# Patient Record
Sex: FEMALE | Race: WHITE | Employment: OTHER | URBAN - NONMETROPOLITAN AREA
[De-identification: names, ages, dates, MRNs, and addresses within clinical notes are randomized per-mention and may not be internally consistent; named-entity substitution may affect disease eponyms.]

---

## 2017-02-15 ENCOUNTER — TELEPHONE (OUTPATIENT)
Dept: NEUROLOGY | Age: 67
End: 2017-02-15

## 2017-02-27 DIAGNOSIS — G43.719 INTRACTABLE CHRONIC MIGRAINE WITHOUT AURA AND WITHOUT STATUS MIGRAINOSUS: Primary | ICD-10-CM

## 2017-03-23 ENCOUNTER — HOSPITAL ENCOUNTER (OUTPATIENT)
Dept: PAIN MANAGEMENT | Age: 67
Discharge: HOME | End: 2017-03-23
Payer: MEDICARE

## 2017-03-23 VITALS
WEIGHT: 164 LBS | TEMPERATURE: 97.1 F | RESPIRATION RATE: 16 BRPM | HEIGHT: 66 IN | SYSTOLIC BLOOD PRESSURE: 119 MMHG | DIASTOLIC BLOOD PRESSURE: 74 MMHG | HEART RATE: 74 BPM | OXYGEN SATURATION: 98 % | BODY MASS INDEX: 26.36 KG/M2

## 2017-03-23 DIAGNOSIS — M54.41 RIGHT-SIDED LOW BACK PAIN WITH RIGHT-SIDED SCIATICA, UNSPECIFIED CHRONICITY: ICD-10-CM

## 2017-03-23 DIAGNOSIS — M47.816 LUMBAR FACET ARTHROPATHY: ICD-10-CM

## 2017-03-23 DIAGNOSIS — G89.29 CHRONIC RIGHT-SIDED LOW BACK PAIN WITHOUT SCIATICA: ICD-10-CM

## 2017-03-23 DIAGNOSIS — M54.2 NECK AND SHOULDER PAIN: ICD-10-CM

## 2017-03-23 DIAGNOSIS — R51.9 FREQUENT HEADACHES: ICD-10-CM

## 2017-03-23 DIAGNOSIS — M25.519 NECK AND SHOULDER PAIN: ICD-10-CM

## 2017-03-23 DIAGNOSIS — G44.221 CHRONIC TENSION-TYPE HEADACHE, INTRACTABLE: ICD-10-CM

## 2017-03-23 DIAGNOSIS — M54.50 CHRONIC RIGHT-SIDED LOW BACK PAIN WITHOUT SCIATICA: ICD-10-CM

## 2017-03-23 PROCEDURE — 80307 DRUG TEST PRSMV CHEM ANLYZR: CPT

## 2017-03-23 PROCEDURE — 99214 OFFICE O/P EST MOD 30 MIN: CPT

## 2017-03-23 RX ORDER — OXYCODONE HYDROCHLORIDE 10 MG/1
10 TABLET ORAL 2 TIMES DAILY
Qty: 60 TABLET | Refills: 0 | Status: SHIPPED | OUTPATIENT
Start: 2017-03-23 | End: 2017-05-25 | Stop reason: SDUPTHER

## 2017-03-23 RX ORDER — METHOCARBAMOL 500 MG/1
500 TABLET, FILM COATED ORAL 2 TIMES DAILY PRN
Qty: 60 TABLET | Refills: 2 | Status: SHIPPED | OUTPATIENT
Start: 2017-03-23 | End: 2017-08-11 | Stop reason: SDUPTHER

## 2017-03-23 RX ORDER — ONDANSETRON 4 MG/1
4 TABLET, ORALLY DISINTEGRATING ORAL EVERY 8 HOURS PRN
Qty: 45 TABLET | Refills: 2 | Status: SHIPPED | OUTPATIENT
Start: 2017-03-23 | End: 2019-05-01 | Stop reason: ALTCHOICE

## 2017-03-23 RX ORDER — HYDROXYCHLOROQUINE SULFATE 200 MG/1
1 TABLET, FILM COATED ORAL 2 TIMES DAILY
COMMUNITY

## 2017-03-23 ASSESSMENT — ACTIVITIES OF DAILY LIVING (ADL): EFFECT OF PAIN ON DAILY ACTIVITIES: DAILY ACTIVITIES

## 2017-03-23 ASSESSMENT — PAIN DESCRIPTION - ONSET: ONSET: ON-GOING

## 2017-03-23 ASSESSMENT — PAIN DESCRIPTION - PROGRESSION: CLINICAL_PROGRESSION: NOT CHANGED

## 2017-03-23 ASSESSMENT — PAIN DESCRIPTION - DESCRIPTORS: DESCRIPTORS: ACHING;CONSTANT;THROBBING

## 2017-03-23 ASSESSMENT — PAIN DESCRIPTION - PAIN TYPE: TYPE: CHRONIC PAIN

## 2017-03-23 ASSESSMENT — PAIN DESCRIPTION - ORIENTATION: ORIENTATION: LOWER

## 2017-03-23 ASSESSMENT — PAIN SCALES - GENERAL: PAINLEVEL_OUTOF10: 7

## 2017-03-23 ASSESSMENT — PAIN DESCRIPTION - LOCATION: LOCATION: BACK;NECK

## 2017-03-23 ASSESSMENT — PAIN DESCRIPTION - FREQUENCY: FREQUENCY: CONTINUOUS

## 2017-03-24 ENCOUNTER — TRANSCRIBE ORDERS (OUTPATIENT)
Dept: PHYSICAL THERAPY | Facility: HOSPITAL | Age: 67
End: 2017-03-24

## 2017-03-24 DIAGNOSIS — M50.30 DDD (DEGENERATIVE DISC DISEASE), CERVICAL: Primary | ICD-10-CM

## 2017-03-24 DIAGNOSIS — M51.36 DDD (DEGENERATIVE DISC DISEASE), LUMBAR: ICD-10-CM

## 2017-03-27 ENCOUNTER — TELEPHONE (OUTPATIENT)
Dept: NEUROLOGY | Age: 67
End: 2017-03-27

## 2017-03-27 ENCOUNTER — TELEPHONE (OUTPATIENT)
Dept: PAIN MANAGEMENT | Age: 67
End: 2017-03-27

## 2017-03-29 LAB
ALPRAZOLAM, URINE CONFIRM: 528 NG/ML
ALPRAZOLAM, URINE: POSITIVE
AMPHETAMINES, URINE: NEGATIVE NG/ML
BARBITURATES, URINE: NEGATIVE NG/ML
BENZODIAZEPINES, URINE: ABNORMAL NG/ML
BENZODIAZEPINES, URINE: POSITIVE NG/ML
CANNABINOIDS, URINE: NEGATIVE NG/ML
CLONAZEPAM, URINE: NEGATIVE
COCAINE METABOLITE, URINE: NEGATIVE NG/ML
CREATININE, URINE: 70.9 MG/DL (ref 20–300)
ETHANOL U, QUAN: NEGATIVE %
FENTANYL URINE: NEGATIVE PG/ML
FLURAZEPAM, UR: NEGATIVE
LORAZEPAM, URINE: NEGATIVE
MEPERIDINE, UR: NEGATIVE NG/ML
METHADONE SCREEN, URINE: NEGATIVE NG/ML
MIDAZOLAM, URINE: NEGATIVE
NORDIAZEPAM, URINE: NEGATIVE
OPIATES, URINE: NEGATIVE NG/ML
OXAZEPAM, URINE: NEGATIVE
OXYCODONE/OXYMORPHONE, UR: NEGATIVE NG/ML
PH, URINE: 5.1 (ref 4.5–8.9)
PHENCYCLIDINE, URINE: NEGATIVE NG/ML
PROPOXYPHENE, URINE: NEGATIVE NG/ML
TEMAZEPAM, URINE: NEGATIVE
TRIAZOLAM, URINE: NEGATIVE

## 2017-04-05 ENCOUNTER — OFFICE VISIT (OUTPATIENT)
Dept: NEUROLOGY | Age: 67
End: 2017-04-05
Payer: MEDICARE

## 2017-04-05 VITALS
BODY MASS INDEX: 26.49 KG/M2 | HEIGHT: 65 IN | WEIGHT: 159 LBS | SYSTOLIC BLOOD PRESSURE: 118 MMHG | DIASTOLIC BLOOD PRESSURE: 72 MMHG

## 2017-04-05 DIAGNOSIS — G43.719 INTRACTABLE CHRONIC MIGRAINE WITHOUT AURA AND WITHOUT STATUS MIGRAINOSUS: Primary | ICD-10-CM

## 2017-04-05 PROCEDURE — 64615 CHEMODENERV MUSC MIGRAINE: CPT | Performed by: PSYCHIATRY & NEUROLOGY

## 2017-04-05 PROCEDURE — 1036F TOBACCO NON-USER: CPT | Performed by: PSYCHIATRY & NEUROLOGY

## 2017-05-08 ENCOUNTER — TRANSCRIBE ORDERS (OUTPATIENT)
Dept: PHYSICAL THERAPY | Facility: HOSPITAL | Age: 67
End: 2017-05-08

## 2017-05-08 DIAGNOSIS — M51.36 DDD (DEGENERATIVE DISC DISEASE), LUMBAR: Primary | ICD-10-CM

## 2017-05-09 ENCOUNTER — APPOINTMENT (OUTPATIENT)
Dept: PHYSICAL THERAPY | Facility: HOSPITAL | Age: 67
End: 2017-05-09

## 2017-05-17 ENCOUNTER — HOSPITAL ENCOUNTER (OUTPATIENT)
Dept: PHYSICAL THERAPY | Facility: HOSPITAL | Age: 67
Setting detail: THERAPIES SERIES
Discharge: HOME | End: 2017-05-17

## 2017-05-17 DIAGNOSIS — M54.2 NECK PAIN: ICD-10-CM

## 2017-05-17 DIAGNOSIS — M51.36 DDD (DEGENERATIVE DISC DISEASE), LUMBAR: Primary | ICD-10-CM

## 2017-05-17 PROCEDURE — G8979 MOBILITY GOAL STATUS: HCPCS | Performed by: PHYSICAL THERAPIST

## 2017-05-17 PROCEDURE — 97110 THERAPEUTIC EXERCISES: CPT | Performed by: PHYSICAL THERAPIST

## 2017-05-17 PROCEDURE — G8978 MOBILITY CURRENT STATUS: HCPCS | Performed by: PHYSICAL THERAPIST

## 2017-05-17 PROCEDURE — 97161 PT EVAL LOW COMPLEX 20 MIN: CPT | Performed by: PHYSICAL THERAPIST

## 2017-05-19 ENCOUNTER — APPOINTMENT (OUTPATIENT)
Dept: PHYSICAL THERAPY | Facility: HOSPITAL | Age: 67
End: 2017-05-19

## 2017-05-23 ENCOUNTER — HOSPITAL ENCOUNTER (OUTPATIENT)
Dept: PHYSICAL THERAPY | Facility: HOSPITAL | Age: 67
Setting detail: THERAPIES SERIES
Discharge: HOME | End: 2017-05-23

## 2017-05-23 DIAGNOSIS — M51.36 DDD (DEGENERATIVE DISC DISEASE), LUMBAR: Primary | ICD-10-CM

## 2017-05-23 DIAGNOSIS — M54.2 NECK PAIN: ICD-10-CM

## 2017-05-23 PROCEDURE — 97140 MANUAL THERAPY 1/> REGIONS: CPT

## 2017-05-23 PROCEDURE — 97110 THERAPEUTIC EXERCISES: CPT

## 2017-05-25 ENCOUNTER — HOSPITAL ENCOUNTER (OUTPATIENT)
Dept: PHYSICAL THERAPY | Facility: HOSPITAL | Age: 67
Setting detail: THERAPIES SERIES
Discharge: HOME | End: 2017-05-25

## 2017-05-25 ENCOUNTER — HOSPITAL ENCOUNTER (OUTPATIENT)
Dept: PAIN MANAGEMENT | Age: 67
Discharge: HOME | End: 2017-05-25
Payer: MEDICARE

## 2017-05-25 VITALS
WEIGHT: 165 LBS | DIASTOLIC BLOOD PRESSURE: 71 MMHG | HEIGHT: 66 IN | TEMPERATURE: 97.4 F | HEART RATE: 67 BPM | BODY MASS INDEX: 26.52 KG/M2 | SYSTOLIC BLOOD PRESSURE: 103 MMHG | OXYGEN SATURATION: 95 % | RESPIRATION RATE: 16 BRPM

## 2017-05-25 DIAGNOSIS — M25.519 NECK AND SHOULDER PAIN: ICD-10-CM

## 2017-05-25 DIAGNOSIS — R51.9 FREQUENT HEADACHES: ICD-10-CM

## 2017-05-25 DIAGNOSIS — M54.41 RIGHT-SIDED LOW BACK PAIN WITH RIGHT-SIDED SCIATICA, UNSPECIFIED CHRONICITY: ICD-10-CM

## 2017-05-25 DIAGNOSIS — M51.36 DDD (DEGENERATIVE DISC DISEASE), LUMBAR: Primary | ICD-10-CM

## 2017-05-25 DIAGNOSIS — G89.29 CHRONIC RIGHT-SIDED LOW BACK PAIN WITHOUT SCIATICA: ICD-10-CM

## 2017-05-25 DIAGNOSIS — M54.50 CHRONIC RIGHT-SIDED LOW BACK PAIN WITHOUT SCIATICA: ICD-10-CM

## 2017-05-25 DIAGNOSIS — G44.221 CHRONIC TENSION-TYPE HEADACHE, INTRACTABLE: ICD-10-CM

## 2017-05-25 DIAGNOSIS — M47.816 LUMBAR FACET ARTHROPATHY: ICD-10-CM

## 2017-05-25 DIAGNOSIS — M54.2 NECK AND SHOULDER PAIN: ICD-10-CM

## 2017-05-25 DIAGNOSIS — M54.2 NECK PAIN: ICD-10-CM

## 2017-05-25 PROCEDURE — 97140 MANUAL THERAPY 1/> REGIONS: CPT

## 2017-05-25 PROCEDURE — 99213 OFFICE O/P EST LOW 20 MIN: CPT

## 2017-05-25 PROCEDURE — 97110 THERAPEUTIC EXERCISES: CPT

## 2017-05-25 RX ORDER — OXYCODONE HYDROCHLORIDE 10 MG/1
10 TABLET ORAL 2 TIMES DAILY
Qty: 60 TABLET | Refills: 0 | Status: SHIPPED | OUTPATIENT
Start: 2017-05-25 | End: 2017-10-24 | Stop reason: SDUPTHER

## 2017-05-25 ASSESSMENT — PAIN DESCRIPTION - FREQUENCY: FREQUENCY: CONTINUOUS

## 2017-05-25 ASSESSMENT — PAIN DESCRIPTION - PAIN TYPE: TYPE: CHRONIC PAIN

## 2017-05-25 ASSESSMENT — PAIN DESCRIPTION - PROGRESSION: CLINICAL_PROGRESSION: NOT CHANGED

## 2017-05-25 ASSESSMENT — PAIN DESCRIPTION - ORIENTATION: ORIENTATION: LOWER

## 2017-05-25 ASSESSMENT — PAIN SCALES - GENERAL: PAINLEVEL_OUTOF10: 7

## 2017-05-25 ASSESSMENT — PAIN DESCRIPTION - ONSET: ONSET: ON-GOING

## 2017-05-25 ASSESSMENT — PAIN DESCRIPTION - LOCATION: LOCATION: BACK;NECK

## 2017-05-30 ENCOUNTER — HOSPITAL ENCOUNTER (OUTPATIENT)
Dept: PHYSICAL THERAPY | Facility: HOSPITAL | Age: 67
Setting detail: THERAPIES SERIES
Discharge: HOME | End: 2017-05-30

## 2017-05-30 ENCOUNTER — TELEPHONE (OUTPATIENT)
Dept: NEUROLOGY | Age: 67
End: 2017-05-30

## 2017-05-30 DIAGNOSIS — M51.36 DDD (DEGENERATIVE DISC DISEASE), LUMBAR: Primary | ICD-10-CM

## 2017-05-30 DIAGNOSIS — M54.2 NECK PAIN: ICD-10-CM

## 2017-05-30 PROCEDURE — 97140 MANUAL THERAPY 1/> REGIONS: CPT | Performed by: PHYSICAL THERAPIST

## 2017-05-30 PROCEDURE — 97110 THERAPEUTIC EXERCISES: CPT | Performed by: PHYSICAL THERAPIST

## 2017-06-01 ENCOUNTER — APPOINTMENT (OUTPATIENT)
Dept: PHYSICAL THERAPY | Facility: HOSPITAL | Age: 67
End: 2017-06-01

## 2017-06-06 ENCOUNTER — APPOINTMENT (OUTPATIENT)
Dept: PHYSICAL THERAPY | Facility: HOSPITAL | Age: 67
End: 2017-06-06

## 2017-06-08 ENCOUNTER — APPOINTMENT (OUTPATIENT)
Dept: PHYSICAL THERAPY | Facility: HOSPITAL | Age: 67
End: 2017-06-08

## 2017-06-13 ENCOUNTER — HOSPITAL ENCOUNTER (OUTPATIENT)
Dept: PHYSICAL THERAPY | Facility: HOSPITAL | Age: 67
Setting detail: THERAPIES SERIES
Discharge: HOME | End: 2017-06-13

## 2017-06-13 DIAGNOSIS — M51.36 DDD (DEGENERATIVE DISC DISEASE), LUMBAR: Primary | ICD-10-CM

## 2017-06-13 DIAGNOSIS — M54.2 NECK PAIN: ICD-10-CM

## 2017-06-13 PROCEDURE — 97110 THERAPEUTIC EXERCISES: CPT

## 2017-06-13 PROCEDURE — 97140 MANUAL THERAPY 1/> REGIONS: CPT

## 2017-06-13 NOTE — THERAPY TREATMENT NOTE
Outpatient Physical Therapy Ortho Treatment Note  [facility]     Patient Name: Natalee Noble  : 1950  MRN: [id number]  Today's Date: 2017      Visit Date: 2017    Visit Dx:    ICD-10-CM ICD-9-CM   1. DDD (degenerative disc disease), lumbar M51.36 722.52   2. Neck pain M54.2 723.1       There is no problem list on file for this patient.       Past Medical History:   Diagnosis Date   • Back pain    • Fractures     R tibia/fibula; L ankle   • Neck pain         Past Surgical History:   Procedure Laterality Date   • BACK SURGERY      L2-5 fusion    • NECK SURGERY      ACDF C4-7                             PT Assessment/Plan       17 1437       PT Assessment    Functional Limitations Impaired gait;Limitations in community activities;Limitations in functional capacity and performance;Performance in leisure activities;Performance in self-care ADL  -TC     Impairments Balance;Gait;Endurance;Impaired flexibility;Impaired muscle length;Impaired muscle endurance;Impaired muscle power;Impaired postural alignment;Joint mobility;Motor function;Muscle strength;Pain;Posture;Poor body mechanics;Range of motion  -TC     Assessment Comments Pt has not met any goals at this time but is making progress towards her goals. Her cervical, thoracic and hip ROM are improving along with her core/gluteal  activation. Although these are improving they are still limited thus exacerbating her poor pathomechanics and increasing the strain across the area above and below her cervical and lumbar fusion and place shearing forces across the SIJs. She did report a new onset of thoracic/right scapular pain but it appeared to be joint referred pain due to a stuck facet and subsequent mm guarding. This was relieved post mobilizaitions and exercises post session. She would continue to benefit from skilled care in order to improve function, decrease pain, and prevent further surgery. Thank you for this referral.     -TC     Please refer to paper survey for additional self-reported information Yes  -TC     Rehab Potential Good  -TC     Patient/caregiver participated in establishment of treatment plan and goals Yes  -TC     Patient would benefit from skilled therapy intervention Yes  -TC     PT Plan    PT Frequency 2x/week  -TC     Predicted Duration of Therapy Intervention (days/wks) 4-6 weeks   -TC     Planned CPT's? PT THER PROC EA 15 MIN: 30424;PT THER ACT EA 15 MIN: 28864;PT MANUAL THERAPY EA 15 MIN: 79278;PT NEUROMUSC RE-EDUCATION EA 15 MIN: 14578;PT ELECTRICAL STIM UNATTEND: ;PT ELECTRICAL STIM ATTD EA 15 MIN: 27272  -TC     Physical Therapy Interventions (Optional Details) balance training;gait training;gross motor skills;home exercise program;lumbar stabilization;joint mobilization;manual therapy techniques;modalities;neuromuscular re-education;patient/family education;postural re-education;ROM (Range of Motion);strengthening;taping  -TC     PT Plan Comments We will continue too work with her addressing spinal and hip mobility, guarding, and  neural tension and restore her ROM in order to promote improved posture. We will be progressing her scapular, lumbar and work to activate and strengthen her core and gluteals in order to improve force closure to decrease strain on SIJs and across lumbar and cervical spine.   -TC       User Key  (r) = Recorded By, (t) = Taken By, (c) = Cosigned By    Initials Name Provider Type    TC Lakeisha Rock, PT DPT Physical Therapist                    Exercises       06/13/17 1280          Subjective Comments    Subjective Comments Pt reported that for the past week and 1/2 she has had a terrible pain in her R shoulder blade. She has tried ice/heat and nothing seems to help. The place wont give, it is even painful with shoulder elevation.   -TC      Subjective Pain    Able to rate subjective pain? yes  -TC      Pre-Treatment Pain Level 7  -TC      Subjective Pain Comment right mid  thoracic pain   -TC      Exercise 1    Exercise Name 1 hooklying hip abd with femoral ER  -TC      Cueing 1 Verbal;Tactile;Demo  -TC      Sets 1 3  -TC      Reps 1 10  -TC      Exercise 2    Exercise Name 2 standing SA slides   -TC      Cueing 2 Verbal;Tactile;Demo  -TC      Sets 2 3  -TC      Reps 2 10  -TC      Exercise 3    Exercise Name 3 reviewed lisa UE phasic w/o resistance; cues not to overextert herself into scapular depression, retraction and thoracic extension  -TC      Cueing 3 Verbal;Tactile;Demo  -TC      Sets 3 3  -TC      Reps 3 10  -TC        User Key  (r) = Recorded By, (t) = Taken By, (c) = Cosigned By    Initials Name Provider Type    TC Lakeisha Rock, PT DPT Physical Therapist                        Manual Rx (last 36 hours)      Manual Treatments       06/13/17 1437          Manual Rx 1    Manual Rx 1 Location assessed her thoracic and scapular ROM  -TC      Manual Rx 1 Type decreased ROM with left thoracic, full thoracic right rotation with limited motion at T5-6 and associated ribs, sharp pain to right of scapula elicited. Palpation over the right rhomboids also elicited similar pain she reports.   -TC      Manual Rx 1 Duration 5  -TC      Manual Rx 2    Manual Rx 2 Location prone STM to thoracic carmel, rhomboids, R>L, UT    -TC      Manual Rx 2 Grade moderate, TrP  -TC      Manual Rx 2 Duration 13  -TC      Manual Rx 3    Manual Rx 3 Location prone thoracic rotational mobilization to T5-6  -TC      Manual Rx 3 Grade 3  -TC      Manual Rx 3 Duration 5---pain relief noted afterwards improved mobility   -TC      Manual Rx 4    Manual Rx 4 Location prone PA to R 4th and 6th rib  -TC      Manual Rx 4 Grade 2-3  -TC      Manual Rx 4 Duration 5 pain relief noted after, improved mobility   -TC        User Key  (r) = Recorded By, (t) = Taken By, (c) = Cosigned By    Initials Name Provider Type    TC Lakeisha Rock, PT DPT Physical Therapist                PT OP Goals       06/13/17 6627        PT Short Term Goals    STG Date to Achieve 07/04/17  -TC     STG 1 Pt will report no greater than 3-4/10 with sitting, standing and walking activities.   -TC     STG 1 Progress Ongoing  -TC     STG 1 Progress Comments 7/10 to scapular region/thoracic spine today; mostly 5/10 neck and back pain   -TC     STG 2 Pt will be independent with home program.   -TC     STG 2 Progress Partially Met  -TC     STG 2 Progress Comments reports daily compliance  -TC     Long Term Goals    LTG Date to Achieve 07/25/17  -TC     LTG 1 Pt will score 20 % or less on the Oswestry.   -TC     LTG 1 Progress Ongoing  -TC     LTG 1 Progress Comments unable to assess today d/t time contraints from assessing her thoracic/scapular pain  -TC     LTG 2 Pt will score 15 or less on the neck disability index.   -TC     LTG 2 Progress Ongoing  -TC     LTG 2 Progress Comments unable to assess today d/t time contraints from assessing her thoracic/scapular pain  -TC     LTG 3 Pt will perform single leg stance for 10-15 seconds without hip drop, pelvic drift/sway.   -TC     LTG 3 Progress Ongoing  -TC     LTG 3 Progress Comments unable to perform this today without demonstrating compensations   -TC     LTG 4 Pt will demonstrate 60 degrees or greater cervical rotation.   -TC     LTG 4 Progress Ongoing  -TC     LTG 4 Progress Comments R 57, L 54  -TC     Time Calculation    PT Goal Re-Cert Due Date 07/13/17  -TC       User Key  (r) = Recorded By, (t) = Taken By, (c) = Cosigned By    Initials Name Provider Type    TC Lakeisha Rock, PT DPT Physical Therapist                Therapy Education       06/13/17 1550          Therapy Education    Given HEP;Symptoms/condition management;Pain management;Posture/body mechanics  -TC      Program New   added neutral bridge with femoral ER resisted, reviewed lisa UE phasic mechanics, and added SA  slides   -TC      How Provided Verbal;Demonstration;Written  -TC      Provided to Patient  -TC      Level of  Understanding Teach back education performed;Verbalized;Demonstrated  -TC        User Key  (r) = Recorded By, (t) = Taken By, (c) = Cosigned By    Initials Name Provider Type    TC Lakeisha Rock, PT DPT Physical Therapist                Time Calculation:   Start Time: 1437  Stop Time: 1526  Time Calculation (min): 49 min  Total Timed Code Minutes- PT: 49 minute(s)    Therapy Charges for Today     Code Description Service Date Service Provider Modifiers Qty    82152877046  PT MANUAL THERAPY EA 15 MIN 6/13/2017 Lakeisha Rock, PT DPT GP 2    70829624096 HC PT THER PROC EA 15 MIN 6/13/2017 Lakeisha Rock, PT DPT GP 1                    Lakeisha Rock, PT DPT  6/13/2017

## 2017-06-14 ENCOUNTER — HOSPITAL ENCOUNTER (OUTPATIENT)
Dept: PAIN MANAGEMENT | Age: 67
Discharge: HOME | End: 2017-06-14
Payer: MEDICARE

## 2017-06-14 VITALS
SYSTOLIC BLOOD PRESSURE: 114 MMHG | OXYGEN SATURATION: 98 % | HEART RATE: 59 BPM | RESPIRATION RATE: 20 BRPM | DIASTOLIC BLOOD PRESSURE: 63 MMHG | TEMPERATURE: 97.5 F

## 2017-06-14 DIAGNOSIS — M50.90 CERVICAL DISC DISEASE: Chronic | ICD-10-CM

## 2017-06-14 DIAGNOSIS — M54.2 NECK PAIN: ICD-10-CM

## 2017-06-14 PROCEDURE — 2580000003 HC RX 258

## 2017-06-14 PROCEDURE — 62321 NJX INTERLAMINAR CRV/THRC: CPT

## 2017-06-14 PROCEDURE — 99152 MOD SED SAME PHYS/QHP 5/>YRS: CPT

## 2017-06-14 PROCEDURE — 20553 NJX 1/MLT TRIGGER POINTS 3/>: CPT

## 2017-06-14 PROCEDURE — 6360000002 HC RX W HCPCS

## 2017-06-14 PROCEDURE — G0260 INJ FOR SACROILIAC JT ANESTH: HCPCS

## 2017-06-14 PROCEDURE — 2500000003 HC RX 250 WO HCPCS

## 2017-06-14 PROCEDURE — 3209999900 FLUORO FOR SURGICAL PROCEDURES

## 2017-06-14 RX ORDER — MIDAZOLAM HYDROCHLORIDE 1 MG/ML
INJECTION INTRAMUSCULAR; INTRAVENOUS
Status: COMPLETED | OUTPATIENT
Start: 2017-06-14 | End: 2017-06-14

## 2017-06-14 RX ORDER — 0.9 % SODIUM CHLORIDE 0.9 %
VIAL (ML) INJECTION
Status: COMPLETED | OUTPATIENT
Start: 2017-06-14 | End: 2017-06-14

## 2017-06-14 RX ORDER — LIDOCAINE HYDROCHLORIDE 10 MG/ML
INJECTION, SOLUTION EPIDURAL; INFILTRATION; INTRACAUDAL; PERINEURAL
Status: COMPLETED | OUTPATIENT
Start: 2017-06-14 | End: 2017-06-14

## 2017-06-14 RX ORDER — METHYLPREDNISOLONE ACETATE 80 MG/ML
INJECTION, SUSPENSION INTRA-ARTICULAR; INTRALESIONAL; INTRAMUSCULAR; SOFT TISSUE
Status: COMPLETED | OUTPATIENT
Start: 2017-06-14 | End: 2017-06-14

## 2017-06-14 RX ORDER — TRIAMCINOLONE ACETONIDE 40 MG/ML
INJECTION, SUSPENSION INTRA-ARTICULAR; INTRAMUSCULAR
Status: COMPLETED | OUTPATIENT
Start: 2017-06-14 | End: 2017-06-14

## 2017-06-14 RX ORDER — BUPIVACAINE HYDROCHLORIDE 2.5 MG/ML
INJECTION, SOLUTION EPIDURAL; INFILTRATION; INTRACAUDAL
Status: COMPLETED | OUTPATIENT
Start: 2017-06-14 | End: 2017-06-14

## 2017-06-14 RX ORDER — BUPIVACAINE HYDROCHLORIDE 5 MG/ML
INJECTION, SOLUTION EPIDURAL; INTRACAUDAL
Status: COMPLETED | OUTPATIENT
Start: 2017-06-14 | End: 2017-06-14

## 2017-06-14 RX ADMIN — BUPIVACAINE HYDROCHLORIDE 4.5 ML: 5 INJECTION, SOLUTION EPIDURAL; INTRACAUDAL at 11:28

## 2017-06-14 RX ADMIN — TRIAMCINOLONE ACETONIDE 20 MG: 40 INJECTION, SUSPENSION INTRA-ARTICULAR; INTRAMUSCULAR at 11:29

## 2017-06-14 RX ADMIN — LIDOCAINE HYDROCHLORIDE 3 ML: 10 INJECTION, SOLUTION EPIDURAL; INFILTRATION; INTRACAUDAL; PERINEURAL at 11:23

## 2017-06-14 RX ADMIN — MIDAZOLAM HYDROCHLORIDE 2 MG: 1 INJECTION INTRAMUSCULAR; INTRAVENOUS at 11:19

## 2017-06-14 RX ADMIN — BUPIVACAINE HYDROCHLORIDE 2.5 ML: 2.5 INJECTION, SOLUTION EPIDURAL; INFILTRATION; INTRACAUDAL at 11:26

## 2017-06-14 RX ADMIN — METHYLPREDNISOLONE ACETATE 80 MG: 80 INJECTION, SUSPENSION INTRA-ARTICULAR; INTRALESIONAL; INTRAMUSCULAR; SOFT TISSUE at 11:27

## 2017-06-14 RX ADMIN — Medication 1.5 ML: at 11:26

## 2017-06-14 ASSESSMENT — PAIN DESCRIPTION - PAIN TYPE: TYPE: CHRONIC PAIN

## 2017-06-14 ASSESSMENT — PAIN DESCRIPTION - PROGRESSION: CLINICAL_PROGRESSION: GRADUALLY WORSENING

## 2017-06-14 ASSESSMENT — PAIN SCALES - GENERAL: PAINLEVEL_OUTOF10: 7

## 2017-06-14 ASSESSMENT — PAIN DESCRIPTION - ORIENTATION: ORIENTATION: LOWER

## 2017-06-14 ASSESSMENT — PAIN DESCRIPTION - FREQUENCY: FREQUENCY: CONTINUOUS

## 2017-06-14 ASSESSMENT — PAIN DESCRIPTION - LOCATION: LOCATION: BACK;NECK

## 2017-06-14 ASSESSMENT — PAIN DESCRIPTION - ONSET: ONSET: AWAKENED FROM SLEEP

## 2017-06-20 ENCOUNTER — TELEPHONE (OUTPATIENT)
Dept: NEUROLOGY | Age: 67
End: 2017-06-20

## 2017-06-20 ENCOUNTER — HOSPITAL ENCOUNTER (OUTPATIENT)
Dept: PHYSICAL THERAPY | Facility: HOSPITAL | Age: 67
Setting detail: THERAPIES SERIES
Discharge: HOME | End: 2017-06-20

## 2017-06-20 DIAGNOSIS — M54.2 NECK PAIN: ICD-10-CM

## 2017-06-20 DIAGNOSIS — M51.36 DDD (DEGENERATIVE DISC DISEASE), LUMBAR: Primary | ICD-10-CM

## 2017-06-20 PROCEDURE — 97140 MANUAL THERAPY 1/> REGIONS: CPT

## 2017-06-20 PROCEDURE — 97110 THERAPEUTIC EXERCISES: CPT

## 2017-06-20 NOTE — THERAPY TREATMENT NOTE
Outpatient Physical Therapy Ortho Treatment Note  [facility]     Patient Name: Natalee Noble  : 1950  MRN: [id number]  Today's Date: 2017      Visit Date: 2017    Visit Dx:    ICD-10-CM ICD-9-CM   1. DDD (degenerative disc disease), lumbar M51.36 722.52   2. Neck pain M54.2 723.1       There is no problem list on file for this patient.       Past Medical History:   Diagnosis Date   • Back pain    • Fractures     R tibia/fibula; L ankle   • Neck pain         Past Surgical History:   Procedure Laterality Date   • BACK SURGERY      L2-5 fusion    • NECK SURGERY      ACDF C4-7                             PT Assessment/Plan       17 0938       PT Assessment    Assessment Comments Pt was 8 mins late to therapy today, but I was able to see her for 41 mins. Pts R SIJ was a bit more sore today compared to the past few sessions. Her right thoracic pain is improving but still sore as well. She still appears to have joint hypomobility and secondary mm guarding in the region thus exacerbating the pain. She felt better after thoracic and SIJ mobilizations today but she still requires review and cues for proper mechanaics during her home exercises. We once more focused on this today in order to ensure she is not aggravating her symptoms with her HEP. I added/modified her HEP and she understands the importance of performing her exercises with proper technique and quality vs quantity. We will reassess and progress her as indicated next session and address more thoracic/cervical ROM and stability. .  -TC     PT Plan    PT Plan Comments We will continue to address her mobility restrictions, mm guarding and restore ROM. We will begin to work into her new ranges and work towards core, hip, scapular stability in order to begin to decrease the strain across her cervical and lumbar spine. Force closure will continue to be addressed to decreased strain  through her SIJs.   -TC       User Key  (r)  = Recorded By, (t) = Taken By, (c) = Cosigned By    Initials Name Provider Type    TC Lakeisha Rock, PT DPT Physical Therapist                    Exercises       06/20/17 0938          Subjective Comments    Subjective Comments Pt reported that her shoulder blade pain better but still there. Her epidural went well to cervical, lumbar and SIJs. She has stayed on ice and heat for last few days. R SIJ still bothering her most    -TC      Subjective Pain    Able to rate subjective pain? yes  -TC      Pre-Treatment Pain Level 7  -TC      Post-Treatment Pain Level 5  -TC      Subjective Pain Comment R SIJ and R lateral thoracic region   -TC      Exercise 1    Exercise Name 1 prone isometric glute with attempted lifts (knees bent and towel roll supporting each above the knee)   -TC      Cueing 1 Verbal;Tactile;Demo  -TC      Sets 1 3  -TC      Reps 1 10  -TC      Exercise 2    Exercise Name 2 reviewed hookllying hip abd with femoral ER   -TC      Cueing 2 Verbal;Tactile;Demo  -TC      Sets 2 3  -TC      Reps 2 10  -TC      Exercise 3    Exercise Name 3 lisa UE phasic reivew   -TC      Cueing 3 Verbal;Tactile;Demo  -TC      Sets 3 3  -TC      Reps 3 10  -TC      Exercise 4    Exercise Name 4 standing SA wall slides reviewed   -TC      Cueing 4 Verbal;Tactile;Demo  -TC      Sets 4 3  -TC      Reps 4 10  -TC        User Key  (r) = Recorded By, (t) = Taken By, (c) = Cosigned By    Initials Name Provider Type    TC Lakeisha Rock, PT DPT Physical Therapist                        Manual Rx (last 36 hours)      Manual Treatments       06/20/17 0938          Manual Rx 1    Manual Rx 1 Location assessed her thoracic and scapular ROM  -TC      Manual Rx 1 Type decreased ROM with left thoracic, full thoracic right rotation with limited motion at T5-6 and associated ribs, sharp pain to right of scapula elicited. Palpation over the right rhomboids also elicited similar pain she reports.   -TC      Manual Rx 1 Duration 5  -TC       Manual Rx 2    Manual Rx 2 Location prone STM to thoracic carmel, rhomboids, R>L, UT    -TC      Manual Rx 2 Grade moderate, TrP  -TC      Manual Rx 2 Duration 8  -TC      Manual Rx 3    Manual Rx 3 Location prone thoracic rotational mobilization to T5-6  -TC      Manual Rx 3 Grade 3  -TC      Manual Rx 3 Duration 5---pain relief noted afterwards improved mobility   -TC      Manual Rx 4    Manual Rx 4 Location prone PA to R 4th and 6th rib  -TC      Manual Rx 4 Grade 2-3  -TC      Manual Rx 4 Duration 5 pain relief noted after, improved mobility   -TC      Manual Rx 5    Manual Rx 5 Location R SIJ   -TC      Manual Rx 5 Type distraction mobilization grade 2 and sacral PA grade 3  -TC      Manual Rx 5 Grade 2-3  -TC      Manual Rx 5 Duration 5  -TC        User Key  (r) = Recorded By, (t) = Taken By, (c) = Cosigned By    Initials Name Provider Type    TC Lakeisha Rock, PT DPT Physical Therapist                PT OP Goals       06/20/17 0938       PT Short Term Goals    STG Date to Achieve 07/04/17  -TC     STG 1 Pt will report no greater than 3-4/10 with sitting, standing and walking activities.   -TC     STG 1 Progress Ongoing  -TC     STG 1 Progress Comments 7/10 thoracic and R SIJ today   -TC     STG 2 Pt will be independent with home program.   -TC     STG 2 Progress Partially Met  -TC     STG 2 Progress Comments reports compliance, reviewed today because she continues to perform with poor technique  -TC     Long Term Goals    LTG Date to Achieve 07/25/17  -TC     LTG 1 Pt will score 20 % or less on the Oswestry.   -TC     LTG 1 Progress Ongoing  -TC     LTG 2 Pt will score 15 or less on the neck disability index.   -TC     LTG 2 Progress Ongoing  -TC     LTG 3 Pt will perform single leg stance for 10-15 seconds without hip drop, pelvic drift/sway.   -TC     LTG 3 Progress Ongoing  -TC     LTG 4 Pt will demonstrate 60 degrees or greater cervical rotation.   -TC     LTG 4 Progress Ongoing  -TC     Time  Calculation    PT Goal Re-Cert Due Date 07/13/17  -TC       User Key  (r) = Recorded By, (t) = Taken By, (c) = Cosigned By    Initials Name Provider Type    TC Lakeisha Rock PT DPT Physical Therapist                Therapy Education       06/20/17 1124          Therapy Education    Given HEP;Symptoms/condition management;Posture/body mechanics  -TC      Program New   added prone active gluteal contraction; reviewed all other ex, bridge with femoral ER resisted, lisa UE phasic and SA wall slides  -TC      How Provided Verbal;Demonstration;Written  -TC      Provided to Patient  -TC      Level of Understanding Teach back education performed;Verbalized;Demonstrated  -TC        User Key  (r) = Recorded By, (t) = Taken By, (c) = Cosigned By    Initials Name Provider Type    TC Lakeisha Rock, PT DPT Physical Therapist                Time Calculation:   Start Time: 0938  Stop Time: 1019  Time Calculation (min): 41 min  Total Timed Code Minutes- PT: 41 minute(s)    Therapy Charges for Today     Code Description Service Date Service Provider Modifiers Qty    21546623114 HC PT MANUAL THERAPY EA 15 MIN 6/20/2017 Lakeisha Rock, PT DPT GP 2    19155109444 HC PT THER PROC EA 15 MIN 6/20/2017 Lakeisha Rock, PT DPT GP 1                    Lakeisha Rock PT DPT  6/20/2017

## 2017-06-22 ENCOUNTER — APPOINTMENT (OUTPATIENT)
Dept: PHYSICAL THERAPY | Facility: HOSPITAL | Age: 67
End: 2017-06-22

## 2017-06-27 ENCOUNTER — HOSPITAL ENCOUNTER (OUTPATIENT)
Dept: PHYSICAL THERAPY | Facility: HOSPITAL | Age: 67
Setting detail: THERAPIES SERIES
Discharge: HOME | End: 2017-06-27

## 2017-06-27 DIAGNOSIS — M51.36 DDD (DEGENERATIVE DISC DISEASE), LUMBAR: Primary | ICD-10-CM

## 2017-06-27 DIAGNOSIS — M54.2 NECK PAIN: ICD-10-CM

## 2017-06-27 PROCEDURE — 97140 MANUAL THERAPY 1/> REGIONS: CPT

## 2017-06-27 PROCEDURE — 97110 THERAPEUTIC EXERCISES: CPT

## 2017-06-28 ENCOUNTER — PROCEDURE VISIT (OUTPATIENT)
Dept: NEUROLOGY | Age: 67
End: 2017-06-28
Payer: MEDICARE

## 2017-06-28 VITALS
BODY MASS INDEX: 27.16 KG/M2 | DIASTOLIC BLOOD PRESSURE: 70 MMHG | SYSTOLIC BLOOD PRESSURE: 118 MMHG | WEIGHT: 163 LBS | HEIGHT: 65 IN

## 2017-06-28 DIAGNOSIS — G43.719 INTRACTABLE CHRONIC MIGRAINE WITHOUT AURA AND WITHOUT STATUS MIGRAINOSUS: Primary | ICD-10-CM

## 2017-06-28 PROCEDURE — 1036F TOBACCO NON-USER: CPT | Performed by: PSYCHIATRY & NEUROLOGY

## 2017-06-28 PROCEDURE — 64615 CHEMODENERV MUSC MIGRAINE: CPT | Performed by: PSYCHIATRY & NEUROLOGY

## 2017-06-28 RX ORDER — BUTALBITAL, ACETAMINOPHEN AND CAFFEINE 50; 325; 40 MG/1; MG/1; MG/1
1 TABLET ORAL EVERY 6 HOURS PRN
Qty: 30 TABLET | Refills: 5 | Status: SHIPPED | OUTPATIENT
Start: 2017-06-28 | End: 2018-04-28 | Stop reason: SDUPTHER

## 2017-06-28 NOTE — THERAPY TREATMENT NOTE
Outpatient Physical Therapy Ortho Treatment Note  [facility]     Patient Name: Natalee Noble  : 1950  MRN: [id number]  Today's Date: 2017      Visit Date: 2017    Visit Dx:    ICD-10-CM ICD-9-CM   1. DDD (degenerative disc disease), lumbar M51.36 722.52   2. Neck pain M54.2 723.1       There is no problem list on file for this patient.       Past Medical History:   Diagnosis Date   • Back pain    • Fractures     R tibia/fibula; L ankle   • Neck pain         Past Surgical History:   Procedure Laterality Date   • BACK SURGERY      L2-5 fusion    • NECK SURGERY      ACDF C4-7            17 1435   PT Assessment   Assessment Comments Pt reported that she was slightly more guarded today, she has done a lot of activity. I continue to progress her with lower level strengthening exercises and stability with focus on scapular, core, and hips. She tolerate all activity today and even reported improvement afterwards. She still demonstrate UT and LS overactivity with cervical or scapular movement and diminished hip extension and abduction. We reviewed and practiced gait mechanics at the end with cues to promote more hip extension and thoracic rotation.    PT Plan   PT Plan Comments We will continue to address her mobility, mm guarding and ROM. With progression of strengthening into gained ranges and core, hip, scapular stability to decreased strain on low back and lower cervical.           17 1435   Subjective Comments   Subjective Comments Pt stated that she has a headache   Subjective Pain   Able to rate subjective pain? yes   Pre-Treatment Pain Level 7   Post-Treatment Pain Level 5   Exercise 1   Exercise Name 1 prone isometric glute with attempted lifts (knees bent and towel roll supporting each above the knee)    Cueing 1 Verbal;Tactile;Demo   Sets 1 3   Reps 1 10   Exercise 2   Exercise Name 2 prone on elbow; cervicothoracic neutral with chin tucks    Sets 2 3   Reps 2 10    Time (Seconds) 2 2-3s holds    Exercise 3   Exercise Name 3 prone mid to lower trap level 1   Cueing 3 Verbal;Tactile;Demo   Sets 3 3   Reps 3 10   Time (Seconds) 3 2-3s holdsl    Exercise 4   Exercise Name 4 standing bolster against wall; scapular Ws    Cueing 4 Verbal;Tactile;Demo   Sets 4 2   Reps 4 15   Exercise 5   Exercise Name 5 Ambulated 150ft in hallway (see assessment)    Time (Minutes) 5 3                                    Manual Rx (last 36 hours)      Manual Treatments       06/27/17 1435          Manual Rx 1    Manual Rx 1 Location assessed her thoracic and scapular ROM  -TC      Manual Rx 1 Type decreased ROM with left thoracic, full thoracic right rotation with limited motion at T5-6 and associated ribs, sharp pain to right of scapula elicited. Palpation over the right rhomboids also elicited similar pain she reports.   -TC      Manual Rx 1 Duration 5  -TC      Manual Rx 2    Manual Rx 2 Location prone STM to thoracic carmel, rhomboids, R>L, UT    -TC      Manual Rx 2 Grade moderate, TrP  -TC      Manual Rx 2 Duration 8  -TC      Manual Rx 3    Manual Rx 3 Location prone thoracic rotational mobilization to T5-6  -TC      Manual Rx 3 Grade 3  -TC      Manual Rx 3 Duration 5---pain relief noted afterwards improved mobility   -TC      Manual Rx 4    Manual Rx 4 Location prone PA to R 4th and 6th rib  -TC      Manual Rx 4 Grade 2-3  -TC      Manual Rx 4 Duration 5 pain relief noted after, improved mobility   -TC      Manual Rx 5    Manual Rx 5 Location R SIJ   -TC      Manual Rx 5 Type distraction mobilization grade 2 and sacral PA grade 3  -TC      Manual Rx 5 Grade 2-3  -TC      Manual Rx 5 Duration 5  -TC        User Key  (r) = Recorded By, (t) = Taken By, (c) = Cosigned By    Initials Name Provider Type    TC Lakeisha Rock, PT DPT Physical Therapist                   06/27/17 1435   PT Short Term Goals   STG Date to Achieve 07/04/17   STG 1 Pt will report no greater than 3-4/10 with sitting,  standing and walking activities.    STG 1 Progress Ongoing   STG 1 Progress Comments 5-7/10 R SIJ    STG 2 Pt will be independent with home program.    STG 2 Progress Partially Met   Long Term Goals   LTG Date to Achieve 07/25/17   LTG 1 Pt will score 20 % or less on the Oswestry.    LTG 1 Progress Ongoing   LTG 2 Pt will score 15 or less on the neck disability index.    LTG 2 Progress Ongoing   LTG 3 Pt will perform single leg stance for 10-15 seconds without hip drop, pelvic drift/sway.    LTG 3 Progress Ongoing   LTG 4 Pt will demonstrate 60 degrees or greater cervical rotation.    LTG 4 Progress Ongoing   Time Calculation   PT Goal Re-Cert Due Date 07/13/17           Time Calculation:   Start Time: 1435  Stop Time: 1520  Time Calculation (min): 45 min  Total Timed Code Minutes- PT: 45 minute(s)    Therapy Charges for Today     Code Description Service Date Service Provider Modifiers Qty    51153405202 HC PT MANUAL THERAPY EA 15 MIN 6/27/2017 Lakeisha Rock, PT DPT GP 1    84126139890 HC PT THER PROC EA 15 MIN 6/27/2017 aLkeisha Rock, PT DPT GP 2                    Lakeisha Rock, PT DPT  6/28/2017

## 2017-06-29 ENCOUNTER — HOSPITAL ENCOUNTER (OUTPATIENT)
Dept: PHYSICAL THERAPY | Facility: HOSPITAL | Age: 67
Setting detail: THERAPIES SERIES
Discharge: HOME | End: 2017-06-29

## 2017-06-29 DIAGNOSIS — M51.36 DDD (DEGENERATIVE DISC DISEASE), LUMBAR: Primary | ICD-10-CM

## 2017-06-29 DIAGNOSIS — M54.2 NECK PAIN: ICD-10-CM

## 2017-06-29 PROCEDURE — 97110 THERAPEUTIC EXERCISES: CPT

## 2017-06-29 PROCEDURE — 97140 MANUAL THERAPY 1/> REGIONS: CPT

## 2017-06-29 NOTE — THERAPY TREATMENT NOTE
Outpatient Physical Therapy Ortho Treatment Note  [facility]     Patient Name: Natalee Noble  : 1950  MRN: [id number]  Today's Date: 2017      Visit Date: 2017    Visit Dx:    ICD-10-CM ICD-9-CM   1. DDD (degenerative disc disease), lumbar M51.36 722.52   2. Neck pain M54.2 723.1       There is no problem list on file for this patient.       Past Medical History:   Diagnosis Date   • Back pain    • Fractures     R tibia/fibula; L ankle   • Neck pain         Past Surgical History:   Procedure Laterality Date   • BACK SURGERY      L2-5 fusion    • NECK SURGERY      ACDF C4-7                             PT Assessment/Plan       17 1226       PT Assessment    Assessment Comments Pt presented today with increased scapulothoracic pain moreso than usual. This pain has been improving to the point of dissipation the past few sessions. She cannot think of anything specifically that flared the symptoms. Her rhomboids, SA and LS are taught and presented with spasms and taughtness today impairing normal scapulothoracic and rib cage rhythm and eliciting her symptoms. I was able to improve her symptoms with mobilizations and DTM with improved ROM afterwards. I encouraged normal scapular ROM exercises at home and we reviewed her other exercises for HEP, she will be off 1 week.    -TC     PT Plan    PT Plan Comments We will continue to address her impaired spinal and scapular mobility avioding a flare in symptoms. Restore scapulothoracic rhythm, perform unweighted spine activities and promote core, gluteal strengthening. G code and Recert due next scheduled session.   -TC       User Key  (r) = Recorded By, (t) = Taken By, (c) = Cosigned By    Initials Name Provider Type    TC Lakeisha Rock, PT DPT Physical Therapist                    Exercises       17 1121          Subjective Comments    Subjective Comments Pt reported that her R scapula is guarded again and flared, sharp  pain   -TC      Subjective Pain    Able to rate subjective pain? yes  -TC      Pre-Treatment Pain Level 7  -TC      Exercise 1    Exercise Name 1 muscle synergy technique; resisted scapular retraction/adduction    -TC      Cueing 1 Verbal;Tactile;Demo  -TC      Sets 1 2  -TC      Reps 1 10  -TC      Exercise 2    Exercise Name 2 thoracic segmental flexion/ext with end range low stretch both planes; ag bolster standing ag wall   -TC      Cueing 2 Verbal;Tactile;Demo  -TC      Sets 2 2  -TC      Reps 2 20  -TC      Exercise 4    Exercise Name 4 standing SA wall slides with a plus, arms out at a 45 deg angle   -TC      Cueing 4 Verbal;Tactile;Demo  -TC      Sets 4 2  -TC      Reps 4 20  -TC      Exercise 5    Exercise Name 5 Reviewed previous HEP  -TC      Time (Minutes) 5 3  -TC        User Key  (r) = Recorded By, (t) = Taken By, (c) = Cosigned By    Initials Name Provider Type    TC Lakeisha Rock, PT DPT Physical Therapist                        Manual Rx (last 36 hours)      Manual Treatments       06/29/17 1121          Manual Rx 1    Manual Rx 1 Location L sidelying; R scapular   -TC      Manual Rx 1 Type lift off mobilization (posterior) with DTM to medial border of scapula (and rhomboids, LS, SA attach)   -TC      Manual Rx 1 Grade 3  -TC      Manual Rx 1 Duration 15  -TC      Manual Rx 2    Manual Rx 2 Location L sidelying PROM of R scapula  -TC      Manual Rx 2 Type all planes  -TC      Manual Rx 2 Grade 2x10  -TC      Manual Rx 2 Duration 8  -TC      Manual Rx 3    Manual Rx 3 Location prone thoracic rotational mobilization to T5-6  -TC      Manual Rx 3 Grade 3  -TC      Manual Rx 3 Duration 5---pain relief noted afterwards improved mobility   -TC        User Key  (r) = Recorded By, (t) = Taken By, (c) = Cosigned By    Initials Name Provider Type    TC Lakeisha Rock, PT DPT Physical Therapist                PT OP Goals       06/29/17 1121       PT Short Term Goals    STG Date to Achieve 07/04/17   -TC     STG 1 Pt will report no greater than 3-4/10 with sitting, standing and walking activities.   -TC     STG 1 Progress Ongoing  -TC     STG 2 Pt will be independent with home program.   -TC     STG 2 Progress Partially Met  -TC     STG 2 Progress Comments reviewed today every component  -TC     Long Term Goals    LTG Date to Achieve 07/25/17  -TC     LTG 1 Pt will score 20 % or less on the Oswestry.   -TC     LTG 1 Progress Ongoing  -TC     LTG 2 Pt will score 15 or less on the neck disability index.   -TC     LTG 2 Progress Ongoing  -TC     LTG 3 Pt will perform single leg stance for 10-15 seconds without hip drop, pelvic drift/sway.   -TC     LTG 3 Progress Ongoing  -TC     LTG 3 Progress Comments working on correcting hip drop and increasing gluteal extension  -TC     LTG 4 Pt will demonstrate 60 degrees or greater cervical rotation.   -TC     LTG 4 Progress Ongoing  -TC     Time Calculation    PT Goal Re-Cert Due Date 07/13/17  -TC       User Key  (r) = Recorded By, (t) = Taken By, (c) = Cosigned By    Initials Name Provider Type    TC Lakeisha Rock, PT DPT Physical Therapist                Therapy Education       06/29/17 1225          Therapy Education    Given HEP;Symptoms/condition management;Posture/body mechanics  -TC      Program New  -TC      How Provided Verbal;Demonstration  -TC      Provided to Patient  -TC      Level of Understanding Teach back education performed;Verbalized;Demonstrated  -TC        User Key  (r) = Recorded By, (t) = Taken By, (c) = Cosigned By    Initials Name Provider Type    TC Lakeisha Rock PT DPT Physical Therapist                Time Calculation:   Start Time: 1121  Stop Time: 1208  Time Calculation (min): 47 min  Total Timed Code Minutes- PT: 47 minute(s)    Therapy Charges for Today     Code Description Service Date Service Provider Modifiers Qty    06428772584 HC PT MANUAL THERAPY EA 15 MIN 6/29/2017 Lakeisha Rock, PT DPT GP 2    56465060814 HC PT THER  PROC EA 15 MIN 6/29/2017 Lakeisha Rock, PT DPT GP 1                    Lakiesha Rock, PT DPT  6/29/2017

## 2017-06-30 ENCOUNTER — APPOINTMENT (OUTPATIENT)
Dept: PHYSICAL THERAPY | Facility: HOSPITAL | Age: 67
End: 2017-06-30

## 2017-07-11 ENCOUNTER — HOSPITAL ENCOUNTER (OUTPATIENT)
Dept: PHYSICAL THERAPY | Facility: HOSPITAL | Age: 67
Setting detail: THERAPIES SERIES
Discharge: HOME | End: 2017-07-11

## 2017-07-11 DIAGNOSIS — M54.2 NECK PAIN: ICD-10-CM

## 2017-07-11 DIAGNOSIS — M51.36 DDD (DEGENERATIVE DISC DISEASE), LUMBAR: Primary | ICD-10-CM

## 2017-07-11 PROCEDURE — 97110 THERAPEUTIC EXERCISES: CPT

## 2017-07-11 PROCEDURE — 97140 MANUAL THERAPY 1/> REGIONS: CPT

## 2017-07-11 NOTE — THERAPY TREATMENT NOTE
Outpatient Physical Therapy Ortho Treatment Note  [facility]     Patient Name: Natalee Noble  : 1950  MRN: [id number]  Today's Date: 2017      Visit Date: 2017    Visit Dx:    ICD-10-CM ICD-9-CM   1. DDD (degenerative disc disease), lumbar M51.36 722.52   2. Neck pain M54.2 723.1       There is no problem list on file for this patient.       Past Medical History:   Diagnosis Date   • Back pain    • Fractures     R tibia/fibula; L ankle   • Neck pain         Past Surgical History:   Procedure Laterality Date   • BACK SURGERY      L2-5 fusion    • NECK SURGERY      ACDF C4-7                             PT Assessment/Plan       17 1633       PT Assessment    Assessment Comments Her thoracic mobility and pain is improving along with her ROM. Her scapular strength, core and gluteal activation continue to be poor and her pain is exacerbated by an elevated R 1st rib and taught latissimus dorsi and teres major.   -TC     PT Plan    PT Plan Comments Continue to address thoracic, scapular ROM. Restore thoracic and scapular rhythm and strengthen into new ranges addressing core, gluteal, scapular and thoracic carmel.   -TC       User Key  (r) = Recorded By, (t) = Taken By, (c) = Cosigned By    Initials Name Provider Type    TC Lakeisha Rock, PT DPT Physical Therapist                    Exercises       17 1348          Subjective Comments    Subjective Comments Pt reports that the place in her thoracic spine is much  improved. She has been trying exercises in the pool and this has also helped. It catches every once in a while but has improved significantly. Neck and back pain have improved. She is taking more mm relaxer than she wasa taking. Her main goal is to manage her pain without medication  -TC      Subjective Pain    Able to rate subjective pain? yes  -TC      Pre-Treatment Pain Level 6  -TC      Post-Treatment Pain Level 4  -TC      Subjective Pain Comment 6/10 low  back and 5/10 neck  -TC      Exercise 1    Exercise Name 1 thoracic SB with rotation facing wall    (thoracic wall crawl) reported improved pain  -TC      Cueing 1 Verbal;Tactile;Demo  -TC      Sets 1 2  -TC      Reps 1 15  -TC      Time (Seconds) 1 5  -TC      Exercise 2    Exercise Name 2 Ambulated [id number] in hallway working on improving hip extension during terminal stance/preswing, increase thoracic rotation, and avoid slight lateral hip drift to the right during midstance.   -TC      Time (Minutes) 2 6  -TC        User Key  (r) = Recorded By, (t) = Taken By, (c) = Cosigned By    Initials Name Provider Type    TC Lakeisha Rock, PT DPT Physical Therapist                        Manual Rx (last 36 hours)      Manual Treatments       07/11/17 1348          Manual Rx 2    Manual Rx 2 Location prone STM to thoracic carmel, rhomboids, R>L, UT, lumbar carmel and R SIJ    -TC      Manual Rx 2 Grade moderate, TrP  -TC      Manual Rx 2 Duration 8  -TC      Manual Rx 3    Manual Rx 3 Location prone thoracic rotational mobilization to T5-6  -TC      Manual Rx 3 Grade 3  -TC      Manual Rx 3 Duration 5---pain relief noted afterwards improved mobility   -TC      Manual Rx 4    Manual Rx 4 Location hooklying R lat dorsi and teres major  -TC      Manual Rx 4 Type sustained deep pressure  -TC      Manual Rx 4 Duration 5 pain relief noted after  -TC      Manual Rx 5    Manual Rx 5 Location R SIJ   -TC      Manual Rx 5 Type distraction mobilization grade 2 and sacral PA grade 3  -TC      Manual Rx 5 Grade 2-3  -TC      Manual Rx 5 Duration 5  -TC      Manual Rx 6    Manual Rx 6 Location R 1st rib   -TC      Manual Rx 6 Type depression mobilization  -TC      Manual Rx 6 Grade 2-3  -TC      Manual Rx 6 Duration 3  -TC        User Key  (r) = Recorded By, (t) = Taken By, (c) = Cosigned By    Initials Name Provider Type    TC Lakeisha Rock, PT DPT Physical Therapist                PT OP Goals       07/11/17 1348       PT Mike  Term Goals    STG Date to Achieve 07/04/17  -TC     STG 1 Pt will report no greater than 3-4/10 with sitting, standing and walking activities.   -TC     STG 1 Progress Ongoing  -TC     STG 1 Progress Comments 6-7/10 today pre session   -TC     STG 2 Pt will be independent with home program.   -TC     STG 2 Progress Partially Met  -TC     Long Term Goals    LTG Date to Achieve 07/25/17  -TC     LTG 1 Pt will score 20 % or less on the Oswestry.   -TC     LTG 1 Progress Ongoing  -TC     LTG 2 Pt will score 15 or less on the neck disability index.   -TC     LTG 2 Progress Ongoing  -TC     LTG 3 Pt will perform single leg stance for 10-15 seconds without hip drop, pelvic drift/sway.   -TC     LTG 3 Progress Ongoing  -TC     LTG 3 Progress Comments improving during gait   -TC     LTG 4 Pt will demonstrate 60 degrees or greater cervical rotation.   -TC     LTG 4 Progress Ongoing  -TC     Time Calculation    PT Goal Re-Cert Due Date 07/13/17  -TC       User Key  (r) = Recorded By, (t) = Taken By, (c) = Cosigned By    Initials Name Provider Type    TC Lakeisha Rock, PT DPT Physical Therapist                Therapy Education       07/11/17 1633          Therapy Education    Given HEP;Symptoms/condition management  -TC      Program Reinforced  -TC      How Provided Verbal  -TC      Provided to Patient  -TC      Level of Understanding Verbalized  -TC        User Key  (r) = Recorded By, (t) = Taken By, (c) = Cosigned By    Initials Name Provider Type    TC Lakeisha Rock, PT DPT Physical Therapist                Time Calculation:   Start Time: 1348  Stop Time: 1435  Time Calculation (min): 47 min  Total Timed Code Minutes- PT: 47 minute(s)    Therapy Charges for Today     Code Description Service Date Service Provider Modifiers Qty    87028140699 HC PT MANUAL THERAPY EA 15 MIN 7/11/2017 Lakeisha Rock, PT DPT GP 2    28752726939 HC PT THER PROC EA 15 MIN 7/11/2017 Lakeisha Rock, PT DPT GP 1                     Lakeisha Rock, PT DPT  7/11/2017

## 2017-07-13 ENCOUNTER — APPOINTMENT (OUTPATIENT)
Dept: PHYSICAL THERAPY | Facility: HOSPITAL | Age: 67
End: 2017-07-13

## 2017-07-18 ENCOUNTER — HOSPITAL ENCOUNTER (OUTPATIENT)
Dept: PHYSICAL THERAPY | Facility: HOSPITAL | Age: 67
Setting detail: THERAPIES SERIES
Discharge: HOME | End: 2017-07-18

## 2017-07-18 DIAGNOSIS — M54.2 NECK PAIN: ICD-10-CM

## 2017-07-18 DIAGNOSIS — M51.36 DDD (DEGENERATIVE DISC DISEASE), LUMBAR: Primary | ICD-10-CM

## 2017-07-18 PROCEDURE — 97140 MANUAL THERAPY 1/> REGIONS: CPT

## 2017-07-18 PROCEDURE — G8979 MOBILITY GOAL STATUS: HCPCS

## 2017-07-18 PROCEDURE — 97110 THERAPEUTIC EXERCISES: CPT

## 2017-07-18 PROCEDURE — G8978 MOBILITY CURRENT STATUS: HCPCS

## 2017-07-18 NOTE — THERAPY PROGRESS REPORT/RE-CERT
Outpatient Physical Therapy Ortho Progress Note   [place]     Patient Name: Natalee Noble  : 1950  MRN: [id number]  Today's Date: 2017      Visit Date: 2017    Visit Dx:    ICD-10-CM ICD-9-CM   1. DDD (degenerative disc disease), lumbar M51.36 722.52   2. Neck pain M54.2 723.1       There is no problem list on file for this patient.       Past Medical History:   Diagnosis Date   • Back pain    • Fractures     R tibia/fibula; L ankle   • Neck pain         Past Surgical History:   Procedure Laterality Date   • BACK SURGERY      L2-5 fusion    • NECK SURGERY      ACDF C4-7                             PT Assessment/Plan       17 1400       PT Assessment    Functional Limitations Impaired gait;Limitations in functional capacity and performance;Performance in leisure activities;Performance in self-care ADL  -TC     Impairments Balance;Gait;Muscle strength;Range of motion;Posture;Poor body mechanics;Joint mobility;Pain  -TC     Assessment Comments Pt has progressed quite well towards her goals mostly in regards to her cervical, thoracic, and hip ROM allowing her to improve her functional abilities with things such as driving, picking things up from floor, carrying objects, and walking. Her core, hip, and scapular strength have also improved thus further decreasing the strain across her cervical and lumbar spine. She has functionally improved to the point we will place her on hold for a couple of weeks after a review of her HEP next session and during this time assess how she does during this time and avoiding any flares we will discharge.    -TC     Please refer to paper survey for additional self-reported information Yes  -TC     Rehab Potential Good  -TC     Patient/caregiver participated in establishment of treatment plan and goals Yes  -TC     Patient would benefit from skilled therapy intervention Yes  -TC     PT Plan    PT Frequency 1x/week  -TC     Predicted Duration of  Therapy Intervention (days/wks) 4 weeks   -TC     Planned CPT's? PT THER PROC EA 15 MIN: 21570;PT MANUAL THERAPY EA 15 MIN: 52147;PT NEUROMUSC RE-EDUCATION EA 15 MIN: 96709;PT ELECTRICAL STIM UNATTEND: ;PT ELECTRICAL STIM ATTD EA 15 MIN: 65589  -TC     Physical Therapy Interventions (Optional Details) balance training;joint mobilization;home exercise program;lumbar stabilization;modalities;motor coordination training;ROM (Range of Motion);stair training;strengthening;stretching  -TC     PT Plan Comments We discussed placing her on hold for a few weeks and reviewing her comprehensive HEP next session to work on during that time.   -TC       User Key  (r) = Recorded By, (t) = Taken By, (c) = Cosigned By    Initials Name Provider Type    TC Lakeisha Rock, PT DPT Physical Therapist                    Exercises       07/18/17 1300          Subjective Comments    Subjective Comments Pt reported that she has had horrible migraines. She has tried to do as many exercises as possible but admitted she has been unable to perform many due to her severe migraines.    -TC      Subjective Pain    Able to rate subjective pain? yes  -TC      Subjective Pain Comment 7-8/10 migraine; neck 5-6/10 and same with back  -TC      Exercise 1    Exercise Name 1 sidelying place and hold hip ext/abd  -TC      Cueing 1 Tactile;Demo  -TC      Sets 1 5   lisa   -TC      Time (Seconds) 1 30-45s holds   -TC      Exercise 2    Exercise Name 2 standing ag wall mini squat position with resisted hip abd   -TC      Cueing 2 Verbal;Tactile;Demo  -TC      Sets 2 3  -TC      Reps 2 10  -TC      Exercise 3    Exercise Name 3 // bars with mirror for visual feedback; practiced SLS w/ cues to correct hip shift and pelvic drift; hands on waist    added to HEP   -TC      Sets 3 5  -TC      Exercise 4    Exercise Name 4 Reassessment performed, all goals addressed   -TC        User Key  (r) = Recorded By, (t) = Taken By, (c) = Cosigned By    Initials Name  Provider Type    TC Lakeisha Rock, PT DPT Physical Therapist                        Manual Rx (last 36 hours)      Manual Treatments       07/18/17 1300          Manual Rx 3    Manual Rx 3 Location prone thoracic rotational mobilization to T5-6  -TC      Manual Rx 3 Grade 3  -TC      Manual Rx 3 Duration 5---pain relief noted afterwards improved mobility   -TC      Manual Rx 5    Manual Rx 5 Location R SIJ   -TC      Manual Rx 5 Type distraction mobilization grade 2 and sacral PA grade 3  -TC      Manual Rx 5 Grade 2-3  -TC      Manual Rx 5 Duration 5  -TC        User Key  (r) = Recorded By, (t) = Taken By, (c) = Cosigned By    Initials Name Provider Type    TC Lakeisha Rock, PT DPT Physical Therapist                PT OP Goals       07/18/17 1300       PT Short Term Goals    STG Date to Achieve 08/01/17  -TC     STG 1 Pt will report no greater than 3-4/10 with sitting, standing and walking activities.   -TC     STG 1 Progress Partially Met  -TC     STG 1 Progress Comments pt reports 5-6/10 pain neck and low back   -TC     STG 2 Pt will be independent with home program.   -TC     STG 2 Progress Partially Met  -TC     STG 2 Progress Comments full HEP will be provided next session; besides last week she was performing religiously   -TC     Long Term Goals    LTG Date to Achieve 08/15/17  -TC     LTG 1 Pt will score 20 % or less on the Oswestry.   -TC     LTG 1 Progress Partially Met  -TC     LTG 1 Progress Comments 23%  -TC     LTG 2 Pt will score 15 or less on the neck disability index.   -TC     LTG 2 Progress Progressing  -TC     LTG 2 Progress Comments 26  -TC     LTG 3 Pt will perform single leg stance for 10-15 seconds without hip drop, pelvic drift/sway.   -TC     LTG 3 Progress Partially Met  -TC     LTG 3 Progress Comments best 9s on left and 5s on right; painfree and demonstrating good mechanics  -TC     LTG 4 Pt will demonstrate 60 degrees or greater cervical rotation.   -TC     LTG 4 Progress Met   -TC     LTG 4 Progress Comments PROM 75 deg bilaterally just stiff but painfree; AROM >65  deg bilaterally with stiffness noted at end range   -TC     Time Calculation    PT Goal Re-Cert Due Date 08/18/17  -TC       User Key  (r) = Recorded By, (t) = Taken By, (c) = Cosigned By    Initials Name Provider Type    TC Lakeisha Rock, PT DPT Physical Therapist                Therapy Education       07/18/17 1359          Therapy Education    Given HEP;Symptoms/condition management  -TC      Program New   SLS   -      How Provided Verbal  -TC      Provided to Patient  -TC      Level of Understanding Teach back education performed;Verbalized  -TC        User Key  (r) = Recorded By, (t) = Taken By, (c) = Cosigned By    Initials Name Provider Type    TC Lakeisha Rock, PT DPT Physical Therapist                Outcome Measures       07/18/17 1400          Modified Oswestry    Modified Oswestry Score/Comments 23%  -TC      Neck Disability Index    Section 1 - Pain Intensity 2  -TC      Section 2 - Personal Care 1  -TC      Section 3 - Lifting 4  -TC      Section 4 - Work 3  -TC      Section 5 - Headaches 4  -TC      Section 6 - Concentration 1  -TC      Section 7 - Sleeping 2  -TC      Section 8 - Driving 2  -TC      Section 9 - Reading 3  -TC      Section 10 - Recreation 4  -TC      Neck Disability Index Score 26  -TC      Functional Assessment    Outcome Measure Options Modterrid Miguel Ángely;Neck Disability Index (NDI)  -TC        User Key  (r) = Recorded By, (t) = Taken By, (c) = Cosigned By    Initials Name Provider Type    TC Lakeisha Rock, PT DPT Physical Therapist            Time Calculation:   Start Time: 1300  Stop Time: 1345  Time Calculation (min): 45 min  Total Timed Code Minutes- PT: 45 minute(s)    Therapy Charges for Today     Code Description Service Date Service Provider Modifiers Qty    48468119158 HC PT MOBILITY CURRENT 7/18/2017 Lakeisha Rock, PT DPT GP, CJ 1    12440576298 HC PT MOBILITY  PROJECTED 7/18/2017 Lakeisha Rock, PT DPT GP, CJ 1    79151845773 HC PT MANUAL THERAPY EA 15 MIN 7/18/2017 Lakeisha Rock, PT DPT GP 1    40443849668 HC PT THER PROC EA 15 MIN 7/18/2017 Lakeisha Rock, PT DPT GP 2          PT G-Codes  Outcome Measure Options: Sofie Sosa, Neck Disability Index (NDI)  Functional Limitation: Mobility: Walking and moving around  Mobility: Walking and Moving Around Current Status (): At least 20 percent but less than 40 percent impaired, limited or restricted  Mobility: Walking and Moving Around Goal Status (): At least 20 percent but less than 40 percent impaired, limited or restricted         Lakeisha Rock, PT DPT  7/18/2017

## 2017-07-20 ENCOUNTER — HOSPITAL ENCOUNTER (OUTPATIENT)
Dept: PHYSICAL THERAPY | Facility: HOSPITAL | Age: 67
Setting detail: THERAPIES SERIES
Discharge: HOME | End: 2017-07-20

## 2017-07-20 DIAGNOSIS — M54.2 NECK PAIN: ICD-10-CM

## 2017-07-20 DIAGNOSIS — M51.36 DDD (DEGENERATIVE DISC DISEASE), LUMBAR: Primary | ICD-10-CM

## 2017-07-20 PROCEDURE — 97110 THERAPEUTIC EXERCISES: CPT

## 2017-07-20 NOTE — THERAPY TREATMENT NOTE
Outpatient Physical Therapy Ortho Treatment Note  [facility]     Patient Name: Natalee Noble  : 1950  MRN: [id number]  Today's Date: 2017      Visit Date: 2017    Visit Dx:    ICD-10-CM ICD-9-CM   1. DDD (degenerative disc disease), lumbar M51.36 722.52   2. Neck pain M54.2 723.1       There is no problem list on file for this patient.       Past Medical History:   Diagnosis Date   • Back pain    • Fractures     R tibia/fibula; L ankle   • Neck pain         Past Surgical History:   Procedure Laterality Date   • BACK SURGERY      L2-5 fusion    • NECK SURGERY      ACDF C4-7                             PT Assessment/Plan       17 1717       PT Assessment    Assessment Comments Her cervical, thoracic and hip ROM are still limited but much progressed along with her core, cervical, scapular and hip strength thus improving her overall movement and mobility. Due to her great progression, I compiled a comprehensive HEP  I would like to work on and we will schedule a follow up session in 3-4 weeks in order to assess her progress independently. Avoiding a flare or regression, we may discharge her at that time. She has made good functional gains.   -TC     PT Plan    PT Plan Comments see assessment.  -TC       User Key  (r) = Recorded By, (t) = Taken By, (c) = Cosigned By    Initials Name Provider Type    TC Lakeisha Rock, PT DPT Physical Therapist                    Exercises       17 9016          Subjective Comments    Subjective Comments Pt reported that she was sore after last session  -TC      Subjective Pain    Able to rate subjective pain? yes  -TC      Pre-Treatment Pain Level 6  -TC      Exercise 1    Exercise Name 1 pectoralis stretches on towel roll   -TC      Cueing 1 Verbal;Tactile;Demo  -TC      Exercise 2    Exercise Name 2 hooklying neutral  bridge with resisted femoral ER  -TC      Exercise 3    Exercise Name 3 DLPS  -TC      Exercise 4    Exercise Name  4 prone thoracic ext/flex ROM  -TC      Exercise 5    Exercise Name 5 prone pillow under abd; altLE and UE  -TC      Exercise 6    Exercise Name 6 thoracic wall crawl  -TC      Exercise 7    Exercise Name 7 unweighted cervical ROM  -TC      Exercise 8    Exercise Name 8 Sa slides  -TC      Exercise 9    Exercise Name 9 resisted lisa UE phasic   -TC      Exercise 10    Exercise Name 10 reviewed mini squats with slightly resisted hip abd  -TC      Exercise 11    Exercise Name 11 reviewed SLS   -TC        User Key  (r) = Recorded By, (t) = Taken By, (c) = Cosigned By    Initials Name Provider Type    TC Lakeisha Rock, PT DPT Physical Therapist                               PT OP Goals       07/20/17 1435       PT Short Term Goals    STG Date to Achieve 08/01/17  -TC     STG 1 Pt will report no greater than 3-4/10 with sitting, standing and walking activities.   -TC     STG 1 Progress Partially Met  -TC     STG 2 Pt will be independent with home program.   -TC     STG 2 Progress Partially Met  -TC     STG 2 Progress Comments full HEP provided today, reassess next session   -TC     Long Term Goals    LTG Date to Achieve 08/15/17  -TC     LTG 1 Pt will score 20 % or less on the Oswestry.   -TC     LTG 1 Progress Partially Met  -TC     LTG 2 Pt will score 15 or less on the neck disability index.   -TC     LTG 2 Progress Progressing  -TC     LTG 3 Pt will perform single leg stance for 10-15 seconds without hip drop, pelvic drift/sway.   -TC     LTG 3 Progress Partially Met  -TC     LTG 4 Pt will demonstrate 60 degrees or greater cervical rotation.   -TC     LTG 4 Progress Met  -TC     Time Calculation    PT Goal Re-Cert Due Date 08/18/17  -TC       User Key  (r) = Recorded By, (t) = Taken By, (c) = Cosigned By    Initials Name Provider Type    TC Lakeisha Rock, PT DPT Physical Therapist                Therapy Education       07/20/17 1716          Therapy Education    Given HEP;Symptoms/condition  management;Posture/body mechanics  -TC      Program New   gave her a printout of comprehensive HEP and reviewed/progressed today   -TC      How Provided Verbal;Demonstration;Written  -TC      Provided to Patient  -TC      Level of Understanding Teach back education performed;Verbalized;Demonstrated  -TC        User Key  (r) = Recorded By, (t) = Taken By, (c) = Cosigned By    Initials Name Provider Type    TC Lakeisha Rock, PT DPT Physical Therapist                Outcome Measures       07/18/17 1400          Modified Oswestry    Modified Oswestry Score/Comments 23%  -TC      Neck Disability Index    Section 1 - Pain Intensity 2  -TC      Section 2 - Personal Care 1  -TC      Section 3 - Lifting 4  -TC      Section 4 - Work 3  -TC      Section 5 - Headaches 4  -TC      Section 6 - Concentration 1  -TC      Section 7 - Sleeping 2  -TC      Section 8 - Driving 2  -TC      Section 9 - Reading 3  -TC      Section 10 - Recreation 4  -TC      Neck Disability Index Score 26  -TC      Functional Assessment    Outcome Measure Options Modifed Owestry;Neck Disability Index (NDI)  -TC        User Key  (r) = Recorded By, (t) = Taken By, (c) = Cosigned By    Initials Name Provider Type    TC Lakeisha Rock, PT DPT Physical Therapist            Time Calculation:   Start Time: 1435  Stop Time: 1518  Time Calculation (min): 43 min  Total Timed Code Minutes- PT: 43 minute(s)    Therapy Charges for Today     Code Description Service Date Service Provider Modifiers Qty    [id number]  PT THER PROC EA 15 MIN 7/20/2017 Lakeisha Rock, PT DPT GP 3                    Lakeisha Rock PT DPT  7/20/2017

## 2017-08-11 ENCOUNTER — HOSPITAL ENCOUNTER (OUTPATIENT)
Dept: PAIN MANAGEMENT | Age: 67
Discharge: HOME | End: 2017-08-11
Payer: MEDICARE

## 2017-08-11 VITALS
TEMPERATURE: 98 F | OXYGEN SATURATION: 96 % | HEIGHT: 66 IN | BODY MASS INDEX: 26.2 KG/M2 | SYSTOLIC BLOOD PRESSURE: 114 MMHG | DIASTOLIC BLOOD PRESSURE: 73 MMHG | RESPIRATION RATE: 18 BRPM | WEIGHT: 163 LBS | HEART RATE: 70 BPM

## 2017-08-11 DIAGNOSIS — M25.519 NECK AND SHOULDER PAIN: ICD-10-CM

## 2017-08-11 DIAGNOSIS — M50.90 CERVICAL DISC DISEASE: ICD-10-CM

## 2017-08-11 DIAGNOSIS — M54.2 NECK AND SHOULDER PAIN: ICD-10-CM

## 2017-08-11 DIAGNOSIS — R51.9 FREQUENT HEADACHES: ICD-10-CM

## 2017-08-11 DIAGNOSIS — M47.816 LUMBAR FACET ARTHROPATHY: ICD-10-CM

## 2017-08-11 DIAGNOSIS — G44.221 CHRONIC TENSION-TYPE HEADACHE, INTRACTABLE: ICD-10-CM

## 2017-08-11 PROCEDURE — 99214 OFFICE O/P EST MOD 30 MIN: CPT

## 2017-08-11 RX ORDER — METHOCARBAMOL 500 MG/1
500 TABLET, FILM COATED ORAL 2 TIMES DAILY PRN
Qty: 60 TABLET | Refills: 2 | Status: SHIPPED | OUTPATIENT
Start: 2017-08-11 | End: 2018-03-26 | Stop reason: CLARIF

## 2017-08-11 ASSESSMENT — PAIN DESCRIPTION - ORIENTATION: ORIENTATION: LOWER;MID

## 2017-08-11 ASSESSMENT — PAIN DESCRIPTION - DIRECTION: RADIATING_TOWARDS: INTO RIGHT LEG

## 2017-08-11 ASSESSMENT — PAIN DESCRIPTION - PROGRESSION: CLINICAL_PROGRESSION: NOT CHANGED

## 2017-08-11 ASSESSMENT — PAIN DESCRIPTION - PAIN TYPE: TYPE: CHRONIC PAIN

## 2017-08-11 ASSESSMENT — ACTIVITIES OF DAILY LIVING (ADL): EFFECT OF PAIN ON DAILY ACTIVITIES: LIMITS ACTIVITY

## 2017-08-11 ASSESSMENT — PAIN SCALES - GENERAL: PAINLEVEL_OUTOF10: 6

## 2017-08-11 ASSESSMENT — PAIN DESCRIPTION - LOCATION: LOCATION: BACK;NECK

## 2017-08-11 ASSESSMENT — PAIN DESCRIPTION - ONSET: ONSET: ON-GOING

## 2017-08-11 ASSESSMENT — PAIN DESCRIPTION - FREQUENCY: FREQUENCY: CONTINUOUS

## 2017-08-31 ENCOUNTER — TELEPHONE (OUTPATIENT)
Dept: NEUROLOGY | Age: 67
End: 2017-08-31

## 2017-09-01 DIAGNOSIS — G43.719 INTRACTABLE CHRONIC MIGRAINE WITHOUT AURA AND WITHOUT STATUS MIGRAINOSUS: ICD-10-CM

## 2017-09-05 ENCOUNTER — DOCUMENTATION (OUTPATIENT)
Dept: PHYSICAL THERAPY | Facility: HOSPITAL | Age: 67
End: 2017-09-05

## 2017-09-05 DIAGNOSIS — M54.2 NECK PAIN: ICD-10-CM

## 2017-09-05 DIAGNOSIS — M51.36 DDD (DEGENERATIVE DISC DISEASE), LUMBAR: Primary | ICD-10-CM

## 2017-09-05 NOTE — THERAPY DISCHARGE NOTE
Outpatient Physical Therapy Discharge Summary         Patient Name: Natalee Noble  : 1950  MRN: [id number]    Today's Date: 2017    Visit Dx:    ICD-10-CM ICD-9-CM   1. DDD (degenerative disc disease), lumbar M51.36 722.52   2. Neck pain M54.2 723.1             PT OP Goals       17 1353       PT Short Term Goals    STG Date to Achieve 17  -TC     STG 1 Pt will report no greater than 3-4/10 with sitting, standing and walking activities.   -TC     STG 1 Progress Partially Met  -TC     STG 2 Pt will be independent with home program.   -TC     STG 2 Progress Partially Met  -TC     Long Term Goals    LTG Date to Achieve 08/15/17  -TC     LTG 1 Pt will score 20 % or less on the Oswestry.   -TC     LTG 1 Progress Partially Met  -TC     LTG 2 Pt will score 15 or less on the neck disability index.   -TC     LTG 2 Progress Progressing  -TC     LTG 3 Pt will perform single leg stance for 10-15 seconds without hip drop, pelvic drift/sway.   -TC     LTG 3 Progress Partially Met  -TC     LTG 4 Pt will demonstrate 60 degrees or greater cervical rotation.   -TC     LTG 4 Progress Met  -TC       User Key  (r) = Recorded By, (t) = Taken By, (c) = Cosigned By    Initials Name Provider Type    LESLIE Rock, PT DPT Physical Therapist          OP PT Discharge Summary  Date of Discharge: 17  Reason for Discharge: Independent  Outcomes Achieved: Patient able to partially acheive established goals  Discharge Destination: Home with home program  Discharge Instructions: Pt had partially met or met all of her goals regarding her neck and back pain and mobility and therefore we agreed to place her on hold to assess how she maintained her independence of her condition. We spoke over the phone today and she reported she has been compliant with her HEP and that she is feeling well. She even reported she is thinking about getting back into yoga incrementally. Due to her continued progress and  maintenance independently we will discharge her at this time.       Time Calculation:                    Lakeisha Rock, PT DPT  9/5/2017

## 2017-09-19 ENCOUNTER — TELEPHONE (OUTPATIENT)
Dept: NEUROLOGY | Age: 67
End: 2017-09-19

## 2017-09-19 RX ORDER — CYPROHEPTADINE HYDROCHLORIDE 4 MG/1
TABLET ORAL
Qty: 90 TABLET | Refills: 5 | Status: SHIPPED | OUTPATIENT
Start: 2017-09-19

## 2017-10-24 ENCOUNTER — HOSPITAL ENCOUNTER (OUTPATIENT)
Dept: PAIN MANAGEMENT | Age: 67
Discharge: HOME | End: 2017-10-24
Payer: MEDICARE

## 2017-10-24 VITALS
BODY MASS INDEX: 26.68 KG/M2 | TEMPERATURE: 97.2 F | HEIGHT: 66 IN | WEIGHT: 166 LBS | OXYGEN SATURATION: 100 % | SYSTOLIC BLOOD PRESSURE: 110 MMHG | DIASTOLIC BLOOD PRESSURE: 67 MMHG | RESPIRATION RATE: 16 BRPM | HEART RATE: 60 BPM

## 2017-10-24 DIAGNOSIS — M51.36 LUMBAR DEGENERATIVE DISC DISEASE: ICD-10-CM

## 2017-10-24 DIAGNOSIS — M47.816 LUMBAR FACET ARTHROPATHY: ICD-10-CM

## 2017-10-24 DIAGNOSIS — R51.9 FREQUENT HEADACHES: ICD-10-CM

## 2017-10-24 DIAGNOSIS — M50.90 CERVICAL DISC DISEASE: ICD-10-CM

## 2017-10-24 DIAGNOSIS — M54.2 NECK AND SHOULDER PAIN: ICD-10-CM

## 2017-10-24 DIAGNOSIS — M25.519 NECK AND SHOULDER PAIN: ICD-10-CM

## 2017-10-24 DIAGNOSIS — G44.221 CHRONIC TENSION-TYPE HEADACHE, INTRACTABLE: ICD-10-CM

## 2017-10-24 DIAGNOSIS — M51.16 DISPLACEMENT OF LUMBAR DISC WITH RADICULOPATHY: Chronic | ICD-10-CM

## 2017-10-24 PROCEDURE — 20553 NJX 1/MLT TRIGGER POINTS 3/>: CPT

## 2017-10-24 PROCEDURE — 2500000003 HC RX 250 WO HCPCS

## 2017-10-24 PROCEDURE — 2580000003 HC RX 258

## 2017-10-24 PROCEDURE — 3209999900 FLUORO FOR SURGICAL PROCEDURES

## 2017-10-24 PROCEDURE — 6360000002 HC RX W HCPCS

## 2017-10-24 PROCEDURE — 99152 MOD SED SAME PHYS/QHP 5/>YRS: CPT

## 2017-10-24 PROCEDURE — 62323 NJX INTERLAMINAR LMBR/SAC: CPT

## 2017-10-24 RX ORDER — METHYLPREDNISOLONE ACETATE 80 MG/ML
INJECTION, SUSPENSION INTRA-ARTICULAR; INTRALESIONAL; INTRAMUSCULAR; SOFT TISSUE
Status: COMPLETED | OUTPATIENT
Start: 2017-10-24 | End: 2017-10-24

## 2017-10-24 RX ORDER — BUPIVACAINE HYDROCHLORIDE 5 MG/ML
INJECTION, SOLUTION EPIDURAL; INTRACAUDAL
Status: COMPLETED | OUTPATIENT
Start: 2017-10-24 | End: 2017-10-24

## 2017-10-24 RX ORDER — 0.9 % SODIUM CHLORIDE 0.9 %
VIAL (ML) INJECTION
Status: COMPLETED | OUTPATIENT
Start: 2017-10-24 | End: 2017-10-24

## 2017-10-24 RX ORDER — LIDOCAINE HYDROCHLORIDE 10 MG/ML
INJECTION, SOLUTION EPIDURAL; INFILTRATION; INTRACAUDAL; PERINEURAL
Status: COMPLETED | OUTPATIENT
Start: 2017-10-24 | End: 2017-10-24

## 2017-10-24 RX ORDER — OXYCODONE HYDROCHLORIDE 10 MG/1
10 TABLET ORAL 2 TIMES DAILY
Qty: 60 TABLET | Refills: 0 | Status: SHIPPED | OUTPATIENT
Start: 2017-10-24 | End: 2017-12-19 | Stop reason: SDUPTHER

## 2017-10-24 RX ORDER — MIDAZOLAM HYDROCHLORIDE 1 MG/ML
INJECTION INTRAMUSCULAR; INTRAVENOUS
Status: COMPLETED | OUTPATIENT
Start: 2017-10-24 | End: 2017-10-24

## 2017-10-24 RX ORDER — BUPIVACAINE HYDROCHLORIDE 2.5 MG/ML
INJECTION, SOLUTION EPIDURAL; INFILTRATION; INTRACAUDAL
Status: COMPLETED | OUTPATIENT
Start: 2017-10-24 | End: 2017-10-24

## 2017-10-24 RX ORDER — TRIAMCINOLONE ACETONIDE 40 MG/ML
INJECTION, SUSPENSION INTRA-ARTICULAR; INTRAMUSCULAR
Status: COMPLETED | OUTPATIENT
Start: 2017-10-24 | End: 2017-10-24

## 2017-10-24 RX ADMIN — TRIAMCINOLONE ACETONIDE 20 MG: 40 INJECTION, SUSPENSION INTRA-ARTICULAR; INTRAMUSCULAR at 10:05

## 2017-10-24 RX ADMIN — BUPIVACAINE HYDROCHLORIDE 2.5 ML: 2.5 INJECTION, SOLUTION EPIDURAL; INFILTRATION; INTRACAUDAL at 10:03

## 2017-10-24 RX ADMIN — METHYLPREDNISOLONE ACETATE 80 MG: 80 INJECTION, SUSPENSION INTRA-ARTICULAR; INTRALESIONAL; INTRAMUSCULAR; SOFT TISSUE at 10:04

## 2017-10-24 RX ADMIN — BUPIVACAINE HYDROCHLORIDE 9.5 ML: 5 INJECTION, SOLUTION EPIDURAL; INTRACAUDAL at 10:04

## 2017-10-24 RX ADMIN — Medication 1.5 ML: at 10:04

## 2017-10-24 RX ADMIN — MIDAZOLAM HYDROCHLORIDE 2 MG: 1 INJECTION INTRAMUSCULAR; INTRAVENOUS at 09:55

## 2017-10-24 RX ADMIN — LIDOCAINE HYDROCHLORIDE 3 ML: 10 INJECTION, SOLUTION EPIDURAL; INFILTRATION; INTRACAUDAL; PERINEURAL at 10:01

## 2017-10-24 ASSESSMENT — ACTIVITIES OF DAILY LIVING (ADL): EFFECT OF PAIN ON DAILY ACTIVITIES: LIMITS ACTIVITY

## 2017-10-24 ASSESSMENT — PAIN - FUNCTIONAL ASSESSMENT
PAIN_FUNCTIONAL_ASSESSMENT: 0-10

## 2017-10-24 NOTE — PROGRESS NOTES
Patient Name: Babak Cunningham  : 1950  MRN: [id number]    PRE-SEDATION ASSESSMENT    Procedure:  [unfilled]  I have examined the patient's status immediately prior to the procedure. BRIEF H&P    HPI/Changes/Indicators/Diagnosis  There are no active hospital problems to display for this patient. Medications:  Prior to Admission medications    Medication Sig Start Date End Date Taking? Authorizing Provider   oxyCODONE HCl (OXY-IR) 10 MG immediate release tablet Take 1 tablet by mouth 2 times daily .  Earliest Fill Date: 10/24/17 10/24/17   Danita George MD   cyproheptadine (PERIACTIN) 4 MG tablet Take 2-3 times a day as tolerated 17   Mat Guzman MD   onabotulinumtoxin A (BOTOX) 100 units injection Inject 200 Units into the muscle every 3 months 155 units injected in head and neck for migraines 17   Mat Guzman MD   methocarbamol (ROBAXIN) 500 MG tablet Take 1 tablet by mouth 2 times daily as needed (muscle spasms) 17   STEVE Dardenalbital-acetaminophen-caffeine (FIORICET) -55 MG per tablet Take 1 tablet by mouth every 6 hours as needed for Headaches 17   Mat Guzman MD   hydroxychloroquine (PLAQUENIL) 200 MG tablet Take 1 tablet by mouth 2 times daily    Historical Provider, MD   ondansetron (ZOFRAN-ODT) 4 MG disintegrating tablet Take 1 tablet by mouth every 8 hours as needed for Nausea 3/23/17   STEVE Darden   denosumab (PROLIA) 60 MG/ML SOLN SC injection Inject 60 mg into the skin every 6 months     Historical Provider, MD   escitalopram (LEXAPRO) 20 MG tablet Take 20 mg by mouth daily    Historical Provider, MD   butalbital-acetaminophen-caffeine (FIORICET, ESGIC) -40 MG per tablet Take 1 tablet by mouth every 4 hours as needed for Headaches    Historical Provider, MD   esomeprazole Magnesium (NEXIUM) 40 MG PACK Take 40 mg by mouth daily as needed     Historical Provider, MD   Coenzyme Q10 (CO Q-10) 200 MG CAPS Take 200 mg by mouth daily Historical Provider, MD   folic acid (FOLVITE) 654 MCG tablet Take 800 mcg by mouth daily    Historical Provider, MD   Lactobacillus (PROBIOTIC ACIDOPHILUS PO) Take by mouth daily    Historical Provider, MD   topiramate (TOPAMAX) 200 MG tablet Take 1 tablet by mouth daily. 12/18/12   STEVE Malik   conjugated estrogens (PREMARIN) vaginal cream 1 applicator q hs mwf 6/56/17   Cristobal Wright MD   atorvastatin (LIPITOR) 80 MG tablet Take 1 tablet by mouth daily. 8/23/12   Cristobal Wright MD   ALPRAZolam [place]) 1 MG tablet Take 1 tablet by mouth 2 times daily as needed for Anxiety. 8/23/12   Cristobal Wright MD       Allergies:  is allergic to benzoin; iodine; sulfa antibiotics; and vancomycin. Vital Signs:  Vitals:    10/24/17 0854   BP: 102/63   Pulse: 62   Resp: 18   Temp: 97.2 °F (36.2 °C)   SpO2: 98%       Physical Exam:  Cardiac:                                    [x]WNL                    []Comments:    Pulmonary:                               [x]WNL                    []Comments:    Neuro/Mental Status:               [x]WNL                    []Comments:      Informed Consent:  The risks and benefits of the procedure have been discussed with either the patient or if they cannot consent their representative. Assessment:  Patient examined and appropriate for the planned sedation and procedure. Plan:  Proceed with planned sedation and procedure as above. Mallampati Airway Assessment: Adequate      ASA STATUS:  []1. Normal Healty  [x]2. Mild Systemice Disease, doesn't limit activity eg HTN, mild DM  []3. Severe Systemic Disease, does limit activity eg stable angina, DM with vascular         disease          []4. Severe Systemic Disease constant threat eg CHF, renal failure  []5.  Moribund not expected to survive without procedure          Marlene Allen, RN

## 2017-10-24 NOTE — PROCEDURES
being on blood thinner at this time. Discussed the risk and benefits of procedure with patient. Will proceed today with Lumbar Epidural and Trigger Point Injections. PLAN:  [x] Will return to office in  3 month(s)for  [] Planned Procedure  [] Office Visit  [] Prescriptions were given today   [] No prescriptions needed today  [] Patient is to call with any questions or concerns which may arise prior to the next office visit. [x] LESI with sedation  [x] Trigger Point with Sedation  [] Cervical Epidural  [] Sacroiliac Joint Injection  [] RFl of Lumbar Facets        [] Over 50% of today's appointment was given to discussion, evaluation and counseling.

## 2017-10-24 NOTE — PROGRESS NOTES
Procedure:  Level of Consciousness: [x]Alert [x]Oriented []Disoriented []Lethargic  Anxiety Level: [x]Calm []Anxious []Depressed []Other  Skin: [x]Warm [x]Dry []Cool []Moist []Intact []Other  Cardiovascular: []Palpitations: [x]Never []Occasionally []Frequently  Chest Pain: [x]No []Yes  Respiratory:  [x]Unlabored []Labored []Cough ([] Productive []Unproductive)  HCG Required: [x]No []Yes   Results: []Negative []Positive  Knowledge Level:        [x]Patient/Other verbalized understanding of pre-procedure instructions. []Assessment of post-op care needs (transportation, responsible caregiver)        []Able to discuss health care problems and how to deal with it.   Factors that Affect Teaching:        Language Barrier: [x]No []Yes - why:        Hearing Loss:        [x]No []Yes            Corrective Device:  []Yes []No        Vision Loss:           []No [x]Yes            Corrective Device:  [x]Yes []No        Memory Loss:       [x]No []Yes            []Short Term []Long Term  Motivational Level:  [x]Asks Questions                  []Extremely Anxious       [x]Seems Interested               []Seems Uninterested                  [x]Denies need for Education  Risk for Injury:  [x]Patient oriented to person, place and time  []History of frequent falls/loss of balance  Nutritional:  []Change in appetite   []Weight Gain   []Weight Loss  Functional:  []Requires assistance with ADL'sNursing Admission Record    Current Issues / Kavon Brisk / ER Visits:  No    Percentage of Pain Relief after Last Procedure:  50 %    How long lasted:  2 months    Radiology exams received during the last 12 months: No       When na                                              Where na       Imaging on chart: No         Imaging records requested: No  MRI exams received in the past 2 years:  Yes  Physical therapy during the last 6 months: Yes       When: 2017                                             Where  na  Labs during the last 12 months: Yes    Education Provided:  [x] Review of Johnathon Bolanos  [x] Agreement Review  [x] Compliance Issues Discussed    [] Cognitive Behavior Needs [x] Exercise [] Review of Test [] Financial Issues  [] Tobacco/Alcohol Use [x] Teaching [] New Patient [] Picture Obtained    Physician Plan:  [] Outgoing Referral  [] Pharmacy Consult  [] Test Ordered   [] Obtained Test Results / Consult Notes  [] UDS due at next visit, verified per EPIC      [] Suspected Physical Abuse or Suicide Risk assessed - IF YES COMPLETE QUESTIONS BELOW    If any of the following questions are answered yes - contact attending physician for referral:    Has been considering harming self to escape stress, pain problems? [] YES  [x] NO  Has a suicide plan? [] YES  [x] NO  Has attempted suicide in the past?   [] YES  [x] NO  Has a close friend or family member who committed suicide? [] YES  [x] NO    Patient Referred To :      Additional Notes:    Assessment Completed by:  Electronically signed by Noe Borja RN on 10/24/2017 at 8:58 AM

## 2017-12-19 ENCOUNTER — HOSPITAL ENCOUNTER (OUTPATIENT)
Dept: PAIN MANAGEMENT | Age: 67
Discharge: HOME | End: 2017-12-19
Payer: MEDICARE

## 2017-12-19 VITALS
DIASTOLIC BLOOD PRESSURE: 76 MMHG | HEART RATE: 74 BPM | TEMPERATURE: 97.2 F | BODY MASS INDEX: 26.36 KG/M2 | WEIGHT: 164 LBS | HEIGHT: 66 IN | RESPIRATION RATE: 18 BRPM | SYSTOLIC BLOOD PRESSURE: 122 MMHG | OXYGEN SATURATION: 94 %

## 2017-12-19 DIAGNOSIS — M50.90 CERVICAL DISC DISEASE: ICD-10-CM

## 2017-12-19 DIAGNOSIS — M47.816 LUMBAR FACET ARTHROPATHY: ICD-10-CM

## 2017-12-19 DIAGNOSIS — M51.16 DISPLACEMENT OF LUMBAR DISC WITH RADICULOPATHY: ICD-10-CM

## 2017-12-19 DIAGNOSIS — M54.2 NECK AND SHOULDER PAIN: ICD-10-CM

## 2017-12-19 DIAGNOSIS — M25.519 NECK AND SHOULDER PAIN: ICD-10-CM

## 2017-12-19 DIAGNOSIS — R51.9 FREQUENT HEADACHES: ICD-10-CM

## 2017-12-19 PROCEDURE — 99213 OFFICE O/P EST LOW 20 MIN: CPT

## 2017-12-19 RX ORDER — OXYCODONE HYDROCHLORIDE 10 MG/1
10 TABLET ORAL 2 TIMES DAILY
Qty: 60 TABLET | Refills: 0 | Status: SHIPPED | OUTPATIENT
Start: 2017-12-19 | End: 2018-03-26 | Stop reason: SDUPTHER

## 2017-12-19 ASSESSMENT — PAIN DESCRIPTION - DIRECTION: RADIATING_TOWARDS: INTO RIGHT LEG

## 2017-12-19 ASSESSMENT — PAIN DESCRIPTION - ORIENTATION: ORIENTATION: LOWER;UPPER

## 2017-12-19 ASSESSMENT — PAIN DESCRIPTION - PAIN TYPE: TYPE: CHRONIC PAIN

## 2017-12-19 ASSESSMENT — PAIN SCALES - GENERAL: PAINLEVEL_OUTOF10: 7

## 2017-12-19 ASSESSMENT — PAIN DESCRIPTION - PROGRESSION: CLINICAL_PROGRESSION: NOT CHANGED

## 2017-12-19 ASSESSMENT — PAIN DESCRIPTION - FREQUENCY: FREQUENCY: CONTINUOUS

## 2017-12-19 ASSESSMENT — PAIN DESCRIPTION - ONSET: ONSET: ON-GOING

## 2017-12-19 ASSESSMENT — PAIN DESCRIPTION - LOCATION: LOCATION: BACK;NECK

## 2017-12-19 ASSESSMENT — ACTIVITIES OF DAILY LIVING (ADL): EFFECT OF PAIN ON DAILY ACTIVITIES: LIMITS ACTIVITIES

## 2017-12-19 NOTE — PROGRESS NOTES
03/23/2017    LABMETH Negative 03/23/2017    PPXUR Negative 03/23/2017    MEPERIDU Negative 03/23/2017    FENTU Negative 03/23/2017    ETHUQN Negative 03/23/2017          Vitals:  /76   Pulse 74   Temp 97.2 °F (36.2 °C) (Temporal)   Resp 18   Ht 5' 5.5\" (1.664 m)   Wt 164 lb (74.4 kg)   SpO2 94%   BMI 26.88 kg/m²       Physical Exam:  General appearance: no acute distress  Head: NCAT, EOMI  Skin: Warm, Dry   Musculoskeletal: ambulatory per self, steady gait  Neurologic: alert and oriented X 3, speech clear  Mood and affect: appropriate, no SI or HI    Assessment:    *     Cervical DDD  *     Cervical Radiculopathy  *     Myofascial Pain Syndrome  *     Sacroiliitis, right  *     Lumbar DDD  *     Lumbar Radiculopathy    Plan:   [x]  Patient is to call with any questions or concerns which may arise prior to the next office visit    [x]  Continue current medications per our office, see medication tab, EDER reviewed   []  Add   []  Imaging order given to patient   []  PT order given to patient   [x]  Procedure scheduled for next visit, see encounter details (JONG C6-7, Right SI joint injection and Cervical Trigger point injections with sedation)   []  UDS done today   []  UDS next visit   []  . .. Controlled Substance Monitoring: Discussed with patient possible medication side effects, risk of tolerance and/or dependence, and alternative treatments. Discussed the effects of long term narcotic use. Patient encouraged to set daily goals of exercising and decreasing daily narcotic intake.       Electronically signed by STEVE Arvizu

## 2018-03-22 ENCOUNTER — TELEPHONE (OUTPATIENT)
Dept: PAIN MANAGEMENT | Age: 68
End: 2018-03-22

## 2018-03-22 NOTE — TELEPHONE ENCOUNTER
Call received from patient stating that she is having a lot of increased pain and numbness to her legs and is only able to minmally walk right now. She has an appointment for a cervical epidural scheduled on 3/26/18. Have tried to change appointment to a lumbar epidural.  Message sent to [facility] with preauthorization and let patient know that we are trying to get approval at this time.

## 2018-03-26 ENCOUNTER — HOSPITAL ENCOUNTER (OUTPATIENT)
Dept: PAIN MANAGEMENT | Age: 68
Discharge: HOME | End: 2018-03-26
Payer: MEDICARE

## 2018-03-26 VITALS
HEART RATE: 57 BPM | BODY MASS INDEX: 26.36 KG/M2 | WEIGHT: 164 LBS | HEIGHT: 66 IN | SYSTOLIC BLOOD PRESSURE: 101 MMHG | RESPIRATION RATE: 20 BRPM | DIASTOLIC BLOOD PRESSURE: 63 MMHG | OXYGEN SATURATION: 98 % | TEMPERATURE: 97.8 F

## 2018-03-26 DIAGNOSIS — M51.16 LUMBAR DISC DISEASE WITH RADICULOPATHY: Chronic | ICD-10-CM

## 2018-03-26 DIAGNOSIS — M51.36 LUMBAR DEGENERATIVE DISC DISEASE: ICD-10-CM

## 2018-03-26 PROCEDURE — 2500000003 HC RX 250 WO HCPCS

## 2018-03-26 PROCEDURE — 99152 MOD SED SAME PHYS/QHP 5/>YRS: CPT

## 2018-03-26 PROCEDURE — 3209999900 FLUORO FOR SURGICAL PROCEDURES

## 2018-03-26 PROCEDURE — 62323 NJX INTERLAMINAR LMBR/SAC: CPT

## 2018-03-26 PROCEDURE — 6360000002 HC RX W HCPCS

## 2018-03-26 PROCEDURE — 2580000003 HC RX 258

## 2018-03-26 PROCEDURE — G0260 INJ FOR SACROILIAC JT ANESTH: HCPCS

## 2018-03-26 RX ORDER — BUPIVACAINE HYDROCHLORIDE 5 MG/ML
INJECTION, SOLUTION EPIDURAL; INTRACAUDAL
Status: COMPLETED | OUTPATIENT
Start: 2018-03-26 | End: 2018-03-26

## 2018-03-26 RX ORDER — TIZANIDINE 4 MG/1
4 TABLET ORAL 2 TIMES DAILY PRN
Qty: 30 TABLET | Refills: 2 | Status: SHIPPED | OUTPATIENT
Start: 2018-03-26 | End: 2019-05-01 | Stop reason: ALTCHOICE

## 2018-03-26 RX ORDER — TRIAMCINOLONE ACETONIDE 40 MG/ML
INJECTION, SUSPENSION INTRA-ARTICULAR; INTRAMUSCULAR
Status: COMPLETED | OUTPATIENT
Start: 2018-03-26 | End: 2018-03-26

## 2018-03-26 RX ORDER — 0.9 % SODIUM CHLORIDE 0.9 %
VIAL (ML) INJECTION
Status: COMPLETED | OUTPATIENT
Start: 2018-03-26 | End: 2018-03-26

## 2018-03-26 RX ORDER — METHYLPREDNISOLONE ACETATE 80 MG/ML
INJECTION, SUSPENSION INTRA-ARTICULAR; INTRALESIONAL; INTRAMUSCULAR; SOFT TISSUE
Status: COMPLETED | OUTPATIENT
Start: 2018-03-26 | End: 2018-03-26

## 2018-03-26 RX ORDER — OXYCODONE HYDROCHLORIDE 10 MG/1
10 TABLET ORAL 2 TIMES DAILY
Qty: 60 TABLET | Refills: 0 | Status: SHIPPED | OUTPATIENT
Start: 2018-03-26 | End: 2018-05-04 | Stop reason: SDUPTHER

## 2018-03-26 RX ORDER — MIDAZOLAM HYDROCHLORIDE 1 MG/ML
INJECTION INTRAMUSCULAR; INTRAVENOUS
Status: COMPLETED | OUTPATIENT
Start: 2018-03-26 | End: 2018-03-26

## 2018-03-26 RX ORDER — BUPIVACAINE HYDROCHLORIDE 2.5 MG/ML
INJECTION, SOLUTION EPIDURAL; INFILTRATION; INTRACAUDAL
Status: COMPLETED | OUTPATIENT
Start: 2018-03-26 | End: 2018-03-26

## 2018-03-26 RX ORDER — LIDOCAINE HYDROCHLORIDE 10 MG/ML
INJECTION, SOLUTION EPIDURAL; INFILTRATION; INTRACAUDAL; PERINEURAL
Status: COMPLETED | OUTPATIENT
Start: 2018-03-26 | End: 2018-03-26

## 2018-03-26 RX ADMIN — BUPIVACAINE HYDROCHLORIDE 2.5 ML: 2.5 INJECTION, SOLUTION EPIDURAL; INFILTRATION; INTRACAUDAL at 10:53

## 2018-03-26 RX ADMIN — TRIAMCINOLONE ACETONIDE 20 MG: 40 INJECTION, SUSPENSION INTRA-ARTICULAR; INTRAMUSCULAR at 10:56

## 2018-03-26 RX ADMIN — BUPIVACAINE HYDROCHLORIDE 4.5 ML: 5 INJECTION, SOLUTION EPIDURAL; INTRACAUDAL at 10:54

## 2018-03-26 RX ADMIN — Medication 1.5 ML: at 10:53

## 2018-03-26 RX ADMIN — METHYLPREDNISOLONE ACETATE 80 MG: 80 INJECTION, SUSPENSION INTRA-ARTICULAR; INTRALESIONAL; INTRAMUSCULAR; SOFT TISSUE at 10:54

## 2018-03-26 RX ADMIN — LIDOCAINE HYDROCHLORIDE 3 ML: 10 INJECTION, SOLUTION EPIDURAL; INFILTRATION; INTRACAUDAL; PERINEURAL at 10:51

## 2018-03-26 RX ADMIN — MIDAZOLAM HYDROCHLORIDE 2 MG: 1 INJECTION INTRAMUSCULAR; INTRAVENOUS at 10:45

## 2018-03-26 ASSESSMENT — PAIN DESCRIPTION - ONSET: ONSET: ON-GOING

## 2018-03-26 ASSESSMENT — PAIN SCALES - GENERAL: PAINLEVEL_OUTOF10: 8

## 2018-03-26 ASSESSMENT — PAIN DESCRIPTION - ORIENTATION: ORIENTATION: RIGHT;LOWER

## 2018-03-26 ASSESSMENT — PAIN DESCRIPTION - FREQUENCY: FREQUENCY: CONTINUOUS

## 2018-03-26 ASSESSMENT — PAIN DESCRIPTION - PROGRESSION: CLINICAL_PROGRESSION: NOT CHANGED

## 2018-03-26 ASSESSMENT — PAIN DESCRIPTION - PAIN TYPE: TYPE: CHRONIC PAIN

## 2018-04-25 ENCOUNTER — TELEPHONE (OUTPATIENT)
Dept: NEUROLOGY | Age: 68
End: 2018-04-25

## 2018-04-30 RX ORDER — BUTALBITAL, ACETAMINOPHEN AND CAFFEINE 50; 325; 40 MG/1; MG/1; MG/1
1 TABLET ORAL EVERY 6 HOURS PRN
Qty: 30 TABLET | Refills: 0 | Status: SHIPPED | OUTPATIENT
Start: 2018-04-30 | End: 2019-05-01

## 2018-05-02 ENCOUNTER — TELEPHONE (OUTPATIENT)
Dept: NEUROLOGY | Age: 68
End: 2018-05-02

## 2018-05-04 ENCOUNTER — HOSPITAL ENCOUNTER (OUTPATIENT)
Dept: PAIN MANAGEMENT | Age: 68
Discharge: HOME | End: 2018-05-04
Payer: MEDICARE

## 2018-05-04 VITALS
RESPIRATION RATE: 20 BRPM | OXYGEN SATURATION: 93 % | SYSTOLIC BLOOD PRESSURE: 98 MMHG | HEART RATE: 93 BPM | WEIGHT: 164 LBS | DIASTOLIC BLOOD PRESSURE: 60 MMHG | BODY MASS INDEX: 26.36 KG/M2 | HEIGHT: 66 IN | TEMPERATURE: 97.3 F

## 2018-05-04 DIAGNOSIS — R51.9 FREQUENT HEADACHES: ICD-10-CM

## 2018-05-04 DIAGNOSIS — M25.519 NECK AND SHOULDER PAIN: ICD-10-CM

## 2018-05-04 DIAGNOSIS — M54.2 NECK AND SHOULDER PAIN: ICD-10-CM

## 2018-05-04 DIAGNOSIS — M51.16 DISPLACEMENT OF LUMBAR DISC WITH RADICULOPATHY: ICD-10-CM

## 2018-05-04 DIAGNOSIS — M50.90 CERVICAL DISC DISEASE: ICD-10-CM

## 2018-05-04 DIAGNOSIS — M51.16 LUMBAR DISC DISEASE WITH RADICULOPATHY: ICD-10-CM

## 2018-05-04 DIAGNOSIS — M47.816 LUMBAR FACET ARTHROPATHY: ICD-10-CM

## 2018-05-04 DIAGNOSIS — M54.2 NECK PAIN: ICD-10-CM

## 2018-05-04 PROCEDURE — 3209999900 FLUORO FOR SURGICAL PROCEDURES

## 2018-05-04 PROCEDURE — 20553 NJX 1/MLT TRIGGER POINTS 3/>: CPT

## 2018-05-04 PROCEDURE — 6360000002 HC RX W HCPCS

## 2018-05-04 PROCEDURE — 2500000003 HC RX 250 WO HCPCS

## 2018-05-04 PROCEDURE — 80307 DRUG TEST PRSMV CHEM ANLYZR: CPT

## 2018-05-04 PROCEDURE — 62321 NJX INTERLAMINAR CRV/THRC: CPT

## 2018-05-04 PROCEDURE — 99152 MOD SED SAME PHYS/QHP 5/>YRS: CPT

## 2018-05-04 RX ORDER — MIDAZOLAM HYDROCHLORIDE 1 MG/ML
INJECTION INTRAMUSCULAR; INTRAVENOUS
Status: COMPLETED | OUTPATIENT
Start: 2018-05-04 | End: 2018-05-04

## 2018-05-04 RX ORDER — LIDOCAINE HYDROCHLORIDE 10 MG/ML
INJECTION, SOLUTION EPIDURAL; INFILTRATION; INTRACAUDAL; PERINEURAL
Status: COMPLETED | OUTPATIENT
Start: 2018-05-04 | End: 2018-05-04

## 2018-05-04 RX ORDER — OXYCODONE HYDROCHLORIDE 10 MG/1
10 TABLET ORAL 2 TIMES DAILY
Qty: 60 TABLET | Refills: 0 | Status: SHIPPED | OUTPATIENT
Start: 2018-05-04 | End: 2019-05-01

## 2018-05-04 RX ORDER — BUPIVACAINE HYDROCHLORIDE 5 MG/ML
INJECTION, SOLUTION EPIDURAL; INTRACAUDAL
Status: COMPLETED | OUTPATIENT
Start: 2018-05-04 | End: 2018-05-04

## 2018-05-04 RX ORDER — BUPIVACAINE HYDROCHLORIDE 2.5 MG/ML
INJECTION, SOLUTION EPIDURAL; INFILTRATION; INTRACAUDAL
Status: COMPLETED | OUTPATIENT
Start: 2018-05-04 | End: 2018-05-04

## 2018-05-04 RX ORDER — 0.9 % SODIUM CHLORIDE 0.9 %
VIAL (ML) INJECTION CONTINUOUS PRN
Status: COMPLETED | OUTPATIENT
Start: 2018-05-04 | End: 2018-05-04

## 2018-05-04 RX ORDER — METHYLPREDNISOLONE ACETATE 80 MG/ML
INJECTION, SUSPENSION INTRA-ARTICULAR; INTRALESIONAL; INTRAMUSCULAR; SOFT TISSUE
Status: COMPLETED | OUTPATIENT
Start: 2018-05-04 | End: 2018-05-04

## 2018-05-04 RX ORDER — TRIAMCINOLONE ACETONIDE 40 MG/ML
INJECTION, SUSPENSION INTRA-ARTICULAR; INTRAMUSCULAR
Status: COMPLETED | OUTPATIENT
Start: 2018-05-04 | End: 2018-05-04

## 2018-05-04 RX ADMIN — BUPIVACAINE HYDROCHLORIDE 9.5 ML: 5 INJECTION, SOLUTION EPIDURAL; INTRACAUDAL at 11:28

## 2018-05-04 RX ADMIN — METHYLPREDNISOLONE ACETATE 80 MG: 80 INJECTION, SUSPENSION INTRA-ARTICULAR; INTRALESIONAL; INTRAMUSCULAR; SOFT TISSUE at 11:27

## 2018-05-04 RX ADMIN — MIDAZOLAM HYDROCHLORIDE 2 MG: 1 INJECTION INTRAMUSCULAR; INTRAVENOUS at 11:17

## 2018-05-04 RX ADMIN — LIDOCAINE HYDROCHLORIDE 5 ML: 10 INJECTION, SOLUTION EPIDURAL; INFILTRATION; INTRACAUDAL; PERINEURAL at 11:23

## 2018-05-04 RX ADMIN — BUPIVACAINE HYDROCHLORIDE 2.5 ML: 2.5 INJECTION, SOLUTION EPIDURAL; INFILTRATION; INTRACAUDAL at 11:26

## 2018-05-04 RX ADMIN — TRIAMCINOLONE ACETONIDE 20 MG: 40 INJECTION, SUSPENSION INTRA-ARTICULAR; INTRAMUSCULAR at 11:31

## 2018-05-04 RX ADMIN — Medication 1.5 ML: at 11:27

## 2018-05-04 ASSESSMENT — ACTIVITIES OF DAILY LIVING (ADL): EFFECT OF PAIN ON DAILY ACTIVITIES: DAILY CHORES AND ACTIVITIES

## 2018-05-04 ASSESSMENT — PAIN - FUNCTIONAL ASSESSMENT: PAIN_FUNCTIONAL_ASSESSMENT: 0-10

## 2018-05-07 ENCOUNTER — TELEPHONE (OUTPATIENT)
Dept: NEUROLOGY | Age: 68
End: 2018-05-07

## 2018-05-09 LAB
ALPRAZOLAM, URINE CONFIRM: 408 NG/ML
ALPRAZOLAM, URINE: POSITIVE
AMPHETAMINES, URINE: NEGATIVE NG/ML
BARBITURATES, URINE: NEGATIVE NG/ML
BENZODIAZEPINES, URINE: ABNORMAL NG/ML
BENZODIAZEPINES, URINE: POSITIVE NG/ML
CANNABINOIDS, URINE: NEGATIVE NG/ML
CLONAZEPAM, URINE: NEGATIVE
COCAINE METABOLITE, URINE: NEGATIVE NG/ML
CREATININE, URINE: 62.2 MG/DL (ref 20–300)
ETHANOL U, QUAN: NEGATIVE %
FENTANYL URINE: NEGATIVE PG/ML
FLURAZEPAM, UR: NEGATIVE
LORAZEPAM, URINE: NEGATIVE
MEPERIDINE, UR: NEGATIVE NG/ML
METHADONE SCREEN, URINE: NEGATIVE NG/ML
MIDAZOLAM, URINE: NEGATIVE
NORDIAZEPAM, URINE: NEGATIVE
OPIATES, URINE: NEGATIVE NG/ML
OXAZEPAM, URINE: NEGATIVE
OXYCODONE/OXYMORPHONE, UR: NEGATIVE NG/ML
PH, URINE: 5 (ref 4.5–8.9)
PHENCYCLIDINE, URINE: NEGATIVE NG/ML
PROPOXYPHENE, URINE: NEGATIVE NG/ML
TEMAZEPAM, URINE: NEGATIVE
TRIAZOLAM, URINE: NEGATIVE

## 2018-05-11 ENCOUNTER — OFFICE VISIT (OUTPATIENT)
Dept: NEUROLOGY | Age: 68
End: 2018-05-11
Payer: MEDICARE

## 2018-05-11 ENCOUNTER — TELEPHONE (OUTPATIENT)
Dept: NEUROLOGY | Age: 68
End: 2018-05-11

## 2018-05-11 VITALS
BODY MASS INDEX: 25.55 KG/M2 | SYSTOLIC BLOOD PRESSURE: 121 MMHG | DIASTOLIC BLOOD PRESSURE: 79 MMHG | HEIGHT: 66 IN | WEIGHT: 159 LBS

## 2018-05-11 DIAGNOSIS — G43.719 INTRACTABLE CHRONIC MIGRAINE WITHOUT AURA AND WITHOUT STATUS MIGRAINOSUS: Primary | ICD-10-CM

## 2018-05-11 PROCEDURE — 64615 CHEMODENERV MUSC MIGRAINE: CPT | Performed by: PSYCHIATRY & NEUROLOGY

## 2018-05-11 RX ORDER — ONABOTULINUMTOXINA 200 [USP'U]/1
200 INJECTION, POWDER, LYOPHILIZED, FOR SOLUTION INTRADERMAL; INTRAMUSCULAR ONCE
Status: COMPLETED | OUTPATIENT
Start: 2018-05-11 | End: 2018-05-11

## 2018-05-11 RX ORDER — ONABOTULINUMTOXINA 200 [USP'U]/1
200 INJECTION, POWDER, LYOPHILIZED, FOR SOLUTION INTRADERMAL; INTRAMUSCULAR ONCE
Qty: 1 EACH | Refills: 0 | Status: SHIPPED | OUTPATIENT
Start: 2018-05-11 | End: 2018-05-11 | Stop reason: SDUPTHER

## 2018-05-11 RX ADMIN — ONABOTULINUMTOXINA 200 UNITS: 200 INJECTION, POWDER, LYOPHILIZED, FOR SOLUTION INTRADERMAL; INTRAMUSCULAR at 09:48

## 2018-08-03 ENCOUNTER — PROCEDURE VISIT (OUTPATIENT)
Dept: NEUROLOGY | Age: 68
End: 2018-08-03
Payer: MEDICARE

## 2018-08-03 ENCOUNTER — TELEPHONE (OUTPATIENT)
Dept: NEUROSURGERY | Age: 68
End: 2018-08-03

## 2018-08-03 VITALS
SYSTOLIC BLOOD PRESSURE: 112 MMHG | BODY MASS INDEX: 27.16 KG/M2 | DIASTOLIC BLOOD PRESSURE: 74 MMHG | HEIGHT: 65 IN | HEART RATE: 67 BPM | WEIGHT: 163 LBS

## 2018-08-03 DIAGNOSIS — G43.719 INTRACTABLE CHRONIC MIGRAINE WITHOUT AURA AND WITHOUT STATUS MIGRAINOSUS: Primary | ICD-10-CM

## 2018-08-03 PROCEDURE — 64615 CHEMODENERV MUSC MIGRAINE: CPT | Performed by: PSYCHIATRY & NEUROLOGY

## 2018-08-03 RX ORDER — ONABOTULINUMTOXINA 200 [USP'U]/1
200 INJECTION, POWDER, LYOPHILIZED, FOR SOLUTION INTRADERMAL; INTRAMUSCULAR ONCE
Status: COMPLETED | OUTPATIENT
Start: 2018-08-03 | End: 2018-08-03

## 2018-08-03 RX ORDER — CALCIUM CARBONATE 200(500)MG
1 TABLET,CHEWABLE ORAL 2 TIMES DAILY
COMMUNITY
End: 2019-05-01

## 2018-08-03 RX ADMIN — ONABOTULINUMTOXINA 200 UNITS: 200 INJECTION, POWDER, LYOPHILIZED, FOR SOLUTION INTRADERMAL; INTRAMUSCULAR at 11:09

## 2018-08-03 NOTE — TELEPHONE ENCOUNTER
Called patients specialty pharmacy to see why no one had sent us Botox, stated that they needed a prescription, gave them a verbal order they are filling and contacting patient. Patient was scheduled for this morning for Botox the Botox was not here, patient is already on her way and spoke with [employer] with her approval going to use a Botox and replace it was not patients fault that this was overlooked. Patients Botox will be here by Wednesday on next week.  August 8 per Hayden Tran the pharmacist.

## 2018-08-17 ENCOUNTER — TELEPHONE (OUTPATIENT)
Dept: NEUROSURGERY | Age: 68
End: 2018-08-17

## 2018-08-17 DIAGNOSIS — G43.719 INTRACTABLE CHRONIC MIGRAINE WITHOUT AURA AND WITHOUT STATUS MIGRAINOSUS: ICD-10-CM

## 2018-08-17 NOTE — TELEPHONE ENCOUNTER
Called patient to set up delivery with her specialty pharmacy [facility] would be calling . After  Confirmed shipment please call the office to let us know .

## 2018-09-01 ENCOUNTER — APPOINTMENT (OUTPATIENT)
Dept: CARDIOLOGY | Facility: HOSPITAL | Age: 68
End: 2018-09-01
Attending: FAMILY MEDICINE

## 2018-09-01 ENCOUNTER — APPOINTMENT (OUTPATIENT)
Dept: MRI IMAGING | Facility: HOSPITAL | Age: 68
End: 2018-09-01

## 2018-09-01 ENCOUNTER — HOSPITAL ENCOUNTER (INPATIENT)
Facility: HOSPITAL | Age: 68
LOS: 2 days | Discharge: HOME | End: 2018-09-03
Attending: EMERGENCY MEDICINE | Admitting: INTERNAL MEDICINE

## 2018-09-01 ENCOUNTER — APPOINTMENT (OUTPATIENT)
Dept: GENERAL RADIOLOGY | Facility: HOSPITAL | Age: 68
End: 2018-09-01

## 2018-09-01 ENCOUNTER — APPOINTMENT (OUTPATIENT)
Dept: CT IMAGING | Facility: HOSPITAL | Age: 68
End: 2018-09-01

## 2018-09-01 DIAGNOSIS — R41.89 COGNITIVE CHANGES: ICD-10-CM

## 2018-09-01 DIAGNOSIS — R26.9 ABNORMALITY OF GAIT FOLLOWING CEREBROVASCULAR ACCIDENT (CVA): ICD-10-CM

## 2018-09-01 DIAGNOSIS — R13.10 DYSPHAGIA, UNSPECIFIED TYPE: ICD-10-CM

## 2018-09-01 DIAGNOSIS — Z78.9 DECREASED ACTIVITIES OF DAILY LIVING (ADL): ICD-10-CM

## 2018-09-01 DIAGNOSIS — I63.9 CEREBROVASCULAR ACCIDENT (CVA), UNSPECIFIED MECHANISM (HCC): Primary | ICD-10-CM

## 2018-09-01 DIAGNOSIS — I69.398 ABNORMALITY OF GAIT FOLLOWING CEREBROVASCULAR ACCIDENT (CVA): ICD-10-CM

## 2018-09-01 LAB
ALBUMIN SERPL-MCNC: 3.8 G/DL (ref 3.5–5)
ALBUMIN/GLOB SERPL: 1.4 G/DL (ref 1.1–2.5)
ALP SERPL-CCNC: 64 U/L (ref 24–120)
ALT SERPL W P-5'-P-CCNC: 22 U/L (ref 0–54)
ANION GAP SERPL CALCULATED.3IONS-SCNC: 10 MMOL/L (ref 4–13)
APTT PPP: 21.1 SECONDS (ref 24.1–34.8)
AST SERPL-CCNC: 22 U/L (ref 7–45)
BASOPHILS # BLD AUTO: 0.02 10*3/MM3 (ref 0–0.2)
BASOPHILS NFR BLD AUTO: 0.3 % (ref 0–2)
BH CV ECHO MEAS - AO MAX PG (FULL): 1.6 MMHG
BH CV ECHO MEAS - AO MAX PG: 5 MMHG
BH CV ECHO MEAS - AO MEAN PG (FULL): 1 MMHG
BH CV ECHO MEAS - AO MEAN PG: 3 MMHG
BH CV ECHO MEAS - AO ROOT AREA (BSA CORRECTED): 1.7
BH CV ECHO MEAS - AO ROOT AREA: 7.5 CM^2
BH CV ECHO MEAS - AO ROOT DIAM: 3.1 CM
BH CV ECHO MEAS - AO V2 MAX: 112 CM/SEC
BH CV ECHO MEAS - AO V2 MEAN: 88.8 CM/SEC
BH CV ECHO MEAS - AO V2 VTI: 30 CM
BH CV ECHO MEAS - AVA(I,A): 2.3 CM^2
BH CV ECHO MEAS - AVA(I,D): 2.3 CM^2
BH CV ECHO MEAS - AVA(V,A): 2.6 CM^2
BH CV ECHO MEAS - AVA(V,D): 2.6 CM^2
BH CV ECHO MEAS - BSA(HAYCOCK): 1.9 M^2
BH CV ECHO MEAS - BSA: 1.8 M^2
BH CV ECHO MEAS - BZI_BMI: 26.6 KILOGRAMS/M^2
BH CV ECHO MEAS - BZI_METRIC_HEIGHT: 167.6 CM
BH CV ECHO MEAS - BZI_METRIC_WEIGHT: 74.8 KG
BH CV ECHO MEAS - EDV(CUBED): 95.4 ML
BH CV ECHO MEAS - EDV(MOD-SP4): 76.7 ML
BH CV ECHO MEAS - EDV(TEICH): 95.9 ML
BH CV ECHO MEAS - EF(CUBED): 64 %
BH CV ECHO MEAS - EF(MOD-SP4): 50.2 %
BH CV ECHO MEAS - EF(TEICH): 55.6 %
BH CV ECHO MEAS - ESV(CUBED): 34.3 ML
BH CV ECHO MEAS - ESV(MOD-SP4): 38.2 ML
BH CV ECHO MEAS - ESV(TEICH): 42.5 ML
BH CV ECHO MEAS - FS: 28.9 %
BH CV ECHO MEAS - IVS/LVPW: 1
BH CV ECHO MEAS - IVSD: 1 CM
BH CV ECHO MEAS - LA DIMENSION: 3.5 CM
BH CV ECHO MEAS - LA/AO: 1.1
BH CV ECHO MEAS - LAT PEAK E' VEL: 9.4 CM/SEC
BH CV ECHO MEAS - LV DIASTOLIC VOL/BSA (35-75): 41.6 ML/M^2
BH CV ECHO MEAS - LV MASS(C)D: 160 GRAMS
BH CV ECHO MEAS - LV MASS(C)DI: 86.8 GRAMS/M^2
BH CV ECHO MEAS - LV MAX PG: 3.4 MMHG
BH CV ECHO MEAS - LV MEAN PG: 2 MMHG
BH CV ECHO MEAS - LV SYSTOLIC VOL/BSA (12-30): 20.7 ML/M^2
BH CV ECHO MEAS - LV V1 MAX: 92.4 CM/SEC
BH CV ECHO MEAS - LV V1 MEAN: 66 CM/SEC
BH CV ECHO MEAS - LV V1 VTI: 22 CM
BH CV ECHO MEAS - LVIDD: 4.6 CM
BH CV ECHO MEAS - LVIDS: 3.3 CM
BH CV ECHO MEAS - LVLD AP4: 7.5 CM
BH CV ECHO MEAS - LVLS AP4: 6.5 CM
BH CV ECHO MEAS - LVOT AREA (M): 3.1 CM^2
BH CV ECHO MEAS - LVOT AREA: 3.1 CM^2
BH CV ECHO MEAS - LVOT DIAM: 2 CM
BH CV ECHO MEAS - LVPWD: 1 CM
BH CV ECHO MEAS - MED PEAK E' VEL: 4.79 CM/SEC
BH CV ECHO MEAS - MV A MAX VEL: 111 CM/SEC
BH CV ECHO MEAS - MV DEC TIME: 0.23 SEC
BH CV ECHO MEAS - MV E MAX VEL: 79.1 CM/SEC
BH CV ECHO MEAS - MV E/A: 0.71
BH CV ECHO MEAS - PA MAX PG: 1.1 MMHG
BH CV ECHO MEAS - PA V2 MAX: 52.9 CM/SEC
BH CV ECHO MEAS - RAP SYSTOLE: 5 MMHG
BH CV ECHO MEAS - RVSP: 35.9 MMHG
BH CV ECHO MEAS - SI(AO): 122.9 ML/M^2
BH CV ECHO MEAS - SI(CUBED): 33.2 ML/M^2
BH CV ECHO MEAS - SI(LVOT): 37.5 ML/M^2
BH CV ECHO MEAS - SI(MOD-SP4): 20.9 ML/M^2
BH CV ECHO MEAS - SI(TEICH): 28.9 ML/M^2
BH CV ECHO MEAS - SV(AO): 226.4 ML
BH CV ECHO MEAS - SV(CUBED): 61.1 ML
BH CV ECHO MEAS - SV(LVOT): 69.1 ML
BH CV ECHO MEAS - SV(MOD-SP4): 38.5 ML
BH CV ECHO MEAS - SV(TEICH): 53.3 ML
BH CV ECHO MEAS - TR MAX VEL: 278 CM/SEC
BH CV ECHO MEASUREMENTS AVERAGE E/E' RATIO: 11.15
BILIRUB SERPL-MCNC: 0.3 MG/DL (ref 0.1–1)
BUN BLD-MCNC: 17 MG/DL (ref 5–21)
BUN/CREAT SERPL: 13.9 (ref 7–25)
CALCIUM SPEC-SCNC: 8.7 MG/DL (ref 8.4–10.4)
CHLORIDE SERPL-SCNC: 109 MMOL/L (ref 98–110)
CO2 SERPL-SCNC: 23 MMOL/L (ref 24–31)
CREAT BLD-MCNC: 1.22 MG/DL (ref 0.5–1.4)
DEPRECATED RDW RBC AUTO: 46.7 FL (ref 40–54)
EOSINOPHIL # BLD AUTO: 0.22 10*3/MM3 (ref 0–0.7)
EOSINOPHIL NFR BLD AUTO: 3.3 % (ref 0–4)
ERYTHROCYTE [DISTWIDTH] IN BLOOD BY AUTOMATED COUNT: 13.6 % (ref 12–15)
GFR SERPL CREATININE-BSD FRML MDRD: 44 ML/MIN/1.73
GLOBULIN UR ELPH-MCNC: 2.8 GM/DL
GLUCOSE BLD-MCNC: 85 MG/DL (ref 70–100)
GLUCOSE BLDC GLUCOMTR-MCNC: 85 MG/DL (ref 70–130)
HCT VFR BLD AUTO: 39.8 % (ref 37–47)
HGB BLD-MCNC: 13.2 G/DL (ref 12–16)
IMM GRANULOCYTES # BLD: 0.05 10*3/MM3 (ref 0–0.03)
IMM GRANULOCYTES NFR BLD: 0.7 % (ref 0–5)
INR PPP: 0.87 (ref 0.91–1.09)
LEFT ATRIUM VOLUME INDEX: 24.3 ML/M2
LEFT ATRIUM VOLUME: 44.7 CM3
LV EF 2D ECHO EST: 50 %
LYMPHOCYTES # BLD AUTO: 1.47 10*3/MM3 (ref 0.72–4.86)
LYMPHOCYTES NFR BLD AUTO: 21.9 % (ref 15–45)
MAXIMAL PREDICTED HEART RATE: 152 BPM
MCH RBC QN AUTO: 31.3 PG (ref 28–32)
MCHC RBC AUTO-ENTMCNC: 33.2 G/DL (ref 33–36)
MCV RBC AUTO: 94.3 FL (ref 82–98)
MONOCYTES # BLD AUTO: 0.57 10*3/MM3 (ref 0.19–1.3)
MONOCYTES NFR BLD AUTO: 8.5 % (ref 4–12)
NEUTROPHILS # BLD AUTO: 4.38 10*3/MM3 (ref 1.87–8.4)
NEUTROPHILS NFR BLD AUTO: 65.3 % (ref 39–78)
NRBC BLD MANUAL-RTO: 0 /100 WBC (ref 0–0)
PLATELET # BLD AUTO: 205 10*3/MM3 (ref 130–400)
PMV BLD AUTO: 11.3 FL (ref 6–12)
POTASSIUM BLD-SCNC: 4.1 MMOL/L (ref 3.5–5.3)
PROT SERPL-MCNC: 6.6 G/DL (ref 6.3–8.7)
PROTHROMBIN TIME: 12.1 SECONDS (ref 11.9–14.6)
RBC # BLD AUTO: 4.22 10*6/MM3 (ref 4.2–5.4)
SODIUM BLD-SCNC: 142 MMOL/L (ref 135–145)
STRESS TARGET HR: 129 BPM
WBC NRBC COR # BLD: 6.71 10*3/MM3 (ref 4.8–10.8)

## 2018-09-01 PROCEDURE — 93306 TTE W/DOPPLER COMPLETE: CPT | Performed by: INTERNAL MEDICINE

## 2018-09-01 PROCEDURE — 85730 THROMBOPLASTIN TIME PARTIAL: CPT | Performed by: EMERGENCY MEDICINE

## 2018-09-01 PROCEDURE — 85025 COMPLETE CBC W/AUTO DIFF WBC: CPT | Performed by: EMERGENCY MEDICINE

## 2018-09-01 PROCEDURE — G8998 SWALLOW D/C STATUS: HCPCS

## 2018-09-01 PROCEDURE — G8996 SWALLOW CURRENT STATUS: HCPCS

## 2018-09-01 PROCEDURE — 80053 COMPREHEN METABOLIC PANEL: CPT | Performed by: EMERGENCY MEDICINE

## 2018-09-01 PROCEDURE — 93306 TTE W/DOPPLER COMPLETE: CPT

## 2018-09-01 PROCEDURE — 25010000002 MORPHINE SULFATE (PF) 2 MG/ML SOLUTION: Performed by: PSYCHIATRY & NEUROLOGY

## 2018-09-01 PROCEDURE — 85610 PROTHROMBIN TIME: CPT | Performed by: EMERGENCY MEDICINE

## 2018-09-01 PROCEDURE — 70450 CT HEAD/BRAIN W/O DYE: CPT

## 2018-09-01 PROCEDURE — 99223 1ST HOSP IP/OBS HIGH 75: CPT | Performed by: PSYCHIATRY & NEUROLOGY

## 2018-09-01 PROCEDURE — 25010000002 PERFLUTREN 6.52 MG/ML SUSPENSION: Performed by: FAMILY MEDICINE

## 2018-09-01 PROCEDURE — 94760 N-INVAS EAR/PLS OXIMETRY 1: CPT

## 2018-09-01 PROCEDURE — 70551 MRI BRAIN STEM W/O DYE: CPT

## 2018-09-01 PROCEDURE — G8997 SWALLOW GOAL STATUS: HCPCS

## 2018-09-01 PROCEDURE — 71045 X-RAY EXAM CHEST 1 VIEW: CPT

## 2018-09-01 PROCEDURE — 93010 ELECTROCARDIOGRAM REPORT: CPT | Performed by: INTERNAL MEDICINE

## 2018-09-01 PROCEDURE — 99285 EMERGENCY DEPT VISIT HI MDM: CPT

## 2018-09-01 PROCEDURE — 82962 GLUCOSE BLOOD TEST: CPT

## 2018-09-01 PROCEDURE — 93005 ELECTROCARDIOGRAM TRACING: CPT | Performed by: EMERGENCY MEDICINE

## 2018-09-01 PROCEDURE — 92610 EVALUATE SWALLOWING FUNCTION: CPT

## 2018-09-01 PROCEDURE — 94799 UNLISTED PULMONARY SVC/PX: CPT

## 2018-09-01 RX ORDER — MORPHINE SULFATE 2 MG/ML
1 INJECTION, SOLUTION INTRAMUSCULAR; INTRAVENOUS EVERY 4 HOURS PRN
Status: DISCONTINUED | OUTPATIENT
Start: 2018-09-01 | End: 2018-09-01

## 2018-09-01 RX ORDER — SODIUM CHLORIDE 0.9 % (FLUSH) 0.9 %
1-10 SYRINGE (ML) INJECTION AS NEEDED
Status: DISCONTINUED | OUTPATIENT
Start: 2018-09-01 | End: 2018-09-03 | Stop reason: HOSPADM

## 2018-09-01 RX ORDER — TIZANIDINE 4 MG/1
4 TABLET ORAL 2 TIMES DAILY PRN
COMMUNITY
End: 2019-08-22

## 2018-09-01 RX ORDER — ESCITALOPRAM OXALATE 20 MG/1
20 TABLET ORAL DAILY
COMMUNITY

## 2018-09-01 RX ORDER — ESOMEPRAZOLE MAGNESIUM 40 MG/1
40 CAPSULE, DELAYED RELEASE ORAL DAILY PRN
Status: ON HOLD | COMMUNITY
End: 2019-11-01

## 2018-09-01 RX ORDER — ASPIRIN 300 MG/1
300 SUPPOSITORY RECTAL DAILY
Status: DISCONTINUED | OUTPATIENT
Start: 2018-09-01 | End: 2018-09-02

## 2018-09-01 RX ORDER — ONDANSETRON 2 MG/ML
4 INJECTION INTRAMUSCULAR; INTRAVENOUS EVERY 6 HOURS PRN
Status: DISCONTINUED | OUTPATIENT
Start: 2018-09-01 | End: 2018-09-03 | Stop reason: HOSPADM

## 2018-09-01 RX ORDER — OXYCODONE AND ACETAMINOPHEN 10; 325 MG/1; MG/1
1 TABLET ORAL 2 TIMES DAILY
Status: ON HOLD | COMMUNITY
End: 2018-09-01

## 2018-09-01 RX ORDER — MORPHINE SULFATE 2 MG/ML
2 INJECTION, SOLUTION INTRAMUSCULAR; INTRAVENOUS EVERY 4 HOURS PRN
Status: DISCONTINUED | OUTPATIENT
Start: 2018-09-01 | End: 2018-09-02

## 2018-09-01 RX ORDER — SODIUM CHLORIDE 0.9 % (FLUSH) 0.9 %
10 SYRINGE (ML) INJECTION AS NEEDED
Status: DISCONTINUED | OUTPATIENT
Start: 2018-09-01 | End: 2018-09-03 | Stop reason: HOSPADM

## 2018-09-01 RX ORDER — ASPIRIN 325 MG
325 TABLET ORAL DAILY
Status: DISCONTINUED | OUTPATIENT
Start: 2018-09-01 | End: 2018-09-02

## 2018-09-01 RX ORDER — OXYCODONE HCL 10 MG/1
10 TABLET, FILM COATED, EXTENDED RELEASE ORAL EVERY 12 HOURS PRN
Status: ON HOLD | COMMUNITY
End: 2019-11-01

## 2018-09-01 RX ORDER — ATORVASTATIN CALCIUM 40 MG/1
80 TABLET, FILM COATED ORAL NIGHTLY
Status: DISCONTINUED | OUTPATIENT
Start: 2018-09-01 | End: 2018-09-03 | Stop reason: HOSPADM

## 2018-09-01 RX ORDER — ATORVASTATIN CALCIUM 80 MG/1
80 TABLET, FILM COATED ORAL DAILY
COMMUNITY
End: 2020-08-05 | Stop reason: ALTCHOICE

## 2018-09-01 RX ORDER — HYDROCODONE BITARTRATE AND ACETAMINOPHEN 7.5; 325 MG/1; MG/1
1 TABLET ORAL EVERY 4 HOURS PRN
Status: DISCONTINUED | OUTPATIENT
Start: 2018-09-01 | End: 2018-09-03 | Stop reason: HOSPADM

## 2018-09-01 RX ORDER — TOPIRAMATE 100 MG/1
100 TABLET, FILM COATED ORAL EVERY 12 HOURS SCHEDULED
Status: DISCONTINUED | OUTPATIENT
Start: 2018-09-01 | End: 2018-09-03 | Stop reason: HOSPADM

## 2018-09-01 RX ORDER — TOPIRAMATE 100 MG/1
200 TABLET, FILM COATED ORAL DAILY
COMMUNITY
End: 2019-08-22 | Stop reason: DRUGHIGH

## 2018-09-01 RX ORDER — BUTALBITAL, ACETAMINOPHEN AND CAFFEINE 50; 325; 40 MG/1; MG/1; MG/1
1 TABLET ORAL EVERY 6 HOURS PRN
COMMUNITY

## 2018-09-01 RX ORDER — METHOCARBAMOL 500 MG/1
500 TABLET, FILM COATED ORAL 2 TIMES DAILY PRN
Status: ON HOLD | COMMUNITY
End: 2018-09-03

## 2018-09-01 RX ORDER — ENALAPRILAT 2.5 MG/2ML
0.62 INJECTION INTRAVENOUS EVERY 6 HOURS PRN
Status: DISCONTINUED | OUTPATIENT
Start: 2018-09-01 | End: 2018-09-03 | Stop reason: HOSPADM

## 2018-09-01 RX ORDER — ALPRAZOLAM 1 MG/1
1 TABLET ORAL 3 TIMES DAILY PRN
COMMUNITY

## 2018-09-01 RX ADMIN — ASPIRIN 325 MG: 325 TABLET, COATED ORAL at 15:59

## 2018-09-01 RX ADMIN — MORPHINE SULFATE 2 MG: 2 INJECTION, SOLUTION INTRAMUSCULAR; INTRAVENOUS at 16:07

## 2018-09-01 RX ADMIN — HYDROCODONE BITARTRATE AND ACETAMINOPHEN 1 TABLET: 7.5; 325 TABLET ORAL at 18:26

## 2018-09-01 RX ADMIN — PERFLUTREN 9.78 MG: 6.52 INJECTION, SUSPENSION INTRAVENOUS at 16:52

## 2018-09-01 RX ADMIN — TOPIRAMATE 100 MG: 100 TABLET, FILM COATED ORAL at 21:08

## 2018-09-01 RX ADMIN — ATORVASTATIN CALCIUM 80 MG: 40 TABLET, FILM COATED ORAL at 21:08

## 2018-09-01 RX ADMIN — VALPROATE SODIUM 1000 MG: 100 INJECTION, SOLUTION INTRAVENOUS at 15:58

## 2018-09-01 NOTE — PLAN OF CARE
Problem: Patient Care Overview  Goal: Plan of Care Review  Outcome: Ongoing (interventions implemented as appropriate)   09/01/18 8509   Coping/Psychosocial   Plan of Care Reviewed With patient   Plan of Care Review   Progress no change  (Initial eval)   OTHER   Outcome Summary Clinical bedside swallow evaluation complete. Pt presented with a full range of consistencies except for mechanical soft solids. Adequate oral prepration and laryngeal elevation noted. Pt also displayed adequate bolus clearance and a timely swallow. The pt did have a cough prior to PO trials as well as 1x quite delayed cough following thin liquids, however no other overt s/s of aspiration noted and vocal quality was unchanged. SLP recommends a regular diet with thin liquids. Ok for meds to be administered whole with thin liquids. SLP is signing off of swallowing, however will follow to complete speech-language eval.

## 2018-09-01 NOTE — PROGRESS NOTES
Discharge Planning Assessment  [facility]     Patient Name: Natalee Noble  MRN: [id number]  Today's Date: 9/1/2018    Admit Date: 9/1/2018          Discharge Needs Assessment     Row Name 09/01/18 1723       Living Environment    Lives With alone    Current Living Arrangements home/apartment/condo    Primary Care Provided by self    Provides Primary Care For no one       Transition Planning    Transportation Anticipated family or friend will provide       Discharge Needs Assessment    Readmission Within the Last 30 Days no previous admission in last 30 days    Concerns to be Addressed no discharge needs identified    Equipment Currently Used at Home none    Anticipated Changes Related to Illness none    Discharge Coordination/Progress Pt has PCP and RX coverage.  Pt admitted for stroke.  SW will follow to see what therapy recommends.            Discharge Plan    No documentation.       Destination     No service coordination in this encounter.      Durable Medical Equipment     No service coordination in this encounter.      Dialysis/Infusion     No service coordination in this encounter.      Home Medical Care     No service coordination in this encounter.      Social Care     No service coordination in this encounter.                Demographic Summary    No documentation.           Functional Status    No documentation.           Psychosocial    No documentation.           Abuse/Neglect    No documentation.           Legal    No documentation.           Substance Abuse    No documentation.           Patient Forms    No documentation.         ALESSIA FreedmanW

## 2018-09-01 NOTE — THERAPY EVALUATION
Acute Care - Speech Language Pathology   Swallow Initial Evaluation [facility]     Patient Name: Natalee Noble  : 1950  MRN: [id number]  Today's Date: 2018               Admit Date: 2018  Clinical bedside swallow evaluation complete. Pt presented with a full range of consistencies except for mechanical soft solids. Adequate oral prepration and laryngeal elevation noted. Pt also displayed adequate bolus clearance and a timely swallow. The pt did have a cough prior to PO trials as well as 1x quite delayed cough following thin liquids, however no other overt s/s of aspiration noted and vocal quality was unchanged. SLP recommends a regular diet with thin liquids. Ok for meds to be administered whole with thin liquids. SLP is signing off of swallowing, however will follow to complete speech-language eval.  Balta Edwards, MS CCC-SLP 2018 3:47 PM    Visit Dx:     ICD-10-CM ICD-9-CM   1. Cerebrovascular accident (CVA), unspecified mechanism (CMS/HCC) I63.9 434.91   2. Dysphagia, unspecified type R13.10 787.20     Patient Active Problem List   Diagnosis   • Cerebrovascular accident (CVA) (CMS/HCC)     Past Medical History:   Diagnosis Date   • Arthritis    • Back pain    • Fractures     R tibia/fibula; L ankle   • Kidney disorder    • Migraine    • Neck pain      Past Surgical History:   Procedure Laterality Date   • BACK SURGERY      L2-5 fusion    • HYSTERECTOMY     • NECK SURGERY      ACDF C4-7          SWALLOW EVALUATION (last 72 hours)      SLP Adult Swallow Evaluation     Row Name 18 1509                   Rehab Evaluation    Document Type evaluation  -CS        Subjective Information complains of;pain  -CS        Patient Observations alert;cooperative  -CS        Patient/Family Observations Sister-in-law present  -CS        Patient Effort good  -CS           General Information    Patient Profile Reviewed yes  -CS        Pertinent History Of Current Problem MRI 18- Acute  infarct at left posterior temporal lobe.  -CS        Current Method of Nutrition NPO  -CS        Precautions/Limitations, Vision WFL;for purposes of eval  -CS        Precautions/Limitations, Hearing WFL;for purposes of eval  -CS        Prior Level of Function-Communication WFL  -CS        Prior Level of Function-Swallowing no diet consistency restrictions  -CS        Plans/Goals Discussed with patient;family  -CS        Barriers to Rehab none identified  -CS        Patient's Goals for Discharge patient did not state  -CS           Pain Assessment    Additional Documentation Pain Scale: FACES Pre/Post-Treatment (Group)  -CS           Pain Scale: FACES Pre/Post-Treatment    Pain: FACES Scale, Pretreatment 6-->hurts even more  -CS        Pain: FACES Scale, Post-Treatment 6-->hurts even more  -CS           Oral Motor and Function    Dentition Assessment natural, present and adequate  -CS        Secretion Management WNL/WFL  -CS        Mucosal Quality moist, healthy  -CS        Volitional Swallow WFL  -CS        Volitional Cough WFL  -CS           Oral Musculature and Cranial Nerve Assessment    Oral Motor General Assessment WFL  -CS           General Eating/Swallowing Observations    Respiratory Support Currently in Use room air  -CS        Eating/Swallowing Skills fed by SLP;appropriate self-feeding skills observed  -CS        Positioning During Eating upright in bed  -CS        Utensils Used spoon;straw  -CS        Consistencies Trialed regular textures;honey-thick liquids;nectar/syrup-thick liquids;thin liquids;pureed  -CS           Clinical Swallow Eval    Oral Prep Phase WFL  -CS        Oral Transit WFL  -CS        Oral Residue WFL  -CS        Pharyngeal Phase WFL  -CS        Clinical Swallow Evaluation Summary Clinical bedside swallow evaluation complete. Pt presented with a full range of consistencies except for mechanical soft solids. Adequate oral prepration and laryngeal elevation noted. Pt also displayed  adequate bolus clearance and a timely swallow. The pt did have a cough prior to PO trials as well as 1x quite delayed cough following thin liquids, however no other overt s/s of aspiration noted and vocal quality was unchanged. SLP recommends a regular diet with thin liquids. Ok for meds to be administered whole with thin liquids. SLP is signing off of swallowing, however will follow to complete speech-language eval.  -CS           Clinical Impression    SLP Swallowing Diagnosis functional oral phase;functional pharyngeal phase  -CS        Functional Impact no impact on function  -CS        Swallow Criteria for Skilled Therapeutic Interventions Met no problems identified which require skilled intervention  -CS           Recommendations    Therapy Frequency (Swallow) PRN  -CS        Predicted Duration Therapy Intervention (Days) until discharge  -CS        SLP Diet Recommendation regular textures;thin liquids  -CS        Recommended Diagnostics other (see comments)   Speech-language eval  -CS        Recommended Precautions and Strategies upright posture during/after eating  -CS        SLP Rec. for Method of Medication Administration meds whole;with thin liquids  -CS        Monitor for Signs of Aspiration yes;notify SLP if any concerns  -CS        Anticipated Dischage Disposition home with assist  -CS          User Key  (r) = Recorded By, (t) = Taken By, (c) = Cosigned By    Initials Name Effective Dates    CS Balta Edwards, MS CCC-SLP 04/03/18 -         EDUCATION  The patient has been educated in the following areas:   Dysphagia (Swallowing Impairment).    SLP Recommendation and Plan  SLP Swallowing Diagnosis: functional oral phase, functional pharyngeal phase  SLP Diet Recommendation: regular textures, thin liquids  Recommended Precautions and Strategies: upright posture during/after eating     Monitor for Signs of Aspiration: yes, notify SLP if any concerns  Recommended Diagnostics: other (see comments)  (Speech-language eval)  Swallow Criteria for Skilled Therapeutic Interventions Met: no problems identified which require skilled intervention  Anticipated Dischage Disposition: home with assist     Therapy Frequency (Swallow): PRN  Predicted Duration Therapy Intervention (Days): until discharge       Plan of Care Reviewed With: patient  Plan of Care Review  Plan of Care Reviewed With: patient  Progress: no change (Initial eval)  Outcome Summary: Clinical bedside swallow evaluation complete. Pt presented with a full range of consistencies except for mechanical soft solids. Adequate oral prepration and laryngeal elevation noted. Pt also displayed adequate bolus clearance and a timely swallow. The pt did have a cough prior to PO trials as well as 1x quite delayed cough following thin liquids, however no other overt s/s of aspiration noted and vocal quality was unchanged. SLP recommends a regular diet with thin liquids. Ok for meds to be administered whole with thin liquids. SLP is signing off of swallowing, however will follow to complete speech-language eval.           SLP Outcome Measures (last 72 hours)      SLP Outcome Measures     Row Name 09/01/18 1509             SLP Outcome Measures    Outcome Measure Used? Adult NOMS  -CS         Adult FCM Scores    FCM Chosen Swallowing  -      Swallowing FCM Score 7  -        User Key  (r) = Recorded By, (t) = Taken By, (c) = Cosigned By    Initials Name Effective Dates    Balta Rivera MS CCC-SLP 04/03/18 -            Time Calculation:         Time Calculation- SLP     Row Name 09/01/18 1547             Time Calculation- SLP    SLP Start Time 1509  -      SLP Stop Time 1547  -      SLP Time Calculation (min) 38 min  -      SLP Received On 09/01/18  -      SLP Goal Re-Cert Due Date 09/11/18  -        User Key  (r) = Recorded By, (t) = Taken By, (c) = Cosigned By    Initials Name Provider Type    Balta Rivera MS CCC-SLP Speech and Language Pathologist           Therapy Charges for Today     Code Description Service Date Service Provider Modifiers Qty    32866862255 HC ST SWALLOWING CURRENT STATUS 9/1/2018 Balta Edwards, MS CCC-SLP GN, CH 1    42192668010 HC ST SWALLOWING PROJECTED 9/1/2018 Balta Edwards, MS CCC-SLP GN, CH 1    52439380023 HC ST SWALLOWING DISCHARGE 9/1/2018 Balta Edwards, MS MA-SLP GN, CH 1    50192496530 HC ST EVAL ORAL PHARYNG SWALLOW 3 9/1/2018 Balta Edwards MS CCC-SLP GN 1          SLP G-Codes  SLP NOMS Used?: Yes  Functional Limitations: Swallowing  Swallow Current Status (): 0 percent impaired, limited or restricted  Swallow Goal Status (): 0 percent impaired, limited or restricted  Swallow Discharge Status (): 0 percent impaired, limited or restricted    Balta Edwards MS CCC-SLP  9/1/2018

## 2018-09-01 NOTE — PLAN OF CARE
Problem: Patient Care Overview  Goal: Plan of Care Review  Outcome: Ongoing (interventions implemented as appropriate)   09/01/18 4433   Coping/Psychosocial   Plan of Care Reviewed With patient   Plan of Care Review   Progress no change   OTHER   Outcome Summary Pt to room 345 from ER. pt A&Ox4. VSS. Pt speech fragmented and repetitive. pt unable to tell me pts age anlthough keeps repeating pt's birthdate. Pt repeating todays date as well. no slurred speech noted.pt c/o severe headache pt holding left side of head.        Problem: Fall Risk (Adult)  Goal: Identify Related Risk Factors and Signs and Symptoms  Outcome: Outcome(s) achieved Date Met: 09/01/18    Goal: Absence of Fall  Outcome: Ongoing (interventions implemented as appropriate)      Problem: Stroke (Ischemic) (Adult)  Goal: Signs and Symptoms of Listed Potential Problems Will be Absent, Minimized or Managed (Stroke)  Outcome: Ongoing (interventions implemented as appropriate)

## 2018-09-01 NOTE — CONSULTS
Neurology Consult Note    Referring Provider: Dr. Clayton Iniguez  Reason for Consultation: stroke      History of present illness:      68 year old female who presents with a last seen normal time of 5PM the night before presentation.  She had a sudden onset of left sided head pain and inability to speak.  Reportedly the patient does live alone.  She went on to bed.  She woke up this morning.  It is unclear whether she alerted EMS or whether her family members were trying to contact her via the telephone from [place] and could not and therefore they called EMS.  Regardless [facility]  She continued to complain of severe left-sided head pain.  She had an obvious expressive and receptive aphasia.  She had no facial droop.  She denied diplopia.  She denies unilateral weakness or numbness.  Physical exam was unremarkable with exception of the aphasia described below.  She has no previous history of stroke.      Past Medical History:   Diagnosis Date   • Arthritis    • Back pain    • Fractures     R tibia/fibula; L ankle   • Kidney disorder    • Migraine    • Neck pain        Allergies   Allergen Reactions   • Iodine Other (See Comments)     PT UNABLE TO TELL RN ABOUT REACTION AT THIS TIME, BUT FAMILY STATES PT HAD A REACTION TO BEING CLEANSED WITH IODINE IN SURGERY   • Sulfa Antibiotics      No current facility-administered medications on file prior to encounter.      No current outpatient prescriptions on file prior to encounter.       Social History     Social History   • Marital status:      Spouse name: N/A   • Number of children: N/A   • Years of education: N/A     Occupational History   • Not on file.     Social History Main Topics   • Smoking status: Never Smoker   • Smokeless tobacco: Not on file   • Alcohol use No   • Drug use: Unknown   • Sexual activity: Defer     Other Topics Concern   • Not on file     Social History Narrative   • No narrative on file     History  reviewed. No pertinent family history.    Review of Systems  A 14 point review of systems was reviewed and was negative except for speech changes    Vital Signs   Temp:  [97.6 °F (36.4 °C)-98.1 °F (36.7 °C)] 97.6 °F (36.4 °C)  Heart Rate:  [61-73] 69  Resp:  [16-17] 16  BP: (107-128)/(61-77) 117/77    General Exam:  Head:  Normal cephalic, atraumatic  HEENT:  Neck supple  Fundoscopic Exam:  No signs of disc edema  CVS:  Regular rate and rhythm.  No murmurs  Carotid Examination:  No bruits  Lungs:  Clear to auscultation  Abdomen:  Non-tender, Non-distended  Extremities:  No signs of peripheral edema  Skin:  No rashes    Neurologic Exam:    Mental Status:    -Awake, Alert, Oriented X 3  -No word finding difficulties  -She really has more of a global aphasia.  There are receptive and expressive components.  In addition to this she has no repetition.    CN II:  Visual fields full.  Pupils equally reactive to light  CN III, IV, VI:  Extraocular Muscles full with no signs of nystagmus  CN V:  Facial sensory is symmetric with no asymmetries.  CN VII:  Facial motor symmetric  CN VIII:  Gross hearing intact bilaterally  CN IX:  Palate elevates symmetrically  CN X:  Palate elevates symmetrically  CN XI:  Shoulder shrug symmetric  CN XII:  Tongue is midline on protrusion    Motor: (strength out of 5:  1= minimal movement, 2 = movement in plane of gravity, 3 = movement against gravity, 4 = movement against some resistance, 5 = full strength)    -Right Upper Ext: Proximal: 5 Distal: 5  -Left Upper Ext: Proximal: 5 Distal: 5    -Right Lower Ext: Proximal: 5 Distal: 5  -Left Lower Ext: Proximal: 5 Distal: 5    DTR:  -Right   Bicep: 2+ Tricep: 2+ Brachoradialis: 2+   Patella: 2+ Ankle: 2+ Neg Babinski  -Left   Bicep: 2+ Tricep: 2+ Brachoradialis: 2+   Patella: 2+ Ankle: 2+ Neg Babinski    Sensory:  -Intact to light touch, pinprick, temperature, pain, and proprioception    Coordination:  -Finger to nose intact  -Heel to shin  intact  -No ataxia    Gait  -No signs of ataxia  -ambulates unassisted      Results Review:  Lab Results (last 24 hours)     Procedure Component Value Units Date/Time    Comprehensive Metabolic Panel [[id number]]  (Abnormal) Collected:  09/01/18 0910    Specimen:  Blood from Arm, Right Updated:  09/01/18 0932     Glucose 85 mg/dL      BUN 17 mg/dL      Creatinine 1.22 mg/dL      Sodium 142 mmol/L      Potassium 4.1 mmol/L      Chloride 109 mmol/L      CO2 23.0 (L) mmol/L      Calcium 8.7 mg/dL      Total Protein 6.6 g/dL      Albumin 3.80 g/dL      ALT (SGPT) 22 U/L      AST (SGOT) 22 U/L      Alkaline Phosphatase 64 U/L      Total Bilirubin 0.3 mg/dL      eGFR Non African Amer 44 (L) mL/min/1.73      Globulin 2.8 gm/dL      A/G Ratio 1.4 g/dL      BUN/Creatinine Ratio 13.9     Anion Gap 10.0 mmol/L     aPTT [[id number]]  (Abnormal) Collected:  09/01/18 0910    Specimen:  Blood from Arm, Right Updated:  09/01/18 0930     PTT 21.1 (L) seconds     Protime-INR [[id number]]  (Abnormal) Collected:  09/01/18 0910    Specimen:  Blood from Arm, Right Updated:  09/01/18 0930     Protime 12.1 Seconds      INR 0.87 (L)    POC Glucose Once [[id number]]  (Normal) Collected:  09/01/18 0913    Specimen:  Blood Updated:  09/01/18 0926     Glucose 85 mg/dL      Comment: : [id number] Yeyo Jean BaptisteOtis) TracyMeter ID: [id number]       CBC & Differential [[id number]] Collected:  09/01/18 0910    Specimen:  Blood Updated:  09/01/18 0921    Narrative:       The following orders were created for panel order CBC & Differential.  Procedure                               Abnormality         Status                     ---------                               -----------         ------                     CBC Auto Differential[[id number]]         Abnormal            Final result                 Please view results for these tests on the individual orders.    CBC Auto Differential [[id number]]  (Abnormal) Collected:  09/01/18 0910    Specimen:  Blood from  Arm, Right Updated:  09/01/18 0921     WBC 6.71 10*3/mm3      RBC 4.22 10*6/mm3      Hemoglobin 13.2 g/dL      Hematocrit 39.8 %      MCV 94.3 fL      MCH 31.3 pg      MCHC 33.2 g/dL      RDW 13.6 %      RDW-SD 46.7 fl      MPV 11.3 fL      Platelets 205 10*3/mm3      Neutrophil % 65.3 %      Lymphocyte % 21.9 %      Monocyte % 8.5 %      Eosinophil % 3.3 %      Basophil % 0.3 %      Immature Grans % 0.7 %      Neutrophils, Absolute 4.38 10*3/mm3      Lymphocytes, Absolute 1.47 10*3/mm3      Monocytes, Absolute 0.57 10*3/mm3      Eosinophils, Absolute 0.22 10*3/mm3      Basophils, Absolute 0.02 10*3/mm3      Immature Grans, Absolute 0.05 (H) 10*3/mm3      nRBC 0.0 /100 WBC           .  Imaging Results (last 24 hours)     Procedure Component Value Units Date/Time    MRI Brain Without Contrast [[id number]] Collected:  09/01/18 1124     Updated:  09/01/18 1131    Narrative:       EXAM: MRI BRAIN WO CONTRAST- - 9/1/2018 10:52 AM CDT     HISTORY: Stroke       COMPARISON: 9/1/2018, 8/24/2012.      TECHNIQUE:   Routine pulse sequences of the brain were obtained without IV contrast.      FINDINGS:   Acute infarct at the left posterior temporal lobe measuring about 5.0 x  2.9 cm (AP by transverse).      Mild periventricular and subcortical white matter changes of chronic  microvascular disease. Ventricles, cisterns and sulci are normal in size  and configuration. Sella and midline structures within normal limits.  Brainstem and posterior fossa are within normal limits.      Visualized flow voids within normal limits. Visualized portions of the  orbits, paranasal sinuses and mastoid air cells are within normal  limits. There is T2 hyperintensity in the subcutaneous tissues overlying  the bilateral maxillary sinuses, could represent contusion.          Impression:       1. Acute infarct at the left posterior temporal lobe measuring about 5.0  x 2.9 cm.  2. Changes in the subcutaneous tissues overlying the bilateral  maxillary  sinuses, could represent contusion.  3. Mild chronic microvascular changes.     This report was finalized on 09/01/2018 11:27 by Dr Ewelina Arias MD.    XR Chest 1 View [[id number]] Collected:  09/01/18 1049     Updated:  09/01/18 1053    Narrative:       EXAM: XR CHEST 1 VW- - 9/1/2018 9:47 AM CDT     HISTORY: weakness       COMPARISON: 7/3/2014.      TECHNIQUE:  1 images.  Frontal view of the chest.     FINDINGS:    No pneumothorax, pleural effusion or focal consolidation. Cardiac  mediastinal silhouette within normal limits. No acute findings in the  bones, soft tissues or upper abdomen. Changes of lower cervical anterior  discectomy and fusion. Cholecystectomy clips.       Impression:       1. No acute cardiopulmonary findings.  This report was finalized on 09/01/2018 10:50 by Dr Ewelina Arias MD.    CT Head Without Contrast [[id number]] Collected:  09/01/18 0938     Updated:  09/01/18 0945    Narrative:       EXAM: CT HEAD WO CONTRAST- - 9/1/2018 9:16 AM CDT     HISTORY: Stroke       COMPARISON: 8/24/2012.      DOSE LENGTH PRODUCT: 571 mGy cm. Automated exposure control was also  utilized to decrease patient radiation dose.     TECHNIQUE: Unenhanced axial CT images obtained from vertex to skull  base.     FINDINGS:  No acute intracranial hemorrhage, midline shift, or mass effect.  Hypodensity at the posterior left temporal lobe cortex such as axial  image 13 and coronal image 26. Ventricles, sulci, basilar cisterns are  normal in size and configuration for the patient's age. Gray-white  matter differentiation maintained.     Globes, retrobulbar soft tissues, paranasal sinuses, mastoid air cells  and external auditory canals are within normal limits. Calvarium appears  intact. Subcutaneous tissues within normal limits.       Impression:       1. Hypodensity at the left posterior temporal lobe cortex, could  represent infarct. MRI could be helpful for definitive evaluation.  2. No evidence of acute  hemorrhage.  This report was finalized on 09/01/2018 09:42 by Dr Ewelina Arias MD.        MR brain reviewed by me.  There is evidence of an acute infarction in the anterior pole of the left temporal lobe.    Cardiac echo pending        Impression    1.  Acute ischemic infarction in the inferior division of the left middle cerebral artery  --Not a TPA candidate as presented outside of the three-hour window  2.  She currently has a severe headache.  She does have a history of migraines.  The headache is currently located over her left temple.  3.  Arthritis  4.  Chronic history of migraines - refractory to medications      Plan    · I will increase morphine to 2 mg IV every 4 hours when necessary severe headache  · I will also add a 1000 mg load of IV Depacon for the headache  · Aspirin recommended  · Lipitor 80 mg daily  · The patient is adamant that she takes 200 mg of extended release Topamax at night.  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  I will start Topamax immediate release twice a day currently.  · Carotid ultrasound ordered  · Cardiac echo pending  · Fasting lipid profile and hemoglobin A1c pending  · Therapy consultations    I discussed the patients findings and my recommendations with patient and family    Toby Hammer MD  09/01/18  2:24 PM

## 2018-09-01 NOTE — ED NOTES
"PT LIVES HOME ALONE. PT IS ALERT & ORIENTED. EMS STATES PT WAS DISORGANIZED AND UNABLE TO SPEAK CLEARLY ON THEIR ARRIVAL. PT REPORTS HEADACHE LAST NIGHT, AND STATED \"I CAN'T MAKE IT TALK\" SINCE LAST NIGHT. PT CALLED FAMILY IN [place] THIS MORNING, WHO ALSO NOTICED HER TROUBLE WITH SPEECH. PT HAS NO WEAKNESS AT THIS TIME. PT HAS HX OF MIGRAINE HEADACHES AND C/O FRONTAL HEADACHE AND PHOTOPHOBIA SINCE LAST NIGHT. PT ALSO REPORTS RECENT SINUS INFECTION, AND STATES SHE HAD EPIDURAL INJECTION IN NECK ON Monday. PT SPEAKING IN BROKEN SENTENCES, BUT IS LOGICAL. PT WAS ABLE TO SIT UP ON SIDE OF BED.      Tatiana Ng RN  09/01/18 0992    "

## 2018-09-01 NOTE — ED PROVIDER NOTES
Subjective   Patient brought by EMS with report from patient that she started having headache about 5 PM yesterday with difficulty getting words out and loss of coordination.  Family noted abnormal speech on phone this AM and called EMS.        History provided by:  Patient and EMS personnel   used: No    Altered Mental Status   Presenting symptoms: confusion    Severity:  Moderate  Most recent episode:  Yesterday  Episode history:  Single  Duration:  14 hours  Timing:  Constant  Progression:  Unchanged  Chronicity:  New  Context: not alcohol use, not dementia, not drug use, not head injury, not homeless, taking medications as prescribed, not nursing home resident, not recent change in medication, not recent illness and not recent infection    Associated symptoms: headaches and weakness    Associated symptoms: no abdominal pain, normal movement, no agitation, no bladder incontinence, no decreased appetite, no depression, no difficulty breathing, no eye deviation, no fever, no hallucinations, no light-headedness, no nausea, no palpitations, no seizures, no slurred speech, no suicidal behavior, no visual change and no vomiting        Review of Systems   Constitutional: Negative for decreased appetite and fever.   Respiratory: Negative.    Cardiovascular: Negative.  Negative for palpitations.   Gastrointestinal: Negative.  Negative for abdominal pain, nausea and vomiting.   Genitourinary: Negative.  Negative for bladder incontinence.   Musculoskeletal: Negative.    Skin: Negative.    Neurological: Positive for weakness and headaches. Negative for seizures and light-headedness.   Psychiatric/Behavioral: Positive for confusion. Negative for agitation and hallucinations.   All other systems reviewed and are negative.      Past Medical History:   Diagnosis Date   • Arthritis    • Back pain    • Fractures     R tibia/fibula; L ankle   • Kidney disorder    • Migraine    • Neck pain        Allergies    Allergen Reactions   • Iodine Other (See Comments)     PT UNABLE TO TELL RN ABOUT REACTION AT THIS TIME, BUT FAMILY STATES PT HAD A REACTION TO BEING CLEANSED WITH IODINE IN SURGERY   • Sulfa Antibiotics        Past Surgical History:   Procedure Laterality Date   • BACK SURGERY      L2-5 fusion 2012   • HYSTERECTOMY     • NECK SURGERY  2012    ACDF C4-7       History reviewed. No pertinent family history.    Social History     Social History   • Marital status:      Social History Main Topics   • Smoking status: Never Smoker   • Alcohol use No   • Drug use: Unknown     Other Topics Concern   • Not on file       Prior to Admission medications    Not on File       Medications   sodium chloride 0.9 % flush 10 mL (not administered)       Vitals:    09/01/18 1251   BP:    Pulse: 64   Resp:    Temp:    SpO2: 98%         Objective   Physical Exam   Constitutional: She appears well-developed and well-nourished.   HENT:   Head: Normocephalic and atraumatic.   Eyes: Pupils are equal, round, and reactive to light. EOM are normal.   Neck: Normal range of motion. Neck supple.   Cardiovascular: Normal rate and regular rhythm.    Pulmonary/Chest: Effort normal and breath sounds normal.   Musculoskeletal: Normal range of motion.   Neurological: She is alert. Coordination abnormal.   Answers correctly but slowly and has moderate expressive aphasia   Skin: Skin is warm and dry.   Psychiatric: She has a normal mood and affect. Her behavior is normal.   Nursing note and vitals reviewed.      Procedures         Lab Results (last 24 hours)     Procedure Component Value Units Date/Time    CBC & Differential [[id number]] Collected:  09/01/18 0910    Specimen:  Blood Updated:  09/01/18 0921    Narrative:       The following orders were created for panel order CBC & Differential.  Procedure                               Abnormality         Status                     ---------                               -----------         ------                      CBC Auto Differential[[id number]]         Abnormal            Final result                 Please view results for these tests on the individual orders.    Comprehensive Metabolic Panel [[id number]]  (Abnormal) Collected:  09/01/18 0910    Specimen:  Blood from Arm, Right Updated:  09/01/18 0932     Glucose 85 mg/dL      BUN 17 mg/dL      Creatinine 1.22 mg/dL      Sodium 142 mmol/L      Potassium 4.1 mmol/L      Chloride 109 mmol/L      CO2 23.0 (L) mmol/L      Calcium 8.7 mg/dL      Total Protein 6.6 g/dL      Albumin 3.80 g/dL      ALT (SGPT) 22 U/L      AST (SGOT) 22 U/L      Alkaline Phosphatase 64 U/L      Total Bilirubin 0.3 mg/dL      eGFR Non African Amer 44 (L) mL/min/1.73      Globulin 2.8 gm/dL      A/G Ratio 1.4 g/dL      BUN/Creatinine Ratio 13.9     Anion Gap 10.0 mmol/L     aPTT [[id number]]  (Abnormal) Collected:  09/01/18 0910    Specimen:  Blood from Arm, Right Updated:  09/01/18 0930     PTT 21.1 (L) seconds     Protime-INR [[id number]]  (Abnormal) Collected:  09/01/18 0910    Specimen:  Blood from Arm, Right Updated:  09/01/18 0930     Protime 12.1 Seconds      INR 0.87 (L)    CBC Auto Differential [[id number]]  (Abnormal) Collected:  09/01/18 0910    Specimen:  Blood from Arm, Right Updated:  09/01/18 0921     WBC 6.71 10*3/mm3      RBC 4.22 10*6/mm3      Hemoglobin 13.2 g/dL      Hematocrit 39.8 %      MCV 94.3 fL      MCH 31.3 pg      MCHC 33.2 g/dL      RDW 13.6 %      RDW-SD 46.7 fl      MPV 11.3 fL      Platelets 205 10*3/mm3      Neutrophil % 65.3 %      Lymphocyte % 21.9 %      Monocyte % 8.5 %      Eosinophil % 3.3 %      Basophil % 0.3 %      Immature Grans % 0.7 %      Neutrophils, Absolute 4.38 10*3/mm3      Lymphocytes, Absolute 1.47 10*3/mm3      Monocytes, Absolute 0.57 10*3/mm3      Eosinophils, Absolute 0.22 10*3/mm3      Basophils, Absolute 0.02 10*3/mm3      Immature Grans, Absolute 0.05 (H) 10*3/mm3      nRBC 0.0 /100 WBC     POC Glucose Once [[id number]]  (Normal)  Collected:  09/01/18 0913    Specimen:  Blood Updated:  09/01/18 0926     Glucose 85 mg/dL      Comment: : [id number] [place] ([place] TracyMeter ID: [id number]             MRI Brain Without Contrast   Final Result   1. Acute infarct at the left posterior temporal lobe measuring about 5.0   x 2.9 cm.   2. Changes in the subcutaneous tissues overlying the bilateral maxillary   sinuses, could represent contusion.   3. Mild chronic microvascular changes.       This report was finalized on 09/01/2018 11:27 by Dr Ewelina Arias MD.      XR Chest 1 View   Final Result   1. No acute cardiopulmonary findings.   This report was finalized on 09/01/2018 10:50 by Dr Ewelina Arias MD.      CT Head Without Contrast   Final Result   1. Hypodensity at the left posterior temporal lobe cortex, could   represent infarct. MRI could be helpful for definitive evaluation.   2. No evidence of acute hemorrhage.   This report was finalized on 09/01/2018 09:42 by Dr Ewelina Arias MD.          ED Course  ED Course as of Sep 01 1305   Sat Sep 01, 2018   1020 Told patient and sister-in-law of probable CVA.  Awaiting MRI.  [TR]   1251 I spoke with Dr. Chandra and with Dr. Hammer.  The patient will be admitted for further care.  [TR]      ED Course User Index  [TR] Harry Floyd Jr., MD          MDM  Number of Diagnoses or Management Options  Cerebrovascular accident (CVA), unspecified mechanism (CMS/HCC): new and requires workup     Amount and/or Complexity of Data Reviewed  Clinical lab tests: ordered and reviewed  Tests in the radiology section of CPT®: ordered and reviewed  Tests in the medicine section of CPT®: ordered and reviewed  Discuss the patient with other providers: yes    Risk of Complications, Morbidity, and/or Mortality  Presenting problems: moderate  Diagnostic procedures: moderate  Management options: moderate    Patient Progress  Patient progress: stable      Final diagnoses:   Cerebrovascular accident (CVA),  unspecified mechanism (CMS/HCC)          Harry Floyd Jr., MD  09/01/18 [id number]

## 2018-09-01 NOTE — H&P
[facility] Medicine Services  HISTORY AND PHYSICAL    Date of Admission: 9/1/2018  Primary Care Physician: Eliezer Valentin MD    Subjective     Chief Complaint: Headache onset 1700 yesterday.    History of Present Illness  Information from ER doctor and some from the patient.  Patient has marked difficulty in answering questions.     Apparently had headache start at 1700 last night.  She did not seek help.  This morning one of her children talked to her on the phone, found her speech to be garbled/off, called the ambulance.  Patient then presented to [facility] ER  She was outside the time parameters for TPA.      Review of Systems     Unable to complete a ROS as patient can not answer most questions.    Past Medical History:   Past Medical History:   Diagnosis Date   • Arthritis    • Back pain    • Fractures     R tibia/fibula; L ankle   • Kidney disorder    • Migraine    • Neck pain      Past Surgical History:  Past Surgical History:   Procedure Laterality Date   • BACK SURGERY      L2-5 fusion 2012   • HYSTERECTOMY     • NECK SURGERY  2012    ACDF C4-7     Social History:   Lives at home.  Indicates she is   No family present.  Records indicate no use of alcohol, drugs or tobacco.  Unable to ascertain DPOA or living will at this time.  PCP is listed as SANJU Valentin  Neurologist listed as Ez Haddad  See pain management also.  Code status will be full unless family presents with information otherwise.      Family History:   Unknown at this time    Allergies:  Suprisingly is able to give list of allergies  Benzoin  Iodine  Levaquin  Sulfa   Vancomycin.    Medications:  From Dr Haddad records.  conjugated estrogens (PREMARIN) vaginal cream    Indications: Menopause 1 applicator q hs mwf 90 g   5 08/23/2012   Active   atorvastatin (LIPITOR) 80 MG tablet    Indications: Hypercholesteremia Take 1 tablet by mouth daily. 90 tablet   3 08/23/2012   Active   ALPRAZolam (XANAX) 1 MG  tablet    Indications: Anxiety Take 1 tablet by mouth 2 times daily as needed for Anxiety. 60 tablet   5 08/23/2012   Active   topiramate (TOPAMAX) 200 MG tablet    Indications: Migraine Take 1 tablet by mouth daily. 90 tablet   3 12/18/2012   Active   esomeprazole Magnesium (NEXIUM) 40 MG PACK   Take 40 mg by mouth daily as needed          Active   Coenzyme Q10 (CO Q-10) 200 MG CAPS   Take 200 mg by mouth daily         Active   folic acid (FOLVITE) 800 MCG tablet   Take 800 mcg by mouth daily         Active   Lactobacillus (PROBIOTIC ACIDOPHILUS PO)   Take by mouth daily         Active   escitalopram (LEXAPRO) 20 MG tablet   Take 20 mg by mouth daily         Active   denosumab (PROLIA) 60 MG/ML SOLN SC injection   Inject 60 mg into the skin every 6 months          Active   hydroxychloroquine (PLAQUENIL) 200 MG tablet   Take 1 tablet by mouth 2 times daily         Active   ondansetron (ZOFRAN-ODT) 4 MG disintegrating tablet   Take 1 tablet by mouth every 8 hours as needed for Nausea 45 tablet   2 03/23/2017   Active   onabotulinumtoxin A (BOTOX) 100 units injection    Indications: Intractable chronic migraine without aura and without status migrainosus Inject 200 Units into the muscle every 3 months 155 units injected in head and neck for migraines 200 Units   3 09/01/2017   Active   cyproheptadine (PERIACTIN) 4 MG tablet   Take 2-3 times a day as tolerated 90 tablet   5 09/19/2017   Active   tiZANidine (ZANAFLEX) 4 MG tablet   Take 1 tablet by mouth 2 times daily as needed (as needed for muscle spasms) 30 tablet   2 03/26/2018   Active   butalbital-acetaminophen-caffeine (FIORICET, ESGIC) -40 MG per tablet   TAKE 1 TABLET BY MOUTH EVERY 6 HOURS AS NEEDED FOR HEADACHES 30 tablet   0 04/30/2018   Active   oxyCODONE HCl (OXY-IR) 10 MG immediate release tablet   Take 1 tablet by mouth 2 times daily for 30 days.. Earliest Fill Date: 5/4/18 60 tablet   0 05/04/2018   Active   calcium carbonate (TUMS) 500 MG  "chewable tablet   Take 1 tablet by mouth 2 times daily         Active     Hospital, Clinic, or Other Facility Administered Medication Ordered Dose Route Frequency Start Date End Date Status   BOTOX SOLR 200 Units    Indications: Intractable chronic migraine without aura and without status migrainosus 200 Units IJ ONCE 08/03/2018 08/03/2018 Ended       Objective     Vital Signs: /69   Pulse 64   Temp 98.1 °F (36.7 °C)   Resp 17   Ht 167.6 cm (66\")   Wt 74.9 kg (165 lb 3.2 oz)   SpO2 98%   BMI 26.66 kg/m²   Physical Exam   Constitutional: She appears well-developed and well-nourished.   HENT:   Head: Normocephalic and atraumatic.   Right Ear: External ear normal.   Left Ear: External ear normal.   Nose: Nose normal.   Mouth/Throat: Oropharynx is clear and moist. No oropharyngeal exudate.   Eyes: Pupils are equal, round, and reactive to light. Conjunctivae and EOM are normal. Right eye exhibits no discharge. Left eye exhibits no discharge.   Neck: Normal range of motion. Neck supple. No JVD present. No tracheal deviation present. No thyromegaly present.   Cardiovascular: Normal rate, regular rhythm, normal heart sounds and intact distal pulses.  Exam reveals no gallop and no friction rub.    No murmur heard.  Pulmonary/Chest: Effort normal and breath sounds normal. No respiratory distress.   Abdominal: Soft. Bowel sounds are normal. There is no hepatosplenomegaly. There is no tenderness.   Musculoskeletal: Normal range of motion. She exhibits no edema or deformity.   Lymphadenopathy:     She has no cervical adenopathy.   Neurological: She is alert. No cranial nerve deficit. Coordination normal.   Patient with significant dysphasia.    Difficulty getting patient to follow command with leg movements.  Was able to do arm movements after demonstration of what movement was requested.       Skin: Skin is warm and dry.   Psychiatric:   Very anxious and tearful   Nursing note and vitals reviewed.          Results " Reviewed:  Lab Results (last 24 hours)     Procedure Component Value Units Date/Time    Comprehensive Metabolic Panel [[id number]]  (Abnormal) Collected:  09/01/18 0910    Specimen:  Blood from Arm, Right Updated:  09/01/18 0932     Glucose 85 mg/dL      BUN 17 mg/dL      Creatinine 1.22 mg/dL      Sodium 142 mmol/L      Potassium 4.1 mmol/L      Chloride 109 mmol/L      CO2 23.0 (L) mmol/L      Calcium 8.7 mg/dL      Total Protein 6.6 g/dL      Albumin 3.80 g/dL      ALT (SGPT) 22 U/L      AST (SGOT) 22 U/L      Alkaline Phosphatase 64 U/L      Total Bilirubin 0.3 mg/dL      eGFR Non African Amer 44 (L) mL/min/1.73      Globulin 2.8 gm/dL      A/G Ratio 1.4 g/dL      BUN/Creatinine Ratio 13.9     Anion Gap 10.0 mmol/L     aPTT [[id number]]  (Abnormal) Collected:  09/01/18 0910    Specimen:  Blood from Arm, Right Updated:  09/01/18 0930     PTT 21.1 (L) seconds     Protime-INR [[id number]]  (Abnormal) Collected:  09/01/18 0910    Specimen:  Blood from Arm, Right Updated:  09/01/18 0930     Protime 12.1 Seconds      INR 0.87 (L)    POC Glucose Once [[id number]]  (Normal) Collected:  09/01/18 0913    Specimen:  Blood Updated:  09/01/18 0926     Glucose 85 mg/dL      Comment: : [id number] [place] ([place]) TracyMeter ID: [id number]       CBC & Differential [[id number]] Collected:  09/01/18 0910    Specimen:  Blood Updated:  09/01/18 0921    Narrative:       The following orders were created for panel order CBC & Differential.  Procedure                               Abnormality         Status                     ---------                               -----------         ------                     CBC Auto Differential[[id number]]         Abnormal            Final result                 Please view results for these tests on the individual orders.    CBC Auto Differential [[id number]]  (Abnormal) Collected:  09/01/18 0910    Specimen:  Blood from Arm, Right Updated:  09/01/18 0921     WBC 6.71 10*3/mm3      RBC 4.22 10*6/mm3       Hemoglobin 13.2 g/dL      Hematocrit 39.8 %      MCV 94.3 fL      MCH 31.3 pg      MCHC 33.2 g/dL      RDW 13.6 %      RDW-SD 46.7 fl      MPV 11.3 fL      Platelets 205 10*3/mm3      Neutrophil % 65.3 %      Lymphocyte % 21.9 %      Monocyte % 8.5 %      Eosinophil % 3.3 %      Basophil % 0.3 %      Immature Grans % 0.7 %      Neutrophils, Absolute 4.38 10*3/mm3      Lymphocytes, Absolute 1.47 10*3/mm3      Monocytes, Absolute 0.57 10*3/mm3      Eosinophils, Absolute 0.22 10*3/mm3      Basophils, Absolute 0.02 10*3/mm3      Immature Grans, Absolute 0.05 (H) 10*3/mm3      nRBC 0.0 /100 WBC         Imaging Results (last 24 hours)     Procedure Component Value Units Date/Time    MRI Brain Without Contrast [[id number]] Collected:  09/01/18 1124     Updated:  09/01/18 1131    Narrative:       EXAM: MRI BRAIN WO CONTRAST- - 9/1/2018 10:52 AM CDT     HISTORY: Stroke       COMPARISON: 9/1/2018, 8/24/2012.      TECHNIQUE:   Routine pulse sequences of the brain were obtained without IV contrast.      FINDINGS:   Acute infarct at the left posterior temporal lobe measuring about 5.0 x  2.9 cm (AP by transverse).      Mild periventricular and subcortical white matter changes of chronic  microvascular disease. Ventricles, cisterns and sulci are normal in size  and configuration. Sella and midline structures within normal limits.  Brainstem and posterior fossa are within normal limits.      Visualized flow voids within normal limits. Visualized portions of the  orbits, paranasal sinuses and mastoid air cells are within normal  limits. There is T2 hyperintensity in the subcutaneous tissues overlying  the bilateral maxillary sinuses, could represent contusion.          Impression:       1. Acute infarct at the left posterior temporal lobe measuring about 5.0  x 2.9 cm.  2. Changes in the subcutaneous tissues overlying the bilateral maxillary  sinuses, could represent contusion.  3. Mild chronic microvascular changes.     This report  was finalized on 09/01/2018 11:27 by Dr Ewelina Arias MD.    XR Chest 1 View [[id number]] Collected:  09/01/18 1049     Updated:  09/01/18 1053    Narrative:       EXAM: XR CHEST 1 VW- - 9/1/2018 9:47 AM CDT     HISTORY: weakness       COMPARISON: 7/3/2014.      TECHNIQUE:  1 images.  Frontal view of the chest.     FINDINGS:    No pneumothorax, pleural effusion or focal consolidation. Cardiac  mediastinal silhouette within normal limits. No acute findings in the  bones, soft tissues or upper abdomen. Changes of lower cervical anterior  discectomy and fusion. Cholecystectomy clips.       Impression:       1. No acute cardiopulmonary findings.  This report was finalized on 09/01/2018 10:50 by Dr Ewelina Arias MD.    CT Head Without Contrast [[id number]] Collected:  09/01/18 0938     Updated:  09/01/18 0945    Narrative:       EXAM: CT HEAD WO CONTRAST- - 9/1/2018 9:16 AM CDT     HISTORY: Stroke       COMPARISON: 8/24/2012.      DOSE LENGTH PRODUCT: 571 mGy cm. Automated exposure control was also  utilized to decrease patient radiation dose.     TECHNIQUE: Unenhanced axial CT images obtained from vertex to skull  base.     FINDINGS:  No acute intracranial hemorrhage, midline shift, or mass effect.  Hypodensity at the posterior left temporal lobe cortex such as axial  image 13 and coronal image 26. Ventricles, sulci, basilar cisterns are  normal in size and configuration for the patient's age. Gray-white  matter differentiation maintained.     Globes, retrobulbar soft tissues, paranasal sinuses, mastoid air cells  and external auditory canals are within normal limits. Calvarium appears  intact. Subcutaneous tissues within normal limits.       Impression:       1. Hypodensity at the left posterior temporal lobe cortex, could  represent infarct. MRI could be helpful for definitive evaluation.  2. No evidence of acute hemorrhage.  This report was finalized on 09/01/2018 09:42 by Dr Ewelina Arias MD.        I have  personally reviewed and interpreted the radiology studies and ECG obtained at time of admission.     Assessment / Plan     Assessment:   Left temporal lobe CVA  Dysphasia  Chronic pain history  Headache      Plan:      Admit   VS with neuro checks   Consult neurology  No TPA, outside of time restrictions   CBC CMP in AM  PT OT ST consults  When family present review past histories  Telemetry  Defer the carotid studies to neurology  Echocardiogram.    Code Status: full     I discussed the patient's findings and my recommendations with the patient.  No family present.    Estimated length of stay 3-5 days.    Fatimah Iniguez DO   09/01/18   1:11 PM

## 2018-09-02 ENCOUNTER — APPOINTMENT (OUTPATIENT)
Dept: CT IMAGING | Facility: HOSPITAL | Age: 68
End: 2018-09-02

## 2018-09-02 ENCOUNTER — APPOINTMENT (OUTPATIENT)
Dept: ULTRASOUND IMAGING | Facility: HOSPITAL | Age: 68
End: 2018-09-02

## 2018-09-02 LAB
ALBUMIN SERPL-MCNC: 3.3 G/DL (ref 3.5–5)
ALBUMIN/GLOB SERPL: 1.2 G/DL (ref 1.1–2.5)
ALP SERPL-CCNC: 65 U/L (ref 24–120)
ALT SERPL W P-5'-P-CCNC: 20 U/L (ref 0–54)
ANION GAP SERPL CALCULATED.3IONS-SCNC: 9 MMOL/L (ref 4–13)
ARTICHOKE IGE QN: 65 MG/DL (ref 0–99)
AST SERPL-CCNC: 19 U/L (ref 7–45)
BASOPHILS # BLD AUTO: 0.01 10*3/MM3 (ref 0–0.2)
BASOPHILS NFR BLD AUTO: 0.2 % (ref 0–2)
BILIRUB SERPL-MCNC: 0.4 MG/DL (ref 0.1–1)
BUN BLD-MCNC: 16 MG/DL (ref 5–21)
BUN/CREAT SERPL: 13 (ref 7–25)
CALCIUM SPEC-SCNC: 8.8 MG/DL (ref 8.4–10.4)
CHLORIDE SERPL-SCNC: 108 MMOL/L (ref 98–110)
CHOLEST SERPL-MCNC: 117 MG/DL (ref 130–200)
CO2 SERPL-SCNC: 24 MMOL/L (ref 24–31)
CREAT BLD-MCNC: 1.23 MG/DL (ref 0.5–1.4)
DEPRECATED RDW RBC AUTO: 45.7 FL (ref 40–54)
EOSINOPHIL # BLD AUTO: 0.18 10*3/MM3 (ref 0–0.7)
EOSINOPHIL NFR BLD AUTO: 4.3 % (ref 0–4)
ERYTHROCYTE [DISTWIDTH] IN BLOOD BY AUTOMATED COUNT: 13.4 % (ref 12–15)
GFR SERPL CREATININE-BSD FRML MDRD: 43 ML/MIN/1.73
GLOBULIN UR ELPH-MCNC: 2.7 GM/DL
GLUCOSE BLD-MCNC: 87 MG/DL (ref 70–100)
GLUCOSE BLDC GLUCOMTR-MCNC: 84 MG/DL (ref 70–130)
GLUCOSE BLDC GLUCOMTR-MCNC: 99 MG/DL (ref 70–130)
HBA1C MFR BLD: 5.8 %
HCT VFR BLD AUTO: 35.3 % (ref 37–47)
HDLC SERPL-MCNC: 27 MG/DL
HGB BLD-MCNC: 11.9 G/DL (ref 12–16)
IMM GRANULOCYTES # BLD: 0.05 10*3/MM3 (ref 0–0.03)
IMM GRANULOCYTES NFR BLD: 1.2 % (ref 0–5)
LDLC/HDLC SERPL: 2.27 {RATIO}
LYMPHOCYTES # BLD AUTO: 1.21 10*3/MM3 (ref 0.72–4.86)
LYMPHOCYTES NFR BLD AUTO: 29 % (ref 15–45)
MCH RBC QN AUTO: 31.6 PG (ref 28–32)
MCHC RBC AUTO-ENTMCNC: 33.7 G/DL (ref 33–36)
MCV RBC AUTO: 93.6 FL (ref 82–98)
MONOCYTES # BLD AUTO: 0.41 10*3/MM3 (ref 0.19–1.3)
MONOCYTES NFR BLD AUTO: 9.8 % (ref 4–12)
NEUTROPHILS # BLD AUTO: 2.31 10*3/MM3 (ref 1.87–8.4)
NEUTROPHILS NFR BLD AUTO: 55.5 % (ref 39–78)
NRBC BLD MANUAL-RTO: 0 /100 WBC (ref 0–0)
PLATELET # BLD AUTO: 184 10*3/MM3 (ref 130–400)
PMV BLD AUTO: 10.7 FL (ref 6–12)
POTASSIUM BLD-SCNC: 4.3 MMOL/L (ref 3.5–5.3)
PROT SERPL-MCNC: 6 G/DL (ref 6.3–8.7)
RBC # BLD AUTO: 3.77 10*6/MM3 (ref 4.2–5.4)
SODIUM BLD-SCNC: 141 MMOL/L (ref 135–145)
TRIGL SERPL-MCNC: 143 MG/DL (ref 0–149)
WBC NRBC COR # BLD: 4.17 10*3/MM3 (ref 4.8–10.8)

## 2018-09-02 PROCEDURE — 70450 CT HEAD/BRAIN W/O DYE: CPT

## 2018-09-02 PROCEDURE — G8988 SELF CARE GOAL STATUS: HCPCS | Performed by: OCCUPATIONAL THERAPIST

## 2018-09-02 PROCEDURE — G8987 SELF CARE CURRENT STATUS: HCPCS | Performed by: OCCUPATIONAL THERAPIST

## 2018-09-02 PROCEDURE — 99233 SBSQ HOSP IP/OBS HIGH 50: CPT | Performed by: PSYCHIATRY & NEUROLOGY

## 2018-09-02 PROCEDURE — 97166 OT EVAL MOD COMPLEX 45 MIN: CPT | Performed by: OCCUPATIONAL THERAPIST

## 2018-09-02 PROCEDURE — 85025 COMPLETE CBC W/AUTO DIFF WBC: CPT | Performed by: FAMILY MEDICINE

## 2018-09-02 PROCEDURE — G8978 MOBILITY CURRENT STATUS: HCPCS

## 2018-09-02 PROCEDURE — 94760 N-INVAS EAR/PLS OXIMETRY 1: CPT

## 2018-09-02 PROCEDURE — 93880 EXTRACRANIAL BILAT STUDY: CPT | Performed by: SURGERY

## 2018-09-02 PROCEDURE — G8979 MOBILITY GOAL STATUS: HCPCS

## 2018-09-02 PROCEDURE — G8980 MOBILITY D/C STATUS: HCPCS

## 2018-09-02 PROCEDURE — 82962 GLUCOSE BLOOD TEST: CPT

## 2018-09-02 PROCEDURE — 94799 UNLISTED PULMONARY SVC/PX: CPT

## 2018-09-02 PROCEDURE — 25010000002 MORPHINE SULFATE (PF) 2 MG/ML SOLUTION: Performed by: PSYCHIATRY & NEUROLOGY

## 2018-09-02 PROCEDURE — 97161 PT EVAL LOW COMPLEX 20 MIN: CPT

## 2018-09-02 PROCEDURE — 83036 HEMOGLOBIN GLYCOSYLATED A1C: CPT | Performed by: FAMILY MEDICINE

## 2018-09-02 PROCEDURE — 80053 COMPREHEN METABOLIC PANEL: CPT | Performed by: FAMILY MEDICINE

## 2018-09-02 PROCEDURE — 93880 EXTRACRANIAL BILAT STUDY: CPT

## 2018-09-02 PROCEDURE — 25010000002 DEXAMETHASONE PER 1 MG: Performed by: PSYCHIATRY & NEUROLOGY

## 2018-09-02 PROCEDURE — 80061 LIPID PANEL: CPT | Performed by: FAMILY MEDICINE

## 2018-09-02 RX ORDER — DABIGATRAN ETEXILATE 150 MG/1
150 CAPSULE ORAL EVERY 12 HOURS SCHEDULED
Status: DISCONTINUED | OUTPATIENT
Start: 2018-09-02 | End: 2018-09-03 | Stop reason: HOSPADM

## 2018-09-02 RX ORDER — DEXAMETHASONE SODIUM PHOSPHATE 10 MG/ML
10 INJECTION INTRAMUSCULAR; INTRAVENOUS ONCE
Status: COMPLETED | OUTPATIENT
Start: 2018-09-02 | End: 2018-09-02

## 2018-09-02 RX ADMIN — TOPIRAMATE 100 MG: 100 TABLET, FILM COATED ORAL at 20:24

## 2018-09-02 RX ADMIN — DABIGATRAN ETEXILATE MESYLATE 150 MG: 150 CAPSULE ORAL at 17:03

## 2018-09-02 RX ADMIN — ASPIRIN 325 MG: 325 TABLET, COATED ORAL at 11:06

## 2018-09-02 RX ADMIN — MORPHINE SULFATE 2 MG: 2 INJECTION, SOLUTION INTRAMUSCULAR; INTRAVENOUS at 04:14

## 2018-09-02 RX ADMIN — ATORVASTATIN CALCIUM 80 MG: 40 TABLET, FILM COATED ORAL at 20:24

## 2018-09-02 RX ADMIN — DEXAMETHASONE SODIUM PHOSPHATE 10 MG: 10 INJECTION INTRAMUSCULAR; INTRAVENOUS at 14:06

## 2018-09-02 RX ADMIN — MORPHINE SULFATE 2 MG: 2 INJECTION, SOLUTION INTRAMUSCULAR; INTRAVENOUS at 11:12

## 2018-09-02 RX ADMIN — DABIGATRAN ETEXILATE MESYLATE 150 MG: 150 CAPSULE ORAL at 20:24

## 2018-09-02 RX ADMIN — METOPROLOL TARTRATE 12.5 MG: 25 TABLET, FILM COATED ORAL at 20:24

## 2018-09-02 RX ADMIN — HYDROCODONE BITARTRATE AND ACETAMINOPHEN 1 TABLET: 7.5; 325 TABLET ORAL at 00:46

## 2018-09-02 RX ADMIN — TOPIRAMATE 100 MG: 100 TABLET, FILM COATED ORAL at 11:06

## 2018-09-02 NOTE — THERAPY DISCHARGE NOTE
Acute Care - Physical Therapy Initial Eval/Discharge  [facility]     Patient Name: Natalee Noble  : 1950  MRN: [id number]  Today's Date: 2018   Onset of Illness/Injury or Date of Surgery: 18  Date of Referral to PT: 18  Referring Physician: Dr. Iniguez      Admit Date: 2018    Visit Dx:    ICD-10-CM ICD-9-CM   1. Cerebrovascular accident (CVA), unspecified mechanism (CMS/HCC) I63.9 434.91   2. Dysphagia, unspecified type R13.10 787.20   3. Abnormality of gait following cerebrovascular accident (CVA) I69.398 438.89    R26.9 781.2     Patient Active Problem List   Diagnosis   • Cerebrovascular accident (CVA) (CMS/HCC)     Past Medical History:   Diagnosis Date   • Arthritis    • Back pain    • Fractures     R tibia/fibula; L ankle   • Kidney disorder    • Migraine    • Neck pain      Past Surgical History:   Procedure Laterality Date   • BACK SURGERY      L2-5 fusion    • HYSTERECTOMY     • NECK SURGERY      ACDF C4-7          PT ASSESSMENT (last 12 hours)      Physical Therapy Evaluation     Row Name 18 1438          PT Evaluation Time/Intention    Document Type evaluation  -RS     Mode of Treatment physical therapy  -RS     Patient Effort good  -RS     Symptoms Noted During/After Treatment none  -RS     Row Name 18 1438          General Information    Patient Profile Reviewed? yes  -RS     Onset of Illness/Injury or Date of Surgery 18  -RS     Referring Physician Dr. 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   -RS     Existing Precautions/Restrictions fall  -RS     Limitations/Impairments safety/cognitive  -RS     Risks Reviewed patient and family:;LOB;dizziness;increased discomfort  -RS     Benefits Reviewed patient and family:;improve function;increase independence;increase strength;increase balance;decrease risk of DVT  -RS     Barriers to Rehab none identified  -RS     Row Name 09/02/18 1438          Relationship/Environment    Primary Source of Support/Comfort child(siobhan)  -RS     Lives With alone  -RS     Row Name 09/02/18 1438          Resource/Environmental Concerns    Current Living Arrangements home/apartment/condo  -RS     Resource/Environmental Concerns none  -RS     Transportation Concerns car, none  -RS     Row Name 09/02/18 1438          Home Main Entrance    Number of Stairs, Main Entrance four  -RS     Stair Railings, Main Entrance railing on right side (ascending)  -RS     Stairs Comment, Main Entrance this is the makeup of her daughters home, who she will be discharging with.  -RS     Row Name 09/02/18 1438          Cognitive Assessment/Interventions    Additional Documentation Cognitive Assessment/Intervention (Group)  -RS     Row Name 09/02/18 1438          Cognitive Assessment/Intervention- PT/OT    Affect/Mental Status (Cognitive) confused  -RS     Orientation Status (Cognition) oriented to;person;verbal cues/prompts needed for orientation  -RS     Follows Commands (Cognition) follows one step commands;follows two step commands;increased processing time needed;repetition of directions required;verbal cues/prompting required;visual queue  -RS     Personal Safety Interventions gait belt;nonskid shoes/slippers when out of bed;supervised activity;fall prevention program maintained  -RS     Cognitive Assessment/Intervention Comment Patient displayed difficulty with auditory processing and has expressive aphasia  -RS     Row Name 09/02/18 1438          Safety Issues, Functional Mobility    Impairments  Affecting Function (Mobility) balance;postural/trunk control  -RS     Comment, Safety Issues/Impairments (Mobility) processing of auditory cues does impact safety  -[facility] Name 09/02/18 1438          Bed Mobility Assessment/Treatment    Bed Mobility Assessment/Treatment supine-sit;sit-supine  -RS     Supine-Sit [place] (Bed Mobility) conditional independence  -RS     Sit-Supine [place] (Bed Mobility) conditional independence  -RS     Bed Mobility, Safety Issues cognitive deficits limit understanding  -RS     Row Name 09/02/18 1438          Transfer Assessment/Treatment    Transfer Assessment/Treatment sit-stand transfer;stand-sit transfer  -RS     Sit-Stand [place] (Transfers) supervision;stand by assist  -RS     Stand-Sit [place] (Transfers) supervision;stand by assist  -RS     Row Name 09/02/18 1438          Gait/Stairs Assessment/Training    [place] Level (Gait) verbal cues;contact guard;stand by assist  -RS     Distance in Feet (Gait) 125  -RS     Pattern (Gait) swing-through  -RS     [place] Level (Stairs) contact guard;stand by assist  -RS     Handrail Location (Stairs) right side (ascending)  -RS     Number of Steps (Stairs) 8  -RS     Ascending Technique (Stairs) step-over-step  -RS     Descending Technique (Stairs) step-over-step  -RS     Comment (Gait/Stairs) No significant deficits were noted that impact safety other than processing of cueing  -RS     Row Name 09/02/18 1438          General ROM    GENERAL ROM COMMENTS No ROM identified in AROM; WFL for age  -[facility] Name 09/02/18 1438          MMT (Manual Muscle Testing)    General MMT Comments Sonu LE:  functionally 4+/5  -RS     Row Name 09/02/18 1438          Motor Assessment/Intervention    Additional Documentation Balance (Group)  -RS     Row Name 09/02/18 1438          Balance    Balance static sitting balance;static standing balance;dynamic sitting balance  -RS     Row Name 09/02/18 1438          Static Sitting  Balance    Level of [place] (Supported Sitting, Static Balance) independent  -RS     Sitting Position (Supported Sitting, Static Balance) sitting on edge of bed  -RS     Time Able to Maintain Position (Supported Sitting, Static Balance) 4 to 5 minutes  -RS     Row Name 09/02/18 1438          Dynamic Sitting Balance    Level of [place], Reaches Outside Midline (Sitting, Dynamic Balance) standby assist  -RS     Sitting Position, Reaches Outside Midline (Sitting, Dynamic Balance) sitting on edge of bed  -RS     Comment, Reaches Outside Midline (Sitting, Dynamic Balance) no perceptual deficit noted  -RS     Row Name 09/02/18 1438          Static Standing Balance    Level of [place] (Supported Standing, Static Balance) contact guard assist  -RS     Time Able to Maintain Position (Supported Standing, Static Balance) 15 to 30 seconds  -RS     Assistive Device Utilized (Supported Standing, Static Balance) --   therapist hand held assist required on right SLS  -RS     Comment (Supported Standing, Static Balance) No posterior LOB noted with SLS  -RS     [facility] Name 09/02/18 1438          Sensory Assessment/Intervention    Sensory General Assessment no sensation deficits identified  -[facility] Name 09/02/18 1438          Vision Assessment/Intervention    Visual Impairment/Limitations WFL  -[facility] Name 09/02/18 1438          Pain Scale: FACES Pre/Post-Treatment    Pain: FACES Scale, Pretreatment 2-->hurts little bit  -RS     Pain: FACES Scale, Post-Treatment 2-->hurts little bit  -RS     [facility] Name 09/02/18 1438          Plan of Care Review    Plan of Care Reviewed With patient;daughter  -[facility] Name 09/02/18 1438          Physical Therapy Clinical Impression    Date of Referral to PT 09/02/18  -RS     Functional Level at Time of Evaluation (PT Clinical Impression) Patient is near baseline from a physical standpoint.  Patient able to safely negotiate stairs with HR assist, ambulate without LOB with no AD, and  perform SLS with EO.  Patient does display slight posterior LOB with eyes closed standing assessment feet together.  -RS     Patient/Family Goals Statement (PT Clinical Impression) to get to outpatient therapy in [place]  -RS     Criteria for Skilled Interventions Met (PT Clinical Impression) no problems identified which require skilled intervention;current level of function same as previous level of function  -RS     Pathology/Pathophysiology Noted (Describe Specifically for Each System) neuromuscular  -RS     Impairments Found (describe specific impairments) arousal, attention, and cognition;gait, locomotion, and balance  -RS     Care Plan Review (PT) evaluation/treatment results reviewed;care plan/treatment goals reviewed;patient/other agree to care plan;risks/benefits reviewed  -RS     Care Plan Review, Other Participant (PT Clinical Impression) daughter  -RS     Patient/Family Concerns, Anticipated Discharge Disposition (PT) Daughter is a PT who will oversee transition to OP post discharge  -RS     Row Name 09/02/18 1438          Positioning and Restraints    Pre-Treatment Position in bed  -RS     Post Treatment Position bed  -RS     In Bed fowlers;call light within reach;encouraged to call for assist;with family/caregiver;side rails up x2  -RS     Row Name 09/02/18 1438          Living Environment    Home Accessibility stairs to enter home  -RS       User Key  (r) = Recorded By, (t) = Taken By, (c) = Cosigned By    Initials Name Provider Type    RS Tulio Madrid, PT, DPT, OCS Physical Therapist          Physical Therapy Education     Title: PT OT SLP Therapies (Active)     Topic: Physical Therapy (Active)     Point: Mobility training (Done)    Learning Progress Summary     Learner Status Readiness Method Response Comment Documented by    Patient Done Acceptance E,TB VU,NR Patient and daughter educated about balance deficits and COM orientation with standing activity in dim areas and with eyes  closed (posterior lean) RS 09/02/18 1506    Family Done Acceptance E,TB VU,NR Patient and daughter educated about balance deficits and COM orientation with standing activity in dim areas and with eyes closed (posterior lean) RS 09/02/18 1506          Point: Precautions (Done)    Learning Progress Summary     Learner Status Readiness Method Response Comment Documented by    Patient Done Acceptance E,TB VU,NR Patient and daughter educated about balance deficits and COM orientation with standing activity in dim areas and with eyes closed (posterior lean) RS 09/02/18 1506    Family Done Acceptance E,TB VU,NR Patient and daughter educated about balance deficits and COM orientation with standing activity in dim areas and with eyes closed (posterior lean) RS 09/02/18 1506                      User Key     Initials Effective Dates Name Provider Type Discipline    RS 06/19/18 -  Tulio Madrid, PT, DPT, OCS Physical Therapist PT                PT Recommendation and Plan  Anticipated Discharge Disposition (PT): home with assist, home with OP services  Therapy Frequency (PT Clinical Impression): evaluation only  Outcome Summary/Treatment Plan (PT)  Anticipated Discharge Disposition (PT): home with assist, home with OP services  Patient/Family Concerns, Anticipated Discharge Disposition (PT): Daughter is a PT who will oversee transition to OP post discharge  Plan of Care Reviewed With: patient  Progress: no change  Outcome Summary: PT evaluation completed.  Patient has no needs identified from PT standpoint at this time for mobility.  Daughter was present during the evaluation who is also a PT and plans to take the patient home tomorrow with her to start in outpatient next week.  Patient does demonstrate posterior LOB with eyes closed and is able to assume a SLS with UE support without sway.  Stair negotiation is near baseline at this time.  Patient did display difficulty with auditory processing as well as verbal  expression.  No further needs identified.          Outcome Measures     Row Name 09/02/18 1438             How much help from another person do you currently need...    Turning from your back to your side while in flat bed without using bedrails? 4  -RS      Moving from lying on back to sitting on the side of a flat bed without bedrails? 4  -RS      Moving to and from a bed to a chair (including a wheelchair)? 4  -RS      Standing up from a chair using your arms (e.g., wheelchair, bedside chair)? 4  -RS      Climbing 3-5 steps with a railing? 3  -RS      To walk in hospital room? 4  -RS      AM-PAC 6 Clicks Score 23  -RS         Functional Assessment    Outcome Measure Options AM-PAC 6 Clicks Basic Mobility (PT)  -RS        User Key  (r) = Recorded By, (t) = Taken By, (c) = Cosigned By    Initials Name Provider Type    RS Tulio Madrid, PT, DPT, OCS Physical Therapist           Time Calculation:         PT Charges     Row Name 09/02/18 1438             Time Calculation    Start Time 1438  -RS      Stop Time 1518  -RS      Time Calculation (min) 40 min  -RS      PT Received On 09/02/18  -RS      PT Goal Re-Cert Due Date 09/12/18  -RS        User Key  (r) = Recorded By, (t) = Taken By, (c) = Cosigned By    Initials Name Provider Type    Tulio Jackson, PT, DPT, OCS Physical Therapist        Therapy Suggested Charges     Code   Minutes Charges    None           Therapy Charges for Today     Code Description Service Date Service Provider Modifiers Qty    64531453647 HC PT MOBILITY CURRENT 9/2/2018 Tulio Madrid PT, DPT, OCS GP, CI 1    16869508074 HC PT MOBILITY PROJECTED 9/2/2018 Tulio Madrid PT, DPT, OCS GP, CI 1    63608287037 HC PT MOBILITY DISCHARGE 9/2/2018 Tulio Madrid PT, DPT, OCS GP, CI 1    76607801558 HC PT EVAL LOW COMPLEXITY 3 9/2/2018 Tulio Madrid, PT, DPT, OCS GP 1          PT G-Codes  Outcome Measure Options: AM-PAC 6 Clicks Basic Mobility  (PT)  Score: 23  Functional Limitation: Mobility: Walking and moving around  Mobility: Walking and Moving Around Current Status (): At least 1 percent but less than 20 percent impaired, limited or restricted  Mobility: Walking and Moving Around Goal Status (): At least 1 percent but less than 20 percent impaired, limited or restricted  Mobility: Walking and Moving Around Discharge Status (): At least 1 percent but less than 20 percent impaired, limited or restricted    PT Discharge Summary  Anticipated Discharge Disposition (PT): home with assist, home with OP services  Reason for Discharge: At baseline function  Outcomes Achieved: Refer to plan of care for updates on goals achieved  Discharge Destination: Home with outpatient services (planned D/C destination; patient still admitted at this time)    BHARATHI Madrid, PT, DPT, OCS  9/2/2018

## 2018-09-02 NOTE — PLAN OF CARE
Problem: Patient Care Overview  Goal: Plan of Care Review  Outcome: Ongoing (interventions implemented as appropriate)   09/02/18 3242   Coping/Psychosocial   Plan of Care Reviewed With patient   Plan of Care Review   Progress no change   OTHER   Outcome Summary PT evaluation completed. Patient has no needs identified from PT standpoint at this time for mobility. Daughter was present during the evaluation who is also a PT and plans to take the patient home tomorrow with her to start in outpatient next week. Patient does demonstrate posterior LOB with eyes closed and is able to assume a SLS with UE support without sway. Stair negotiation is near baseline at this time. Patient did display difficulty with auditory processing as well as verbal expression. No further needs identified.

## 2018-09-02 NOTE — THERAPY EVALUATION
Acute Care - Occupational Therapy Initial Evaluation  [facility]     Patient Name: Natalee Noble  : 1950  MRN: [id number]  Today's Date: 2018  Onset of Illness/Injury or Date of Surgery: 18  Date of Referral to OT: 18  Referring Physician: Dr. Iniguez    Admit Date: 2018       ICD-10-CM ICD-9-CM   1. Cerebrovascular accident (CVA), unspecified mechanism (CMS/HCC) I63.9 434.91   2. Dysphagia, unspecified type R13.10 787.20   3. Abnormality of gait following cerebrovascular accident (CVA) I69.398 438.89    R26.9 781.2   4. Cognitive changes R41.89 799.59     Patient Active Problem List   Diagnosis   • Cerebrovascular accident (CVA) (CMS/HCC)     Past Medical History:   Diagnosis Date   • Arthritis    • Back pain    • Fractures     R tibia/fibula; L ankle   • Kidney disorder    • Migraine    • Neck pain      Past Surgical History:   Procedure Laterality Date   • BACK SURGERY      L2-5 fusion    • HYSTERECTOMY     • NECK SURGERY      ACDF C4-7          OT ASSESSMENT FLOWSHEET (last 72 hours)      Occupational Therapy Evaluation     Row Name 18 1437                   OT Evaluation Time/Intention    Document Type evaluation  -        Mode of Treatment occupational therapy  -           General Information    Patient Profile Reviewed? yes  -        Onset of Illness/Injury or Date of Surgery 18  -        Referring Physician Dr. 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 going to DTR's home  -           Home Main Entrance    Number of Stairs, Main Entrance four  -           Cognitive Assessment/Interventions    Additional Documentation Cognitive Assessment/Intervention (Group)  -           Cognitive Assessment/Intervention- PT/OT    Affect/Mental Status (Cognitive) confused  -        Orientation Status (Cognition) oriented to;person   not oriented to birthdate  -        Follows Commands (Cognition) follows one step commands;visual queue;verbal cues/prompting required;repetition of directions required;50-74% accuracy;delayed response/completion;increased processing time needed  -        Cognitive Function (Cognitive) safety deficit;executive function deficit;attention deficit;memory deficit  -        Attention Deficit (Cognitive) distractible in noisy environment;perseverates on recent conversation;perseverates on recent task  -        Executive Function Deficit (Cognition) moderate deficit;insight/awareness of deficits;organization/sequencing  -        Memory Deficit (Cognitive) moderate deficit;recall, recent events;recall, biographical information  -        Safety Deficit (Cognitive) moderate deficit;awareness of need for assistance;insight into deficits/self awareness;judgment;safety precautions awareness;safety precautions follow-through/compliance  -        Personal Safety Interventions fall prevention program maintained;gait belt;muscle strengthening facilitated;nonskid shoes/slippers when out  of bed;supervised activity  -        Cognitive Assessment/Intervention Comment Pt. requires visual/verbal/demo cues to follow 1 step commands.  Ms. Rossi does not appear to have difficulty with performing familiar, desired ADL tasks, & displays no deficits in initiation in chosen tasks; however, she does display inhibiton & delayed response with auditory processing.  DTR reports that she plans to take pt. to her home in [place].  I rec the pt. have 24/7 S 2' to her confusion & decreased cognition.  She is oriented x 1 & does not appear to fully understand her deficits.  SHe appears to be moderately globally aphasic.    -           Bed Mobility Assessment/Treatment    Bed Mobility Assessment/Treatment supine-sit-supine  -        Supine-Sit-Supine Norris (Bed Mobility) conditional independence  -           Functional Mobility    Functional Mobility- Ind. Level supervision required  -        Functional Mobility- Comment S for safety 2' cog deficits  -           Transfer Assessment/Treatment    Transfer Assessment/Treatment sit-stand transfer;stand-sit transfer  -           Sit-Stand Transfer    Sit-Stand Norris (Transfers) supervision;verbal cues  -           Stand-Sit Transfer    Stand-Sit Norris (Transfers) supervision  -           ADL Assessment/Intervention    BADL Assessment/Intervention lower body dressing  -           Lower Body Dressing Assessment/Training    Lower Body Dressing Norris Level supervision;don;socks  -        Lower Body Dressing Position edge of bed sitting  -        Comment (Lower Body Dressing) S for safety  -           BADL Safety/Performance    Impairments, BADL Safety/Performance cognition  -        Skilled BADL Treatment/Intervention cognitive/safety deficit modifications;environmental modifications  -           General ROM    GENERAL ROM COMMENTS AROM WFL at all extremities  -           MMT (Manual Muscle Testing)    General MMT  Comments grossly 4+/5  -           Motor Assessment/Interventions    Additional Documentation Balance (Group);Fine Motor Testing & Training (Group)  -           Static Sitting Balance    Level of McLeod (Supported Sitting, Static Balance) independent  -           Dynamic Sitting Balance    Level of McLeod, Reaches Outside Midline (Sitting, Dynamic Balance) supervision  -           Static Standing Balance    Level of McLeod (Supported Standing, Static Balance) supervision;contact guard assist  -           Fine Motor Testing & Training    Comment, Fine Motor Coordination WFL  -           Vision Assessment/Intervention    Visual Impairment/Limitations corrective lenses full time;L  -           Pain Scale: FACES Pre/Post-Treatment    Pain: FACES Scale, Pretreatment 2-->hurts little bit  -        Pain: FACES Scale, Post-Treatment 2-->hurts little bit  -           Clinical Impression (OT)    Date of Referral to OT 09/01/18  -        OT Diagnosis decline in ADLs  -        Patient/Family Goals Statement (OT Eval) return to DTR's home  -        Criteria for Skilled Therapeutic Interventions Met (OT Eval) yes;treatment indicated  -        Rehab Potential (OT Eval) good, to achieve stated therapy goals  -        Therapy Frequency (OT Eval) 5 times/wk  -        Care Plan Review (OT) evaluation/treatment results reviewed;care plan/treatment goals reviewed;risks/benefits reviewed;current/potential barriers reviewed;patient/other agree to care plan  -        Care Plan Review, Other Participant (OT Eval) daughter  -        Anticipated Discharge Disposition (OT) home with OP services  -        Patient/Family Concerns, Anticipated Discharge Disposition (OT) Pt. to DC to DTR's home in [place].  Rec pt. have 24/7 S 2' to confusion, decreased safety  -           Planned OT Interventions    Planned Therapy Interventions (OT Eval) cognitive/visual perception retraining;BADL  retraining;IADL retraining;occupation/activity based interventions;patient/caregiver education/training  -           OT Cognitive Goals    Orientation Goal Selection (OT) orientation, OT goal 1  -        Directions Goal Selection (OT) direction following, OT goal 1  -CH        Executive Function Goal Selection (OT) executive function, OT goal 1  -        Additional Documentation Directions Goal Selection (OT) (Row);Executive Function Goal Selection (OT) (Row)  -           Orientation Goal 1 (OT)    Activity (Orientation Goal 1, OT) oriented to person;oriented to place;oriented to situation  -        Pratt/Cues/Accuracy (Orientation Goal 1, OT) independent use of environmental cues/strategies  -        Time Frame (Orientation Goal 1, OT) long term goal (LTG);by discharge  -        Barriers (Orientation Goal 1, OT) .  -        Progress/Outcome (Orientation Goal 1, OT) goal ongoing  -           Direction Following Goal 1 (OT)    Activity (Direction Following Goal 1, OT) follow two step directions  -        Pratt/Cues/Accuracy (Direction Following Goal 1, OT) with minimum;with additional processing time;verbal cues/redirection;with 90% accuracy  -        Time Frame (Direction Following Goal 1, OT) long term goal (LTG);by discharge  -        Barriers (Direction Following Goal 1, OT) .  -        Progress/Outcome (Direction Following Goal 1, OT) goal ongoing  -           Living Environment    Home Accessibility stairs to enter home  -          User Key  (r) = Recorded By, (t) = Taken By, (c) = Cosigned By    Initials Name Effective Dates     Elena Strickland, OTR/L 08/02/16 -            Occupational Therapy Education     Title: PT OT SLP Therapies (Active)     Topic: Occupational Therapy (Active)     Point: ADL training (Done)     Description: Instruct learner(s) on proper safety adaptation and remediation techniques during self care or transfers.   Instruct in proper use of  assistive devices.   Learning Progress Summary     Learner Status Readiness Method Response Comment Documented by    Patient Done Acceptance E VU,NR cog deficits, safety, level of S required  09/02/18 1533    Family Done Acceptance E VU,NR cog deficits, safety, level of S required  09/02/18 1533                      User Key     Initials Effective Dates Name Provider Type Discipline     08/02/16 -  Elena Strickland, OTR/L Occupational Therapist OT                  OT Recommendation and Plan  Outcome Summary/Treatment Plan (OT)  Anticipated Discharge Disposition (OT): (S) home with OP services  Patient/Family Concerns, Anticipated Discharge Disposition (OT): Pt. to DC to DTR's home in [place].  Rec pt. have 24/7 S 2' to confusion, decreased safety  Planned Therapy Interventions (OT Eval): cognitive/visual perception retraining, BADL retraining, IADL retraining, occupation/activity based interventions, patient/caregiver education/training  Therapy Frequency (OT Eval): 5 times/wk  Plan of Care Review  Plan of Care Reviewed With: patient  Plan of Care Reviewed With: patient  Outcome Summary: OT eval completed.  Pt. requires visual/verbal/demo cues to follow 1 step commands. Ms. Rossi does not appear to have difficulty with performing familiar, desired ADL tasks, & displays no deficits in initiation in chosen tasks; however, she does display inhibiton & delayed response with auditory processing. DTR reports that she plans to take pt. to her home in [place]. I rec the pt. have 24/7 S 2' to her confusion & decreased cognition. She is oriented x 1 & does not appear to fully understand her deficits. She appears to be moderately globally aphasic.             Outcome Measures     Row Name 09/02/18 1438 09/02/18 1437          How much help from another person do you currently need...    Turning from your back to your side while in flat bed without using bedrails? 4  -RS  --     Moving from lying on back to sitting  on the side of a flat bed without bedrails? 4  -RS  --     Moving to and from a bed to a chair (including a wheelchair)? 4  -RS  --     Standing up from a chair using your arms (e.g., wheelchair, bedside chair)? 4  -RS  --     Climbing 3-5 steps with a railing? 3  -RS  --     To walk in hospital room? 4  -RS  --     AM-PAC 6 Clicks Score 23  -RS  --        How much help from another is currently needed...    Putting on and taking off regular lower body clothing?  -- 4  -CH     Bathing (including washing, rinsing, and drying)  -- 3  -CH     Toileting (which includes using toilet bed pan or urinal)  -- 3  -CH     Putting on and taking off regular upper body clothing  -- 4  -CH     Taking care of personal grooming (such as brushing teeth)  -- 4  -CH     Eating meals  -- 4  -CH     Score  -- 22  -CH        Functional Assessment    Outcome Measure Options AM-PAC 6 Clicks Basic Mobility (PT)  -RS AM-PAC 6 Clicks Daily Activity (OT)  -       User Key  (r) = Recorded By, (t) = Taken By, (c) = Cosigned By    Initials Name Provider Type     Elnea Strickland OTR/L Occupational Therapist    RS Tulio Madrid, PT, DPT, OCS Physical Therapist          Time Calculation:         Time Calculation- OT     Row Name 09/02/18 1437             Time Calculation- OT    OT Start Time 1437  -      OT Stop Time 1510  -      OT Time Calculation (min) 33 min  -      OT Received On 09/02/18  -      OT Goal Re-Cert Due Date 09/12/18  -        User Key  (r) = Recorded By, (t) = Taken By, (c) = Cosigned By    Initials Name Provider Type     Elena Strickland OTR/L Occupational Therapist        Therapy Suggested Charges     Code   Minutes Charges    None           Therapy Charges for Today     Code Description Service Date Service Provider Modifiers Qty    73022779851  OT SELFCARE CURRENT 9/2/2018 Elena Strickland OTR/L GO, CJ 1    77902658411 HC OT SELFCARE PROJECTED 9/2/2018 Elena Strickland OTR/L GO, CI 1    68358884853 HC OT  EVAL MOD COMPLEXITY 2 9/2/2018 Elena Strickland OTR/L GO, KX 1          OT G-codes  OT Professional Judgement Used?: Yes  OT Functional Scales Options: AM-PAC 6 Clicks Daily Activity (OT)  Score: 22  Functional Limitation: Self care  Self Care Current Status (): At least 20 percent but less than 40 percent impaired, limited or restricted  Self Care Goal Status (): At least 1 percent but less than 20 percent impaired, limited or restricted    NAYELI Bennett/RENATO  9/2/2018

## 2018-09-02 NOTE — PLAN OF CARE
Problem: Patient Care Overview  Goal: Plan of Care Review  Outcome: Ongoing (interventions implemented as appropriate)   09/02/18 8717   Coping/Psychosocial   Plan of Care Reviewed With patient;daughter   OTHER   Outcome Summary Pt complains of headache, medication ordered and MD aware. Speech is only deficit, having moderate aphasia, but understandable.     Goal: Individualization and Mutuality  Outcome: Ongoing (interventions implemented as appropriate)    Goal: Discharge Needs Assessment  Outcome: Ongoing (interventions implemented as appropriate)    Goal: Interprofessional Rounds/Family Conf  Outcome: Ongoing (interventions implemented as appropriate)      Problem: Fall Risk (Adult)  Goal: Absence of Fall  Outcome: Ongoing (interventions implemented as appropriate)      Problem: Stroke (Ischemic) (Adult)  Goal: Signs and Symptoms of Listed Potential Problems Will be Absent, Minimized or Managed (Stroke)  Outcome: Ongoing (interventions implemented as appropriate)

## 2018-09-02 NOTE — PROGRESS NOTES
[facility] Medicine Services  INPATIENT PROGRESS NOTE    Patient Name: Natalee Noble  Date of Admission: 9/1/2018  Today's Date: 09/02/18  Length of Stay: 1  Primary Care Physician: Eliezer Valentin MD    Subjective   Chief Complaint: follow-up stroke  HPI   Patient and daughter do indicate that she has complained of symptoms of palpitations in the past.  In fact, they indicate that she is been seen by a cardiologist but workup was unremarkable.  She did have a brief episode of what appeared to be paroxysmal atrial fibrillation overnight.  She reports ongoing symptoms of headache, and reports that she has been on Topamax for many years.  Has a known history of migraine headaches.  No new neuro symptoms.        Review of Systems     All pertinent negatives and positives are as above. All other systems have been reviewed and are negative unless otherwise stated.     Objective    Temp:  [97.5 °F (36.4 °C)-98.2 °F (36.8 °C)] 97.5 °F (36.4 °C)  Heart Rate:  [] 76  Resp:  [16-18] 16  BP: ()/(55-78) 103/57  Physical Exam   Constitutional: No distress.   HENT:   Head: Normocephalic.   Mouth/Throat: No oropharyngeal exudate.   Wears eyeglasses   Eyes: Pupils are equal, round, and reactive to light. No scleral icterus.   Neck: No tracheal deviation present.   Cardiovascular: Normal rate and regular rhythm.    Pulmonary/Chest: Effort normal. No respiratory distress.   Abdominal: Soft.   Musculoskeletal: She exhibits no edema.   Neurological: She is alert.   Continues to have expressive/receptive aphasia   Skin: Skin is warm and dry. She is not diaphoretic.   Vitals reviewed.      Results Review:  I have reviewed the labs, radiology results, and diagnostic studies.    Laboratory Data:     Results from last 7 days  Lab Units 09/02/18  0503 09/01/18  0910   WBC 10*3/mm3 4.17* 6.71   HEMOGLOBIN g/dL 11.9* 13.2   HEMATOCRIT % 35.3* 39.8   PLATELETS 10*3/mm3 184 205           Results from last 7 days  Lab Units 09/02/18  0654 09/01/18  0910   SODIUM mmol/L 141 142   POTASSIUM mmol/L 4.3 4.1   CHLORIDE mmol/L 108 109   CO2 mmol/L 24.0 23.0*   BUN mg/dL 16 17   CREATININE mg/dL 1.23 1.22   CALCIUM mg/dL 8.8 8.7   BILIRUBIN mg/dL 0.4 0.3   ALK PHOS U/L 65 64   ALT (SGPT) U/L 20 22   AST (SGOT) U/L 19 22   GLUCOSE mg/dL 87 85       Culture Data:        Radiology Data:   Imaging Results (last 24 hours)     Procedure Component Value Units Date/Time    US Carotid Bilateral [[id number]] Updated:  09/02/18 1325          I have reviewed the patient's current medications.     Assessment/Plan     Hospital Problem List     Cerebrovascular accident (CVA) (CMS/HCC)        1.  Acute ischemic infarction in the inferior division of the left middle cerebral artery complicated by aphasia (both expressive and receptive)  2.  Suspect paroxysmal atrial fibrillation  3.  Migraine Headache - chronic migraines  4.  Arthritis    Plan:  1.  Contacted telemetry and it appears patient has episode of PAFIB last PM; patient and daughter do indicate that she has a history of palpitations, and in fact, state that she has seen a Cardiologist in the past but the workup was negative  2.  Case discussed with Dr. Hammer  3.  Start anticoagulation with Pradaxa  4.  Low dose PO Metoprolol; monitor HR closely  5.  Echo results reviewed with Dr. Reilly; bubble negative  6.  Topamax to be restarted  7.  NICS pending  8.  Statin  9.  PT/OT/ST  10.  Possibly d/c home tomorrow with daughter pending the above; does not need to be alone given communication barrier - Dr. Hammer has this discussion with family.      Mayur Sweet MD   09/02/18   1:36 PM

## 2018-09-02 NOTE — PLAN OF CARE
Problem: Patient Care Overview  Goal: Plan of Care Review  Outcome: Ongoing (interventions implemented as appropriate)   09/02/18 0704   Coping/Psychosocial   Plan of Care Reviewed With patient   Plan of Care Review   Progress no change   OTHER   Outcome Summary Pt. continues to have aphasia and gets very frustrated when trying to communicate, up with 1 assist to BRP, VSS, NIH 3, c/o HA and PRN meds given with minimal relief, daughter at bedside, safety maintained, will continue to monitor.        Problem: Fall Risk (Adult)  Goal: Absence of Fall  Outcome: Ongoing (interventions implemented as appropriate)      Problem: Stroke (Ischemic) (Adult)  Goal: Signs and Symptoms of Listed Potential Problems Will be Absent, Minimized or Managed (Stroke)  Outcome: Ongoing (interventions implemented as appropriate)

## 2018-09-02 NOTE — PROGRESS NOTES
Neurology Progress Note      Chief Complaint:  stroke    Subjective     Subjective:    No new issues.  Daughter at bedside.    Medications:  Current Facility-Administered Medications   Medication Dose Route Frequency Provider Last Rate Last Dose   • aspirin tablet 325 mg  325 mg Oral Daily Fatimah Iniguez DO   325 mg at 09/01/18 1559    Or   • aspirin suppository 300 mg  300 mg Rectal Daily Fatimah Iniguez DO       • atorvastatin (LIPITOR) tablet 80 mg  80 mg Oral Nightly Fatimah Iniguez DO   80 mg at 09/01/18 2108   • enalaprilat (VASOTEC) injection 0.625 mg  0.625 mg Intravenous Q6H PRN Fatimah Iniguez DO       • HYDROcodone-acetaminophen (NORCO) 7.5-325 MG per tablet 1 tablet  1 tablet Oral Q4H PRN Fatimah Iniguez DO   1 tablet at 09/02/18 0046   • Morphine sulfate (PF) injection 2 mg  2 mg Intravenous Q4H PRN Toby Hammer MD   2 mg at 09/02/18 0414   • ondansetron (ZOFRAN) injection 4 mg  4 mg Intravenous Q6H PRN Fatimah Iniguez DO       • sodium chloride 0.9 % flush 1-10 mL  1-10 mL Intravenous PRN Fatimah Iniguez DO       • sodium chloride 0.9 % flush 10 mL  10 mL Intravenous PRN Harry Floyd Jr., MD       • topiramate (TOPAMAX) tablet 100 mg  100 mg Oral Q12H Toby Hammer MD   100 mg at 09/01/18 2108       Review of Systems:   -A 14 point review of systems is completed and is negative except for speech changes      Objective      Vital Signs  Temp:  [97.6 °F (36.4 °C)-98.2 °F (36.8 °C)] 97.6 °F (36.4 °C)  Heart Rate:  [] 58  Resp:  [16-18] 17  BP: ()/(55-78) 100/55    Physical Exam:    General Exam:  Head:  Normal cephalic, atraumatic  HEENT:  Neck supple  Fundoscopic Exam:  No signs of disc edema  CVS:  Regular rate and rhythm.  No murmurs  Carotid Examination:  No bruits  Lungs:  Clear to auscultation  Abdomen:  Non-tender, Non-distended  Extremities:  No signs of peripheral edema  Skin:  No rashes     Neurologic Exam:     Mental Status:     -Awake, Alert, Oriented X 3  -No word finding difficulties  -She really has more of a global aphasia.  There are receptive and expressive components.  In addition to this she has no repetition.     CN II:  Visual fields full.  Pupils equally reactive to light  CN III, IV, VI:  Extraocular Muscles full with no signs of nystagmus  CN V:  Facial sensory is symmetric with no asymmetries.  CN VII:  Facial motor symmetric  CN VIII:  Gross hearing intact bilaterally  CN IX:  Palate elevates symmetrically  CN X:  Palate elevates symmetrically  CN XI:  Shoulder shrug symmetric  CN XII:  Tongue is midline on protrusion     Motor: (strength out of 5:  1= minimal movement, 2 = movement in plane of gravity, 3 = movement against gravity, 4 = movement against some resistance, 5 = full strength)     -Right Upper Ext: Proximal: 5 Distal: 5  -Left Upper Ext: Proximal: 5   Distal: 5     -Right Lower Ext: Proximal: 5 Distal: 5  -Left Lower Ext: Proximal: 5   Distal: 5     DTR:  -Right              Bicep: 2+         Tricep: 2+        Brachoradialis: 2+              Patella: 2+       Ankle: 2+         Neg Babinski  -Left              Bicep: 2+         Tricep: 2+        Brachoradialis: 2+              Patella: 2+       Ankle: 2+         Neg Babinski     Sensory:  -Intact to light touch, pinprick, temperature, pain, and proprioception     Coordination:  -Finger to nose intact  -Heel to shin intact  -No ataxia     Gait  -No signs of ataxia  -ambulates unassisted     Results Review:    I reviewed the patient's new clinical results.      Results from last 7 days  Lab Units 09/02/18  0503 09/01/18  0910   WBC 10*3/mm3 4.17* 6.71   HEMOGLOBIN g/dL 11.9* 13.2   HEMATOCRIT % 35.3* 39.8   PLATELETS 10*3/mm3 184 205          Results from last 7 days  Lab Units 09/02/18  0654 09/01/18  0910   SODIUM mmol/L 141 142   POTASSIUM mmol/L 4.3 4.1   CHLORIDE mmol/L 108 109   CO2 mmol/L 24.0 23.0*   BUN mg/dL 16 17   CREATININE mg/dL 1.23 1.22   CALCIUM  mg/dL 8.8 8.7   BILIRUBIN mg/dL 0.4 0.3   ALK PHOS U/L 65 64   ALT (SGPT) U/L 20 22   AST (SGOT) U/L 19 22   GLUCOSE mg/dL 87 85        Lab Results   Component Value Date    PROTIME 12.1 09/01/2018    INR 0.87 (L) 09/01/2018     No components found for: POCGLUC  No components found for: A1C  Lab Results   Component Value Date    HDL 27 (L) 09/02/2018    LDL 65 09/02/2018     No components found for: B12  No results found for: TSH    MR brain reviewed by me.  There is evidence of an acute infarction in the anterior pole of the left temporal lobe.    CUS:  pending     · Left ventricular systolic function is normal. Estimated EF = 50%.  · Left ventricular diastolic dysfunction (grade I a) consistent with impaired relaxation.  · Left atrial cavity size is mildly dilated.  · Mild mitral valve regurgitation is present  · Mild to moderate tricuspid valve regurgitation is present.  · There is abnormal thickening noted on the RV that may represent a vegetation  Assessment/Plan     Hospital Problem List    Active Problems:    Cerebrovascular accident (CVA) (CMS/[facility])    Impression:  1.  Acute ischemic infarction in the inferior division of the left middle cerebral artery  2.  Migraine ( history of) likely worsened by stroke.  It is somewhat better today but she still has a mild one today  3.  Arthritis    Plan:  · Decadron 10mg IV SHERRY for migraine abortive  · Aspirin recommended  · Lipitor 80 mg daily  · The patient is adamant that she takes 200 mg of extended release Topamax at night.  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  I will start Topamax immediate release twice a day currently.  · PT/OT evals pending  · D/C planning:  Daughter plans to take her home and provide supportive care.  The patient will likely not be safe to live alone currently secondary to communication barrier.  PT and OT have yet to make their D/C recs but I suspect she will not have any  needs.  I am going to recommend outpatient telemetry to rule out parox afib as currently her stroke is cryptogenic.          Toby Hammer MD  09/02/18  10:42 AM

## 2018-09-02 NOTE — PLAN OF CARE
Problem: Patient Care Overview  Goal: Plan of Care Review  Outcome: Ongoing (interventions implemented as appropriate)   09/02/18 7582   Coping/Psychosocial   Plan of Care Reviewed With patient   Plan of Care Review   Progress no change   OTHER   Outcome Summary OT eval completed. Pt. requires visual/verbal/demo cues to follow 1 step commands. Ms. Rossi does not appear to have difficulty with performing familiar, desired ADL tasks, & displays no deficits in initiation in chosen tasks; however, she does display inhibiton & delayed response with auditory processing. DTR reports that she plans to take pt. to her home in [place]. I rec the pt. have 24/7 S 2' to her confusion & decreased cognition. She is oriented x 1 & does not appear to fully understand her deficits. She appears to be moderately globally aphasic. I rec outpatient OT to improve her indep & safety.

## 2018-09-03 VITALS
BODY MASS INDEX: 26.55 KG/M2 | WEIGHT: 165.2 LBS | HEIGHT: 66 IN | TEMPERATURE: 97.5 F | SYSTOLIC BLOOD PRESSURE: 110 MMHG | OXYGEN SATURATION: 94 % | DIASTOLIC BLOOD PRESSURE: 63 MMHG | RESPIRATION RATE: 16 BRPM | HEART RATE: 52 BPM

## 2018-09-03 PROBLEM — I48.0 PAROXYSMAL A-FIB (HCC): Status: ACTIVE | Noted: 2018-09-03

## 2018-09-03 PROCEDURE — 99233 SBSQ HOSP IP/OBS HIGH 50: CPT | Performed by: PSYCHIATRY & NEUROLOGY

## 2018-09-03 PROCEDURE — 97535 SELF CARE MNGMENT TRAINING: CPT

## 2018-09-03 RX ORDER — DABIGATRAN ETEXILATE 150 MG/1
150 CAPSULE ORAL EVERY 12 HOURS SCHEDULED
Qty: 60 CAPSULE | Refills: 2 | OUTPATIENT
Start: 2018-09-03 | End: 2019-10-16

## 2018-09-03 RX ORDER — BISACODYL 10 MG
10 SUPPOSITORY, RECTAL RECTAL DAILY PRN
Status: DISCONTINUED | OUTPATIENT
Start: 2018-09-03 | End: 2018-09-03 | Stop reason: HOSPADM

## 2018-09-03 RX ORDER — SENNA AND DOCUSATE SODIUM 50; 8.6 MG/1; MG/1
1 TABLET, FILM COATED ORAL 2 TIMES DAILY
Status: DISCONTINUED | OUTPATIENT
Start: 2018-09-03 | End: 2018-09-03 | Stop reason: HOSPADM

## 2018-09-03 RX ORDER — DABIGATRAN ETEXILATE 150 MG/1
150 CAPSULE ORAL EVERY 12 HOURS SCHEDULED
Qty: 60 CAPSULE | Refills: 2 | Status: SHIPPED | OUTPATIENT
Start: 2018-09-03 | End: 2018-09-03

## 2018-09-03 RX ADMIN — DABIGATRAN ETEXILATE MESYLATE 150 MG: 150 CAPSULE ORAL at 09:01

## 2018-09-03 RX ADMIN — HYDROCODONE BITARTRATE AND ACETAMINOPHEN 1 TABLET: 7.5; 325 TABLET ORAL at 00:11

## 2018-09-03 RX ADMIN — POLYETHYLENE GLYCOL (3350) 17 G: 17 POWDER, FOR SOLUTION ORAL at 11:02

## 2018-09-03 RX ADMIN — METOPROLOL TARTRATE 12.5 MG: 25 TABLET, FILM COATED ORAL at 09:01

## 2018-09-03 RX ADMIN — TOPIRAMATE 100 MG: 100 TABLET, FILM COATED ORAL at 09:00

## 2018-09-03 RX ADMIN — DOCUSATE SODIUM -SENNOSIDES 1 TABLET: 50; 8.6 TABLET, COATED ORAL at 11:02

## 2018-09-03 NOTE — PROGRESS NOTES
Neurology Progress Note      Chief Complaint:  stroke    Subjective     Subjective:    She is doing very well this morning.  Last night I did receive a call saying that she had some increased numbness on that right arm.  She had artery received 1 dose of Pradaxa.  I did wonder rule out hemorrhagic conversion of the known stroke.  I requested a repeat head CT without contrast.  After reviewing this and showed no signs of hemorrhagic conversion.  She had no signs of edema.  She has some mild extension of a hypoattenuation in the parietal lobe but otherwise was unremarkable.  Medications:  Current Facility-Administered Medications   Medication Dose Route Frequency Provider Last Rate Last Dose   • atorvastatin (LIPITOR) tablet 80 mg  80 mg Oral Nightly Fatimah Iniguez DO   80 mg at 09/02/18 2024   • bisacodyl (DULCOLAX) suppository 10 mg  10 mg Rectal Daily PRN Mayur Sweet MD       • dabigatran etexilate (PRADAXA) capsule 150 mg  150 mg Oral Q12H Mayur Sweet MD   150 mg at 09/03/18 0901   • enalaprilat (VASOTEC) injection 0.625 mg  0.625 mg Intravenous Q6H PRN Fatimah Iniguez DO       • HYDROcodone-acetaminophen (NORCO) 7.5-325 MG per tablet 1 tablet  1 tablet Oral Q4H PRN Fatimah Iniguez DO   1 tablet at 09/03/18 0011   • metoprolol tartrate (LOPRESSOR) tablet 12.5 mg  12.5 mg Oral Q12H Mayur Sweet MD   12.5 mg at 09/03/18 0901   • ondansetron (ZOFRAN) injection 4 mg  4 mg Intravenous Q6H PRN Fatimah Iniguez DO       • polyethylene glycol 3350 powder (packet)  17 g Oral Daily Mayur Sweet MD   17 g at 09/03/18 1102   • sennosides-docusate sodium (SENOKOT-S) 8.6-50 MG tablet 1 tablet  1 tablet Oral BID Mayur Sweet MD   1 tablet at 09/03/18 1102   • sodium chloride 0.9 % flush 1-10 mL  1-10 mL Intravenous PRN Fatimah Iniguez DO       • sodium chloride 0.9 % flush 10 mL  10 mL Intravenous PRN Harry Floyd Jr., MD       • topiramate (TOPAMAX)  tablet 100 mg  100 mg Oral Q12H Toby Hammer MD   100 mg at 09/03/18 0900       Review of Systems:   -A 14 point review of systems is completed and is negative except for speech changes      Objective      Vital Signs  Temp:  [97.4 °F (36.3 °C)-98.3 °F (36.8 °C)] 97.5 °F (36.4 °C)  Heart Rate:  [] 52  Resp:  [16-18] 16  BP: (103-138)/(57-82) 110/63    Physical Exam:    General Exam:  Head:  Normal cephalic, atraumatic  HEENT:  Neck supple  Fundoscopic Exam:  No signs of disc edema  CVS:  Regular rate and rhythm.  No murmurs  Carotid Examination:  No bruits  Lungs:  Clear to auscultation  Abdomen:  Non-tender, Non-distended  Extremities:  No signs of peripheral edema  Skin:  No rashes     Neurologic Exam:     Mental Status:    -Awake, Alert, Oriented X 3  -No word finding difficulties  -She really has more of a global aphasia.  There are receptive and expressive components.  In addition to this she has no repetition.     CN II:  Visual fields full.  Pupils equally reactive to light  CN III, IV, VI:  Extraocular Muscles full with no signs of nystagmus  CN V:  Facial sensory is symmetric with no asymmetries.  CN VII:  Facial motor symmetric  CN VIII:  Gross hearing intact bilaterally  CN IX:  Palate elevates symmetrically  CN X:  Palate elevates symmetrically  CN XI:  Shoulder shrug symmetric  CN XII:  Tongue is midline on protrusion     Motor: (strength out of 5:  1= minimal movement, 2 = movement in plane of gravity, 3 = movement against gravity, 4 = movement against some resistance, 5 = full strength)     -Right Upper Ext: Proximal: 5 Distal: 5  -Left Upper Ext: Proximal: 5   Distal: 5     -Right Lower Ext: Proximal: 5 Distal: 5  -Left Lower Ext: Proximal: 5   Distal: 5     DTR:  -Right              Bicep: 2+         Tricep: 2+        Brachoradialis: 2+              Patella: 2+       Ankle: 2+         Neg Babinski  -Left              Bicep: 2+         Tricep: 2+        Brachoradialis: 2+               Patella: 2+       Ankle: 2+         Neg Babinski     Sensory:  -Intact to light touch, pinprick, temperature, pain, and proprioception     Coordination:  -Finger to nose intact  -Heel to shin intact  -No ataxia     Gait  -No signs of ataxia  -ambulates unassisted     Results Review:    I reviewed the patient's new clinical results.      Results from last 7 days  Lab Units 09/02/18  0503 09/01/18  0910   WBC 10*3/mm3 4.17* 6.71   HEMOGLOBIN g/dL 11.9* 13.2   HEMATOCRIT % 35.3* 39.8   PLATELETS 10*3/mm3 184 205          Results from last 7 days  Lab Units 09/02/18  0654 09/01/18  0910   SODIUM mmol/L 141 142   POTASSIUM mmol/L 4.3 4.1   CHLORIDE mmol/L 108 109   CO2 mmol/L 24.0 23.0*   BUN mg/dL 16 17   CREATININE mg/dL 1.23 1.22   CALCIUM mg/dL 8.8 8.7   BILIRUBIN mg/dL 0.4 0.3   ALK PHOS U/L 65 64   ALT (SGPT) U/L 20 22   AST (SGOT) U/L 19 22   GLUCOSE mg/dL 87 85        Lab Results   Component Value Date    PROTIME 12.1 09/01/2018    INR 0.87 (L) 09/01/2018     No components found for: POCGLUC  No components found for: A1C  Lab Results   Component Value Date    HDL 27 (L) 09/02/2018    LDL 65 09/02/2018     No components found for: B12  No results found for: TSH    MR brain reviewed by me.  There is evidence of an acute infarction in the anterior pole of the left temporal lobe.    CUS:  pending     · Left ventricular systolic function is normal. Estimated EF = 50%.  · Left ventricular diastolic dysfunction (grade I a) consistent with impaired relaxation.  · Left atrial cavity size is mildly dilated.  · Mild mitral valve regurgitation is present  · Mild to moderate tricuspid valve regurgitation is present.  · There is abnormal thickening noted on the RV that may represent a vegetation      CT Head from 09/02 reviewed by me:  No significant changes.  Slight increase in size to involve parietal lobe.  Assessment/Plan     Hospital Problem List    Active Problems:    Cerebrovascular accident (CVA)  (CMS/[facility])    Impression:  1.  Acute ischemic infarction in the inferior division of the left middle cerebral artery  2.  Migraine ( history of) likely worsened by stroke.  It is somewhat better today but she still has a mild one today  3.  Arthritis  4.  New onset atrial fibrillation found on cardiac telemetry    Plan:  · Aspirin recommended  · Lipitor 80 mg daily  · Back to 150 mg twice per day  · The patient is cleared for discharge today.  She will live with her daughter-in-law hold for the time being.  I requested that they follow up with a neurologist in 3 weeks.  She would like to be transferred to my neurology clinic.  I requested a follow-up in 3 weeks.  From a long-term standpoint I would like for her to transition off of Topamax.  With her global aphasia my fear is is that the side effect of word finding difficulties with Topamax may slow her improvement.  I have spoken with her about this.  This can be done as an outpatient.  She may be a candidate for the new migraine preventative Aimovig instead of Topomax.        Toby Hammer MD  09/03/18  12:11 PM

## 2018-09-03 NOTE — PLAN OF CARE
"Problem: Patient Care Overview  Goal: Plan of Care Review  Outcome: Ongoing (interventions implemented as appropriate)   09/03/18 0358   Coping/Psychosocial   Plan of Care Reviewed With patient   Plan of Care Review   Progress no change   OTHER   Outcome Summary pt continued to complain of headache; migraine med and prn pain med given with relief reported; at the start of this shift pt said \"numb\"; the day shift nurse and I concluded after trying to decifer her answers that she was having some numbeness on her right side; Dr. Hammer notified and CT of head was ordered; nothing new showed on the CT; within an hour the pt stated it was gone; pt continues to struggle with aphasia; no other deficits noted; pt does have bouts of anxiety and worry; she fears she is not going to get better; tried to reassure pt that it will take time and to take things a day at a time; pt did have trouble sleeping; pt and pt's daughter requested that something be ordered to help pt have BM; last BM was over a week ago however pt has been ill and not eating much all week; pt and daughter were informed that it would be addressed in the morning with the MD; pt's VSS and safety maintained     Goal: Individualization and Mutuality  Outcome: Ongoing (interventions implemented as appropriate)   09/03/18 0358   Individualization   Patient Specific Preferences pt likes cranberry juice   Patient Specific Interventions give extra time for pt to answer question or state what she is feeling or wanting; encourage pt to contiue verbal communication    Mutuality/Individual Preferences   What Anxieties, Fears, Concerns, or Questions Do You Have About Your Care? pt fears that her communication skills will never get better     Goal: Discharge Needs Assessment  Outcome: Ongoing (interventions implemented as appropriate)    Goal: Interprofessional Rounds/Family Conf  Outcome: Ongoing (interventions implemented as appropriate)      Problem: Fall Risk " (Adult)  Goal: Absence of Fall  Outcome: Ongoing (interventions implemented as appropriate)   09/03/18 0358   Fall Risk (Adult)   Absence of Fall making progress toward outcome       Problem: Stroke (Ischemic) (Adult)  Goal: Signs and Symptoms of Listed Potential Problems Will be Absent, Minimized or Managed (Stroke)  Outcome: Ongoing (interventions implemented as appropriate)   09/03/18 0358   Goal/Outcome Evaluation   Problems Assessed (Stroke (Ischemic)) all   Problems Assessed (Stroke (Ischemic)) communication impairment;situational response

## 2018-09-03 NOTE — DISCHARGE SUMMARY
[facility] Medicine Services  DISCHARGE SUMMARY       Date of Admission: 9/1/2018  Date of Discharge:  9/3/2018  Primary Care Physician: Eliezer Valentin MD    Presenting Problem/History of Present Illness:  Cerebrovascular accident (CVA), unspecified mechanism (CMS/[facility]) [I63.9]     Final Discharge Diagnoses:  Hospital Problem List     Cerebrovascular accident (CVA) (CMS/[facility])    Paroxysmal a-fib (CMS/[facility])         Discharge Diagnosis:  1.  Acute ischemic infarction in the inferior division of the left middle cerebral artery - complicated by aphasia (both expressive and receptive)  2.  Paroxysmal atrial fibrillation   3.  Migraine headaches  4.  Arthritis  5.  Chronic pain syndrome - followed in pain mgmt clinic (cervical/neck pain)    Consults: Dr. Hammer with Neurology    Procedures Performed:   Imaging Results (last 7 days)     Procedure Component Value Units Date/Time    US Carotid Bilateral [[id number]] Collected:  09/03/18 1005     Updated:  09/03/18 1009    Narrative:       History: Stroke       Impression:       Impression:  1. There is less than 50% stenosis of the right internal carotid artery.  2. There is less than 50% stenosis of the left internal carotid artery.  3. Antegrade flow is demonstrated in bilateral vertebral arteries.     Comments: Bilateral carotid vertebral arterial duplex scan was  performed.     Grayscale imaging shows intimal thickening and calcified elements at the  carotid bifurcation. The right internal carotid artery peak systolic  velocity is 77.4 cm/sec. The end-diastolic velocity is 30.5 cm/sec. The  right ICA/CCA ratio is approximately 0.81 . These findings correlate  with less than 50% stenosis of the right internal carotid artery.     Grayscale imaging shows intimal thickening and calcified elements at the  carotid bifurcation. The left internal carotid artery peak systolic  velocity is 77.4 cm/sec. The end-diastolic velocity is 32.8 cm/sec.  The  left ICA/CCA ratio is approximately 0.84 . These findings correlate with  less than 50% stenosis of the left internal carotid artery.     Antegrade flow is demonstrated in bilateral vertebral arteries.       This report was finalized on 09/03/2018 10:05 by Dr. Kenny Basilio MD.    CT Head Without Contrast [[id number]] Collected:  09/02/18 2006     Updated:  09/02/18 2015    Narrative:       History:  68-year-old with sensation loss.     Reference:  MRI brain and CT head September 1, 2018.     Technique:   Unenhanced CT imaging of the head/brain performed.     For this CT exam, one or more of the following dose reduction techniques  was employed:  -automated exposure control  -mA and/or kVp adjustment for patient size  -iterative reconstruction      mGy-cm     Findings:   Increased hypodensity of the known left temporal acute/subacute infarct,  expected evolutionary change. Infarct may have extended to involve the  left parieto-occipital junction (series 3 image 18-20). No acute  hemorrhage. Chronic microvascular ischemic changes and atrophy again  noted. No hydrocephalus. No acute osseous abnormalities.          Impression:       No hemorrhagic transformation of the known left temporal acute/subacute  infarct. Slight extension of infarct to involve the left  parieto-occipital region.  This report was finalized on 09/02/2018 20:12 by Dr Pavel Pace, .    MRI Brain Without Contrast [[id number]] Collected:  09/01/18 1124     Updated:  09/01/18 1131    Narrative:       EXAM: MRI BRAIN WO CONTRAST- - 9/1/2018 10:52 AM CDT     HISTORY: Stroke       COMPARISON: 9/1/2018, 8/24/2012.      TECHNIQUE:   Routine pulse sequences of the brain were obtained without IV contrast.      FINDINGS:   Acute infarct at the left posterior temporal lobe measuring about 5.0 x  2.9 cm (AP by transverse).      Mild periventricular and subcortical white matter changes of chronic  microvascular disease. Ventricles, cisterns and sulci  are normal in size  and configuration. Sella and midline structures within normal limits.  Brainstem and posterior fossa are within normal limits.      Visualized flow voids within normal limits. Visualized portions of the  orbits, paranasal sinuses and mastoid air cells are within normal  limits. There is T2 hyperintensity in the subcutaneous tissues overlying  the bilateral maxillary sinuses, could represent contusion.          Impression:       1. Acute infarct at the left posterior temporal lobe measuring about 5.0  x 2.9 cm.  2. Changes in the subcutaneous tissues overlying the bilateral maxillary  sinuses, could represent contusion.  3. Mild chronic microvascular changes.     This report was finalized on 09/01/2018 11:27 by Dr Ewelina Arias MD.    XR Chest 1 View [[id number]] Collected:  09/01/18 1049     Updated:  09/01/18 1053    Narrative:       EXAM: XR CHEST 1 VW- - 9/1/2018 9:47 AM CDT     HISTORY: weakness       COMPARISON: 7/3/2014.      TECHNIQUE:  1 images.  Frontal view of the chest.     FINDINGS:    No pneumothorax, pleural effusion or focal consolidation. Cardiac  mediastinal silhouette within normal limits. No acute findings in the  bones, soft tissues or upper abdomen. Changes of lower cervical anterior  discectomy and fusion. Cholecystectomy clips.       Impression:       1. No acute cardiopulmonary findings.  This report was finalized on 09/01/2018 10:50 by Dr Ewelina Arias MD.    CT Head Without Contrast [[id number]] Collected:  09/01/18 0938     Updated:  09/01/18 0945    Narrative:       EXAM: CT HEAD WO CONTRAST- - 9/1/2018 9:16 AM CDT     HISTORY: Stroke       COMPARISON: 8/24/2012.      DOSE LENGTH PRODUCT: 571 mGy cm. Automated exposure control was also  utilized to decrease patient radiation dose.     TECHNIQUE: Unenhanced axial CT images obtained from vertex to skull  base.     FINDINGS:  No acute intracranial hemorrhage, midline shift, or mass effect.  Hypodensity at the posterior  left temporal lobe cortex such as axial  image 13 and coronal image 26. Ventricles, sulci, basilar cisterns are  normal in size and configuration for the patient's age. Gray-white  matter differentiation maintained.     Globes, retrobulbar soft tissues, paranasal sinuses, mastoid air cells  and external auditory canals are within normal limits. Calvarium appears  intact. Subcutaneous tissues within normal limits.       Impression:       1. Hypodensity at the left posterior temporal lobe cortex, could  represent infarct. MRI could be helpful for definitive evaluation.  2. No evidence of acute hemorrhage.  This report was finalized on 09/01/2018 09:42 by Dr Ewelina Arias MD.      Interpretation Summary of Echocardiogram    · Left ventricular systolic function is normal. Estimated EF = 50%.  · Left ventricular diastolic dysfunction (grade I a) consistent with impaired relaxation.  · Left atrial cavity size is mildly dilated.  · Mild mitral valve regurgitation is present  · Mild to moderate tricuspid valve regurgitation is present.  · There is abnormal thickening noted on the RV that may represent a vegetation - this was discussed with Dr. Hammer and Dr. Reilly.  No patent foramen ovale.  No additional workup needed at this time     Pertinent Test Results:   Lab Results (last 48 hours)     Procedure Component Value Units Date/Time    Comprehensive Metabolic Panel [[id number]]  (Abnormal) Collected:  09/02/18 0654    Specimen:  Blood Updated:  09/02/18 0756     Glucose 87 mg/dL      BUN 16 mg/dL      Creatinine 1.23 mg/dL      Sodium 141 mmol/L      Potassium 4.3 mmol/L      Chloride 108 mmol/L      CO2 24.0 mmol/L      Calcium 8.8 mg/dL      Total Protein 6.0 (L) g/dL      Albumin 3.30 (L) g/dL      ALT (SGPT) 20 U/L      AST (SGOT) 19 U/L      Alkaline Phosphatase 65 U/L      Total Bilirubin 0.4 mg/dL      eGFR Non African Amer 43 (L) mL/min/1.73      Globulin 2.7 gm/dL      A/G Ratio 1.2 g/dL      BUN/Creatinine Ratio  13.0     Anion Gap 9.0 mmol/L     Hemoglobin A1c [[id number]] Collected:  09/02/18 0503    Specimen:  Blood Updated:  09/02/18 0750     Hemoglobin A1C 5.8 %     Narrative:       Less than 6.0           Non-Diabetic Range  6.0-7.0                 ADA Therapeutic Target  Greater than 7.0        Action Suggested    POC Glucose Once [[id number]]  (Normal) Collected:  09/02/18 0650    Specimen:  Blood Updated:  09/02/18 0701     Glucose 84 mg/dL      Comment: : [id number] ShnergleMeter ID: [id number]       Lipid Panel [[id number]]  (Abnormal) Collected:  09/02/18 0503    Specimen:  Blood Updated:  09/02/18 0537     Total Cholesterol 117 (L) mg/dL      Triglycerides 143 mg/dL      HDL Cholesterol 27 (L) mg/dL      LDL Cholesterol  65 mg/dL      LDL/HDL Ratio 2.27    CBC Auto Differential [[id number]]  (Abnormal) Collected:  09/02/18 0503    Specimen:  Blood Updated:  09/02/18 0514     WBC 4.17 (L) 10*3/mm3      RBC 3.77 (L) 10*6/mm3      Hemoglobin 11.9 (L) g/dL      Hematocrit 35.3 (L) %      MCV 93.6 fL      MCH 31.6 pg      MCHC 33.7 g/dL      RDW 13.4 %      RDW-SD 45.7 fl      MPV 10.7 fL      Platelets 184 10*3/mm3      Neutrophil % 55.5 %      Lymphocyte % 29.0 %      Monocyte % 9.8 %      Eosinophil % 4.3 (H) %      Basophil % 0.2 %      Immature Grans % 1.2 %      Neutrophils, Absolute 2.31 10*3/mm3      Lymphocytes, Absolute 1.21 10*3/mm3      Monocytes, Absolute 0.41 10*3/mm3      Eosinophils, Absolute 0.18 10*3/mm3      Basophils, Absolute 0.01 10*3/mm3      Immature Grans, Absolute 0.05 (H) 10*3/mm3      nRBC 0.0 /100 WBC     POC Glucose Once [[id number]]  (Normal) Collected:  09/02/18 0048    Specimen:  Blood Updated:  09/02/18 0117     Glucose 99 mg/dL      Comment: : [id number] ShnergleMeter ID: [id number]             Chief Complaint on Day of Discharge: No new symptoms; aphasia symptoms very similar to mildly improved    Hospital Course:  The patient is a 68 y.o. female who  "presented to [facility] with a chief complaint of headache and speech difficulty.  Patient reports that her symptoms started around 5 o'clock on the night prior to her admission.  She felt like she was having trouble forming words at that time, was experiencing a headache, and decided to go to bed.  When she woke up the following morning she spoke with one of her daughters who recognized that she was having trouble with her speech, subsequently called the ambulance, and patient presented to our facility for further workup and management.  Patient was outside the time window as far as consideration of thrombolytic therapy is concerned.    An MRI of the brain was performed which confirmed the presence of an acute ischemic infarction in the inferior division of the left middle cerebral artery.  Dr. Hammer of neurology was consulted and followed along with us during this hospitalization.  Noninvasive carotid studies were performed with results as noted above.  An echocardiogram was also performed.  There was no evidence of patent foramen ovale, ejection fractions were preserved, there was some evidence of diastolic dysfunction in addition to mild to moderate tricuspid regurgitation.  There was also mention of a \"thickening\" noted near the RV, with documentation that could not rule out vegetation.  This was discussed with Dr. Reilly, cardiologist that read the echocardiogram.  He reported that it did not appear like a typical valvular vegetation.  He did inquire if this echocardiogram was ordered for evaluation of valvular heart disease or endocarditis, and reported to him the patient was admitted for workup and treatment of an acute ischemic stroke.  Given that this thickening was located on the right side, he did confirm there was no evidence of a patent foramen ovale.  After discussion with consultants, no acute indication for further workup at this time including MELISSA.    Patient was maintained on " continuous telemetry and did have a brief episode of paroxysmal atrial fibrillation.  After this was discussed with patient and family, they did corroborate a history of her experiencing intermittent palpitations on outpatient basis.  In fact, they report that they were seen by cardiologist in the past for these symptoms, but it sounds like at that time no abnormal findings of arrhythmia were seen on the cardiac workup that had been performed.    In light of these findings, anticoagulation discussion occurred with Dr. Hammer in addition to patient/family.  We have started patient on anticoagulation therapy with Pradaxa  Per neurology, aspirin therapy is also recommended.  She will continue taking statin.    I started her on a low-dose of beta blocker, she will need close monitoring of her heart rate moving forward.  Review of her vital signs over the course of the past 24 hours reveals that the majority of her heart rates are in the 50s to 60s, with 2 episodes of her heart rate running at 107 and 122 respectively.  We will try a low-dose metoprolol 12.5 mg by mouth twice a day.  Patient will need close outpatient follow-up with primary care provider, in addition to follow-up in the [facility] neurology clinic.    Patient has a long-standing history of migraine headaches, and has been on medication therapy with Topamax.  This is not an ideal medication, per Dr. Hammer, in the setting of a patient who is had a recent stroke complicated by both expressive and receptive aphasia.  Nonetheless, she has had good results with better migraine control on this medication and will continue this therapy for the time being.  Again, patient will have close outpatient follow-up in the neurology clinic.    Our disposition plan will include discharge with the patient's daughter who is very supportive.  She lives in [place], [place] and is going to move her mother to live with her over the course of the next 3 weeks as she  "continues to recover.  It sounds like the daughter is even arranging for outpatient therapy services to include speech and language therapy.      Condition on Discharge:  Medically stable    Physical Exam on Discharge:  /63 (BP Location: Right leg, Patient Position: Lying)   Pulse 52   Temp 97.5 °F (36.4 °C) (Oral)   Resp 16   Ht 167.6 cm (66\")   Wt 74.9 kg (165 lb 3.2 oz)   SpO2 94%   BMI 26.66 kg/m²   Physical Exam  No acute distress, very pleasant and cooperative, continues to have ongoing symptoms of receptive and expressive aphasia which frustrates her, no new focal motor or sensory deficits.    Discharge Disposition:  Home or Self Care    Discharge Medications:     Discharge Medications      New Medications      Instructions Start Date   aspirin 81 MG tablet   81 mg, Oral, Daily      dabigatran etexilate 150 MG capsu  Commonly known as:  PRADAXA   150 mg, Oral, Every 12 Hours Scheduled      metoprolol tartrate 25 MG tablet  Commonly known as:  LOPRESSOR   12.5 mg, Oral, Every 12 Hours Scheduled         Continue These Medications      Instructions Start Date   ALPRAZolam 1 MG tablet  Commonly known as:  XANAX   1 mg, Oral, 3 Times Daily PRN      atorvastatin 80 MG tablet  Commonly known as:  LIPITOR   80 mg, Oral, Daily      butalbital-acetaminophen-caffeine -40 MG per tablet  Commonly known as:  FIORICET, ESGIC   1 tablet, Oral, Every 6 Hours PRN      escitalopram 20 MG tablet  Commonly known as:  LEXAPRO   20 mg, Oral, Daily      esomeprazole 40 MG capsule  Commonly known as:  nexIUM   40 mg, Oral, Daily PRN      oxyCODONE 10 MG 12 hr tablet  Commonly known as:  oxyCONTIN   10 mg, Oral, Every 12 Hours PRN      tiZANidine 4 MG tablet  Commonly known as:  ZANAFLEX   4 mg, Oral, 2 Times Daily PRN      topiramate 100 MG tablet  Commonly known as:  TOPAMAX   200 mg, Oral, Daily             Discharge Diet: cardiac    Activity at Discharge: continue outpatient therapy services    Discharge Care " Plan/Instructions: Patient plans to live with daughter in [place], [place] for the next 3 weeks while she continues to recover.    Follow-up Appointments:   Outpatient follow-up at the [facility] neurology clinic per Dr. Hammer's recommendations at the time of discharge.  Would recommend close outpatient follow-up with primary care provider, either here locally in [place] or with the provider near the [facility] [place] [facility] area where patient will be residing for the next 3 weeks with her daughter.    Test Results Pending at Discharge: none    Mayur Sweet MD  09/03/18  12:47 PM    Time: 25 min    Addendum: Contacted by nursing staff this afternoon after patient had been discharged that she was at the pharmacy attempting to fill the prescription for Pradaxa, and was notified that her insurance did not cover this NOAC, however they would cover Xarelto.  Based upon this information, patient will be started on Xarelto 20 mg by mouth daily.    Mayur Sweet MD  09/03/18  4:16 PM

## 2018-09-03 NOTE — THERAPY DISCHARGE NOTE
Acute Care - Speech Language Pathology Discharge Summary  [facility]       Patient Name: Natalee Noble  : 1950  MRN: [id number]    Today's Date: 9/3/2018  Onset of Illness/Injury or Date of Surgery: 18       Referring Physician: Dr. Iniguez        Admit Date: 2018      SLP Recommendation and Plan    Visit Dx:    ICD-10-CM ICD-9-CM   1. Cerebrovascular accident (CVA), unspecified mechanism (CMS/HCC) I63.9 434.91   2. Dysphagia, unspecified type R13.10 787.20   3. Abnormality of gait following cerebrovascular accident (CVA) I69.398 438.89    R26.9 781.2   4. Cognitive changes R41.89 799.59   5. Decreased activities of daily living (ADL) Z78.9 V49.89                           SLP Discharge Summary  Anticipated Dischage Disposition: home  Reason for Discharge: discharge from this facility  Progress Toward Achieving Short/long Term Goals: all goals met within established timelines  Discharge Destination: home      Balta Edwards MS CCC-SLP  9/3/2018

## 2018-09-03 NOTE — THERAPY TREATMENT NOTE
Acute Care - Occupational Therapy Treatment Note  [facility]     Patient Name: Nataele Noble  : 1950  MRN: [id number]  Today's Date: 9/3/2018  Onset of Illness/Injury or Date of Surgery: 18  Date of Referral to OT: 18  Referring Physician: Dr. Iniguez    Admit Date: 2018       ICD-10-CM ICD-9-CM   1. Cerebrovascular accident (CVA), unspecified mechanism (CMS/HCC) I63.9 434.91   2. Dysphagia, unspecified type R13.10 787.20   3. Abnormality of gait following cerebrovascular accident (CVA) I69.398 438.89    R26.9 781.2   4. Cognitive changes R41.89 799.59   5. Decreased activities of daily living (ADL) Z78.9 V49.89     Patient Active Problem List   Diagnosis   • Cerebrovascular accident (CVA) (CMS/HCC)     Past Medical History:   Diagnosis Date   • Arthritis    • Back pain    • Fractures     R tibia/fibula; L ankle   • Kidney disorder    • Migraine    • Neck pain      Past Surgical History:   Procedure Laterality Date   • BACK SURGERY      L2-5 fusion    • HYSTERECTOMY     • NECK SURGERY      ACDF C4-7       Therapy Treatment          Rehabilitation Treatment Summary     Row Name 18 0958             Treatment Time/Intention    Discipline occupational therapy assistant  -TS      Document Type therapy note (daily note)  -TS      Subjective Information no complaints  -TS2      Patient/Family Observations daughter present   -TS2      Existing Precautions/Restrictions fall  -TS2      Treatment Considerations/Comments expressive/receptive aphasia  -TS2      Recorded by [TS] Opal Dent COTA/L 18 0958  [TS2] Opal Dent COTA/L 18 1204      Row Name 18 0958             Cognitive Assessment/Intervention- PT/OT    Cognitive Assessment/Intervention Comment Pt able to carry out self care tasks with set up ie brushing teeth and combing hair, applying chapstick. Pt initiated tasks after prompted with task item such as comb, tooth brush or chapstick. Pt  able to follow 1 step command with visual cues x3 occurances out of 8 trials. Pt daughter and GILLETTE discussed safety techniques for home including shower seat, ID bracelet and door chimes.   -TS      Recorded by [TS] Opal Dent COTA/L 09/03/18 1204      Row Name 09/03/18 0958             Bed Mobility Assessment/Treatment    Bed Mobility Assessment/Treatment supine-sit  -TS      Supine-Sit [place] (Bed Mobility) independent  -TS      Recorded by [TS] Opal Dent COTA/L 09/03/18 1204      Row Name 09/03/18 0958             Functional Mobility    Functional Mobility- Ind. Level supervision required;independent  -TS      Recorded by [TS] Oapl Dent COTA/L 09/03/18 1204      Row Name 09/03/18 0958             Transfer Assessment/Treatment    Transfer Assessment/Treatment sit-stand transfer;stand-sit transfer  -TS      Recorded by [TS] Opal Dent COTA/L 09/03/18 1204      Row Name 09/03/18 0958             Sit-Stand Transfer    Sit-Stand [place] (Transfers) independent  -TS      Recorded by [TS] Opal Dent COTA/L 09/03/18 1204      Row Name 09/03/18 0958             Stand-Sit Transfer    Stand-Sit [place] (Transfers) independent  -TS      Recorded by [TS] Opal Dent GILLETTE/L 09/03/18 1204      Row Name 09/03/18 0958             ADL Assessment/Intervention    05502 - OT Self Care/Mgmt Minutes 30  -TS      BADL Assessment/Intervention grooming  -TS      Recorded by [TS] Opal Dent COTA/L 09/03/18 1204      Row Name 09/03/18 0958             Grooming Assessment/Training    [place] Level (Grooming) hair care, combing/brushing;oral care regimen;set up  -TS      Grooming Position unsupported standing;sink side  -TS      Recorded by [TS] Opal Dent COTA/L 09/03/18 1204      Row Name 09/03/18 0958             Positioning and Restraints    Pre-Treatment Position in bed  -TS      Post Treatment Position chair  -TS      In Chair  sitting;call light within reach;encouraged to call for assist;with family/caregiver  -TS      Recorded by [TS] Jadiel DentNAIN Crouch/L 09/03/18 1204      Row Name 09/03/18 0958             Pain Scale: FACES Pre/Post-Treatment    Pain: FACES Scale, Pretreatment 0-->no hurt  -TS      Recorded by [TS] Filipe NAIN Velazquez/L 09/03/18 1204      Row Name 09/03/18 0958             Outcome Summary/Treatment Plan (OT)    Daily Summary of Progress (OT) progress towards functional goals is fair  -TS      Recorded by [TS] Filipe, NAIN Velazquez/L 09/03/18 1204        User Key  (r) = Recorded By, (t) = Taken By, (c) = Cosigned By    Initials Name Effective Dates Discipline     Scott DentNAIN Roberto/L 08/02/16 -  OT               Occupational Therapy Education     Title: PT OT SLP Therapies (Active)     Topic: Occupational Therapy (Active)     Point: ADL training (Done)     Description: Instruct learner(s) on proper safety adaptation and remediation techniques during self care or transfers.   Instruct in proper use of assistive devices.   Learning Progress Summary     Learner Status Readiness Method Response Comment Documented by    Patient Done Acceptance E VU,NR cog deficits, safety, level of S required  09/02/18 1533    Family Done Acceptance E VU,NR cog deficits, safety, level of S required  09/02/18 1533                      User Key     Initials Effective Dates Name Provider Type Discipline     08/02/16 -  Elena Strickland, OTR/L Occupational Therapist OT                OT Recommendation and Plan  Outcome Summary/Treatment Plan (OT)  Daily Summary of Progress (OT): progress towards functional goals is fair  Daily Summary of Progress (OT): progress towards functional goals is fair        Outcome Measures     Row Name 09/03/18 1200 09/02/18 1438 09/02/18 1437       How much help from another person do you currently need...    Turning from your back to your side while in flat bed without using  bedrails?  -- 4  -RS  --    Moving from lying on back to sitting on the side of a flat bed without bedrails?  -- 4  -RS  --    Moving to and from a bed to a chair (including a wheelchair)?  -- 4  -RS  --    Standing up from a chair using your arms (e.g., wheelchair, bedside chair)?  -- 4  -RS  --    Climbing 3-5 steps with a railing?  -- 3  -RS  --    To walk in hospital room?  -- 4  -RS  --    AM-PAC 6 Clicks Score  -- 23  -RS  --       How much help from another is currently needed...    Putting on and taking off regular lower body clothing? 4  -TS  -- 4  -CH    Bathing (including washing, rinsing, and drying) 3  -TS  -- 3  -CH    Toileting (which includes using toilet bed pan or urinal) 4  -TS  -- 3  -CH    Putting on and taking off regular upper body clothing 4  -TS  -- 4  -CH    Taking care of personal grooming (such as brushing teeth) 4  -TS  -- 4  -CH    Eating meals 4  -TS  -- 4  -CH    Score 23  -TS  -- 22  -CH       Functional Assessment    Outcome Measure Options AM-PAC 6 Clicks Daily Activity (OT)  -TS AM-PAC 6 Clicks Basic Mobility (PT)  -RS AM-PAC 6 Clicks Daily Activity (OT)  -CH      User Key  (r) = Recorded By, (t) = Taken By, (c) = Cosigned By    Initials Name Provider Type     Elena Strickland, OTR/L Occupational Therapist    Opal Plummer, GILLETTE/L Occupational Therapy Assistant    RS Tulio Madrid, PT, DPT, OCS Physical Therapist           Time Calculation:         Time Calculation- OT     Row Name 09/03/18 1204 09/03/18 0958          Time Calculation- OT    OT Start Time 0958  -TS  --     OT Stop Time 1028  -TS  --     OT Time Calculation (min) 30 min  -TS  --     Total Timed Code Minutes- OT 30 minute(s)  -TS  --     OT Received On 09/03/18  -TS  --        Timed Charges    01259 - OT Self Care/Mgmt Minutes  -- 30  -TS       User Key  (r) = Recorded By, (t) = Taken By, (c) = Cosigned By    Initials Name Provider Type     Opal Dent, NAIN/L Occupational Therapy  Assistant           Therapy Suggested Charges     Code   Minutes Charges    42159 (CPT®) Hc Ot Neuromusc Re Education Ea 15 Min      27171 (CPT®) Hc Ot Ther Proc Ea 15 Min      42013 (CPT®) Hc Ot Therapeutic Act Ea 15 Min      75505 (CPT®) Hc Ot Manual Therapy Ea 15 Min      06776 (CPT®) Hc Ot Iontophoresis Ea 15 Min      84573 (CPT®) Hc Ot Elec Stim Ea-Per 15 Min      19107 (CPT®) Hc Ot Ultrasound Ea 15 Min      78349 (CPT®) Hc Ot Self Care/Mgmt/Train Ea 15 Min 30 2    Total  30 2        Therapy Charges for Today     Code Description Service Date Service Provider Modifiers Qty    70931317102 HC OT SELF CARE/MGMT/TRAIN EA 15 MIN 9/3/2018 Opal Dent COTA/L GO, KX 2          OT G-codes  OT Professional Judgement Used?: Yes  OT Functional Scales Options: AM-PAC 6 Clicks Daily Activity (OT)  Score: 22  Functional Limitation: Self care  Self Care Current Status (): At least 20 percent but less than 40 percent impaired, limited or restricted  Self Care Goal Status (): At least 1 percent but less than 20 percent impaired, limited or restricted    ADILIA Ornelas  9/3/2018

## 2018-09-03 NOTE — PLAN OF CARE
Problem: Patient Care Overview  Goal: Plan of Care Review  Outcome: Outcome(s) achieved Date Met: 09/03/18 09/03/18 0225   Coping/Psychosocial   Plan of Care Reviewed With patient;daughter   Plan of Care Review   Progress improving   OTHER   Outcome Summary No c/o numbness or migraine. Pt is aphasic at times but able to make sense and appropriate- at times will repeat question or answer mutliple times, but seems to be understanding....mirlax and senokot given as ordered for BM, tolerating food and drink without difficutlies, new medications and dosages reviewed. Up without being unsteady, will go home to stay with daughter for a few weeks for closer observation and care. VSS, in NSR currently no afib

## 2018-09-04 NOTE — THERAPY DISCHARGE NOTE
Acute Care - Occupational Therapy Discharge Summary  [facility]     Patient Name: Natalee Noble  : 1950  MRN: [id number]    Today's Date: 2018  Onset of Illness/Injury or Date of Surgery: 18    Date of Referral to OT: 18  Referring Physician: Dr. Iniguez      Admit Date: 2018        OT Recommendation and Plan    Visit Dx:    ICD-10-CM ICD-9-CM   1. Cerebrovascular accident (CVA), unspecified mechanism (CMS/HCC) I63.9 434.91   2. Dysphagia, unspecified type R13.10 787.20   3. Abnormality of gait following cerebrovascular accident (CVA) I69.398 438.89    R26.9 781.2   4. Cognitive changes R41.89 799.59   5. Decreased activities of daily living (ADL) Z78.9 V49.89                     OT Rehab Goals     Row Name 18 0719             Orientation Goal 1 (OT)    Activity (Orientation Goal 1, OT) oriented to person;oriented to place;oriented to situation  -TS      Luce/Cues/Accuracy (Orientation Goal 1, OT) independent use of environmental cues/strategies  -TS      Time Frame (Orientation Goal 1, OT) long term goal (LTG);by discharge  -TS      Progress/Outcome (Orientation Goal 1, OT) goal not met  -TS         Direction Following Goal 1 (OT)    Activity (Direction Following Goal 1, OT) follow two step directions  -TS      Luce/Cues/Accuracy (Direction Following Goal 1, OT) with minimum;with additional processing time;verbal cues/redirection;with 90% accuracy  -TS      Time Frame (Direction Following Goal 1, OT) long term goal (LTG);by discharge  -TS      Progress/Outcome (Direction Following Goal 1, OT) goal not met  -TS        User Key  (r) = Recorded By, (t) = Taken By, (c) = Cosigned By    Initials Name Provider Type Discipline    TS Opal Dent COTA/L Occupational Therapy Assistant OT                Outcome Measures     Row Name 18 1200 18 1438 18 1437       How much help from another person do you currently need...    Turning from your  back to your side while in flat bed without using bedrails?  -- 4  -RS  --    Moving from lying on back to sitting on the side of a flat bed without bedrails?  -- 4  -RS  --    Moving to and from a bed to a chair (including a wheelchair)?  -- 4  -RS  --    Standing up from a chair using your arms (e.g., wheelchair, bedside chair)?  -- 4  -RS  --    Climbing 3-5 steps with a railing?  -- 3  -RS  --    To walk in hospital room?  -- 4  -RS  --    AM-PAC 6 Clicks Score  -- 23  -RS  --       How much help from another is currently needed...    Putting on and taking off regular lower body clothing? 4  -TS  -- 4  -CH    Bathing (including washing, rinsing, and drying) 3  -TS  -- 3  -CH    Toileting (which includes using toilet bed pan or urinal) 4  -TS  -- 3  -CH    Putting on and taking off regular upper body clothing 4  -TS  -- 4  -CH    Taking care of personal grooming (such as brushing teeth) 4  -TS  -- 4  -CH    Eating meals 4  -TS  -- 4  -CH    Score 23  -TS  -- 22  -CH       Functional Assessment    Outcome Measure Options AM-PAC 6 Clicks Daily Activity (OT)  -TS AM-PAC 6 Clicks Basic Mobility (PT)  -RS AM-PAC 6 Clicks Daily Activity (OT)  -CH      User Key  (r) = Recorded By, (t) = Taken By, (c) = Cosigned By    Initials Name Provider Type    CH Elena Strickland, OTR/L Occupational Therapist    TS Opal Dent, GILLETTE/L Occupational Therapy Assistant    RS Tulio Madrid, PT, DPT, OCS Physical Therapist          Therapy Suggested Charges     Code   Minutes Charges    66433 (CPT®) Hc Ot Neuromusc Re Education Ea 15 Min      13453 (CPT®) Hc Ot Ther Proc Ea 15 Min      30845 (CPT®) Hc Ot Therapeutic Act Ea 15 Min      79674 (CPT®) Hc Ot Manual Therapy Ea 15 Min      48042 (CPT®) Hc Ot Iontophoresis Ea 15 Min      23646 (CPT®) Hc Ot Elec Stim Ea-Per 15 Min      75789 (CPT®) Hc Ot Ultrasound Ea 15 Min      66387 (CPT®) Hc Ot Self Care/Mgmt/Train Ea 15 Min 30 2    Total  30 2          Therapy Charges for  Today     Code Description Service Date Service Provider Modifiers Qty    64265742031 HC OT SELF CARE/MGMT/TRAIN EA 15 MIN 9/3/2018 Opal Dent COTA/L GO, KX 2          OT Discharge Summary  Reason for Discharge: Discharge from facility  Outcomes Achieved: Refer to plan of care for updates on goals achieved  Discharge Destination: Home with assist      ADILIA Ornelas  9/4/2018

## 2018-09-25 ENCOUNTER — TELEPHONE (OUTPATIENT)
Dept: NEUROLOGY | Age: 68
End: 2018-09-25

## 2018-09-28 ENCOUNTER — OFFICE VISIT (OUTPATIENT)
Dept: NEUROLOGY | Age: 68
End: 2018-09-28
Payer: MEDICARE

## 2018-09-28 VITALS
SYSTOLIC BLOOD PRESSURE: 104 MMHG | OXYGEN SATURATION: 98 % | HEART RATE: 60 BPM | WEIGHT: 147 LBS | DIASTOLIC BLOOD PRESSURE: 71 MMHG | BODY MASS INDEX: 24.46 KG/M2

## 2018-09-28 DIAGNOSIS — I69.320 APHASIA DUE TO RECENT STROKE: Primary | ICD-10-CM

## 2018-09-28 DIAGNOSIS — G43.719 INTRACTABLE CHRONIC MIGRAINE WITHOUT AURA AND WITHOUT STATUS MIGRAINOSUS: ICD-10-CM

## 2018-09-28 DIAGNOSIS — Z86.79 HISTORY OF ATRIAL FIBRILLATION: ICD-10-CM

## 2018-09-28 PROCEDURE — 3017F COLORECTAL CA SCREEN DOC REV: CPT | Performed by: PSYCHIATRY & NEUROLOGY

## 2018-09-28 PROCEDURE — 1090F PRES/ABSN URINE INCON ASSESS: CPT | Performed by: PSYCHIATRY & NEUROLOGY

## 2018-09-28 PROCEDURE — G8420 CALC BMI NORM PARAMETERS: HCPCS | Performed by: PSYCHIATRY & NEUROLOGY

## 2018-09-28 PROCEDURE — 99215 OFFICE O/P EST HI 40 MIN: CPT | Performed by: PSYCHIATRY & NEUROLOGY

## 2018-09-28 PROCEDURE — 1101F PT FALLS ASSESS-DOCD LE1/YR: CPT | Performed by: PSYCHIATRY & NEUROLOGY

## 2018-09-28 PROCEDURE — 1036F TOBACCO NON-USER: CPT | Performed by: PSYCHIATRY & NEUROLOGY

## 2018-09-28 PROCEDURE — G8400 PT W/DXA NO RESULTS DOC: HCPCS | Performed by: PSYCHIATRY & NEUROLOGY

## 2018-09-28 PROCEDURE — G8427 DOCREV CUR MEDS BY ELIG CLIN: HCPCS | Performed by: PSYCHIATRY & NEUROLOGY

## 2018-09-28 PROCEDURE — 1123F ACP DISCUSS/DSCN MKR DOCD: CPT | Performed by: PSYCHIATRY & NEUROLOGY

## 2018-09-28 PROCEDURE — 4040F PNEUMOC VAC/ADMIN/RCVD: CPT | Performed by: PSYCHIATRY & NEUROLOGY

## 2018-09-28 NOTE — PROGRESS NOTES
REVIEW OF SYSTEMS    Constitutional: []Fever []Sweats []Chills [] Recent Injury   [x] Denies all unless marked  HENT:[x]Headache  [] Head Injury  [] Sore Throat  [] Ear Pain  [] Dizziness [] Hearing Loss   [x] Denies all unless marked  Spine:  [x] Neck pain  [x] Back pain  [x] Sciaticia  [x] Denies all unless marked  Cardiovascular:[]Chest Pain [x]Palpitations [] Heart Disease  [x] Denies all unless marked  Pulmonary: []Shortness of Breath []Cough   [x] Denies all unless marked  Gastrointestinal:  []Abdominal Pain  []Blood in Stool  []Diarrhea []Constipation []Nausea  []Vomiting  [x] Denies all unless marked  Genitourinary:  [] Dysuria [] Frequency  [] Incontinence [] Urgency   [x] Denies all unless marked  Musculoskeletal: [] Arthralgia  [] Myalgias [] Muscle cramps  [] Muscle twitches   [x] Denies all unless marked   Extremities:   [] Pain   [] Swelling   [x] Denies all unless marked  Skin:[] Rash  [] Color Change  [x] Denies all unless marked  Neurological:[] Visual Disturbance [] Double Vision [] Slurred Speech [] Trouble swallowing  [] Vertigo [x] Tingling [x] Numbness [] Weakness [] Loss of Balance   [] Loss of Consciousness [] Memory Loss [] Seizures  [x] Denies all unless marked  Psychiatric/Behavioral:[] Depression [] Anxiety  [x] Denies all unless marked  Sleep: []  Insomnia [] Sleep Disturbance [] Snoring [] Restless Legs [] Daytime Sleepiness [] Sleep Apnea  [x] Denies all unless marked

## 2018-09-30 NOTE — PROGRESS NOTES
Chief Complaint   Patient presents with    Follow-Up from [address] is a 76y.o. year old female who is seen for evaluation of Her recent stroke. I've seen her in the past primarily for intractable migraines for which she gets Botox injections. Earlier this month she had an acute left MCA stroke and was diagnosed with atrial fibrillation. She had the latter once before during a surgical procedure but had been without it as best we know until her recent stroke. Those MRI films are reviewed. There is a left posterior MCA distribution stroke. Workup was otherwise unremarkable. Those records from [facility] health reviewed. She is now on Xarelto. Her aphasia has been improving daily. She has no significant motor or visual deficits. She is here today with her daughter from [place]. Old records are reviewed. We had a long talk regarding all of the above.     Active Ambulatory Problems     Diagnosis Date Noted    Encounter for long-term (current) use of other medications 01/27/2011    Goiter 01/27/2011    Left flank pain 01/27/2011    Dysuria 01/27/2011    Menopause 10/07/2011    Arthritis 12/14/2011    Right-sided low back pain with right-sided sciatica 12/15/2015    Chronic headaches 12/15/2015    Right-sided low back pain without sciatica 01/26/2016    Neck and shoulder pain 01/26/2016    Frequent headaches 01/26/2016    Lumbar facet arthropathy ([facility] [address] 09/02/2016    Cervical disc disease 06/14/2017    Displacement of lumbar disc with radiculopathy 10/24/2017    Lumbar disc disease with radiculopathy 03/26/2018    Cervical disc disease 05/04/2018     Resolved Ambulatory Problems     Diagnosis Date Noted    Lower urinary tract infectious disease 12/14/2011     Past Medical History:   Diagnosis Date    Anxiety     Bilateral low back pain without sciatica 1/26/2016    Chronic headaches 12/15/2015    Displacement of lumbar disc with radiculopathy 10/24/2017    [] Incontinence [] Urgency   [x] Denies all unless marked  Musculoskeletal: [] Arthralgia  [] Myalgias [] Muscle cramps  [] Muscle twitches   [x] Denies all unless marked   Extremities:   [] Pain   [] Swelling   [x] Denies all unless marked  Skin:[] Rash  [] Color Change  [x] Denies all unless marked  Neurological:[] Visual Disturbance [] Double Vision [] Slurred Speech [] Trouble swallowing  [] Vertigo [x] Tingling [x] Numbness [] Weakness [] Loss of Balance   [] Loss of Consciousness [] Memory Loss [] Seizures  [x] Denies all unless marked  Psychiatric/Behavioral:[] Depression [] Anxiety  [x] Denies all unless marked  Sleep: []  Insomnia [] Sleep Disturbance [] Snoring [] Restless Legs [] Daytime Sleepiness [] Sleep Apnea  [x] Denies all unless marked         Current Outpatient Prescriptions   Medication Sig Dispense Refill    calcium carbonate (TUMS) 500 MG chewable tablet Take 1 tablet by mouth 2 times daily      butalbital-acetaminophen-caffeine (FIORICET, ESGIC) -40 MG per tablet TAKE 1 TABLET BY MOUTH EVERY 6 HOURS AS NEEDED FOR HEADACHES 30 tablet 0    tiZANidine (ZANAFLEX) 4 MG tablet Take 1 tablet by mouth 2 times daily as needed (as needed for muscle spasms) 30 tablet 2    cyproheptadine (PERIACTIN) 4 MG tablet Take 2-3 times a day as tolerated 90 tablet 5    onabotulinumtoxin A (BOTOX) 100 units injection Inject 200 Units into the muscle every 3 months 155 units injected in head and neck for migraines 200 Units 3    hydroxychloroquine (PLAQUENIL) 200 MG tablet Take 1 tablet by mouth 2 times daily      ondansetron (ZOFRAN-ODT) 4 MG disintegrating tablet Take 1 tablet by mouth every 8 hours as needed for Nausea 45 tablet 2    denosumab (PROLIA) 60 MG/ML SOLN SC injection Inject 60 mg into the skin every 6 months       escitalopram (LEXAPRO) 20 MG tablet Take 20 mg by mouth daily      esomeprazole Magnesium (NEXIUM) 40 MG PACK Take 40 mg by mouth daily as needed       Coenzyme Q10 (CO Q-10) 200 MG CAPS Take 200 mg by mouth daily      folic acid (FOLVITE) 415 MCG tablet Take 800 mcg by mouth daily      Lactobacillus (PROBIOTIC ACIDOPHILUS PO) Take by mouth daily      topiramate (TOPAMAX) 200 MG tablet Take 1 tablet by mouth daily. 90 tablet 3    conjugated estrogens (PREMARIN) vaginal cream 1 applicator q hs mwf 90 g 5    atorvastatin (LIPITOR) 80 MG tablet Take 1 tablet by mouth daily. 90 tablet 3    ALPRAZolam (XANAX) 1 MG tablet Take 1 tablet by mouth 2 times daily as needed for Anxiety. 60 tablet 5    oxyCODONE HCl (OXY-IR) 10 MG immediate release tablet Take 1 tablet by mouth 2 times daily for 30 days. Mary Owens Date: 5/4/18 60 tablet 0     No current facility-administered medications for this visit.         Outpatient Prescriptions Marked as Taking for the 9/28/18 encounter (Office Visit) with Ubaldo Logan MD   Medication Sig Dispense Refill    calcium carbonate (TUMS) 500 MG chewable tablet Take 1 tablet by mouth 2 times daily      butalbital-acetaminophen-caffeine (FIORICET, ESGIC) -40 MG per tablet TAKE 1 TABLET BY MOUTH EVERY 6 HOURS AS NEEDED FOR HEADACHES 30 tablet 0    tiZANidine (ZANAFLEX) 4 MG tablet Take 1 tablet by mouth 2 times daily as needed (as needed for muscle spasms) 30 tablet 2    cyproheptadine (PERIACTIN) 4 MG tablet Take 2-3 times a day as tolerated 90 tablet 5    onabotulinumtoxin A (BOTOX) 100 units injection Inject 200 Units into the muscle every 3 months 155 units injected in head and neck for migraines 200 Units 3    hydroxychloroquine (PLAQUENIL) 200 MG tablet Take 1 tablet by mouth 2 times daily      ondansetron (ZOFRAN-ODT) 4 MG disintegrating tablet Take 1 tablet by mouth every 8 hours as needed for Nausea 45 tablet 2    denosumab (PROLIA) 60 MG/ML SOLN SC injection Inject 60 mg into the skin every 6 months       escitalopram (LEXAPRO) 20 MG tablet Take 20 mg by mouth daily      esomeprazole Magnesium (NEXIUM) 40 MG PACK Take 40 mg midline  CN XI-good shoulder shrug  CN XII-Tongue midline with no fasciculations or fibrillations    Motor Exam  V/V throughout upper and lower extremities bilaterally, no cogwheeling, normal tone      Reflexes   2+ biceps bilaterally  2+ brachioradialis  2+ triceps  2+patella  2+ ankle jerks    Tremors- no tremors in hands or head noted    Gait  Normal base and speed   No ataxia. No Romberg sign    Coordination  Finger to nose and IRENE-unremarkable    Lab Results   Component Value Date    CYMBCSKS85 391 01/27/2011     Lab Results   Component Value Date    WBC 6.30 12/14/2011    HGB 13.1 12/14/2011    HCT 39.5 12/14/2011    .8 (H) 12/14/2011     12/14/2011     Lab Results   Component Value Date     03/28/2012    K 4.7 03/28/2012     03/28/2012    CO2 26 03/28/2012    BUN 16 03/28/2012    CREATININE 1.3 03/28/2012    GLUCOSE 82 03/28/2012    CALCIUM 9.4 03/28/2012    PROT 6.8 03/28/2012    LABALBU 4.2 03/28/2012    ALKPHOS 93 03/28/2012    AST 19 03/28/2012    ALT 13 03/28/2012    LABGLOM 44 (L) 03/28/2012           Assessment    ICD-10-CM ICD-9-CM    1. Aphasia due to recent stroke I69.320 438.11    2. Intractable chronic migraine without aura and without status migrainosus G43.719 346.71    3. History of atrial fibrillation Z86.79 V12.59      Migraines well-controlled with Botox. Recent stroke likely cardioembolic from atrial fibrillation. Agree with anticoagulation. She is not yet ready to return to driving. She will continue with outpatient speech therapy. Namenda could be considered later for language if necessary. We had a long talk regarding poststroke headache and atrial fibrillation. Plan    Return in about 3 months (around 12/28/2018).     (Please note that portions of this note were completed with a voice recognition program. Efforts were made to edit the dictations but occasionally words are mis-transcribed.)

## 2018-10-31 ENCOUNTER — PROCEDURE VISIT (OUTPATIENT)
Dept: NEUROLOGY | Age: 68
End: 2018-10-31
Payer: MEDICARE

## 2018-10-31 VITALS
SYSTOLIC BLOOD PRESSURE: 111 MMHG | HEIGHT: 66 IN | DIASTOLIC BLOOD PRESSURE: 73 MMHG | BODY MASS INDEX: 26.03 KG/M2 | WEIGHT: 162 LBS

## 2018-10-31 DIAGNOSIS — G43.719 INTRACTABLE CHRONIC MIGRAINE WITHOUT AURA AND WITHOUT STATUS MIGRAINOSUS: Primary | ICD-10-CM

## 2018-10-31 PROCEDURE — 64615 CHEMODENERV MUSC MIGRAINE: CPT | Performed by: PSYCHIATRY & NEUROLOGY

## 2018-10-31 RX ORDER — MEMANTINE HYDROCHLORIDE 5 MG/1
TABLET ORAL
Qty: 120 TABLET | Refills: 5 | Status: SHIPPED | OUTPATIENT
Start: 2018-10-31 | End: 2019-05-01 | Stop reason: DRUGHIGH

## 2018-12-06 ENCOUNTER — TELEPHONE (OUTPATIENT)
Dept: NEUROLOGY | Age: 68
End: 2018-12-06

## 2019-01-16 ENCOUNTER — TELEPHONE (OUTPATIENT)
Dept: NEUROLOGY | Age: 69
End: 2019-01-16

## 2019-04-03 ENCOUNTER — HOSPITAL ENCOUNTER (OUTPATIENT)
Dept: PAIN MANAGEMENT | Age: 69
Discharge: HOME | End: 2019-04-03
Payer: MEDICARE

## 2019-04-03 VITALS
HEART RATE: 92 BPM | TEMPERATURE: 98.6 F | OXYGEN SATURATION: 96 % | SYSTOLIC BLOOD PRESSURE: 104 MMHG | DIASTOLIC BLOOD PRESSURE: 68 MMHG | RESPIRATION RATE: 18 BRPM

## 2019-04-03 DIAGNOSIS — R51.9 FREQUENT HEADACHES: ICD-10-CM

## 2019-04-03 DIAGNOSIS — M54.50 CHRONIC RIGHT-SIDED LOW BACK PAIN WITHOUT SCIATICA: ICD-10-CM

## 2019-04-03 DIAGNOSIS — M54.2 NECK AND SHOULDER PAIN: ICD-10-CM

## 2019-04-03 DIAGNOSIS — M51.16 DISPLACEMENT OF LUMBAR DISC WITH RADICULOPATHY: ICD-10-CM

## 2019-04-03 DIAGNOSIS — M25.519 NECK AND SHOULDER PAIN: ICD-10-CM

## 2019-04-03 DIAGNOSIS — M47.816 LUMBAR FACET ARTHROPATHY: ICD-10-CM

## 2019-04-03 DIAGNOSIS — G89.29 CHRONIC RIGHT-SIDED LOW BACK PAIN WITHOUT SCIATICA: ICD-10-CM

## 2019-04-03 DIAGNOSIS — M50.90 CERVICAL DISC DISEASE: ICD-10-CM

## 2019-04-03 DIAGNOSIS — M51.16 LUMBAR DISC DISEASE WITH RADICULOPATHY: ICD-10-CM

## 2019-04-03 PROCEDURE — 64615 CHEMODENERV MUSC MIGRAINE: CPT | Performed by: PSYCHIATRY & NEUROLOGY

## 2019-04-03 PROCEDURE — 64616 CHEMODENERV MUSC NECK DYSTON: CPT

## 2019-04-03 PROCEDURE — 64612 DESTROY NERVE FACE MUSCLE: CPT

## 2019-04-03 PROCEDURE — 2580000003 HC RX 258

## 2019-04-03 PROCEDURE — 6360000002 HC RX W HCPCS

## 2019-04-03 PROCEDURE — 64402 HC FACIAL NERVE INJECTION: CPT

## 2019-04-03 NOTE — PROCEDURES
[address]    Patient Name: Cherelle Patrick    : 1950                    Age: 76 y.o. Sex: female    Date: April 3, 2019    Pre-op Diagnosis: Chronic Migraines    Post-op Diagnosis: Chronic Migraines    Procedure: Botox injections x 155 units (45 units wasted)    Performing Procedure:  Anupal Julian      Patient Vitals for the past 24 hrs:   BP Temp Temp src Pulse Resp SpO2   19 0931 104/68 98.6 °F (37 °C) Temporal 92 18 96 %       Phoenix Salazar is a 76y.o. year old female who is seen for evaluation of Her intractable migraines for which she gets Botox injections. Last year she had an acute left MCA stroke and was diagnosed with atrial fibrillation. She had the latter once before during a surgical procedure but had been without it as best we know until her recent stroke. MRI films showed a left posterior MCA distribution stroke. . She is now on Xarelto. Her aphasia has been improving daily. She is on namenda, and it seems to be working. She has no significant motor or visual deficits. Indications:    Cherelle Patrick has been treated for problems with chronic migraine headaches. The patient was taken through a conservative course of treatment without complete resolution of symptoms. Botox injection therapy was offered and after the risks and benefits of the procedure were explained to the patient, we had agreed to proceed. The patient was taken to the procedure room and placed in the seated position. The following muscles were identified using palpation and standard landmarks. These muscles were marked and prepped with alcohol.  A total of 155 units were injected after careful aspiration and the following distribution bilaterally:  10 units in 2 sites, procerus 5 units in one site, frontalis 20 units in 4 sites, temporalis 40 unit in 8 sites, occipitals 30 units in 6 sites, cervical paraspinals 20 units in 4 sites, and trapezius 30 units in 6 sites. Discharge: The patient tolerated the procedure well. There were no complications during the procedure and the patient was discharged home with discharge instructions. The patient has been instructed to contact the office should there be any complications or questions to arise between today and their next appointment.     Plan:  [x] Will return to the office in   [] 1 month  [] 4 - 6 weeks  [] 8 weeks   [x] 12 weeks:  [x] Procedure Follow-up   [] Office Visit      Tamika Moyer MD, 4/3/2019 at 9:54 AM

## 2019-05-01 ENCOUNTER — OFFICE VISIT (OUTPATIENT)
Dept: CARDIOLOGY | Age: 69
End: 2019-05-01
Payer: MEDICARE

## 2019-05-01 VITALS
HEART RATE: 64 BPM | BODY MASS INDEX: 28.16 KG/M2 | WEIGHT: 169 LBS | HEIGHT: 65 IN | SYSTOLIC BLOOD PRESSURE: 100 MMHG | DIASTOLIC BLOOD PRESSURE: 70 MMHG

## 2019-05-01 DIAGNOSIS — R07.9 CHEST PAIN, UNSPECIFIED TYPE: Primary | ICD-10-CM

## 2019-05-01 DIAGNOSIS — I47.1 SVT (SUPRAVENTRICULAR TACHYCARDIA) (HCC): ICD-10-CM

## 2019-05-01 DIAGNOSIS — I48.0 PAROXYSMAL ATRIAL FIBRILLATION (HCC): ICD-10-CM

## 2019-05-01 PROBLEM — I47.10 SVT (SUPRAVENTRICULAR TACHYCARDIA): Status: ACTIVE | Noted: 2019-05-01

## 2019-05-01 PROCEDURE — 1036F TOBACCO NON-USER: CPT | Performed by: INTERNAL MEDICINE

## 2019-05-01 PROCEDURE — 1090F PRES/ABSN URINE INCON ASSESS: CPT | Performed by: INTERNAL MEDICINE

## 2019-05-01 PROCEDURE — 1123F ACP DISCUSS/DSCN MKR DOCD: CPT | Performed by: INTERNAL MEDICINE

## 2019-05-01 PROCEDURE — G8419 CALC BMI OUT NRM PARAM NOF/U: HCPCS | Performed by: INTERNAL MEDICINE

## 2019-05-01 PROCEDURE — 99212 OFFICE O/P EST SF 10 MIN: CPT | Performed by: INTERNAL MEDICINE

## 2019-05-01 PROCEDURE — G8400 PT W/DXA NO RESULTS DOC: HCPCS | Performed by: INTERNAL MEDICINE

## 2019-05-01 PROCEDURE — G8427 DOCREV CUR MEDS BY ELIG CLIN: HCPCS | Performed by: INTERNAL MEDICINE

## 2019-05-01 PROCEDURE — 3017F COLORECTAL CA SCREEN DOC REV: CPT | Performed by: INTERNAL MEDICINE

## 2019-05-01 PROCEDURE — 4040F PNEUMOC VAC/ADMIN/RCVD: CPT | Performed by: INTERNAL MEDICINE

## 2019-05-01 PROCEDURE — 93000 ELECTROCARDIOGRAM COMPLETE: CPT | Performed by: INTERNAL MEDICINE

## 2019-05-01 NOTE — PROGRESS NOTES
78-year-old lady with a history with a history of recurrent chest discomfort, dyslipidemia, SVT, and paroxysmal atrial fibrillation. Underwent angiographic assessment in 1998 demonstrating normal ventricular function and normal coronaries. History of some postoperative atrial fibrillation in the past with an evaluation in 2014 demonstrating no structural heart disease and a Holter monitor revealing only some episodic SVT. On return today gives history of having sustained an embolic CVA resulting in expressive aphasia which has almost fully cleared. Reevaluation demonstrated some recurrent atrial fibrillation prompting institution of Xarelto therapy. The density of her PAF was not felt sufficient to justify targeted antiarrhythmic therapy and she was continued on beta blockade. Recurrent SVT was also demonstrated on this most recent monitor. Review of the May 2018 EKG revealed normal sinus rhythm. Because of some recurrent chest discomfort in the past 6 months she underwent repeat angiographic assessment which was normal.    On exam carries 169 pounds in a 5 foot 5 inch frame. Pressure is 100/78 with a regular pulse of 64. EOMs full sclera conjunctiva normal.  No elevation Central venous pressure 45°. Carotid upstrokes normal without delay or bruit. Chest clear to auscultation. On S2 normal without murmurs gallops or clicks. No abdominal masses or bruits. No lower extremity edema. Pedal pulses intact. Alert and oriented ×3 with cognition. Normal based on conversation. No speech difficulties detected. EKG reveals a sinus rhythm without abnormality. Assessment/plan:  1. Paroxysmal atrial fibrillation - continue Xarelto therapy.   Should symptoms suggesting increase in the density of her dysrhythmias we will repeat a 2 week monitor to distinguish how much is SVT and how much is A. fib to hopefully exclude the necessity to proceed to ablation or change from a beta blocker to either sotalol or flecainide.  Her [id number] 2 score at the time of her embolic event was 2.  2.  SVT - no additional therapy at this time

## 2019-08-06 ENCOUNTER — TELEPHONE (OUTPATIENT)
Dept: CARDIOLOGY | Age: 69
End: 2019-08-06

## 2019-08-14 ENCOUNTER — HOSPITAL ENCOUNTER (OUTPATIENT)
Dept: PAIN MANAGEMENT | Age: 69
Discharge: HOME | End: 2019-08-14
Payer: MEDICARE

## 2019-08-14 VITALS
DIASTOLIC BLOOD PRESSURE: 76 MMHG | TEMPERATURE: 97 F | OXYGEN SATURATION: 97 % | SYSTOLIC BLOOD PRESSURE: 110 MMHG | RESPIRATION RATE: 18 BRPM | HEART RATE: 72 BPM

## 2019-08-14 DIAGNOSIS — M47.816 LUMBAR FACET ARTHROPATHY: ICD-10-CM

## 2019-08-14 DIAGNOSIS — M54.50 CHRONIC RIGHT-SIDED LOW BACK PAIN WITHOUT SCIATICA: ICD-10-CM

## 2019-08-14 DIAGNOSIS — M51.16 LUMBAR DISC DISEASE WITH RADICULOPATHY: ICD-10-CM

## 2019-08-14 DIAGNOSIS — M25.519 NECK AND SHOULDER PAIN: ICD-10-CM

## 2019-08-14 DIAGNOSIS — M54.2 NECK AND SHOULDER PAIN: ICD-10-CM

## 2019-08-14 DIAGNOSIS — R51.9 FREQUENT HEADACHES: ICD-10-CM

## 2019-08-14 DIAGNOSIS — M51.16 DISPLACEMENT OF LUMBAR DISC WITH RADICULOPATHY: ICD-10-CM

## 2019-08-14 DIAGNOSIS — G89.29 CHRONIC RIGHT-SIDED LOW BACK PAIN WITHOUT SCIATICA: ICD-10-CM

## 2019-08-14 DIAGNOSIS — M50.90 CERVICAL DISC DISEASE: ICD-10-CM

## 2019-08-14 PROCEDURE — 64615 CHEMODENERV MUSC MIGRAINE: CPT | Performed by: PSYCHIATRY & NEUROLOGY

## 2019-08-14 PROCEDURE — 64615 CHEMODENERV MUSC MIGRAINE: CPT

## 2019-08-14 PROCEDURE — 6360000002 HC RX W HCPCS

## 2019-08-14 NOTE — PROCEDURES
[address]    Patient Name: Thien Weinberg    : 1950                    Age: 71 y.o. Sex: female    Date: 2019    Pre-op Diagnosis: Chronic Migraines    Post-op Diagnosis: Chronic Migraines    Procedure: Botox injections x 155 units (45 units wasted)    Performing Procedure:  Pat Carmichael      No data found. Previous Injections:     Phoenix Henry is a 76 y. o. year old female who is seen for evaluation of Her intractable migraines for which she gets Botox injections. Last year she had an acute left MCA stroke and was diagnosed with atrial fibrillation. She had the latter once before during a surgical procedure but had been without it as best we know until her recent stroke. MRI films showed a left posterior MCA distribution stroke. . She is now on Xarelto. Her aphasia has been improving daily. She is on namenda, and it seems to be working. She has no significant motor or visual deficits.       Indications:    Thien Weinberg has been treated for problems with chronic migraine headaches. The patient was taken through a conservative course of treatment without complete resolution of symptoms. Botox injection therapy was offered and after the risks and benefits of the procedure were explained to the patient, we had agreed to proceed. The patient was taken to the procedure room and placed in the seated position. The following muscles were identified using palpation and standard landmarks. These muscles were marked and prepped with alcohol. A total of 155 units were injected after careful aspiration and the following distribution bilaterally:  10 units in 2 sites, procerus 5 units in one site, frontalis 20 units in 4 sites, temporalis 40 unit in 8 sites, occipitals 30 units in 6 sites, cervical paraspinals 20 units in 4 sites, and trapezius 30 units in 6 sites. Discharge: The patient tolerated the procedure well.  There were no complications during the procedure and the patient was discharged home with discharge instructions. The patient has been instructed to contact the office should there be any complications or questions to arise between today and their next appointment.     Plan:  [x] Will return to the office in   [] 1 month  [] 4 - 6 weeks  [] 8 weeks   [x] 12 weeks:  [x] Procedure Follow-up   [] Office Visit      Sarah Beth Slade MD, 8/14/2019 at 10:02 AM

## 2019-08-22 ENCOUNTER — OFFICE VISIT (OUTPATIENT)
Dept: CARDIOLOGY | Facility: CLINIC | Age: 69
End: 2019-08-22

## 2019-08-22 VITALS
WEIGHT: 171.9 LBS | BODY MASS INDEX: 27.63 KG/M2 | OXYGEN SATURATION: 96 % | SYSTOLIC BLOOD PRESSURE: 90 MMHG | HEART RATE: 70 BPM | HEIGHT: 66 IN | DIASTOLIC BLOOD PRESSURE: 62 MMHG

## 2019-08-22 DIAGNOSIS — I48.0 PAROXYSMAL A-FIB (HCC): Primary | ICD-10-CM

## 2019-08-22 DIAGNOSIS — Z88.8 ALLERGY TO IODINE: ICD-10-CM

## 2019-08-22 DIAGNOSIS — E78.5 HYPERLIPIDEMIA, UNSPECIFIED HYPERLIPIDEMIA TYPE: ICD-10-CM

## 2019-08-22 DIAGNOSIS — I63.9 CEREBROVASCULAR ACCIDENT (CVA), UNSPECIFIED MECHANISM (HCC): ICD-10-CM

## 2019-08-22 DIAGNOSIS — N18.30 CKD (CHRONIC KIDNEY DISEASE) STAGE 3, GFR 30-59 ML/MIN (HCC): ICD-10-CM

## 2019-08-22 PROCEDURE — 99213 OFFICE O/P EST LOW 20 MIN: CPT | Performed by: INTERNAL MEDICINE

## 2019-08-22 RX ORDER — CHOLECALCIFEROL (VITAMIN D3) 125 MCG
1 TABLET ORAL DAILY
COMMUNITY

## 2019-08-22 RX ORDER — CYCLOSPORINE 0.5 MG/ML
1 EMULSION OPHTHALMIC EVERY 12 HOURS
COMMUNITY
Start: 2019-05-01

## 2019-08-22 RX ORDER — UBIDECARENONE 75 MG
1 CAPSULE ORAL DAILY
COMMUNITY

## 2019-08-22 RX ORDER — HYDROXYCHLOROQUINE SULFATE 200 MG/1
1 TABLET, FILM COATED ORAL 2 TIMES DAILY
COMMUNITY

## 2019-08-22 RX ORDER — FAMOTIDINE 20 MG/1
20 TABLET, FILM COATED ORAL AS NEEDED
COMMUNITY
End: 2019-11-01 | Stop reason: HOSPADM

## 2019-08-22 NOTE — PROGRESS NOTES
[facility]        Subjective:     Encounter Date:08/22/2019      Patient ID: Natalee Noble is a 69 y.o. female.      HPI: I saw Natalee Noble today for cardiovascular care.  She is a very pleasant 69-year-old female with history of atrial fibrillation and on long-term anticoagulation with Xarelto.  Unfortunately she previously sustained a cerebrovascular accident which most likely was thromboembolic secondary to atrial fibrillation prior to instituting anticoagulation.  She underwent coronary angiography 1/28/2019 which demonstrated normal epicardial vessels and preserved left ventricular function.  On a 2-week ambulatory EKG monitor she demonstrated sinus rhythm but also 23 episodes of paroxysmal supraventricular tachycardia.  I placed her on metoprolol succinate 25 mg once daily there is been resolution of her palpitations.  Previous amatory EKG October 2018 demonstrated paroxysmal atrial fibrillation therefore the indication for anticoagulation.  Echocardiogram 9/20/2018 revealed left ventricular ejection fraction 50%, impaired left ventricular relaxation, mild to moderate tricuspid insufficiency    Ms. Noble reports decreased energy level but otherwise she is free of chest pain pressure tightness.  She does not report orthopnea or PND and no lower extremity edema.  She is free of syncope or any significant palpitations.  She does have dyspnea on exertion but this is without change.  She has rare orthostatic dizziness despite her blood pressure today 90/62.  She reports drinking 60 ounces of water daily.      The following portions of the patient's history were reviewed and updated as appropriate: allergies, current medications, past family history, past medical history, past social history, past surgical history and problem list.    Problem List:  Patient Active Problem List   Diagnosis   • Cerebrovascular accident (CVA) (CMS/[facility])   • Paroxysmal A-fib (CMS/[facility])   • Hyperlipidemia   •  Migraines   • History of echocardiogram   • History of cardiac cath   • History of cardiac monitoring   • CKD (chronic kidney disease) stage 3, GFR 30-59 ml/min (CMS/[facility])   • Allergy to iodine       Past Medical History:  Past Medical History:   Diagnosis Date   • Arthritis    • Back pain    • Fractures     R tibia/fibula; L ankle   • History of cardiac cath     1/28/19 left main no significant disease. LAD with no significant disease. LCX no significant disease. RCA no significant disease.   • History of cardiac monitoring     2/01/19 - ZIO PATCH - Underlying rhythm is sinus at 48-94 bpm. PSVT x23 with fastest 167 bpm, longest 24.4 seconds.   10/05/2018- ZIO PATCH - NSR most of time but did have episode of A fib ranging from .   • History of echocardiogram     9/2018: Left ventricular systolic function is normal. EF 50%. Left ventricular diastolic dysfunction consistent with ipaired relaxation. Left atrial cavity size is mildly dilated. Mild MR. Mild to moderate TR. There is abnormal thickening noted on the RV that may represent a vegetation-this was discussed with Dr. Hammer and Dr. Reilly. No patent foramen ovale. No further work up needed at this time   • Hyperlipidemia    • Kidney disorder    • Migraine    • Migraines    • Neck pain        Past Surgical History:  Past Surgical History:   Procedure Laterality Date   • BACK SURGERY      L2-5 fusion 2012   • CHOLECYSTECTOMY     • HYSTERECTOMY     • NECK SURGERY  2012    ACDF C4-7       Social History:  Social History     Socioeconomic History   • Marital status:      Spouse name: Not on file   • Number of children: Not on file   • Years of education: Not on file   • Highest education level: Not on file   Tobacco Use   • Smoking status: Never Smoker   Substance and Sexual Activity   • Alcohol use: No   • Sexual activity: Defer       Allergies:  Allergies   Allergen Reactions   • Benzoin Anaphylaxis and Swelling     States when used on her neck it shut  off her airway  States when used on her neck it shut off her airway  States when used on her neck it shut off her airway  Huge abscesses with surgery     • Iodine Other (See Comments)     PT UNABLE TO TELL RN ABOUT REACTION AT THIS TIME, BUT FAMILY STATES PT HAD A REACTION TO BEING CLEANSED WITH IODINE IN SURGERY  PT UNABLE TO TELL RN ABOUT REACTION AT THIS TIME, BUT FAMILY STATES PT HAD A REACTION TO BEING CLEANSED WITH IODINE IN SURGERY  IOBAN - used on neck, swelled to the point of shutting off airway   • Sulfa Antibiotics    • Vancomycin Unknown (See Comments) and Other (See Comments)     .  Kidney failure  Kidney failure  Kidney failure  Vancomycin induced acute kidney injury     • Acetaminophen Nausea Only         ROS:  Review of Systems   Constitution: Positive for malaise/fatigue. Negative for weakness.   HENT: Negative for hearing loss and nosebleeds.    Eyes: Negative for double vision and visual disturbance.   Cardiovascular: Negative for chest pain, claudication, dyspnea on exertion, near-syncope, orthopnea, palpitations, paroxysmal nocturnal dyspnea and syncope.   Respiratory: Negative for cough, shortness of breath, sleep disturbances due to breathing, snoring, sputum production and wheezing.    Endocrine: Negative for cold intolerance, heat intolerance, polydipsia and polyuria.   Hematologic/Lymphatic: Negative for adenopathy and bleeding problem. Does not bruise/bleed easily.   Skin: Negative for flushing and itching.   Musculoskeletal: Negative for back pain, falls, joint pain, joint swelling, muscle weakness and neck pain.   Gastrointestinal: Negative for abdominal pain, dysphagia, heartburn, nausea and vomiting.   Genitourinary: Negative for dysuria and frequency.   Neurological: Negative for disturbances in coordination, dizziness, light-headedness, loss of balance and numbness.   Psychiatric/Behavioral: Negative for altered mental status and memory loss. The patient is not nervous/anxious.   "  Allergic/Immunologic: Negative for hives and persistent infections.          Objective:         BP 90/62 (BP Location: Left arm, Patient Position: Sitting, Cuff Size: Large Adult)   Pulse 70   Ht 167.6 cm (66\")   Wt 78 kg (171 lb 14.4 oz)   SpO2 96%   BMI 27.75 kg/m²     Physical Exam   Constitutional: She is oriented to person, place, and time. She appears well-developed and well-nourished.   HENT:   Head: Normocephalic and atraumatic.   Eyes: Conjunctivae and EOM are normal. Pupils are equal, round, and reactive to light.   Neck: Neck supple. No thyromegaly present.   Cardiovascular: Normal rate, regular rhythm, S1 normal, S2 normal and intact distal pulses. PMI is not displaced. Exam reveals gallop and S4. Exam reveals no S3, no distant heart sounds, no friction rub, no midsystolic click and no opening snap.   No murmur heard.  Pulses:       Carotid pulses are 2+ on the right side, and 2+ on the left side.       Radial pulses are 2+ on the right side, and 2+ on the left side.        Femoral pulses are 2+ on the right side, and 2+ on the left side.       Dorsalis pedis pulses are 2+ on the right side, and 2+ on the left side.        Posterior tibial pulses are 2+ on the right side, and 2+ on the left side.   Pulmonary/Chest: Effort normal and breath sounds normal. No respiratory distress. She has no wheezes. She has no rales.   Abdominal: Soft. Bowel sounds are normal. She exhibits no distension and no mass. There is no tenderness.   Musculoskeletal: Normal range of motion. She exhibits no edema.   Neurological: She is alert and oriented to person, place, and time.   Skin: Skin is warm and dry. No erythema.   Psychiatric: She has a normal mood and affect.       In-Office Procedure(s):  Procedures    ASCVD RIsk Score::  The ASCVD Risk score (Mercedes DONNA Jr., et al., 2013) failed to calculate for the following reasons:    The patient has a prior MI or stroke diagnosis        Assessment/Plan:         1. Paroxysmal " A-fib (CMS/HCC)  On long-term anticoagulation    2. Cerebrovascular accident (CVA), unspecified mechanism (CMS/HCC)  Probably thromboembolic due to atrial fibrillation now on anticoagulation    3. Hyperlipidemia, unspecified hyperlipidemia type  Continue diet controlled and atorvastatin 80 mg daily    4. CKD (chronic kidney disease) stage 3, GFR 30-59 ml/min (CMS/HCC)  Stable    5. Allergy to iodine      I feel Ms. Noble is stable from the cardiovascular standpoint.  She is free of angina euvolemic and free of any significant symptoms.  She tolerates her medications well.  Most importantly she tolerates her long-term anticoagulation without significant bleeding or bruising.  I will see her in follow-up in 6 months sooner if needed.           Jose Scott MD  08/22/19  .

## 2019-10-16 ENCOUNTER — OFFICE VISIT (OUTPATIENT)
Dept: GASTROENTEROLOGY | Facility: CLINIC | Age: 69
End: 2019-10-16

## 2019-10-16 VITALS
DIASTOLIC BLOOD PRESSURE: 66 MMHG | HEART RATE: 73 BPM | WEIGHT: 176 LBS | HEIGHT: 66 IN | BODY MASS INDEX: 28.28 KG/M2 | TEMPERATURE: 98.2 F | SYSTOLIC BLOOD PRESSURE: 110 MMHG | OXYGEN SATURATION: 98 %

## 2019-10-16 DIAGNOSIS — R14.2 BELCHING: ICD-10-CM

## 2019-10-16 DIAGNOSIS — R14.0 BLOATING: ICD-10-CM

## 2019-10-16 DIAGNOSIS — Z86.010 HX OF COLONIC POLYPS: Primary | ICD-10-CM

## 2019-10-16 DIAGNOSIS — Z79.01 ANTICOAGULANT LONG-TERM USE: ICD-10-CM

## 2019-10-16 DIAGNOSIS — R10.13 EPIGASTRIC PAIN: ICD-10-CM

## 2019-10-16 DIAGNOSIS — R11.0 NAUSEA: ICD-10-CM

## 2019-10-16 PROBLEM — Z86.0100 HX OF COLONIC POLYPS: Status: ACTIVE | Noted: 2019-10-16

## 2019-10-16 PROCEDURE — 99214 OFFICE O/P EST MOD 30 MIN: CPT | Performed by: NURSE PRACTITIONER

## 2019-10-16 RX ORDER — SODIUM, POTASSIUM,MAG SULFATES 17.5-3.13G
1 SOLUTION, RECONSTITUTED, ORAL ORAL EVERY 12 HOURS
Qty: 2 BOTTLE | Refills: 0 | Status: SHIPPED | OUTPATIENT
Start: 2019-10-16 | End: 2020-05-01

## 2019-10-16 NOTE — PROGRESS NOTES
Chief Complaint   Patient presents with   • Colon Cancer Screening     Pt presents today for colon recall-last colon was 11/24/2014; Personal history of colon polyps; Family history of colon cancer   • Abdominal Pain     Pt c/o abdominal pain near epigastric area last 3-4 weeks   • Nausea     Pt also c/o being nauseated often-will sometimes last all day but other times will just last a few hours      Subjective   HPI  Natalee Noble is a 69 y.o. female who presents as a referral for preventative maintenance. She has no complaints  vomiting. No change in bowels. No wt loss. No BRBPR. No melena. There is no family hx for colon cancer. There was no Cologuard screening this year.  History of colon polyps.  Patient's last colonoscopy was performed on 11/24/2014 found to be normal.      The patient states that she has been having epigastric pain along with nausea bloating and belching for the past 3 to 4 weeks.  She states that she has quite a bit of nausea that will last all day at times.  She denies any chronic NSAID use.  She cannot associate her symptoms with any particular thing.    Has stage III renal disease followed by  and has only been able to take Nexium as needed or Pepcid. The patient states she is not on Pradaxa but is on Xarelto.      Past Medical History:   Diagnosis Date   • Anxiety and depression    • Arthritis    • Back pain    • CRF (chronic renal failure), stage 3 (moderate) (CMS/HCC)    • Diverticulosis    • Essential hypertension    • Family history of colon cancer    • Family history of colonic polyps    • Fractures     R tibia/fibula; L ankle   • History of cardiac cath     1/28/19 left main no significant disease. LAD with no significant disease. LCX no significant disease. RCA no significant disease.   • History of cardiac monitoring     2/01/19 - ZIO PATCH - Underlying rhythm is sinus at 48-94 bpm. PSVT x23 with fastest 167 bpm, longest 24.4 seconds.   10/05/2018- ZIO PATCH - NSR most  of time but did have episode of A fib ranging from .   • History of colon polyps    • History of echocardiogram     9/2018: Left ventricular systolic function is normal. EF 50%. Left ventricular diastolic dysfunction consistent with ipaired relaxation. Left atrial cavity size is mildly dilated. Mild MR. Mild to moderate TR. There is abnormal thickening noted on the RV that may represent a vegetation-this was discussed with Dr. Hammer and Dr. Reilly. No patent foramen ovale. No further work up needed at this time   • Hyperlipidemia    • Kidney disorder    • Migraine    • Migraines    • Neck pain      Past Surgical History:   Procedure Laterality Date   • ABDOMINOPLASTY     • BACK SURGERY      L2-5 fusion 2012   • CHOLECYSTECTOMY     • COLONOSCOPY  11/24/2014    Diverticulosis; Repeat 5 years   • COLONOSCOPY  11/09/2010    Diverticulosis; Repeat 4 years   • COLONOSCOPY  09/18/2007    Dr. Monzon-Normal; Repeat 3 years   • COLONOSCOPY  06/02/2005    Dr. Monzon-Diminuitive polypectomy above the cecum; Otherwise normal colonoscopy; Repeat 2 years   • CYSTOCELE REPAIR      Cystocele and rectocele repair   • ENDOSCOPY  09/28/2012    LA grade C esophagitis; HH   • FIBULA FRACTURE SURGERY     • HYSTERECTOMY     • NECK SURGERY  2012    ACDF C4-7   • ORIF TIBIA FRACTURE Right        Current Outpatient Medications:   •  ALPRAZolam (XANAX) 1 MG tablet, Take 1 mg by mouth 3 (Three) Times a Day As Needed for Anxiety., Disp: , Rfl:   •  aspirin 81 MG tablet, Take 1 tablet by mouth Daily., Disp: 30 tablet, Rfl: 1  •  atorvastatin (LIPITOR) 80 MG tablet, Take 80 mg by mouth Daily., Disp: , Rfl:   •  butalbital-acetaminophen-caffeine (FIORICET, ESGIC) -40 MG per tablet, Take 1 tablet by mouth Every 6 (Six) Hours As Needed for Headache., Disp: , Rfl:   •  Coenzyme Q10 (CO Q-10) 200 MG capsule, Take 1 tablet by mouth Daily., Disp: , Rfl:   •  conjugated estrogens (PREMARIN) 0.625 MG/GM vaginal cream, As Needed., Disp: , Rfl:   •   cycloSPORINE (RESTASIS) 0.05 % ophthalmic emulsion, INT 1 GTT INTO BOTH EYES BID, Disp: , Rfl:   •  Ergocalciferol (VITAMIN D2) 2000 units tablet, Take  by mouth., Disp: , Rfl:   •  escitalopram (LEXAPRO) 20 MG tablet, Take 20 mg by mouth Daily., Disp: , Rfl:   •  esomeprazole (nexIUM) 40 MG capsule, Take 40 mg by mouth Daily As Needed (Heartburn & indigestion)., Disp: , Rfl:   •  famotidine (PEPCID) 20 MG tablet, Take 20 mg by mouth As Needed., Disp: , Rfl:   •  FOLIC ACID PO, Take  by mouth Daily., Disp: , Rfl:   •  hydroxychloroquine (PLAQUENIL) 200 MG tablet, Take 1 tablet by mouth 2 (Two) Times a Day., Disp: , Rfl:   •  memantine (NAMENDA) 10 MG tablet, Take 10 mg by mouth 2 (Two) Times a Day., Disp: , Rfl:   •  metoprolol tartrate (LOPRESSOR) 25 MG tablet, Take 0.5 tablets by mouth Every 12 (Twelve) Hours. (Patient taking differently: Take 25 mg by mouth Every 12 (Twelve) Hours.), Disp: 60 tablet, Rfl: 1  •  oxyCODONE (oxyCONTIN) 10 MG 12 hr tablet, Take 10 mg by mouth Every 12 (Twelve) Hours As Needed for Severe Pain ., Disp: , Rfl:   •  rivaroxaban (XARELTO) 20 MG tablet, Take 20 mg by mouth Daily., Disp: , Rfl:   •  topiramate (TOPAMAX) 200 MG tablet, Take 1 tablet by mouth Daily., Disp: , Rfl:   •  sodium-potassium-magnesium sulfates (SUPREP BOWEL PREP KIT) 17.5-3.13-1.6 GM/177ML solution oral solution, Take 1 bottle by mouth Every 12 (Twelve) Hours. Split dose prep as directed by office instructions provided.  2 bottles = one kit., Disp: 2 bottle, Rfl: 0  Allergies   Allergen Reactions   • Benzoin Anaphylaxis and Swelling     States when used on her neck it shut off her airway  Huge abscesses with surgery     • Iodine Other (See Comments)     PT UNABLE TO TELL RN ABOUT REACTION AT THIS TIME, BUT FAMILY STATES PT HAD A REACTION TO BEING CLEANSED WITH IODINE IN SURGERY  IOBAN - used on neck, swelled to the point of shutting off airway   • Levaquin [Levofloxacin] Unknown (See Comments)     Unknown   • Sulfa  "Antibiotics    • Vancomycin Other (See Comments) and Unknown (See Comments)     Kidney failure  Vancomycin induced acute kidney injury     • Acetaminophen Nausea Only     Social History     Socioeconomic History   • Marital status:      Spouse name: Not on file   • Number of children: Not on file   • Years of education: Not on file   • Highest education level: Not on file   Tobacco Use   • Smoking status: Never Smoker   • Smokeless tobacco: Never Used   Substance and Sexual Activity   • Alcohol use: No   • Sexual activity: Defer     Family History   Problem Relation Age of Onset   • Colon cancer Father 65   • Colon polyps Daughter 38   • Esophageal cancer Neg Hx    • Liver cancer Neg Hx    • Liver disease Neg Hx    • Rectal cancer Neg Hx    • Stomach cancer Neg Hx        REVIEW OF SYSTEMS  General: well appearing, no fever chills or sweats, no unexplained wt loss  HEENT: no acute visual or hearing disturbances  Cardiovascular: No chest pain or palpitations  Pulmonary: No shortness of breath, coughing, wheezing or hemoptysis  : No burning, urgency, hematuria, or dysuria  Musculoskeletal: No joint pain or stiffness  Peripheral: no edema  Skin: No lesions or rashes  Neuro: No dizziness, headaches, stroke, syncope  Endocrine: No hot or cold intolerances  Hematological: No blood dyscrasias    Objective   Vitals:    10/16/19 1426   BP: 110/66   BP Location: Left arm   Patient Position: Sitting   Cuff Size: Adult   Pulse: 73   Temp: 98.2 °F (36.8 °C)   TempSrc: Tympanic   SpO2: 98%   Weight: 79.8 kg (176 lb)   Height: 166.4 cm (65.5\")     Body mass index is 28.84 kg/m².    PHYSICAL EXAM  General: age appropriate well nourished well appearing, no acute distress  Head: normocephalic and atraumatic  Global assessment-supple  Neck-No JVD noted, no lymphadenopathy  Pulmonary-clear to auscultation bilaterally, normal respiratory effort  Cardiovascular-normal rate and rhythm, normal heart sounds, S1 and S2 " noted  Abdomen-soft, non tender, non distended, normal bowel sounds all 4 quadrants, no hepatosplenomegaly noted  Extremities-No clubbing cyanosis or edema  Neuro-Non focal, converses appropriately, awake, alert, oriented    Imaging Results (most recent)     None        Assessment/Plan   Natalee was seen today for colon cancer screening, abdominal pain and nausea.    Diagnoses and all orders for this visit:    Hx of colonic polyps  Comments:  Schedule colonoscopy  Orders:  -     Case Request; Standing  -     Case Request    Anticoagulant long-term use  Comments:  History of A. fib on Xarelto will need permission to hold 24 hours    Epigastric pain  Comments:  Schedule EGD  Orders:  -     Case Request; Standing  -     Case Request    Nausea  -     Case Request; Standing  -     Case Request    Bloating  -     Case Request; Standing  -     Case Request    Belching  -     Case Request; Standing  -     Case Request    Other orders  -     Follow Anesthesia Guidelines / Standing Orders; Future  -     Obtain Informed Consent; Future  -     Implement Anesthesia Orders Day of Procedure; Standing  -     Obtain Informed Consent; Standing  -     Verify bowel prep was successful; Standing  -     sodium-potassium-magnesium sulfates (SUPREP BOWEL PREP KIT) 17.5-3.13-1.6 GM/177ML solution oral solution; Take 1 bottle by mouth Every 12 (Twelve) Hours. Split dose prep as directed by office instructions provided.  2 bottles = one kit.      ESOPHAGOGASTRODUODENOSCOPY WITH ANESTHESIA (N/A), COLONOSCOPY WITH ANESTHESIA (N/A)       Body mass index is 28.84 kg/m². Patient's Body mass index is 28.84 kg/m². BMI is above normal parameters. Recommendations include: nutrition counseling.      All risks, benefits, alternatives, and indications of colonoscopy procedure have been discussed with the patient. Risks to include perforation of the colon requiring possible surgery or colostomy, risk of bleeding from biopsies or removal of colon tissue,  possibility of missing a colon polyp or cancer, or adverse drug reaction.  Benefits to include the diagnosis and management of disease of the colon and rectum. Alternatives to include barium enema, radiographic evaluation, lab testing or no intervention. Pt verbalizes understanding and agrees.

## 2019-10-18 ENCOUNTER — TELEPHONE (OUTPATIENT)
Dept: GASTROENTEROLOGY | Facility: CLINIC | Age: 69
End: 2019-10-18

## 2019-10-18 NOTE — TELEPHONE ENCOUNTER
----- Message from Susan Lord MD sent at 10/18/2019  2:50 PM CDT -----  Regarding: xarelto clearance  Xarelto clearance obtained as noted below/    Susan Lord MD         Date: 10/18/2019         Mr/Mrs. Natalee Noble,  1950 has been evaluated, I have given the following Cardiac Assessment, as dictated in my office note, and below. This patient is low risk for non-cardiac surgery.     Disclaimer: It is preffered there be no cessation of anti-platelet or anti coagulation medication. Any cessation of anti-platelet or anti-coagulation medication can lead to coronary, vascular and thrombolytic events in patients with cardiovascular disease. If anti-platelet or anti- coagulation medication must be discontinued, please follow surgical protocol and return patient to their anti- platelet or anti-coagulation as soon as safely possible.      Anti-Platelet/ Anti-Coagulation for this patient: Hold Xarelto X 1 day and then resume        Should you have any questions, please contact my office at [phone].        Jose Scott M.D.     ----- Message -----  From: Marina Cruz MA  Sent: 10/18/2019   1:07 PM  To: Susan Lord MD

## 2019-10-18 NOTE — TELEPHONE ENCOUNTER
Received clearance for pt to hold Xarelto for 1 day prior to procedure. PT is scheduled for 11/1/19 and last dose of xarelto is 10/29/19. Called pt and LVM for pt.

## 2019-11-01 ENCOUNTER — HOSPITAL ENCOUNTER (OUTPATIENT)
Facility: HOSPITAL | Age: 69
Setting detail: HOSPITAL OUTPATIENT SURGERY
Discharge: HOME | End: 2019-11-01
Attending: INTERNAL MEDICINE | Admitting: INTERNAL MEDICINE

## 2019-11-01 ENCOUNTER — ANESTHESIA (OUTPATIENT)
Dept: GASTROENTEROLOGY | Facility: HOSPITAL | Age: 69
End: 2019-11-01

## 2019-11-01 ENCOUNTER — ANESTHESIA EVENT (OUTPATIENT)
Dept: GASTROENTEROLOGY | Facility: HOSPITAL | Age: 69
End: 2019-11-01

## 2019-11-01 VITALS
WEIGHT: 174 LBS | RESPIRATION RATE: 16 BRPM | SYSTOLIC BLOOD PRESSURE: 100 MMHG | OXYGEN SATURATION: 98 % | HEART RATE: 55 BPM | DIASTOLIC BLOOD PRESSURE: 57 MMHG | HEIGHT: 65 IN | TEMPERATURE: 97.4 F | BODY MASS INDEX: 28.99 KG/M2

## 2019-11-01 DIAGNOSIS — R11.0 NAUSEA: ICD-10-CM

## 2019-11-01 DIAGNOSIS — R14.0 BLOATING: ICD-10-CM

## 2019-11-01 DIAGNOSIS — Z86.010 HX OF COLONIC POLYPS: ICD-10-CM

## 2019-11-01 DIAGNOSIS — R14.2 BELCHING: ICD-10-CM

## 2019-11-01 DIAGNOSIS — R10.13 EPIGASTRIC PAIN: ICD-10-CM

## 2019-11-01 PROCEDURE — 25010000002 PROPOFOL 10 MG/ML EMULSION: Performed by: NURSE ANESTHETIST, CERTIFIED REGISTERED

## 2019-11-01 PROCEDURE — 87081 CULTURE SCREEN ONLY: CPT | Performed by: INTERNAL MEDICINE

## 2019-11-01 PROCEDURE — 88305 TISSUE EXAM BY PATHOLOGIST: CPT | Performed by: INTERNAL MEDICINE

## 2019-11-01 PROCEDURE — 45385 COLONOSCOPY W/LESION REMOVAL: CPT | Performed by: INTERNAL MEDICINE

## 2019-11-01 PROCEDURE — 25010000002 PHENYLEPHRINE PER 1 ML: Performed by: NURSE ANESTHETIST, CERTIFIED REGISTERED

## 2019-11-01 PROCEDURE — 43239 EGD BIOPSY SINGLE/MULTIPLE: CPT | Performed by: INTERNAL MEDICINE

## 2019-11-01 RX ORDER — SODIUM CHLORIDE 9 MG/ML
500 INJECTION, SOLUTION INTRAVENOUS CONTINUOUS PRN
Status: DISCONTINUED | OUTPATIENT
Start: 2019-11-01 | End: 2019-11-01 | Stop reason: HOSPADM

## 2019-11-01 RX ORDER — PROPOFOL 10 MG/ML
VIAL (ML) INTRAVENOUS AS NEEDED
Status: DISCONTINUED | OUTPATIENT
Start: 2019-11-01 | End: 2019-11-01 | Stop reason: SURG

## 2019-11-01 RX ORDER — PANTOPRAZOLE SODIUM 40 MG/1
40 TABLET, DELAYED RELEASE ORAL DAILY
Qty: 30 TABLET | Refills: 11 | Status: ON HOLD | OUTPATIENT
Start: 2019-11-01 | End: 2022-08-08

## 2019-11-01 RX ORDER — SODIUM CHLORIDE 0.9 % (FLUSH) 0.9 %
10 SYRINGE (ML) INJECTION AS NEEDED
Status: DISCONTINUED | OUTPATIENT
Start: 2019-11-01 | End: 2019-11-01 | Stop reason: HOSPADM

## 2019-11-01 RX ADMIN — PROPOFOL 420 MG: 10 INJECTION, EMULSION INTRAVENOUS at 09:11

## 2019-11-01 RX ADMIN — SODIUM CHLORIDE 500 ML: 9 INJECTION, SOLUTION INTRAVENOUS at 08:56

## 2019-11-01 RX ADMIN — LIDOCAINE HYDROCHLORIDE 100 MG: 20 INJECTION, SOLUTION INTRAVENOUS at 09:11

## 2019-11-01 RX ADMIN — PHENYLEPHRINE HYDROCHLORIDE 80 MCG: 10 INJECTION INTRAVENOUS at 09:36

## 2019-11-01 NOTE — ANESTHESIA PREPROCEDURE EVALUATION
Anesthesia Evaluation     Patient summary reviewed   no history of anesthetic complications:  NPO Solid Status: > 8 hours  NPO Liquid Status: > 2 hours           Airway   Mallampati: I  TM distance: >3 FB  Neck ROM: full  Dental      Comment: anterior crowns    Pulmonary - negative pulmonary ROS   Cardiovascular     PT is on anticoagulation therapy  Patient on routine beta blocker    (+) hypertension, dysrhythmias Atrial Fib, hyperlipidemia,   (-) past MI, CAD, cardiac stents      Neuro/Psych  (+) CVA (speech residual),     (-) seizures, TIA  GI/Hepatic/Renal/Endo    (+)  GERD,  renal disease CRI,   (-) liver disease, diabetes    Musculoskeletal     Abdominal    Substance History      OB/GYN          Other                        Anesthesia Plan    ASA 3     MAC     intravenous induction   Anesthetic plan, all risks, benefits, and alternatives have been provided, discussed and informed consent has been obtained with: patient.

## 2019-11-01 NOTE — ANESTHESIA POSTPROCEDURE EVALUATION
"Patient: Natalee Noble    Procedure Summary     Date:  11/01/19 Room / Location:  [facility] ENDOSCOPY 6 /  [facility]    Anesthesia Start:  0909 Anesthesia Stop:  0956    Procedures:       ESOPHAGOGASTRODUODENOSCOPY WITH ANESTHESIA (N/A )      COLONOSCOPY WITH ANESTHESIA (N/A ) Diagnosis:       Hx of colonic polyps      Epigastric pain      Nausea      Bloating      Belching      (Hx of colonic polyps [Z86.010])      (Epigastric pain [R10.13])      (Nausea [R11.0])      (Bloating [R14.0])      (Belching [R14.2])    Surgeon:  Susan Lord MD Provider:  Breanne Campbell CRNA    Anesthesia Type:  MAC ASA Status:  3          Anesthesia Type: MAC  Last vitals  BP   (!) 86/38 (11/01/19 1000)   Temp   97.4 °F (36.3 °C) (11/01/19 0836)   Pulse   55 (11/01/19 1000)   Resp   16 (11/01/19 1005)     SpO2   96 % (11/01/19 1000)     Post Anesthesia Care and Evaluation    Patient location during evaluation: PACU  Patient participation: complete - patient participated  Level of consciousness: awake and alert  Pain management: adequate  Airway patency: patent  Anesthetic complications: No anesthetic complications    Cardiovascular status: acceptable  Respiratory status: acceptable  Hydration status: acceptable    Comments: Blood pressure (!) 86/38, pulse 55, temperature 97.4 °F (36.3 °C), temperature source Temporal, resp. rate 16, height 165.1 cm (65\"), weight 78.9 kg (174 lb), SpO2 96 %, not currently breastfeeding.    Pt discharged from PACU based on michael score >8      "

## 2019-11-02 LAB — UREASE TISS QL: NEGATIVE

## 2019-11-04 LAB
CYTO UR: NORMAL
LAB AP CASE REPORT: NORMAL
PATH REPORT.FINAL DX SPEC: NORMAL
PATH REPORT.GROSS SPEC: NORMAL

## 2019-11-13 ENCOUNTER — HOSPITAL ENCOUNTER (OUTPATIENT)
Dept: PAIN MANAGEMENT | Age: 69
Discharge: HOME | End: 2019-11-13
Payer: MEDICARE

## 2019-11-13 VITALS
TEMPERATURE: 98 F | RESPIRATION RATE: 18 BRPM | OXYGEN SATURATION: 99 % | SYSTOLIC BLOOD PRESSURE: 115 MMHG | HEART RATE: 67 BPM | DIASTOLIC BLOOD PRESSURE: 53 MMHG

## 2019-11-13 DIAGNOSIS — M51.16 LUMBAR DISC DISEASE WITH RADICULOPATHY: ICD-10-CM

## 2019-11-13 DIAGNOSIS — M51.16 DISPLACEMENT OF LUMBAR DISC WITH RADICULOPATHY: ICD-10-CM

## 2019-11-13 DIAGNOSIS — M25.519 NECK AND SHOULDER PAIN: ICD-10-CM

## 2019-11-13 DIAGNOSIS — R51.9 FREQUENT HEADACHES: ICD-10-CM

## 2019-11-13 DIAGNOSIS — M54.2 NECK AND SHOULDER PAIN: ICD-10-CM

## 2019-11-13 DIAGNOSIS — M47.816 LUMBAR FACET ARTHROPATHY: ICD-10-CM

## 2019-11-13 DIAGNOSIS — M50.90 CERVICAL DISC DISEASE: ICD-10-CM

## 2019-11-13 PROCEDURE — 64615 CHEMODENERV MUSC MIGRAINE: CPT | Performed by: PSYCHIATRY & NEUROLOGY

## 2019-11-13 PROCEDURE — 64615 CHEMODENERV MUSC MIGRAINE: CPT

## 2019-11-13 PROCEDURE — 6360000002 HC RX W HCPCS

## 2019-12-26 RX ORDER — MEMANTINE HYDROCHLORIDE 10 MG/1
TABLET ORAL
Qty: 60 TABLET | Refills: 11 | Status: SHIPPED | OUTPATIENT
Start: 2019-12-26 | End: 2020-03-05

## 2020-02-26 ENCOUNTER — HOSPITAL ENCOUNTER (OUTPATIENT)
Dept: PAIN MANAGEMENT | Age: 70
Discharge: HOME | End: 2020-02-26
Payer: MEDICARE

## 2020-02-26 VITALS
HEART RATE: 64 BPM | SYSTOLIC BLOOD PRESSURE: 103 MMHG | RESPIRATION RATE: 18 BRPM | DIASTOLIC BLOOD PRESSURE: 61 MMHG | OXYGEN SATURATION: 98 % | TEMPERATURE: 97.5 F

## 2020-02-26 PROCEDURE — 6360000002 HC RX W HCPCS

## 2020-02-26 PROCEDURE — 64615 CHEMODENERV MUSC MIGRAINE: CPT | Performed by: PSYCHIATRY & NEUROLOGY

## 2020-02-26 PROCEDURE — 64615 CHEMODENERV MUSC MIGRAINE: CPT

## 2020-02-26 RX ORDER — BUTALBITAL, ACETAMINOPHEN AND CAFFEINE 50; 325; 40 MG/1; MG/1; MG/1
1 TABLET ORAL EVERY 4 HOURS PRN
Qty: 30 TABLET | Refills: 3 | Status: SHIPPED | OUTPATIENT
Start: 2020-02-26

## 2020-02-26 NOTE — PROCEDURES
in 6 sites, cervical paraspinals 20 units in 4 sites, and trapezius 40 units in 8 sites.       Discharge: The patient tolerated the procedure well. There were no complications during the procedure and the patient was discharged home with discharge instructions. The patient has been instructed to contact the office should there be any complications or questions to arise between today and their next appointment.     Plan:  [x] Will return to the office in   [] 1 month  [] 4 - 6 weeks  [] 8 weeks   [x] 12 weeks:  [x] Procedure Follow-up   [] Office Visit      Lucinda Short MD, 2/26/2020 at 10:19 AM

## 2020-03-25 PROBLEM — M50.90 CERVICAL DISC DISEASE: Status: RESOLVED | Noted: 2017-06-14 | Resolved: 2020-03-24

## 2020-05-01 ENCOUNTER — TELEMEDICINE (OUTPATIENT)
Dept: CARDIOLOGY | Facility: CLINIC | Age: 70
End: 2020-05-01

## 2020-05-01 VITALS — SYSTOLIC BLOOD PRESSURE: 124 MMHG | DIASTOLIC BLOOD PRESSURE: 79 MMHG | HEART RATE: 63 BPM

## 2020-05-01 DIAGNOSIS — E78.5 HYPERLIPIDEMIA, UNSPECIFIED HYPERLIPIDEMIA TYPE: ICD-10-CM

## 2020-05-01 DIAGNOSIS — I48.0 PAROXYSMAL A-FIB (HCC): Primary | ICD-10-CM

## 2020-05-01 DIAGNOSIS — Z79.01 LONG TERM CURRENT USE OF ANTICOAGULANT THERAPY: ICD-10-CM

## 2020-05-01 DIAGNOSIS — N18.30 CKD (CHRONIC KIDNEY DISEASE) STAGE 3, GFR 30-59 ML/MIN (HCC): ICD-10-CM

## 2020-05-01 DIAGNOSIS — I63.9 CEREBROVASCULAR ACCIDENT (CVA), UNSPECIFIED MECHANISM (HCC): ICD-10-CM

## 2020-05-01 PROCEDURE — 99213 OFFICE O/P EST LOW 20 MIN: CPT | Performed by: INTERNAL MEDICINE

## 2020-05-01 RX ORDER — METOPROLOL SUCCINATE 25 MG/1
25 TABLET, EXTENDED RELEASE ORAL DAILY
COMMUNITY
Start: 2020-03-24 | End: 2022-03-01

## 2020-05-01 NOTE — PROGRESS NOTES
[facility]    You have chosen to receive care through a telehealth visit.  Do you consent to use a video/audio connection for your medical care today? Yes    Subjective:     Encounter Date:05/01/2020      Patient ID: Natalee Noble is a 69 y.o. female.      HPI: Natalee Noble agreed to a video visit today secondary to the coronavirus pandemic.  She is a pleasant 69-year-old female with a history of paroxysmal atrial fibrillation.  She sustained a cerebrovascular accident September 2018 prior to long-term anticoagulation.  She currently is maintained on Xarelto 20 mg daily.  She tolerates her anticoagulation well without any significant bruising or bleeding.  She is remained free of any tachyarrhythmias for the most part.  She does report in the last 2 weeks she had 3 episodes however.  She is free of chest pain pressure tightness.  She does not experience progressive dyspnea on exertion nor does she report orthopnea PND or lower extremity edema.  She denies syncope or near syncope.  She has known dyslipidemia and remains on atorvastatin 80 mg daily.  Echocardiogram from September 2018 revealed preserved left ventricular function, evidence of diastolic dysfunction mild mitral and mild to moderate tricuspid insufficiency.      The following portions of the patient's history were reviewed and updated as appropriate: allergies, current medications, past family history, past medical history, past social history, past surgical history and problem list.                                                                                                                                                                                                                                                                         Problem List:  Patient Active Problem List   Diagnosis   • Cerebrovascular accident (CVA) (CMS/HCC)   • Paroxysmal A-fib (CMS/HCC)   • Hyperlipidemia   • Migraines   • History of  echocardiogram   • History of cardiac cath   • History of cardiac monitoring   • CKD (chronic kidney disease) stage 3, GFR 30-59 ml/min (CMS/HCC)   • Allergy to iodine   • Hx of colonic polyps   • Epigastric pain   • Nausea   • Bloating   • Belching   • Long term current use of anticoagulant therapy       Past Medical History:  Past Medical History:   Diagnosis Date   • Anxiety and depression    • Arthritis    • Back pain    • CRF (chronic renal failure), stage 3 (moderate) (CMS/HCC)    • Diverticulosis    • Essential hypertension    • Family history of colon cancer    • Family history of colonic polyps    • Fractures     R tibia/fibula; L ankle   • History of cardiac cath     1/28/19 left main no significant disease. LAD with no significant disease. LCX no significant disease. RCA no significant disease.   • History of cardiac monitoring     2/01/19 - ZIO PATCH - Underlying rhythm is sinus at 48-94 bpm. PSVT x23 with fastest 167 bpm, longest 24.4 seconds.   10/05/2018- ZIO PATCH - NSR most of time but did have episode of A fib ranging from .   • History of colon polyps    • History of echocardiogram     9/2018: Left ventricular systolic function is normal. EF 50%. Left ventricular diastolic dysfunction consistent with ipaired relaxation. Left atrial cavity size is mildly dilated. Mild MR. Mild to moderate TR. There is abnormal thickening noted on the RV that may represent a vegetation-this was discussed with Dr. Hammer and Dr. Reilly. No patent foramen ovale. No further work up needed at this time   • Hyperlipidemia    • Kidney disorder    • Migraine    • Migraines    • Neck pain        Past Surgical History:  Past Surgical History:   Procedure Laterality Date   • ABDOMINOPLASTY     • BACK SURGERY      L2-5 fusion 2012   • CHOLECYSTECTOMY     • COLONOSCOPY  11/24/2014    Diverticulosis; Repeat 5 years   • COLONOSCOPY  11/09/2010    Diverticulosis; Repeat 4 years   • COLONOSCOPY  09/18/2007    Dr. Monzon-Normal;  Repeat 3 years   • COLONOSCOPY  06/02/2005    Dr. Monzon-Diminuitive polypectomy above the cecum; Otherwise normal colonoscopy; Repeat 2 years   • COLONOSCOPY N/A 11/1/2019    Procedure: COLONOSCOPY WITH ANESTHESIA;  Surgeon: Susan Lord MD;  Location: [facility] ENDOSCOPY;  Service: Gastroenterology   • CYSTOCELE REPAIR      Cystocele and rectocele repair   • ENDOSCOPY  09/28/2012    LA grade C esophagitis; HH   • ENDOSCOPY N/A 11/1/2019    Procedure: ESOPHAGOGASTRODUODENOSCOPY WITH ANESTHESIA;  Surgeon: Susan Lord MD;  Location: [facility] ENDOSCOPY;  Service: Gastroenterology   • FIBULA FRACTURE SURGERY     • HYSTERECTOMY     • NECK SURGERY  2012    ACDF C4-7   • ORIF TIBIA FRACTURE Right        Social History:  Social History     Socioeconomic History   • Marital status:      Spouse name: Not on file   • Number of children: Not on file   • Years of education: Not on file   • Highest education level: Not on file   Tobacco Use   • Smoking status: Never Smoker   • Smokeless tobacco: Never Used   Substance and Sexual Activity   • Alcohol use: No   • Sexual activity: Defer       Allergies:  Allergies   Allergen Reactions   • Benzoin Anaphylaxis and Swelling     States when used on her neck it shut off her airway  Huge abscesses with surgery     • Iodine Other (See Comments)     PT UNABLE TO TELL RN ABOUT REACTION AT THIS TIME, BUT FAMILY STATES PT HAD A REACTION TO BEING CLEANSED WITH IODINE IN SURGERY  IOBAN - used on neck, swelled to the point of shutting off airway   • Levaquin [Levofloxacin] Unknown (See Comments)     Unknown   • Sulfa Antibiotics    • Vancomycin Other (See Comments) and Unknown (See Comments)     Kidney failure  Vancomycin induced acute kidney injury     • Acetaminophen Nausea Only         ROS:  Review of Systems   Constitution: Negative for malaise/fatigue.   HENT: Negative for hearing loss and nosebleeds.    Eyes: Negative for double vision and visual disturbance.   Cardiovascular:  Positive for palpitations. Negative for chest pain, claudication, dyspnea on exertion, near-syncope, orthopnea, paroxysmal nocturnal dyspnea and syncope.   Respiratory: Negative for cough, shortness of breath, sleep disturbances due to breathing, snoring, sputum production and wheezing.    Endocrine: Negative for cold intolerance, heat intolerance, polydipsia and polyuria.   Hematologic/Lymphatic: Negative for adenopathy and bleeding problem. Does not bruise/bleed easily.   Skin: Negative for flushing and itching.   Musculoskeletal: Negative for back pain, falls, joint pain, joint swelling, muscle weakness and neck pain.   Gastrointestinal: Negative for abdominal pain, dysphagia, heartburn, nausea and vomiting.   Genitourinary: Negative for dysuria and frequency.   Neurological: Negative for disturbances in coordination, dizziness, light-headedness, loss of balance, numbness and weakness.   Psychiatric/Behavioral: Negative for altered mental status and memory loss. The patient is not nervous/anxious.    Allergic/Immunologic: Negative for hives and persistent infections.          Objective:         /79   Pulse 63     Physical Exam not performed    In-Office Procedure(s):  Procedures    ASCVD RIsk Score::  The ASCVD Risk score (Shadi DONNA Jr., et al., 2013) failed to calculate for the following reasons:    The patient has a prior MI or stroke diagnosis        Assessment/Plan:         1. Paroxysmal A-fib (CMS/HCC)  Mildly symptomatic but controlled    2. Long term current use of anticoagulant therapy  Well-tolerated    3. Cerebrovascular accident (CVA), unspecified mechanism (CMS/HCC)  No recurrence on long-term anticoagulation    4. Hyperlipidemia, unspecified hyperlipidemia type  Continue diet and statin therapy    5. CKD (chronic kidney disease) stage 3, GFR 30-59 ml/min (CMS/HCC)  Stable    Ms. Noble overall is stable.  She is free of any chest discomfort or respiratory insufficiency.  She has mildly  symptomatic tach arrhythmia with underlying paroxysmal atrial fibrillation.  She tolerates her anticoagulation well.  I have encouraged her to continue her Xarelto as prescribed.  We will assess fasting lipids and liver function tests.  We will plan to see her in 3 to 4 months and obtain an echocardiogram at that time.    I spent 12 minutes on video visit and 13 minutes upon completion of electronic medical record documentation.  Lorraine Ms. Noble needs an order for fasting lipids and liver function tests I guess sent to her you may want to talk to her about it I think she wants to get it I believe [facility] much.  Thank you           Jose Scott MD  05/01/20  .

## 2020-05-06 ENCOUNTER — RESULTS ENCOUNTER (OUTPATIENT)
Dept: CARDIOLOGY | Facility: CLINIC | Age: 70
End: 2020-05-06

## 2020-05-06 DIAGNOSIS — Z79.01 LONG TERM CURRENT USE OF ANTICOAGULANT THERAPY: ICD-10-CM

## 2020-05-06 DIAGNOSIS — N18.30 CKD (CHRONIC KIDNEY DISEASE) STAGE 3, GFR 30-59 ML/MIN (HCC): ICD-10-CM

## 2020-05-06 DIAGNOSIS — I63.9 CEREBROVASCULAR ACCIDENT (CVA), UNSPECIFIED MECHANISM (HCC): ICD-10-CM

## 2020-05-06 DIAGNOSIS — E78.5 HYPERLIPIDEMIA, UNSPECIFIED HYPERLIPIDEMIA TYPE: ICD-10-CM

## 2020-05-06 DIAGNOSIS — I48.0 PAROXYSMAL A-FIB (HCC): ICD-10-CM

## 2020-05-28 ENCOUNTER — TELEPHONE (OUTPATIENT)
Dept: CARDIOLOGY | Facility: CLINIC | Age: 70
End: 2020-05-28

## 2020-05-28 DIAGNOSIS — E78.5 HYPERLIPIDEMIA, UNSPECIFIED HYPERLIPIDEMIA TYPE: Primary | ICD-10-CM

## 2020-06-03 LAB
ALBUMIN SERPL-MCNC: 4.1 G/DL (ref 3.8–4.8)
ALP SERPL-CCNC: 74 IU/L (ref 39–117)
ALT SERPL-CCNC: 19 IU/L (ref 0–32)
AMBIG ABBREV HFP7 DEFAULT: NORMAL
AMBIG ABBREV LP DEFAULT: NORMAL
AST SERPL-CCNC: 21 IU/L (ref 0–40)
BILIRUB DIRECT SERPL-MCNC: 0.07 MG/DL (ref 0–0.4)
BILIRUB SERPL-MCNC: 0.2 MG/DL (ref 0–1.2)
CHOLEST SERPL-MCNC: 230 MG/DL (ref 100–199)
HDLC SERPL-MCNC: 40 MG/DL
LDLC SERPL CALC-MCNC: 157 MG/DL (ref 0–99)
PROT SERPL-MCNC: 6.3 G/DL (ref 6–8.5)
TRIGL SERPL-MCNC: 163 MG/DL (ref 0–149)
VLDLC SERPL CALC-MCNC: 33 MG/DL (ref 5–40)

## 2020-08-05 ENCOUNTER — OFFICE VISIT (OUTPATIENT)
Dept: CARDIOLOGY | Facility: CLINIC | Age: 70
End: 2020-08-05

## 2020-08-05 VITALS
HEIGHT: 65 IN | SYSTOLIC BLOOD PRESSURE: 100 MMHG | HEART RATE: 64 BPM | DIASTOLIC BLOOD PRESSURE: 62 MMHG | RESPIRATION RATE: 16 BRPM | WEIGHT: 177 LBS | BODY MASS INDEX: 29.49 KG/M2 | TEMPERATURE: 98.4 F

## 2020-08-05 DIAGNOSIS — Z79.01 LONG TERM CURRENT USE OF ANTICOAGULANT THERAPY: ICD-10-CM

## 2020-08-05 DIAGNOSIS — N18.30 CKD (CHRONIC KIDNEY DISEASE) STAGE 3, GFR 30-59 ML/MIN (HCC): ICD-10-CM

## 2020-08-05 DIAGNOSIS — E78.5 HYPERLIPIDEMIA, UNSPECIFIED HYPERLIPIDEMIA TYPE: Primary | ICD-10-CM

## 2020-08-05 DIAGNOSIS — I63.9 CEREBROVASCULAR ACCIDENT (CVA), UNSPECIFIED MECHANISM (HCC): ICD-10-CM

## 2020-08-05 DIAGNOSIS — I48.0 PAROXYSMAL A-FIB (HCC): ICD-10-CM

## 2020-08-05 PROCEDURE — 99214 OFFICE O/P EST MOD 30 MIN: CPT | Performed by: INTERNAL MEDICINE

## 2020-08-05 RX ORDER — ROSUVASTATIN CALCIUM 40 MG/1
40 TABLET, COATED ORAL DAILY
Qty: 30 TABLET | Refills: 5 | Status: SHIPPED | OUTPATIENT
Start: 2020-08-05 | End: 2021-05-26 | Stop reason: ALTCHOICE

## 2020-08-05 RX ORDER — EZETIMIBE 10 MG/1
10 TABLET ORAL DAILY
Qty: 30 TABLET | Refills: 5 | Status: SHIPPED | OUTPATIENT
Start: 2020-08-05

## 2020-08-05 NOTE — PROGRESS NOTES
[facility]        Subjective:     Encounter Date:08/05/2020      Patient ID: Natalee Noble is a 70 y.o. female.      HPI: I saw Natalee Noble today for cardiovascular care.  She is a pleasant 70-year-old female who is observing the CDC guidelines for social distancing.  She remains free of fever cough or increased shortness of breath.  She does not report any change in her sense of smell or taste.  Ms. Noble is free of chest pain pressure or tightness.  She does not report any progressive dyspnea on exertion nor does she experience orthopnea or PND or lower extremity edema.  She denies syncope near syncope or palpitation.  She does have some chronic back and neck discomfort.  She has a history of paroxysmal atrial fibrillation and remains on long-term anticoagulation with Xarelto.  She is free of any significant bruising or bleeding.  She has a history of hyperlipidemia and currently is observing a low-cholesterol low saturated fat diet and it is on atorvastatin 80 mg daily.  Fasting lipids obtained 6/2/2020 revealed triglycerides 163 HDL 40 .    She underwent coronary angiography 1/28/2019 with no evidence of significant epicardial coronary artery disease.      The following portions of the patient's history were reviewed and updated as appropriate: allergies, current medications, past family history, past medical history, past social history, past surgical history and problem list.    Problem List:  Patient Active Problem List   Diagnosis   • Cerebrovascular accident (CVA) (CMS/[facility])   • Paroxysmal A-fib (CMS/[facility])   • Hyperlipidemia   • Migraines   • History of echocardiogram   • History of cardiac cath   • History of cardiac monitoring   • CKD (chronic kidney disease) stage 3, GFR 30-59 ml/min (CMS/[facility])   • Allergy to iodine   • Hx of colonic polyps   • Epigastric pain   • Nausea   • Bloating   • Belching   • Long term current use of anticoagulant therapy       Past Medical  History:  Past Medical History:   Diagnosis Date   • Anxiety and depression    • Arthritis    • Back pain    • CRF (chronic renal failure), stage 3 (moderate) (CMS/HCC)    • Diverticulosis    • Essential hypertension    • Family history of colon cancer    • Family history of colonic polyps    • Fractures     R tibia/fibula; L ankle   • History of cardiac cath     1/28/19 left main no significant disease. LAD with no significant disease. LCX no significant disease. RCA no significant disease.   • History of cardiac monitoring     2/01/19 - ZIO PATCH - Underlying rhythm is sinus at 48-94 bpm. PSVT x23 with fastest 167 bpm, longest 24.4 seconds.   10/05/2018- ZIO PATCH - NSR most of time but did have episode of A fib ranging from .   • History of colon polyps    • History of echocardiogram     9/2018: Left ventricular systolic function is normal. EF 50%. Left ventricular diastolic dysfunction consistent with ipaired relaxation. Left atrial cavity size is mildly dilated. Mild MR. Mild to moderate TR. There is abnormal thickening noted on the RV that may represent a vegetation-this was discussed with Dr. Hammer and Dr. Reilly. No patent foramen ovale. No further work up needed at this time   • Hyperlipidemia    • Kidney disorder    • Migraine    • Migraines    • Neck pain        Past Surgical History:  Past Surgical History:   Procedure Laterality Date   • ABDOMINOPLASTY     • BACK SURGERY      L2-5 fusion 2012   • CHOLECYSTECTOMY     • COLONOSCOPY  11/24/2014    Diverticulosis; Repeat 5 years   • COLONOSCOPY  11/09/2010    Diverticulosis; Repeat 4 years   • COLONOSCOPY  09/18/2007    Dr. Monzon-Normal; Repeat 3 years   • COLONOSCOPY  06/02/2005    Dr. Monzon-Diminuitive polypectomy above the cecum; Otherwise normal colonoscopy; Repeat 2 years   • COLONOSCOPY N/A 11/1/2019    Procedure: COLONOSCOPY WITH ANESTHESIA;  Surgeon: Susan Lord MD;  Location: [facility] ENDOSCOPY;  Service: Gastroenterology   • CYSTOCELE  REPAIR      Cystocele and rectocele repair   • ENDOSCOPY  09/28/2012    LA grade C esophagitis; HH   • ENDOSCOPY N/A 11/1/2019    Procedure: ESOPHAGOGASTRODUODENOSCOPY WITH ANESTHESIA;  Surgeon: Susan Lord MD;  Location: [facility] ENDOSCOPY;  Service: Gastroenterology   • FIBULA FRACTURE SURGERY     • HYSTERECTOMY     • NECK SURGERY  2012    ACDF C4-7   • ORIF TIBIA FRACTURE Right        Social History:  Social History     Socioeconomic History   • Marital status:      Spouse name: Not on file   • Number of children: Not on file   • Years of education: Not on file   • Highest education level: Not on file   Tobacco Use   • Smoking status: Never Smoker   • Smokeless tobacco: Never Used   Substance and Sexual Activity   • Alcohol use: No   • Sexual activity: Defer       Allergies:  Allergies   Allergen Reactions   • Benzoin Anaphylaxis and Swelling     States when used on her neck it shut off her airway  Huge abscesses with surgery     • Iodine Other (See Comments)     PT UNABLE TO TELL RN ABOUT REACTION AT THIS TIME, BUT FAMILY STATES PT HAD A REACTION TO BEING CLEANSED WITH IODINE IN SURGERY  IOBAN - used on neck, swelled to the point of shutting off airway   • Levaquin [Levofloxacin] Unknown (See Comments)     Unknown   • Sulfa Antibiotics    • Vancomycin Other (See Comments) and Unknown (See Comments)     Kidney failure  Vancomycin induced acute kidney injury     • Acetaminophen Nausea Only         ROS:  Review of Systems   Constitution: Negative for malaise/fatigue.   HENT: Negative for hearing loss and nosebleeds.    Eyes: Negative for double vision and visual disturbance.   Cardiovascular: Negative for chest pain, claudication, dyspnea on exertion, near-syncope, orthopnea, palpitations, paroxysmal nocturnal dyspnea and syncope.   Respiratory: Negative for cough, shortness of breath, sleep disturbances due to breathing, snoring, sputum production and wheezing.    Endocrine: Negative for cold  "intolerance, heat intolerance, polydipsia and polyuria.   Hematologic/Lymphatic: Negative for adenopathy and bleeding problem. Does not bruise/bleed easily.   Skin: Negative for flushing and itching.   Musculoskeletal: Negative for back pain, falls, joint pain, joint swelling, muscle weakness and neck pain.   Gastrointestinal: Negative for abdominal pain, dysphagia, heartburn, nausea and vomiting.   Genitourinary: Negative for dysuria and frequency.   Neurological: Negative for disturbances in coordination, dizziness, light-headedness, loss of balance, numbness and weakness.   Psychiatric/Behavioral: Negative for altered mental status and memory loss. The patient is not nervous/anxious.    Allergic/Immunologic: Negative for hives and persistent infections.          Objective:         /62   Pulse 64   Temp 98.4 °F (36.9 °C)   Resp 16   Ht 165.1 cm (65\")   Wt 80.3 kg (177 lb)   BMI 29.45 kg/m²     Physical Exam   Constitutional: She is oriented to person, place, and time. She appears well-developed and well-nourished.   HENT:   Head: Normocephalic and atraumatic.   Eyes: Pupils are equal, round, and reactive to light. Conjunctivae and EOM are normal.   Neck: Neck supple. No thyromegaly present.   Cardiovascular: Normal rate, regular rhythm, S1 normal, S2 normal, normal heart sounds and intact distal pulses. PMI is not displaced. Exam reveals no gallop, no S3, no S4, no distant heart sounds, no friction rub, no midsystolic click and no opening snap.   No murmur heard.  Pulses:       Carotid pulses are 2+ on the right side, and 2+ on the left side.       Radial pulses are 2+ on the right side, and 2+ on the left side.        Femoral pulses are 2+ on the right side, and 2+ on the left side.       Dorsalis pedis pulses are 2+ on the right side, and 2+ on the left side.        Posterior tibial pulses are 2+ on the right side, and 2+ on the left side.   Pulmonary/Chest: Effort normal and breath sounds normal. No " respiratory distress. She has no wheezes. She has no rales.   Abdominal: Soft. Bowel sounds are normal. She exhibits no distension and no mass. There is no tenderness.   Musculoskeletal: Normal range of motion. She exhibits no edema.   Neurological: She is alert and oriented to person, place, and time.   Skin: Skin is warm and dry. No erythema.   Psychiatric: She has a normal mood and affect.       In-Office Procedure(s):  Procedures    ASCVD RIsk Score::  The ASCVD Risk score ([place] DONNA Jr., et al., 2013) failed to calculate for the following reasons:    The patient has a prior MI or stroke diagnosis        Assessment/Plan:         1. Hyperlipidemia, unspecified hyperlipidemia type  LDL target less than 100 and triglyceride less than 150  - Lipid Panel; Future  - Hepatic Function Panel; Future    2. Paroxysmal A-fib (CMS/HCC)  Stable    3. Long term current use of anticoagulant therapy  Well-tolerated    4. Cerebrovascular accident (CVA), unspecified mechanism (CMS/HCC)  No recurrence    5. CKD (chronic kidney disease) stage 3, GFR 30-59 ml/min (CMS/HCC)  Stable    Ms. Noble is stable free of angina and euvolemic.  She is maintained a good activity level while observing the CDC guidelines for social distance.  I recommended she follow a low-cholesterol low saturated fat diet.  We discussed LDL target of 100 or less and triglycerides of less than 150.  I will replace atorvastatin with rosuvastatin and add Zetia.  She will review the cost of this medication change and decide on whether to proceed.  Should she proceed we will reassess fasting lipids and liver function tests and 8 weeks.       Jose Scott MD  08/05/20  .

## 2020-09-22 ENCOUNTER — TELEPHONE (OUTPATIENT)
Dept: NEUROLOGY | Age: 70
End: 2020-09-22

## 2020-09-30 ENCOUNTER — RESULTS ENCOUNTER (OUTPATIENT)
Dept: CARDIOLOGY | Facility: CLINIC | Age: 70
End: 2020-09-30

## 2020-09-30 DIAGNOSIS — E78.5 HYPERLIPIDEMIA, UNSPECIFIED HYPERLIPIDEMIA TYPE: ICD-10-CM

## 2020-10-06 LAB
ALBUMIN SERPL-MCNC: 4.1 G/DL (ref 3.8–4.8)
ALP SERPL-CCNC: 98 IU/L (ref 39–117)
ALT SERPL-CCNC: 25 IU/L (ref 0–32)
AMBIG ABBREV HFP7 DEFAULT: NORMAL
AST SERPL-CCNC: 30 IU/L (ref 0–40)
BILIRUB DIRECT SERPL-MCNC: 0.1 MG/DL (ref 0–0.4)
BILIRUB SERPL-MCNC: 0.3 MG/DL (ref 0–1.2)
CHOLEST SERPL-MCNC: 122 MG/DL (ref 100–199)
HDLC SERPL-MCNC: 40 MG/DL
LDLC SERPL CALC-MCNC: 61 MG/DL (ref 0–99)
PROT SERPL-MCNC: 6.2 G/DL (ref 6–8.5)
TRIGL SERPL-MCNC: 113 MG/DL (ref 0–149)
VLDLC SERPL CALC-MCNC: 21 MG/DL (ref 5–40)

## 2020-10-21 ENCOUNTER — HOSPITAL ENCOUNTER (OUTPATIENT)
Dept: PAIN MANAGEMENT | Age: 70
Discharge: HOME | End: 2020-10-21
Payer: MEDICARE

## 2020-10-21 VITALS
SYSTOLIC BLOOD PRESSURE: 135 MMHG | OXYGEN SATURATION: 97 % | TEMPERATURE: 96.4 F | HEART RATE: 62 BPM | DIASTOLIC BLOOD PRESSURE: 62 MMHG | RESPIRATION RATE: 18 BRPM

## 2020-10-21 PROCEDURE — 6360000002 HC RX W HCPCS

## 2020-10-21 PROCEDURE — 64615 CHEMODENERV MUSC MIGRAINE: CPT | Performed by: PSYCHIATRY & NEUROLOGY

## 2020-10-21 PROCEDURE — 64615 CHEMODENERV MUSC MIGRAINE: CPT

## 2020-10-21 RX ORDER — BUTALBITAL, ACETAMINOPHEN AND CAFFEINE 50; 325; 40 MG/1; MG/1; MG/1
1 TABLET ORAL EVERY 4 HOURS PRN
Qty: 30 TABLET | Refills: 0 | Status: SHIPPED | OUTPATIENT
Start: 2020-10-21

## 2020-10-21 NOTE — PROCEDURES
[address]    Patient Name: Kade Olivia    : 1950                    Age: 79 y.o. Sex: female    Date: 2020    Pre-op Diagnosis: Chronic Migraines    Post-op Diagnosis: Chronic Migraines    Procedure: Botox injections x 140 units (60 units wasted)    Performing Procedure:  Jennifer Mota      Patient Vitals for the past 24 hrs:   BP Temp Temp src Pulse Resp SpO2   10/21/20 0915 135/62 96.4 °F (35.8 °C) Temporal 62 18 97 %       Previous Injections:  Patient had 7 headaches/migraines over the past month. Last botox was . Indications:    Kade Olivia has been treated for problems with chronic migraine headaches. The patient was taken through a conservative course of treatment without complete resolution of symptoms. Botox injection therapy was offered and after the risks and benefits of the procedure were explained to the patient, we had agreed to proceed. The patient was taken to the procedure room and placed in the seated position. The following muscles were identified using palpation and standard landmarks. These muscles were marked and prepped with alcohol. A total of 155 units were injected after careful aspiration and the following distribution bilaterally:  0 units in 2 sites, procerus 0 units in one site, frontalis 20 units in 4 sites, temporalis 40 unit in 8 sites, occipitals 30 units in 6 sites, cervical paraspinals 20 units in 4 sites, and trapezius 30 units in 6 sites. Discharge: The patient tolerated the procedure well. There were no complications during the procedure and the patient was discharged home with discharge instructions. The patient has been instructed to contact the office should there be any complications or questions to arise between today and their next appointment.     Plan:  [x] Will return to the office in   [] 1 month  [] 4 - 6 weeks  [] 8 weeks   [x] 12 weeks:  [x] Procedure Follow-up [] Office Visit      Remi Doshi MD, 10/21/2020 at 10:26 AM

## 2021-04-09 ENCOUNTER — TELEPHONE (OUTPATIENT)
Dept: OBGYN CLINIC | Age: 71
End: 2021-04-09

## 2021-04-09 NOTE — TELEPHONE ENCOUNTER
[facility] faxed referral to our office. This pt has had rectocele surgery 7 years ago. Is having problems now. Called their office & let them know we would refer her to Dr Jaz Crisostomo in [facility]. Office voiced understanding.

## 2021-04-14 ENCOUNTER — HOSPITAL ENCOUNTER (OUTPATIENT)
Dept: PAIN MANAGEMENT | Age: 71
Discharge: HOME | End: 2021-04-14
Payer: MEDICARE

## 2021-04-14 VITALS
HEART RATE: 57 BPM | OXYGEN SATURATION: 98 % | SYSTOLIC BLOOD PRESSURE: 97 MMHG | RESPIRATION RATE: 18 BRPM | DIASTOLIC BLOOD PRESSURE: 65 MMHG | TEMPERATURE: 96.4 F

## 2021-04-14 PROCEDURE — 6360000002 HC RX W HCPCS

## 2021-04-14 PROCEDURE — 64615 CHEMODENERV MUSC MIGRAINE: CPT

## 2021-04-14 PROCEDURE — 64615 CHEMODENERV MUSC MIGRAINE: CPT | Performed by: PSYCHIATRY & NEUROLOGY

## 2021-04-14 NOTE — PROCEDURES
[address]    Patient Name: Jacob Del Cid    : 1950                    Age: 79 y.o. Sex: female    Date: 2021    Pre-op Diagnosis: Chronic Migraines    Post-op Diagnosis: Chronic Migraines    Procedure: Botox injections x 140 units (60 units wasted)    Performing Procedure:  Adrian Nicole      Patient Vitals for the past 24 hrs:   BP Temp Temp src Pulse Resp SpO2   21 1018 97/65 96.4 °F (35.8 °C) Temporal 57 18 98 %       Previous Injections:  Patient had 4/2 headaches/migraines over the past month. Last botox 10/20. Still on topamax  Indications:    P[place] De La Cruz Beth has been treated for problems with chronic migraine headaches. The patient was taken through a conservative course of treatment without complete resolution of symptoms. Botox injection therapy was offered and after the risks and benefits of the procedure were explained to the patient, we had agreed to proceed. The patient was taken to the procedure room and placed in the seated position. The following muscles were identified using palpation and standard landmarks. These muscles were marked and prepped with alcohol. A total of 155 units were injected after careful aspiration and the following distribution bilaterally:  0 units in 2 sites, procerus 0 units in one site, frontalis 20 units in 4 sites, temporalis 40 unit in 8 sites, occipitals 30 units in 6 sites, cervical paraspinals 20 units in 4 sites, and trapezius 30 units in 6 sites. Discharge: The patient tolerated the procedure well. There were no complications during the procedure and the patient was discharged home with discharge instructions. The patient has been instructed to contact the office should there be any complications or questions to arise between today and their next appointment.     Plan:  [x] Will return to the office in   [] 1 month  [] 4 - 6 weeks  [] 8 weeks   [x] 12 weeks:  [x]

## 2021-04-14 NOTE — PROGRESS NOTES
Patient states that he/she has __4____ headaches out of 30 days each month.   Patient states that he/she has __2___ migraines out of 30 days each month.monthly

## 2021-05-26 ENCOUNTER — OFFICE VISIT (OUTPATIENT)
Dept: CARDIOLOGY | Facility: CLINIC | Age: 71
End: 2021-05-26

## 2021-05-26 VITALS
SYSTOLIC BLOOD PRESSURE: 112 MMHG | BODY MASS INDEX: 26.59 KG/M2 | RESPIRATION RATE: 18 BRPM | DIASTOLIC BLOOD PRESSURE: 72 MMHG | HEIGHT: 65 IN | HEART RATE: 56 BPM | WEIGHT: 159.6 LBS | OXYGEN SATURATION: 99 %

## 2021-05-26 DIAGNOSIS — N18.32 STAGE 3B CHRONIC KIDNEY DISEASE (HCC): Chronic | ICD-10-CM

## 2021-05-26 DIAGNOSIS — I63.9 CEREBROVASCULAR ACCIDENT (CVA), UNSPECIFIED MECHANISM (HCC): Primary | Chronic | ICD-10-CM

## 2021-05-26 DIAGNOSIS — Z79.01 LONG TERM CURRENT USE OF ANTICOAGULANT THERAPY: Chronic | ICD-10-CM

## 2021-05-26 DIAGNOSIS — Z88.8 ALLERGY TO IODINE: Chronic | ICD-10-CM

## 2021-05-26 DIAGNOSIS — E78.5 HYPERLIPIDEMIA, UNSPECIFIED HYPERLIPIDEMIA TYPE: Chronic | ICD-10-CM

## 2021-05-26 DIAGNOSIS — I48.0 PAROXYSMAL A-FIB (HCC): Chronic | ICD-10-CM

## 2021-05-26 PROCEDURE — 99214 OFFICE O/P EST MOD 30 MIN: CPT | Performed by: INTERNAL MEDICINE

## 2021-05-26 RX ORDER — ATORVASTATIN CALCIUM 80 MG/1
1 TABLET, FILM COATED ORAL DAILY
COMMUNITY
Start: 2021-04-23

## 2021-05-26 NOTE — PROGRESS NOTES
"Chief Complaint  Hyperlipidemia and Atrial Fibrillation    Subjective    History of Present Illness      I saw Natalee Noble today for continued cardiovascular care.  She is a pleasant 70-year-old female who observes the CDC guidelines and has received her COVID-19 vaccine.  Ms. Noble remains active.  She has had a 20 pound intentional weight reduction.  She is recently noted some left upper chest discomfort.  This can last 5 to 10 minutes at a time but occurs primarily at rest without shortness of breath diaphoresis or nausea.  There is no exertional component.  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  She denies orthopnea, PND or any significant edema.  She is free of syncope or palpitations she does report occasional orthostatic dizziness.  She is known to have paroxysmal atrial fibrillation and remains on long-term anticoagulation with Xarelto 20 mg daily.  She tolerates her anticoagulation well no significant bruising or bleeding.  She has known hyperlipidemia previously on rosuvastatin which she did not tolerate and is currently on atorvastatin 80 mg daily, and Zetia 10 mg daily.    Objective   Vital Signs:   /72 (BP Location: Left arm, Patient Position: Sitting, Cuff Size: Adult)   Pulse 56   Resp 18   Ht 165.1 cm (65\")   Wt 72.4 kg (159 lb 9.6 oz)   SpO2 99%   BMI 26.56 kg/m²     Constitutional:       Appearance: Well-developed.   Eyes:      Conjunctiva/sclera: Conjunctivae normal.      Pupils: Pupils are equal, round, and reactive to light.   HENT:      Head: Normocephalic and atraumatic.   Neck:      Thyroid: No thyromegaly.   Pulmonary:      Effort: Pulmonary effort is normal. No respiratory distress.      Breath sounds: Normal breath sounds. No wheezing. No rales.   Chest:          Comments: Reproduction of chest discomfort with palpation in this region  Cardiovascular:      Normal rate. Regular rhythm.      No gallop. No S3 and S4 " gallop. Midsystolic click click.   Edema:     Peripheral edema absent.   Abdominal:      General: Bowel sounds are normal. There is no distension.      Palpations: Abdomen is soft. There is no abdominal mass.      Tenderness: There is no abdominal tenderness.   Musculoskeletal: Normal range of motion.      Cervical back: Neck supple. Skin:     General: Skin is warm and dry.      Findings: No erythema.   Neurological:      Mental Status: Alert and oriented to person, place, and time.         Result Review :     Common labs    Common Labsle 6/2/20 6/2/20 10/5/20 10/5/20    1025 1025 1226 1226   Total Protein  6.3  6.2   Albumin  4.1  4.1   Total Bilirubin  0.2  0.3   Alkaline Phosphatase  74  98   AST (SGOT)  21  30   ALT (SGPT)  19  25   Total Cholesterol 230 (A)  122    Triglycerides 163 (A)  113    HDL Cholesterol 40  40    LDL Cholesterol  157 (A)  61    (A) Abnormal value            Data reviewed: Cardiology studies Review of echocardiogram from 9/1/2018 reveals preserved left ventricular function, grade 1A diastolic dysfunction, mild mitral insufficiency mild to moderate tricuspid insufficiency          Assessment and Plan    1. Cerebrovascular accident (CVA), unspecified mechanism (CMS/HCC)  No recurrence    2. Paroxysmal A-fib (CMS/HCC)  Stable    3. Long term current use of anticoagulant therapy  Well-tolerated    4. Hyperlipidemia, unspecified hyperlipidemia type  Controlled  - Lipid Panel; Future    5. Stage 3b chronic kidney disease (CMS/HCC)  Stable    6. Allergy to iodine    7.  Musculoskeletal skeletal left chest discomfort    Ms. Noble is stable from the cardiovascular standpoint.  She does not report symptoms consistent with angina.  She is euvolemic and has stable respiratory status.  I have encouraged her to remain active, observe a low-cholesterol low saturated fat diet and we will reassess her fasting lipid profile liver function.  I will plan to see her in follow-up in 6 months at which time we  will repeat an echocardiogram.        Follow Up   No follow-ups on file.  Patient was given instructions and counseling regarding her condition or for health maintenance advice. Please see specific information pulled into the AVS if appropriate.

## 2021-06-02 LAB
CHOLEST SERPL-MCNC: 92 MG/DL (ref 100–199)
HDLC SERPL-MCNC: 34 MG/DL
LDLC SERPL CALC-MCNC: 41 MG/DL (ref 0–99)
TRIGL SERPL-MCNC: 86 MG/DL (ref 0–149)
VLDLC SERPL CALC-MCNC: 17 MG/DL (ref 5–40)

## 2021-09-29 ENCOUNTER — HOSPITAL ENCOUNTER (OUTPATIENT)
Dept: PAIN MANAGEMENT | Age: 71
Discharge: HOME | End: 2021-09-29
Payer: MEDICARE

## 2021-09-29 VITALS
OXYGEN SATURATION: 99 % | SYSTOLIC BLOOD PRESSURE: 126 MMHG | DIASTOLIC BLOOD PRESSURE: 69 MMHG | RESPIRATION RATE: 18 BRPM | TEMPERATURE: 96.3 F | HEART RATE: 56 BPM

## 2021-09-29 DIAGNOSIS — M25.519 NECK AND SHOULDER PAIN: ICD-10-CM

## 2021-09-29 DIAGNOSIS — R51.9 FREQUENT HEADACHES: ICD-10-CM

## 2021-09-29 DIAGNOSIS — M50.90 CERVICAL DISC DISEASE: ICD-10-CM

## 2021-09-29 DIAGNOSIS — M54.2 NECK AND SHOULDER PAIN: ICD-10-CM

## 2021-09-29 DIAGNOSIS — G44.221 CHRONIC TENSION-TYPE HEADACHE, INTRACTABLE: Primary | ICD-10-CM

## 2021-09-29 DIAGNOSIS — M51.16 DISPLACEMENT OF LUMBAR DISC WITH RADICULOPATHY: ICD-10-CM

## 2021-09-29 DIAGNOSIS — M47.816 LUMBAR FACET ARTHROPATHY: ICD-10-CM

## 2021-09-29 DIAGNOSIS — M51.16 LUMBAR DISC DISEASE WITH RADICULOPATHY: ICD-10-CM

## 2021-09-29 PROCEDURE — 6360000002 HC RX W HCPCS

## 2021-09-29 PROCEDURE — 64615 CHEMODENERV MUSC MIGRAINE: CPT | Performed by: PSYCHIATRY & NEUROLOGY

## 2021-09-29 PROCEDURE — 64615 CHEMODENERV MUSC MIGRAINE: CPT

## 2021-09-29 RX ORDER — BUTALBITAL, ACETAMINOPHEN AND CAFFEINE 50; 325; 40 MG/1; MG/1; MG/1
1 TABLET ORAL EVERY 4 HOURS PRN
Qty: 30 TABLET | Refills: 3 | Status: SHIPPED | OUTPATIENT
Start: 2021-09-29

## 2021-09-29 NOTE — PROCEDURES
[address]    Patient Name: Vikas Soares    : 1950                    Age: 70 y.o. Sex: female    Date: 2021    Pre-op Diagnosis: Chronic Migraines    Post-op Diagnosis: Chronic Migraines    Procedure: Botox injections x 155 units (45 units wasted)    Performing Procedure:  Baudilio Appiah MD      Patient Vitals for the past 24 hrs:   BP Temp Temp src Pulse Resp SpO2   21 1031 126/69 96.3 °F (35.7 °C) Temporal 56 18 99 %       Previous Injections:  Patient had  7/6 headaches/migraines over the past month. Last botox . Headaches worse lately. Needs RF of fioricet. Indications:    Vikas Soares has been treated for problems with chronic migraine headaches. The patient was taken through a conservative course of treatment without complete resolution of symptoms. Botox injection therapy was offered and after the risks and benefits of the procedure were explained to the patient, we had agreed to proceed. The patient was taken to the procedure room and placed in the seated position. The following muscles were identified using palpation and standard landmarks. These muscles were marked and prepped with alcohol. A total of 155 units were injected after careful aspiration and the following distribution bilaterally:  10 units in 2 sites, procerus 5 units in one site, frontalis 20 units in 4 sites, temporalis 40 unit in 8 sites, occipitals 30 units in 6 sites, cervical paraspinals 20 units in 4 sites, and trapezius 30 units in 6 sites. Discharge: The patient tolerated the procedure well. There were no complications during the procedure and the patient was discharged home with discharge instructions. The patient has been instructed to contact the office should there be any complications or questions to arise between today and their next appointment.     Plan:  [x] Will return to the office in   [] 1 month  [] 4 - 6 weeks [] 8 weeks   [x] 12 weeks:  [x] Procedure Follow-up   [] Office Visit      Karin Fuentes MD, 9/29/2021 at 10:40 AM
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
## 2021-11-23 ENCOUNTER — OFFICE VISIT (OUTPATIENT)
Dept: CARDIOLOGY | Facility: CLINIC | Age: 71
End: 2021-11-23

## 2021-11-23 VITALS
SYSTOLIC BLOOD PRESSURE: 90 MMHG | HEART RATE: 63 BPM | BODY MASS INDEX: 26.49 KG/M2 | OXYGEN SATURATION: 99 % | DIASTOLIC BLOOD PRESSURE: 62 MMHG | HEIGHT: 65 IN | WEIGHT: 159 LBS

## 2021-11-23 DIAGNOSIS — E78.5 HYPERLIPIDEMIA, UNSPECIFIED HYPERLIPIDEMIA TYPE: Primary | ICD-10-CM

## 2021-11-23 DIAGNOSIS — I63.9 CEREBROVASCULAR ACCIDENT (CVA), UNSPECIFIED MECHANISM (HCC): ICD-10-CM

## 2021-11-23 DIAGNOSIS — I48.0 PAROXYSMAL A-FIB (HCC): ICD-10-CM

## 2021-11-23 DIAGNOSIS — N18.32 STAGE 3B CHRONIC KIDNEY DISEASE (HCC): ICD-10-CM

## 2021-11-23 DIAGNOSIS — Z79.01 LONG TERM CURRENT USE OF ANTICOAGULANT THERAPY: ICD-10-CM

## 2021-11-23 PROCEDURE — 99214 OFFICE O/P EST MOD 30 MIN: CPT | Performed by: INTERNAL MEDICINE

## 2021-12-13 DIAGNOSIS — G43.719 INTRACTABLE CHRONIC MIGRAINE WITHOUT AURA AND WITHOUT STATUS MIGRAINOSUS: ICD-10-CM

## 2022-02-09 ENCOUNTER — HOSPITAL ENCOUNTER (OUTPATIENT)
Dept: PAIN MANAGEMENT | Age: 72
Discharge: HOME | End: 2022-02-09
Payer: MEDICARE

## 2022-02-09 VITALS
RESPIRATION RATE: 18 BRPM | TEMPERATURE: 97.4 F | SYSTOLIC BLOOD PRESSURE: 108 MMHG | DIASTOLIC BLOOD PRESSURE: 63 MMHG | OXYGEN SATURATION: 99 % | HEART RATE: 59 BPM

## 2022-02-09 PROCEDURE — 6360000002 HC RX W HCPCS

## 2022-02-09 PROCEDURE — 64615 CHEMODENERV MUSC MIGRAINE: CPT

## 2022-02-09 PROCEDURE — 64615 CHEMODENERV MUSC MIGRAINE: CPT | Performed by: PSYCHIATRY & NEUROLOGY

## 2022-02-09 NOTE — PROGRESS NOTES
Patient states that he/she has __8____ headaches out of 30 days each month.   Patient states that he/she has __5____ migraines out of 30 days each month.monthly

## 2022-02-09 NOTE — PROCEDURES
[address]    Patient Name: Dontae Phillips    : 1950                    Age: 70 y.o. Sex: female    Date: 2022    Pre-op Diagnosis: Chronic Migraines    Post-op Diagnosis: Chronic Migraines    Procedure: Botox injections x 155 units (45 units wasted)    Performing Procedure:  Luisana Carrasco MD      Patient Vitals for the past 24 hrs:   BP Temp Temp src Pulse Resp SpO2   22 1012 108/63 97.4 °F (36.3 °C) Temporal 59 18 99 %       Previous Injections:  Patient had 8/5 headaches/migraines over the past month. Last botox . Indications:    Dontae Phillips has been treated for problems with chronic migraine headaches. The patient was taken through a conservative course of treatment without complete resolution of symptoms. Botox injection therapy was offered and after the risks and benefits of the procedure were explained to the patient, we had agreed to proceed. The patient was taken to the procedure room and placed in the seated position. The following muscles were identified using palpation and standard landmarks. These muscles were marked and prepped with alcohol. A total of 155 units were injected after careful aspiration and the following distribution bilaterally:  10 units in 2 sites, procerus 5 units in one site, frontalis 20 units in 4 sites, temporalis 40 unit in 8 sites, occipitals 30 units in 6 sites, cervical paraspinals 20 units in 4 sites, and trapezius 30 units in 6 sites. Discharge: The patient tolerated the procedure well. There were no complications during the procedure and the patient was discharged home with discharge instructions. The patient has been instructed to contact the office should there be any complications or questions to arise between today and their next appointment.     Plan:  [x] Will return to the office in   [] 1 month  [] 4 - 6 weeks  [] 8 weeks   [x] 12 weeks:  [x] Procedure Follow-up   [] Office Visit            Anabella Mazariegos MD, 2/9/2022 at 10:28 AM

## 2022-02-15 ENCOUNTER — TELEPHONE (OUTPATIENT)
Dept: PAIN MANAGEMENT | Age: 72
End: 2022-02-15

## 2022-02-15 DIAGNOSIS — M47.816 LUMBAR FACET ARTHROPATHY: ICD-10-CM

## 2022-02-15 DIAGNOSIS — M50.90 CERVICAL DISC DISEASE: ICD-10-CM

## 2022-02-15 DIAGNOSIS — M51.16 LUMBAR DISC DISEASE WITH RADICULOPATHY: ICD-10-CM

## 2022-02-15 DIAGNOSIS — M51.16 DISPLACEMENT OF LUMBAR DISC WITH RADICULOPATHY: ICD-10-CM

## 2022-02-15 DIAGNOSIS — M54.2 NECK AND SHOULDER PAIN: ICD-10-CM

## 2022-02-15 DIAGNOSIS — R51.9 FREQUENT HEADACHES: ICD-10-CM

## 2022-02-15 DIAGNOSIS — M25.519 NECK AND SHOULDER PAIN: ICD-10-CM

## 2022-03-01 RX ORDER — METOPROLOL SUCCINATE 25 MG/1
TABLET, EXTENDED RELEASE ORAL
Qty: 90 TABLET | Refills: 0 | Status: SHIPPED | OUTPATIENT
Start: 2022-03-01 | End: 2022-06-14

## 2022-03-25 RX ORDER — MEMANTINE HYDROCHLORIDE 10 MG/1
TABLET ORAL
Qty: 180 TABLET | Refills: 1 | Status: SHIPPED | OUTPATIENT
Start: 2022-03-25

## 2022-03-25 NOTE — TELEPHONE ENCOUNTER
Requested Prescriptions     Pending Prescriptions Disp Refills    memantine (NAMENDA) 10 MG tablet [Pharmacy Med Name: MEMANTINE 10MG TABLETS] 180 tablet 1     Sig: TAKE 1 TABLET BY MOUTH TWICE DAILY       Last Office Visit: 9/28/18  Next Office Visit: pain mgt 5/4/22  Last Medication Refill: 3/5/20 with 1 refill

## 2022-04-14 NOTE — PROGRESS NOTES
Procedure:  Level of Consciousness: [x]Alert [x]Oriented []Disoriented []Lethargic  Anxiety Level: [x]Calm []Anxious []Depressed []Other  Skin: [x]Warm [x]Dry []Cool []Moist []Intact []Other  Cardiovascular: [x]Palpitations: [x]Never [x]Occasionally []Frequently  Chest Pain: [x]No []Yes  Respiratory:  [x]Unlabored []Labored []Cough ([] Productive []Unproductive)  HCG Required: [x]No []Yes   Results: []Negative []Positive  Knowledge Level:        [x]Patient/Other verbalized understanding of pre-procedure instructions. [x]Assessment of post-op care needs (transportation, responsible caregiver)        [x]Able to discuss health care problems and how to deal with it.   Factors that Affect Teaching:        Language Barrier: [x]No []Yes - why:        Hearing Loss:        [x]No []Yes            Corrective Device:  []Yes []No        Vision Loss:           [x]No []Yes            Corrective Device:  []Yes []No        Memory Loss:       [x]No []Yes            []Short Term []Long Term  Motivational Level:  [x]Asks Questions                  []Extremely Anxious       [x]Seems Interested               []Seems Uninterested                  [x]Denies need for Education  Risk for Injury:  [x]Patient oriented to person, place and time  []History of frequent falls/loss of balance  Nutritional:  []Change in appetite   []Weight Gain   []Weight Loss  Functional:  []Requires assistance with ADL's Clindamycin Counseling: I counseled the patient regarding use of clindamycin as an antibiotic for prophylactic and/or therapeutic purposes. Clindamycin is active against numerous classes of bacteria, including skin bacteria. Side effects may include nausea, diarrhea, gastrointestinal upset, rash, hives, yeast infections, and in rare cases, colitis.

## 2022-05-09 LAB
ALBUMIN SERPL-MCNC: 4.5 G/DL (ref 3.5–5.2)
ALP BLD-CCNC: 72 U/L (ref 35–104)
ALT SERPL-CCNC: 18 U/L (ref 5–33)
ANION GAP SERPL CALCULATED.3IONS-SCNC: 13 MMOL/L (ref 7–19)
AST SERPL-CCNC: 22 U/L (ref 5–32)
BACTERIA: ABNORMAL /HPF
BASOPHILS ABSOLUTE: 0 K/UL (ref 0–0.2)
BASOPHILS RELATIVE PERCENT: 0.2 % (ref 0–1)
BILIRUB SERPL-MCNC: 0.4 MG/DL (ref 0.2–1.2)
BILIRUBIN URINE: NEGATIVE
BLOOD, URINE: NEGATIVE
BUN BLDV-MCNC: 36 MG/DL (ref 8–23)
CALCIUM SERPL-MCNC: 9.2 MG/DL (ref 8.8–10.2)
CHLORIDE BLD-SCNC: 104 MMOL/L (ref 98–111)
CLARITY: CLEAR
CO2: 24 MMOL/L (ref 22–29)
COLOR: YELLOW
CREAT SERPL-MCNC: 1.1 MG/DL (ref 0.5–0.9)
CREATININE URINE: 61.9 MG/DL (ref 4.2–622)
CRYSTALS, UA: ABNORMAL /HPF
EOSINOPHILS ABSOLUTE: 0.1 K/UL (ref 0–0.6)
EOSINOPHILS RELATIVE PERCENT: 1 % (ref 0–5)
EPITHELIAL CELLS, UA: 1 /HPF (ref 0–5)
GFR AFRICAN AMERICAN: >59
GFR NON-AFRICAN AMERICAN: 49
GLUCOSE BLD-MCNC: 90 MG/DL (ref 74–109)
GLUCOSE URINE: NEGATIVE MG/DL
HCT VFR BLD CALC: 43.2 % (ref 37–47)
HEMOGLOBIN: 13.2 G/DL (ref 12–16)
HYALINE CASTS: 2 /HPF (ref 0–8)
IMMATURE GRANULOCYTES #: 0 K/UL
KETONES, URINE: NEGATIVE MG/DL
LEUKOCYTE ESTERASE, URINE: ABNORMAL
LYMPHOCYTES ABSOLUTE: 0.8 K/UL (ref 1.1–4.5)
LYMPHOCYTES RELATIVE PERCENT: 15.2 % (ref 20–40)
MCH RBC QN AUTO: 32.2 PG (ref 27–31)
MCHC RBC AUTO-ENTMCNC: 30.6 G/DL (ref 33–37)
MCV RBC AUTO: 105.4 FL (ref 81–99)
MONOCYTES ABSOLUTE: 0.5 K/UL (ref 0–0.9)
MONOCYTES RELATIVE PERCENT: 9.4 % (ref 0–10)
NEUTROPHILS ABSOLUTE: 3.8 K/UL (ref 1.5–7.5)
NEUTROPHILS RELATIVE PERCENT: 73.6 % (ref 50–65)
NITRITE, URINE: NEGATIVE
PDW BLD-RTO: 13.1 % (ref 11.5–14.5)
PH UA: 7.5 (ref 5–8)
PLATELET # BLD: 188 K/UL (ref 130–400)
PMV BLD AUTO: 11.5 FL (ref 9.4–12.3)
POTASSIUM SERPL-SCNC: 3.9 MMOL/L (ref 3.5–5)
PROTEIN PROTEIN: 8 MG/DL (ref 15–45)
PROTEIN UA: NEGATIVE MG/DL
RBC # BLD: 4.1 M/UL (ref 4.2–5.4)
RBC UA: 1 /HPF (ref 0–4)
SODIUM BLD-SCNC: 141 MMOL/L (ref 136–145)
SPECIFIC GRAVITY UA: 1.02 (ref 1–1.03)
TOTAL PROTEIN: 7.1 G/DL (ref 6.6–8.7)
UROBILINOGEN, URINE: 0.2 E.U./DL
VITAMIN D 25-HYDROXY: 72.8 NG/ML
WBC # BLD: 5.2 K/UL (ref 4.8–10.8)
WBC UA: 1 /HPF (ref 0–5)

## 2022-06-01 ENCOUNTER — HOSPITAL ENCOUNTER (OUTPATIENT)
Dept: PAIN MANAGEMENT | Age: 72
Discharge: HOME | End: 2022-06-01
Payer: MEDICARE

## 2022-06-01 VITALS
SYSTOLIC BLOOD PRESSURE: 92 MMHG | RESPIRATION RATE: 18 BRPM | OXYGEN SATURATION: 100 % | DIASTOLIC BLOOD PRESSURE: 61 MMHG | HEART RATE: 52 BPM | TEMPERATURE: 97.4 F

## 2022-06-01 DIAGNOSIS — R51.9 FREQUENT HEADACHES: ICD-10-CM

## 2022-06-01 DIAGNOSIS — M51.16 DISPLACEMENT OF LUMBAR DISC WITH RADICULOPATHY: ICD-10-CM

## 2022-06-01 DIAGNOSIS — M51.16 LUMBAR DISC DISEASE WITH RADICULOPATHY: ICD-10-CM

## 2022-06-01 DIAGNOSIS — M47.816 LUMBAR FACET ARTHROPATHY: ICD-10-CM

## 2022-06-01 DIAGNOSIS — M50.90 CERVICAL DISC DISEASE: ICD-10-CM

## 2022-06-01 DIAGNOSIS — M54.2 NECK AND SHOULDER PAIN: ICD-10-CM

## 2022-06-01 DIAGNOSIS — M25.519 NECK AND SHOULDER PAIN: ICD-10-CM

## 2022-06-01 PROCEDURE — 64615 CHEMODENERV MUSC MIGRAINE: CPT

## 2022-06-01 PROCEDURE — 6360000002 HC RX W HCPCS

## 2022-06-01 PROCEDURE — A4216 STERILE WATER/SALINE, 10 ML: HCPCS

## 2022-06-01 PROCEDURE — 64615 CHEMODENERV MUSC MIGRAINE: CPT | Performed by: PSYCHIATRY & NEUROLOGY

## 2022-06-01 PROCEDURE — 2580000003 HC RX 258

## 2022-06-01 RX ORDER — SODIUM CHLORIDE 0.9 % (FLUSH) 0.9 %
5 SYRINGE (ML) INJECTION ONCE
Status: DISCONTINUED | OUTPATIENT
Start: 2022-06-01 | End: 2022-06-03 | Stop reason: HOSPADM

## 2022-06-01 NOTE — PROCEDURES
[address]    Patient Name: Hafsa Carlin    : 1950                    Age: 70 y.o. Sex: female    Date: 2022    Pre-op Diagnosis: Chronic Migraines    Post-op Diagnosis: Chronic Migraines    Procedure: Botox injections x 155 units (45 units wasted)    Performing Procedure:  Ann-Marie Edmonds MD      Patient Vitals for the past 24 hrs:   BP Temp Temp src Pulse Resp SpO2   22 1014 92/61 97.4 °F (36.3 °C) Temporal 52 18 100 %       Previous Injections:  Patient had 3/3 headaches/migraines over the past month. Last botox 22  Indications:    Hafsa Carlin has been treated for problems with chronic migraine headaches. The patient was taken through a conservative course of treatment without complete resolution of symptoms. Botox injection therapy was offered and after the risks and benefits of the procedure were explained to the patient, we had agreed to proceed. The patient was taken to the procedure room and placed in the seated position. The following muscles were identified using palpation and standard landmarks. These muscles were marked and prepped with alcohol. A total of 155 units were injected after careful aspiration and the following distribution bilaterally:  10 units in 2 sites, procerus 5 units in one site, frontalis 20 units in 4 sites, temporalis 40 unit in 8 sites, occipitals 30 units in 6 sites, cervical paraspinals 20 units in 4 sites, and trapezius 30 units in 6 sites. Discharge: The patient tolerated the procedure well. There were no complications during the procedure and the patient was discharged home with discharge instructions. The patient has been instructed to contact the office should there be any complications or questions to arise between today and their next appointment.     Plan:  [x] Will return to the office in   [] 1 month  [] 4 - 6 weeks  [] 8 weeks   [x] 12 weeks:  [x] Procedure Follow-up [] Office Visit      Cheri Bob MD, 6/1/2022 at 10:38 AM

## 2022-06-01 NOTE — PROGRESS NOTES
Procedure:  Level of Consciousness: [x]Alert [x]Oriented []Disoriented []Lethargic  Anxiety Level: [x]Calm []Anxious []Depressed []Other  Skin: []Warm [x]Dry []Cool []Moist []Intact []Other  Cardiovascular: [x]Palpitations: [x]Never []Occasionally []Frequently  Chest Pain: [x]No []Yes  Respiratory:  [x]Unlabored []Labored []Cough ([] Productive []Unproductive)  HCG Required: [x]No []Yes   Results: []Negative []Positive  Knowledge Level:        [x]Patient/Other verbalized understanding of pre-procedure instructions. [x]Assessment of post-op care needs (transportation, responsible caregiver)        [x]Able to discuss health care problems and how to deal with it. Factors that Affect Teaching:        Language Barrier: [x]No []Yes - why:        Hearing Loss:        [x]No []Yes            Corrective Device:  []Yes []No        Vision Loss:           [x]No []Yes            Corrective Device:  []Yes []No        Memory Loss:       [x]No []Yes            []Short Term []Long Term  Motivational Level:  [x]Asks Questions                  []Extremely Anxious       [x]Seems Interested               []Seems Uninterested                  [x]Denies need for Education  Risk for Injury:  [x]Patient oriented to person, place and time  []History of frequent falls/loss of balance  Nutritional:  []Change in appetite   []Weight Gain   []Weight Loss  Functional:  []Requires assistance with ADL's      Botox Assessment  [] Patient  has had a reduction of at least 100 hours (5 Days) in Migraines since receiving Botox  []  []  []  Factors that Affect Teaching:  Assessment of post-op care needs (transportation, responsible caregiver)        []Able to discuss health care problems and how to deal with it. Factors that Affect Teaching:Patient states that he/she has __3____ headaches out of 30 days each month.   Patient states that he/she has ___3___ migraines out of 30 days each month.monthly

## 2022-06-14 RX ORDER — METOPROLOL SUCCINATE 25 MG/1
TABLET, EXTENDED RELEASE ORAL
Qty: 90 TABLET | Refills: 1 | Status: ON HOLD | OUTPATIENT
Start: 2022-06-14 | End: 2022-08-08

## 2022-08-06 ENCOUNTER — HOSPITAL ENCOUNTER (INPATIENT)
Facility: HOSPITAL | Age: 72
LOS: 2 days | Discharge: HOME | End: 2022-08-09
Attending: STUDENT IN AN ORGANIZED HEALTH CARE EDUCATION/TRAINING PROGRAM | Admitting: INTERNAL MEDICINE

## 2022-08-06 ENCOUNTER — APPOINTMENT (OUTPATIENT)
Dept: GENERAL RADIOLOGY | Facility: HOSPITAL | Age: 72
End: 2022-08-06

## 2022-08-06 ENCOUNTER — APPOINTMENT (OUTPATIENT)
Dept: CT IMAGING | Facility: HOSPITAL | Age: 72
End: 2022-08-06

## 2022-08-06 DIAGNOSIS — Z79.01 ON CONTINUOUS ORAL ANTICOAGULATION: ICD-10-CM

## 2022-08-06 DIAGNOSIS — M84.475A METATARSAL FRACTURE, PATHOLOGIC, LEFT, INITIAL ENCOUNTER: ICD-10-CM

## 2022-08-06 DIAGNOSIS — S92.342A CLOSED DISPLACED FRACTURE OF FOURTH METATARSAL BONE OF LEFT FOOT, INITIAL ENCOUNTER: ICD-10-CM

## 2022-08-06 DIAGNOSIS — U07.1 COVID-19 VIRUS INFECTION: ICD-10-CM

## 2022-08-06 DIAGNOSIS — I48.91 ATRIAL FIBRILLATION WITH RVR: Primary | ICD-10-CM

## 2022-08-06 DIAGNOSIS — Z74.09 IMPAIRED MOBILITY: ICD-10-CM

## 2022-08-06 DIAGNOSIS — R55 SYNCOPE AND COLLAPSE: ICD-10-CM

## 2022-08-06 LAB
ALBUMIN SERPL-MCNC: 4.1 G/DL (ref 3.5–5.2)
ALBUMIN/GLOB SERPL: 1.6 G/DL
ALP SERPL-CCNC: 85 U/L (ref 39–117)
ALT SERPL W P-5'-P-CCNC: 19 U/L (ref 1–33)
ANION GAP SERPL CALCULATED.3IONS-SCNC: 11 MMOL/L (ref 5–15)
APTT PPP: 34.7 SECONDS (ref 24.1–35)
AST SERPL-CCNC: 21 U/L (ref 1–32)
BASOPHILS # BLD AUTO: 0.01 10*3/MM3 (ref 0–0.2)
BASOPHILS NFR BLD AUTO: 0.1 % (ref 0–1.5)
BILIRUB SERPL-MCNC: 0.4 MG/DL (ref 0–1.2)
BUN SERPL-MCNC: 18 MG/DL (ref 8–23)
BUN/CREAT SERPL: 12.1 (ref 7–25)
CALCIUM SPEC-SCNC: 9.2 MG/DL (ref 8.6–10.5)
CHLORIDE SERPL-SCNC: 110 MMOL/L (ref 98–107)
CO2 SERPL-SCNC: 21 MMOL/L (ref 22–29)
CREAT SERPL-MCNC: 1.49 MG/DL (ref 0.57–1)
D DIMER PPP FEU-MCNC: 9.69 MCGFEU/ML (ref 0–0.5)
D-LACTATE SERPL-SCNC: 1.8 MMOL/L (ref 0.5–2)
DEPRECATED RDW RBC AUTO: 49.8 FL (ref 37–54)
EGFRCR SERPLBLD CKD-EPI 2021: 37.2 ML/MIN/1.73
EOSINOPHIL # BLD AUTO: 0.11 10*3/MM3 (ref 0–0.4)
EOSINOPHIL NFR BLD AUTO: 1.5 % (ref 0.3–6.2)
ERYTHROCYTE [DISTWIDTH] IN BLOOD BY AUTOMATED COUNT: 13.4 % (ref 12.3–15.4)
GLOBULIN UR ELPH-MCNC: 2.5 GM/DL
GLUCOSE SERPL-MCNC: 114 MG/DL (ref 65–99)
HCT VFR BLD AUTO: 42.5 % (ref 34–46.6)
HGB BLD-MCNC: 13.1 G/DL (ref 12–15.9)
IMM GRANULOCYTES # BLD AUTO: 0.04 10*3/MM3 (ref 0–0.05)
IMM GRANULOCYTES NFR BLD AUTO: 0.5 % (ref 0–0.5)
INR PPP: 2.35 (ref 0.91–1.09)
LIPASE SERPL-CCNC: 67 U/L (ref 13–60)
LYMPHOCYTES # BLD AUTO: 0.8 10*3/MM3 (ref 0.7–3.1)
LYMPHOCYTES NFR BLD AUTO: 10.9 % (ref 19.6–45.3)
MCH RBC QN AUTO: 31 PG (ref 26.6–33)
MCHC RBC AUTO-ENTMCNC: 30.8 G/DL (ref 31.5–35.7)
MCV RBC AUTO: 100.5 FL (ref 79–97)
MONOCYTES # BLD AUTO: 0.62 10*3/MM3 (ref 0.1–0.9)
MONOCYTES NFR BLD AUTO: 8.4 % (ref 5–12)
NEUTROPHILS NFR BLD AUTO: 5.77 10*3/MM3 (ref 1.7–7)
NEUTROPHILS NFR BLD AUTO: 78.6 % (ref 42.7–76)
NRBC BLD AUTO-RTO: 0 /100 WBC (ref 0–0.2)
NT-PROBNP SERPL-MCNC: ABNORMAL PG/ML (ref 0–900)
PLATELET # BLD AUTO: 154 10*3/MM3 (ref 140–450)
PMV BLD AUTO: 10.8 FL (ref 6–12)
POTASSIUM SERPL-SCNC: 4.1 MMOL/L (ref 3.5–5.2)
PROCALCITONIN SERPL-MCNC: 0.09 NG/ML (ref 0–0.25)
PROT SERPL-MCNC: 6.6 G/DL (ref 6–8.5)
PROTHROMBIN TIME: 24.9 SECONDS (ref 11.9–14.6)
RBC # BLD AUTO: 4.23 10*6/MM3 (ref 3.77–5.28)
SARS-COV-2 RNA PNL SPEC NAA+PROBE: DETECTED
SODIUM SERPL-SCNC: 142 MMOL/L (ref 136–145)
TROPONIN T SERPL-MCNC: <0.01 NG/ML (ref 0–0.03)
WBC NRBC COR # BLD: 7.35 10*3/MM3 (ref 3.4–10.8)

## 2022-08-06 PROCEDURE — 85025 COMPLETE CBC W/AUTO DIFF WBC: CPT | Performed by: STUDENT IN AN ORGANIZED HEALTH CARE EDUCATION/TRAINING PROGRAM

## 2022-08-06 PROCEDURE — 70450 CT HEAD/BRAIN W/O DYE: CPT

## 2022-08-06 PROCEDURE — 83690 ASSAY OF LIPASE: CPT | Performed by: STUDENT IN AN ORGANIZED HEALTH CARE EDUCATION/TRAINING PROGRAM

## 2022-08-06 PROCEDURE — 93005 ELECTROCARDIOGRAM TRACING: CPT | Performed by: STUDENT IN AN ORGANIZED HEALTH CARE EDUCATION/TRAINING PROGRAM

## 2022-08-06 PROCEDURE — 85610 PROTHROMBIN TIME: CPT | Performed by: STUDENT IN AN ORGANIZED HEALTH CARE EDUCATION/TRAINING PROGRAM

## 2022-08-06 PROCEDURE — 36415 COLL VENOUS BLD VENIPUNCTURE: CPT

## 2022-08-06 PROCEDURE — 93010 ELECTROCARDIOGRAM REPORT: CPT | Performed by: INTERNAL MEDICINE

## 2022-08-06 PROCEDURE — 73630 X-RAY EXAM OF FOOT: CPT

## 2022-08-06 PROCEDURE — 99284 EMERGENCY DEPT VISIT MOD MDM: CPT

## 2022-08-06 PROCEDURE — 73610 X-RAY EXAM OF ANKLE: CPT

## 2022-08-06 PROCEDURE — 80053 COMPREHEN METABOLIC PANEL: CPT | Performed by: STUDENT IN AN ORGANIZED HEALTH CARE EDUCATION/TRAINING PROGRAM

## 2022-08-06 PROCEDURE — 85730 THROMBOPLASTIN TIME PARTIAL: CPT | Performed by: STUDENT IN AN ORGANIZED HEALTH CARE EDUCATION/TRAINING PROGRAM

## 2022-08-06 PROCEDURE — 84145 PROCALCITONIN (PCT): CPT | Performed by: STUDENT IN AN ORGANIZED HEALTH CARE EDUCATION/TRAINING PROGRAM

## 2022-08-06 PROCEDURE — 85379 FIBRIN DEGRADATION QUANT: CPT | Performed by: STUDENT IN AN ORGANIZED HEALTH CARE EDUCATION/TRAINING PROGRAM

## 2022-08-06 PROCEDURE — 72125 CT NECK SPINE W/O DYE: CPT

## 2022-08-06 PROCEDURE — 87635 SARS-COV-2 COVID-19 AMP PRB: CPT | Performed by: STUDENT IN AN ORGANIZED HEALTH CARE EDUCATION/TRAINING PROGRAM

## 2022-08-06 PROCEDURE — 87040 BLOOD CULTURE FOR BACTERIA: CPT | Performed by: STUDENT IN AN ORGANIZED HEALTH CARE EDUCATION/TRAINING PROGRAM

## 2022-08-06 PROCEDURE — 71045 X-RAY EXAM CHEST 1 VIEW: CPT

## 2022-08-06 PROCEDURE — 84484 ASSAY OF TROPONIN QUANT: CPT | Performed by: STUDENT IN AN ORGANIZED HEALTH CARE EDUCATION/TRAINING PROGRAM

## 2022-08-06 PROCEDURE — 83605 ASSAY OF LACTIC ACID: CPT | Performed by: STUDENT IN AN ORGANIZED HEALTH CARE EDUCATION/TRAINING PROGRAM

## 2022-08-06 PROCEDURE — 83880 ASSAY OF NATRIURETIC PEPTIDE: CPT | Performed by: STUDENT IN AN ORGANIZED HEALTH CARE EDUCATION/TRAINING PROGRAM

## 2022-08-07 PROBLEM — I48.91 ATRIAL FIBRILLATION WITH RVR (HCC): Status: ACTIVE | Noted: 2022-08-07

## 2022-08-07 LAB
ALBUMIN SERPL-MCNC: 3.4 G/DL (ref 3.5–5.2)
ALBUMIN/GLOB SERPL: 1.4 G/DL
ALP SERPL-CCNC: 76 U/L (ref 39–117)
ALT SERPL W P-5'-P-CCNC: 16 U/L (ref 1–33)
ANION GAP SERPL CALCULATED.3IONS-SCNC: 9 MMOL/L (ref 5–15)
AST SERPL-CCNC: 18 U/L (ref 1–32)
BASOPHILS # BLD AUTO: 0.01 10*3/MM3 (ref 0–0.2)
BASOPHILS NFR BLD AUTO: 0.2 % (ref 0–1.5)
BILIRUB SERPL-MCNC: 0.4 MG/DL (ref 0–1.2)
BUN SERPL-MCNC: 20 MG/DL (ref 8–23)
BUN/CREAT SERPL: 14.9 (ref 7–25)
CALCIUM SPEC-SCNC: 8.6 MG/DL (ref 8.6–10.5)
CHLORIDE SERPL-SCNC: 110 MMOL/L (ref 98–107)
CO2 SERPL-SCNC: 20 MMOL/L (ref 22–29)
CREAT SERPL-MCNC: 1.34 MG/DL (ref 0.57–1)
DEPRECATED RDW RBC AUTO: 51 FL (ref 37–54)
EGFRCR SERPLBLD CKD-EPI 2021: 42.2 ML/MIN/1.73
EOSINOPHIL # BLD AUTO: 0.11 10*3/MM3 (ref 0–0.4)
EOSINOPHIL NFR BLD AUTO: 2.3 % (ref 0.3–6.2)
ERYTHROCYTE [DISTWIDTH] IN BLOOD BY AUTOMATED COUNT: 13.6 % (ref 12.3–15.4)
GLOBULIN UR ELPH-MCNC: 2.5 GM/DL
GLUCOSE SERPL-MCNC: 106 MG/DL (ref 65–99)
HCT VFR BLD AUTO: 38.5 % (ref 34–46.6)
HGB BLD-MCNC: 11.9 G/DL (ref 12–15.9)
IMM GRANULOCYTES # BLD AUTO: 0.02 10*3/MM3 (ref 0–0.05)
IMM GRANULOCYTES NFR BLD AUTO: 0.4 % (ref 0–0.5)
LYMPHOCYTES # BLD AUTO: 0.75 10*3/MM3 (ref 0.7–3.1)
LYMPHOCYTES NFR BLD AUTO: 16 % (ref 19.6–45.3)
MCH RBC QN AUTO: 31.2 PG (ref 26.6–33)
MCHC RBC AUTO-ENTMCNC: 30.9 G/DL (ref 31.5–35.7)
MCV RBC AUTO: 100.8 FL (ref 79–97)
MONOCYTES # BLD AUTO: 0.39 10*3/MM3 (ref 0.1–0.9)
MONOCYTES NFR BLD AUTO: 8.3 % (ref 5–12)
NEUTROPHILS NFR BLD AUTO: 3.42 10*3/MM3 (ref 1.7–7)
NEUTROPHILS NFR BLD AUTO: 72.8 % (ref 42.7–76)
NRBC BLD AUTO-RTO: 0 /100 WBC (ref 0–0.2)
PLATELET # BLD AUTO: 143 10*3/MM3 (ref 140–450)
PMV BLD AUTO: 11 FL (ref 6–12)
POTASSIUM SERPL-SCNC: 4 MMOL/L (ref 3.5–5.2)
PROT SERPL-MCNC: 5.9 G/DL (ref 6–8.5)
RBC # BLD AUTO: 3.82 10*6/MM3 (ref 3.77–5.28)
SODIUM SERPL-SCNC: 139 MMOL/L (ref 136–145)
TROPONIN T SERPL-MCNC: <0.01 NG/ML (ref 0–0.03)
TROPONIN T SERPL-MCNC: <0.01 NG/ML (ref 0–0.03)
WBC NRBC COR # BLD: 4.7 10*3/MM3 (ref 3.4–10.8)

## 2022-08-07 PROCEDURE — 85025 COMPLETE CBC W/AUTO DIFF WBC: CPT | Performed by: INTERNAL MEDICINE

## 2022-08-07 PROCEDURE — 80053 COMPREHEN METABOLIC PANEL: CPT | Performed by: INTERNAL MEDICINE

## 2022-08-07 PROCEDURE — 84484 ASSAY OF TROPONIN QUANT: CPT | Performed by: STUDENT IN AN ORGANIZED HEALTH CARE EDUCATION/TRAINING PROGRAM

## 2022-08-07 PROCEDURE — 87040 BLOOD CULTURE FOR BACTERIA: CPT | Performed by: STUDENT IN AN ORGANIZED HEALTH CARE EDUCATION/TRAINING PROGRAM

## 2022-08-07 PROCEDURE — 84484 ASSAY OF TROPONIN QUANT: CPT | Performed by: INTERNAL MEDICINE

## 2022-08-07 RX ORDER — ASPIRIN 81 MG/1
81 TABLET ORAL DAILY
Status: DISCONTINUED | OUTPATIENT
Start: 2022-08-07 | End: 2022-08-09 | Stop reason: HOSPADM

## 2022-08-07 RX ORDER — DILTIAZEM HCL IN NACL,ISO-OSM 125 MG/125
5-15 PLASTIC BAG, INJECTION (ML) INTRAVENOUS
Status: DISCONTINUED | OUTPATIENT
Start: 2022-08-07 | End: 2022-08-07

## 2022-08-07 RX ORDER — MEMANTINE HYDROCHLORIDE 5 MG/1
10 TABLET ORAL EVERY 12 HOURS SCHEDULED
Status: DISCONTINUED | OUTPATIENT
Start: 2022-08-07 | End: 2022-08-09 | Stop reason: HOSPADM

## 2022-08-07 RX ORDER — CYCLOSPORINE 0.5 MG/ML
1 EMULSION OPHTHALMIC EVERY 12 HOURS PRN
Status: DISCONTINUED | OUTPATIENT
Start: 2022-08-07 | End: 2022-08-09 | Stop reason: HOSPADM

## 2022-08-07 RX ORDER — PANTOPRAZOLE SODIUM 40 MG/1
40 TABLET, DELAYED RELEASE ORAL DAILY
Status: DISCONTINUED | OUTPATIENT
Start: 2022-08-07 | End: 2022-08-09 | Stop reason: HOSPADM

## 2022-08-07 RX ORDER — ALPRAZOLAM 0.5 MG/1
1 TABLET ORAL 3 TIMES DAILY PRN
Status: DISCONTINUED | OUTPATIENT
Start: 2022-08-07 | End: 2022-08-09 | Stop reason: HOSPADM

## 2022-08-07 RX ORDER — ACETAMINOPHEN 500 MG
1000 TABLET ORAL ONCE
Status: COMPLETED | OUTPATIENT
Start: 2022-08-07 | End: 2022-08-07

## 2022-08-07 RX ORDER — SODIUM CHLORIDE 0.9 % (FLUSH) 0.9 %
10 SYRINGE (ML) INJECTION EVERY 12 HOURS SCHEDULED
Status: DISCONTINUED | OUTPATIENT
Start: 2022-08-07 | End: 2022-08-09 | Stop reason: HOSPADM

## 2022-08-07 RX ORDER — SODIUM CHLORIDE, SODIUM LACTATE, POTASSIUM CHLORIDE, CALCIUM CHLORIDE 600; 310; 30; 20 MG/100ML; MG/100ML; MG/100ML; MG/100ML
75 INJECTION, SOLUTION INTRAVENOUS CONTINUOUS
Status: DISPENSED | OUTPATIENT
Start: 2022-08-07 | End: 2022-08-08

## 2022-08-07 RX ORDER — ESCITALOPRAM OXALATE 10 MG/1
20 TABLET ORAL DAILY
Status: DISCONTINUED | OUTPATIENT
Start: 2022-08-07 | End: 2022-08-09 | Stop reason: HOSPADM

## 2022-08-07 RX ORDER — ONDANSETRON 2 MG/ML
4 INJECTION INTRAMUSCULAR; INTRAVENOUS EVERY 6 HOURS PRN
Status: DISCONTINUED | OUTPATIENT
Start: 2022-08-07 | End: 2022-08-09 | Stop reason: HOSPADM

## 2022-08-07 RX ORDER — TOPIRAMATE 100 MG/1
200 TABLET, FILM COATED ORAL DAILY
Status: DISCONTINUED | OUTPATIENT
Start: 2022-08-07 | End: 2022-08-09 | Stop reason: HOSPADM

## 2022-08-07 RX ORDER — OXYCODONE HYDROCHLORIDE 5 MG/1
5 TABLET ORAL EVERY 4 HOURS PRN
Status: DISCONTINUED | OUTPATIENT
Start: 2022-08-07 | End: 2022-08-09 | Stop reason: HOSPADM

## 2022-08-07 RX ORDER — ACETAMINOPHEN 325 MG/1
650 TABLET ORAL EVERY 4 HOURS PRN
Status: DISCONTINUED | OUTPATIENT
Start: 2022-08-07 | End: 2022-08-09 | Stop reason: HOSPADM

## 2022-08-07 RX ORDER — SODIUM CHLORIDE 0.9 % (FLUSH) 0.9 %
10 SYRINGE (ML) INJECTION AS NEEDED
Status: DISCONTINUED | OUTPATIENT
Start: 2022-08-07 | End: 2022-08-09 | Stop reason: HOSPADM

## 2022-08-07 RX ORDER — ATORVASTATIN CALCIUM 40 MG/1
80 TABLET, FILM COATED ORAL DAILY
Status: DISCONTINUED | OUTPATIENT
Start: 2022-08-07 | End: 2022-08-09 | Stop reason: HOSPADM

## 2022-08-07 RX ORDER — BUTALBITAL, ACETAMINOPHEN AND CAFFEINE 50; 325; 40 MG/1; MG/1; MG/1
1 TABLET ORAL EVERY 6 HOURS PRN
Status: DISCONTINUED | OUTPATIENT
Start: 2022-08-07 | End: 2022-08-09 | Stop reason: HOSPADM

## 2022-08-07 RX ADMIN — RIVAROXABAN 20 MG: 20 TABLET, FILM COATED ORAL at 09:28

## 2022-08-07 RX ADMIN — METOPROLOL TARTRATE 25 MG: 25 TABLET, FILM COATED ORAL at 11:54

## 2022-08-07 RX ADMIN — OXYCODONE HYDROCHLORIDE 5 MG: 5 TABLET ORAL at 22:42

## 2022-08-07 RX ADMIN — Medication 10 ML: at 20:41

## 2022-08-07 RX ADMIN — ATORVASTATIN CALCIUM 80 MG: 40 TABLET, FILM COATED ORAL at 09:29

## 2022-08-07 RX ADMIN — SODIUM CHLORIDE, POTASSIUM CHLORIDE, SODIUM LACTATE AND CALCIUM CHLORIDE 500 ML: 600; 310; 30; 20 INJECTION, SOLUTION INTRAVENOUS at 00:50

## 2022-08-07 RX ADMIN — ESCITALOPRAM 20 MG: 10 TABLET, FILM COATED ORAL at 09:29

## 2022-08-07 RX ADMIN — METOPROLOL TARTRATE 12.5 MG: 25 TABLET, FILM COATED ORAL at 01:23

## 2022-08-07 RX ADMIN — OXYCODONE HYDROCHLORIDE 5 MG: 5 TABLET ORAL at 09:36

## 2022-08-07 RX ADMIN — MEMANTINE HYDROCHLORIDE 10 MG: 5 TABLET, FILM COATED ORAL at 09:29

## 2022-08-07 RX ADMIN — MEMANTINE HYDROCHLORIDE 10 MG: 5 TABLET, FILM COATED ORAL at 20:41

## 2022-08-07 RX ADMIN — ACETAMINOPHEN 1000 MG: 500 TABLET ORAL at 00:49

## 2022-08-07 RX ADMIN — OXYCODONE HYDROCHLORIDE 5 MG: 5 TABLET ORAL at 04:47

## 2022-08-07 RX ADMIN — PANTOPRAZOLE SODIUM 40 MG: 40 TABLET, DELAYED RELEASE ORAL at 09:28

## 2022-08-07 RX ADMIN — SODIUM CHLORIDE, POTASSIUM CHLORIDE, SODIUM LACTATE AND CALCIUM CHLORIDE 75 ML/HR: 600; 310; 30; 20 INJECTION, SOLUTION INTRAVENOUS at 04:47

## 2022-08-07 RX ADMIN — OXYCODONE HYDROCHLORIDE 5 MG: 5 TABLET ORAL at 18:27

## 2022-08-07 RX ADMIN — SODIUM CHLORIDE, POTASSIUM CHLORIDE, SODIUM LACTATE AND CALCIUM CHLORIDE 75 ML/HR: 600; 310; 30; 20 INJECTION, SOLUTION INTRAVENOUS at 18:27

## 2022-08-07 RX ADMIN — ASPIRIN 81 MG: 81 TABLET, COATED ORAL at 09:29

## 2022-08-07 RX ADMIN — ALPRAZOLAM 1 MG: 0.5 TABLET ORAL at 20:41

## 2022-08-07 RX ADMIN — METOPROLOL TARTRATE 25 MG: 25 TABLET, FILM COATED ORAL at 20:41

## 2022-08-07 RX ADMIN — OXYCODONE HYDROCHLORIDE 5 MG: 5 TABLET ORAL at 14:16

## 2022-08-07 RX ADMIN — TOPIRAMATE 200 MG: 100 TABLET, FILM COATED ORAL at 09:28

## 2022-08-07 RX ADMIN — OXYCODONE HYDROCHLORIDE 5 MG: 5 TABLET ORAL at 00:49

## 2022-08-07 NOTE — NURSING NOTE
The patient was unable to stand long enough to complete orthostatic vitals. I did complete the standing & sitting vitals.

## 2022-08-07 NOTE — NURSING NOTE
"Order received for CAM walking boot to be applied to LLE.  Telephone order received to remove current splint cast and place CAM walking boot.  Contacted central, 3A and ED with no luck in locating such device.  Contacted [employer] regarding item and stated was not a \"call-in\" item and would not be delivering today.  Per ED, MD must complete a [employer] form in order to file with insurance.  RN has not received a [employer] form and will discuss with provider upon his arrival to floor.    "

## 2022-08-07 NOTE — ED PROVIDER NOTES
EMERGENCY DEPARTMENT HISTORY AND PHYSICAL EXAM    Patient Name: Natalee Noble    Chief Complaint   Patient presents with   • Chest Pain   • Syncope   • COVID POSITIVE   • Head Injury       History of Presenting Illness:  Natalee Noble is a 72 y.o. female with history of atrial fibrillation on Xarelto as well as pressure of CVA who presents to the emergency department due to multiple episodes of syncope at home in the setting of a COVID infection.    History was provided by the patient as well as corroborated by her family member and friend at the bedside.  Patient was tested positive for COVID about 3 days ago in the setting of cough.  Has had multiple episodes where she endorses a room spinning sensation with lightheadedness and a sensation of about to blackout and then she has passed out woke up on the floor.  During 1 of these falls she woke up with significant bilateral ankle pain.  Difficulty walking worsens with walking worsens with movement moderate in nature no radiation.  Also endorses some left-sided retrosternal chest pressure which began about 3 hours ago.  Denies any significant shortness of breath.    Past Medical History:   Past Medical History:   Diagnosis Date   • Anxiety and depression    • Arthritis    • Back pain    • CRF (chronic renal failure), stage 3 (moderate) (HCC)    • Diverticulosis    • Essential hypertension    • Family history of colon cancer    • Family history of colonic polyps    • Fractures     R tibia/fibula; L ankle   • History of cardiac cath     1/28/19 left main no significant disease. LAD with no significant disease. LCX no significant disease. RCA no significant disease.   • History of cardiac monitoring     2/01/19 - ZIO PATCH - Underlying rhythm is sinus at 48-94 bpm. PSVT x23 with fastest 167 bpm, longest 24.4 seconds.   10/05/2018- ZIO PATCH - NSR most of time but did have episode of A fib ranging from .   • History of colon polyps    • History of  echocardiogram     9/2018: Left ventricular systolic function is normal. EF 50%. Left ventricular diastolic dysfunction consistent with ipaired relaxation. Left atrial cavity size is mildly dilated. Mild MR. Mild to moderate TR. There is abnormal thickening noted on the RV that may represent a vegetation-this was discussed with Dr. Hammer and Dr. Reilly. No patent foramen ovale. No further work up needed at this time   • Hyperlipidemia    • Kidney disorder    • Migraine    • Migraines    • Neck pain        Past Surgical History:   Past Surgical History:   Procedure Laterality Date   • ABDOMINOPLASTY     • BACK SURGERY      L2-5 fusion 2012   • CHOLECYSTECTOMY     • COLONOSCOPY  11/24/2014    Diverticulosis; Repeat 5 years   • COLONOSCOPY  11/09/2010    Diverticulosis; Repeat 4 years   • COLONOSCOPY  09/18/2007    Dr. Monzon-Normal; Repeat 3 years   • COLONOSCOPY  06/02/2005    Dr. Monzon-Diminuitive polypectomy above the cecum; Otherwise normal colonoscopy; Repeat 2 years   • COLONOSCOPY N/A 11/1/2019    Procedure: COLONOSCOPY WITH ANESTHESIA;  Surgeon: Susan Lord MD;  Location: [facility] ENDOSCOPY;  Service: Gastroenterology   • CYSTOCELE REPAIR      Cystocele and rectocele repair   • ENDOSCOPY  09/28/2012    LA grade C esophagitis; HH   • ENDOSCOPY N/A 11/1/2019    Procedure: ESOPHAGOGASTRODUODENOSCOPY WITH ANESTHESIA;  Surgeon: Susan Lord MD;  Location: [facility] ENDOSCOPY;  Service: Gastroenterology   • FIBULA FRACTURE SURGERY     • HYSTERECTOMY     • NECK SURGERY  2012    ACDF C4-7   • ORIF TIBIA FRACTURE Right        Family History:  Family History   Problem Relation Age of Onset   • Colon cancer Father 65   • Colon polyps Daughter 38   • Esophageal cancer Neg Hx    • Liver cancer Neg Hx    • Liver disease Neg Hx    • Rectal cancer Neg Hx    • Stomach cancer Neg Hx        Social History:   Denies tobacco  Denies EtOH  Denies marijuana, cocaine, or IV drugs    Allergies:   Allergies   Allergen Reactions   •  Benzoin Anaphylaxis and Swelling     States when used on her neck it shut off her airway  Huge abscesses with surgery     • Iodine Other (See Comments)     PT UNABLE TO TELL RN ABOUT REACTION AT THIS TIME, BUT FAMILY STATES PT HAD A REACTION TO BEING CLEANSED WITH IODINE IN SURGERY  IOBAN - used on neck, swelled to the point of shutting off airway   • Latex Itching   • Levaquin [Levofloxacin] Unknown (See Comments)     Unknown   • Sulfa Antibiotics    • Vancomycin Other (See Comments) and Unknown (See Comments)     Kidney failure  Vancomycin induced acute kidney injury     • Acetaminophen Nausea Only       Medications:     Current Facility-Administered Medications:   •  acetaminophen (TYLENOL) tablet 650 mg, 650 mg, Oral, Q4H PRN, Michael Hniton DO  •  ALPRAZolam (XANAX) tablet 1 mg, 1 mg, Oral, TID PRN, Michael Hinton DO  •  aspirin EC tablet 81 mg, 81 mg, Oral, Daily, Michael Hinton DO  •  atorvastatin (LIPITOR) tablet 80 mg, 80 mg, Oral, Daily, Michael Hinton DO  •  butalbital-acetaminophen-caffeine (FIORICET, ESGIC) -40 MG per tablet 1 tablet, 1 tablet, Oral, Q6H PRN, Michael Hinton DO  •  cycloSPORINE (RESTASIS) 0.05 % ophthalmic emulsion 1 drop, 1 drop, Both Eyes, PRN, Michael Hinton DO  •  dilTIAZem (CARDIZEM) 125 mg in 125 mL 0.7% sodium chloride  infusion, 5-15 mg/hr, Intravenous, Titrated, Michael Hniton DO, Held at 08/07/22 0452  •  escitalopram (LEXAPRO) tablet 20 mg, 20 mg, Oral, Daily, Michael Hinton DO  •  lactated ringers infusion, 75 mL/hr, Intravenous, Continuous, Michael Hinton DO, Last Rate: 75 mL/hr at 08/07/22 0447, 75 mL/hr at 08/07/22 0447  •  memantine (NAMENDA) tablet 10 mg, 10 mg, Oral, Q12H, Michael Hinton DO  •  ondansetron (ZOFRAN) injection 4 mg, 4 mg, Intravenous, Q6H PRN, Michael Hinton,   •  oxyCODONE (ROXICODONE) immediate release tablet 5 mg, 5 mg, Oral, Q4H PRN, Miguel Rios MD, 5 mg at 08/07/22 0447  •   "pantoprazole (PROTONIX) EC tablet 40 mg, 40 mg, Oral, Daily, Michael Hinton,   •  rivaroxaban (XARELTO) tablet 20 mg, 20 mg, Oral, Daily, Michael Hinton,   •  sodium chloride 0.9 % flush 10 mL, 10 mL, Intravenous, Q12H, Michael Hinton,   •  sodium chloride 0.9 % flush 10 mL, 10 mL, Intravenous, PRN, Michael Hinton,   •  topiramate (TOPAMAX) tablet 200 mg, 200 mg, Oral, Daily, Michael Hinton,     Review of Systems:  A full review of systems was obtained and is negative unless otherwise stated in HPI.    Physical Exam:  VS: /68 (BP Location: Left arm)   Pulse 59   Temp 98.2 °F (36.8 °C) (Oral)   Resp 19   Ht 165 cm (64.96\")   Wt 70.3 kg (155 lb)   SpO2 93%   BMI 25.82 kg/m²   GENERAL: Well-appearing middle-aged woman sitting up in stretcher no acute stress; well nourished, well developed, awake, alert, no acute distress, nontoxic appearing, comfortable  EYES: PERRL, sclera anicteric, extra-occular movements grossly intact, symmetric lids  EARS, NOSE, MOUTH, THROAT: atraumatic external nose and ears, moist mucous membranes  NECK: Symmetric, trachea midline, no thyromegaly, no adenopathy, no meningismus  RESPIRATORY: Unlabored respiratory effort, clear to auscultation bilaterally, good air movement  CARDIOVASCULAR: No murmurs or gallops, peripheral pulses 2+ and equal in all extremities  GI: Soft, nontender, nondistended, bowel sounds present, no hepatosplenomegaly  LYMPHATIC: no lymphadenopathy  MUSCULOSKELETAL/EXTREMITIES: Notable ecchymosis and tenderness over the dorsal surface of the foot laterally on the left with tenderness over the metatarsals; notable tenderness of the right lateral malleolus; midline cervical spine tenderness; otherwise all extremities without obvious deformity, no cyanosis or clubbing  SKIN: warm and dry with no obvious rashes  NEUROLOGIC: moving all 4 extremities symmetrically, CN II-XII grossly intact; GCS 15  PSYCHIATRIC: alert, pleasant and " cooperative. Appropriate mood and affect.      Labs:  Labs Reviewed   COVID-19,[facility] IN-HOUSE,NASAL SWAB NO TRANSPORT MEDIA 2 HR TAT - Abnormal; Notable for the following components:       Result Value    COVID19 Detected (*)     All other components within normal limits    Narrative:     Fact sheet for providers: https://www.fda.gov/media/099983/download     Fact sheet for patients: https://www.fda.gov/media/738128/download    Test performed by PCR.    Consider negative results in combination with clinical observations, patient history, and epidemiological information.   COMPREHENSIVE METABOLIC PANEL - Abnormal; Notable for the following components:    Glucose 114 (*)     Creatinine 1.49 (*)     Chloride 110 (*)     CO2 21.0 (*)     eGFR 37.2 (*)     All other components within normal limits    Narrative:     GFR Normal >60  Chronic Kidney Disease <60  Kidney Failure <15     LIPASE - Abnormal; Notable for the following components:    Lipase 67 (*)     All other components within normal limits   PROTIME-INR - Abnormal; Notable for the following components:    Protime 24.9 (*)     INR 2.35 (*)     All other components within normal limits   BNP (IN-HOUSE) - Abnormal; Notable for the following components:    proBNP 10,463.0 (*)     All other components within normal limits    Narrative:     Among patients with dyspnea, NT-proBNP is highly sensitive for the detection of acute congestive heart failure. In addition NT-proBNP of <300 pg/ml effectively rules out acute congestive heart failure with 99% negative predictive value.    Results may be falsely decreased if patient taking Biotin.     D-DIMER, QUANTITATIVE - Abnormal; Notable for the following components:    D-Dimer, Quantitative 9.69 (*)     All other components within normal limits    Narrative:     Reference Range is 0-0.50 MCGFEU/mL. However, results <0.50 MCGFEU/mL tends to rule out DVT or PE. Results >0.50 MCGFEU/mL are not useful in predicting absence or  presence of DVT or PE.     CBC WITH AUTO DIFFERENTIAL - Abnormal; Notable for the following components:    .5 (*)     MCHC 30.8 (*)     Neutrophil % 78.6 (*)     Lymphocyte % 10.9 (*)     All other components within normal limits   CBC WITH AUTO DIFFERENTIAL - Abnormal; Notable for the following components:    Hemoglobin 11.9 (*)     .8 (*)     MCHC 30.9 (*)     Lymphocyte % 16.0 (*)     All other components within normal limits   COMPREHENSIVE METABOLIC PANEL - Abnormal; Notable for the following components:    Glucose 106 (*)     Creatinine 1.34 (*)     Chloride 110 (*)     CO2 20.0 (*)     Total Protein 5.9 (*)     Albumin 3.40 (*)     eGFR 42.2 (*)     All other components within normal limits    Narrative:     GFR Normal >60  Chronic Kidney Disease <60  Kidney Failure <15     APTT - Normal   TROPONIN (IN-HOUSE) - Normal    Narrative:     Troponin T Reference Range:  <= 0.03 ng/mL-   Negative for AMI  >0.03 ng/mL-     Abnormal for myocardial necrosis.  Clinicians would have to utilize clinical acumen, EKG, Troponin and serial changes to determine if it is an Acute Myocardial Infarction or myocardial injury due to an underlying chronic condition.       Results may be falsely decreased if patient taking Biotin.     TROPONIN (IN-HOUSE) - Normal    Narrative:     Troponin T Reference Range:  <= 0.03 ng/mL-   Negative for AMI  >0.03 ng/mL-     Abnormal for myocardial necrosis.  Clinicians would have to utilize clinical acumen, EKG, Troponin and serial changes to determine if it is an Acute Myocardial Infarction or myocardial injury due to an underlying chronic condition.       Results may be falsely decreased if patient taking Biotin.     LACTIC ACID, PLASMA - Normal   PROCALCITONIN - Normal    Narrative:     As a Marker for Sepsis (Non-Neonates):    1. <0.5 ng/mL represents a low risk of severe sepsis and/or septic shock.  2. >2 ng/mL represents a high risk of severe sepsis and/or septic shock.    As a  "Marker for Lower Respiratory Tract Infections that require antibiotic therapy:    PCT on Admission    Antibiotic Therapy       6-12 Hrs later    >0.5                Strongly Recommended  >0.25 - <0.5        Recommended   0.1 - 0.25          Discouraged              Remeasure/reassess PCT  <0.1                Strongly Discouraged     Remeasure/reassess PCT    As 28 day mortality risk marker: \"Change in Procalcitonin Result\" (>80% or <=80%) if Day 0 (or Day 1) and Day 4 values are available. Refer to http://www.[employer] SoftwareINTEGR[facility] [place]-pct-calculator.com    Change in PCT <=80%  A decrease of PCT levels below or equal to 80% defines a positive change in PCT test result representing a higher risk for 28-day all-cause mortality of patients diagnosed with severe sepsis for septic shock.    Change in PCT >80%  A decrease of PCT levels of more than 80% defines a negative change in PCT result representing a lower risk for 28-day all-cause mortality of patients diagnosed with severe sepsis or septic shock.      TROPONIN (IN-HOUSE) - Normal    Narrative:     Troponin T Reference Range:  <= 0.03 ng/mL-   Negative for AMI  >0.03 ng/mL-     Abnormal for myocardial necrosis.  Clinicians would have to utilize clinical acumen, EKG, Troponin and serial changes to determine if it is an Acute Myocardial Infarction or myocardial injury due to an underlying chronic condition.       Results may be falsely decreased if patient taking Biotin.     COVID PRE-OP / PRE-PROCEDURE SCREENING ORDER (NO ISOLATION)    Narrative:     The following orders were created for panel order COVID PRE-OP / PRE-PROCEDURE SCREENING ORDER (NO ISOLATION) - Swab, Nasal Cavity.  Procedure                               Abnormality         Status                     ---------                               -----------         ------                     COVID-19,[employer] [facility]...[[id number]]  Abnormal            Final result                 Please view results for these tests on the " individual orders.   BLOOD CULTURE   BLOOD CULTURE   CBC AND DIFFERENTIAL    Narrative:     The following orders were created for panel order CBC & Differential.  Procedure                               Abnormality         Status                     ---------                               -----------         ------                     CBC Auto Differential[[id number]]        Abnormal            Final result                 Please view results for these tests on the individual orders.         Radiology:  XR Chest 1 View    (Results Pending)   CT Head Without Contrast    (Results Pending)   CT Cervical Spine Without Contrast    (Results Pending)   XR Ankle 3+ View Left    (Results Pending)   XR Foot 3+ View Left    (Results Pending)   XR Foot 3+ View Right    (Results Pending)   XR Ankle 3+ View Right    (Results Pending)         Procedures: splinting      Medical Decision Making:  Natalee Noble is a 72 y.o. female with history of atrial fibrillation anticoagulation presents emergency department after multiple syncopal episodes at home.    Initial tachycardia otherwise no hypotension.    I have reviewed and interpreted the EKG and it shows: Atrial fibrillation with rapid ventricular response rate of 103 with occasional PVCs.  Normal axis and intervals. No signs of acute ischemia such as ST elevation, ST depression, or T wave inversion. No signs of Hyperkalemia, WPW, PE, Pericarditis, LV aneurysm, Brugada, Heart Block.    Labs were ordered and reviewed.  Elevated D-dimer 9.69.  Elevated BNP to 10 463.  Elevated creatinine to 1.49 above patient baseline of 1.  Negative troponin x2.  Positive COVID testing.    Imaging was ordered and reviewed.  CT head and cervical spine per stat read read with no acute abnormality.  X-rays of her feet and ankles per my read notable for a displaced left fourth metatarsal fracture.    Short leg splint was placed in the setting of metatarsal fracture.    Nursing notes were  reviewed.    The patient was given IV fluids.  Also given metoprolol for rate control.  Also given Roxicodone for pain.    Patient's presentation is most consistent with multiple episodes of syncope and collapse.  Would consider arrhythmia patient with clear evidence of atrial fibrillation without ventricular response.  This chronic nature she is on anticoagulation.  We will consider NSVT.  Heart failure may be contributing given elevated BNP.  In the setting of her ground-level fall with ankle and foot pain I would consider fractures to the tibia and fibula as well as the metatarsals.  Clear evidence of left fourth metatarsal displaced fracture.    Patient was admitted to the hospitalist for further management.      ED Diagnosis:  Atrial fibrillation with RVR (HCC); On continuous oral anticoagulation; Syncope and collapse; COVID-19 virus infection; Closed displaced fracture of fourth metatarsal bone of left foot, initial encounter      Disposition: admit to hospitalist        Signed:  Miguel Rios MD  Emergency Medicine Physician    This note was generated using speech recognition software and may contain homophonic word substitutions or errors.     Miguel Rios MD  08/07/22 0631

## 2022-08-07 NOTE — PLAN OF CARE
Goal Outcome Evaluation:  Plan of Care Reviewed With: patient        Progress: improving  VSS, c/o constant bilateral foot/LE pain; prn meds with relief, isolation precautions continue, S 62-72 on tele, unable to locate CAM walking boot, no new issues noted, cont to monitor

## 2022-08-07 NOTE — NURSING NOTE
Pt arrived from the ED at 0135. Pt is A & O x 4 but very restless and anxious. Pts HR upon arrival was 68 and NSR, O2 was 93% on rough air with a congested non productive cough, /68 and RR 19. Pts temp was 98.1 and pt was very thirsty. Pt settled into bed, call bell provided and bed alarm set. Pt understands that she needs to call to use the bedpan. Resting comfortable. Will continue to monitor.

## 2022-08-07 NOTE — ED PROCEDURE NOTE
Place of Service:[address]  Patient Name:Natalee Noble  :1950    Procedure  Splint - Cast - Strapping    Date/Time: 2022 1:01 AM  Performed by: Miguel Rios MD  Authorized by: Michael Hinton DO     Consent:     Consent obtained:  Verbal    Consent given by:  Patient    Risks discussed:  Discoloration, numbness, pain and swelling  Universal protocol:     Imaging studies available: yes      Patient identity confirmed:  Verbally with patient  Pre-procedure details:     Distal neurologic exam:  Normal    Distal perfusion: distal pulses strong    Procedure details:     Location:  Foot    Foot location:  L foot    Splint type:  Short leg    Supplies:  Fiberglass, cotton padding and elastic bandage  Post-procedure details:     Distal neurologic exam:  Unchanged    Distal perfusion: distal pulses strong      Procedure completion:  Tolerated well, no immediate complications              Miguel Rios MD  22 0302

## 2022-08-07 NOTE — H&P
"    [facility] Medicine Services  HISTORY AND PHYSICAL    Date of Admission: 8/6/2022  Primary Care Physician: Woody Valentin MD    Subjective     Chief Complaint: Syncope and collapse    History of Present Illness  72-year-old female who presents to the emergency department after syncope and collapse.  She started having \"all the symptoms\" of COVID this past Sunday.  On Tuesday she went to see her PCP and was tested and found to be positive.  She complained of diarrhea, migraine, sore throat, cough.  She does not complain of shortness of breath on exam.  She states that she got up today went to the kitchen and fell.  She went back into her bedroom, and blacked out.  She states that she fell so hard, she guessed she broke her foot.  She was unable to ambulate because of left foot and right ankle pain.  She called her daughter who lives in [place], and she came down to take her mom to the ED.  She states she normally does not have a fast heart rate, but does have chronic atrial fibrillation.  She follows with a cardiologist in [place].    Last cardiac echo results are noted in past medical history.    Review of Systems   Syncope  Foot and ankle pain  Diarrhea    Otherwise complete ROS reviewed and negative except as mentioned in the HPI.    Past Medical History:   Past Medical History:   Diagnosis Date   • Anxiety and depression    • Arthritis    • Back pain    • CRF (chronic renal failure), stage 3 (moderate) (HCC)    • Diverticulosis    • Essential hypertension    • Family history of colon cancer    • Family history of colonic polyps    • Fractures     R tibia/fibula; L ankle   • History of cardiac cath     1/28/19 left main no significant disease. LAD with no significant disease. LCX no significant disease. RCA no significant disease.   • History of cardiac monitoring     2/01/19 - ZIO PATCH - Underlying rhythm is sinus at 48-94 bpm. PSVT x23 with fastest 167 bpm, " longest 24.4 seconds.   10/05/2018- ZIO PATCH - NSR most of time but did have episode of A fib ranging from .   • History of colon polyps    • History of echocardiogram     9/2018: Left ventricular systolic function is normal. EF 50%. Left ventricular diastolic dysfunction consistent with ipaired relaxation. Left atrial cavity size is mildly dilated. Mild MR. Mild to moderate TR. There is abnormal thickening noted on the RV that may represent a vegetation-this was discussed with Dr. Hammer and Dr. Reilly. No patent foramen ovale. No further work up needed at this time   • Hyperlipidemia    • Kidney disorder    • Migraine    • Migraines    • Neck pain      Past Surgical History:  Past Surgical History:   Procedure Laterality Date   • ABDOMINOPLASTY     • BACK SURGERY      L2-5 fusion 2012   • CHOLECYSTECTOMY     • COLONOSCOPY  11/24/2014    Diverticulosis; Repeat 5 years   • COLONOSCOPY  11/09/2010    Diverticulosis; Repeat 4 years   • COLONOSCOPY  09/18/2007    Dr. Monzon-Normal; Repeat 3 years   • COLONOSCOPY  06/02/2005    Dr. Monzon-Diminuitive polypectomy above the cecum; Otherwise normal colonoscopy; Repeat 2 years   • COLONOSCOPY N/A 11/1/2019    Procedure: COLONOSCOPY WITH ANESTHESIA;  Surgeon: Susan Lord MD;  Location: [facility] ENDOSCOPY;  Service: Gastroenterology   • CYSTOCELE REPAIR      Cystocele and rectocele repair   • ENDOSCOPY  09/28/2012    LA grade C esophagitis; HH   • ENDOSCOPY N/A 11/1/2019    Procedure: ESOPHAGOGASTRODUODENOSCOPY WITH ANESTHESIA;  Surgeon: Susan Lord MD;  Location: [facility] ENDOSCOPY;  Service: Gastroenterology   • FIBULA FRACTURE SURGERY     • HYSTERECTOMY     • NECK SURGERY  2012    ACDF C4-7   • ORIF TIBIA FRACTURE Right      Social History:  reports that she has never smoked. She has never used smokeless tobacco. She reports that she does not drink alcohol and does not use drugs.    Family History: family history includes Colon cancer (age of onset: 65) in her  father; Colon polyps (age of onset: 38) in her daughter.       Allergies:  Allergies   Allergen Reactions   • Benzoin Anaphylaxis and Swelling     States when used on her neck it shut off her airway  Huge abscesses with surgery     • Iodine Other (See Comments)     PT UNABLE TO TELL RN ABOUT REACTION AT THIS TIME, BUT FAMILY STATES PT HAD A REACTION TO BEING CLEANSED WITH IODINE IN SURGERY  IOBAN - used on neck, swelled to the point of shutting off airway   • Latex Itching   • Levaquin [Levofloxacin] Unknown (See Comments)     Unknown   • Sulfa Antibiotics    • Vancomycin Other (See Comments) and Unknown (See Comments)     Kidney failure  Vancomycin induced acute kidney injury     • Acetaminophen Nausea Only       Medications:  Prior to Admission medications    Medication Sig Start Date End Date Taking? Authorizing Provider   ALPRAZolam (XANAX) 1 MG tablet Take 1 mg by mouth 3 (Three) Times a Day As Needed for Anxiety.    Jacklyn Rahman MD   aspirin 81 MG tablet Take 1 tablet by mouth Daily. 9/3/18   Mayur Sweet MD   atorvastatin (LIPITOR) 80 MG tablet Take 1 tablet by mouth Daily. 4/23/21   Jacklyn Rahman MD   butalbital-acetaminophen-caffeine (FIORICET, ESGIC) -40 MG per tablet Take 1 tablet by mouth Every 6 (Six) Hours As Needed for Headache.    Jacklyn Rahman MD   Coenzyme Q10 (CO Q-10) 200 MG capsule Take 1 tablet by mouth Daily.    Jacklyn Rahman MD   conjugated estrogens (PREMARIN) 0.625 MG/GM vaginal cream As Needed. 8/23/12   Jacklyn Rahman MD   cycloSPORINE (RESTASIS) 0.05 % ophthalmic emulsion As Needed. 5/1/19   Jacklyn Rahman MD   Ergocalciferol (VITAMIN D2) 2000 units tablet Take  by mouth.    Jacklyn Rahman MD   escitalopram (LEXAPRO) 20 MG tablet Take 20 mg by mouth Daily.    Jacklyn Rahman MD   ezetimibe (ZETIA) 10 MG tablet Take 1 tablet by mouth Daily. 8/5/20   Jose Scott MD   FOLIC ACID PO Take  by mouth Daily.     "Jacklyn Rahman MD   hydroxychloroquine (PLAQUENIL) 200 MG tablet Take 1 tablet by mouth 2 (Two) Times a Day.    Jacklyn Rahman MD   memantine (NAMENDA) 10 MG tablet Take 10 mg by mouth 2 (Two) Times a Day.    Jacklyn Rahman MD   metoprolol succinate XL (TOPROL-XL) 25 MG 24 hr tablet TAKE 1 TABLET BY MOUTH DAILY 6/14/22   Jose Scott MD   pantoprazole (PROTONIX) 40 MG EC tablet Take 1 tablet by mouth Daily. 11/1/19   Susan Lord MD   rivaroxaban (XARELTO) 20 MG tablet Take 20 mg by mouth Daily.    Jacklyn Rahman MD   topiramate (TOPAMAX) 200 MG tablet Take 1 tablet by mouth Daily. 6/4/19   Jacklyn Rahman MD I have utilized all available immediate resources to obtain, update, and review the patient's current medications.    Objective     Vital Signs: /87   Pulse (!) 126   Temp 98 °F (36.7 °C) (Oral)   Resp 18   Ht 165.1 cm (65\")   Wt 70.3 kg (155 lb)   SpO2 98%   BMI 25.79 kg/m²   Physical Exam  Vitals reviewed.   Constitutional:       Appearance: She is not ill-appearing.   HENT:      Head: Normocephalic and atraumatic.      Right Ear: External ear normal.      Left Ear: External ear normal.      Nose: Nose normal.   Eyes:      General: No scleral icterus.     Conjunctiva/sclera: Conjunctivae normal.   Cardiovascular:      Rate and Rhythm: Tachycardia present. Rhythm irregular.   Pulmonary:      Effort: Pulmonary effort is normal. No respiratory distress.   Abdominal:      General: There is no distension.      Palpations: Abdomen is soft.   Musculoskeletal:         General: No tenderness.      Cervical back: Normal range of motion and neck supple.      Comments: Right lateral ankle swelling and pain with palpation.  Left foot is splinted.   Skin:     General: Skin is warm and dry.   Neurological:      General: No focal deficit present.      Mental Status: She is alert.      Cranial Nerves: No cranial nerve deficit.   Psychiatric:         Mood and Affect: " "Mood normal.         Behavior: Behavior normal.         Results Reviewed:  Lab Results (last 24 hours)     Procedure Component Value Units Date/Time    Troponin [[id number]] Collected: 08/07/22 0053    Specimen: Blood from Arm, Right Updated: 08/07/22 0100    Blood Culture - Blood, Arm, Right [[id number]] Collected: 08/07/22 0022    Specimen: Blood from Arm, Right Updated: 08/07/22 0028    Protime-INR [[id number]]  (Abnormal) Collected: 08/06/22 2254    Specimen: Blood from Arm, Right Updated: 08/06/22 2339     Protime 24.9 Seconds      INR 2.35    aPTT [[id number]]  (Normal) Collected: 08/06/22 2254    Specimen: Blood from Arm, Right Updated: 08/06/22 2339     PTT 34.7 seconds     D-dimer, Quantitative [[id number]]  (Abnormal) Collected: 08/06/22 2254    Specimen: Blood from Arm, Right Updated: 08/06/22 2339     D-Dimer, Quantitative 9.69 MCGFEU/mL     Narrative:      Reference Range is 0-0.50 MCGFEU/mL. However, results <0.50 MCGFEU/mL tends to rule out DVT or PE. Results >0.50 MCGFEU/mL are not useful in predicting absence or presence of DVT or PE.      Procalcitonin [[id number]]  (Normal) Collected: 08/06/22 2254    Specimen: Blood from Arm, Right Updated: 08/06/22 2335     Procalcitonin 0.09 ng/mL     Narrative:      As a Marker for Sepsis (Non-Neonates):    1. <0.5 ng/mL represents a low risk of severe sepsis and/or septic shock.  2. >2 ng/mL represents a high risk of severe sepsis and/or septic shock.    As a Marker for Lower Respiratory Tract Infections that require antibiotic therapy:    PCT on Admission    Antibiotic Therapy       6-12 Hrs later    >0.5                Strongly Recommended  >0.25 - <0.5        Recommended   0.1 - 0.25          Discouraged              Remeasure/reassess PCT  <0.1                Strongly Discouraged     Remeasure/reassess PCT    As 28 day mortality risk marker: \"Change in Procalcitonin Result\" (>80% or <=80%) if Day 0 (or Day 1) and Day 4 values are available. Refer to " http://www.[facility]-pct-calculator.com    Change in PCT <=80%  A decrease of PCT levels below or equal to 80% defines a positive change in PCT test result representing a higher risk for 28-day all-cause mortality of patients diagnosed with severe sepsis for septic shock.    Change in PCT >80%  A decrease of PCT levels of more than 80% defines a negative change in PCT result representing a lower risk for 28-day all-cause mortality of patients diagnosed with severe sepsis or septic shock.       Comprehensive Metabolic Panel [[id number]]  (Abnormal) Collected: 08/06/22 2254    Specimen: Blood from Arm, Right Updated: 08/06/22 2331     Glucose 114 mg/dL      BUN 18 mg/dL      Creatinine 1.49 mg/dL      Sodium 142 mmol/L      Potassium 4.1 mmol/L      Chloride 110 mmol/L      CO2 21.0 mmol/L      Calcium 9.2 mg/dL      Total Protein 6.6 g/dL      Albumin 4.10 g/dL      ALT (SGPT) 19 U/L      AST (SGOT) 21 U/L      Alkaline Phosphatase 85 U/L      Total Bilirubin 0.4 mg/dL      Globulin 2.5 gm/dL      A/G Ratio 1.6 g/dL      BUN/Creatinine Ratio 12.1     Anion Gap 11.0 mmol/L      eGFR 37.2 mL/min/1.73      Comment: National Kidney Foundation and American Society of Nephrology (ASN) Task Force recommended calculation based on the Chronic Kidney Disease Epidemiology Collaboration (CKD-EPI) equation refit without adjustment for race.       Narrative:      GFR Normal >60  Chronic Kidney Disease <60  Kidney Failure <15      Troponin [[id number]]  (Normal) Collected: 08/06/22 2254    Specimen: Blood from Arm, Right Updated: 08/06/22 2329     Troponin T <0.010 ng/mL     Narrative:      Troponin T Reference Range:  <= 0.03 ng/mL-   Negative for AMI  >0.03 ng/mL-     Abnormal for myocardial necrosis.  Clinicians would have to utilize clinical acumen, EKG, Troponin and serial changes to determine if it is an Acute Myocardial Infarction or myocardial injury due to an underlying chronic condition.       Results may be falsely decreased  if patient taking Biotin.      Lactic Acid, Plasma [[id number]]  (Normal) Collected: 08/06/22 2254    Specimen: Blood from Arm, Right Updated: 08/06/22 2329     Lactate 1.8 mmol/L     BNP [[id number]]  (Abnormal) Collected: 08/06/22 2254    Specimen: Blood from Arm, Right Updated: 08/06/22 2328     proBNP 10,463.0 pg/mL     Narrative:      Among patients with dyspnea, NT-proBNP is highly sensitive for the detection of acute congestive heart failure. In addition NT-proBNP of <300 pg/ml effectively rules out acute congestive heart failure with 99% negative predictive value.    Results may be falsely decreased if patient taking Biotin.      Lipase [[id number]]  (Abnormal) Collected: 08/06/22 2254    Specimen: Blood from Arm, Right Updated: 08/06/22 2326     Lipase 67 U/L     CBC & Differential [[id number]]  (Abnormal) Collected: 08/06/22 2254    Specimen: Blood from Arm, Right Updated: 08/06/22 2311    Narrative:      The following orders were created for panel order CBC & Differential.  Procedure                               Abnormality         Status                     ---------                               -----------         ------                     CBC Auto Differential[[id number]]        Abnormal            Final result                 Please view results for these tests on the individual orders.    CBC Auto Differential [[id number]]  (Abnormal) Collected: 08/06/22 2254    Specimen: Blood from Arm, Right Updated: 08/06/22 2311     WBC 7.35 10*3/mm3      RBC 4.23 10*6/mm3      Hemoglobin 13.1 g/dL      Hematocrit 42.5 %      .5 fL      MCH 31.0 pg      MCHC 30.8 g/dL      RDW 13.4 %      RDW-SD 49.8 fl      MPV 10.8 fL      Platelets 154 10*3/mm3      Neutrophil % 78.6 %      Lymphocyte % 10.9 %      Monocyte % 8.4 %      Eosinophil % 1.5 %      Basophil % 0.1 %      Immature Grans % 0.5 %      Neutrophils, Absolute 5.77 10*3/mm3      Lymphocytes, Absolute 0.80 10*3/mm3      Monocytes, Absolute 0.62 10*3/mm3       Eosinophils, Absolute 0.11 10*3/mm3      Basophils, Absolute 0.01 10*3/mm3      Immature Grans, Absolute 0.04 10*3/mm3      nRBC 0.0 /100 WBC     Blood Culture - Blood, Arm, Right [[id number]] Collected: 08/06/22 2254    Specimen: Blood from Arm, Right Updated: 08/06/22 2308    COVID PRE-OP / PRE-PROCEDURE SCREENING ORDER (NO ISOLATION) - Swab, Nasal Cavity [[id number]]  (Abnormal) Collected: 08/06/22 2126    Specimen: Swab from Nasal Cavity Updated: 08/06/22 2229    Narrative:      The following orders were created for panel order COVID PRE-OP / PRE-PROCEDURE SCREENING ORDER (NO ISOLATION) - Swab, Nasal Cavity.  Procedure                               Abnormality         Status                     ---------                               -----------         ------                     COVID-19,[facility] IN-CLAIRE...[[id number]]  Abnormal            Final result                 Please view results for these tests on the individual orders.    COVID-19,[facility] [employer] IN-HOUSE,Nasal Swab No Transport Media 3-4 HR TAT - Swab, Nasal Cavity [[id number]]  (Abnormal) Collected: 08/06/22 2126    Specimen: Swab from Nasal Cavity Updated: 08/06/22 2229     COVID19 Detected    Narrative:      Fact sheet for providers: https://www.fda.gov/media/028998/download     Fact sheet for patients: https://www.fda.gov/media/806940/download    Test performed by PCR.    Consider negative results in combination with clinical observations, patient history, and epidemiological information.        Imaging Results (Last 24 Hours)     Procedure Component Value Units Date/Time    XR Foot 3+ View Right [[id number]] Resulted: 08/06/22 2242     Updated: 08/06/22 2243    XR Foot 3+ View Left [[id number]] Resulted: 08/06/22 2242     Updated: 08/06/22 2242    XR Ankle 3+ View Left [[id number]] Resulted: 08/06/22 2241     Updated: 08/06/22 2242    XR Ankle 3+ View Right [[id number]] Resulted: 08/06/22 2241     Updated: 08/06/22 2241    XR Chest 1 View [[id number]]  Resulted: 08/06/22 2240     Updated: 08/06/22 2240    CT Head Without Contrast [[id number]] Resulted: 08/06/22 2231     Updated: 08/06/22 2239    CT Cervical Spine Without Contrast [[id number]] Resulted: 08/06/22 2231     Updated: 08/06/22 2239        I have personally reviewed and interpreted the radiology studies and ECG obtained at time of admission.     Assessment / Plan     Assessment:   Active Hospital Problems    Diagnosis    • Atrial fibrillation with RVR (HCC)      Impression:  1.  Syncope and collapse   2.  A. fib with RVR, chronically anticoagulated  3.  Right ankle pain and left foot fracture status post fall  4.  COVID 19  5.  CKD stage III    Plan:   1.  Admit to telemetry  2.  Cardizem drip  3.  Labs in the morning to include troponin  4. Orthopedics consult  5.  Gentle IV hydration, the patient has had diarrhea for several days  6.  Cardiac echo in the morning  7.  Every shift orthostatic blood pressure checks  8.  Fall precautions    Code Status/Advanced Care Plan: Full    The patient's surrogate decision maker is family.     I discussed my findings and recommendations with the patient and family at bedside.    Estimated length of stay is 1 to 2 days.     The patient was seen and examined by me on 8/7/2022 at seen after midnight.    Electronically signed by Michael Hinton DO, 08/07/22, 01:13 CDT.

## 2022-08-07 NOTE — PROGRESS NOTES
Patient feeling better this AM. HR is controlled, cardizem IV switched to oral lopressor. Anticoagulated with Xarelto. Renal function improving, baseline GFR 45.   Left foot pain waiting for orthopedic surgery recommendations.     Electronically signed by Humberto Ireland MD, 08/07/22, 12:56 PM CDT.

## 2022-08-08 LAB
ANION GAP SERPL CALCULATED.3IONS-SCNC: 10 MMOL/L (ref 5–15)
BUN SERPL-MCNC: 16 MG/DL (ref 8–23)
BUN/CREAT SERPL: 13.1 (ref 7–25)
CALCIUM SPEC-SCNC: 8.3 MG/DL (ref 8.6–10.5)
CHLORIDE SERPL-SCNC: 110 MMOL/L (ref 98–107)
CO2 SERPL-SCNC: 20 MMOL/L (ref 22–29)
CREAT SERPL-MCNC: 1.22 MG/DL (ref 0.57–1)
EGFRCR SERPLBLD CKD-EPI 2021: 47.2 ML/MIN/1.73
GLUCOSE SERPL-MCNC: 118 MG/DL (ref 65–99)
MAGNESIUM SERPL-MCNC: 2.1 MG/DL (ref 1.6–2.4)
POTASSIUM SERPL-SCNC: 4.2 MMOL/L (ref 3.5–5.2)
QT INTERVAL: 364 MS
QTC INTERVAL: 476 MS
SODIUM SERPL-SCNC: 140 MMOL/L (ref 136–145)
T4 FREE SERPL-MCNC: 1.17 NG/DL (ref 0.93–1.7)
TSH SERPL DL<=0.05 MIU/L-ACNC: 5.32 UIU/ML (ref 0.27–4.2)

## 2022-08-08 PROCEDURE — 80048 BASIC METABOLIC PNL TOTAL CA: CPT | Performed by: INTERNAL MEDICINE

## 2022-08-08 PROCEDURE — 84443 ASSAY THYROID STIM HORMONE: CPT | Performed by: INTERNAL MEDICINE

## 2022-08-08 PROCEDURE — 97116 GAIT TRAINING THERAPY: CPT

## 2022-08-08 PROCEDURE — 84439 ASSAY OF FREE THYROXINE: CPT | Performed by: INTERNAL MEDICINE

## 2022-08-08 PROCEDURE — 83735 ASSAY OF MAGNESIUM: CPT | Performed by: INTERNAL MEDICINE

## 2022-08-08 PROCEDURE — 97161 PT EVAL LOW COMPLEX 20 MIN: CPT

## 2022-08-08 RX ORDER — METOPROLOL SUCCINATE 25 MG/1
25 TABLET, EXTENDED RELEASE ORAL DAILY
Status: ON HOLD | COMMUNITY
End: 2022-08-09 | Stop reason: SDUPTHER

## 2022-08-08 RX ORDER — PANTOPRAZOLE SODIUM 40 MG/1
40 TABLET, DELAYED RELEASE ORAL DAILY PRN
COMMUNITY

## 2022-08-08 RX ADMIN — OXYCODONE HYDROCHLORIDE 5 MG: 5 TABLET ORAL at 20:03

## 2022-08-08 RX ADMIN — PANTOPRAZOLE SODIUM 40 MG: 40 TABLET, DELAYED RELEASE ORAL at 09:02

## 2022-08-08 RX ADMIN — METOPROLOL TARTRATE 25 MG: 25 TABLET, FILM COATED ORAL at 09:02

## 2022-08-08 RX ADMIN — MEMANTINE HYDROCHLORIDE 10 MG: 5 TABLET, FILM COATED ORAL at 09:02

## 2022-08-08 RX ADMIN — OXYCODONE HYDROCHLORIDE 5 MG: 5 TABLET ORAL at 15:20

## 2022-08-08 RX ADMIN — ALPRAZOLAM 1 MG: 0.5 TABLET ORAL at 20:03

## 2022-08-08 RX ADMIN — OXYCODONE HYDROCHLORIDE 5 MG: 5 TABLET ORAL at 04:21

## 2022-08-08 RX ADMIN — MEMANTINE HYDROCHLORIDE 10 MG: 5 TABLET, FILM COATED ORAL at 21:52

## 2022-08-08 RX ADMIN — METOPROLOL TARTRATE 25 MG: 25 TABLET, FILM COATED ORAL at 21:52

## 2022-08-08 RX ADMIN — ATORVASTATIN CALCIUM 80 MG: 40 TABLET, FILM COATED ORAL at 09:02

## 2022-08-08 RX ADMIN — ESCITALOPRAM 20 MG: 10 TABLET, FILM COATED ORAL at 09:02

## 2022-08-08 RX ADMIN — ASPIRIN 81 MG: 81 TABLET, COATED ORAL at 09:02

## 2022-08-08 RX ADMIN — Medication 10 ML: at 21:52

## 2022-08-08 RX ADMIN — TOPIRAMATE 200 MG: 100 TABLET, FILM COATED ORAL at 09:02

## 2022-08-08 RX ADMIN — OXYCODONE HYDROCHLORIDE 5 MG: 5 TABLET ORAL at 09:01

## 2022-08-08 RX ADMIN — CYCLOSPORINE 1 DROP: 0.5 EMULSION OPHTHALMIC at 21:52

## 2022-08-08 RX ADMIN — RIVAROXABAN 20 MG: 20 TABLET, FILM COATED ORAL at 12:23

## 2022-08-08 RX ADMIN — Medication 10 ML: at 09:01

## 2022-08-08 NOTE — PLAN OF CARE
Pt rested well throughout night.  Alert/Oriented x 4. Pt very pleasant and cooperative with staff. On RA.  No c/o CP, SOB, or increased WOB noted. SR on monitor.  PRN meds effective in managing pain. Able to get to [facility] with assist x 1.  No other issues or concerns noted.  Will continue to monitor for acute changes in status   headache/diarrhea

## 2022-08-08 NOTE — THERAPY TREATMENT NOTE
Acute Care - Physical Therapy Treatment Note  Psychiatric     Patient Name: Natalee Noble  : 1950  MRN: [id number]  Today's Date: 2022      Visit Dx:     ICD-10-CM ICD-9-CM   1. Atrial fibrillation with RVR ([facility])  I48.91 427.31   2. On continuous oral anticoagulation  Z79.01 V58.61   3. Syncope and collapse  R55 780.2   4. COVID-19 virus infection  U07.1 079.89   5. Closed displaced fracture of fourth metatarsal bone of left foot, initial encounter  S92.342A 825.25   6. Impaired mobility  Z74.09 799.89     Patient Active Problem List   Diagnosis   • Cerebrovascular accident (CVA) ([facility])   • Paroxysmal A-fib ([facility])   • Hyperlipidemia   • Migraines   • History of echocardiogram   • History of cardiac cath   • History of cardiac monitoring   • CKD (chronic kidney disease) stage 3, GFR 30-59 ml/min ([facility])   • Allergy to iodine   • Hx of colonic polyps   • Epigastric pain   • Nausea   • Bloating   • Belching   • Long term current use of anticoagulant therapy   • Atrial fibrillation with RVR ([facility])     Past Medical History:   Diagnosis Date   • Anxiety and depression    • Arthritis    • Back pain    • CRF (chronic renal failure), stage 3 (moderate) ([facility])    • Diverticulosis    • Essential hypertension    • Family history of colon cancer    • Family history of colonic polyps    • Fractures     R tibia/fibula; L ankle   • History of cardiac cath     19 left main no significant disease. LAD with no significant disease. LCX no significant disease. RCA no significant disease.   • History of cardiac monitoring     19 - ZIO PATCH - Underlying rhythm is sinus at 48-94 bpm. PSVT x23 with fastest 167 bpm, longest 24.4 seconds.   10/05/2018- ZIO PATCH - NSR most of time but did have episode of A fib ranging from .   • History of colon polyps    • History of echocardiogram     2018: Left ventricular systolic function is normal. EF 50%. Left ventricular diastolic dysfunction consistent with ipaired  relaxation. Left atrial cavity size is mildly dilated. Mild MR. Mild to moderate TR. There is abnormal thickening noted on the RV that may represent a vegetation-this was discussed with Dr. Hammer and Dr. Reilly. No patent foramen ovale. No further work up needed at this time   • Hyperlipidemia    • Kidney disorder    • Migraine    • Migraines    • Neck pain      Past Surgical History:   Procedure Laterality Date   • ABDOMINOPLASTY     • BACK SURGERY      L2-5 fusion 2012   • CHOLECYSTECTOMY     • COLONOSCOPY  11/24/2014    Diverticulosis; Repeat 5 years   • COLONOSCOPY  11/09/2010    Diverticulosis; Repeat 4 years   • COLONOSCOPY  09/18/2007    Dr. Monzon-Normal; Repeat 3 years   • COLONOSCOPY  06/02/2005    Dr. Monzon-Diminuitive polypectomy above the cecum; Otherwise normal colonoscopy; Repeat 2 years   • COLONOSCOPY N/A 11/1/2019    Procedure: COLONOSCOPY WITH ANESTHESIA;  Surgeon: Susan Lord MD;  Location: [facility] ENDOSCOPY;  Service: Gastroenterology   • CYSTOCELE REPAIR      Cystocele and rectocele repair   • ENDOSCOPY  09/28/2012    LA grade C esophagitis; HH   • ENDOSCOPY N/A 11/1/2019    Procedure: ESOPHAGOGASTRODUODENOSCOPY WITH ANESTHESIA;  Surgeon: Susan Lord MD;  Location:  [facility] ENDOSCOPY;  Service: Gastroenterology   • FIBULA FRACTURE SURGERY     • HYSTERECTOMY     • NECK SURGERY  2012    ACDF C4-7   • ORIF TIBIA FRACTURE Right      PT Assessment (last 12 hours)     PT Evaluation and Treatment     Row Name 08/08/22 3190          Physical Therapy Time and Intention    Subjective Information complains of;pain  -     Document Type therapy note (daily note)  -     Mode of Treatment physical therapy  -     Comment Nsg reported that pt. had a run of Vtach prior to getting up. But no episode during this session.  -     Row Name 08/08/22 9171          General Information    Existing Precautions/Restrictions fall  Limited WB on L LE, boot  -     Row Name 08/08/22 9688          Pain     Pretreatment Pain Rating 4/10  -     Posttreatment Pain Rating 4/10  -     Pain Location - Side/Orientation Left  -     Pain Location - foot  -     Pain Intervention(s) Repositioned;Ambulation/increased activity  -     Row Name 08/08/22 1352          Bed Mobility    Supine-Sit [place] (Bed Mobility) independent  -     Sit-Supine [place] (Bed Mobility) independent  -     Comment, (Bed Mobility) pt had to sit a few minutes after getting up due to dizziness.  -     Row Name 08/08/22 1352          Transfers    Sit-Stand [place] (Transfers) standby assist;contact guard  -     Stand-Sit [place] (Transfers) standby assist  -     Row Name 08/08/22 1352          Gait/Stairs (Locomotion)    [place] Level (Gait) standby assist;verbal cues  -     Assistive Device (Gait) walker, front-wheeled  -     Distance in Feet (Gait) 30  walking boot  -     Row Name 08/08/22 1352          Vital Signs    Posttreatment Heart Rate (beats/min) 68  -     Post Patient Position Sitting  -     Row Name 08/08/22 1352          Positioning and Restraints    Pre-Treatment Position in bed  -     Post Treatment Position bed  -     In Bed fowlers;call light within reach;encouraged to call for assist;side rails up x2;legs elevated  -           User Key  (r) = Recorded By, (t) = Taken By, (c) = Cosigned By    Initials Name Provider Type    Kellee Norman PTA Physical Therapist Assistant                Physical Therapy Education                 Title: PT OT SLP Therapies (Done)     Topic: Physical Therapy (Done)     Point: Mobility training (Done)     Learning Progress Summary           Patient Acceptance, E,D, VERENICE,RUSS by  at 8/8/2022 1142    Comment: benefits of PT and POC, reviewed proper stair sequence to enter/exit dtr's home, call for A OOB                   Point: Precautions (Done)     Learning Progress Summary           Patient Acceptance, E,D, VERENICE,VU by  at 8/8/2022 1142     Comment: benefits of PT and POC, reviewed proper stair sequence to enter/exit dtr's home, call for A OOB                               User Key     Initials Effective Dates Name Provider Type Discipline     06/16/21 -  Franco Fernandez, PT Physical Therapist PT              PT Recommendation and Plan             Time Calculation:    PT Charges     Row Name 08/08/22 1424 08/08/22 1143          Time Calculation    Start Time 1352  -MF 1023  -     Stop Time 1415  -MF 1123  -     Time Calculation (min) 23 min  - 60 min  -     PT Received On -- 08/08/22  -     PT Goal Re-Cert Due Date -- 08/18/22  -            Time Calculation- PT    Total Timed Code Minutes- PT 23 minute(s)  - --            Timed Charges    76332 - Gait Training Minutes  23  -MF --            Untimed Charges    PT Eval/Re-eval Minutes -- 60  -LH            Total Minutes    Timed Charges Total Minutes 23  -MF --     Untimed Charges Total Minutes -- 60  -LH      Total Minutes 23  -MF 60  -LH           User Key  (r) = Recorded By, (t) = Taken By, (c) = Cosigned By    Initials Name Provider Type     Franco Fernandez, PT Physical Therapist     Kellee Zee PTA Physical Therapist Assistant              Therapy Charges for Today     Code Description Service Date Service Provider Modifiers Qty    17682549115 HC GAIT TRAINING EA 15 MIN 8/8/2022 Kellee Zee PTA GP 2          PT G-Codes  Outcome Measure Options: AM-PAC 6 Clicks Basic Mobility (PT)  AM-PAC 6 Clicks Score (PT): 20    Kellee Zee PTA  8/8/2022

## 2022-08-08 NOTE — THERAPY EVALUATION
Patient Name: Natalee Noble  : 1950    MRN: [id number]                              Today's Date: 2022       Admit Date: 2022    Visit Dx:     ICD-10-CM ICD-9-CM   1. Atrial fibrillation with RVR ([facility])  I48.91 427.31   2. On continuous oral anticoagulation  Z79.01 V58.61   3. Syncope and collapse  R55 780.2   4. COVID-19 virus infection  U07.1 079.89   5. Closed displaced fracture of fourth metatarsal bone of left foot, initial encounter  S92.342A 825.25   6. Impaired mobility  Z74.09 799.89     Patient Active Problem List   Diagnosis   • Cerebrovascular accident (CVA) ([facility])   • Paroxysmal A-fib ([facility])   • Hyperlipidemia   • Migraines   • History of echocardiogram   • History of cardiac cath   • History of cardiac monitoring   • CKD (chronic kidney disease) stage 3, GFR 30-59 ml/min ([facility])   • Allergy to iodine   • Hx of colonic polyps   • Epigastric pain   • Nausea   • Bloating   • Belching   • Long term current use of anticoagulant therapy   • Atrial fibrillation with RVR ([facility])     Past Medical History:   Diagnosis Date   • Anxiety and depression    • Arthritis    • Back pain    • CRF (chronic renal failure), stage 3 (moderate) ([facility])    • Diverticulosis    • Essential hypertension    • Family history of colon cancer    • Family history of colonic polyps    • Fractures     R tibia/fibula; L ankle   • History of cardiac cath     19 left main no significant disease. LAD with no significant disease. LCX no significant disease. RCA no significant disease.   • History of cardiac monitoring     19 - ZIO PATCH - Underlying rhythm is sinus at 48-94 bpm. PSVT x23 with fastest 167 bpm, longest 24.4 seconds.   10/05/2018- ZIO PATCH - NSR most of time but did have episode of A fib ranging from .   • History of colon polyps    • History of echocardiogram     2018: Left ventricular systolic function is normal. EF 50%. Left ventricular diastolic dysfunction consistent with ipaired relaxation.  "Left atrial cavity size is mildly dilated. Mild MR. Mild to moderate TR. There is abnormal thickening noted on the RV that may represent a vegetation-this was discussed with Dr. Hammer and Dr. Reilly. No patent foramen ovale. No further work up needed at this time   • Hyperlipidemia    • Kidney disorder    • Migraine    • Migraines    • Neck pain      Past Surgical History:   Procedure Laterality Date   • ABDOMINOPLASTY     • BACK SURGERY      L2-5 fusion 2012   • CHOLECYSTECTOMY     • COLONOSCOPY  11/24/2014    Diverticulosis; Repeat 5 years   • COLONOSCOPY  11/09/2010    Diverticulosis; Repeat 4 years   • COLONOSCOPY  09/18/2007    Dr. Monzon-Normal; Repeat 3 years   • COLONOSCOPY  06/02/2005    Dr. Monzon-Diminuitive polypectomy above the cecum; Otherwise normal colonoscopy; Repeat 2 years   • COLONOSCOPY N/A 11/1/2019    Procedure: COLONOSCOPY WITH ANESTHESIA;  Surgeon: Susan Lord MD;  Location: [facility] ENDOSCOPY;  Service: Gastroenterology   • CYSTOCELE REPAIR      Cystocele and rectocele repair   • ENDOSCOPY  09/28/2012    LA grade C esophagitis; HH   • ENDOSCOPY N/A 11/1/2019    Procedure: ESOPHAGOGASTRODUODENOSCOPY WITH ANESTHESIA;  Surgeon: Susan Lord MD;  Location:  [facility] ENDOSCOPY;  Service: Gastroenterology   • FIBULA FRACTURE SURGERY     • HYSTERECTOMY     • NECK SURGERY  2012    ACDF C4-7   • ORIF TIBIA FRACTURE Right       General Information     Row Name 08/08/22 1023          Physical Therapy Time and Intention    Document Type evaluation  syncope and collapse, Dx:  R ankle sprain, L 4th metatarsal fx  -LH     Mode of Treatment physical therapy  -     Row Name 08/08/22 1023          General Information    Patient Profile Reviewed yes  -LH     Prior Level of Function independent:;community mobility;ADL's;shopping;driving;cleaning;cooking;home management  DME:  none.  dtr's house has tub/shower  -LH     Existing Precautions/Restrictions --  \"limited weight bearing\" in walking boot LLE  -LH     " "Barriers to Rehab none identified  -     Row Name 08/08/22 1023          Living Environment    People in Home alone  going to dtr's house at [place]  -[facility] Name 08/08/22 1023          Home Main Entrance    Number of Stairs, Main Entrance three  -     Stair Railings, Main Entrance none  -     Row Name 08/08/22 1023          Stairs Within Home, Primary    Number of Stairs, Within Home, Primary none  -[facility] Name 08/08/22 1023          Cognition    Orientation Status (Cognition) oriented x 4  -     Row Name 08/08/22 1023          Safety Issues, Functional Mobility    Safety Issues Affecting Function (Mobility) ability to follow commands  -     Impairments Affecting Function (Mobility) balance;endurance/activity tolerance  -           User Key  (r) = Recorded By, (t) = Taken By, (c) = Cosigned By    Initials Name Provider Type     Franco Fernandez, PT Physical Therapist               Mobility     Row Name 08/08/22 1023          Bed Mobility    Bed Mobility bed mobility (all) activities  -     All Activities, Wise (Bed Mobility) independent  -[facility] Name 08/08/22 1023          Sit-Stand Transfer    Sit-Stand [place] (Transfers) verbal cues;contact guard  -     Row Name 08/08/22 1023          Gait/Stairs (Locomotion)    [place] Level (Gait) verbal cues;standby assist  -     Assistive Device (Gait) walker, front-wheeled  -     Distance in Feet (Gait) 30  w/ LLE walking boot  -     Row Name 08/08/22 1023          Mobility    Extremity Weight-bearing Status left lower extremity  -     Left Lower Extremity (Weight-bearing Status) other (see comments)  \"limited\"  -           User Key  (r) = Recorded By, (t) = Taken By, (c) = Cosigned By    Initials Name Provider Type     Franco Fernandez, PT Physical Therapist               Obj/Interventions     Row Name 08/08/22 1023          Range of Motion Comprehensive    General Range of Motion lower extremity range of motion deficits " identified;bilateral upper extremity ROM WFL  -     Row Name 08/08/22 1023          Strength Comprehensive (MMT)    General Manual Muscle Testing (MMT) Assessment lower extremity strength deficits identified  -[facility] Name 08/08/22 1023          Sensory Assessment (Somatosensory)    Sensory Assessment (Somatosensory) sensation intact  -           User Key  (r) = Recorded By, (t) = Taken By, (c) = Cosigned By    Initials Name Provider Type     Franco Fernandez, PT Physical Therapist               Goals/Plan     [facility] Name 08/08/22 1023          Gait Training Goal 1 (PT)    Activity/Assistive Device (Gait Training Goal 1, PT) gait (walking locomotion);walker, rolling  -     [place] Level (Gait Training Goal 1, PT) independent  -     Distance (Gait Training Goal 1, PT) 75ft  -     Time Frame (Gait Training Goal 1, PT) by discharge  -     Strategies/Barriers (Gait Training Goal 1, PT) L walking boot  -     Progress/Outcome (Gait Training Goal 1, PT) new goal  -[facility] Name 08/08/22 1023          Therapy Assessment/Plan (PT)    Planned Therapy Interventions (PT) gait training;orthotic fitting/training;patient/family education  -           User Key  (r) = Recorded By, (t) = Taken By, (c) = Cosigned By    Initials Name Provider Type     Franco Fernandez, PT Physical Therapist               Clinical Impression     [facility] Name 08/08/22 1023          Pain    Pretreatment Pain Rating 5/10  -     Posttreatment Pain Rating 5/10  -     Pain Location - Side/Orientation Left  -     Pain Location - foot  -     Pain Intervention(s) Medication (See MAR);Ambulation/increased activity  -[facility] Name 08/08/22 1023          Plan of Care Review    Plan of Care Reviewed With patient  -     Outcome Evaluation PT IE complete.  A&Ox4.  C/o 5/10 L foot pain.  Reports improvement in mobility w/ walking boot and RW.  Independent bed mobility.  CGA sit<>stand.  Ambulated 30ft SBA x1 w/ RW and L walking boot.  PT to  provide gait training and boot don/dof education.  Recommend home w/ dtr and RW at [place].  Thank you for referral.  -[facility] Name 08/08/22 1023          Therapy Assessment/Plan (PT)    Patient/Family Therapy Goals Statement (PT) return home  -     Rehab Potential (PT) good, to achieve stated therapy goals  -     Criteria for Skilled Interventions Met (PT) yes;skilled treatment is necessary  -     Therapy Frequency (PT) 2 times/day  -     Predicted Duration of Therapy Intervention (PT) until dc  -[facility] Name 08/08/22 1023          Positioning and Restraints    Pre-Treatment Position in bed  -LH     Post Treatment Position bed  -     In Bed fowlers;call light within reach;encouraged to call for assist;side rails up x2;legs elevated  -           User Key  (r) = Recorded By, (t) = Taken By, (c) = Cosigned By    Initials Name Provider Type     Franco Fernandez, PT Physical Therapist               Outcome Measures     [facility] Name 08/08/22 1023          How much help from another person do you currently need...    Turning from your back to your side while in flat bed without using bedrails? 4  -LH     Moving from lying on back to sitting on the side of a flat bed without bedrails? 4  -LH     Moving to and from a bed to a chair (including a wheelchair)? 3  -LH     Standing up from a chair using your arms (e.g., wheelchair, bedside chair)? 3  -LH     Climbing 3-5 steps with a railing? 3  -LH     To walk in hospital room? 3  -     AM-PAC 6 Clicks Score (PT) 20  -     Highest level of mobility 6 --> Walked 10 steps or more  -     Row Name 08/08/22 1023          Functional Assessment    Outcome Measure Options AM-PAC 6 Clicks Basic Mobility (PT)  -           User Key  (r) = Recorded By, (t) = Taken By, (c) = Cosigned By    Initials Name Provider Type    Franco Ye, PT Physical Therapist                             Physical Therapy Education                 Title: PT OT SLP Therapies (Done)     Topic:  Physical Therapy (Done)     Point: Mobility training (Done)     Learning Progress Summary           Patient Acceptance, E,D, DU,VU by  at 8/8/2022 1142    Comment: benefits of PT and POC, reviewed proper stair sequence to enter/exit dtr's home, call for A OOB                   Point: Precautions (Done)     Learning Progress Summary           Patient Acceptance, E,D, DU,VU by  at 8/8/2022 1142    Comment: benefits of PT and POC, reviewed proper stair sequence to enter/exit dtr's home, call for A OOB                               User Key     Initials Effective Dates Name Provider Type [facility] 06/16/21 -  Franco Fernandez, PT Physical Therapist PT              PT Recommendation and Plan  Planned Therapy Interventions (PT): gait training, orthotic fitting/training, patient/family education  Plan of Care Reviewed With: patient  Outcome Evaluation: PT IE complete.  A&Ox4.  C/o 5/10 L foot pain.  Reports improvement in mobility w/ walking boot and RW.  Independent bed mobility.  CGA sit<>stand.  Ambulated 30ft SBA x1 w/ RW and L walking boot.  PT to provide gait training and boot don/dof education.  Recommend home w/ dtr and RW at [place].  Thank you for referral.     Time Calculation:    PT Charges     Row Name 08/08/22 1143             Time Calculation    Start Time 1023  -      Stop Time 1123  -      Time Calculation (min) 60 min  -      PT Received On 08/08/22  -      PT Goal Re-Cert Due Date 08/18/22  -              Untimed Charges    PT Eval/Re-eval Minutes 60  -              Total Minutes    Untimed Charges Total Minutes 60  -       Total Minutes 60  -            User Key  (r) = Recorded By, (t) = Taken By, (c) = Cosigned By    Initials Name Provider Type     Franco Fernandez, PT Physical Therapist              Therapy Charges for Today     Code Description Service Date Service Provider Modifiers Qty    51280394777 HC PT EVAL LOW COMPLEXITY 4 8/8/2022 Franco Fernandez, PT GP 1          PT  G-Codes  Outcome Measure Options: AM-PAC 6 Clicks Basic Mobility (PT)  AM-PAC 6 Clicks Score (PT): 20    Franco Fernandez, PT  8/8/2022

## 2022-08-08 NOTE — PLAN OF CARE
Problem: Adult Inpatient Plan of Care  Goal: Plan of Care Review  Recent Flowsheet Documentation  Taken 8/8/2022 1023 by Franco Fernandez, PT  Plan of Care Reviewed With: patient  Outcome Evaluation: PT IE complete.  A&Ox4.  C/o 5/10 L foot pain.  Reports improvement in mobility w/ walking boot and RW.  Independent bed mobility.  CGA sit<>stand.  Ambulated 30ft SBA x1 w/ RW and L walking boot.  PT to provide gait training and boot don/dof education.  Recommend home w/ dtr and RW at [place].  Thank you for referral.   Goal Outcome Evaluation:  Plan of Care Reviewed With: patient           Outcome Evaluation: PT IE complete.  A&Ox4.  C/o 5/10 L foot pain.  Reports improvement in mobility w/ walking boot and RW.  Independent bed mobility.  CGA sit<>stand.  Ambulated 30ft SBA x1 w/ RW and L walking boot.  PT to provide gait training and boot don/dof education.  Recommend home w/ dtr and RW at [place].  Thank you for referral.

## 2022-08-08 NOTE — PLAN OF CARE
Goal Outcome Evaluation:           Progress: improving  Outcome Evaluation: Patient c/o HA and foot pain, given pain med as ordered with relief. Patient has her cam walking boot in the room to use when up. Still on RA. VSS. Sinus in the 60s and had 15 beats of SVT and MD made aware. Echo ordered for today. Continue monitoring.

## 2022-08-08 NOTE — PROGRESS NOTES
Discharge Planning Assessment  [facility]     Patient Name: Natalee Noble  MRN: [id number]  Today's Date: 8/8/2022    Admit Date: 8/6/2022     Discharge Needs Assessment     Row Name 08/08/22 1336       Living Environment    People in Home alone    Current Living Arrangements home    Primary Care Provided by self    Provides Primary Care For no one    Family Caregiver if Needed child(siobhan), adult    Family Caregiver Names Daughter, Gaurang    Quality of Family Relationships helpful;involved    Able to Return to Prior Arrangements yes       Transition Planning    Patient/Family Anticipates Transition to home with family    Patient/Family Anticipated Services at Transition durable medical equipment    Transportation Anticipated family or friend will provide       Discharge Needs Assessment    Readmission Within the Last 30 Days no previous admission in last 30 days    Equipment Currently Used at Home none    Concerns to be Addressed adjustment to diagnosis/illness    Equipment Needed After Discharge walker, rolling    Discharge Coordination/Progress Patient lives alone but says plan is to stay with her daughter at discharge. Patient has PCP and Rx coverage. PT has recommended that patient have a rolling walker and patient confirms that this will need to be ordered at discharge as she does not have access to one. SW informed Dr. Merritt and he says he will order at dc. Will arrange walker to be delivered at dc when order written by MD.                  SHEREE Vitale

## 2022-08-08 NOTE — CONSULTS
Orthopaedic Inpatient Consultation    NAME:  Natalee Noble   : 1950  MRN: [id number]    2022  9:14 PM    Requesting Physician: Michael Hinton DO    CHIEF COMPLAINT:  left foot pain    HISTORY OF PRESENT ILLNESS:   The patient is a 72 y.o. female who is COVID-positive finds her self admitted with left foot pain.  X-rays demonstrated an oblique left fourth metatarsal fracture.  Pain is located in the left foot, rated a 2/5, dull and constant, worse with movement, better with rest and medication.  There are no associated symptoms.      Past Medical History:    Past Medical History:   Diagnosis Date   • Anxiety and depression    • Arthritis    • Back pain    • CRF (chronic renal failure), stage 3 (moderate) (HCC)    • Diverticulosis    • Essential hypertension    • Family history of colon cancer    • Family history of colonic polyps    • Fractures     R tibia/fibula; L ankle   • History of cardiac cath     19 left main no significant disease. LAD with no significant disease. LCX no significant disease. RCA no significant disease.   • History of cardiac monitoring     19 - ZIO PATCH - Underlying rhythm is sinus at 48-94 bpm. PSVT x23 with fastest 167 bpm, longest 24.4 seconds.   10/05/2018- ZIO PATCH - NSR most of time but did have episode of A fib ranging from .   • History of colon polyps    • History of echocardiogram     2018: Left ventricular systolic function is normal. EF 50%. Left ventricular diastolic dysfunction consistent with ipaired relaxation. Left atrial cavity size is mildly dilated. Mild MR. Mild to moderate TR. There is abnormal thickening noted on the RV that may represent a vegetation-this was discussed with Dr. Hammer and Dr. Reilly. No patent foramen ovale. No further work up needed at this time   • Hyperlipidemia    • Kidney disorder    • Migraine    • Migraines    • Neck pain        Past Surgical History:    Past Surgical History:   Procedure Laterality Date    • ABDOMINOPLASTY     • BACK SURGERY      L2-5 fusion 2012   • CHOLECYSTECTOMY     • COLONOSCOPY  11/24/2014    Diverticulosis; Repeat 5 years   • COLONOSCOPY  11/09/2010    Diverticulosis; Repeat 4 years   • COLONOSCOPY  09/18/2007    Dr. Monzon-Normal; Repeat 3 years   • COLONOSCOPY  06/02/2005    Dr. Monzon-Diminuitive polypectomy above the cecum; Otherwise normal colonoscopy; Repeat 2 years   • COLONOSCOPY N/A 11/1/2019    Procedure: COLONOSCOPY WITH ANESTHESIA;  Surgeon: Susan Lord MD;  Location:  [facility];  Service: Gastroenterology   • CYSTOCELE REPAIR      Cystocele and rectocele repair   • ENDOSCOPY  09/28/2012    LA grade C esophagitis; HH   • ENDOSCOPY N/A 11/1/2019    Procedure: ESOPHAGOGASTRODUODENOSCOPY WITH ANESTHESIA;  Surgeon: Susan Lord MD;  Location:  [facility];  Service: Gastroenterology   • FIBULA FRACTURE SURGERY     • HYSTERECTOMY     • NECK SURGERY  2012    ACDF C4-7   • ORIF TIBIA FRACTURE Right        Current Medications:   Prior to Admission medications    Medication Sig Start Date End Date Taking? Authorizing Provider   ALPRAZolam (XANAX) 1 MG tablet Take 1 mg by mouth 3 (Three) Times a Day As Needed for Anxiety.   Yes Jacklyn Rahman MD   aspirin 81 MG tablet Take 1 tablet by mouth Daily. 9/3/18  Yes Mayur Sweet MD   atorvastatin (LIPITOR) 80 MG tablet Take 1 tablet by mouth Daily. 4/23/21  Yes Jacklyn Rahman MD   Coenzyme Q10 (CO Q-10) 200 MG capsule Take 1 tablet by mouth Daily.   Yes Jacklyn Rahman MD   conjugated estrogens (PREMARIN) 0.625 MG/GM vaginal cream As Needed. 8/23/12  Yes Jacklyn Rahman MD   cycloSPORINE (RESTASIS) 0.05 % ophthalmic emulsion Administer 1 drop to both eyes Every 12 (Twelve) Hours. Per patient during admission assessment & med req 5/1/19  Yes Jacklyn Rahman MD   Ergocalciferol (VITAMIN D2) 2000 units tablet Take  by mouth.   Yes Jacklyn Rahman MD   escitalopram (LEXAPRO) 20 MG tablet  Take 20 mg by mouth Daily.   Yes Jacklyn Rahman MD   ezetimibe (ZETIA) 10 MG tablet Take 1 tablet by mouth Daily. 8/5/20  Yes Jose Scott MD   FOLIC ACID PO Take  by mouth Daily.   Yes Jacklyn Rahman MD   hydroxychloroquine (PLAQUENIL) 200 MG tablet Take 1 tablet by mouth 2 (Two) Times a Day.   Yes Jacklyn Rahman MD   memantine (NAMENDA) 10 MG tablet Take 10 mg by mouth 2 (Two) Times a Day.   Yes Jacklyn Rahman MD   metoprolol succinate XL (TOPROL-XL) 25 MG 24 hr tablet TAKE 1 TABLET BY MOUTH DAILY 6/14/22  Yes Jose Scott MD   pantoprazole (PROTONIX) 40 MG EC tablet Take 1 tablet by mouth Daily. 11/1/19  Yes Susan Lord MD   rivaroxaban (XARELTO) 20 MG tablet Take 20 mg by mouth Daily.   Yes Jacklyn Rahman MD   topiramate (TOPAMAX) 200 MG tablet Take 1 tablet by mouth Daily. 6/4/19  Yes Jacklyn Rahman MD   butalbital-acetaminophen-caffeine (FIORICET, ESGIC) -40 MG per tablet Take 1 tablet by mouth Every 6 (Six) Hours As Needed for Headache.    Jacklyn Rahman MD       Allergies:  Benzoin, Iodine, Latex, Levaquin [levofloxacin], Sulfa antibiotics, Vancomycin, and Acetaminophen    Social History:   Social History     Socioeconomic History   • Marital status:    Tobacco Use   • Smoking status: Never Smoker   • Smokeless tobacco: Never Used   Vaping Use   • Vaping Use: Never used   Substance and Sexual Activity   • Alcohol use: No   • Drug use: Never   • Sexual activity: Defer       Family History:   Family History   Problem Relation Age of Onset   • Colon cancer Father 65   • Colon polyps Daughter 38   • Esophageal cancer Neg Hx    • Liver cancer Neg Hx    • Liver disease Neg Hx    • Rectal cancer Neg Hx    • Stomach cancer Neg Hx        REVIEW OF SYSTEMS:  14 point review of systems has been reviewed from the patient's emergency room visit, reviewed with the patient on today's date with no new changes.    PHYSICAL EXAM:      Physical  Examination:  Vitals:   Vitals:    08/07/22 1226 08/07/22 1703 08/07/22 1710 08/07/22 1713   BP: 112/60 105/60 98/62 96/60   BP Location: Left arm Left arm Left arm Left arm   Patient Position: Lying Lying Sitting Standing   Pulse: 68 63     Resp: 16 16     Temp: 97.5 °F (36.4 °C) 98.1 °F (36.7 °C)     TempSrc: Oral Oral     SpO2: 100% 97%     Weight:       Height:         General:  Appears stated age, no distress.  Orientation:  Alert and oriented to time, place, and person.  Mood and Affect:  Cooperative and pleasant.  Gait:  Resting comfortably in bed.  Cardiovascular:  Symmetric 1-2 plus pulses in upper and lower extremities.  Lymph:  No cervical or inguinal lymphadenopathy noted.  Sensation:  Grossly intact to light touch.  DTR:  Normal, no pathologic reflexes.  Coordination/balance:  Normal    Musculoskeletal:  Right upper extremity exam:  There is no tenderness to palpation about the shoulder, elbow, wrist or hand.  Full motion.  Stability normal with provocative tests, 5/5 strength, and skin is normal.      Left upper extremity exam:  There is no tenderness to palpation about the shoulder, elbow, wrist or hand. Full motion.  Stability normal with provocative tests, 5/5 strength, and skin is normal.     Right lower extremity exam:  There is no tenderness to palpation about the hip, knee, ankle or foot.  Full motion  Stability normal with provocative tests, 5/5 strength, and skin is normal.     Left lower extremity exam:  There is no tenderness to palpation about the hip, knee or ankle, but there is described pain about the left mid/forefoot.  There is a short leg posterior splint in place to left lower extremity.  Skin is intact at the proximal and distal extents of the splint.  She demonstrates preserved sensation with brisk capillary refill and intact active digit range of motion.        DATA:    LAB RESULTS:      Lab 08/07/22  0410 08/06/22  2254   WBC 4.70 7.35   HEMOGLOBIN 11.9* 13.1   HEMATOCRIT 38.5  42.5   PLATELETS 143 154   NEUTROS ABS 3.42 5.77   IMMATURE GRANS (ABS) 0.02 0.04   LYMPHS ABS 0.75 0.80   MONOS ABS 0.39 0.62   EOS ABS 0.11 0.11   .8* 100.5*   PROCALCITONIN  --  0.09   LACTATE  --  1.8   PROTIME  --  24.9*   APTT  --  34.7   D DIMER QUANT  --  9.69*         Lab 08/07/22  0409 08/06/22  2254   SODIUM 139 142   POTASSIUM 4.0 4.1   CHLORIDE 110* 110*   CO2 20.0* 21.0*   ANION GAP 9.0 11.0   BUN 20 18   CREATININE 1.34* 1.49*   EGFR 42.2* 37.2*   GLUCOSE 106* 114*   CALCIUM 8.6 9.2         Lab 08/07/22  0409 08/06/22  2254   TOTAL PROTEIN 5.9* 6.6   ALBUMIN 3.40* 4.10   GLOBULIN 2.5 2.5   ALT (SGPT) 16 19   AST (SGOT) 18 21   BILIRUBIN 0.4 0.4   ALK PHOS 76 85   LIPASE  --  67*         Lab 08/07/22  0409 08/07/22  0053 08/06/22  2254   PROBNP  --   --  10,463.0*   TROPONIN T <0.010 <0.010 <0.010   PROTIME  --   --  24.9*   INR  --   --  2.35*                 Brief Urine Lab Results  (Last result in the past 365 days)      Color   Clarity   Blood   Leuk Est   Nitrite   Protein   CREAT   Urine HCG        05/09/22 1418 YELLOW   Clear   Negative   TRACE  Comment: Culture Urine under CMS guidelines and criteria will auto reflex on a sole  criteria that is based on manual microscopic count of more than 10/hpf for  WBC. If Culture Urine is warranted aside from this criteria, place a  requisition or order within 24 hours of collection.   Negative                 Microbiology Results (last 10 days)     Procedure Component Value - Date/Time    COVID PRE-OP / PRE-PROCEDURE SCREENING ORDER (NO ISOLATION) - Swab, Nasal Cavity [[id number]]  (Abnormal) Collected: 08/06/22 2126    Lab Status: Final result Specimen: Swab from Nasal Cavity Updated: 08/06/22 2229    Narrative:      The following orders were created for panel order COVID PRE-OP / PRE-PROCEDURE SCREENING ORDER (NO ISOLATION) - Swab, Nasal Cavity.  Procedure                               Abnormality         Status                     ---------                                -----------         ------                     COVID-19,[facility]...[[id number]]  Abnormal            Final result                 Please view results for these tests on the individual orders.    COVID-19,[facility] IN[facility]HOU[facility],Nasal Swab No Transport Media 3-4 HR TAT - Swab, Nasal Cavity [[id number]]  (Abnormal) Collected: 08/06/22 2126    Lab Status: Final result Specimen: Swab from Nasal Cavity Updated: 08/06/22 2229     COVID19 Detected    Narrative:      Fact sheet for providers: https://www.fda.gov/media/864758/download     Fact sheet for patients: https://www.[employer].gov/media/396858/download    Test performed by PCR.    Consider negative results in combination with clinical observations, patient history, and epidemiological information.             ----------------------------------------------------------------------------------------------------------------------  I have reviewed the radiology images above and agree with the findings dictated below    Radiology: XR Ankle 3+ View Left    Result Date: 8/7/2022  EXAMINATION: XR ANKLE 3+ VW LEFT- 8/7/2022 9:00 AM CDT  HISTORY: ankle pain, suspect sprain; lateral mal tender; I48.91-Unspecified atrial fibrillation; Z79.01-Long term (current) use of anticoagulants; R55-Syncope and collapse.  REPORT: 3 views of the left ankle were obtained.  COMPARISON: There are no correlative imaging studies for comparison.  Osseous alignment is normal, no fracture is identified. The joint spaces are preserved. The soft tissues appear within normal limits. An inferior calcaneal enthesophyte is present.      No acute osseous abnormality. This report was finalized on 08/07/2022 09:00 by Dr. Javid Espino MD.    XR Ankle 3+ View Right    Result Date: 8/7/2022  EXAMINATION: XR ANKLE 3+ VW RIGHT- 8/7/2022 9:03 AM CDT  HISTORY: foot pain; I48.91-Unspecified atrial fibrillation; Z79.01-Long term (current) use of anticoagulants; R55-Syncope and collapse  REPORT: 3  views of the right ankle were obtained.  COMPARISON: There are no correlative imaging studies for comparison.  There are hardware defects within the distal tibia with healed fractures of the tibia distal fibula. No acute fracture is identified. There is diffuse osteopenia. The ankle joint is preserved. There is mild lateral soft tissue swelling at the ankle. A small inferior calcaneal enthesophyte is present.      No acute osseous abnormality. Healed fractures of the chest of the right tibia and fibula with hardware defects, previous hardware removal. Probable lateral ankle sprain with soft tissue swelling. Diffuse osteopenia. This report was finalized on 08/07/2022 09:04 by Dr. Javid Espino MD.    XR Foot 3+ View Left    Result Date: 8/7/2022  EXAMINATION: XR FOOT 3+ VW LEFT- 8/7/2022 9:00 AM CDT  HISTORY: ankle pain, suspect sprain; lateral mal tender; I48.91-Unspecified atrial fibrillation; Z79.01-Long term (current) use of anticoagulants; R55-Syncope and collapse.  REPORT: 3 views of the left foot were obtained.  COMPARISON: There are no correlative imaging studies for comparison.  There is an acute obliquely oriented closed extra-articular fracture involving the distal shaft of the fourth metatarsal, with about 2 mm of displacement medially. There is no significant angulation. A small inferior calcaneal enthesophyte is present. The soft tissues are within normal limits.      Acute mildly displaced closed extra-articular obliquely oriented fracture through the distal shaft of the fourth metatarsal. This report was finalized on 08/07/2022 09:01 by Dr. Javid Espino MD.    XR Foot 3+ View Right    Result Date: 8/7/2022  EXAMINATION: XR FOOT 3+ VW RIGHT- 8/7/2022 9:01 AM CDT  HISTORY: suspect 5th met fx, foot pain; I48.91-Unspecified atrial fibrillation; Z79.01-Long term (current) use of anticoagulants; R55-Syncope and collapse  REPORT: 3 views of the right foot were obtained.  COMPARISON: There are no  correlative imaging studies for comparison.  Osseous alignment is normal, no fractures identified. There is narrowing of the talonavicular joint with dorsal osteophytosis. A small inferior calcaneal enthesophyte is present. There is mild overgrowth of the first metatarsal head with narrowing of the first MTP joint. The other joint spaces are preserved.      No fracture or acute osseous findings. Degenerative changes as described. This report was finalized on 08/07/2022 09:02 by Dr. Javid Espino MD.    CT Head Without Contrast    Result Date: 8/7/2022  EXAMINATION: CT HEAD WO CONTRAST- 8/7/2022 7:57 AM CDT  HISTORY: Facial trauma, blunt; I48.91-Unspecified atrial fibrillation; Z79.01-Long term (current) use of anticoagulants; R55-Syncope and collapse.  DOSE: 662 mGycm (Automatic exposure control technique was implemented in an effort to keep the radiation dose as low as possible without compromising image quality)  REPORT: Axial CT of the head was performed without contrast, reconstructed coronal and sagittal images are also reviewed.  Comparison: CT head without contrast 09/01/2018.  No intracranial hemorrhage, mass or mass effect is identified. There is stable decreased attenuation in the left temporal lobe compatible with previous infarction and encephalomalacia. The ventricles and basal cisterns are within normal limits. There is mild diffuse atrophy. There is decreased attenuation in the periventricular white matter tracts compatible with chronic small vessel white matter ischemic disease. The extracranial structures appear within normal limits.      No acute intracranial abnormality. Chronic left temporal lobe infarction. Mild diffuse atrophy and chronic small vessel white matter ischemic changes.  A preliminary report was provided by the [facility] radiology service.  This report was finalized on 08/07/2022 08:00 by Dr. Javid Espino MD.    CT Cervical Spine Without Contrast    Result Date:  8/7/2022  EXAMINATION: CT CERVICAL SPINE WO CONTRAST- 8/7/2022 9:06 AM CDT  HISTORY: Neck trauma (Age >= 65y); I48.91-Unspecified atrial fibrillation; Z79.01-Long term (current) use of anticoagulants; R55-Syncope and collapse  DOSE: 362 mGycm (Automatic exposure control technique was implemented in an effort to keep the radiation dose as low as possible without compromising image quality)  REPORT: Spiral CT of the cervical spine was performed without contrast, reconstructed coronal and sagittal images are also reviewed.  COMPARISON: CT cervical spine without contrast 08/24/2012.  There is evidence of interval ACDF with hardware extending from C4 through C7, in satisfactory position without loosening. There is excellent bony bridging at the fused levels. There is grade 1 spondylolisthesis at C3-4. No acute fracture is identified. There is mild spinal stenosis at C6-7. The prevertebral soft tissues are normal. Facet overgrowth and uncinate spurring is seen at several levels. No high-grade neuroforaminal narrowing is identified. Review of soft tissue windows shows no gross evidence of a traumatic cervical disc herniation, there is some motion artifact on images through the mid cervical spine. The visualized upper lungs are clear. The airway is patent. Incidentally noted is a oval low-attenuation nodule in the right thyroid gland measuring approximately 9 mm. This is likely benign.      No fracture, no acute osseous cervical spine abnormality. There is evidence of interval ACDF with hardware extending from C4 through C7 with satisfactory position without loosening. No evidence of pseudoarthrosis. A 1 spondylolisthesis C3-4. Additional degenerative changes are described above.  A preliminary report was provided by the [facility] radiology service.      This report was finalized on 08/07/2022 09:09 by Dr. Javid Espino MD.    XR Chest 1 View    Result Date: 8/7/2022  EXAMINATION: XR CHEST 1 VW- 8/7/2022 8:59 AM CDT   HISTORY: covid, syncope; I48.91-Unspecified atrial fibrillation; Z79.01-Long term (current) use of anticoagulants; R55-Syncope and collapse.  REPORT: A frontal view of the chest was obtained.  COMPARISON: Chest x-ray 09/01/2018.  The lungs are clear, heart size is normal. No pneumothorax or pleural effusion is identified. There is previous ACDF. The upper abdomen is unremarkable except for cholecystectomy clips.      No acute cardiopulmonary abnormality. This report was finalized on 08/07/2022 08:59 by Dr. Javid Espino MD.       ----------------------------------------------------------------------------------------------------------------------  Assessment:    1)  Acute traumatic displaced left fourth metatarsal diaphysis oblique fracture of the foot, initial encounter for closed fracture    Atrial fibrillation with RVR (HCC)       Plan:  1) Nonop treatment with serial x-rays until healing, PT/OT, limited weight bearing  2) Admit for pain control, PT/OT  3) we will discontinue splint and convert to cam walking boot to allow her to weight-bear through heel in boot    Electronically signed by Bertrand Bowles MD on 8/7/2022 at 19:56 CDT

## 2022-08-09 ENCOUNTER — READMISSION MANAGEMENT (OUTPATIENT)
Dept: CALL CENTER | Facility: HOSPITAL | Age: 72
End: 2022-08-09

## 2022-08-09 VITALS
WEIGHT: 154.9 LBS | SYSTOLIC BLOOD PRESSURE: 132 MMHG | OXYGEN SATURATION: 99 % | HEIGHT: 65 IN | DIASTOLIC BLOOD PRESSURE: 75 MMHG | RESPIRATION RATE: 16 BRPM | TEMPERATURE: 97.4 F | HEART RATE: 58 BPM | BODY MASS INDEX: 25.81 KG/M2

## 2022-08-09 PROBLEM — M84.475A METATARSAL FRACTURE, PATHOLOGIC, LEFT, INITIAL ENCOUNTER: Status: ACTIVE | Noted: 2022-08-09

## 2022-08-09 LAB
ANION GAP SERPL CALCULATED.3IONS-SCNC: 6 MMOL/L (ref 5–15)
BUN SERPL-MCNC: 12 MG/DL (ref 8–23)
BUN/CREAT SERPL: 10.7 (ref 7–25)
CALCIUM SPEC-SCNC: 8.1 MG/DL (ref 8.6–10.5)
CHLORIDE SERPL-SCNC: 109 MMOL/L (ref 98–107)
CO2 SERPL-SCNC: 24 MMOL/L (ref 22–29)
CREAT SERPL-MCNC: 1.12 MG/DL (ref 0.57–1)
EGFRCR SERPLBLD CKD-EPI 2021: 52.4 ML/MIN/1.73
GLUCOSE SERPL-MCNC: 124 MG/DL (ref 65–99)
POTASSIUM SERPL-SCNC: 4.3 MMOL/L (ref 3.5–5.2)
SODIUM SERPL-SCNC: 139 MMOL/L (ref 136–145)

## 2022-08-09 PROCEDURE — 94799 UNLISTED PULMONARY SVC/PX: CPT

## 2022-08-09 PROCEDURE — 94761 N-INVAS EAR/PLS OXIMETRY MLT: CPT

## 2022-08-09 PROCEDURE — 80048 BASIC METABOLIC PNL TOTAL CA: CPT | Performed by: INTERNAL MEDICINE

## 2022-08-09 PROCEDURE — 97116 GAIT TRAINING THERAPY: CPT

## 2022-08-09 RX ORDER — METOPROLOL SUCCINATE 25 MG/1
37.25 TABLET, EXTENDED RELEASE ORAL DAILY
Qty: 90 TABLET | Refills: 0
Start: 2022-08-09 | End: 2022-08-12 | Stop reason: SDUPTHER

## 2022-08-09 RX ORDER — OXYCODONE HYDROCHLORIDE 5 MG/1
5 TABLET ORAL EVERY 4 HOURS PRN
Qty: 18 TABLET | Refills: 0 | Status: SHIPPED | OUTPATIENT
Start: 2022-08-09 | End: 2022-08-12

## 2022-08-09 RX ADMIN — PANTOPRAZOLE SODIUM 40 MG: 40 TABLET, DELAYED RELEASE ORAL at 08:50

## 2022-08-09 RX ADMIN — ESCITALOPRAM 20 MG: 10 TABLET, FILM COATED ORAL at 08:50

## 2022-08-09 RX ADMIN — ATORVASTATIN CALCIUM 80 MG: 40 TABLET, FILM COATED ORAL at 08:50

## 2022-08-09 RX ADMIN — MEMANTINE HYDROCHLORIDE 10 MG: 5 TABLET, FILM COATED ORAL at 08:50

## 2022-08-09 RX ADMIN — TOPIRAMATE 200 MG: 100 TABLET, FILM COATED ORAL at 08:50

## 2022-08-09 RX ADMIN — ASPIRIN 81 MG: 81 TABLET, COATED ORAL at 08:50

## 2022-08-09 RX ADMIN — OXYCODONE HYDROCHLORIDE 5 MG: 5 TABLET ORAL at 12:47

## 2022-08-09 RX ADMIN — METOPROLOL TARTRATE 25 MG: 25 TABLET, FILM COATED ORAL at 08:50

## 2022-08-09 RX ADMIN — Medication 10 ML: at 08:50

## 2022-08-09 NOTE — PROGRESS NOTES
[facility] Medicine Services  INPATIENT PROGRESS NOTE    Length of Stay: 1  Date of Admission: 8/6/2022  Primary Care Physician: Woody Valentin MD    Subjective   Chief Complaint: Left foot pain    HPI: Patient has improvement in her left foot pain.  She had an episode of SVT today.  Echocardiogram pending.    Review of Systems   Constitutional: Negative for activity change, appetite change, chills, fatigue and fever.   HENT: Negative for congestion, ear pain, rhinorrhea, sore throat and trouble swallowing.    Respiratory: Negative for cough, chest tightness, shortness of breath and wheezing.    Cardiovascular: Negative for chest pain, palpitations and leg swelling.   Gastrointestinal: Negative for abdominal distention, abdominal pain, diarrhea, nausea and vomiting.   Genitourinary: Negative for difficulty urinating, dysuria and hematuria.   Musculoskeletal: Positive for arthralgias. Negative for back pain and myalgias.   Skin: Negative for pallor and rash.   Neurological: Negative for dizziness, syncope, weakness, light-headedness and headaches.   Hematological: Negative for adenopathy. Does not bruise/bleed easily.   Psychiatric/Behavioral: Negative for agitation and confusion. The patient is not nervous/anxious.      Objective    Temp:  [97 °F (36.1 °C)-97.9 °F (36.6 °C)] 97.4 °F (36.3 °C)  Heart Rate:  [61-67] 61  Resp:  [18] 18  BP: (100-156)/(54-82) 110/70    Physical Exam  Constitutional:       General: She is not in acute distress.     Appearance: She is not ill-appearing or diaphoretic.   HENT:      Head: Normocephalic and atraumatic.      Right Ear: External ear normal.      Left Ear: External ear normal.      Nose: No congestion or rhinorrhea.      Mouth/Throat:      Mouth: Mucous membranes are moist.      Pharynx: No oropharyngeal exudate or posterior oropharyngeal erythema.   Eyes:      General: No scleral icterus.     Extraocular Movements: Extraocular  movements intact.      Conjunctiva/sclera: Conjunctivae normal.   Cardiovascular:      Rate and Rhythm: Normal rate and regular rhythm.      Heart sounds: Normal heart sounds. No murmur heard.  Pulmonary:      Effort: Pulmonary effort is normal. No respiratory distress.      Breath sounds: Normal breath sounds. No wheezing, rhonchi or rales.   Abdominal:      General: Abdomen is flat. There is no distension.      Palpations: Abdomen is soft.      Tenderness: There is no abdominal tenderness. There is no guarding.   Musculoskeletal:         General: Tenderness and signs of injury present. No swelling or deformity.      Cervical back: Neck supple. No rigidity. No muscular tenderness.      Right lower leg: No edema.      Left lower leg: No edema.      Comments: Left foot and ankle wrapped in bandage.   Lymphadenopathy:      Cervical: No cervical adenopathy.   Skin:     General: Skin is warm and dry.   Neurological:      General: No focal deficit present.      Mental Status: She is alert and oriented to person, place, and time.      Cranial Nerves: No cranial nerve deficit.      Motor: No weakness.   Psychiatric:         Mood and Affect: Mood normal.         Behavior: Behavior normal.         Thought Content: Thought content normal.       Medication Review:    Current Facility-Administered Medications:   •  acetaminophen (TYLENOL) tablet 650 mg, 650 mg, Oral, Q4H PRN, Michael Hinton DO  •  ALPRAZolam (XANAX) tablet 1 mg, 1 mg, Oral, TID PRN, Michael Hinton DO, 1 mg at 08/08/22 2003  •  aspirin EC tablet 81 mg, 81 mg, Oral, Daily, Michael Hinton DO, 81 mg at 08/08/22 0902  •  atorvastatin (LIPITOR) tablet 80 mg, 80 mg, Oral, Daily, Michael Hinton DO, 80 mg at 08/08/22 0902  •  butalbital-acetaminophen-caffeine (FIORICET, ESGIC) -40 MG per tablet 1 tablet, 1 tablet, Oral, Q6H PRN, Michael Hinton DO  •  cycloSPORINE (RESTASIS) 0.05 % ophthalmic emulsion 1 drop, 1 drop, Both Eyes, Q12H PRN,  Michael Hinton DO  •  escitalopram (LEXAPRO) tablet 20 mg, 20 mg, Oral, Daily, Michael Hinton DO, 20 mg at 08/08/22 0902  •  memantine (NAMENDA) tablet 10 mg, 10 mg, Oral, Q12H, Michael Hinton DO, 10 mg at 08/08/22 0902  •  metoprolol tartrate (LOPRESSOR) tablet 25 mg, 25 mg, Oral, Q12H, Humberto Ireland MD, 25 mg at 08/08/22 0902  •  ondansetron (ZOFRAN) injection 4 mg, 4 mg, Intravenous, Q6H PRN, Michael Hinton DO  •  oxyCODONE (ROXICODONE) immediate release tablet 5 mg, 5 mg, Oral, Q4H PRN, Miguel Rios MD, 5 mg at 08/08/22 2003  •  pantoprazole (PROTONIX) EC tablet 40 mg, 40 mg, Oral, Daily, Michael Hinton DO, 40 mg at 08/08/22 0902  •  rivaroxaban (XARELTO) tablet 20 mg, 20 mg, Oral, Daily, Michael Hinton DO, 20 mg at 08/08/22 1223  •  sodium chloride 0.9 % flush 10 mL, 10 mL, Intravenous, Q12H, Michael Hinton DO, 10 mL at 08/08/22 0901  •  sodium chloride 0.9 % flush 10 mL, 10 mL, Intravenous, PRN, Michael Hinton DO  •  topiramate (TOPAMAX) tablet 200 mg, 200 mg, Oral, Daily, Michael Hinton DO, 200 mg at 08/08/22 0902    I have reviewed the patient's current medications.     Results Review:  I have reviewed the labs, radiology results, and diagnostic studies.    Laboratory Data:   Results from last 7 days   Lab Units 08/08/22  1054 08/07/22  0409 08/06/22  2254   SODIUM mmol/L 140 139 142   POTASSIUM mmol/L 4.2 4.0 4.1   CHLORIDE mmol/L 110* 110* 110*   CO2 mmol/L 20.0* 20.0* 21.0*   BUN mg/dL 16 20 18   CREATININE mg/dL 1.22* 1.34* 1.49*   GLUCOSE mg/dL 118* 106* 114*   CALCIUM mg/dL 8.3* 8.6 9.2   BILIRUBIN mg/dL  --  0.4 0.4   ALK PHOS U/L  --  76 85   ALT (SGPT) U/L  --  16 19   AST (SGOT) U/L  --  18 21   ANION GAP mmol/L 10.0 9.0 11.0     Estimated Creatinine Clearance: 41 mL/min (A) (by C-G formula based on SCr of 1.22 mg/dL (H)).  Results from last 7 days   Lab Units 08/08/22  1054   MAGNESIUM mg/dL 2.1         Results from last 7 days   Lab  Units 08/07/22  0410 08/06/22  2254   WBC 10*3/mm3 4.70 7.35   HEMOGLOBIN g/dL 11.9* 13.1   HEMATOCRIT % 38.5 42.5   PLATELETS 10*3/mm3 143 154     Results from last 7 days   Lab Units 08/06/22  2254   INR  2.35*       Culture Data:   Blood Culture   Date Value Ref Range Status   08/07/2022 No growth at 24 hours  Preliminary   08/06/2022 No growth at 24 hours  Preliminary     No results found for: URINECX  No results found for: RESPCX  No results found for: WOUNDCX  No results found for: STOOLCX  No components found for: BODYFLD    Radiology Data:   Imaging Results (Last 24 Hours)     ** No results found for the last 24 hours. **          Assessment/Plan     Hospital Problem List:  Active Problems:    Atrial fibrillation with RVR (HCC)  Syncope  Left foot fracture  COVID-19  CKD stage III    Plan  Continue p.o. metoprolol.  Check echocardiogram.  Monitor on telemetry.    Orthopedic surgery input appreciated for left foot fracture.  Placed left foot in boot and mobilized with PT/OT.    Continue IV fluid.  Patient's COVID symptoms appear mild at this time and is saturating adequately on room air.  Continue to monitor.    DVT prophylaxis with Xarelto    CODE STATUS is full code    Discharge Planning: In progress    I confirmed that the patient's Advance Care Plan is present, code status is documented, or surrogate decision maker is listed in the patient's medical record.      I have utilized all available immediate resources to obtain, update, or review the patient's current medications.      Oh Merritt MD   08/08/22   21:10 CDT

## 2022-08-09 NOTE — PLAN OF CARE
Problem: Adult Inpatient Plan of Care  Goal: Plan of Care Review  Recent Flowsheet Documentation    Progress: improving  Plan of Care Reviewed With: patient  Outcome Evaluation: Pt amb 30' SBA with Rw with cues to maintain WB on L foot. Pt educated on safety during gait..

## 2022-08-09 NOTE — PLAN OF CARE
Goal Outcome Evaluation:  Plan of Care Reviewed With: patient        Progress: improving  Outcome Evaluation: Patient received alert and orientated with no acute distress; patient out of bed with stand by assist utilizing boot to left foot as instructed.  Patient remains on continuous cardiac monitoring; pain managed well with ordered medications.  Will continue to assess and treat patient per plan of care.

## 2022-08-09 NOTE — DISCHARGE SUMMARY
[facility] Medicine Services  DISCHARGE SUMMARY       Date of Admission: 8/6/2022  Date of Discharge:  8/9/2022  Primary Care Physician: Woody Valentin MD    Presenting Problem/History of Present Illness:  Atrial fibrillation with RVR (HCC) [I48.91]     Final Discharge Diagnoses:  Active Hospital Problems    Diagnosis    • **Atrial fibrillation with RVR (HCC)    • Metatarsal fracture, pathologic, left, initial encounter    SVT    Consults:   Consults     Date and Time Order Name Status Description    8/7/2022  3:09 AM Inpatient Orthopedic Surgery Consult Completed           Procedures Performed: None                Pertinent Test Results:   XR Foot 3+ View Left [[id number]] Alessandro as Reviewed   Order Status: Completed Collected: 08/07/22 0900    Updated: 08/07/22 0904   Narrative:     EXAMINATION: XR FOOT 3+ VW LEFT- 8/7/2022 9:00 AM CDT       HISTORY: ankle pain, suspect sprain; lateral mal tender;   I48.91-Unspecified atrial fibrillation; Z79.01-Long term (current) use   of anticoagulants; R55-Syncope and collapse.       REPORT: 3 views of the left foot were obtained.       COMPARISON: There are no correlative imaging studies for comparison.       There is an acute obliquely oriented closed extra-articular fracture   involving the distal shaft of the fourth metatarsal, with about 2 mm of   displacement medially. There is no significant angulation. A small   inferior calcaneal enthesophyte is present. The soft tissues are within   normal limits.       Impression:     Acute mildly displaced closed extra-articular obliquely   oriented fracture through the distal shaft of the fourth metatarsal.   This report was finalized on 08/07/2022 09:01 by Dr. Javid Espino MD.     Chief Complaint on Day of Discharge: None    Hospital Course:  The patient is a 72 y.o. female with a past medical history of atrial fibrillation on Xarelto, CVA, and essential hypertension.  She presented  "with complaints of lightheadedness, syncope and collapse of 1 days duration.      The patient started having some shortness of breath and cough about 3 days prior to presentation and tested positive for COVID-19.  She developed lightheadedness on the day of presentation and had 2 episodes of syncope when she found her self on the floor.  After 1 of these falls, she developed left foot pain and right ankle pain.  Upon presentation to the ER she was found to have atrial fibrillation with RVR with occasional PVCs.  Rhythm changed to normal sinus rhythm shortly after admission and receiving p.o. metoprolol.  CT scan of the brain did not show any acute lesion but x-ray of the left and right foot showed oblique fracture through the distal shaft of the fourth metatarsal.  She was reviewed by orthopedic surgery and after splinting of the foot she was eventually transition to a walking cam boot.  She had a transient episode of SVT during admission which was short-lived for a few seconds on telemetry. Patient had no further episodes of lightheadedness or syncope during period of evaluation and her symptoms were thought to be from COVID-19 infection.  She was discharged on an increased dose of p.o. metoprolol and instructed to follow-up with her regular cardiologist in [place] within 1 week for monitoring of her paroxysmal atrial fibrillation.  He was given a rolling walker on discharge and instructed to follow-up with orthopedic surgeon Dr. Bowles for her left fourth metatarsal fracture after discharge.    Condition on Discharge: Stable  Physical Exam on Discharge:  /75 (BP Location: Left arm, Patient Position: Lying)   Pulse 58   Temp 97.4 °F (36.3 °C) (Oral)   Resp 16   Ht 165 cm (64.96\")   Wt 70.3 kg (154 lb 14.4 oz)   SpO2 99%   BMI 25.81 kg/m²   Physical Exam  Constitutional:       General: She is not in acute distress.     Appearance: She is not ill-appearing or diaphoretic.   HENT:      Head: " Normocephalic and atraumatic.      Right Ear: External ear normal.      Left Ear: External ear normal.      Nose: No congestion or rhinorrhea.      Mouth/Throat:      Mouth: Mucous membranes are moist.      Pharynx: No oropharyngeal exudate or posterior oropharyngeal erythema.   Eyes:      General: No scleral icterus.     Extraocular Movements: Extraocular movements intact.      Conjunctiva/sclera: Conjunctivae normal.   Cardiovascular:      Rate and Rhythm: Normal rate and regular rhythm.      Heart sounds: Normal heart sounds. No murmur heard.  Pulmonary:      Effort: Pulmonary effort is normal. No respiratory distress.      Breath sounds: Normal breath sounds. No wheezing, rhonchi or rales.   Abdominal:      General: Abdomen is flat. There is no distension.      Palpations: Abdomen is soft.      Tenderness: There is no abdominal tenderness. There is no guarding.   Musculoskeletal:         General: Tenderness and signs of injury present. No swelling or deformity.      Cervical back: Neck supple. No rigidity. No muscular tenderness.      Right lower leg: No edema.      Left lower leg: No edema.      Comments: Left .   Lymphadenopathy:      Cervical: No cervical adenopathy.   Skin:     General: Skin is warm and dry.   Neurological:      General: No focal deficit present.      Mental Status: She is alert and oriented to person, place, and time.      Cranial Nerves: No cranial nerve deficit.      Motor: No weakness.   Psychiatric:         Mood and Affect: Mood normal.         Behavior: Behavior normal.         Thought Content: Thought content normal.     Discharge Disposition:  Home or Self Care    Discharge Medications:     Discharge Medications      New Medications      Instructions Start Date   oxyCODONE 5 MG immediate release tablet  Commonly known as: ROXICODONE   5 mg, Oral, Every 4 Hours PRN         Changes to Medications      Instructions Start Date   metoprolol succinate XL 25 MG 24 hr  tablet  Commonly known as: TOPROL-XL  What changed: how much to take   37.25 mg, Oral, Daily         Continue These Medications      Instructions Start Date   ALPRAZolam 1 MG tablet  Commonly known as: XANAX   1 mg, Oral, 3 Times Daily PRN      aspirin 81 MG tablet   81 mg, Oral, Daily      atorvastatin 80 MG tablet  Commonly known as: LIPITOR   1 tablet, Oral, Daily      butalbital-acetaminophen-caffeine -40 MG per tablet  Commonly known as: FIORICET, ESGIC   1 tablet, Oral, Every 6 Hours PRN      Co Q-10 200 MG capsule   1 tablet, Oral, Daily      conjugated estrogens 0.625 MG/GM vaginal cream  Commonly known as: PREMARIN   1 g, Vaginal, As Needed      cycloSPORINE 0.05 % ophthalmic emulsion  Commonly known as: RESTASIS   1 drop, Both Eyes, Every 12 Hours, Per patient during admission assessment & med req      escitalopram 20 MG tablet  Commonly known as: LEXAPRO   20 mg, Oral, Daily      ezetimibe 10 MG tablet  Commonly known as: ZETIA   10 mg, Oral, Daily      FOLIC ACID PO   Oral, Daily      hydroxychloroquine 200 MG tablet  Commonly known as: PLAQUENIL   1 tablet, Oral, 2 Times Daily      memantine 10 MG tablet  Commonly known as: NAMENDA   10 mg, Oral, 2 Times Daily      pantoprazole 40 MG EC tablet  Commonly known as: PROTONIX   40 mg, Oral, Daily PRN      rivaroxaban 20 MG tablet  Commonly known as: XARELTO   20 mg, Oral, Daily      topiramate 200 MG tablet  Commonly known as: TOPAMAX   1 tablet, Oral, Daily      Vitamin D2 50 MCG (2000 UT) tablet   Oral             Discharge Diet:   Diet Instructions     Diet: Regular      Discharge Diet: Regular          Activity at Discharge:   Activity Instructions     Other Activity Instructions      Activity Instructions: weight-bear through heel in cam walking boot          Discharge Care Plan/Instructions:   1.  Follow-up with your primary care provider within 1 week of discharge.   2.  Follow-up with your regular cardiologist at [place] for paroxysmal  atrial fibrillation within 1 week of discharge.   3.  Follow-up with orthopedic surgeon Dr. Bowles within 1 week of discharge for your left metatarsal fracture.    Follow-up Appointments:   Future Appointments   Date Time Provider Department Center   8/12/2022  8:30 AM Jose Scott MD [facility]       Test Results Pending at Discharge:   Pending Labs     Order Current Status    Blood Culture - Blood, Arm, Right Preliminary result    Blood Culture - Blood, Arm, Right Preliminary result          Oh Merritt MD  08/09/22  12:38 CDT    Time 38 minutes of time was spent evaluating patient and planning discharge.      Part of this note may be an electronic transcription/translation of spoken language to printed text using the Dragon Dictation system.

## 2022-08-09 NOTE — THERAPY TREATMENT NOTE
Acute Care - Physical Therapy Treatment Note  Psychiatric     Patient Name: Natalee Noble  : 1950  MRN: [id number]  Today's Date: 2022      Visit Dx:     ICD-10-CM ICD-9-CM   1. Atrial fibrillation with RVR ([facility])  I48.91 427.31   2. On continuous oral anticoagulation  Z79.01 V58.61   3. Syncope and collapse  R55 780.2   4. COVID-19 virus infection  U07.1 079.89   5. Closed displaced fracture of fourth metatarsal bone of left foot, initial encounter  S92.342A 825.25   6. Impaired mobility  Z74.09 799.89     Patient Active Problem List   Diagnosis   • Cerebrovascular accident (CVA) ([facility])   • Paroxysmal A-fib ([facility])   • Hyperlipidemia   • Migraines   • History of echocardiogram   • History of cardiac cath   • History of cardiac monitoring   • CKD (chronic kidney disease) stage 3, GFR 30-59 ml/min ([facility])   • Allergy to iodine   • Hx of colonic polyps   • Epigastric pain   • Nausea   • Bloating   • Belching   • Long term current use of anticoagulant therapy   • Atrial fibrillation with RVR ([facility])     Past Medical History:   Diagnosis Date   • Anxiety and depression    • Arthritis    • Back pain    • CRF (chronic renal failure), stage 3 (moderate) ([facility])    • Diverticulosis    • Essential hypertension    • Family history of colon cancer    • Family history of colonic polyps    • Fractures     R tibia/fibula; L ankle   • History of cardiac cath     19 left main no significant disease. LAD with no significant disease. LCX no significant disease. RCA no significant disease.   • History of cardiac monitoring     19 - ZIO PATCH - Underlying rhythm is sinus at 48-94 bpm. PSVT x23 with fastest 167 bpm, longest 24.4 seconds.   10/05/2018- ZIO PATCH - NSR most of time but did have episode of A fib ranging from .   • History of colon polyps    • History of echocardiogram     2018: Left ventricular systolic function is normal. EF 50%. Left ventricular diastolic dysfunction consistent with ipaired  relaxation. Left atrial cavity size is mildly dilated. Mild MR. Mild to moderate TR. There is abnormal thickening noted on the RV that may represent a vegetation-this was discussed with Dr. Hammer and Dr. Reilly. No patent foramen ovale. No further work up needed at this time   • Hyperlipidemia    • Kidney disorder    • Migraine    • Migraines    • Neck pain      Past Surgical History:   Procedure Laterality Date   • ABDOMINOPLASTY     • BACK SURGERY      L2-5 fusion 2012   • CHOLECYSTECTOMY     • COLONOSCOPY  11/24/2014    Diverticulosis; Repeat 5 years   • COLONOSCOPY  11/09/2010    Diverticulosis; Repeat 4 years   • COLONOSCOPY  09/18/2007    Dr. Monzon-Normal; Repeat 3 years   • COLONOSCOPY  06/02/2005    Dr. Monzon-Diminuitive polypectomy above the cecum; Otherwise normal colonoscopy; Repeat 2 years   • COLONOSCOPY N/A 11/1/2019    Procedure: COLONOSCOPY WITH ANESTHESIA;  Surgeon: Susan Lord MD;  Location: [facility] ENDOSCOPY;  Service: Gastroenterology   • CYSTOCELE REPAIR      Cystocele and rectocele repair   • ENDOSCOPY  09/28/2012    LA grade C esophagitis; HH   • ENDOSCOPY N/A 11/1/2019    Procedure: ESOPHAGOGASTRODUODENOSCOPY WITH ANESTHESIA;  Surgeon: Susan Lord MD;  Location:  [facility] ENDOSCOPY;  Service: Gastroenterology   • FIBULA FRACTURE SURGERY     • HYSTERECTOMY     • NECK SURGERY  2012    ACDF C4-7   • ORIF TIBIA FRACTURE Right      PT Assessment (last 12 hours)     PT Evaluation and Treatment     Row Name 08/09/22 1058          Physical Therapy Time and Intention    Subjective Information complains of;pain  L foot 7/10  -WK     Document Type therapy note (daily note)  -WK     Mode of Treatment physical therapy  -WK     Row Name 08/09/22 1058          General Information    Existing Precautions/Restrictions fall  limited WB on left foot. boot  -WK     Row Name 08/09/22 1058          Bed Mobility    All Activities, [place] (Bed Mobility) independent  -WK     Row Name 08/09/22 1054           Transfers    Sit-Stand [place] (Transfers) standby assist  -WK     Stand-Sit [place] (Transfers) standby assist  -WK     Row Name 08/09/22 1058          Gait/Stairs (Locomotion)    [place] Level (Gait) verbal cues;standby assist  -WK     Assistive Device (Gait) walker, front-wheeled  -WK     Distance in Feet (Gait) 30' and then amb into BR , 10'  -WK     Comment, (Gait/Stairs) cues to keep limited weight on left foot while amb  -WK     Row Name 08/09/22 1058          Plan of Care Review    Plan of Care Reviewed With patient  -WK     Progress improving  -WK     Outcome Evaluation Pt amb 30' SBA with Rw with cues to maintain WB on L foot. Pt educated on safety during gait..  -WK     Row Name 08/09/22 1058          Vital Signs    Posttreatment Heart Rate (beats/min) 63  -WK     Row Name 08/09/22 1058          Positioning and Restraints    Pre-Treatment Position in bed  -WK     Post Treatment Position bed  -WK     In Bed fowlers;call light within reach;encouraged to call for assist  refused chair  -WK           User Key  (r) = Recorded By, (t) = Taken By, (c) = Cosigned By    Initials Name Provider Type    WK Nikkie Hayes, PTA Physical Therapist Assistant                Physical Therapy Education                 Title: PT OT SLP Therapies (Done)     Topic: Physical Therapy (Done)     Point: Mobility training (Done)     Learning Progress Summary           Patient Acceptance, ENANCY DU, VU by  at 8/8/2022 1142    Comment: benefits of PT and POC, reviewed proper stair sequence to enter/exit dtr's home, call for A OOB                   Point: Precautions (Done)     Learning Progress Summary           Patient Acceptance, ENANCY DU, VU by  at 8/8/2022 1142    Comment: benefits of PT and POC, reviewed proper stair sequence to enter/exit dtr's home, call for A OOB                               User Key     Initials Effective Dates Name Provider Type [facility] 06/16/21 -  Franco Fernandez, PT Physical  Therapist PT              PT Recommendation and Plan     Plan of Care Reviewed With: patient  Progress: improving  Outcome Evaluation: Pt amb 30' SBA with Rw with cues to maintain WB on L foot. Pt educated on safety during gait..   Outcome Measures     Row Name 08/09/22 1058             How much help from another person do you currently need...    Turning from your back to your side while in flat bed without using bedrails? 4  -WK      Moving from lying on back to sitting on the side of a flat bed without bedrails? 4  -WK      Moving to and from a bed to a chair (including a wheelchair)? 3  -WK      Standing up from a chair using your arms (e.g., wheelchair, bedside chair)? 3  -WK      Climbing 3-5 steps with a railing? 3  -WK      To walk in hospital room? 3  -WK      AM-PAC 6 Clicks Score (PT) 20  -WK              Functional Assessment    Outcome Measure Options AM-PAC 6 Clicks Basic Mobility (PT)  -WK            User Key  (r) = Recorded By, (t) = Taken By, (c) = Cosigned By    Initials Name Provider Type    WK Nikkie Hayes PTA Physical Therapist Assistant                 Time Calculation:    PT Charges     Row Name 08/09/22 1133             Time Calculation    Start Time 1058  -WK      Stop Time 1122  -WK      Time Calculation (min) 24 min  -WK      PT Received On 08/09/22  -WK              Time Calculation- PT    Total Timed Code Minutes- PT 24 minute(s)  -WK            User Key  (r) = Recorded By, (t) = Taken By, (c) = Cosigned By    Initials Name Provider Type    WK Nikkie Hayes PTA Physical Therapist Assistant              Therapy Charges for Today     Code Description Service Date Service Provider Modifiers Qty    [id number] HC GAIT TRAINING EA 15 MIN 8/9/2022 Nikkie Hayes PTA GP 2          PT G-Codes  Outcome Measure Options: AM-PAC 6 Clicks Basic Mobility (PT)  AM-PAC 6 Clicks Score (PT): 20    Nikkie Hayes PTA  8/9/2022

## 2022-08-09 NOTE — PROGRESS NOTES
Continued Stay Note   [place]     Patient Name: Natalee Noble  MRN: [id number]  Today's Date: 8/9/2022    Admit Date: 8/6/2022     Discharge Plan     Row Name 08/09/22 1341       Plan    Plan Walker    Patient/Family in Agreement with Plan yes    Final Discharge Disposition Code 01 - home or self-care    Final Note Patient is discharged today with orders for a rolling walker. Patient would like walker delivered to room via [employer]. SW made referral to [facility]. Walker will be delivered to patient's room shortly.                Expected Discharge Date and Time     Expected Discharge Date Expected Discharge Time    Aug 9, 2022             SHEREE Vitale

## 2022-08-10 ENCOUNTER — READMISSION MANAGEMENT (OUTPATIENT)
Dept: CALL CENTER | Facility: HOSPITAL | Age: 72
End: 2022-08-10

## 2022-08-10 NOTE — OUTREACH NOTE
Prep Survey    Flowsheet Row Responses   Sikhism facility patient discharged from? [place]   Is LACE score < 7 ? No   Emergency Room discharge w/ pulse ox? No   Eligibility Readm Mgmt   Discharge diagnosis Atrial fibrillation with RVR,   Left foot fracture,   COVID-19   Does the patient have one of the following disease processes/diagnoses(primary or secondary)? COVID-19   Does the patient have Home health ordered? No   Is there a DME ordered? Yes   What DME was ordered? rolling walker - [employer]   Prep survey completed? Yes          JAMES KAUFMAN - Registered Nurse

## 2022-08-10 NOTE — OUTREACH NOTE
COVID-19 Week 1 Survey    Flowsheet Row Responses   Confucianism facility patient discharged from? [place]   Does the patient have one of the following disease processes/diagnoses(primary or secondary)? COVID-19   COVID-19 underlying condition? None   Call Number Call 1   Week 1 Call successful? No   Discharge diagnosis Atrial fibrillation with RVR,   Left foot fracture,   COVID-19          RACHELLE PEREIRA - Registered Nurse

## 2022-08-10 NOTE — THERAPY DISCHARGE NOTE
Acute Care - Physical Therapy Discharge Summary  [facility]       Patient Name: Natalee Noble  : 1950  MRN: [id number]    Today's Date: 8/10/2022                 Admit Date: 2022      PT Recommendation and Plan    Visit Dx:    ICD-10-CM ICD-9-CM   1. Atrial fibrillation with RVR ([facility])  I48.91 427.31   2. On continuous oral anticoagulation  Z79.01 V58.61   3. Syncope and collapse  R55 780.2   4. COVID-19 virus infection  U07.1 079.89   5. Closed displaced fracture of fourth metatarsal bone of left foot, initial encounter  S92.342A 825.25   6. Impaired mobility  Z74.09 799.89   7. Metatarsal fracture, pathologic, left, initial encounter  M84.475A 733.19        Outcome Measures     Row Name 22 1058             How much help from another person do you currently need...    Turning from your back to your side while in flat bed without using bedrails? 4  -WK      Moving from lying on back to sitting on the side of a flat bed without bedrails? 4  -WK      Moving to and from a bed to a chair (including a wheelchair)? 3  -WK      Standing up from a chair using your arms (e.g., wheelchair, bedside chair)? 3  -WK      Climbing 3-5 steps with a railing? 3  -WK      To walk in hospital room? 3  -WK      AM-PAC 6 Clicks Score (PT) 20  -WK              Functional Assessment    Outcome Measure Options AM-PAC 6 Clicks Basic Mobility (PT)  -WK            User Key  (r) = Recorded By, (t) = Taken By, (c) = Cosigned By    Initials Name Provider Type    WK Nikkie Hayes PTA Physical Therapist Assistant                     PT Rehab Goals     Row Name 08/10/22 9945             Gait Training Goal 1 (PT)    Activity/Assistive Device (Gait Training Goal 1, PT) gait (walking locomotion);walker, rolling  -AH      [place] Level (Gait Training Goal 1, PT) independent  -AH      Distance (Gait Training Goal 1, PT) 75ft  -AH      Time Frame (Gait Training Goal 1, PT) by discharge  -      Strategies/Barriers (Gait  Training Goal 1, PT) L walking boot  -      Progress/Outcome (Gait Training Goal 1, PT) goal not met  -            User Key  (r) = Recorded By, (t) = Taken By, (c) = Cosigned By    Initials Name Provider Type Discipline    Opal Gonzales, PTA Physical Therapist Assistant PT                    PT Discharge Summary  Reason for Discharge: Discharge from facility  Outcomes Achieved: Refer to plan of care for updates on goals achieved  Discharge Destination: Home      Opal Curry PTA   8/10/2022

## 2022-08-11 LAB — BACTERIA SPEC AEROBE CULT: NORMAL

## 2022-08-11 NOTE — PROGRESS NOTES
"Chief Complaint  No chief complaint on file.    Subjective    History of Present Illness      You have chosen to receive care through a telehealth visit.  Do you consent to use a video/audio connection for your medical care today? Yes    Natalee Noble requested a video visit today.  She reports being found to be COVID 19+ 7/31/2022.  She developed respiratory insufficiency and vertigo.  She apparently fell in her home striking the back of her head and fracturing her left foot metatarsal.  She was hospitalized at [facility] from 8/6/2022 through 8/9/2022.  She demonstrated atrial fibrillation with a rapid ventricular response.  Head CT was obtained was free of any significant abnormality.  She was maintained on her anticoagulation which she tolerated well.  Troponin levels were within normal limits x3.  Currently she is free of chest discomfort or any significant respiratory insufficiency.  She does not report lower extremity edema.  She remains on her current medical regimen including her anticoagulation.  She is free of orthopnea or PND no syncope near syncope or palpitations.    Objective   Vital Signs:   /60   Pulse 63   Ht 162.6 cm (64\")   Wt 70.3 kg (155 lb)   BMI 26.61 kg/m²     Physical Exam not performed  Result Review :     Common labs    Common Labsle 8/7/22 8/7/22 8/8/22 8/9/22    0409 0410     Glucose 106 (A)  118 (A) 124 (A)   BUN 20  16 12   Creatinine 1.34 (A)  1.22 (A) 1.12 (A)   Sodium 139  140 139   Potassium 4.0  4.2 4.3   Chloride 110 (A)  110 (A) 109 (A)   Calcium 8.6  8.3 (A) 8.1 (A)   Albumin 3.40 (A)      Total Bilirubin 0.4      Alkaline Phosphatase 76      AST (SGOT) 18      ALT (SGPT) 16      WBC  4.70     Hemoglobin  11.9 (A)     Hematocrit  38.5     Platelets  143     (A) Abnormal value       Comments are available for some flowsheets but are not being displayed.                     Assessment and Plan    1. Paroxysmal A-fib (HCC)  Recent rapid ventricular response now " controlled    2. Long term current use of anticoagulant therapy  Well-tolerated    3. Mixed hyperlipidemia  Continue atorvastatin 80 mg daily    4. Stage 3 chronic kidney disease, unspecified whether stage 3a or 3b CKD ([facility])  Monitor    5. Cerebrovascular accident (CVA), unspecified mechanism ([facility])  No recurrence    6. COVID-19 virus infection  Resolving    Margaret denies chest discomfort or lower extremity edema.  Her COVID-19 infection is resolving.  I will obtain an echocardiogram to reassess left ventricular contractility and valvular function.  I will tentatively plan to see her in follow-up in 3 months sooner if needed.        Follow Up   No follow-ups on file.  Patient was given instructions and counseling regarding her condition or for health maintenance advice. Please see specific information pulled into the AVS if appropriate.

## 2022-08-12 ENCOUNTER — TELEMEDICINE (OUTPATIENT)
Dept: CARDIOLOGY | Facility: CLINIC | Age: 72
End: 2022-08-12

## 2022-08-12 ENCOUNTER — READMISSION MANAGEMENT (OUTPATIENT)
Dept: CALL CENTER | Facility: HOSPITAL | Age: 72
End: 2022-08-12

## 2022-08-12 VITALS
HEART RATE: 63 BPM | HEIGHT: 64 IN | WEIGHT: 155 LBS | BODY MASS INDEX: 26.46 KG/M2 | DIASTOLIC BLOOD PRESSURE: 60 MMHG | SYSTOLIC BLOOD PRESSURE: 103 MMHG

## 2022-08-12 DIAGNOSIS — I63.9 CEREBROVASCULAR ACCIDENT (CVA), UNSPECIFIED MECHANISM: ICD-10-CM

## 2022-08-12 DIAGNOSIS — U07.1 COVID-19 VIRUS INFECTION: ICD-10-CM

## 2022-08-12 DIAGNOSIS — N18.30 STAGE 3 CHRONIC KIDNEY DISEASE, UNSPECIFIED WHETHER STAGE 3A OR 3B CKD: ICD-10-CM

## 2022-08-12 DIAGNOSIS — Z79.01 LONG TERM CURRENT USE OF ANTICOAGULANT THERAPY: ICD-10-CM

## 2022-08-12 DIAGNOSIS — E78.2 MIXED HYPERLIPIDEMIA: ICD-10-CM

## 2022-08-12 DIAGNOSIS — I48.0 PAROXYSMAL A-FIB: Primary | ICD-10-CM

## 2022-08-12 LAB — BACTERIA SPEC AEROBE CULT: NORMAL

## 2022-08-12 PROCEDURE — 99214 OFFICE O/P EST MOD 30 MIN: CPT | Performed by: INTERNAL MEDICINE

## 2022-08-12 RX ORDER — METOPROLOL SUCCINATE 25 MG/1
25 TABLET, EXTENDED RELEASE ORAL DAILY
Qty: 90 TABLET | Refills: 3 | Status: SHIPPED | OUTPATIENT
Start: 2022-08-12

## 2022-08-12 NOTE — OUTREACH NOTE
COVID-19 Week 1 Survey    Flowsheet Row Responses   Episcopalian facility patient discharged from? [place]   Does the patient have one of the following disease processes/diagnoses(primary or secondary)? COVID-19   COVID-19 underlying condition? None   Call Number Call 2   Week 1 Call successful? No   Discharge diagnosis Atrial fibrillation with RVR,   Left foot fracture,   COVID-19          CARLEY PAREDES - Registered Nurse

## 2022-08-19 ENCOUNTER — READMISSION MANAGEMENT (OUTPATIENT)
Dept: CALL CENTER | Facility: HOSPITAL | Age: 72
End: 2022-08-19

## 2022-08-19 NOTE — OUTREACH NOTE
COVID-19 Week 2 Survey    Flowsheet Row Responses   Congregational facility patient discharged from? [place]   Does the patient have one of the following disease processes/diagnoses(primary or secondary)? COVID-19   COVID-19 underlying condition? None   Call Number Call 1   COVID-19 Week 2: Call 1 attempt successful? No   Discharge diagnosis Atrial fibrillation with RVR,   Left foot fracture,   COVID-19          KELLY RIVERA - Registered Nurse

## 2022-08-26 ENCOUNTER — READMISSION MANAGEMENT (OUTPATIENT)
Dept: CALL CENTER | Facility: HOSPITAL | Age: 72
End: 2022-08-26

## 2022-08-26 NOTE — OUTREACH NOTE
COVID-19 Week 3 Survey    Flowsheet Row Responses   Taoist facility patient discharged from? [place]   Does the patient have one of the following disease processes/diagnoses(primary or secondary)? COVID-19   COVID-19 underlying condition? None   Call Number Call 1   COVID-19 Week 3: Call 1 attempt successful? No   Discharge diagnosis Atrial fibrillation with RVR,   Left foot fracture,   COVID-19          OREN JOLLY - Licensed Nurse

## 2022-09-23 ENCOUNTER — HOSPITAL ENCOUNTER (OUTPATIENT)
Dept: CARDIOLOGY | Facility: HOSPITAL | Age: 72
Discharge: HOME | End: 2022-09-23
Admitting: INTERNAL MEDICINE

## 2022-09-23 VITALS
DIASTOLIC BLOOD PRESSURE: 72 MMHG | SYSTOLIC BLOOD PRESSURE: 111 MMHG | HEIGHT: 64 IN | BODY MASS INDEX: 26.46 KG/M2 | WEIGHT: 155 LBS

## 2022-09-23 DIAGNOSIS — I48.0 PAROXYSMAL A-FIB: ICD-10-CM

## 2022-09-23 LAB
AORTIC DIMENSIONLESS INDEX: 0.7 (DI)
BH CV ECHO MEAS - AO MAX PG: 6.2 MMHG
BH CV ECHO MEAS - AO MEAN PG: 3.1 MMHG
BH CV ECHO MEAS - AO ROOT DIAM: 3.2 CM
BH CV ECHO MEAS - AO V2 MAX: 124.9 CM/SEC
BH CV ECHO MEAS - AO V2 VTI: 26.7 CM
BH CV ECHO MEAS - AVA(I,D): 2.14 CM2
BH CV ECHO MEAS - EDV(CUBED): 57.7 ML
BH CV ECHO MEAS - EDV(MOD-SP2): 63 ML
BH CV ECHO MEAS - EDV(MOD-SP4): 64 ML
BH CV ECHO MEAS - EF(MOD-BP): 51.2 %
BH CV ECHO MEAS - EF(MOD-SP2): 46 %
BH CV ECHO MEAS - EF(MOD-SP4): 53.1 %
BH CV ECHO MEAS - ESV(CUBED): 38.1 ML
BH CV ECHO MEAS - ESV(MOD-SP2): 34 ML
BH CV ECHO MEAS - ESV(MOD-SP4): 30 ML
BH CV ECHO MEAS - FS: 12.9 %
BH CV ECHO MEAS - IVS/LVPW: 0.91 CM
BH CV ECHO MEAS - IVSD: 0.88 CM
BH CV ECHO MEAS - LAT PEAK E' VEL: 10.4 CM/SEC
BH CV ECHO MEAS - LV DIASTOLIC VOL/BSA (35-75): 36.5 CM2
BH CV ECHO MEAS - LV MASS(C)D: 107.3 GRAMS
BH CV ECHO MEAS - LV MAX PG: 2.5 MMHG
BH CV ECHO MEAS - LV MEAN PG: 1.32 MMHG
BH CV ECHO MEAS - LV SYSTOLIC VOL/BSA (12-30): 17.1 CM2
BH CV ECHO MEAS - LV V1 MAX: 79.5 CM/SEC
BH CV ECHO MEAS - LV V1 VTI: 18.5 CM
BH CV ECHO MEAS - LVIDD: 3.9 CM
BH CV ECHO MEAS - LVIDS: 3.4 CM
BH CV ECHO MEAS - LVOT AREA: 3.1 CM2
BH CV ECHO MEAS - LVOT DIAM: 1.99 CM
BH CV ECHO MEAS - LVPWD: 0.96 CM
BH CV ECHO MEAS - MED PEAK E' VEL: 5.9 CM/SEC
BH CV ECHO MEAS - MR MAX PG: 84.3 MMHG
BH CV ECHO MEAS - MR MAX VEL: 459.1 CM/SEC
BH CV ECHO MEAS - MV A DUR: 0.08 SEC
BH CV ECHO MEAS - MV A MAX VEL: 89.7 CM/SEC
BH CV ECHO MEAS - MV DEC SLOPE: 374.4 CM/SEC2
BH CV ECHO MEAS - MV DEC TIME: 205 MSEC
BH CV ECHO MEAS - MV E MAX VEL: 76.6 CM/SEC
BH CV ECHO MEAS - MV E/A: 0.85
BH CV ECHO MEAS - MV MAX PG: 5 MMHG
BH CV ECHO MEAS - MV MEAN PG: 1.23 MMHG
BH CV ECHO MEAS - MV P1/2T: 61.8 MSEC
BH CV ECHO MEAS - MV V2 VTI: 28.9 CM
BH CV ECHO MEAS - MVA(P1/2T): 3.6 CM2
BH CV ECHO MEAS - MVA(VTI): 1.97 CM2
BH CV ECHO MEAS - PA ACC TIME: 0.16 SEC
BH CV ECHO MEAS - PA PR(ACCEL): 6.5 MMHG
BH CV ECHO MEAS - PA V2 MAX: 75.6 CM/SEC
BH CV ECHO MEAS - PI END-D VEL: 79.1 CM/SEC
BH CV ECHO MEAS - PULM A REVS DUR: 0.09 SEC
BH CV ECHO MEAS - PULM A REVS VEL: 32 CM/SEC
BH CV ECHO MEAS - PULM DIAS VEL: 34.9 CM/SEC
BH CV ECHO MEAS - PULM S/D: 1.58
BH CV ECHO MEAS - PULM SYS VEL: 55.3 CM/SEC
BH CV ECHO MEAS - QP/QS: 1.45
BH CV ECHO MEAS - RAP SYSTOLE: 3 MMHG
BH CV ECHO MEAS - RV MAX PG: 1.08 MMHG
BH CV ECHO MEAS - RV V1 MAX: 51.8 CM/SEC
BH CV ECHO MEAS - RV V1 VTI: 14.4 CM
BH CV ECHO MEAS - RVOT DIAM: 2.7 CM
BH CV ECHO MEAS - RVSP: 32 MMHG
BH CV ECHO MEAS - SI(MOD-SP2): 16.5 ML/M2
BH CV ECHO MEAS - SI(MOD-SP4): 19.4 ML/M2
BH CV ECHO MEAS - SV(LVOT): 57.1 ML
BH CV ECHO MEAS - SV(MOD-SP2): 29 ML
BH CV ECHO MEAS - SV(MOD-SP4): 34 ML
BH CV ECHO MEAS - SV(RVOT): 83 ML
BH CV ECHO MEAS - TAPSE (>1.6): 2.1 CM
BH CV ECHO MEAS - TR MAX PG: 29 MMHG
BH CV ECHO MEAS - TR MAX VEL: 269.1 CM/SEC
BH CV ECHO MEASUREMENTS AVERAGE E/E' RATIO: 9.4
BH CV XLRA - RV BASE: 3.1 CM
BH CV XLRA - RV LENGTH: 6.5 CM
BH CV XLRA - RV MID: 2.35 CM
BH CV XLRA - TDI S': 8.9 CM/SEC
LEFT ATRIUM VOLUME INDEX: 27.4 ML/M2
MAXIMAL PREDICTED HEART RATE: 148 BPM
STRESS TARGET HR: 126 BPM

## 2022-09-23 PROCEDURE — 93306 TTE W/DOPPLER COMPLETE: CPT | Performed by: INTERNAL MEDICINE

## 2022-09-23 PROCEDURE — 93306 TTE W/DOPPLER COMPLETE: CPT

## 2022-10-04 ENCOUNTER — OFFICE VISIT (OUTPATIENT)
Dept: CARDIOLOGY | Facility: CLINIC | Age: 72
End: 2022-10-04

## 2022-10-04 VITALS
HEIGHT: 64 IN | WEIGHT: 168 LBS | RESPIRATION RATE: 16 BRPM | SYSTOLIC BLOOD PRESSURE: 108 MMHG | OXYGEN SATURATION: 97 % | HEART RATE: 57 BPM | DIASTOLIC BLOOD PRESSURE: 58 MMHG | BODY MASS INDEX: 28.68 KG/M2

## 2022-10-04 DIAGNOSIS — E78.2 MIXED HYPERLIPIDEMIA: ICD-10-CM

## 2022-10-04 DIAGNOSIS — I63.9 CEREBROVASCULAR ACCIDENT (CVA), UNSPECIFIED MECHANISM: ICD-10-CM

## 2022-10-04 DIAGNOSIS — I48.0 PAROXYSMAL A-FIB: Primary | ICD-10-CM

## 2022-10-04 DIAGNOSIS — Z79.01 LONG TERM CURRENT USE OF ANTICOAGULANT THERAPY: ICD-10-CM

## 2022-10-04 PROCEDURE — 99214 OFFICE O/P EST MOD 30 MIN: CPT | Performed by: INTERNAL MEDICINE

## 2022-10-04 PROCEDURE — 93000 ELECTROCARDIOGRAM COMPLETE: CPT | Performed by: INTERNAL MEDICINE

## 2022-10-04 NOTE — PROGRESS NOTES
"Chief Complaint  Follow-up, Hyperlipidemia, Atrial Fibrillation, and Chronic Kidney Disease    Subjective    History of Present Illness      I saw Natalee Noble today for continued cardiovascular care.  She is a pleasant 72-year-old female accompanied by her daughter.  Margaret reports being COVID-19 + 7/31/2022.  She subsequently developed atrial fibrillation with a rapid ventricular response and became syncopal.  She fell striking the back of her head and fracturing the left fourth and fifth metatarsal.  She underwent evaluation at [facility].  She remains on Xarelto long-term anticoagulation which she tolerates well.  She converted to sinus rhythm and upon discharge her metoprolol succinate was increased from 25 mg daily to 37.5 mg daily.  However Karina did not increase her metoprolol as she has had marginal blood pressures with mild orthostatic dizziness.  She denies chest pain pressure or tightness.  Her respiratory status is stable.  She does not report orthopnea or PND no lower extremity edema.  She has not had any further significant palpitations.  Objective   Vital Signs:   /58   Pulse 57   Resp 16   Ht 162.6 cm (64\")   Wt 76.2 kg (168 lb)   SpO2 97%   BMI 28.84 kg/m²     Constitutional:       Appearance: Well-developed.   Eyes:      Conjunctiva/sclera: Conjunctivae normal.      Pupils: Pupils are equal, round, and reactive to light.   HENT:      Head: Normocephalic and atraumatic.   Neck:      Thyroid: No thyromegaly.   Pulmonary:      Effort: Pulmonary effort is normal. No respiratory distress.      Breath sounds: Normal breath sounds. No wheezing. No rales.   Cardiovascular:      Normal rate. Regular rhythm.      No gallop. No S3 and S4 gallop. No rub.   Edema:     Peripheral edema absent.   Abdominal:      General: Bowel sounds are normal. There is no distension.      Palpations: Abdomen is soft. There is no abdominal mass.      Tenderness: There is no abdominal tenderness. "   Musculoskeletal: Normal range of motion.      Cervical back: Neck supple. Skin:     General: Skin is warm and dry.      Findings: No erythema.   Neurological:      Mental Status: Alert and oriented to person, place, and time.         Result Review :     Common labs    Common Labs 8/7/22 8/7/22 8/8/22 8/9/22    0409 0410     Glucose 106 (A)  118 (A) 124 (A)   BUN 20  16 12   Creatinine 1.34 (A)  1.22 (A) 1.12 (A)   Sodium 139  140 139   Potassium 4.0  4.2 4.3   Chloride 110 (A)  110 (A) 109 (A)   Calcium 8.6  8.3 (A) 8.1 (A)   Albumin 3.40 (A)      Total Bilirubin 0.4      Alkaline Phosphatase 76      AST (SGOT) 18      ALT (SGPT) 16      WBC  4.70     Hemoglobin  11.9 (A)     Hematocrit  38.5     Platelets  143     (A) Abnormal value       Comments are available for some flowsheets but are not being displayed.                ECG 12 Lead    Date/Time: 10/4/2022 4:44 PM  Performed by: Jose Scott MD  Authorized by: Jose Scott MD   Comparison: compared with previous ECG from 8/6/2022  Comparison to previous ECG: Atrial fibrillation has been replaced now with sinus bradycardia  Rhythm: sinus bradycardia  BPM: 54                Assessment and Plan    1. Paroxysmal A-fib (HCC)  Presently sinus bradycardia    2. Long term current use of anticoagulant therapy  Well-tolerated    3. Mixed hyperlipidemia  Continue atorvastatin 80 mg daily    4. Cerebrovascular accident (CVA), unspecified mechanism (HCC)  Stable no recurrence    5.  Orthostatic dizziness    Margaret is free of angina and appears euvolemic.  She tolerates her medical regimen well including her long-term anticoagulation.  Her blood pressure is marginal and she does report some orthostatic dizziness.  Overall this is stable.  I have encouraged her to liberalize the salt in her diet and increase her hydration.  Should she have any further tachycardia or palpitations she will contact us.  If her orthostatic dizziness persists of asked her to contact us and  we may need to consider the addition of midodrine and compression stockings.  We will otherwise arrange for follow-up in 6 months sooner if needed.        Follow Up   No follow-ups on file.  Patient was given instructions and counseling regarding her condition or for health maintenance advice. Please see specific information pulled into the AVS if appropriate.

## 2023-01-13 RX ORDER — MEMANTINE HYDROCHLORIDE 10 MG/1
TABLET ORAL
Qty: 180 TABLET | Refills: 1 | OUTPATIENT
Start: 2023-01-13

## 2023-01-16 ENCOUNTER — TELEPHONE (OUTPATIENT)
Dept: GASTROENTEROLOGY | Facility: CLINIC | Age: 73
End: 2023-01-16
Payer: MEDICARE

## 2023-04-26 ENCOUNTER — OFFICE VISIT (OUTPATIENT)
Dept: GASTROENTEROLOGY | Facility: CLINIC | Age: 73
End: 2023-04-26
Payer: MEDICARE

## 2023-04-26 VITALS
BODY MASS INDEX: 29.53 KG/M2 | HEART RATE: 61 BPM | TEMPERATURE: 97.5 F | WEIGHT: 173 LBS | SYSTOLIC BLOOD PRESSURE: 128 MMHG | DIASTOLIC BLOOD PRESSURE: 80 MMHG | HEIGHT: 64 IN | OXYGEN SATURATION: 96 %

## 2023-04-26 DIAGNOSIS — I48.91 ATRIAL FIBRILLATION WITH RVR: ICD-10-CM

## 2023-04-26 DIAGNOSIS — D12.6 ADENOMATOUS POLYP OF COLON, UNSPECIFIED PART OF COLON: Primary | ICD-10-CM

## 2023-04-26 DIAGNOSIS — Z80.0 FH: COLON CANCER: ICD-10-CM

## 2023-04-26 DIAGNOSIS — Z83.71 FH: COLON POLYPS: ICD-10-CM

## 2023-04-26 PROBLEM — Z83.719 FH: COLON POLYPS: Status: ACTIVE | Noted: 2023-04-26

## 2023-04-26 NOTE — PROGRESS NOTES
Primary Physician: Woody Valentin MD    Chief Complaint   Patient presents with   • Colonoscopy       Subjective     Natalee Noble is a 72 y.o. female.    HPI   Personal history of adenomatous colon polyps  Patient's last colonoscopy 11/1/2019 at which time multiple medium mouth diverticula found in the left colon, 2 polyps (1 hyperplastic and 1 adenomatous) of the cecum resected, 1 tubulovillous adenomatous polyp of the ascending colon resected, and 2 tubular adenomatous polyps of the transverse colon resected.  Pt denies any change in her bowel pattern.  Chronic constipation which she self treats. No abd pain or rectal bleeding.    Family Hx Colon Cancer  Father had colon cancer at age 65.  Patient's daughter had colon polyps in her 30s.      Anticoagulated  Pt had CVA in 2018 related to A-Fib and at that time she started Xarelto.    Long discussion regarding holding blood thinner prior to colonoscopy has been had.  Risks of repeat CVA while holding Xarelto is a risk and she verbalizes understanding of this.  We will be contacting her Cardiologist prior to procedure to make sure he is in approval to hold.      Past Medical History:   Diagnosis Date   • Anxiety and depression    • Arthritis    • Back pain    • CRF (chronic renal failure), stage 3 (moderate)    • Diverticulosis    • Essential hypertension    • Family history of colon cancer    • Family history of colonic polyps    • Fractures     R tibia/fibula; L ankle   • History of adenomatous polyp of colon    • History of cardiac cath     1/28/19 left main no significant disease. LAD with no significant disease. LCX no significant disease. RCA no significant disease.   • History of cardiac monitoring     2/01/19 - ZIO PATCH - Underlying rhythm is sinus at 48-94 bpm. PSVT x23 with fastest 167 bpm, longest 24.4 seconds.   10/05/2018- ZIO PATCH - NSR most of time but did have episode of A fib ranging from .   • History of colon polyps    •  History of echocardiogram     9/2018: Left ventricular systolic function is normal. EF 50%. Left ventricular diastolic dysfunction consistent with ipaired relaxation. Left atrial cavity size is mildly dilated. Mild MR. Mild to moderate TR. There is abnormal thickening noted on the RV that may represent a vegetation-this was discussed with Dr. Hammer and Dr. Reilly. No patent foramen ovale. No further work up needed at this time   • Hyperlipidemia    • Kidney disorder    • Migraine    • Migraines    • Neck pain        Past Surgical History:   Procedure Laterality Date   • ABDOMINOPLASTY     • BACK SURGERY      L2-5 fusion 2012   • CHOLECYSTECTOMY     • COLONOSCOPY  11/24/2014    Diverticulosis; Repeat 5 years   • COLONOSCOPY  11/09/2010    Diverticulosis; Repeat 4 years   • COLONOSCOPY  09/18/2007    Dr. Monzon-Normal; Repeat 3 years   • COLONOSCOPY  06/02/2005    Dr. Monzon-Diminuitive polypectomy above the cecum; Otherwise normal colonoscopy; Repeat 2 years   • COLONOSCOPY N/A 11/01/2019    Diverticulosis in the left colon; Two 5-6mm polyps in the cecum-path shows one tubular adenomatous and on hyperplastic polyp; One 8mm tubular adenomatous polyp in the ascending colon; Two 4-5mm tubular adenomatous polyps in the transverse colon; Repeat 3 years   • CYSTOCELE REPAIR      Cystocele and rectocele repair   • ENDOSCOPY  09/28/2012    LA grade C esophagitis; HH   • ENDOSCOPY N/A 11/01/2019    Small HH; Non-erosive gastritis-biopsied; Normal examined duodenum   • FIBULA FRACTURE SURGERY     • HYSTERECTOMY     • NECK SURGERY  2012    ACDF C4-7   • ORIF TIBIA FRACTURE Right         Current Outpatient Medications:   •  ALPRAZolam (XANAX) 1 MG tablet, Take 1 tablet by mouth 3 (Three) Times a Day As Needed for Anxiety., Disp: , Rfl:   •  aspirin 81 MG tablet, Take 1 tablet by mouth Daily., Disp: 30 tablet, Rfl: 1  •  atorvastatin (LIPITOR) 80 MG tablet, Take 1 tablet by mouth Daily., Disp: , Rfl:   •  cycloSPORINE (RESTASIS)  0.05 % ophthalmic emulsion, Administer 1 drop to both eyes Every 12 (Twelve) Hours. Per patient during admission assessment & med req, Disp: , Rfl:   •  Denosumab (PROLIA SC), Inject  under the skin into the appropriate area as directed. Every 6 months, Disp: , Rfl:   •  Ergocalciferol (VITAMIN D2 PO), Take 1,000 mg by mouth Daily., Disp: , Rfl:   •  escitalopram (LEXAPRO) 20 MG tablet, Take 1 tablet by mouth Daily., Disp: , Rfl:   •  ezetimibe (ZETIA) 10 MG tablet, Take 1 tablet by mouth Daily., Disp: 30 tablet, Rfl: 5  •  FOLIC ACID PO, Take  by mouth Daily., Disp: , Rfl:   •  hydroxychloroquine (PLAQUENIL) 200 MG tablet, Take 1 tablet by mouth 2 (Two) Times a Day., Disp: , Rfl:   •  memantine (NAMENDA) 10 MG tablet, Take 1 tablet by mouth 2 (Two) Times a Day., Disp: , Rfl:   •  metoprolol succinate XL (TOPROL-XL) 25 MG 24 hr tablet, Take 1 tablet by mouth Daily., Disp: 90 tablet, Rfl: 3  •  pantoprazole (PROTONIX) 40 MG EC tablet, Take 1 tablet by mouth Daily As Needed., Disp: , Rfl:   •  rivaroxaban (XARELTO) 20 MG tablet, Take 1 tablet by mouth Daily., Disp: 90 tablet, Rfl: 3  •  topiramate (TOPAMAX) 200 MG tablet, Take 1 tablet by mouth Daily., Disp: , Rfl:     Allergies   Allergen Reactions   • Benzoin Anaphylaxis and Swelling     States when used on her neck it shut off her airway  Huge abscesses with surgery     • Iodine Other (See Comments)     PT UNABLE TO TELL RN ABOUT REACTION AT THIS TIME, BUT FAMILY STATES PT HAD A REACTION TO BEING CLEANSED WITH IODINE IN SURGERY  IOBAN - used on neck, swelled to the point of shutting off airway   • Vancomycin Other (See Comments)     Kidney failure  Vancomycin induced acute kidney injury     • Levaquin [Levofloxacin] Unknown (See Comments)     Unknown   • Sulfa Antibiotics    • Acetaminophen Nausea Only   • Latex Itching       Social History     Socioeconomic History   • Marital status:    Tobacco Use   • Smoking status: Never   • Smokeless tobacco: Never  "  Vaping Use   • Vaping Use: Never used   Substance and Sexual Activity   • Alcohol use: No   • Drug use: Never   • Sexual activity: Defer       Family History   Problem Relation Age of Onset   • Colon cancer Father 65   • Colon polyps Daughter 38   • Esophageal cancer Neg Hx    • Liver cancer Neg Hx    • Liver disease Neg Hx    • Rectal cancer Neg Hx    • Stomach cancer Neg Hx        Review of Systems   Constitutional: Negative for unexpected weight change.   Respiratory: Negative for shortness of breath.    Cardiovascular: Negative for chest pain.       Objective     /80   Pulse 61   Temp 97.5 °F (36.4 °C) (Temporal)   Ht 162.6 cm (64\")   Wt 78.5 kg (173 lb)   SpO2 96%   Breastfeeding No   BMI 29.70 kg/m²     Physical Exam  Vitals reviewed.   Constitutional:       Appearance: Normal appearance.   Cardiovascular:      Rate and Rhythm: Normal rate and regular rhythm.      Heart sounds: Normal heart sounds.   Pulmonary:      Effort: Pulmonary effort is normal.   Neurological:      Mental Status: She is alert.         Lab Results - Last 18 Months   Lab Units 08/09/22  0928 08/08/22  1054 08/07/22  0409 08/06/22  2254 05/09/22  1418   GLUCOSE mg/dL 124* 118* 106* 114* 90   BUN mg/dL 12 16 20 18 36*   CREATININE mg/dL 1.12* 1.22* 1.34* 1.49* 1.1*   SODIUM mmol/L 139 140 139 142 141   POTASSIUM mmol/L 4.3 4.2 4.0 4.1 3.9   CHLORIDE mmol/L 109* 110* 110* 110* 104   TOTAL CO2 mmol/L  --   --   --   --  24   CO2 mmol/L 24.0 20.0* 20.0* 21.0*  --    TOTAL PROTEIN g/dL  --   --  5.9* 6.6 7.1   ALBUMIN g/dL  --   --  3.40* 4.10 4.5   ALT (SGPT) U/L  --   --  16 19 18   AST (SGOT) U/L  --   --  18 21 22   ALK PHOS U/L  --   --  76 85 72   BILIRUBIN mg/dL  --   --  0.4 0.4 0.4   GLOBULIN gm/dL  --   --  2.5 2.5  --        Lab Results - Last 18 Months   Lab Units 08/07/22  0410 08/06/22  2254 05/09/22  1418   HEMOGLOBIN g/dL 11.9* 13.1 13.2   HEMATOCRIT % 38.5 42.5 43.2   MCV fL 100.8* 100.5* 105.4*   WBC 10*3/mm3 " 4.70 7.35 5.2   RDW % 13.6 13.4 13.1   MPV fL 11.0 10.8 11.5   PLATELETS 10*3/mm3 143 154 188   INR   --  2.35*  --        Lab Results - Last 18 Months   Lab Units 08/08/22  1054 05/09/22  1418   TSH uIU/mL 5.320*  --    VIT D 25 HYDROXY ng/mL  --  72.8        IMPRESSION/PLAN:    Assessment & Plan      Problem List Items Addressed This Visit        Cardiac and Vasculature    Atrial fibrillation with RVR    Overview     On daily Xarelto, today's exam pt is normal rate and rhythm.            Family History    FH: colon cancer    Overview     Father at age 65         Relevant Orders    Case Request (Completed)    FH: colon polyps    Overview     Daughter at age 38            Gastrointestinal Abdominal     Adenomatous colon polyp - Primary    Overview      last colonoscopy 11/1/2019 at which time multiple medium mouth diverticula found in the left colon, 2 polyps (1 hyperplastic and 1 adenomatous) of the cecum resected, 1 tubulovillous adenomatous polyp of the ascending colon resected, and 2 tubular adenomatous polyps of the transverse colon resected.           Relevant Orders    Case Request (Completed)     Colonoscopy examination per Dr. Susan Lord  Miralax Prep  Advised to take Miralax daily the 7 days prior to procedure if any constipation issues.    Will call Dr Gilberto Ordaz (Cardiology)([phone]) out of [place], [place] for approval to safely hold Xarelto 24 hours prior to procedure.        ..The risks, benefits, and alternatives of colonoscopy were reviewed with the patient today.  Risks including perforation of the colon possibly requiring surgery or colostomy.  Additional risks include risk of bleeding from biopsies or removal of colon tissue.  There is also the risk of a drug reaction or problems with anesthesia.  This will be discussed with the further by the anesthesia team on the day of the procedure.  Lastly there is a possibility of missing a colon polyp or cancer.  The benefits include the  diagnosis and management of disease of the colon and rectum.  Alternatives to colonoscopy include barium enema, laboratory testing, radiographic evaluation, or no intervention.  The patient verbalizes understanding and agrees.    In accordance with requirements under the Affordable Care Act, [facility] has provided pricing for all hospital services and items on each of its websites. However, a patient's actual cost may differ based on the services the patient receives to meet individual healthcare needs and based on the benefits provided under the patient’s insurance coverage.      Opal Horton, APRN  04/26/23  14:37 CDT    Part of this note may be an electronic transcription/translation of spoken language to printed text.

## 2023-04-28 ENCOUNTER — TELEPHONE (OUTPATIENT)
Dept: GASTROENTEROLOGY | Facility: CLINIC | Age: 73
End: 2023-04-28
Payer: MEDICARE

## 2023-04-28 NOTE — TELEPHONE ENCOUNTER
Received Xarelto clearance from Dr. Ordaz with last dose on 6/10. Tried to call pt to discuss, but no answer. Left detailed vm telling pt when to hold and to follow all instructions given to her in the office.

## 2023-06-12 ENCOUNTER — HOSPITAL ENCOUNTER (OUTPATIENT)
Facility: HOSPITAL | Age: 73
Setting detail: HOSPITAL OUTPATIENT SURGERY
Discharge: HOME | End: 2023-06-12
Attending: INTERNAL MEDICINE | Admitting: INTERNAL MEDICINE
Payer: MEDICARE

## 2023-06-12 ENCOUNTER — ANESTHESIA (OUTPATIENT)
Dept: GASTROENTEROLOGY | Facility: HOSPITAL | Age: 73
End: 2023-06-12
Payer: MEDICARE

## 2023-06-12 ENCOUNTER — ANESTHESIA EVENT (OUTPATIENT)
Dept: GASTROENTEROLOGY | Facility: HOSPITAL | Age: 73
End: 2023-06-12
Payer: MEDICARE

## 2023-06-12 VITALS
OXYGEN SATURATION: 99 % | BODY MASS INDEX: 27.32 KG/M2 | SYSTOLIC BLOOD PRESSURE: 106 MMHG | DIASTOLIC BLOOD PRESSURE: 66 MMHG | TEMPERATURE: 96.9 F | HEIGHT: 66 IN | HEART RATE: 53 BPM | RESPIRATION RATE: 13 BRPM | WEIGHT: 170 LBS

## 2023-06-12 DIAGNOSIS — Z80.0 FH: COLON CANCER: ICD-10-CM

## 2023-06-12 DIAGNOSIS — D12.6 ADENOMATOUS POLYP OF COLON, UNSPECIFIED PART OF COLON: ICD-10-CM

## 2023-06-12 PROCEDURE — 25010000002 PROPOFOL 10 MG/ML EMULSION

## 2023-06-12 PROCEDURE — 88305 TISSUE EXAM BY PATHOLOGIST: CPT | Performed by: INTERNAL MEDICINE

## 2023-06-12 RX ORDER — SODIUM CHLORIDE 9 MG/ML
100 INJECTION, SOLUTION INTRAVENOUS CONTINUOUS
Status: CANCELLED | OUTPATIENT
Start: 2023-06-12

## 2023-06-12 RX ORDER — SODIUM CHLORIDE 0.9 % (FLUSH) 0.9 %
10 SYRINGE (ML) INJECTION AS NEEDED
Status: CANCELLED | OUTPATIENT
Start: 2023-06-12

## 2023-06-12 RX ORDER — SODIUM CHLORIDE 9 MG/ML
40 INJECTION, SOLUTION INTRAVENOUS AS NEEDED
Status: CANCELLED | OUTPATIENT
Start: 2023-06-12

## 2023-06-12 RX ORDER — LIDOCAINE HYDROCHLORIDE 20 MG/ML
INJECTION, SOLUTION EPIDURAL; INFILTRATION; INTRACAUDAL; PERINEURAL AS NEEDED
Status: DISCONTINUED | OUTPATIENT
Start: 2023-06-12 | End: 2023-06-12 | Stop reason: SURG

## 2023-06-12 RX ORDER — PROPOFOL 10 MG/ML
VIAL (ML) INTRAVENOUS AS NEEDED
Status: DISCONTINUED | OUTPATIENT
Start: 2023-06-12 | End: 2023-06-12 | Stop reason: SURG

## 2023-06-12 RX ORDER — SODIUM CHLORIDE 0.9 % (FLUSH) 0.9 %
10 SYRINGE (ML) INJECTION AS NEEDED
Status: DISCONTINUED | OUTPATIENT
Start: 2023-06-12 | End: 2023-06-12 | Stop reason: HOSPADM

## 2023-06-12 RX ORDER — SODIUM CHLORIDE 9 MG/ML
500 INJECTION, SOLUTION INTRAVENOUS CONTINUOUS PRN
Status: DISCONTINUED | OUTPATIENT
Start: 2023-06-12 | End: 2023-06-12 | Stop reason: HOSPADM

## 2023-06-12 RX ORDER — SODIUM CHLORIDE 0.9 % (FLUSH) 0.9 %
10 SYRINGE (ML) INJECTION EVERY 12 HOURS SCHEDULED
Status: CANCELLED | OUTPATIENT
Start: 2023-06-12

## 2023-06-12 RX ORDER — LIDOCAINE HYDROCHLORIDE 10 MG/ML
0.5 INJECTION, SOLUTION EPIDURAL; INFILTRATION; INTRACAUDAL; PERINEURAL ONCE AS NEEDED
Status: CANCELLED | OUTPATIENT
Start: 2023-06-12

## 2023-06-12 RX ADMIN — PROPOFOL INJECTABLE EMULSION 220 MG: 10 INJECTION, EMULSION INTRAVENOUS at 11:59

## 2023-06-12 RX ADMIN — SODIUM CHLORIDE 500 ML: 9 INJECTION, SOLUTION INTRAVENOUS at 11:24

## 2023-06-12 RX ADMIN — LIDOCAINE HYDROCHLORIDE 100 MG: 20 INJECTION, SOLUTION EPIDURAL; INFILTRATION; INTRACAUDAL; PERINEURAL at 11:59

## 2023-06-12 NOTE — H&P
Chief Complaint:   Colon polyps    Subjective     HPI:   She had a tubulovillous adenoma removed from the ascending colon November 2019.  Here for ongoing surveillance.    Past Medical History:   Past Medical History:   Diagnosis Date    Anxiety and depression     Arthritis     Back pain     CRF (chronic renal failure), stage 3 (moderate)     Diverticulosis     Essential hypertension     Family history of colon cancer     Family history of colonic polyps     Fractures     R tibia/fibula; L ankle    GERD (gastroesophageal reflux disease)     History of adenomatous polyp of colon     History of cardiac cath     1/28/19 left main no significant disease. LAD with no significant disease. LCX no significant disease. RCA no significant disease.    History of cardiac monitoring     2/01/19 - ZIO PATCH - Underlying rhythm is sinus at 48-94 bpm. PSVT x23 with fastest 167 bpm, longest 24.4 seconds.   10/05/2018- ZIO PATCH - NSR most of time but did have episode of A fib ranging from .    History of colon polyps     History of echocardiogram     9/2018: Left ventricular systolic function is normal. EF 50%. Left ventricular diastolic dysfunction consistent with ipaired relaxation. Left atrial cavity size is mildly dilated. Mild MR. Mild to moderate TR. There is abnormal thickening noted on the RV that may represent a vegetation-this was discussed with Dr. Hammer and Dr. Reilly. No patent foramen ovale. No further work up needed at this time    Hyperlipidemia     Kidney disorder     Migraine     Migraines     Neck pain     Stroke 2018       Past Surgical History:  Past Surgical History:   Procedure Laterality Date    ABDOMINOPLASTY      BACK SURGERY      L2-5 fusion 2012    CHOLECYSTECTOMY      COLONOSCOPY  11/24/2014    Diverticulosis; Repeat 5 years    COLONOSCOPY  11/09/2010    Diverticulosis; Repeat 4 years    COLONOSCOPY  09/18/2007    Dr. Monzon-Normal; Repeat 3 years    COLONOSCOPY  06/02/2005      Nicolás-Diminuitive polypectomy above the cecum; Otherwise normal colonoscopy; Repeat 2 years    COLONOSCOPY N/A 11/01/2019    Diverticulosis in the left colon; Two 5-6mm polyps in the cecum-path shows one tubular adenomatous and on hyperplastic polyp; One 8mm tubular adenomatous polyp in the ascending colon; Two 4-5mm tubular adenomatous polyps in the transverse colon; Repeat 3 years    CYSTOCELE REPAIR      Cystocele and rectocele repair    ENDOSCOPY  09/28/2012    LA grade C esophagitis; HH    ENDOSCOPY N/A 11/01/2019    Small HH; Non-erosive gastritis-biopsied; Normal examined duodenum    FIBULA FRACTURE SURGERY      HYSTERECTOMY      NECK SURGERY  2012    ACDF C4-7    ORIF TIBIA FRACTURE Right         Family History:  Family History   Problem Relation Age of Onset    Colon cancer Father 65    Colon polyps Daughter 38    Esophageal cancer Neg Hx     Liver cancer Neg Hx     Liver disease Neg Hx     Rectal cancer Neg Hx     Stomach cancer Neg Hx        Social History:   reports that she has never smoked. She has never used smokeless tobacco. She reports that she does not drink alcohol and does not use drugs.    Medications:   Medications Prior to Admission   Medication Sig Dispense Refill Last Dose    ALPRAZolam (XANAX) 1 MG tablet Take 1 tablet by mouth 3 (Three) Times a Day As Needed for Anxiety.   6/11/2023    aspirin 81 MG tablet Take 1 tablet by mouth Daily. 30 tablet 1 6/11/2023    escitalopram (LEXAPRO) 20 MG tablet Take 1 tablet by mouth Daily.   6/11/2023    FOLIC ACID PO Take  by mouth Daily.   6/12/2023    hydroxychloroquine (PLAQUENIL) 200 MG tablet Take 1 tablet by mouth 2 (Two) Times a Day.   6/11/2023    memantine (NAMENDA) 10 MG tablet Take 1 tablet by mouth 2 (Two) Times a Day.   6/11/2023    metoprolol succinate XL (TOPROL-XL) 25 MG 24 hr tablet Take 1 tablet by mouth Daily. 90 tablet 3 6/12/2023    pantoprazole (PROTONIX) 40 MG EC tablet Take 1 tablet by mouth Daily As Needed.   6/11/2023     "topiramate (TOPAMAX) 200 MG tablet Take 1 tablet by mouth Daily.   6/11/2023    atorvastatin (LIPITOR) 80 MG tablet Take 1 tablet by mouth Daily.   6/10/2023    cycloSPORINE (RESTASIS) 0.05 % ophthalmic emulsion Administer 1 drop to both eyes Every 12 (Twelve) Hours. Per patient during admission assessment & med req   Unknown    Denosumab (PROLIA SC) Inject  under the skin into the appropriate area as directed. Every 6 months   More than a month    Ergocalciferol (VITAMIN D2 PO) Take 1,000 mg by mouth Daily.   6/10/2023    ezetimibe (ZETIA) 10 MG tablet Take 1 tablet by mouth Daily. 30 tablet 5 6/10/2023    rivaroxaban (XARELTO) 20 MG tablet Take 1 tablet by mouth Daily. 90 tablet 3 6/10/2023       Allergies:  Benzoin, Iodine, Vancomycin, Levaquin [levofloxacin], Sulfa antibiotics, Acetaminophen, and Latex    ROS:    Resp: No SOA  Cardiovascular: No CP      Objective     /75 (Patient Position: Sitting)   Pulse 65   Temp 96.9 °F (36.1 °C) (Temporal)   Resp 20   Ht 166.4 cm (65.5\")   Wt 77.1 kg (170 lb)   SpO2 97%   BMI 27.86 kg/m²     Physical Exam   Constitutional: Patient is oriented to person, place, and in no distress.  Pulmonary/Chest: No distress.  No audible wheezes  Psychiatric: Mood, memory, affect and judgment appear normal.     Assessment & Plan     Diagnosis:  Colon polyps    Anticipated Surgical Procedure:  Colonoscopy    The risks, benefits, and alternatives of colonoscopy were reviewed with the patient today.  Risks including perforation of the colon possibly requiring surgery or colostomy.  Additional risks include risk of bleeding from biopsies or removal of colon tissue.  There is also the risk of a drug reaction or problems with anesthesia.  This will be discussed with the patient further by the anesthesia team on the day of the procedure.  Lastly there is a possibility of missing a colon polyp or cancer.  The benefits include the diagnosis and management of disease of the colon and " rectum.  Alternatives to colonoscopy include barium enema, laboratory testing, radiographic evaluation, or no intervention.  The patient verbalizes understanding and agrees.        Please note that portions of this note were completed with a voice recognition program.

## 2023-06-12 NOTE — ANESTHESIA PREPROCEDURE EVALUATION
Anesthesia Evaluation     Patient summary reviewed   no history of anesthetic complications:   NPO Solid Status: > 8 hours  NPO Liquid Status: > 2 hours           Airway   Mallampati: I  TM distance: >3 FB  Neck ROM: full  Dental      Comment: anterior crowns    Pulmonary - negative pulmonary ROS   (-) sleep apnea, no home oxygen  Cardiovascular   Exercise tolerance: good (4-7 METS)    PT is on anticoagulation therapy  Patient on routine beta blocker    (+) hypertensiondysrhythmias Atrial Fib, hyperlipidemia  (-) past MI, CAD, cardiac stents      Neuro/Psych  (+) CVA (speech residual; 2018)  (-) seizures, TIA  GI/Hepatic/Renal/Endo    (+) GERD, renal disease CRI  (-) liver disease, diabetes    Musculoskeletal     Abdominal    Substance History      OB/GYN          Other   arthritis,                     Anesthesia Plan    ASA 3     MAC     (Xarelto held; aspirin yesterday )  intravenous induction     Anesthetic plan, risks, benefits, and alternatives have been provided, discussed and informed consent has been obtained with: patient.

## 2023-06-12 NOTE — ANESTHESIA POSTPROCEDURE EVALUATION
Patient: Natalee Noble    Procedure Summary       Date: 06/12/23 Room / Location: [facility] ENDOSCOPY 6 /  [facility]    Anesthesia Start: 1156 Anesthesia Stop: 1223    Procedure: COLONOSCOPY WITH ANESTHESIA Diagnosis:       FH: colon cancer      Adenomatous polyp of colon, unspecified part of colon      (FH: colon cancer [Z80.0])      (Adenomatous polyp of colon, unspecified part of colon [D12.6])    Surgeons: Susan Lord MD Provider: Darlene Cancino CRNA    Anesthesia Type: MAC ASA Status: 3            Anesthesia Type: MAC    Vitals  Vitals Value Taken Time   BP 81/47 06/12/23 1226   Temp     Pulse 51 06/12/23 1226   Resp 13 06/12/23 1225   SpO2 99 % 06/12/23 1226   Vitals shown include unvalidated device data.        Post Anesthesia Care and Evaluation    Patient location during evaluation: PHASE II  Patient participation: complete - patient participated  Level of consciousness: awake  Pain management: adequate    Airway patency: patent  Anesthetic complications: No anesthetic complications    Cardiovascular status: acceptable  Respiratory status: acceptable  Hydration status: acceptable

## 2023-06-13 LAB
LAB AP CASE REPORT: NORMAL
Lab: NORMAL
PATH REPORT.FINAL DX SPEC: NORMAL
PATH REPORT.GROSS SPEC: NORMAL

## 2024-05-01 ENCOUNTER — TELEPHONE (OUTPATIENT)
Dept: PHYSICAL THERAPY | Facility: OTHER | Age: 74
End: 2024-05-01
Payer: MEDICARE

## 2024-05-01 NOTE — TELEPHONE ENCOUNTER
"  Caller: Natalee Noble \"Margaret\"    Relationship: Self    What was the call regarding: PATIENT CANCELLED APPT TODAY BECAUSE THEY CANT MAKE IT       "

## 2024-07-10 DIAGNOSIS — I63.9 CEREBRAL INFARCTION, UNSPECIFIED MECHANISM: ICD-10-CM

## 2024-07-10 DIAGNOSIS — I48.0 PAROXYSMAL ATRIAL FIBRILLATION: Primary | ICD-10-CM

## 2024-07-10 DIAGNOSIS — G46.4 CEREBELLAR STROKE SYNDROME: ICD-10-CM

## 2024-10-11 ENCOUNTER — APPOINTMENT (OUTPATIENT)
Dept: CT IMAGING | Facility: HOSPITAL | Age: 74
DRG: 315 | End: 2024-10-11
Payer: MEDICARE

## 2024-10-11 ENCOUNTER — APPOINTMENT (OUTPATIENT)
Dept: GENERAL RADIOLOGY | Facility: HOSPITAL | Age: 74
DRG: 315 | End: 2024-10-11
Payer: MEDICARE

## 2024-10-11 ENCOUNTER — HOSPITAL ENCOUNTER (INPATIENT)
Facility: HOSPITAL | Age: 74
LOS: 4 days | Discharge: NURSING, REHAB OR LONG-TERM CARE | DRG: 315 | End: 2024-10-17
Attending: STUDENT IN AN ORGANIZED HEALTH CARE EDUCATION/TRAINING PROGRAM | Admitting: INTERNAL MEDICINE
Payer: MEDICARE

## 2024-10-11 ENCOUNTER — TRANSCRIBE ORDERS (OUTPATIENT)
Dept: HOME HEALTH SERVICES | Facility: HOME HEALTHCARE | Age: 74
End: 2024-10-11
Payer: MEDICARE

## 2024-10-11 ENCOUNTER — HOME HEALTH ADMISSION (OUTPATIENT)
Dept: HOME HEALTH SERVICES | Facility: HOME HEALTHCARE | Age: 74
End: 2024-10-11
Payer: MEDICARE

## 2024-10-11 DIAGNOSIS — I95.9 HYPOTENSION, UNSPECIFIED HYPOTENSION TYPE: Primary | ICD-10-CM

## 2024-10-11 DIAGNOSIS — I48.91 ATRIAL FIBRILLATION, UNSPECIFIED TYPE: Primary | ICD-10-CM

## 2024-10-11 DIAGNOSIS — R11.0 NAUSEA: ICD-10-CM

## 2024-10-11 DIAGNOSIS — R79.89 ELEVATED TROPONIN: ICD-10-CM

## 2024-10-11 DIAGNOSIS — N28.9 RENAL INSUFFICIENCY: ICD-10-CM

## 2024-10-11 PROBLEM — K59.00 CONSTIPATION: Status: ACTIVE | Noted: 2024-10-11

## 2024-10-11 PROBLEM — F41.9 ANXIETY DISORDER: Status: ACTIVE | Noted: 2024-10-11

## 2024-10-11 PROBLEM — I50.22 CHRONIC HFREF (HEART FAILURE WITH REDUCED EJECTION FRACTION): Status: ACTIVE | Noted: 2024-10-11

## 2024-10-11 PROBLEM — N18.9 ACUTE ON CHRONIC RENAL INSUFFICIENCY: Status: ACTIVE | Noted: 2024-10-11

## 2024-10-11 LAB
ALBUMIN SERPL-MCNC: 3.5 G/DL (ref 3.5–5.2)
ALBUMIN/GLOB SERPL: 1.7 G/DL
ALP SERPL-CCNC: 81 U/L (ref 39–117)
ALT SERPL W P-5'-P-CCNC: 20 U/L (ref 1–33)
ANION GAP SERPL CALCULATED.3IONS-SCNC: 13.4 MMOL/L (ref 5–15)
AST SERPL-CCNC: 20 U/L (ref 1–32)
BASOPHILS # BLD AUTO: 0.01 10*3/MM3 (ref 0–0.2)
BASOPHILS NFR BLD AUTO: 0.1 % (ref 0–1.5)
BILIRUB SERPL-MCNC: 0.4 MG/DL (ref 0–1.2)
BUN SERPL-MCNC: 35 MG/DL (ref 8–23)
BUN/CREAT SERPL: 24.3 (ref 7–25)
CALCIUM SPEC-SCNC: 8.1 MG/DL (ref 8.6–10.5)
CHLORIDE SERPL-SCNC: 106 MMOL/L (ref 98–107)
CO2 SERPL-SCNC: 21.6 MMOL/L (ref 22–29)
CREAT SERPL-MCNC: 1.44 MG/DL (ref 0.57–1)
DEPRECATED RDW RBC AUTO: 53.4 FL (ref 37–54)
EGFRCR SERPLBLD CKD-EPI 2021: 38.2 ML/MIN/1.73
EOSINOPHIL # BLD AUTO: 0.06 10*3/MM3 (ref 0–0.4)
EOSINOPHIL NFR BLD AUTO: 0.5 % (ref 0.3–6.2)
ERYTHROCYTE [DISTWIDTH] IN BLOOD BY AUTOMATED COUNT: 16.3 % (ref 12.3–15.4)
GLOBULIN UR ELPH-MCNC: 2.1 GM/DL
GLUCOSE SERPL-MCNC: 110 MG/DL (ref 65–99)
HCT VFR BLD AUTO: 45.7 % (ref 34–46.6)
HGB BLD-MCNC: 13.9 G/DL (ref 12–15.9)
IMM GRANULOCYTES # BLD AUTO: 0.11 10*3/MM3 (ref 0–0.05)
IMM GRANULOCYTES NFR BLD AUTO: 1 % (ref 0–0.5)
LYMPHOCYTES # BLD AUTO: 0.78 10*3/MM3 (ref 0.7–3.1)
LYMPHOCYTES NFR BLD AUTO: 7.1 % (ref 19.6–45.3)
MCH RBC QN AUTO: 27.6 PG (ref 26.6–33)
MCHC RBC AUTO-ENTMCNC: 30.4 G/DL (ref 31.5–35.7)
MCV RBC AUTO: 90.7 FL (ref 79–97)
MONOCYTES # BLD AUTO: 0.81 10*3/MM3 (ref 0.1–0.9)
MONOCYTES NFR BLD AUTO: 7.3 % (ref 5–12)
NEUTROPHILS NFR BLD AUTO: 84 % (ref 42.7–76)
NEUTROPHILS NFR BLD AUTO: 9.28 10*3/MM3 (ref 1.7–7)
NRBC BLD AUTO-RTO: 0.2 /100 WBC (ref 0–0.2)
NT-PROBNP SERPL-MCNC: 2832 PG/ML (ref 0–900)
PLATELET # BLD AUTO: 333 10*3/MM3 (ref 140–450)
PMV BLD AUTO: 10.5 FL (ref 6–12)
POTASSIUM SERPL-SCNC: 4 MMOL/L (ref 3.5–5.2)
PROT SERPL-MCNC: 5.6 G/DL (ref 6–8.5)
RBC # BLD AUTO: 5.04 10*6/MM3 (ref 3.77–5.28)
SODIUM SERPL-SCNC: 141 MMOL/L (ref 136–145)
TROPONIN T SERPL HS-MCNC: 68 NG/L
WBC NRBC COR # BLD AUTO: 11.05 10*3/MM3 (ref 3.4–10.8)

## 2024-10-11 PROCEDURE — 25810000003 SODIUM CHLORIDE 0.9 % SOLUTION: Performed by: NURSE PRACTITIONER

## 2024-10-11 PROCEDURE — 85025 COMPLETE CBC W/AUTO DIFF WBC: CPT | Performed by: NURSE PRACTITIONER

## 2024-10-11 PROCEDURE — 71045 X-RAY EXAM CHEST 1 VIEW: CPT

## 2024-10-11 PROCEDURE — 93005 ELECTROCARDIOGRAM TRACING: CPT | Performed by: STUDENT IN AN ORGANIZED HEALTH CARE EDUCATION/TRAINING PROGRAM

## 2024-10-11 PROCEDURE — 70450 CT HEAD/BRAIN W/O DYE: CPT

## 2024-10-11 PROCEDURE — 93005 ELECTROCARDIOGRAM TRACING: CPT

## 2024-10-11 PROCEDURE — 99285 EMERGENCY DEPT VISIT HI MDM: CPT

## 2024-10-11 PROCEDURE — 84484 ASSAY OF TROPONIN QUANT: CPT | Performed by: NURSE PRACTITIONER

## 2024-10-11 PROCEDURE — G0378 HOSPITAL OBSERVATION PER HR: HCPCS

## 2024-10-11 PROCEDURE — 73610 X-RAY EXAM OF ANKLE: CPT

## 2024-10-11 PROCEDURE — 25010000002 ONDANSETRON PER 1 MG: Performed by: NURSE PRACTITIONER

## 2024-10-11 PROCEDURE — 80053 COMPREHEN METABOLIC PANEL: CPT | Performed by: NURSE PRACTITIONER

## 2024-10-11 PROCEDURE — 83880 ASSAY OF NATRIURETIC PEPTIDE: CPT | Performed by: NURSE PRACTITIONER

## 2024-10-11 RX ORDER — METOPROLOL SUCCINATE 25 MG/1
25 TABLET, EXTENDED RELEASE ORAL 2 TIMES DAILY
Status: ON HOLD | COMMUNITY

## 2024-10-11 RX ORDER — MAG HYDROX/ALUMINUM HYD/SIMETH 400-400-40
1 SUSPENSION, ORAL (FINAL DOSE FORM) ORAL ONCE
Status: COMPLETED | OUTPATIENT
Start: 2024-10-12 | End: 2024-10-12

## 2024-10-11 RX ORDER — SODIUM CHLORIDE 0.9 % (FLUSH) 0.9 %
10 SYRINGE (ML) INJECTION AS NEEDED
Status: DISCONTINUED | OUTPATIENT
Start: 2024-10-11 | End: 2024-10-17 | Stop reason: HOSPADM

## 2024-10-11 RX ORDER — DAPAGLIFLOZIN 10 MG/1
10 TABLET, FILM COATED ORAL DAILY
Status: ON HOLD | COMMUNITY

## 2024-10-11 RX ORDER — GUAIFENESIN 200 MG/10ML
200 LIQUID ORAL EVERY 4 HOURS PRN
Status: DISCONTINUED | OUTPATIENT
Start: 2024-10-11 | End: 2024-10-17 | Stop reason: HOSPADM

## 2024-10-11 RX ORDER — BISACODYL 5 MG/1
5 TABLET, DELAYED RELEASE ORAL DAILY PRN
Status: DISCONTINUED | OUTPATIENT
Start: 2024-10-11 | End: 2024-10-17 | Stop reason: HOSPADM

## 2024-10-11 RX ORDER — SODIUM BICARBONATE 650 MG/1
650 TABLET ORAL 2 TIMES DAILY
Status: ON HOLD | COMMUNITY

## 2024-10-11 RX ORDER — TRAMADOL HYDROCHLORIDE 50 MG/1
25 TABLET ORAL ONCE AS NEEDED
Status: COMPLETED | OUTPATIENT
Start: 2024-10-11 | End: 2024-10-12

## 2024-10-11 RX ORDER — SACUBITRIL AND VALSARTAN 24; 26 MG/1; MG/1
1 TABLET, FILM COATED ORAL 2 TIMES DAILY
COMMUNITY
End: 2024-10-17 | Stop reason: HOSPADM

## 2024-10-11 RX ORDER — LIOTHYRONINE SODIUM 5 UG/1
5 TABLET ORAL 2 TIMES DAILY
Status: ON HOLD | COMMUNITY

## 2024-10-11 RX ORDER — ASPIRIN 81 MG/1
324 TABLET, CHEWABLE ORAL ONCE
Status: COMPLETED | OUTPATIENT
Start: 2024-10-11 | End: 2024-10-11

## 2024-10-11 RX ORDER — CHOLECALCIFEROL (VITAMIN D3) 25 MCG
1000 TABLET ORAL DAILY
Status: ON HOLD | COMMUNITY

## 2024-10-11 RX ORDER — AMOXICILLIN 250 MG
2 CAPSULE ORAL 2 TIMES DAILY PRN
Status: DISCONTINUED | OUTPATIENT
Start: 2024-10-11 | End: 2024-10-17 | Stop reason: HOSPADM

## 2024-10-11 RX ORDER — LEVOTHYROXINE SODIUM 112 UG/1
112 TABLET ORAL DAILY
Status: ON HOLD | COMMUNITY

## 2024-10-11 RX ORDER — SPIRONOLACTONE 25 MG/1
12.5 TABLET ORAL DAILY
COMMUNITY
End: 2024-10-17 | Stop reason: HOSPADM

## 2024-10-11 RX ORDER — ACETAMINOPHEN 160 MG/5ML
650 SOLUTION ORAL EVERY 4 HOURS PRN
Status: DISCONTINUED | OUTPATIENT
Start: 2024-10-11 | End: 2024-10-17 | Stop reason: HOSPADM

## 2024-10-11 RX ORDER — ONDANSETRON 2 MG/ML
4 INJECTION INTRAMUSCULAR; INTRAVENOUS ONCE
Status: COMPLETED | OUTPATIENT
Start: 2024-10-12 | End: 2024-10-11

## 2024-10-11 RX ORDER — ACETAMINOPHEN 325 MG/1
650 TABLET ORAL EVERY 4 HOURS PRN
Status: DISCONTINUED | OUTPATIENT
Start: 2024-10-11 | End: 2024-10-17 | Stop reason: HOSPADM

## 2024-10-11 RX ORDER — DIGOXIN 125 MCG
125 TABLET ORAL
COMMUNITY
End: 2024-10-23 | Stop reason: HOSPADM

## 2024-10-11 RX ORDER — SODIUM CHLORIDE 9 MG/ML
100 INJECTION, SOLUTION INTRAVENOUS CONTINUOUS
Status: DISPENSED | OUTPATIENT
Start: 2024-10-11 | End: 2024-10-11

## 2024-10-11 RX ORDER — POLYETHYLENE GLYCOL 3350 17 G/17G
17 POWDER, FOR SOLUTION ORAL DAILY PRN
Status: DISCONTINUED | OUTPATIENT
Start: 2024-10-11 | End: 2024-10-17 | Stop reason: HOSPADM

## 2024-10-11 RX ORDER — ONDANSETRON 2 MG/ML
4 INJECTION INTRAMUSCULAR; INTRAVENOUS EVERY 6 HOURS PRN
Status: DISCONTINUED | OUTPATIENT
Start: 2024-10-11 | End: 2024-10-12

## 2024-10-11 RX ORDER — ACETAMINOPHEN 650 MG/1
650 SUPPOSITORY RECTAL EVERY 4 HOURS PRN
Status: DISCONTINUED | OUTPATIENT
Start: 2024-10-11 | End: 2024-10-17 | Stop reason: HOSPADM

## 2024-10-11 RX ORDER — ONDANSETRON 4 MG/1
4 TABLET, ORALLY DISINTEGRATING ORAL EVERY 6 HOURS PRN
Status: DISCONTINUED | OUTPATIENT
Start: 2024-10-11 | End: 2024-10-12

## 2024-10-11 RX ORDER — BISACODYL 10 MG
10 SUPPOSITORY, RECTAL RECTAL DAILY PRN
Status: DISCONTINUED | OUTPATIENT
Start: 2024-10-11 | End: 2024-10-17 | Stop reason: HOSPADM

## 2024-10-11 RX ADMIN — ACETAMINOPHEN 650 MG: 325 TABLET, FILM COATED ORAL at 23:51

## 2024-10-11 RX ADMIN — ONDANSETRON 4 MG: 2 INJECTION, SOLUTION INTRAMUSCULAR; INTRAVENOUS at 23:52

## 2024-10-11 RX ADMIN — ASPIRIN 81 MG CHEWABLE TABLET 324 MG: 81 TABLET CHEWABLE at 23:51

## 2024-10-11 RX ADMIN — SODIUM CHLORIDE 500 ML: 0.9 INJECTION, SOLUTION INTRAVENOUS at 21:03

## 2024-10-11 NOTE — Clinical Note
Level of Care: Telemetry [5]   Admitting Physician: LLANES ALVAREZ, CARLOS [[id number]]   Attending Physician: LLANES ALVAREZ, CARLOS [[id number]]

## 2024-10-12 ENCOUNTER — INPATIENT HOSPITAL (OUTPATIENT)
Age: 74
End: 2024-10-12

## 2024-10-12 ENCOUNTER — INPATIENT HOSPITAL (OUTPATIENT)
Dept: URBAN - METROPOLITAN AREA HOSPITAL 84 | Facility: HOSPITAL | Age: 74
End: 2024-10-12

## 2024-10-12 DIAGNOSIS — R11.0 NAUSEA: ICD-10-CM

## 2024-10-12 DIAGNOSIS — K59.00 CONSTIPATION, UNSPECIFIED: ICD-10-CM

## 2024-10-12 DIAGNOSIS — Z90.49 ACQUIRED ABSENCE OF OTHER SPECIFIED PARTS OF DIGESTIVE TRACT: ICD-10-CM

## 2024-10-12 DIAGNOSIS — Z79.01 LONG TERM (CURRENT) USE OF ANTICOAGULANTS: ICD-10-CM

## 2024-10-12 PROBLEM — M06.9 RHEUMATOID ARTHRITIS INVOLVING MULTIPLE SITES: Status: ACTIVE | Noted: 2024-10-12

## 2024-10-12 PROBLEM — E03.9 HYPOTHYROIDISM (ACQUIRED): Status: ACTIVE | Noted: 2024-10-12

## 2024-10-12 LAB
BASOPHILS # BLD AUTO: 0.01 10*3/MM3 (ref 0–0.2)
BASOPHILS NFR BLD AUTO: 0.1 % (ref 0–1.5)
DEPRECATED RDW RBC AUTO: 53.9 FL (ref 37–54)
DIGOXIN SERPL-MCNC: 1.15 NG/ML (ref 0.6–1.2)
EOSINOPHIL # BLD AUTO: 0.08 10*3/MM3 (ref 0–0.4)
EOSINOPHIL NFR BLD AUTO: 0.9 % (ref 0.3–6.2)
ERYTHROCYTE [DISTWIDTH] IN BLOOD BY AUTOMATED COUNT: 16.2 % (ref 12.3–15.4)
GEN 5 2HR TROPONIN T REFLEX: 64 NG/L
HCT VFR BLD AUTO: 39.9 % (ref 34–46.6)
HGB BLD-MCNC: 12.2 G/DL (ref 12–15.9)
IMM GRANULOCYTES # BLD AUTO: 0.08 10*3/MM3 (ref 0–0.05)
IMM GRANULOCYTES NFR BLD AUTO: 0.9 % (ref 0–0.5)
LYMPHOCYTES # BLD AUTO: 0.81 10*3/MM3 (ref 0.7–3.1)
LYMPHOCYTES NFR BLD AUTO: 8.7 % (ref 19.6–45.3)
MCH RBC QN AUTO: 27.6 PG (ref 26.6–33)
MCHC RBC AUTO-ENTMCNC: 30.6 G/DL (ref 31.5–35.7)
MCV RBC AUTO: 90.3 FL (ref 79–97)
MONOCYTES # BLD AUTO: 0.75 10*3/MM3 (ref 0.1–0.9)
MONOCYTES NFR BLD AUTO: 8 % (ref 5–12)
NEUTROPHILS NFR BLD AUTO: 7.62 10*3/MM3 (ref 1.7–7)
NEUTROPHILS NFR BLD AUTO: 81.4 % (ref 42.7–76)
NRBC BLD AUTO-RTO: 0 /100 WBC (ref 0–0.2)
PLATELET # BLD AUTO: 288 10*3/MM3 (ref 140–450)
PMV BLD AUTO: 10.5 FL (ref 6–12)
QT INTERVAL: 357 MS
QTC INTERVAL: 457 MS
RBC # BLD AUTO: 4.42 10*6/MM3 (ref 3.77–5.28)
TROPONIN T DELTA: -4 NG/L
TSH SERPL DL<=0.05 MIU/L-ACNC: 0.64 UIU/ML (ref 0.27–4.2)
WBC NRBC COR # BLD AUTO: 9.35 10*3/MM3 (ref 3.4–10.8)

## 2024-10-12 PROCEDURE — 84484 ASSAY OF TROPONIN QUANT: CPT | Performed by: NURSE PRACTITIONER

## 2024-10-12 PROCEDURE — 99222 1ST HOSP IP/OBS MODERATE 55: CPT | Mod: FS | Performed by: NURSE PRACTITIONER

## 2024-10-12 PROCEDURE — 80162 ASSAY OF DIGOXIN TOTAL: CPT | Performed by: INTERNAL MEDICINE

## 2024-10-12 PROCEDURE — 85025 COMPLETE CBC W/AUTO DIFF WBC: CPT | Performed by: NURSE PRACTITIONER

## 2024-10-12 PROCEDURE — 25010000002 METOCLOPRAMIDE PER 10 MG: Performed by: NURSE PRACTITIONER

## 2024-10-12 PROCEDURE — 25010000002 ONDANSETRON PER 1 MG: Performed by: NURSE PRACTITIONER

## 2024-10-12 PROCEDURE — G0378 HOSPITAL OBSERVATION PER HR: HCPCS

## 2024-10-12 PROCEDURE — 63710000001 ONDANSETRON ODT 4 MG TABLET DISPERSIBLE: Performed by: NURSE PRACTITIONER

## 2024-10-12 PROCEDURE — 36415 COLL VENOUS BLD VENIPUNCTURE: CPT

## 2024-10-12 PROCEDURE — 84443 ASSAY THYROID STIM HORMONE: CPT

## 2024-10-12 PROCEDURE — 25810000003 SODIUM CHLORIDE 0.9 % SOLUTION: Performed by: INTERNAL MEDICINE

## 2024-10-12 PROCEDURE — 99214 OFFICE O/P EST MOD 30 MIN: CPT | Performed by: INTERNAL MEDICINE

## 2024-10-12 RX ORDER — LEVOTHYROXINE SODIUM 112 UG/1
112 TABLET ORAL
Status: DISCONTINUED | OUTPATIENT
Start: 2024-10-12 | End: 2024-10-17 | Stop reason: HOSPADM

## 2024-10-12 RX ORDER — SODIUM BICARBONATE 650 MG/1
650 TABLET ORAL 2 TIMES DAILY
Status: DISCONTINUED | OUTPATIENT
Start: 2024-10-12 | End: 2024-10-17 | Stop reason: HOSPADM

## 2024-10-12 RX ORDER — MEMANTINE HYDROCHLORIDE 10 MG/1
10 TABLET ORAL EVERY 12 HOURS SCHEDULED
Status: DISCONTINUED | OUTPATIENT
Start: 2024-10-12 | End: 2024-10-17 | Stop reason: HOSPADM

## 2024-10-12 RX ORDER — ESCITALOPRAM OXALATE 10 MG/1
20 TABLET ORAL DAILY
Status: DISCONTINUED | OUTPATIENT
Start: 2024-10-12 | End: 2024-10-17 | Stop reason: HOSPADM

## 2024-10-12 RX ORDER — ONDANSETRON 2 MG/ML
4 INJECTION INTRAMUSCULAR; INTRAVENOUS EVERY 6 HOURS
Status: DISCONTINUED | OUTPATIENT
Start: 2024-10-12 | End: 2024-10-12

## 2024-10-12 RX ORDER — ALPRAZOLAM 0.5 MG
0.5 TABLET ORAL ONCE
Status: COMPLETED | OUTPATIENT
Start: 2024-10-13 | End: 2024-10-13

## 2024-10-12 RX ORDER — DIGOXIN 125 MCG
125 TABLET ORAL
Status: DISCONTINUED | OUTPATIENT
Start: 2024-10-12 | End: 2024-10-12

## 2024-10-12 RX ORDER — METOCLOPRAMIDE HYDROCHLORIDE 5 MG/ML
10 INJECTION INTRAMUSCULAR; INTRAVENOUS EVERY 6 HOURS
Status: DISCONTINUED | OUTPATIENT
Start: 2024-10-12 | End: 2024-10-12

## 2024-10-12 RX ORDER — POLYETHYLENE GLYCOL 3350 17 G/17G
17 POWDER, FOR SOLUTION ORAL 2 TIMES DAILY
Status: DISCONTINUED | OUTPATIENT
Start: 2024-10-12 | End: 2024-10-17 | Stop reason: HOSPADM

## 2024-10-12 RX ORDER — ASPIRIN 81 MG/1
81 TABLET ORAL DAILY
Status: DISCONTINUED | OUTPATIENT
Start: 2024-10-12 | End: 2024-10-17 | Stop reason: HOSPADM

## 2024-10-12 RX ORDER — LIOTHYRONINE SODIUM 5 UG/1
5 TABLET ORAL 2 TIMES DAILY
Status: DISCONTINUED | OUTPATIENT
Start: 2024-10-12 | End: 2024-10-17 | Stop reason: HOSPADM

## 2024-10-12 RX ORDER — ONDANSETRON 2 MG/ML
4 INJECTION INTRAMUSCULAR; INTRAVENOUS EVERY 6 HOURS
Status: COMPLETED | OUTPATIENT
Start: 2024-10-12 | End: 2024-10-13

## 2024-10-12 RX ORDER — ATORVASTATIN CALCIUM 40 MG/1
80 TABLET, FILM COATED ORAL DAILY
Status: DISCONTINUED | OUTPATIENT
Start: 2024-10-12 | End: 2024-10-12

## 2024-10-12 RX ORDER — AMOXICILLIN 250 MG
2 CAPSULE ORAL ONCE
Status: COMPLETED | OUTPATIENT
Start: 2024-10-12 | End: 2024-10-12

## 2024-10-12 RX ORDER — PANTOPRAZOLE SODIUM 40 MG/1
40 TABLET, DELAYED RELEASE ORAL
Status: DISCONTINUED | OUTPATIENT
Start: 2024-10-12 | End: 2024-10-17 | Stop reason: HOSPADM

## 2024-10-12 RX ORDER — HYDROXYCHLOROQUINE SULFATE 200 MG/1
200 TABLET, FILM COATED ORAL 2 TIMES DAILY
Status: DISCONTINUED | OUTPATIENT
Start: 2024-10-12 | End: 2024-10-12

## 2024-10-12 RX ORDER — CHOLECALCIFEROL (VITAMIN D3) 25 MCG
1000 TABLET ORAL DAILY
Status: DISCONTINUED | OUTPATIENT
Start: 2024-10-12 | End: 2024-10-17 | Stop reason: HOSPADM

## 2024-10-12 RX ORDER — METOCLOPRAMIDE HYDROCHLORIDE 5 MG/ML
5 INJECTION INTRAMUSCULAR; INTRAVENOUS EVERY 6 HOURS
Status: DISPENSED | OUTPATIENT
Start: 2024-10-12 | End: 2024-10-13

## 2024-10-12 RX ORDER — PANTOPRAZOLE SODIUM 40 MG/1
40 TABLET, DELAYED RELEASE ORAL DAILY
Status: DISCONTINUED | OUTPATIENT
Start: 2024-10-12 | End: 2024-10-12

## 2024-10-12 RX ADMIN — ASPIRIN 81 MG: 81 TABLET, COATED ORAL at 08:40

## 2024-10-12 RX ADMIN — GLYCERIN 1 SUPPOSITORY: 2 SUPPOSITORY RECTAL at 00:36

## 2024-10-12 RX ADMIN — ONDANSETRON 4 MG: 4 TABLET, ORALLY DISINTEGRATING ORAL at 13:59

## 2024-10-12 RX ADMIN — LEVOTHYROXINE SODIUM 112 MCG: 0.11 TABLET ORAL at 05:16

## 2024-10-12 RX ADMIN — HYDROXYCHLOROQUINE SULFATE 200 MG: 200 TABLET ORAL at 08:40

## 2024-10-12 RX ADMIN — SODIUM BICARBONATE 650 MG: 650 TABLET ORAL at 08:40

## 2024-10-12 RX ADMIN — PANTOPRAZOLE SODIUM 40 MG: 40 TABLET, DELAYED RELEASE ORAL at 17:35

## 2024-10-12 RX ADMIN — LIOTHYRONINE SODIUM 5 MCG: 5 TABLET ORAL at 20:32

## 2024-10-12 RX ADMIN — ONDANSETRON 4 MG: 2 INJECTION, SOLUTION INTRAMUSCULAR; INTRAVENOUS at 06:11

## 2024-10-12 RX ADMIN — POLYETHYLENE GLYCOL 3350 17 G: 17 POWDER, FOR SOLUTION ORAL at 20:32

## 2024-10-12 RX ADMIN — SODIUM BICARBONATE 650 MG: 650 TABLET ORAL at 20:32

## 2024-10-12 RX ADMIN — METOCLOPRAMIDE 5 MG: 5 INJECTION, SOLUTION INTRAMUSCULAR; INTRAVENOUS at 20:32

## 2024-10-12 RX ADMIN — PANTOPRAZOLE SODIUM 40 MG: 40 TABLET, DELAYED RELEASE ORAL at 08:40

## 2024-10-12 RX ADMIN — GUAIFENESIN 200 MG: 200 SOLUTION ORAL at 06:11

## 2024-10-12 RX ADMIN — MEMANTINE 10 MG: 10 TABLET ORAL at 20:33

## 2024-10-12 RX ADMIN — Medication 1000 UNITS: at 08:40

## 2024-10-12 RX ADMIN — ACETAMINOPHEN 650 MG: 325 TABLET, FILM COATED ORAL at 19:20

## 2024-10-12 RX ADMIN — LIOTHYRONINE SODIUM 5 MCG: 5 TABLET ORAL at 08:39

## 2024-10-12 RX ADMIN — Medication 10 ML: at 20:33

## 2024-10-12 RX ADMIN — SENNOSIDES AND DOCUSATE SODIUM 2 TABLET: 50; 8.6 TABLET ORAL at 17:35

## 2024-10-12 RX ADMIN — TRAMADOL HYDROCHLORIDE 25 MG: 50 TABLET ORAL at 06:11

## 2024-10-12 RX ADMIN — ONDANSETRON 4 MG: 2 INJECTION INTRAMUSCULAR; INTRAVENOUS at 19:10

## 2024-10-12 RX ADMIN — ESCITALOPRAM OXALATE 20 MG: 10 TABLET ORAL at 08:40

## 2024-10-12 RX ADMIN — Medication 10 ML: at 08:40

## 2024-10-12 RX ADMIN — SODIUM CHLORIDE 500 ML: 9 INJECTION, SOLUTION INTRAVENOUS at 12:58

## 2024-10-12 RX ADMIN — MEMANTINE 10 MG: 10 TABLET ORAL at 08:40

## 2024-10-12 RX ADMIN — ATORVASTATIN CALCIUM 80 MG: 40 TABLET, FILM COATED ORAL at 08:40

## 2024-10-12 NOTE — CONSULTS
GI CONSULT  NOTE:    Referring Provider:     Dr Stuart    Chief complaint:    Nausea     Subjective   Syncope      History of present illness:     Patient is a 74-year-old female with past medical history of A-fib/stroke on Xarelto, chronic renal failure, hypertension, migraine, rheumatoid arthritis, cholecystectomy recent admission to [facility] for atrial fibrillation and CHF.  This just discharged 10/10/2024.  Return to the ER on 10/11/2024 for hypotension and syncope.  Daughter states she was not eating or drinking at home and took all of her heart medicines.  Patient had been constipated and had not had a bowel movement for 8 days prior to having 1 moderate-sized stool last evening.  GI was consulted for nausea.    Upon speaking to patient and daughter patient states she just developed the nausea on the way home from the hospital on 10/10/2024.  Had 1 episode of nausea and vomiting prior in a car ride about 2 weeks previously.  Patient takes 81 mg daily as well as Xarelto.  No NSAIDs.  No history of liver disease.  Patient has a history of pancreatitis questionable gallstone related in the remote past.  Patient has had a decreased appetite.    LABS: WBCs 9.35, hemoglobin 12.2, platelets 288.  Creatinine 1.44.  LFTs are normal.  No abdominal imaging.    Endo History:  6/12/2023 colonoscopy (Dr. Lord) TA polyp.  2019 EGD small hiatal hernia.  Nonerosive gastritis.  Multiple colonoscopies previously.  Family history of father having colon cancer.    Past Medical History:  Past Medical History:   Diagnosis Date    Anxiety and depression     Arthritis     Back pain     CRF (chronic renal failure), stage 3 (moderate)     Diverticulosis     Essential hypertension     Family history of colon cancer     Family history of colonic polyps     Fractures     R tibia/fibula; L ankle    GERD (gastroesophageal reflux disease)     History of adenomatous polyp of colon     History of cardiac cath     1/28/19 left main  no significant disease. LAD with no significant disease. LCX no significant disease. RCA no significant disease.    History of cardiac monitoring     2/01/19 - [facility] - Underlying rhythm is sinus at 48-94 bpm. PSVT x23 with fastest 167 bpm, longest 24.4 seconds.   10/05/2018- [facility]IO PATCH - NSR most of time but did have episode of A fib ranging from .    History of colon polyps     History of echocardiogram     9/2018: Left ventricular systolic function is normal. EF 50%. Left ventricular diastolic dysfunction consistent with ipaired relaxation. Left atrial cavity size is mildly dilated. Mild MR. Mild to moderate TR. There is abnormal thickening noted on the RV that may represent a vegetation-this was discussed with Dr. Hammer and Dr. Reilly. No patent foramen ovale. No further work up needed at this time    Hyperlipidemia     Kidney disorder     Migraine     Migraines     Neck pain     Stroke 2018       Past Surgical History:  Past Surgical History:   Procedure Laterality Date    ABDOMINOPLASTY      BACK SURGERY      L2-5 fusion 2012    CHOLECYSTECTOMY      COLONOSCOPY  11/24/2014    Diverticulosis; Repeat 5 years    COLONOSCOPY  11/09/2010    Diverticulosis; Repeat 4 years    COLONOSCOPY  09/18/2007    Dr. Monzon-Normal; Repeat 3 years    COLONOSCOPY  06/02/2005    Dr. Monzon-Diminuitive polypectomy above the cecum; Otherwise normal colonoscopy; Repeat 2 years    COLONOSCOPY N/A 11/01/2019    Diverticulosis in the left colon; Two 5-6mm polyps in the cecum-path shows one tubular adenomatous and on hyperplastic polyp; One 8mm tubular adenomatous polyp in the ascending colon; Two 4-5mm tubular adenomatous polyps in the transverse colon; Repeat 3 years    COLONOSCOPY N/A 6/12/2023    Procedure: COLONOSCOPY WITH ANESTHESIA;  Surgeon: Susan Lord MD;  Location: [facility] ENDOSCOPY;  Service: Gastroenterology;  Laterality: N/A;  preop hx of polyps   postop; polyps   PCP Woody Valentin MD    CYSTOCELE REPAIR       Cystocele and rectocele repair    ENDOSCOPY  09/28/2012    LA grade C esophagitis; HH    ENDOSCOPY N/A 11/01/2019    Small HH; Non-erosive gastritis-biopsied; Normal examined duodenum    FIBULA FRACTURE SURGERY      HYSTERECTOMY      NECK SURGERY  2012    ACDF C4-7    ORIF TIBIA FRACTURE Right        Social History:  Social History     Tobacco Use    Smoking status: Never    Smokeless tobacco: Never   Vaping Use    Vaping status: Never Used   Substance Use Topics    Alcohol use: No    Drug use: Never       Family History:  Family History   Problem Relation Age of Onset    Colon cancer Father 65    Colon polyps Daughter 38    Esophageal cancer Neg Hx     Liver cancer Neg Hx     Liver disease Neg Hx     Rectal cancer Neg Hx     Stomach cancer Neg Hx        Medications:  Medications Prior to Admission   Medication Sig Dispense Refill Last Dose/Taking    ALPRAZolam (XANAX) 1 MG tablet Take 1 tablet by mouth 3 (Three) Times a Day As Needed for Anxiety.   Taking As Needed    aspirin 81 MG tablet Take 1 tablet by mouth Daily. 30 tablet 1 Taking    atorvastatin (LIPITOR) 80 MG tablet Take 1 tablet by mouth Daily.   Taking    Cholecalciferol 25 MCG (1000 UT) tablet Take 1 tablet by mouth Daily.   Taking    dapagliflozin Propanediol 10 MG tablet Take 10 mg by mouth Daily.   Taking    Denosumab (PROLIA SC) Inject  under the skin into the appropriate area as directed. Every 6 months   Taking    digoxin (LANOXIN) 125 MCG tablet Take 1 tablet by mouth Daily.   Taking    escitalopram (LEXAPRO) 20 MG tablet Take 1 tablet by mouth Daily.   Taking    ezetimibe (ZETIA) 10 MG tablet Take 1 tablet by mouth Daily. 30 tablet 5 Taking    FOLIC ACID PO Take  by mouth Daily.   Taking    hydroxychloroquine (PLAQUENIL) 200 MG tablet Take 1 tablet by mouth 2 (Two) Times a Day.   Taking    levothyroxine (SYNTHROID, LEVOTHROID) 112 MCG tablet Take 1 tablet by mouth Daily.   Taking    Lifitegrast (XIIDRA) 5 % ophthalmic solution Administer 1  drop to both eyes 2 (Two) Times a Day.   Taking    liothyronine (CYTOMEL) 5 MCG tablet Take 1 tablet by mouth 2 (Two) Times a Day.   Taking    memantine (NAMENDA) 10 MG tablet Take 1 tablet by mouth 2 (Two) Times a Day.   Taking    metoprolol succinate XL (TOPROL-XL) 25 MG 24 hr tablet Take 1 tablet by mouth 2 (Two) Times a Day.   Taking    pantoprazole (PROTONIX) 40 MG EC tablet Take 1 tablet by mouth Daily As Needed.   Taking As Needed    rivaroxaban (XARELTO) 20 MG tablet Take 1 tablet by mouth Daily. 90 tablet 3 Taking    sacubitril-valsartan (Entresto) 24-26 MG tablet Take 1 tablet by mouth 2 (Two) Times a Day.   Taking    sodium bicarbonate 650 MG tablet Take 1 tablet by mouth 2 (Two) Times a Day.   Taking    spironolactone (ALDACTONE) 25 MG tablet Take 0.5 tablets by mouth Daily.   Taking    topiramate (TOPAMAX) 200 MG tablet Take 1 tablet by mouth Daily.   Taking       Scheduled Meds:aspirin, 81 mg, Oral, Daily  cholecalciferol, 1,000 Units, Oral, Daily  escitalopram, 20 mg, Oral, Daily  levothyroxine, 112 mcg, Oral, Q AM  liothyronine, 5 mcg, Oral, BID  memantine, 10 mg, Oral, Q12H  pantoprazole, 40 mg, Oral, Daily  polyethylene glycol, 17 g, Oral, BID  rivaroxaban, 15 mg, Oral, Daily With Dinner  senna-docusate sodium, 2 tablet, Oral, Once  sodium bicarbonate, 650 mg, Oral, BID      Continuous Infusions:   PRN Meds:.  acetaminophen **OR** acetaminophen **OR** acetaminophen    senna-docusate sodium **AND** polyethylene glycol **AND** bisacodyl **AND** bisacodyl    guaifenesin    ondansetron    ondansetron ODT    sodium chloride    ALLERGIES:  Benzoin, Iodine, Vancomycin, Levaquin [levofloxacin], Sulfa antibiotics, Acetaminophen, and Latex    ROS:  Review of Systems   Gastrointestinal:  Positive for abdominal pain, constipation, nausea and vomiting. Negative for abdominal distention, anal bleeding, blood in stool, diarrhea and rectal pain.   Neurological:  Positive for dizziness, syncope and  light-headedness.       The following systems were reviewed and negative;    Constitution:  No fevers, chills, no unintentional weight loss  Skin: no rash, no jaundice  Eyes:  No blurry vision, no eye pain  HENT:  No change in hearing or smell  Resp:  No dyspnea or cough  CV:  No chest pain or palpitations  :  No dysuria, hematuria  Musculoskeletal:  No leg cramps or arthralgias  Neuro:  No tremor, no numbness  Psych:  No depression or confusion    Objective resting in the hospital bed.  Poor historian.  Daughter at bedside.  Room 256.    Vital Signs:   Vitals:    10/12/24 0405 10/12/24 0839 10/12/24 1232 10/12/24 1443   BP: 108/66 91/56 (!) 79/52 94/53   BP Location: Left arm Left arm     Patient Position: Lying Lying     Pulse: 100 90 107 100   Resp: 18 18 14    Temp: 97.7 °F (36.5 °C) 98.2 °F (36.8 °C) 96.9 °F (36.1 °C)    TempSrc: Oral Oral Temporal    SpO2: 93% 96% 92% 91%   Weight:       Height:           Physical Exam:   General Appearance:    Awake and alert, in no acute distress   Head:    Normocephalic, without obvious abnormality, atraumatic   Eyes:            Conjunctivae normal, anicteric sclerae, pupils equal   Ears:    Ears appear intact with no abnormalities noted   Throat:   No oral lesions, no thrush, oral mucosa moist   Neck:   Supple, no JVD   Lungs:      respirations regular, even and unlabored        Chest Wall:    No abnormalities observed   Abdomen:     soft, nontender, no rebound or guarding, nondistended, no hepatosplenomegaly   Rectal:     Deferred   Extremities:   Moves all extremities, no edema, no cyanosis   Pulses:   Pulses palpable and equal bilaterally   Skin:   No rash, no jaundice, normal palpation        Neurologic:   Cranial nerves 2 - 12 grossly intact, no asterixis       Results Review:   I reviewed the patient's labs and imaging.  CBC    Results from last 7 days   Lab Units 10/12/24  0515 10/11/24  2058 10/10/24  0719 10/09/24  0801 10/08/24  0640   WBC 10*3/mm3 9.35 11.05*  "9.5 8.7 9.7   HEMOGLOBIN g/dL 12.2 13.9 11.9* 12.0 11.5*   PLATELETS 10*3/mm3 288 333 291 279 270     CMP   Results from last 7 days   Lab Units 10/11/24  2142 10/06/24  0519   SODIUM mmol/L 141  --    POTASSIUM mmol/L 4.0  --    CHLORIDE mmol/L 106  --    CO2 mmol/L 21.6*  --    BUN mg/dL 35*  --    CREATININE mg/dL 1.44*  --    GLUCOSE mg/dL 110*  --    ALBUMIN g/dL 3.5  --    BILIRUBIN mg/dL 0.4  --    ALK PHOS U/L 81  --    AST (SGOT) U/L 20  --    ALT (SGPT) U/L 20  --    MAGNESIUM MG/DL  --  2.2     Cr Clearance Estimated Creatinine Clearance: 35.6 mL/min (A) (by C-G formula based on SCr of 1.44 mg/dL (H)).  Coag     HbA1C   Lab Results   Component Value Date    HGBA1C 5.6 10/06/2024    HGBA1C 5.8 09/02/2018     Blood Glucose No results found for: \"POCGLU\"  Infection     UA      Radiology(recent) CT Head Without Contrast    Result Date: 10/11/2024  Impression: 1. No acute intracranial abnormalities are identified. 2. Pansinusitis. Electronically Signed: Jeet Reis MD  10/11/2024 10:24 PM EDT  Workstation ID: [id number]    XR Ankle 3+ View Left    Result Date: 10/11/2024  Impression: No acute osseous abnormalities are identified. Electronically Signed: Jeet Reis MD  10/11/2024 9:33 PM EDT  Workstation ID: [id number]    XR Chest 1 View    Result Date: 10/11/2024  No active disease. Electronically Signed: Jeet Reis MD  10/11/2024 9:25 PM EDT  Workstation ID: [id number]       ASSESSMENT:  Nausea for 2 days  Syncopal episode thought to be related to hypotension  Constipation  Atrial fibrillation/stroke on Xarelto  Hypertension  Chronic renal failure  History of migraines  History of cholecystectomy  Rheumatoid arthritis  Remote history of pancreatitis questionable gallstone related      PLAN:   Patient is 74-year-old female who was just discharged from [facility] 2 days ago and returned to the ER on 10/11/2024 for hypotension and syncope.  GI was consulted for nausea.    Patient's daughter states she " developed nausea after being discharged from the hospital 2 days ago.  She thought it was related to taking a pain pill initially.  Patient had not had a bowel movement for 8 days previously.  Finally had 1 medium size last evening.  Will give Senokot and start MiraLAX twice a day to help get her bowels moving.  Will schedule Zofran it does seem to be helping her symptoms.  Will give Reglan for 24 hours.  Increase Protonix to twice a day.  Hold Xarelto in case endoscopies are needed.  Will evaluate in a.m.  N.p.o. after midnight in case patient feels as if she is not better we will proceed with EGD.       I discussed the patient's findings and my recommendations with the patient.  Lis Hines, GREGORIA  10/12/24  16:35 EDT    Time:

## 2024-10-12 NOTE — ED PROVIDER NOTES
Subjective   History of Present Illness  Patient is a 74-year-old female who presents via EMS from home for hypotension, and syncope.  Patient reports she was recently admitted for 5 days at [facility] for CHF and A-fib and was discharged yesterday.  Patient reports a couple of near syncopal episodes and 1 syncopal episode today due to her low blood pressure.  Patient reports she did fall during one of her syncopal episodes today but denies hitting head and has no other complaints of pain other than her left ankle.  Patient reports she did take all of her home medications today but did not drink much water with her medications.  She reports she did land on her left ankle during her syncopal episode and complains of some left ankle pain.  Patient also reports constipation, and states she has not had a bowel movement for the past 8 days.  Patient reports she is on Xarelto for her A-fib, and has recently started taking Farxiga and Lasix.  Denies any chest pain but does states she does have a wet cough.      Review of Systems   Constitutional:  Negative for fever.   Respiratory:  Positive for cough. Negative for shortness of breath.    Cardiovascular:  Negative for chest pain.   Gastrointestinal:  Positive for constipation.   Musculoskeletal:  Positive for myalgias.   Neurological:  Positive for syncope.       Past Medical History:   Diagnosis Date    Anxiety and depression     Arthritis     Back pain     CRF (chronic renal failure), stage 3 (moderate)     Diverticulosis     Essential hypertension     Family history of colon cancer     Family history of colonic polyps     Fractures     R tibia/fibula; L ankle    GERD (gastroesophageal reflux disease)     History of adenomatous polyp of colon     History of cardiac cath     1/28/19 left main no significant disease. LAD with no significant disease. LCX no significant disease. RCA no significant disease.    History of cardiac monitoring     2/01/19 - CL[facility]O PATCH -  Underlying rhythm is sinus at 48-94 bpm. PSVT x23 with fastest 167 bpm, longest 24.4 seconds.   10/05/2018- ZIO PATCH - NSR most of time but did have episode of A fib ranging from .    History of colon polyps     History of echocardiogram     9/2018: Left ventricular systolic function is normal. EF 50%. Left ventricular diastolic dysfunction consistent with ipaired relaxation. Left atrial cavity size is mildly dilated. Mild MR. Mild to moderate TR. There is abnormal thickening noted on the RV that may represent a vegetation-this was discussed with Dr. Hammer and Dr. Reilly. No patent foramen ovale. No further work up needed at this time    Hyperlipidemia     Kidney disorder     Migraine     Migraines     Neck pain     Stroke 2018       Allergies   Allergen Reactions    Benzoin Anaphylaxis and Swelling     States when used on her neck it shut off her airway  Huge abscesses with surgery      Iodine Other (See Comments)     PT UNABLE TO TELL RN ABOUT REACTION AT THIS TIME, BUT FAMILY STATES PT HAD A REACTION TO BEING CLEANSED WITH IODINE IN SURGERY  IOBAN - used on neck, swelled to the point of shutting off airway    Vancomycin Other (See Comments)     Kidney failure  Vancomycin induced acute kidney injury      Levaquin [Levofloxacin] Unknown (See Comments)     Unknown    Sulfa Antibiotics     Acetaminophen Nausea Only    Latex Itching       Past Surgical History:   Procedure Laterality Date    ABDOMINOPLASTY      BACK SURGERY      L2-5 fusion 2012    CHOLECYSTECTOMY      COLONOSCOPY  11/24/2014    Diverticulosis; Repeat 5 years    COLONOSCOPY  11/09/2010    Diverticulosis; Repeat 4 years    COLONOSCOPY  09/18/2007    Dr. Monzon-Normal; Repeat 3 years    COLONOSCOPY  06/02/2005    Dr. Monzon-Diminuitive polypectomy above the cecum; Otherwise normal colonoscopy; Repeat 2 years    COLONOSCOPY N/A 11/01/2019    Diverticulosis in the left colon; Two 5-6mm polyps in the cecum-path shows one tubular adenomatous and on  hyperplastic polyp; One 8mm tubular adenomatous polyp in the ascending colon; Two 4-5mm tubular adenomatous polyps in the transverse colon; Repeat 3 years    COLONOSCOPY N/A 6/12/2023    Procedure: COLONOSCOPY WITH ANESTHESIA;  Surgeon: Susan Lord MD;  Location: [facility] ENDOSCOPY;  Service: Gastroenterology;  Laterality: N/A;  preop hx of polyps   postop; polyps   PCP Woody Valentin MD    CYSTOCELE REPAIR      Cystocele and rectocele repair    ENDOSCOPY  09/28/2012    LA grade C esophagitis; HH    ENDOSCOPY N/A 11/01/2019    Small HH; Non-erosive gastritis-biopsied; Normal examined duodenum    FIBULA FRACTURE SURGERY      HYSTERECTOMY      NECK SURGERY  2012    ACDF C4-7    ORIF TIBIA FRACTURE Right        Family History   Problem Relation Age of Onset    Colon cancer Father 65    Colon polyps Daughter 38    Esophageal cancer Neg Hx     Liver cancer Neg Hx     Liver disease Neg Hx     Rectal cancer Neg Hx     Stomach cancer Neg Hx        Social History     Socioeconomic History    Marital status:    Tobacco Use    Smoking status: Never    Smokeless tobacco: Never   Vaping Use    Vaping status: Never Used   Substance and Sexual Activity    Alcohol use: No    Drug use: Never    Sexual activity: Defer           Objective   Physical Exam  Constitutional:       Appearance: Normal appearance.   HENT:      Head: Normocephalic and atraumatic.   Eyes:      Extraocular Movements: Extraocular movements intact.      Conjunctiva/sclera: Conjunctivae normal.      Pupils: Pupils are equal, round, and reactive to light.   Cardiovascular:      Rate and Rhythm: Rhythm irregular.   Pulmonary:      Effort: Pulmonary effort is normal.      Breath sounds: Normal breath sounds. No wheezing.   Abdominal:      Palpations: Abdomen is soft. There is no mass.      Tenderness: There is no abdominal tenderness.   Musculoskeletal:         General: Tenderness and signs of injury present.      Cervical back: Normal range of  "motion.      Comments: Tenderness to left ankle noted.  No ecchymosis noted, or obvious deformity.  Pulses present and neurovascularly intact.   Skin:     General: Skin is warm and dry.      Capillary Refill: Capillary refill takes less than 2 seconds.   Neurological:      General: No focal deficit present.      Mental Status: She is alert and oriented to person, place, and time.   Psychiatric:         Mood and Affect: Mood normal.         Behavior: Behavior normal.         Procedures           ED Course  ED Course as of 10/11/24 2259   Fri Oct 11, 2024   2233 Spoke to GREGORIA Abdullahi.  [KW]      ED Course User Index  [KW] Lizz Carrillo, GREGORIA        /67   Pulse 86   Temp 97.5 °F (36.4 °C) (Oral)   Resp 18   Ht 165.1 cm (65\")   Wt 78.9 kg (174 lb)   SpO2 96%   BMI 28.96 kg/m²   Labs Reviewed   COMPREHENSIVE METABOLIC PANEL - Abnormal; Notable for the following components:       Result Value    Glucose 110 (*)     BUN 35 (*)     Creatinine 1.44 (*)     CO2 21.6 (*)     Calcium 8.1 (*)     Total Protein 5.6 (*)     eGFR 38.2 (*)     All other components within normal limits    Narrative:     GFR Normal >60  Chronic Kidney Disease <60  Kidney Failure <15    The GFR formula is only valid for adults with stable renal function between ages 18 and 70.   BNP (IN-HOUSE) - Abnormal; Notable for the following components:    proBNP 2,832.0 (*)     All other components within normal limits    Narrative:     This assay is used as an aid in the diagnosis of individuals suspected of having heart failure. It can be used as an aid in the diagnosis of acute decompensated heart failure (ADHF) in patients presenting with signs and symptoms of ADHF to the emergency department (ED). In addition, NT-proBNP of <300 pg/mL indicates ADHF is not likely.    Age Range Result Interpretation  NT-proBNP Concentration (pg/mL:      <50             Positive            >450                   Gray                 300-450                   "  Negative             <300    50-75           Positive            >900                  Gray                300-900                  Negative            <300      >75             Positive            >1800                  Gray                300-1800                  Negative            <300   SINGLE HS TROPONIN T - Abnormal; Notable for the following components:    HS Troponin T 68 (*)     All other components within normal limits    Narrative:     High Sensitive Troponin T Reference Range:  <14.0 ng/L- Negative Female for AMI  <22.0 ng/L- Negative Male for AMI  >=14 - Abnormal Female indicating possible myocardial injury.  >=22 - Abnormal Male indicating possible myocardial injury.   Clinicians would have to utilize clinical acumen, EKG, Troponin, and serial changes to determine if it is an Acute Myocardial Infarction or myocardial injury due to an underlying chronic condition.        CBC WITH AUTO DIFFERENTIAL - Abnormal; Notable for the following components:    WBC 11.05 (*)     MCHC 30.4 (*)     RDW 16.3 (*)     Neutrophil % 84.0 (*)     Lymphocyte % 7.1 (*)     Immature Grans % 1.0 (*)     Neutrophils, Absolute 9.28 (*)     Immature Grans, Absolute 0.11 (*)     All other components within normal limits   HIGH SENSITIVITIY TROPONIN T 2HR   BASIC METABOLIC PANEL   CBC WITH AUTO DIFFERENTIAL   CBC AND DIFFERENTIAL    Narrative:     The following orders were created for panel order CBC & Differential.  Procedure                               Abnormality         Status                     ---------                               -----------         ------                     CBC Auto Differential[[id number]]        Abnormal            Final result                 Please view results for these tests on the individual orders.   CBC AND DIFFERENTIAL    Narrative:     The following orders were created for panel order CBC & Differential.  Procedure                               Abnormality         Status                      ---------                               -----------         ------                     CBC Auto Differential[[id number]]                                                         Please view results for these tests on the individual orders.     Medications   sodium chloride 0.9 % flush 10 mL (has no administration in time range)   aspirin chewable tablet 324 mg (has no administration in time range)   sodium chloride 0.9 % infusion (has no administration in time range)   acetaminophen (TYLENOL) tablet 650 mg (has no administration in time range)     Or   acetaminophen (TYLENOL) 160 MG/5ML oral solution 650 mg (has no administration in time range)     Or   acetaminophen (TYLENOL) suppository 650 mg (has no administration in time range)   sennosides-docusate (PERICOLACE) 8.6-50 MG per tablet 2 tablet (has no administration in time range)     And   polyethylene glycol (MIRALAX) packet 17 g (has no administration in time range)     And   bisacodyl (DULCOLAX) EC tablet 5 mg (has no administration in time range)     And   bisacodyl (DULCOLAX) suppository 10 mg (has no administration in time range)   sodium chloride 0.9 % bolus 500 mL (0 mL Intravenous Stopped 10/11/24 2130)     CT Head Without Contrast    Result Date: 10/11/2024  Impression: 1. No acute intracranial abnormalities are identified. 2. Pansinusitis. Electronically Signed: Jeet Reis MD  10/11/2024 10:24 PM EDT  Workstation ID: [id number]    XR Ankle 3+ View Left    Result Date: 10/11/2024  Impression: No acute osseous abnormalities are identified. Electronically Signed: Jeet Reis MD  10/11/2024 9:33 PM EDT  Workstation ID: [id number]    XR Chest 1 View    Result Date: 10/11/2024  No active disease. Electronically Signed: Jeet Reis MD  10/11/2024 9:25 PM EDT  Workstation ID: [id number]                                        Medical Decision Making  Chart review of  discharge summary from [facility] on 10/10:   Hospital Course:  The patient is a 74 y.o.  female with medical history notable for atrial fibrillation, hyperlipidemia, rheumatoid arthritis, and prior CVA who presented to [facility] with palpitations.  Patient was found to have A-fib with RVR and started on appropriate therapies for this.  Cardiology was consulted to help with management and made adjustments to her medications.  Patient was also found to have imaging findings concerning for bilateral pneumonia for which she was treated with antimicrobial therapy.  She was also treated for new onset heart failure with reduced EF.  Patient was recommended for outpatient ablation in the future.  Patient made good improvements during her stay and was cleared from cardiac standpoint for discharge.  She will return home with her daughter and continue working with home health and therapy.  Patient daughter voiced understanding agreement to the plan.     Patient is a 74-year-old female who presents with hypotension and couple episode that occurred today.  After being discharged from [facility] yesterday.     IV was established and labs were obtained and significant for; troponin 68, UN 35, creatinine 1.44, GFR 38.2, BNP 2832, WBC 11.05.     X-ray of left ankle was independently interpreted by radiology and reviewed by me and was significant for:  No acute osseous abnormalities are identified  Chest x-ray was independently interpreted by radiology and reviewed by me and was significant for: No active disease.  CT head was independently interpreted by radiology and reviewed by me and was significant for: 1. No acute intracranial abnormalities are identified. 2. Pansinusitis.     Patient was found to be hypotensive upon arrival and IV was established and a 500 cc bolus of fluids was given, and patient was placed in Trendelenburg.  Patient's troponin was 68, which is up from 31 dated 10/5.  Patient's creatinine was 1.44, up from 1.1 on 10/10, and up from 1.2 on 10/8.  Patient's BUN was 35, up from  29 on 10/10, and 19 on 10/8.  On reexamination patient's blood pressure improved, and and patient had no other episodes of hypotension.  Maintenance fluids were started at 100/h.  Discussed with patient the decision to admit, patient was agreeable to admission and plan of care.  Patient was found to have a troponin of 68, and was found to have some renal insufficiency may be accountable for the troponin level.  Will do a repeat troponin and patient had no chest pain at this time.  Patient was given aspirin.  Patient is stable at time of admission.  Discussed with Kaylen lackey nurse practitioner and admitted to Dr. Norman.        Problems Addressed:  Elevated troponin: complicated acute illness or injury  Hypotension, unspecified hypotension type: complicated acute illness or injury  Renal insufficiency: complicated acute illness or injury    Amount and/or Complexity of Data Reviewed  Labs: ordered.  Radiology: ordered.    Risk  OTC drugs.  Prescription drug management.  Decision regarding hospitalization.        Final diagnoses:   Hypotension, unspecified hypotension type   Elevated troponin   Renal insufficiency       ED Disposition  ED Disposition       ED Disposition   Decision to Admit    Condition   --    Comment   Level of Care: Med/Surg [1]   Diagnosis: Hypotensive episode [618407]   Admitting Physician: LLANES ALVAREZ, CARLOS [[id number]]   Attending Physician: LLANES ALVAREZ, CARLOS [[id number]]   Bed Request Comments: cardiac monitor                 No follow-up provider specified.       Medication List      No changes were made to your prescriptions during this visit.            Lizz Carrillo, APRN  10/11/24 3076

## 2024-10-12 NOTE — PROGRESS NOTES
[facility] MEDICINE SERVICE  DAILY PROGRESS NOTE    NAME: Natalee Noble  : 1950  MRN: [id number]      LOS: 0 days     PROVIDER OF SERVICE: Nayan Stuart MD    Chief Complaint: Hypotensive episode    Subjective:     Interval History:  History taken from: patient         History of Present Illness: Natalee Noble is a very pleasant  74 y.o. female with a CMH of anxiety and depression, chronic back pain, chronic renal failure stage III, hypertension, hyperlipidemia, heart failure reduced EF, paroxysmal atrial fibrillation on anticoagulation who presented to [facility] on 10/11/2024 with complaints of several near syncopal and 1 syncopal episode today associated with hypotension.  She reports and  records reflect she was admitted to [facility] from 10/5/2024 until yesterday.  Review of records shows she was admitted there for atrial fibrillation with RVR elevated troponin, and heart failure.  She had an echo which showed an ejection fraction of 35 to 40%..  She was discharged on  new medications Entresto, Farxiga, spironolactone and Lasix which she reports she took today.  In  ED she also complained of left ankle pain.  X-ray done in the ED showed no acute process per radiology.  She denies any chest pain, shortness of air.  She does report dizziness any focal weaknesses.  She has no slurred speech or facial droop.  She is awake and alert and answering appropriately.  Family at bedside assisting with history.     Blood pressure was initially low as 69/47 in ED.  She was given a 500 cc fluid bolus with improvement.  Labs show a high-sensitivity troponin of 68, proBNP 2832 and stable on room air.  BUN 35 creatinine 1.44 glucose 110 calcium 8.1 WBC 11.05 afebrile.  Urinalysis ordered and pending, chest x-ray per radiology here shows no active disease, CT head per radiology shows no acute abnormalities.  EKG shows atrial fibrillation heart rate 98.  She also  complains of constipation has not had a bowel movement in 8 days.  She denies abdominal pain.  She will be admitted for further evaluation and treatment and cardiology has been consulted.    10/12/24 seen in bed RAYMOND BRUCE RN.  C/o nausea, bp 91/56    Review of Systems    Constitutional: Negative.    HENT: Negative.     Eyes: Negative.    Respiratory: Negative.     Cardiovascular: Negative.    Gastrointestinal:  Positive for constipation and nausea.   Endocrine: Negative.    Genitourinary: Negative.    Musculoskeletal: Negative.    Skin: Negative.    Allergic/Immunologic: Negative.    Neurological:  Positive for syncope and light-headedness.   Hematological: Negative.    Psychiatric/Behavioral: Negative.     All other systems reviewed and are negative.  Objective:     Vital Signs  Temp:  [97.3 °F (36.3 °C)-98.2 °F (36.8 °C)] 98.2 °F (36.8 °C)  Heart Rate:  [] 90  Resp:  [18] 18  BP: ()/(46-72) 91/56  Flow (L/min) (Oxygen Therapy):  [2] 2   Body mass index is 28.96 kg/m².    Physical Exam     Appearance: Normal appearance. She is obese.   HENT:      Head: Normocephalic and atraumatic.      Right Ear: External ear normal.      Left Ear: External ear normal.      Nose: Nose normal.      Mouth/Throat:      Mouth: Mucous membranes are moist.   Eyes:      Extraocular Movements: Extraocular movements intact.   Cardiovascular:      Rate and Rhythm: Normal rate. Rhythm irregular.      Pulses: Normal pulses.      Heart sounds: Normal heart sounds.   Pulmonary:      Effort: Pulmonary effort is normal.      Breath sounds: Normal breath sounds.   Abdominal:      Palpations: Abdomen is soft.   Genitourinary:     Comments: deferred  Musculoskeletal:         General: Normal range of motion.      Cervical back: Normal range of motion and neck supple.   Skin:     General: Skin is warm and dry.      Coloration: Skin is pale.   Neurological:      General: No focal deficit present.      Mental Status: She is alert and oriented  to person, place, and time.   Psychiatric:         Mood and Affect: Mood normal.         Behavior: Behavior normal.         Thought Content: Thought content normal.         Judgment: Judgment normal.      Diagnostic Data    Results from last 7 days   Lab Units 10/12/24  0515 10/11/24  2142   WBC 10*3/mm3 9.35  --    HEMOGLOBIN g/dL 12.2  --    HEMATOCRIT % 39.9  --    PLATELETS 10*3/mm3 288  --    GLUCOSE mg/dL  --  110*   CREATININE mg/dL  --  1.44*   BUN mg/dL  --  35*   SODIUM mmol/L  --  141   POTASSIUM mmol/L  --  4.0   AST (SGOT) U/L  --  20   ALT (SGPT) U/L  --  20   ALK PHOS U/L  --  81   BILIRUBIN mg/dL  --  0.4   ANION GAP mmol/L  --  13.4       CT Head Without Contrast    Result Date: 10/11/2024  Impression: 1. No acute intracranial abnormalities are identified. 2. Pansinusitis. Electronically Signed: Jeet Reis MD  10/11/2024 10:24 PM EDT  Workstation ID: [id number]    XR Ankle 3+ View Left    Result Date: 10/11/2024  Impression: No acute osseous abnormalities are identified. Electronically Signed: Jeet Reis MD  10/11/2024 9:33 PM EDT  Workstation ID: [id number]    XR Chest 1 View    Result Date: 10/11/2024  No active disease. Electronically Signed: Jeet Reis MD  10/11/2024 9:25 PM EDT  Workstation ID: [id number]       I reviewed the patient's new clinical results.    Assessment/Plan:     Active and Resolved Problems  Active Hospital Problems    Diagnosis  POA    **Hypotensive episode [I95.9]  Yes    Hypothyroidism (acquired) [E03.9]  Yes    Rheumatoid arthritis involving multiple sites [M06.9]  Yes    Acute on chronic renal insufficiency [N28.9, N18.9]  Yes    Elevated troponin [R79.89]  Yes    Chronic HFrEF (heart failure with reduced ejection fraction) [I50.22]  Yes    Anxiety disorder [F41.9]  Yes    Constipation [K59.00]  Yes    Long term current use of anticoagulant therapy [Z79.01]  Not Applicable    CKD (chronic kidney disease) stage 3, GFR 30-59 ml/min [N18.30]  Yes    Hyperlipidemia  [E78.5]  Yes    Paroxysmal A-fib [I48.0]  Yes      Resolved Hospital Problems   No resolved problems to display.       Hypotensive episode,  - improved with 500 cc IV fluid bolus continue normal saline at 100 cc/h,   -monitor BP likely secondary to addition of several new medications since recent hospitalization discharge yesterday,   -cardiology consulted     Acute on chronic renal insufficiency creatinine 1.44 baseline appears to be 1.1, avoid nephrotoxic meds trend renal function reordered home sodium bicarb     Chronic heart failure reduced ejection fraction recent echo at [facility] EF 35 to 40%, stable on room air no dyspnea or bilateral lower extremity edema, hold home Entresto, spironolactone, Farxiga and Lasix till evaluated by cardiology     Hypo-thyroidism, high-sensitivity TSH dated 10/5/2024 0.79 within normal limits, reordered home levothyroxine and Cytomel     Elevated troponin, 68 denies chest pain no ischemic changes acutely on EKG cardiology consulted likely secondary to ischemic demand     Anxiety disorder, reordered home Lexapro, hold home alprazolam for hypotension     Constipation, as needed medication orders in place one-time glycerin suppository, reports was recently on narcotics at other facility for back pain     Long-term use of anticoagulants, reordered home Xarelto     Hyperlipidemia, on statin no Zetia in formulary     Paroxysmal atrial fibrillation, EKG shows atrial fibrillation controlled, patient reports contemplating ablation, reordered home digitalis, digoxin level in a.m. reordered home Xarelto, hold home metoprolol for hypotension cardiology consulted continuous cardiac monitoring     No memory loss, Dementia?  On Namenda was prescribed for aphasia, per daughter improved     Rheumatoid arthritis, on home Plaquenil    VTE Prophylaxis:  Pharmacologic VTE prophylaxis orders are present.      Disposition Planning:     Barriers to Discharge:hypotension  Anticipated Date of Discharge:  10/12/24  Place of Discharge: home      Time: 35 minutes     Code Status and Medical Interventions: CPR (Attempt to Resuscitate); Full Support   Ordered at: 10/11/24 8581     Code Status (Patient has no pulse and is not breathing):    CPR (Attempt to Resuscitate)     Medical Interventions (Patient has pulse or is breathing):    Full Support       Signature: Electronically signed by Nayan Stuart MD, 10/12/24, 11:24 EDT.  [facility] Hospitalist Team

## 2024-10-12 NOTE — PLAN OF CARE
"Goal Outcome Evaluation:  Plan of Care Reviewed With: patient        Progress: no change  Outcome Evaluation: Pt bp improved to map above 65 all shift. Pt complains of periodic syncopal episodes for the last \"few years\". Pain to left ankle from fall treated with prn meds. Cough present nonproductive. Constipation treated with suppository. Call light within reach, care continues.                             "

## 2024-10-12 NOTE — H&P
[facility] Medicine Services  History & Physical    Patient Name: Natalee Noble  : 1950  MRN: [id number]  Primary Care Physician:  Woody Valentin MD  Date of admission: 10/11/2024  Date and Time of Service: 10/11/2024 at 2300    Subjective      Chief Complaint: sycnope and hypotension    History of Present Illness: Natalee Noble is a very pleasant  74 y.o. female with a CMH of anxiety and depression, chronic back pain, chronic renal failure stage III, hypertension, hyperlipidemia, heart failure reduced EF, paroxysmal atrial fibrillation on anticoagulation who presented to [facility] on 10/11/2024 with complaints of several near syncopal and 1 syncopal episode today associated with hypotension.  She reports and  records reflect she was admitted to [facility] from 10/5/2024 until yesterday.  Review of records shows she was admitted there for atrial fibrillation with RVR elevated troponin, and heart failure.  She had an echo which showed an ejection fraction of 35 to 40%..  She was discharged on  new medications Entresto, Farxiga, spironolactone and Lasix which she reports she took today.  In  ED she also complained of left ankle pain.  X-ray done in the ED showed no acute process per radiology.  She denies any chest pain, shortness of air.  She does report dizziness any focal weaknesses.  She has no slurred speech or facial droop.  She is awake and alert and answering appropriately.  Family at bedside assisting with history.    Blood pressure was initially low as 69/47 in ED.  She was given a 500 cc fluid bolus with improvement.  Labs show a high-sensitivity troponin of 68, proBNP 2832 and stable on room air.  BUN 35 creatinine 1.44 glucose 110 calcium 8.1 WBC 11.05 afebrile.  Urinalysis ordered and pending, chest x-ray per radiology here shows no active disease, CT head per radiology shows no acute abnormalities.  EKG shows atrial fibrillation heart  rate 98.  She also complains of constipation has not had a bowel movement in 8 days.  She denies abdominal pain.  She will be admitted for further evaluation and treatment and cardiology has been consulted.  Review of Systems   Constitutional: Negative.    HENT: Negative.     Eyes: Negative.    Respiratory: Negative.     Cardiovascular: Negative.    Gastrointestinal:  Positive for constipation and nausea.   Endocrine: Negative.    Genitourinary: Negative.    Musculoskeletal: Negative.    Skin: Negative.    Allergic/Immunologic: Negative.    Neurological:  Positive for syncope and light-headedness.   Hematological: Negative.    Psychiatric/Behavioral: Negative.     All other systems reviewed and are negative.      Personal History     Past Medical History:   Diagnosis Date    Anxiety and depression     Arthritis     Back pain     CRF (chronic renal failure), stage 3 (moderate)     Diverticulosis     Essential hypertension     Family history of colon cancer     Family history of colonic polyps     Fractures     R tibia/fibula; L ankle    GERD (gastroesophageal reflux disease)     History of adenomatous polyp of colon     History of cardiac cath     1/28/19 left main no significant disease. LAD with no significant disease. LCX no significant disease. RCA no significant disease.    History of cardiac monitoring     2/01/19 - ZIO PATCH - Underlying rhythm is sinus at 48-94 bpm. PSVT x23 with fastest 167 bpm, longest 24.4 seconds.   10/05/2018- ZIO PATCH - NSR most of time but did have episode of A fib ranging from .    History of colon polyps     History of echocardiogram     9/2018: Left ventricular systolic function is normal. EF 50%. Left ventricular diastolic dysfunction consistent with ipaired relaxation. Left atrial cavity size is mildly dilated. Mild MR. Mild to moderate TR. There is abnormal thickening noted on the RV that may represent a vegetation-this was discussed with Dr. Hammer and Dr. Reilly. No  patent foramen ovale. No further work up needed at this time    Hyperlipidemia     Kidney disorder     Migraine     Migraines     Neck pain     Stroke 2018       Past Surgical History:   Procedure Laterality Date    ABDOMINOPLASTY      BACK SURGERY      L2-5 fusion 2012    CHOLECYSTECTOMY      COLONOSCOPY  11/24/2014    Diverticulosis; Repeat 5 years    COLONOSCOPY  11/09/2010    Diverticulosis; Repeat 4 years    COLONOSCOPY  09/18/2007    Dr. Monzon-Normal; Repeat 3 years    COLONOSCOPY  06/02/2005    Dr. Monzon-Diminuitive polypectomy above the cecum; Otherwise normal colonoscopy; Repeat 2 years    COLONOSCOPY N/A 11/01/2019    Diverticulosis in the left colon; Two 5-6mm polyps in the cecum-path shows one tubular adenomatous and on hyperplastic polyp; One 8mm tubular adenomatous polyp in the ascending colon; Two 4-5mm tubular adenomatous polyps in the transverse colon; Repeat 3 years    COLONOSCOPY N/A 6/12/2023    Procedure: COLONOSCOPY WITH ANESTHESIA;  Surgeon: Susan Lord MD;  Location: [facility] ENDOSCOPY;  Service: Gastroenterology;  Laterality: N/A;  preop hx of polyps   postop; polyps   PCP Woody Valentin MD    CYSTOCELE REPAIR      Cystocele and rectocele repair    ENDOSCOPY  09/28/2012    LA grade C esophagitis; HH    ENDOSCOPY N/A 11/01/2019    Small HH; Non-erosive gastritis-biopsied; Normal examined duodenum    FIBULA FRACTURE SURGERY      HYSTERECTOMY      NECK SURGERY  2012    ACDF C4-7    ORIF TIBIA FRACTURE Right        Family History: family history includes Colon cancer (age of onset: 65) in her father; Colon polyps (age of onset: 38) in her daughter. Otherwise pertinent FHx was reviewed and not pertinent to current issue.    Social History:  reports that she has never smoked. She has never used smokeless tobacco. She reports that she does not drink alcohol and does not use drugs.    Home Medications:  Prior to Admission Medications       Prescriptions Last Dose Informant Patient  Reported? Taking?    ALPRAZolam (XANAX) 1 MG tablet  Pharmacy Yes No    Take 1 tablet by mouth 3 (Three) Times a Day As Needed for Anxiety.    aspirin 81 MG tablet   No No    Take 1 tablet by mouth Daily.    atorvastatin (LIPITOR) 80 MG tablet   Yes No    Take 1 tablet by mouth Daily.    cycloSPORINE (RESTASIS) 0.05 % ophthalmic emulsion  Self Yes No    Administer 1 drop to both eyes Every 12 (Twelve) Hours. Per patient during admission assessment & med req    Denosumab (PROLIA SC)   Yes No    Inject  under the skin into the appropriate area as directed. Every 6 months    Ergocalciferol (VITAMIN D2 PO)   Yes No    Take 1,000 mg by mouth Daily.    escitalopram (LEXAPRO) 20 MG tablet  Pharmacy Yes No    Take 1 tablet by mouth Daily.    ezetimibe (ZETIA) 10 MG tablet   No No    Take 1 tablet by mouth Daily.    FOLIC ACID PO   Yes No    Take  by mouth Daily.    hydroxychloroquine (PLAQUENIL) 200 MG tablet   Yes No    Take 1 tablet by mouth 2 (Two) Times a Day.    memantine (NAMENDA) 10 MG tablet   Yes No    Take 1 tablet by mouth 2 (Two) Times a Day.    metoprolol succinate XL (TOPROL-XL) 25 MG 24 hr tablet   No No    Take 1 tablet by mouth Daily.    pantoprazole (PROTONIX) 40 MG EC tablet   Yes No    Take 1 tablet by mouth Daily As Needed.    rivaroxaban (XARELTO) 20 MG tablet   No No    Take 1 tablet by mouth Daily.    topiramate (TOPAMAX) 200 MG tablet   Yes No    Take 1 tablet by mouth Daily.              Allergies:  Allergies   Allergen Reactions    Benzoin Anaphylaxis and Swelling     States when used on her neck it shut off her airway  Huge abscesses with surgery      Iodine Other (See Comments)     PT UNABLE TO TELL RN ABOUT REACTION AT THIS TIME, BUT FAMILY STATES PT HAD A REACTION TO BEING CLEANSED WITH IODINE IN SURGERY  IOBAN - used on neck, swelled to the point of shutting off airway    Vancomycin Other (See Comments)     Kidney failure  Vancomycin induced acute kidney injury      Levaquin [Levofloxacin]  Unknown (See Comments)     Unknown    Sulfa Antibiotics     Acetaminophen Nausea Only    Latex Itching       Objective      Vitals:   Temp:  [97.5 °F (36.4 °C)] 97.5 °F (36.4 °C)  Heart Rate:  [] 124  Resp:  [18] 18  BP: ()/(46-72) 108/72  Body mass index is 28.96 kg/m².  Physical Exam  Vitals reviewed.   Constitutional:       Appearance: Normal appearance. She is obese.   HENT:      Head: Normocephalic and atraumatic.      Right Ear: External ear normal.      Left Ear: External ear normal.      Nose: Nose normal.      Mouth/Throat:      Mouth: Mucous membranes are moist.   Eyes:      Extraocular Movements: Extraocular movements intact.   Cardiovascular:      Rate and Rhythm: Normal rate. Rhythm irregular.      Pulses: Normal pulses.      Heart sounds: Normal heart sounds.   Pulmonary:      Effort: Pulmonary effort is normal.      Breath sounds: Normal breath sounds.   Abdominal:      Palpations: Abdomen is soft.   Genitourinary:     Comments: deferred  Musculoskeletal:         General: Normal range of motion.      Cervical back: Normal range of motion and neck supple.   Skin:     General: Skin is warm and dry.      Coloration: Skin is pale.   Neurological:      General: No focal deficit present.      Mental Status: She is alert and oriented to person, place, and time.   Psychiatric:         Mood and Affect: Mood normal.         Behavior: Behavior normal.         Thought Content: Thought content normal.         Judgment: Judgment normal.         Diagnostic Data:            Lab Results (last 24 hours)       Procedure Component Value Units Date/Time    High Sensitivity Troponin T 2Hr [[id number]] Collected: 10/12/24 0021    Specimen: Blood from Arm, Right Updated: 10/12/24 0028    Single High Sensitivity Troponin T [[id number]]  (Abnormal) Collected: 10/11/24 2142    Specimen: Blood Updated: 10/11/24 2209     HS Troponin T 68 ng/L     Narrative:      High Sensitive Troponin T Reference Range:  <14.0 ng/L-  Negative Female for AMI  <22.0 ng/L- Negative Male for AMI  >=14 - Abnormal Female indicating possible myocardial injury.  >=22 - Abnormal Male indicating possible myocardial injury.   Clinicians would have to utilize clinical acumen, EKG, Troponin, and serial changes to determine if it is an Acute Myocardial Infarction or myocardial injury due to an underlying chronic condition.         Comprehensive Metabolic Panel [[id number]]  (Abnormal) Collected: 10/11/24 2142    Specimen: Blood Updated: 10/11/24 2208     Glucose 110 mg/dL      BUN 35 mg/dL      Creatinine 1.44 mg/dL      Sodium 141 mmol/L      Potassium 4.0 mmol/L      Chloride 106 mmol/L      CO2 21.6 mmol/L      Calcium 8.1 mg/dL      Total Protein 5.6 g/dL      Albumin 3.5 g/dL      ALT (SGPT) 20 U/L      AST (SGOT) 20 U/L      Alkaline Phosphatase 81 U/L      Total Bilirubin 0.4 mg/dL      Globulin 2.1 gm/dL      A/G Ratio 1.7 g/dL      BUN/Creatinine Ratio 24.3     Anion Gap 13.4 mmol/L      eGFR 38.2 mL/min/1.73     Narrative:      GFR Normal >60  Chronic Kidney Disease <60  Kidney Failure <15    The GFR formula is only valid for adults with stable renal function between ages 18 and 70.    BNP [[id number]]  (Abnormal) Collected: 10/11/24 2058    Specimen: Blood Updated: 10/11/24 2125     proBNP 2,832.0 pg/mL     Narrative:      This assay is used as an aid in the diagnosis of individuals suspected of having heart failure. It can be used as an aid in the diagnosis of acute decompensated heart failure (ADHF) in patients presenting with signs and symptoms of ADHF to the emergency department (ED). In addition, NT-proBNP of <300 pg/mL indicates ADHF is not likely.    Age Range Result Interpretation  NT-proBNP Concentration (pg/mL:      <50             Positive            >450                   Gray                 300-450                    Negative             <300    50-75           Positive            >900                  Gray                300-900                   Negative            <300      >75             Positive            >1800                  Gray                300-1800                  Negative            <300    CBC & Differential [[id number]]  (Abnormal) Collected: 10/11/24 2058    Specimen: Blood Updated: 10/11/24 2103    Narrative:      The following orders were created for panel order CBC & Differential.  Procedure                               Abnormality         Status                     ---------                               -----------         ------                     CBC Auto Differential[[id number]]        Abnormal            Final result                 Please view results for these tests on the individual orders.    CBC Auto Differential [[id number]]  (Abnormal) Collected: 10/11/24 2058    Specimen: Blood Updated: 10/11/24 2103     WBC 11.05 10*3/mm3      RBC 5.04 10*6/mm3      Hemoglobin 13.9 g/dL      Hematocrit 45.7 %      MCV 90.7 fL      MCH 27.6 pg      MCHC 30.4 g/dL      RDW 16.3 %      RDW-SD 53.4 fl      MPV 10.5 fL      Platelets 333 10*3/mm3      Neutrophil % 84.0 %      Lymphocyte % 7.1 %      Monocyte % 7.3 %      Eosinophil % 0.5 %      Basophil % 0.1 %      Immature Grans % 1.0 %      Neutrophils, Absolute 9.28 10*3/mm3      Lymphocytes, Absolute 0.78 10*3/mm3      Monocytes, Absolute 0.81 10*3/mm3      Eosinophils, Absolute 0.06 10*3/mm3      Basophils, Absolute 0.01 10*3/mm3      Immature Grans, Absolute 0.11 10*3/mm3      nRBC 0.2 /100 WBC              Imaging Results (Last 24 Hours)       Procedure Component Value Units Date/Time    CT Head Without Contrast [[id number]] Collected: 10/11/24 2219     Updated: 10/11/24 2226    Narrative:      CT HEAD WO CONTRAST    Date of Exam: 10/11/2024 9:55 PM EDT    Indication: syncope.    Comparison: 8/6/2022    Technique: Axial CT images were obtained of the head without contrast administration.  Coronal reconstructions were performed.  Automated exposure control and iterative  reconstruction methods were used.      Findings:  Stable left MCA distribution infarct with encephalomalacia. The ventricles have a normal size and configuration. There is mild generalized atrophy. No evidence of acute intracranial hemorrhage, mass or midline shift. No extra-axial fluid collections are   identified. Inflammatory changes and air-fluid levels are present in the visualized paranasal sinuses. No skull fractures are identified.      Impression:      Impression:    1. No acute intracranial abnormalities are identified.    2. Pansinusitis.      Electronically Signed: Jeet Reis MD    10/11/2024 10:24 PM EDT    Workstation ID: [id number]    XR Ankle 3+ View Left [[id number]] Collected: 10/11/24 2131     Updated: 10/11/24 2135    Narrative:      XR ANKLE 3+ VW LEFT    Date of Exam: 10/11/2024 9:10 PM EDT    Indication: fall    Comparison: 8/6/2022    Findings:  The left ankle mortise is intact. There is a plantar calcaneal enthesophyte. No fractures, dislocations or acute osseous abnormalities are identified. There are old fractures of the left fourth and fifth metatarsals.      Impression:      Impression:  No acute osseous abnormalities are identified.      Electronically Signed: Jeet Reis MD    10/11/2024 9:33 PM EDT    Workstation ID: [id number]    XR Chest 1 View [[id number]] Collected: 10/11/24 2124     Updated: 10/11/24 2127    Narrative:      XR CHEST 1 VW    Date of Exam: 10/11/2024 8:54 PM EDT    Indication: CHF/COPD Protocol  CHF/COPD Protocol    Comparison: 8/6/2022    FINDINGS:    The lungs are well-expanded. The heart and pulmonary vasculature are within normal limits. No pleural effusions are identified. There are no active appearing infiltrates. Scoliosis is present. Prior ACDF in the visualized lower cervical spine.      Impression:      No active disease.      Electronically Signed: Jeet Reis MD    10/11/2024 9:25 PM EDT    Workstation ID: [id number]              Assessment & Plan         This is a 74 y.o. female with:    Active and Resolved Problems  Active Hospital Problems    Diagnosis  POA    **Hypotensive episode [I95.9]  Yes     Priority: High    Acute on chronic renal insufficiency [N28.9, N18.9]  Yes     Priority: Medium    Chronic HFrEF (heart failure with reduced ejection fraction) [I50.22]  Yes     Priority: Medium    Hypothyroidism (acquired) [E03.9]  Yes    Rheumatoid arthritis involving multiple sites [M06.9]  Yes    Elevated troponin [R79.89]  Yes    Anxiety disorder [F41.9]  Yes    Constipation [K59.00]  Yes    Long term current use of anticoagulant therapy [Z79.01]  Not Applicable    CKD (chronic kidney disease) stage 3, GFR 30-59 ml/min [N18.30]  Yes    Hyperlipidemia [E78.5]  Yes    Paroxysmal A-fib [I48.0]  Yes      Resolved Hospital Problems   No resolved problems to display.       Hypotensive episode, improved with 500 cc IV fluid bolus continue normal saline at 100 cc/h, monitor BP likely secondary to addition of several new medications since recent hospitalization discharge yesterday, cardiology consulted    Acute on chronic renal insufficiency creatinine 1.44 baseline appears to be 1.1, avoid nephrotoxic meds trend renal function reordered home sodium bicarb    Chronic heart failure reduced ejection fraction [facility] to 40%, stable on room air no dyspnea or bilateral lower extremity edema, hold home Entresto, spironolactone, Farxiga and Lasix till evaluated by cardiology    Hypo-thyroidism, high-sensitivity TSH dated 10/5/2024 0.79 within normal limits, reordered home levothyroxine and Cytomel    Elevated troponin, 68 denies chest pain no ischemic changes acutely on EKG cardiology consulted likely secondary to ischemic demand    Anxiety disorder, reordered home Lexapro, hold home alprazolam for hypotension    Constipation, as needed medication orders in place one-time glycerin suppository, reports was recently on narcotics at other facility for  back pain    Long-term use of anticoagulants, reordered home Xarelto    Hyperlipidemia, on statin no Zetia in formulary    Paroxysmal atrial fibrillation, EKG shows atrial fibrillation controlled, patient reports contemplating ablation, reordered home digitalis, digoxin level in a.m. reordered home Xarelto, hold home metoprolol for hypotension cardiology consulted continuous cardiac monitoring    Dementia?  On Namenda    Rheumatoid arthritis, on home Plaquenil        VTE Prophylaxis:  Pharmacologic VTE prophylaxis orders are present.        The patient desires to be as follows:    CODE STATUS:    Code Status (Patient has no pulse and is not breathing): CPR (Attempt to Resuscitate)  Medical Interventions (Patient has pulse or is breathing): Full Support        Admission Status:  I believe this patient meets observation status.    Expected Length of Stay: pending cardiology evaluation and clinical course     PDMP and Medication Dispenses via Sidebar reviewed and consistent with patient reported medications.    I discussed the patient's findings and my recommendations with patient and family.      Signature:     This document has been electronically signed by GREGORIA Hughes on October 12, 2024 00:29 EDT   [facility] Hospitalist Team

## 2024-10-12 NOTE — ED NOTES
Patient was just released from [facility] for CHF exb. Patient says they changed all her meds around and she just took them. Patient was feeling kind if weak and called EMS. Patient was hypotensive on arrival.

## 2024-10-12 NOTE — CONSULTS
Cardiology Consult Note    Patient Identification:  Name: Natalee Noble  Age: 74 y.o.  Sex: female  :  1950  MRN: [id number]             Requesting Physician :  Kaylen Alamo APRN     Reason for Consultation / Chief Complaint :   Hypotension, syncope    History of Present Illness:      Naatlee Noble is a 74-year-old female with a medical history to include:    Atrial fibrillation  [id number] SCORE   [id number] Score: 6 (10/12/2024  9:08 AM)     Long-term anticoagulation  Intolerant to flecainide and amiodarone in the past  HFrEF  Hypertension  Dyslipidemia  CKD stage III  History of CVA  Anxiety/depression  GERD  Diverticulosis  Stroke    Who presented to [facility] on 10/11/2024 following syncopal episode.  Patient reports that she was recently hospitalized at [facility] for 6 days for respiratory infection, CHF and atrial fibrillation.  She was discharged on Thursday.  Echocardiogram completed 10/5/2024 shows reduced LVEF of 35 to 40%, moderate global hypokinesis of left ventricle and mild to moderate mitral, tricuspid, and pulmonic valve regurgitation.  While hospitalized she was treated with IV antibiotics and started on Entresto, spironolactone, digoxin.  She reports 2 syncopal episodes yesterday.  Associated symptoms of shortness of breath and nausea.  She denies chest pain, palpitations, edema.  Radiology consulted for hypotension and syncope.  Upon admission patient noted to be significantly hypotensive at 69/47.  Pertinent labs include troponin 64, digoxin 1.15 creatinine 1.44, TSH unremarkable.  CXR without evidence of active disease.  Patient follows with Dr. Ordaz for cardiology.  She is also scheduled to see an electrophysiologist in near future to discuss atrial fibrilation ablation.  She reports she is intolerable to amiodarone and flecainide.    Cardiology attending addendum :    I have personally performed a face-to-face diagnostic evaluation, physical exam and reviewed  data on this patient.  I have reviewed documentation done by me and nurse practitioner  and corrected as needed.  And agree with the different components of documentation.Greater than 50% of the time spent in the care of this patient was provided by attending consultant/me.            Assessment:  :    Dizziness near syncope  Hypotension  Recurrent syncope  Elevated troponin  Chronic HFrEF due to systolic dysfunction from dilated cardiomyopathy  Paroxysmal atrial fibrillation on long-term anticoagulation/Xarelto  Dyslipidemia  CKD  History of CVA      Recommendations / Plan:       Patient says she has recurrent syncope since age 12 with migraine headaches.  Recently was diagnosed with heart failure due to reduced ejection fraction LVEF of 35-40%, was put on guideline directed medical therapy with with Entresto, Aldactone, digoxin, metoprolol succinate, Farxiga.  Patient's blood pressure was low and was having near syncope and dizziness.  Also has been having nausea.  Discussed with patient about A-fib and heart failure and cardiomyopathy.  Will hold off on meds till blood pressure improves.  Discussed with hospitalist  about nausea.  He is holding bunch of other medications.  Patient is having recurrent falls and syncope.  Has history of A-fib and elevated [id number] score of 6.  Will benefit from long-term anticoagulation but due to her falls she is a poor candidate for long-term anticoagulation.  Will evaluate for watchman and ablation as outpatient.  Patient's proBNP is 2832.  Will hold off on diuretics due to significant hypotension at the current time.  Continue to monitor fluid status.  Patient's troponin is elevated at 68 has no particular trend has come down to 64.  Will monitor serial troponins.  Probably is due to congestive heart failure and A-fib..  Will monitor renal function.  Follow-up with nephrology for CKD.  Discussed with multiple family members.  Will consult EP, Monday.            Diagnosis Plan   1. Hypotension, unspecified hypotension type        2. Elevated troponin        3. Renal insufficiency                   Past Medical History:  Past Medical History:   Diagnosis Date    Anxiety and depression     Arthritis     Back pain     CRF (chronic renal failure), stage 3 (moderate)     Diverticulosis     Essential hypertension     Family history of colon cancer     Family history of colonic polyps     Fractures     R tibia/fibula; L ankle    GERD (gastroesophageal reflux disease)     History of adenomatous polyp of colon     History of cardiac cath     1/28/19 left main no significant disease. LAD with no significant disease. LCX no significant disease. RCA no significant disease.    History of cardiac monitoring     2/01/19 - ZIO PATCH - Underlying rhythm is sinus at 48-94 bpm. PSVT x23 with fastest 167 bpm, longest 24.4 seconds.   10/05/2018- ZIO PATCH - NSR most of time but did have episode of A fib ranging from .    History of colon polyps     History of echocardiogram     9/2018: Left ventricular systolic function is normal. EF 50%. Left ventricular diastolic dysfunction consistent with ipaired relaxation. Left atrial cavity size is mildly dilated. Mild MR. Mild to moderate TR. There is abnormal thickening noted on the RV that may represent a vegetation-this was discussed with Dr. Hammer and Dr. Reilly. No patent foramen ovale. No further work up needed at this time    Hyperlipidemia     Kidney disorder     Migraine     Migraines     Neck pain     Stroke 2018     Past Surgical History:  Past Surgical History:   Procedure Laterality Date    ABDOMINOPLASTY      BACK SURGERY      L2-5 fusion 2012    CHOLECYSTECTOMY      COLONOSCOPY  11/24/2014    Diverticulosis; Repeat 5 years    COLONOSCOPY  11/09/2010    Diverticulosis; Repeat 4 years    COLONOSCOPY  09/18/2007    Dr. Monzon-Normal; Repeat 3 years    COLONOSCOPY  06/02/2005    Dr. Monzon-Diminuitive polypectomy above the cecum;  Otherwise normal colonoscopy; Repeat 2 years    COLONOSCOPY N/A 11/01/2019    Diverticulosis in the left colon; Two 5-6mm polyps in the cecum-path shows one tubular adenomatous and on hyperplastic polyp; One 8mm tubular adenomatous polyp in the ascending colon; Two 4-5mm tubular adenomatous polyps in the transverse colon; Repeat 3 years    COLONOSCOPY N/A 6/12/2023    Procedure: COLONOSCOPY WITH ANESTHESIA;  Surgeon: Susan Lord MD;  Location: [facility] ENDOSCOPY;  Service: Gastroenterology;  Laterality: N/A;  preop hx of polyps   postop; polyps   PCP Woody Valentin MD    CYSTOCELE REPAIR      Cystocele and rectocele repair    ENDOSCOPY  09/28/2012    LA grade C esophagitis; HH    ENDOSCOPY N/A 11/01/2019    Small HH; Non-erosive gastritis-biopsied; Normal examined duodenum    FIBULA FRACTURE SURGERY      HYSTERECTOMY      NECK SURGERY  2012    ACDF C4-7    ORIF TIBIA FRACTURE Right       Allergies:  Allergies   Allergen Reactions    Benzoin Anaphylaxis and Swelling     States when used on her neck it shut off her airway  Huge abscesses with surgery      Iodine Other (See Comments)     PT UNABLE TO TELL RN ABOUT REACTION AT THIS TIME, BUT FAMILY STATES PT HAD A REACTION TO BEING CLEANSED WITH IODINE IN SURGERY  IOBAN - used on neck, swelled to the point of shutting off airway    Vancomycin Other (See Comments)     Kidney failure  Vancomycin induced acute kidney injury      Levaquin [Levofloxacin] Unknown (See Comments)     Unknown    Sulfa Antibiotics     Acetaminophen Nausea Only    Latex Itching     Home Meds:  Medications Prior to Admission   Medication Sig Dispense Refill Last Dose/Taking    ALPRAZolam (XANAX) 1 MG tablet Take 1 tablet by mouth 3 (Three) Times a Day As Needed for Anxiety.   Taking As Needed    aspirin 81 MG tablet Take 1 tablet by mouth Daily. 30 tablet 1 Taking    atorvastatin (LIPITOR) 80 MG tablet Take 1 tablet by mouth Daily.   Taking    Cholecalciferol 25 MCG (1000 UT) tablet  Take 1 tablet by mouth Daily.   Taking    dapagliflozin Propanediol 10 MG tablet Take 10 mg by mouth Daily.   Taking    Denosumab (PROLIA SC) Inject  under the skin into the appropriate area as directed. Every 6 months   Taking    digoxin (LANOXIN) 125 MCG tablet Take 1 tablet by mouth Daily.   Taking    escitalopram (LEXAPRO) 20 MG tablet Take 1 tablet by mouth Daily.   Taking    ezetimibe (ZETIA) 10 MG tablet Take 1 tablet by mouth Daily. 30 tablet 5 Taking    FOLIC ACID PO Take  by mouth Daily.   Taking    hydroxychloroquine (PLAQUENIL) 200 MG tablet Take 1 tablet by mouth 2 (Two) Times a Day.   Taking    levothyroxine (SYNTHROID, LEVOTHROID) 112 MCG tablet Take 1 tablet by mouth Daily.   Taking    Lifitegrast (XIIDRA) 5 % ophthalmic solution Administer 1 drop to both eyes 2 (Two) Times a Day.   Taking    liothyronine (CYTOMEL) 5 MCG tablet Take 1 tablet by mouth 2 (Two) Times a Day.   Taking    memantine (NAMENDA) 10 MG tablet Take 1 tablet by mouth 2 (Two) Times a Day.   Taking    metoprolol succinate XL (TOPROL-XL) 25 MG 24 hr tablet Take 1 tablet by mouth 2 (Two) Times a Day.   Taking    pantoprazole (PROTONIX) 40 MG EC tablet Take 1 tablet by mouth Daily As Needed.   Taking As Needed    rivaroxaban (XARELTO) 20 MG tablet Take 1 tablet by mouth Daily. 90 tablet 3 Taking    sacubitril-valsartan (Entresto) 24-26 MG tablet Take 1 tablet by mouth 2 (Two) Times a Day.   Taking    sodium bicarbonate 650 MG tablet Take 1 tablet by mouth 2 (Two) Times a Day.   Taking    spironolactone (ALDACTONE) 25 MG tablet Take 0.5 tablets by mouth Daily.   Taking    topiramate (TOPAMAX) 200 MG tablet Take 1 tablet by mouth Daily.   Taking     Current Meds:     Current Facility-Administered Medications:     acetaminophen (TYLENOL) tablet 650 mg, 650 mg, Oral, Q4H PRN, 650 mg at 10/11/24 0952 **OR** acetaminophen (TYLENOL) 160 MG/5ML oral solution 650 mg, 650 mg, Oral, Q4H PRN **OR** acetaminophen (TYLENOL) suppository 650 mg, 650  mg, Rectal, Q4H PRN, Kaylen Alamo, APRN    aspirin EC tablet 81 mg, 81 mg, Oral, Daily, [place]-Kaylen Montes, APRN, 81 mg at 10/12/24 0840    sennosides-docusate (PERICOLACE) 8.6-50 MG per tablet 2 tablet, 2 tablet, Oral, BID PRN **AND** polyethylene glycol (MIRALAX) packet 17 g, 17 g, Oral, Daily PRN **AND** bisacodyl (DULCOLAX) EC tablet 5 mg, 5 mg, Oral, Daily PRN **AND** bisacodyl (DULCOLAX) suppository 10 mg, 10 mg, Rectal, Daily PRN, [place]-Kaylen Montes, APRN    cholecalciferol (VITAMIN D3) tablet 1,000 Units, 1,000 Units, Oral, Daily, [place]ras[place]-Kaylen Montes, APRN, 1,000 Units at 10/12/24 0840    escitalopram (LEXAPRO) tablet 20 mg, 20 mg, Oral, Daily, [place]ras[place]-Kaylen Montes, APRN, 20 mg at 10/12/24 0840    guaifenesin (ROBITUSSIN) 100 MG/5ML liquid 200 mg, 200 mg, Oral, Q4H PRN, [place]rasema-Kaylen Montes, APRN, 200 mg at 10/12/24 0611    levothyroxine (SYNTHROID, LEVOTHROID) tablet 112 mcg, 112 mcg, Oral, Q AM, Kaylen Alamo, APRN, 112 mcg at 10/12/24 0516    liothyronine (CYTOMEL) tablet 5 mcg, 5 mcg, Oral, BID, Kaylen Alamo, APRN, 5 mcg at 10/12/24 0839    memantine (NAMENDA) tablet 10 mg, 10 mg, Oral, Q12H, [place]rasema-Kaylen Montes, APRN, 10 mg at 10/12/24 0840    metoclopramide (REGLAN) injection 5 mg, 5 mg, Intravenous, Q6H, Lis Hines, APRN    ondansetron (ZOFRAN) injection 4 mg, 4 mg, Intravenous, Q6H, Lis Hines, APRN    pantoprazole (PROTONIX) EC tablet 40 mg, 40 mg, Oral, BID AC, Lis Hines APRN, 40 mg at 10/12/24 1735    polyethylene glycol (MIRALAX) packet 17 g, 17 g, Oral, BID, Lis Hines APRN    [Held by provider] rivaroxaban (XARELTO) tablet 15 mg, 15 mg, Oral, Daily With Dinner, Kaylen Alamo APRN    sodium bicarbonate tablet 650 mg, 650 mg, Oral, BID, Kaylen Alamo APRN, 650 mg at 10/12/24 0840    sodium chloride 0.9 % flush 10 mL, 10 mL, Intravenous, PRN, Lizz Carrillo APRN, 10 mL at 10/12/24 0840  Social History:   Social  "History     Tobacco Use    Smoking status: Never    Smokeless tobacco: Never   Substance Use Topics    Alcohol use: No      Family History:  Family History   Problem Relation Age of Onset    Colon cancer Father 65    Colon polyps Daughter 38    Esophageal cancer Neg Hx     Liver cancer Neg Hx     Liver disease Neg Hx     Rectal cancer Neg Hx     Stomach cancer Neg Hx         Review of Systems : Review of Systems   Constitutional: Negative for chills and fever.   Cardiovascular:  Positive for near-syncope, palpitations and syncope. Negative for chest pain.   Respiratory:  Positive for shortness of breath.    Gastrointestinal:  Positive for nausea. Negative for abdominal pain and vomiting.   Neurological:  Positive for dizziness, light-headedness and weakness.   Psychiatric/Behavioral:  Negative for altered mental status.         Constitutional:  Temp:  [96.9 °F (36.1 °C)-98.2 °F (36.8 °C)] 96.9 °F (36.1 °C)  Heart Rate:  [] 100  Resp:  [14-18] 14  BP: ()/(46-72) 94/53    Physical Exam   BP 94/53   Pulse 100   Temp 96.9 °F (36.1 °C) (Temporal)   Resp 14   Ht 165.1 cm (65\")   Wt 78.9 kg (174 lb)   SpO2 91%   BMI 28.96 kg/m²     Physical Exam  General:  Appears in no acute distress  Eyes: Sclerae are anicteric,  conjunctivae are clear   HEENT:  No JVD. Thyroid not visibly enlarged. No mucosal pallor or cyanosis  Respiratory: Respirations regular and unlabored at rest.  Bilaterally good breath sounds with good air entry in all fields. No crackles, rubs or wheezes auscultated  Cardiovascular: S1,S2 irregularly irregular rhythm. Normal rate. No murmur, rub or gallop auscultated. No pretibial pitting edema  Gastrointestinal: Abdomen soft, flat, nontender. Bowel sounds present.   Musculoskeletal:  No abnormal movements  Extremities: No digital clubbing or cyanosis  Skin: Color pink. Skin warm and dry to touch. No rashes  No xanthoma  Neuro: Alert and awake, no lateralizing deficits " appreciated    Cardiographics  ECG: EKG tracing was  personally reviewed/interpreted by me  ECG 12 Lead Other; hypotension   Final Result   HEART RATE=98  bpm   RR Cymvfsee=183  ms   [place] Interval=  ms   P Horizontal Axis=  deg   P Front Axis=  deg   QRSD Interval=80  ms   QT Gtadfyur=791  ms   ZRqY=695  ms   QRS Axis=55  deg   T Wave Axis=243  deg   - ABNORMAL ECG -   Atrial fibrillation   Repol abnrm suggests ischemia, anterolateral   No previous ECG available for comparison   Electronically Signed By: Fuad Macias ([facility]) 2024-10-12 08:32:29   Date and Time of Study:2024-10-11 20:47:52      Telemetry Scan   Final Result      Telemetry Scan   Final Result      Telemetry Scan   Final Result      Telemetry Scan   Final Result      Telemetry Scan   Final Result      Telemetry Scan   Final Result          Telemetry: Atrial fibrillation with controlled ventricular response    Echocardiogram:   Results for orders placed during the hospital encounter of 09/23/22    Adult Transthoracic Echo Complete W/ Color, Spectral and Contrast if Necessary Per Protocol    Interpretation Summary  · Calculated left ventricular EF = 51.2% Estimated left ventricular EF was in agreement with the calculated left ventricular EF.  · Left ventricular diastolic function is consistent with (grade I) impaired relaxation.  · Moderate tricuspid valve regurgitation is present.      Imaging  Chest X-ray:   Imaging Results (Last 24 Hours)       Procedure Component Value Units Date/Time    CT Head Without Contrast [[id number]] Collected: 10/11/24 2219     Updated: 10/11/24 2226    Narrative:      CT HEAD WO CONTRAST    Date of Exam: 10/11/2024 9:55 PM EDT    Indication: syncope.    Comparison: 8/6/2022    Technique: Axial CT images were obtained of the head without contrast administration.  Coronal reconstructions were performed.  Automated exposure control and iterative reconstruction methods were used.      Findings:  Stable left MCA distribution infarct  with encephalomalacia. The ventricles have a normal size and configuration. There is mild generalized atrophy. No evidence of acute intracranial hemorrhage, mass or midline shift. No extra-axial fluid collections are   identified. Inflammatory changes and air-fluid levels are present in the visualized paranasal sinuses. No skull fractures are identified.      Impression:      Impression:    1. No acute intracranial abnormalities are identified.    2. Pansinusitis.      Electronically Signed: Jeet Reis MD    10/11/2024 10:24 PM EDT    Workstation ID: [id number]    XR Ankle 3+ View Left [[id number]] Collected: 10/11/24 2131     Updated: 10/11/24 2135    Narrative:      XR ANKLE 3+ VW LEFT    Date of Exam: 10/11/2024 9:10 PM EDT    Indication: fall    Comparison: 8/6/2022    Findings:  The left ankle mortise is intact. There is a plantar calcaneal enthesophyte. No fractures, dislocations or acute osseous abnormalities are identified. There are old fractures of the left fourth and fifth metatarsals.      Impression:      Impression:  No acute osseous abnormalities are identified.      Electronically Signed: Jeet Reis MD    10/11/2024 9:33 PM EDT    Workstation ID: [id number]    XR Chest 1 View [[id number]] Collected: 10/11/24 2124     Updated: 10/11/24 2127    Narrative:      XR CHEST 1 VW    Date of Exam: 10/11/2024 8:54 PM EDT    Indication: CHF/COPD Protocol  CHF/COPD Protocol    Comparison: 8/6/2022    FINDINGS:    The lungs are well-expanded. The heart and pulmonary vasculature are within normal limits. No pleural effusions are identified. There are no active appearing infiltrates. Scoliosis is present. Prior ACDF in the visualized lower cervical spine.      Impression:      No active disease.      Electronically Signed: Jeet Reis MD    10/11/2024 9:25 PM EDT    Workstation ID: [id number]            Lab Review: I have reviewed the labs  Results from last 7 days   Lab Units 10/12/24  0112 10/11/24  2142   HSTROP  T ng/L 64* 68*     Results from last 7 days   Lab Units 10/06/24  0519   MAGNESIUM MG/DL 2.2     Results from last 7 days   Lab Units 10/11/24  2142   SODIUM mmol/L 141   POTASSIUM mmol/L 4.0   BUN mg/dL 35*   CREATININE mg/dL 1.44*   CALCIUM mg/dL 8.1*         Results from last 7 days   Lab Units 10/11/24  2058   PROBNP pg/mL 2,832.0*     Results from last 7 days   Lab Units 10/12/24  0515 10/11/24  2058 10/10/24  0719   WBC 10*3/mm3 9.35 11.05* 9.5   HEMOGLOBIN g/dL 12.2 13.9 11.9*   HEMATOCRIT % 39.9 45.7 36.6   PLATELETS 10*3/mm3 288 333 291                 Josemanuel Sheriff MD  10/12/2024, 17:51 EDT      EMR Dragon/Transcription:   Dictated utilizing Dragon dictation

## 2024-10-12 NOTE — PLAN OF CARE
"Goal Outcome Evaluation:  Plan of Care Reviewed With: patient                                         Problem: Adult Inpatient Plan of Care  Goal: Plan of Care Review  Outcome: Progressing  Flowsheets  Taken 10/12/2024 1744 by Fatou Rodriguez LPN  Plan of Care Reviewed With: patient  Taken 10/12/2024 0454 by Randa Uribe RN  Outcome Evaluation: Pt bp improved to map above 65 all shift. Pt complains of periodic syncopal episodes for the last \"few years\". Pain to left ankle from fall treated with prn meds. Cough present nonproductive. Constipation treated with suppository. Call light within reach, care continues.  Goal: Absence of Hospital-Acquired Illness or Injury  Intervention: Identify and Manage Fall Risk  Recent Flowsheet Documentation  Taken 10/12/2024 1602 by Fatou Rodriguez LPN  Safety Promotion/Fall Prevention:   safety round/check completed   room organization consistent   nonskid shoes/slippers when out of bed   mobility aid in reach   lighting adjusted   gait belt   fall prevention program maintained   clutter free environment maintained   activity supervised   assistive device/personal items within reach  Taken 10/12/2024 1450 by Fatou Rodriguez LPN  Safety Promotion/Fall Prevention:   safety round/check completed   room organization consistent   nonskid shoes/slippers when out of bed   mobility aid in reach   lighting adjusted   gait belt   fall prevention program maintained   assistive device/personal items within reach   clutter free environment maintained   activity supervised  Taken 10/12/2024 1205 by Fatou Rodriguez LPN  Safety Promotion/Fall Prevention:   safety round/check completed   room organization consistent   nonskid shoes/slippers when out of bed   mobility aid in reach   gait belt   lighting adjusted   fall prevention program maintained   assistive device/personal items within reach   clutter free environment maintained   activity supervised  Taken 10/12/2024 1023 by Fatou Rodriguez LPN  Safety " Promotion/Fall Prevention:   safety round/check completed   room organization consistent   nonskid shoes/slippers when out of bed   mobility aid in reach   lighting adjusted   gait belt   fall prevention program maintained   clutter free environment maintained   assistive device/personal items within reach   activity supervised  Taken 10/12/2024 0839 by Fatou Rodriguez LPN  Safety Promotion/Fall Prevention:   safety round/check completed   room organization consistent   nonskid shoes/slippers when out of bed   mobility aid in reach   fall prevention program maintained   lighting adjusted   gait belt   clutter free environment maintained   assistive device/personal items within reach   activity supervised  Intervention: Prevent Skin Injury  Recent Flowsheet Documentation  Taken 10/12/2024 1602 by Fatou Rodriguez LPN  Body Position: weight shifting  Taken 10/12/2024 1205 by Fatou Rodriguez LPN  Body Position: weight shifting  Taken 10/12/2024 0839 by Fatou Rodriguez LPN  Body Position: position changed independently  Intervention: Prevent and Manage VTE (Venous Thromboembolism) Risk  Recent Flowsheet Documentation  Taken 10/12/2024 1205 by Fatou Rodriguez LPN  VTE Prevention/Management:   bilateral   SCDs (sequential compression devices) off  Taken 10/12/2024 0839 by Fatou Rodriguez LPN  VTE Prevention/Management:   bilateral   SCDs (sequential compression devices) off  Intervention: Prevent Infection  Recent Flowsheet Documentation  Taken 10/12/2024 1602 by Fatou Rodriguez LPN  Infection Prevention:   single patient room provided   rest/sleep promoted   environmental surveillance performed   cohorting utilized   hand hygiene promoted   equipment surfaces disinfected   personal protective equipment utilized  Taken 10/12/2024 1450 by Fatou Rodriguez LPN  Infection Prevention:   single patient room provided   rest/sleep promoted   personal protective equipment utilized   cohorting utilized   environmental surveillance performed   equipment  surfaces disinfected   hand hygiene promoted  Taken 10/12/2024 1205 by Fatou Rodriguez LPN  Infection Prevention:   single patient room provided   rest/sleep promoted   equipment surfaces disinfected   cohorting utilized   environmental surveillance performed   personal protective equipment utilized   hand hygiene promoted  Taken 10/12/2024 1023 by Fatou Rodriguez LPN  Infection Prevention:   single patient room provided   rest/sleep promoted   personal protective equipment utilized   hand hygiene promoted   equipment surfaces disinfected   environmental surveillance performed   cohorting utilized  Taken 10/12/2024 0839 by Fatou Rodriguez LPN  Infection Prevention:   single patient room provided   rest/sleep promoted   personal protective equipment utilized   environmental surveillance performed   cohorting utilized   equipment surfaces disinfected   hand hygiene promoted  Goal: Optimal Comfort and Wellbeing  Intervention: Provide Person-Centered Care  Recent Flowsheet Documentation  Taken 10/12/2024 1602 by Fatou Rodriguez LPN  Trust Relationship/Rapport:   care explained   empathic listening provided   questions encouraged   thoughts/feelings acknowledged    Problem: Heart Failure Comorbidity  Goal: Maintenance of Heart Failure Symptom Control  Intervention: Maintain Heart Failure-Management  Recent Flowsheet Documentation  Taken 10/12/2024 1602 by Fatou Rodriguez LPN  Medication Review/Management: medications reviewed  Taken 10/12/2024 1450 by Fatou Rodriguez LPN  Medication Review/Management: medications reviewed  Taken 10/12/2024 1205 by Fatou Rodriguez LPN  Medication Review/Management: medications reviewed  Taken 10/12/2024 1023 by Fatou Rodriguez LPN  Medication Review/Management: medications reviewed  Taken 10/12/2024 0839 by Fatou Rodriguez LPN  Medication Review/Management: medications reviewed     Problem: Osteoarthritis Comorbidity  Goal: Maintenance of Osteoarthritis Symptom Control  Intervention: Maintain Osteoarthritis Symptom  Control  Recent Flowsheet Documentation  Taken 10/12/2024 1602 by Fatou Rodriguez LPN  Activity Management: activity encouraged  Medication Review/Management: medications reviewed  Taken 10/12/2024 1450 by Fatou Rodriguez LPN  Medication Review/Management: medications reviewed  Taken 10/12/2024 1205 by Fatou Rodriguez LPN  Activity Management: activity encouraged  Medication Review/Management: medications reviewed  Taken 10/12/2024 1023 by Fatou Rodriguez LPN  Medication Review/Management: medications reviewed  Taken 10/12/2024 0839 by Fatou Rodriguez LPN  Activity Management: activity encouraged  Medication Review/Management: medications reviewed     Problem: Skin Injury Risk Increased  Goal: Skin Health and Integrity  Intervention: Optimize Skin Protection  Recent Flowsheet Documentation  Taken 10/12/2024 1602 by Fatou Rodriguez LPN  Activity Management: activity encouraged  Head of Bed (HOB) Positioning: HOB elevated  Taken 10/12/2024 1205 by Fatou Rodriguez LPN  Activity Management: activity encouraged  Head of Bed (HOB) Positioning: HOB elevated  Taken 10/12/2024 0839 by Fatou Rodriguez LPN  Activity Management: activity encouraged  Head of Bed (HOB) Positioning: HOB elevated      questions answered   choices provided   emotional support provided  Taken 10/12/2024 1205 by Fatou Rodriguez LPN  Trust Relationship/Rapport:   care explained   choices provided   emotional support provided   questions encouraged   reassurance provided   thoughts/feelings acknowledged   questions answered   empathic listening provided  Taken 10/12/2024 0839 by Fatou Rodriguez LPN  Trust Relationship/Rapport:   care explained   emotional support provided   questions answered   reassurance provided   thoughts/feelings acknowledged   questions encouraged   empathic listening provided   choices provided

## 2024-10-12 NOTE — CASE MANAGEMENT/SOCIAL WORK
Discharge Planning Assessment   Live     Patient Name: Natalee Noble  MRN: [id number]  Today's Date: 10/12/2024    Admit Date: 10/11/2024    Plan: From home with family. PT rec's pending. Family can transport at discharge.   Discharge Needs Assessment       Row Name 10/12/24 9704       Living Environment    People in Home child(siobhan), adult    Name(s) of People in Home SonAugustin    Current Living Arrangements home    Potentially Unsafe Housing Conditions none    In the past 12 months has the electric, gas, oil, or water company threatened to shut off services in your home? No    Primary Care Provided by self    Provides Primary Care For no one    Family Caregiver if Needed child(siobhan), adult    Family Caregiver Names Daughter, Gaurang and SonAugustin    Quality of Family Relationships helpful;involved;supportive    Able to Return to Prior Arrangements yes       Resource/Environmental Concerns    Resource/Environmental Concerns none    Transportation Concerns none       Transportation Needs    In the past 12 months, has lack of transportation kept you from medical appointments or from getting medications? no    In the past 12 months, has lack of transportation kept you from meetings, work, or from getting things needed for daily living? No       Food Insecurity    Within the past 12 months, you worried that your food would run out before you got the money to buy more. Never true    Within the past 12 months, the food you bought just didn't last and you didn't have money to get more. Never true       Transition Planning    Patient/Family Anticipates Transition to inpatient rehabilitation facility    Patient/Family Anticipated Services at Transition rehabilitation services    Transportation Anticipated family or friend will provide       Discharge Needs Assessment    Readmission Within the Last 30 Days no previous admission in last 30 days    Equipment Currently Used at Home walker, rolling;bp cuff  shower bench     Concerns to be Addressed discharge planning    Anticipated Changes Related to Illness none    Equipment Needed After Discharge none    Provided Post Acute Provider List? N/A    Provided Post Acute Provider Quality & Resource List? N/A                   Discharge Plan       Row Name 10/12/24 1908       Plan    Plan From home with family. PT rec's pending. Family can transport at discharge.    Patient/Family in Agreement with Plan yes    Plan Comments CM met with patient and daughterGaurang at bedside. Confirmed patient lives at home with son, Augustin. Patient no longer drives and has reliable transportation from son. Son assists with grocery shopping, appointments, and pharmacy trips. Otherwise, patient able to be independent with ADLs. DME use includes rolling walker (rarely used), shower bench, and b/p cuff. Denies current HH/OP services. Denies issues affording medications, food, utilities, or transportation concerns. PCP and pharmacy confirmed. Declines Meds to Bed. Daughter works at [employer]. Daughter and patient prefer inpatient rehab at [facility]. Family can transport at discharge. DC Barriers: IV Reglan/Zofran, cardiac monitoring, PT consult pending, NPO after MN pending GI workup.                  Continued Care and Services - Admitted Since 10/11/2024    No active coordination exists for this encounter.          Demographic Summary       Row Name 10/12/24 1903       General Information    Admission Type observation    Arrived From emergency department    Reason for Consult care coordination/care conference;discharge planning    Preferred Language English       Contact Information    Permission Granted to Share Info With                    Functional Status       Row Name 10/12/24 1903       Functional Status    Usual Activity Tolerance moderate    Current Activity Tolerance moderate       Functional Status, IADL    Medications independent    Meal Preparation assistive person     Housekeeping independent;assistive person    Laundry independent;assistive person    Shopping independent;assistive person                  Chery Licea RN     Office: [phone]  Fax: [phone]

## 2024-10-13 ENCOUNTER — INPATIENT HOSPITAL (OUTPATIENT)
Age: 74
End: 2024-10-13

## 2024-10-13 ENCOUNTER — ANESTHESIA (OUTPATIENT)
Dept: GASTROENTEROLOGY | Facility: HOSPITAL | Age: 74
End: 2024-10-13
Payer: MEDICARE

## 2024-10-13 ENCOUNTER — ANESTHESIA EVENT (OUTPATIENT)
Dept: GASTROENTEROLOGY | Facility: HOSPITAL | Age: 74
End: 2024-10-13
Payer: MEDICARE

## 2024-10-13 ENCOUNTER — INPATIENT HOSPITAL (OUTPATIENT)
Dept: URBAN - METROPOLITAN AREA HOSPITAL 84 | Facility: HOSPITAL | Age: 74
End: 2024-10-13

## 2024-10-13 DIAGNOSIS — R63.0 ANOREXIA: ICD-10-CM

## 2024-10-13 DIAGNOSIS — K21.00 GASTRO-ESOPHAGEAL REFLUX DISEASE WITH ESOPHAGITIS, WITHOUT B: ICD-10-CM

## 2024-10-13 DIAGNOSIS — K44.9 DIAPHRAGMATIC HERNIA WITHOUT OBSTRUCTION OR GANGRENE: ICD-10-CM

## 2024-10-13 DIAGNOSIS — K29.50 UNSPECIFIED CHRONIC GASTRITIS WITHOUT BLEEDING: ICD-10-CM

## 2024-10-13 DIAGNOSIS — R11.0 NAUSEA: ICD-10-CM

## 2024-10-13 DIAGNOSIS — R10.84 GENERALIZED ABDOMINAL PAIN: ICD-10-CM

## 2024-10-13 LAB
DEPRECATED RDW RBC AUTO: 53.6 FL (ref 37–54)
ERYTHROCYTE [DISTWIDTH] IN BLOOD BY AUTOMATED COUNT: 16.2 % (ref 12.3–15.4)
HCT VFR BLD AUTO: 38.1 % (ref 34–46.6)
HGB BLD-MCNC: 11.4 G/DL (ref 12–15.9)
MCH RBC QN AUTO: 27.5 PG (ref 26.6–33)
MCHC RBC AUTO-ENTMCNC: 29.9 G/DL (ref 31.5–35.7)
MCV RBC AUTO: 92 FL (ref 79–97)
PLATELET # BLD AUTO: 235 10*3/MM3 (ref 140–450)
PMV BLD AUTO: 10.6 FL (ref 6–12)
RBC # BLD AUTO: 4.14 10*6/MM3 (ref 3.77–5.28)
WBC NRBC COR # BLD AUTO: 6.79 10*3/MM3 (ref 3.4–10.8)

## 2024-10-13 PROCEDURE — 25010000002 ONDANSETRON PER 1 MG: Performed by: INTERNAL MEDICINE

## 2024-10-13 PROCEDURE — 43239 EGD BIOPSY SINGLE/MULTIPLE: CPT | Performed by: INTERNAL MEDICINE

## 2024-10-13 PROCEDURE — 0DB68ZX EXCISION OF STOMACH, VIA NATURAL OR ARTIFICIAL OPENING ENDOSCOPIC, DIAGNOSTIC: ICD-10-PCS | Performed by: INTERNAL MEDICINE

## 2024-10-13 PROCEDURE — 88305 TISSUE EXAM BY PATHOLOGIST: CPT | Performed by: INTERNAL MEDICINE

## 2024-10-13 PROCEDURE — 25010000002 PROPOFOL 200 MG/20ML EMULSION: Performed by: ANESTHESIOLOGY

## 2024-10-13 PROCEDURE — 93010 ELECTROCARDIOGRAM REPORT: CPT | Performed by: INTERNAL MEDICINE

## 2024-10-13 PROCEDURE — 93005 ELECTROCARDIOGRAM TRACING: CPT | Performed by: INTERNAL MEDICINE

## 2024-10-13 PROCEDURE — 25010000002 METOCLOPRAMIDE PER 10 MG: Performed by: NURSE PRACTITIONER

## 2024-10-13 PROCEDURE — 85027 COMPLETE CBC AUTOMATED: CPT | Performed by: NURSE PRACTITIONER

## 2024-10-13 PROCEDURE — 25010000002 METOCLOPRAMIDE PER 10 MG: Performed by: INTERNAL MEDICINE

## 2024-10-13 PROCEDURE — 99232 SBSQ HOSP IP/OBS MODERATE 35: CPT | Performed by: INTERNAL MEDICINE

## 2024-10-13 PROCEDURE — 97162 PT EVAL MOD COMPLEX 30 MIN: CPT

## 2024-10-13 PROCEDURE — 25010000002 ONDANSETRON PER 1 MG: Performed by: NURSE PRACTITIONER

## 2024-10-13 RX ORDER — ONDANSETRON 4 MG/1
4 TABLET, ORALLY DISINTEGRATING ORAL EVERY 6 HOURS PRN
Status: DISCONTINUED | OUTPATIENT
Start: 2024-10-13 | End: 2024-10-17 | Stop reason: HOSPADM

## 2024-10-13 RX ORDER — PROPOFOL 10 MG/ML
INJECTION, EMULSION INTRAVENOUS AS NEEDED
Status: DISCONTINUED | OUTPATIENT
Start: 2024-10-13 | End: 2024-10-13 | Stop reason: SURG

## 2024-10-13 RX ORDER — ALPRAZOLAM 1 MG
1 TABLET ORAL 2 TIMES DAILY PRN
Status: DISCONTINUED | OUTPATIENT
Start: 2024-10-13 | End: 2024-10-16

## 2024-10-13 RX ORDER — ONDANSETRON 2 MG/ML
4 INJECTION INTRAMUSCULAR; INTRAVENOUS EVERY 6 HOURS PRN
Status: DISCONTINUED | OUTPATIENT
Start: 2024-10-13 | End: 2024-10-17 | Stop reason: HOSPADM

## 2024-10-13 RX ORDER — METOCLOPRAMIDE HYDROCHLORIDE 5 MG/ML
5 INJECTION INTRAMUSCULAR; INTRAVENOUS EVERY 6 HOURS
Status: COMPLETED | OUTPATIENT
Start: 2024-10-13 | End: 2024-10-14

## 2024-10-13 RX ORDER — METOPROLOL TARTRATE 1 MG/ML
2.5 INJECTION, SOLUTION INTRAVENOUS ONCE AS NEEDED
Status: DISCONTINUED | OUTPATIENT
Start: 2024-10-13 | End: 2024-10-17 | Stop reason: HOSPADM

## 2024-10-13 RX ADMIN — POLYETHYLENE GLYCOL 3350 17 G: 17 POWDER, FOR SOLUTION ORAL at 08:11

## 2024-10-13 RX ADMIN — PROPOFOL 50 MG: 10 INJECTION, EMULSION INTRAVENOUS at 14:46

## 2024-10-13 RX ADMIN — ONDANSETRON 4 MG: 2 INJECTION INTRAMUSCULAR; INTRAVENOUS at 00:24

## 2024-10-13 RX ADMIN — LIOTHYRONINE SODIUM 5 MCG: 5 TABLET ORAL at 21:31

## 2024-10-13 RX ADMIN — POLYETHYLENE GLYCOL 3350 17 G: 17 POWDER, FOR SOLUTION ORAL at 21:31

## 2024-10-13 RX ADMIN — METOCLOPRAMIDE 5 MG: 5 INJECTION, SOLUTION INTRAMUSCULAR; INTRAVENOUS at 17:13

## 2024-10-13 RX ADMIN — METOCLOPRAMIDE 5 MG: 5 INJECTION, SOLUTION INTRAMUSCULAR; INTRAVENOUS at 04:24

## 2024-10-13 RX ADMIN — PROPOFOL 50 MG: 10 INJECTION, EMULSION INTRAVENOUS at 14:43

## 2024-10-13 RX ADMIN — MEMANTINE 10 MG: 10 TABLET ORAL at 21:31

## 2024-10-13 RX ADMIN — ONDANSETRON 4 MG: 2 INJECTION INTRAMUSCULAR; INTRAVENOUS at 12:11

## 2024-10-13 RX ADMIN — Medication 1000 UNITS: at 08:11

## 2024-10-13 RX ADMIN — PANTOPRAZOLE SODIUM 40 MG: 40 TABLET, DELAYED RELEASE ORAL at 17:13

## 2024-10-13 RX ADMIN — ASPIRIN 81 MG: 81 TABLET, COATED ORAL at 08:11

## 2024-10-13 RX ADMIN — METOCLOPRAMIDE 5 MG: 5 INJECTION, SOLUTION INTRAMUSCULAR; INTRAVENOUS at 09:28

## 2024-10-13 RX ADMIN — ESCITALOPRAM OXALATE 20 MG: 10 TABLET ORAL at 08:11

## 2024-10-13 RX ADMIN — LIOTHYRONINE SODIUM 5 MCG: 5 TABLET ORAL at 08:11

## 2024-10-13 RX ADMIN — ALPRAZOLAM 0.5 MG: 0.5 TABLET ORAL at 00:24

## 2024-10-13 RX ADMIN — RIVAROXABAN 15 MG: 15 TABLET, FILM COATED ORAL at 17:17

## 2024-10-13 RX ADMIN — SODIUM BICARBONATE 650 MG: 650 TABLET ORAL at 08:11

## 2024-10-13 RX ADMIN — ALPRAZOLAM 1 MG: 1 TABLET ORAL at 21:31

## 2024-10-13 RX ADMIN — Medication 10 ML: at 08:12

## 2024-10-13 RX ADMIN — MEMANTINE 10 MG: 10 TABLET ORAL at 08:11

## 2024-10-13 RX ADMIN — METOCLOPRAMIDE 5 MG: 5 INJECTION, SOLUTION INTRAMUSCULAR; INTRAVENOUS at 23:45

## 2024-10-13 RX ADMIN — SODIUM BICARBONATE 650 MG: 650 TABLET ORAL at 21:31

## 2024-10-13 RX ADMIN — LEVOTHYROXINE SODIUM 112 MCG: 0.11 TABLET ORAL at 04:24

## 2024-10-13 RX ADMIN — PANTOPRAZOLE SODIUM 40 MG: 40 TABLET, DELAYED RELEASE ORAL at 08:11

## 2024-10-13 RX ADMIN — ONDANSETRON 4 MG: 2 INJECTION, SOLUTION INTRAMUSCULAR; INTRAVENOUS at 21:38

## 2024-10-13 RX ADMIN — ONDANSETRON 4 MG: 2 INJECTION INTRAMUSCULAR; INTRAVENOUS at 08:11

## 2024-10-13 NOTE — ANESTHESIA PREPROCEDURE EVALUATION
Anesthesia Evaluation     NPO Solid Status: > 8 hours  NPO Liquid Status: > 8 hours           Airway   Mallampati: II  TM distance: >3 FB  Neck ROM: full  No difficulty expected  Dental - normal exam     Pulmonary - normal exam   Cardiovascular - normal exam    (+) hypertension, dysrhythmias, hyperlipidemia      Neuro/Psych  (+) CVA, headaches, psychiatric history  GI/Hepatic/Renal/Endo    (+) GERD, renal disease- CRI, thyroid problem hypothyroidism    Musculoskeletal     (+) back pain, neck pain  Abdominal  - normal exam    Bowel sounds: normal.   Substance History      OB/GYN          Other   arthritis,                 Anesthesia Plan    ASA 3     MAC   total IV anesthesia  intravenous induction     Anesthetic plan, risks, benefits, and alternatives have been provided, discussed and informed consent has been obtained with: patient.  Pre-procedure education provided    CODE STATUS:    Code Status (Patient has no pulse and is not breathing): CPR (Attempt to Resuscitate)  Medical Interventions (Patient has pulse or is breathing): Full Support

## 2024-10-13 NOTE — PLAN OF CARE
Goal Outcome Evaluation:  Plan of Care Reviewed With: cesar(siobhan)           Problem: Adult Inpatient Plan of Care  Goal: Plan of Care Review  Outcome: Progressing  Flowsheets  Taken 10/13/2024 1559 by Fatou Rodriguez LPN  Plan of Care Reviewed With: grandchild(siobhan)  Taken 10/13/2024 1259 by Gladys Hylton PT  Outcome Evaluation: Natalee Noble presents as a  74 y.o. female with a CMH of anxiety and depression, chronic back pain, chronic renal failure stage III, hypertension, hyperlipidemia, heart failure reduced EF, paroxysmal atrial fibrillation on anticoagulation who presented to [facility] on 10/11/2024 with complaints of several near syncopal and 1 syncopal episode associated with hypotension. [facility] to 10/10/24. Pt had a fall at home and injured her left ankle. X-ray left ankle (-). CXR (-). CT head (-). Cardiac EP consult pending. Pt lethargic initially but wakened fully with conversation. Pt oriented x 4. Pt had daughter in room who is a PT. BP: 130/85, 116/72 in sitting, 112/75 post tx. Pt typically lives with adult son in [facility] with 1 HERMELINDA and she is independent with community mobility without AD. This date, pt transferred supine>sit with CGA of 1, sit<>stand with min assist of 1 and prt ambulated 15 feet with rolling walker with min assist of 1 with left ankle pain, forward flexed posture and decreased heel strike BLE. Pt relied heavily on the rolling walker. Pt was fatigued with minimal exertion. Tinetti Combined Gait and Balance Score was 8/28 indicating pt is a high risk far falls. Pt presents with generalized weakness, standing balance deficits and general unsteadiness up on her feet. Patient is a high risk of injurious falls and unsafe to return to prior living environment at this time.  Pt and daughter are requesting a referral to [facility]. PT will follow pt with PT recommendation of In-patient rehabilitation  Facility.  Goal: Patient-Specific Goal (Individualized)  Outcome: Progressing  Goal: Absence of Hospital-Acquired Illness or Injury  Outcome: Progressing  Intervention: Identify and Manage Fall Risk  Recent Flowsheet Documentation  Taken 10/13/2024 1404 by Fatou Rodriguez LPN  Safety Promotion/Fall Prevention:   safety round/check completed   room organization consistent   nonskid shoes/slippers when out of bed   mobility aid in reach   lighting adjusted   gait belt   fall prevention program maintained   clutter free environment maintained   assistive device/personal items within reach   activity supervised  Taken 10/13/2024 1205 by Fatou Rodriguez LPN  Safety Promotion/Fall Prevention:   safety round/check completed   room organization consistent   nonskid shoes/slippers when out of bed   mobility aid in reach   lighting adjusted   gait belt   fall prevention program maintained   clutter free environment maintained   assistive device/personal items within reach   activity supervised  Taken 10/13/2024 1007 by Fatou Rodriguez LPN  Safety Promotion/Fall Prevention:   safety round/check completed   room organization consistent   nonskid shoes/slippers when out of bed   mobility aid in reach   lighting adjusted   fall prevention program maintained   gait belt   assistive device/personal items within reach   activity supervised   clutter free environment maintained  Taken 10/13/2024 0809 by Fatou Rodriguez LPN  Safety Promotion/Fall Prevention:   safety round/check completed   room organization consistent   nonskid shoes/slippers when out of bed   mobility aid in reach   lighting adjusted   gait belt   fall prevention program maintained   clutter free environment maintained   assistive device/personal items within reach   activity supervised  Intervention: Prevent Skin Injury  Recent Flowsheet Documentation  Taken 10/13/2024 1205 by Fatou Rodriguez LPN  Body Position: position changed independently  Taken 10/13/2024 0809 by Michael  LORRIE Lainez  Body Position: position changed independently  Intervention: Prevent and Manage VTE (Venous Thromboembolism) Risk  Recent Flowsheet Documentation  Taken 10/13/2024 1205 by Fatou Rodriguez LPN  VTE Prevention/Management:   bilateral   SCDs (sequential compression devices) off  Intervention: Prevent Infection  Recent Flowsheet Documentation  Taken 10/13/2024 1404 by Fatou Rodriguez LPN  Infection Prevention:   single patient room provided   rest/sleep promoted   personal protective equipment utilized   cohorting utilized   environmental surveillance performed   hand hygiene promoted   equipment surfaces disinfected  Taken 10/13/2024 1205 by Fatou Rodriguez LPN  Infection Prevention:   single patient room provided   rest/sleep promoted   personal protective equipment utilized   hand hygiene promoted   environmental surveillance performed   equipment surfaces disinfected   cohorting utilized  Taken 10/13/2024 1007 by Fatou Rodriguez LPN  Infection Prevention:   single patient room provided   rest/sleep promoted   personal protective equipment utilized   hand hygiene promoted   environmental surveillance performed   equipment surfaces disinfected   cohorting utilized  Taken 10/13/2024 0809 by Fatou Rodriguez LPN  Infection Prevention:   single patient room provided   rest/sleep promoted   personal protective equipment utilized   hand hygiene promoted   equipment surfaces disinfected   environmental surveillance performed   cohorting utilized  Goal: Optimal Comfort and Wellbeing  Outcome: Progressing  Intervention: Provide Person-Centered Care  Recent Flowsheet Documentation  Taken 10/13/2024 1205 by Fatou Rodriguez LPN  Trust Relationship/Rapport:   care explained   choices provided   questions encouraged   reassurance provided   thoughts/feelings acknowledged   emotional support provided   empathic listening provided   questions answered  Taken 10/13/2024 0809 by Fatou Rodriguez LPN  Trust Relationship/Rapport:   care  explained   questions answered   questions encouraged   reassurance provided   thoughts/feelings acknowledged   emotional support provided   empathic listening provided   choices provided  Goal: Readiness for Transition of Care  Outcome: Progressing     Problem: Heart Failure Comorbidity  Goal: Maintenance of Heart Failure Symptom Control  Outcome: Progressing  Intervention: Maintain Heart Failure-Management  Recent Flowsheet Documentation  Taken 10/13/2024 1404 by Fatou Rodriguez LPN  Medication Review/Management: medications reviewed  Taken 10/13/2024 1205 by Fatou Rodriguez LPN  Medication Review/Management: medications reviewed  Taken 10/13/2024 1007 by Fatou Rodriguez LPN  Medication Review/Management: medications reviewed  Taken 10/13/2024 0809 by Fatou Rodriguez LPN  Medication Review/Management: medications reviewed     Problem: Osteoarthritis Comorbidity  Goal: Maintenance of Osteoarthritis Symptom Control  Outcome: Progressing  Intervention: Maintain Osteoarthritis Symptom Control  Recent Flowsheet Documentation  Taken 10/13/2024 1404 by Fatou Rodriguez LPN  Medication Review/Management: medications reviewed  Taken 10/13/2024 1205 by Fatou Rodriguez LPN  Activity Management: activity encouraged  Medication Review/Management: medications reviewed  Taken 10/13/2024 1007 by Fatou Rodriguez LPN  Medication Review/Management: medications reviewed  Taken 10/13/2024 0809 by Fatou Rodriguez LPN  Activity Management: activity encouraged  Medication Review/Management: medications reviewed     Problem: Skin Injury Risk Increased  Goal: Skin Health and Integrity  Outcome: Progressing  Intervention: Optimize Skin Protection  Recent Flowsheet Documentation  Taken 10/13/2024 1205 by Fatou Rodriguez LPN  Activity Management: activity encouraged  Head of Bed (HOB) Positioning: HOB elevated  Taken 10/13/2024 0809 by Fatou Rodriguez LPN  Activity Management: activity encouraged  Head of Bed (HOB) Positioning: HOB elevated     Problem: Comorbidity  Management  Goal: Maintenance of Heart Failure Symptom Control  Outcome: Progressing  Intervention: Maintain Heart Failure Management  Recent Flowsheet Documentation  Taken 10/13/2024 1404 by Fatou Rodriguez LPN  Medication Review/Management: medications reviewed  Taken 10/13/2024 1205 by Fatou Rodriguez LPN  Medication Review/Management: medications reviewed  Taken 10/13/2024 1007 by Fatou Rodriguez LPN  Medication Review/Management: medications reviewed  Taken 10/13/2024 0809 by Fatou Rodriguez LPN  Medication Review/Management: medications reviewed  Goal: Blood Pressure in Desired Range  Outcome: Progressing  Intervention: Maintain Blood Pressure Management  Recent Flowsheet Documentation  Taken 10/13/2024 1404 by Fatou Rodriguez LPN  Medication Review/Management: medications reviewed  Taken 10/13/2024 1205 by Fatou Rodriguez LPN  Medication Review/Management: medications reviewed  Taken 10/13/2024 1007 by Fatou Rodriguez LPN  Medication Review/Management: medications reviewed  Taken 10/13/2024 0809 by Fatou Rodriguez LPN  Medication Review/Management: medications reviewed  Goal: Maintenance of Osteoarthritis Symptom Control  Outcome: Progressing  Intervention: Maintain Osteoarthritis Symptom Control  Recent Flowsheet Documentation  Taken 10/13/2024 1404 by Fatou Rodriguez LPN  Medication Review/Management: medications reviewed  Taken 10/13/2024 1205 by Fatou Rodriguez LPN  Activity Management: activity encouraged  Medication Review/Management: medications reviewed  Taken 10/13/2024 1007 by Fatou Rodriguez LPN  Medication Review/Management: medications reviewed  Taken 10/13/2024 0809 by Fatou Rodriguez LPN  Activity Management: activity encouraged  Medication Review/Management: medications reviewed     Problem: Fall Injury Risk  Goal: Absence of Fall and Fall-Related Injury  Outcome: Progressing  Intervention: Identify and Manage Contributors  Recent Flowsheet Documentation  Taken 10/13/2024 1404 by Fatou Rodriguez LPN  Medication Review/Management:  medications reviewed  Taken 10/13/2024 1205 by Fatou Rodriguez LPN  Medication Review/Management: medications reviewed  Taken 10/13/2024 1007 by Fatou Rodriguez LPN  Medication Review/Management: medications reviewed  Taken 10/13/2024 0809 by Fatou Rodriguez LPN  Medication Review/Management: medications reviewed  Intervention: Promote Injury-Free Environment  Recent Flowsheet Documentation  Taken 10/13/2024 1404 by Fatou Rodriguez LPN  Safety Promotion/Fall Prevention:   safety round/check completed   room organization consistent   nonskid shoes/slippers when out of bed   mobility aid in reach   lighting adjusted   gait belt   fall prevention program maintained   clutter free environment maintained   assistive device/personal items within reach   activity supervised  Taken 10/13/2024 1205 by Fatou Rodriguez LPN  Safety Promotion/Fall Prevention:   safety round/check completed   room organization consistent   nonskid shoes/slippers when out of bed   mobility aid in reach   lighting adjusted   gait belt   fall prevention program maintained   clutter free environment maintained   assistive device/personal items within reach   activity supervised  Taken 10/13/2024 1007 by Fatou Rodriguez LPN  Safety Promotion/Fall Prevention:   safety round/check completed   room organization consistent   nonskid shoes/slippers when out of bed   mobility aid in reach   lighting adjusted   fall prevention program maintained   gait belt   assistive device/personal items within reach   activity supervised   clutter free environment maintained  Taken 10/13/2024 0809 by Fatou Rodriguez LPN  Safety Promotion/Fall Prevention:   safety round/check completed   room organization consistent   nonskid shoes/slippers when out of bed   mobility aid in reach   lighting adjusted   gait belt   fall prevention program maintained   clutter free environment maintained   assistive device/personal items within reach   activity supervised

## 2024-10-13 NOTE — PLAN OF CARE
Goal Outcome Evaluation:              Outcome Evaluation: Patient slept off and on during the night.  Patient NPO at midnight for GI.  Pt nauseated off and on during the night.  Pt resting in bed stable at this time.

## 2024-10-13 NOTE — PROGRESS NOTES
[facility] MEDICINE SERVICE  DAILY PROGRESS NOTE    NAME: Natalee Noble  : 1950  MRN: [id number]      LOS: 0 days     PROVIDER OF SERVICE: Nayan Stuart MD    Chief Complaint: Hypotensive episode    Subjective:     Interval History:  History taken from: patient         History of Present Illness: Natalee Noble is a very pleasant  74 y.o. female with a CMH of anxiety and depression, chronic back pain, chronic renal failure stage III, hypertension, hyperlipidemia, heart failure reduced EF, paroxysmal atrial fibrillation on anticoagulation who presented to [facility] on 10/11/2024 with complaints of several near syncopal and 1 syncopal episode today associated with hypotension.  She reports and  records reflect she was admitted to [facility] from 10/5/2024 until yesterday.  Review of records shows she was admitted there for atrial fibrillation with RVR elevated troponin, and heart failure.  She had an echo which showed an ejection fraction of 35 to 40%..  She was discharged on  new medications Entresto, Farxiga, spironolactone and Lasix which she reports she took today.  In  ED she also complained of left ankle pain.  X-ray done in the ED showed no acute process per radiology.  She denies any chest pain, shortness of air.  She does report dizziness any focal weaknesses.  She has no slurred speech or facial droop.  She is awake and alert and answering appropriately.  Family at bedside assisting with history.     Blood pressure was initially low as 69/47 in ED.  She was given a 500 cc fluid bolus with improvement.  Labs show a high-sensitivity troponin of 68, proBNP 2832 and stable on room air.  BUN 35 creatinine 1.44 glucose 110 calcium 8.1 WBC 11.05 afebrile.  Urinalysis ordered and pending, chest x-ray per radiology here shows no active disease, CT head per radiology shows no acute abnormalities.  EKG shows atrial fibrillation heart rate 98.  She also  complains of constipation has not had a bowel movement in 8 days.  She denies abdominal pain.  She will be admitted for further evaluation and treatment and cardiology has been consulted.    10/12/24 seen in bed RAYMOND BRUCE RN.  C/o nausea, bp 91/56  10/13/24 c/o nausea, headache, back pain, for EGD today, , daughter wants her to get rahab    Review of Systems   Genitourinary:  Negative for urgency.   Psychiatric/Behavioral:  Negative for suicidal ideas.        Constitutional: Negative.    HENT: Negative.     Eyes: Negative.    Respiratory: Negative.     Cardiovascular: Negative.    Gastrointestinal:  Positive for constipation and nausea.   Endocrine: Negative.    Genitourinary: Negative.    Musculoskeletal: Negative.    Skin: Negative.    Allergic/Immunologic: Negative.    Neurological:  Positive for syncope and light-headedness.   Hematological: Negative.    Psychiatric/Behavioral: Negative.     All other systems reviewed and are negative.  Objective:     Vital Signs  Temp:  [96.9 °F (36.1 °C)-98.2 °F (36.8 °C)] 98.2 °F (36.8 °C)  Heart Rate:  [] 107  Resp:  [14-18] 18  BP: ()/(52-78) 134/77   Body mass index is 28.96 kg/m².    Physical Exam     Appearance: Normal appearance. She is obese.   HENT:      Head: Normocephalic and atraumatic.      Right Ear: External ear normal.      Left Ear: External ear normal.      Nose: Nose normal.      Mouth/Throat:      Mouth: Mucous membranes are moist.   Eyes:      Extraocular Movements: Extraocular movements intact.   Cardiovascular:      Rate and Rhythm: Normal rate. Rhythm irregular.      Pulses: Normal pulses.      Heart sounds: Normal heart sounds.   Pulmonary:      Effort: Pulmonary effort is normal.      Breath sounds: Normal breath sounds.   Abdominal:      Palpations: Abdomen is soft.   Genitourinary:     Comments: deferred  Musculoskeletal:         General: Normal range of motion.      Cervical back: Normal range of motion and neck supple.   Skin:      General: Skin is warm and dry.      Coloration: Skin is pale.   Neurological:      General: No focal deficit present.      Mental Status: She is alert and oriented to person, place, and time.   Psychiatric:         Mood and Affect: Mood normal.         Behavior: Behavior normal.         Thought Content: Thought content normal.         Judgment: Judgment normal.      Diagnostic Data    Results from last 7 days   Lab Units 10/13/24  0532 10/12/24  0515 10/11/24  2142   WBC 10*3/mm3 6.79   < >  --    HEMOGLOBIN g/dL 11.4*   < >  --    HEMATOCRIT % 38.1   < >  --    PLATELETS 10*3/mm3 235   < >  --    GLUCOSE mg/dL  --   --  110*   CREATININE mg/dL  --   --  1.44*   BUN mg/dL  --   --  35*   SODIUM mmol/L  --   --  141   POTASSIUM mmol/L  --   --  4.0   AST (SGOT) U/L  --   --  20   ALT (SGPT) U/L  --   --  20   ALK PHOS U/L  --   --  81   BILIRUBIN mg/dL  --   --  0.4   ANION GAP mmol/L  --   --  13.4    < > = values in this interval not displayed.       CT Head Without Contrast    Result Date: 10/11/2024  Impression: 1. No acute intracranial abnormalities are identified. 2. Pansinusitis. Electronically Signed: Jeet Reis MD  10/11/2024 10:24 PM EDT  Workstation ID: [id number]    XR Ankle 3+ View Left    Result Date: 10/11/2024  Impression: No acute osseous abnormalities are identified. Electronically Signed: Jeet Reis MD  10/11/2024 9:33 PM EDT  Workstation ID: [id number]    XR Chest 1 View    Result Date: 10/11/2024  No active disease. Electronically Signed: Jeet Reis MD  10/11/2024 9:25 PM EDT  Workstation ID: [id number]       I reviewed the patient's new clinical results.    Assessment/Plan:     Active and Resolved Problems  Active Hospital Problems    Diagnosis  POA    **Hypotensive episode [I95.9]  Yes    Hypothyroidism (acquired) [E03.9]  Yes    Rheumatoid arthritis involving multiple sites [M06.9]  Yes    Acute on chronic renal insufficiency [N28.9, N18.9]  Yes    Elevated troponin [R79.89]  Yes    Chronic  HFrEF (heart failure with reduced ejection fraction) [I50.22]  Yes    Anxiety disorder [F41.9]  Yes    Constipation [K59.00]  Yes    Long term current use of anticoagulant therapy [Z79.01]  Not Applicable    CKD (chronic kidney disease) stage 3, GFR 30-59 ml/min [N18.30]  Yes    Hyperlipidemia [E78.5]  Yes    Paroxysmal A-fib [I48.0]  Yes      Resolved Hospital Problems   No resolved problems to display.     Paroxysmal atrial fibrillation, EKG shows atrial fibrillation controlled, patient reports contemplating ablation,   -reordered home digitalis,   -digoxin level 1.1  - reordered home Xarelto,   -hold home metoprolol for hypotension   -cardiology consulted   -continuous cardiac monitoring    Hypotensive episode, was not drinking due to nausea, was taking diuretics  - improved with 500 cc IV fluid bolus continue normal saline at 100 cc/h,   -monitor BP likely secondary to addition of several new medications since recent hospitalization discharge yesterday,   -cardiology consulted     Acute on chronic renal insufficiency creatinine 1.44 baseline appears to be 1.1, avoid nephrotoxic meds trend renal function reordered home sodium bicarb     Chronic heart failure reduced ejection fraction recent echo at [facility] EF 35 to 40%, stable on room air no dyspnea or bilateral lower extremity edema, hold home Entresto, spironolactone, Farxiga and Lasix till evaluated by cardiology     Hypothyroidism, high-sensitivity TSH dated 10/5/2024 0.79 within normal limits, reordered home levothyroxine and Cytomel     Elevated troponin, 68 denies chest pain no ischemic changes acutely on EKG cardiology consulted likely secondary to ischemic demand     Anxiety disorder, reordered home Lexapro, hold home alprazolam for hypotension     Constipation, as needed medication orders in place one-time glycerin suppository, reports was recently on narcotics at other facility for back pain     Long-term use of anticoagulants, reordered home  Xarelto     Hyperlipidemia, on statin no Zetia in formulary      Hx CVA APHASIA 2018, Dementia?Denies memory loss    -On Namenda was prescribed for aphasia, per daughter improved     Osteoarthritis, on home Plaquenil    VTE Prophylaxis:  Pharmacologic VTE prophylaxis orders are present.      Disposition Planning:     Barriers to Discharge:hypotension  Anticipated Date of Discharge: 10/12/24  Place of Discharge: home      Time: 35 minutes     Code Status and Medical Interventions: CPR (Attempt to Resuscitate); Full Support   Ordered at: 10/11/24 3231     Code Status (Patient has no pulse and is not breathing):    CPR (Attempt to Resuscitate)     Medical Interventions (Patient has pulse or is breathing):    Full Support       Signature: Electronically signed by Nayan Stuart MD, 10/13/24, 10:53 EDT.  [facility] Hospitalist Team

## 2024-10-13 NOTE — PLAN OF CARE
Goal Outcome Evaluation:  Plan of Care Reviewed With: patient, child  Natalee Noble presents as a  74 y.o. female with a CMH of anxiety and depression, chronic back pain, chronic renal failure stage III, hypertension, hyperlipidemia, heart failure reduced EF, paroxysmal atrial fibrillation on anticoagulation who presented to [facility] on 10/11/2024 with complaints of several near syncopal and 1 syncopal episode associated with hypotension. Pt was admitted to [facility] from 10/5/2024 to 10/10/24. Pt had a fall at home and injured her left ankle. X-ray left ankle (-). CXR (-). CT head (-). Cardiac EP consult pending. Pt lethargic initially but wakened fully with conversation. Pt oriented x 4. Pt had daughter in room who is a PT. BP: 130/85, 116/72 in sitting, 112/75 post tx. Pt typically lives with adult son in [facility] with 1 HERMELINDA and she is independent with community mobility without AD. This date, pt transferred supine>sit with CGA of 1, sit<>stand with min assist of 1 and prt ambulated 15 feet with rolling walker with min assist of 1 with left ankle pain, forward flexed posture and decreased heel strike BLE. Pt relied heavily on the rolling walker. Pt was fatigued with minimal exertion. Tinetti Combined Gait and Balance Score was 8/28 indicating pt is a high risk far falls. Pt presents with generalized weakness, standing balance deficits and general unsteadiness up on her feet. Patient is a high risk of injurious falls and unsafe to return to prior living environment at this time.  Pt and daughter are requesting a referral to [facility]. PT will follow pt with PT recommendation of In-patient rehabilitation Facility.              Anticipated Discharge Disposition (PT): inpatient rehabilitation facility

## 2024-10-13 NOTE — THERAPY EVALUATION
Patient Name: Natalee Noble  : 1950    MRN: [id number]                              Today's Date: 10/13/2024       Admit Date: 10/11/2024    Visit Dx:     ICD-10-CM ICD-9-CM   1. Hypotension, unspecified hypotension type  I95.9 458.9   2. Elevated troponin  R79.89 790.6   3. Renal insufficiency  N28.9 593.9   4. Nausea  R11.0 787.02     Patient Active Problem List   Diagnosis    Cerebrovascular accident (CVA)    Paroxysmal A-fib    Hyperlipidemia    Migraines    History of echocardiogram    History of cardiac cath    History of cardiac monitoring    CKD (chronic kidney disease) stage 3, GFR 30-59 ml/min    Allergy to iodine    Adenomatous colon polyp    Epigastric pain    Nausea    Bloating    Belching    Long term current use of anticoagulant therapy    Atrial fibrillation with RVR    Metatarsal fracture, pathologic, left, initial encounter    FH: colon cancer    FH: colon polyps    Hypotensive episode    Acute on chronic renal insufficiency    Elevated troponin    Chronic HFrEF (heart failure with reduced ejection fraction)    Anxiety disorder    Constipation    Hypothyroidism (acquired)    Rheumatoid arthritis involving multiple sites     Past Medical History:   Diagnosis Date    Anxiety and depression     Arthritis     Back pain     CRF (chronic renal failure), stage 3 (moderate)     Diverticulosis     Essential hypertension     Family history of colon cancer     Family history of colonic polyps     Fractures     R tibia/fibula; L ankle    GERD (gastroesophageal reflux disease)     History of adenomatous polyp of colon     History of cardiac cath     19 left main no significant disease. LAD with no significant disease. LCX no significant disease. RCA no significant disease.    History of cardiac monitoring     19 - ZIO PATCH - Underlying rhythm is sinus at 48-94 bpm. PSVT x23 with fastest 167 bpm, longest 24.4 seconds.   10/05/2018- ZIO PATCH - NSR most of time but did have episode of A fib  ranging from .    History of colon polyps     History of echocardiogram     9/2018: Left ventricular systolic function is normal. EF 50%. Left ventricular diastolic dysfunction consistent with ipaired relaxation. Left atrial cavity size is mildly dilated. Mild MR. Mild to moderate TR. There is abnormal thickening noted on the RV that may represent a vegetation-this was discussed with Dr. Hammer and Dr. Reilly. No patent foramen ovale. No further work up needed at this time    Hyperlipidemia     Kidney disorder     Migraine     Migraines     Neck pain     Stroke 2018     Past Surgical History:   Procedure Laterality Date    ABDOMINOPLASTY      BACK SURGERY      L2-5 fusion 2012    CHOLECYSTECTOMY      COLONOSCOPY  11/24/2014    Diverticulosis; Repeat 5 years    COLONOSCOPY  11/09/2010    Diverticulosis; Repeat 4 years    COLONOSCOPY  09/18/2007    Dr. Monzon-Normal; Repeat 3 years    COLONOSCOPY  06/02/2005    Dr. Monzon-[facility]inuitive polypectomy above the cecum; Otherwise normal colonoscopy; Repeat 2 years    COLONOSCOPY N/A 11/01/2019    Diverticulosis in the left colon; Two 5-6mm polyps in the cecum-path shows one tubular adenomatous and on hyperplastic polyp; One 8mm tubular adenomatous polyp in the ascending colon; Two 4-5mm tubular adenomatous polyps in the transverse colon; Repeat 3 years    COLONOSCOPY N/A 6/12/2023    Procedure: COLONOSCOPY WITH ANESTHESIA;  Surgeon: Susan Lord MD;  Location: [facility] ENDOSCOPY;  Service: Gastroenterology;  Laterality: N/A;  preop hx of polyps   postop; polyps   PCP Woody Valentin MD    CYSTOCELE REPAIR      Cystocele and rectocele repair    ENDOSCOPY  09/28/2012    LA grade C esophagitis; HH    ENDOSCOPY N/A 11/01/2019    Small HH; Non-erosive gastritis-biopsied; Normal examined duodenum    FIBULA FRACTURE SURGERY      HYSTERECTOMY      NECK SURGERY  2012    ACDF C4-7    ORIF TIBIA FRACTURE Right       General Information       Row Name 10/13/24 5387           Physical Therapy Time and Intention    Document Type evaluation  -BR     Mode of Treatment physical therapy  -BR       Row Name 10/13/24 1235          General Information    Patient Profile Reviewed yes  -BR     Prior Level of Function independent:;all household mobility;community mobility;gait;transfer;driving  -BR     Existing Precautions/Restrictions fall;orthostatic hypotension  -BR     Barriers to Rehab medically complex;previous functional deficit  -BR       Row Name 10/13/24 1235          Living Environment    People in Home child(siobhan), adult  -BR       Row Name 10/13/24 1235          Home Main Entrance    Number of Stairs, Main Entrance one  -BR       Row Name 10/13/24 1235          Stairs Within Home, Primary    Number of Stairs, Within Home, Primary none  -BR       Row Name 10/13/24 1235          Cognition    Orientation Status (Cognition) oriented x 4  -BR       Row Name 10/13/24 1235          Safety Issues/Impairments Affecting Functional Mobility    Impairments Affecting Function (Mobility) balance;endurance/activity tolerance;strength;pain  -BR               User Key  (r) = Recorded By, (t) = Taken By, (c) = Cosigned By      Initials Name Provider Type    BR Gladys Hylton PT Physical Therapist                   Mobility       Row Name 10/13/24 1237          Bed Mobility    Bed Mobility supine-sit  -BR     Supine-Sit [place] (Bed Mobility) verbal cues;contact guard  -BR     Assistive Device (Bed Mobility) head of bed elevated;bed rails  -BR     Comment, (Bed Mobility) Pt required additional time.  -BR       Row Name 10/13/24 1237          Sit-Stand Transfer    Sit-Stand [place] (Transfers) verbal cues;minimum assist (75% patient effort);1 person assist  -BR     Assistive Device (Sit-Stand Transfers) walker, front-wheeled  -BR       Row Name 10/13/24 1237          Gait/Stairs (Locomotion)    [place] Level (Gait) minimum assist (75% patient effort)  -BR     Assistive Device (Gait)  walker, front-wheeled  -BR     Patient was able to Ambulate yes  -BR     Distance in Feet (Gait) 15  -BR     Deviations/Abnormal Patterns (Gait) antalgic;base of support, wide;ya decreased  -BR     Bilateral Gait Deviations forward flexed posture;heel strike decreased  -BR               User Key  (r) = Recorded By, (t) = Taken By, (c) = Cosigned By      Initials Name Provider Type    Gladys Brandon PT Physical Therapist                   Obj/Interventions       Row Name 10/13/24 1245          Range of Motion Comprehensive    General Range of Motion bilateral lower extremity ROM WFL  -BR       Row Name 10/13/24 1245          Strength Comprehensive (MMT)    Comment, General Manual Muscle Testing (MMT) Assessment BLE strength grossly 3/5  -BR       Row Name 10/13/24 1245          Balance    Balance Assessment sitting static balance;sitting dynamic balance;standing static balance  -BR     Static Sitting Balance supervision  -BR     Dynamic Sitting Balance minimal assist  -BR     Position, Sitting Balance unsupported;sitting edge of bed  -BR     Static Standing Balance minimal assist  -BR     Position/Device Used, Standing Balance supported;walker, rolling  -BR       Row Name 10/13/24 1245          Sensory Assessment (Somatosensory)    Sensory Assessment (Somatosensory) sensation intact  -BR               User Key  (r) = Recorded By, (t) = Taken By, (c) = Cosigned By      Initials Name Provider Type    Gladys Brandon PT Physical Therapist                   Goals/Plan       Row Name 10/13/24 125          Bed Mobility Goal 1 (PT)    Activity/Assistive Device (Bed Mobility Goal 1, PT) bed mobility activities, all  -BR     [place] Level/Cues Needed (Bed Mobility Goal 1, PT) independent  -BR     Time Frame (Bed Mobility Goal 1, PT) long term goal (LTG);2 weeks  -BR       Row Name 10/13/24 1253          Transfer Goal 1 (PT)    Activity/Assistive Device (Transfer Goal 1, PT) transfers, all  -BR      [place] Level/Cues Needed (Transfer Goal 1, PT) modified independence  -BR     Time Frame (Transfer Goal 1, PT) long term goal (LTG);2 weeks  -BR       Row Name 10/13/24 1259          Gait Training Goal 1 (PT)    Activity/Assistive Device (Gait Training Goal 1, PT) gait (walking locomotion);decrease fall risk  -BR     [place] Level (Gait Training Goal 1, PT) modified independence  -BR     Distance (Gait Training Goal 1, PT) 150  -BR     Time Frame (Gait Training Goal 1, PT) long term goal (LTG);2 weeks  -BR       Row Name 10/13/24 1255          Therapy Assessment/Plan (PT)    Planned Therapy Interventions (PT) balance training;bed mobility training;gait training;home exercise program;patient/family education;neuromuscular re-education;strengthening;transfer training;ROM (range of motion);stair training  -BR               User Key  (r) = Recorded By, (t) = Taken By, (c) = Cosigned By      Initials Name Provider Type    BR Gladys Hylton, PT Physical Therapist                   Clinical Impression       Row Name 10/13/24 1246          Pain    Pretreatment Pain Rating 4/10  -BR     Posttreatment Pain Rating 4/10  -BR     Pain Location back;neck  -BR     Pre/Posttreatment Pain Comment Pt c/o left ankle pain with gait.  -BR       Row Name 10/13/24 1259 10/13/24 1246       Plan of Care Review    Plan of Care Reviewed With -- patient;child  -BR    Outcome Evaluation Natalee Noble presents as a  74 y.o. female with a CMH of anxiety and depression, chronic back pain, chronic renal failure stage III, hypertension, hyperlipidemia, heart failure reduced EF, paroxysmal atrial fibrillation on anticoagulation who presented to [facility] on 10/11/2024 with complaints of several near syncopal and 1 syncopal episode associated with hypotension. Pt was admitted to [facility] from 10/5/2024 to 10/10/24. Pt had a fall at home and injured her left ankle. X-ray left ankle (-). CXR (-). CT head  (-). Cardiac EP consult pending. Pt lethargic initially but wakened fully with conversation. Pt oriented x 4. Pt had daughter in room who is a PT. BP: 130/85, 116/72 in sitting, 112/75 post tx. Pt typically lives with adult son in [facility] with 1 HERMELINDA and she is independent with community mobility without AD. This date, pt transferred supine>sit with CGA of 1, sit<>stand with min assist of 1 and prt ambulated 15 feet with rolling walker with min assist of 1 with left ankle pain, forward flexed posture and decreased heel strike BLE. Pt relied heavily on the rolling walker. Pt was fatigued with minimal exertion. Tinetti Combined Gait and Balance Score was 8/28 indicating pt is a high risk far falls. Pt presents with generalized weakness, standing balance deficits and general unsteadiness up on her feet. Patient is a high risk of injurious falls and unsafe to return to prior living environment at this time.  Pt and daughter are requesting a referral to [facility]. PT will follow pt with PT recommendation of In-patient rehabilitation Facility.  -BR --      Row Name 10/13/24 1246          Therapy Assessment/Plan (PT)    Rehab Potential (PT) good  -BR     Criteria for Skilled Interventions Met (PT) yes;meets criteria;skilled treatment is necessary  -BR     Therapy Frequency (PT) 5 times/wk  -BR     Predicted Duration of Therapy Intervention (PT) until D/C  -BR       Row Name 10/13/24 1246          Vital Signs    Pre Systolic BP Rehab 130  -BR     Pre Treatment Diastolic BP 85  -BR     Intra Systolic BP Rehab 116  sitting  -BR     Intra Treatment Diastolic BP 72  -BR     Post Systolic BP Rehab 112  -BR     Post Treatment Diastolic BP 75  -BR     Pretreatment Heart Rate (beats/min) 103  -BR     Intratreatment Heart Rate (beats/min) 124  -BR     Posttreatment Heart Rate (beats/min) 108  -BR     Pre SpO2 (%) 94  -BR     O2 Delivery Pre Treatment room air  -BR     Pre Patient Position Supine  -BR     Intra Patient Position  "Standing  -BR     Post Patient Position Sitting  -BR       Row Name 10/13/24 1246          Positioning and Restraints    Pre-Treatment Position in bed  -BR     Post Treatment Position chair  -BR     In Chair notified nsg;reclined;call light within reach;encouraged to call for assist;exit alarm on;with family/caregiver;legs elevated  -BR               User Key  (r) = Recorded By, (t) = Taken By, (c) = Cosigned By      Initials Name Provider Type    BR Gladys Hylton, PT Physical Therapist                   Outcome Measures       Row Name 10/13/24 1300 10/13/24 0809       How much help from another person do you currently need...    Turning from your back to your side while in flat bed without using bedrails? 3  -BR 4  -MA    Moving from lying on back to sitting on the side of a flat bed without bedrails? 3  -BR 3  -MA    Moving to and from a bed to a chair (including a wheelchair)? 2  -BR 3  -MA    Standing up from a chair using your arms (e.g., wheelchair, bedside chair)? 3  -BR 3  -MA    Climbing 3-5 steps with a railing? 1  -BR 2  -MA    To walk in hospital room? 2  -BR 3  -MA    AM-PAC 6 Clicks Score (PT) 14  -BR 18  -MA    Highest Level of Mobility Goal 4 --> Transfer to chair/commode  -BR 6 --> Walk 10 steps or more  -MA      Row Name 10/13/24 1256          Tinetti Assessment    Tinetti Assessment yes  -BR     Sitting Balance 1  -BR     Arises 0  -BR     Attempts to Rise 0  -BR     Immediate Standing Balance (first 5 sec) 1  -BR     Standing Balance 1  -BR     Sternal Nudge (feet close together) 0  -BR     Eyes Closed (feet close together) 0  -BR     Turning 360 Degrees- Steps 0  -BR     Turning 360 Degrees- Steadiness 0  -BR     Sitting Down 1  -BR     Tinetti Balance Score 4  -BR     Gait Initiation (immediate after told \"go\") 0  -BR     Step Length- Right Swing 0  -BR     Step Length- Left Swing 0  -BR     Foot Clearance- Right Foot 1  -BR     Foot Clearance- Left Foot 1  -BR     Step Symmetry 0  -BR     " Step Continuity 1  -BR     Path (excursion) 1  -BR     Trunk 0  -BR     Base of Support 0  -BR     Gait Score 4  -BR     Tinetti Total Score 8  -BR       Row Name 10/13/24 1300 10/13/24 1256       Functional Assessment    Outcome Measure Options AM-PAC 6 Clicks Basic Mobility (PT)  -BR AM-PAC 6 Clicks Basic Mobility (PT);Tinetti  -BR              User Key  (r) = Recorded By, (t) = Taken By, (c) = Cosigned By      Initials Name Provider Type    Fatou Mi LPN Licensed Nurse    Gladys Brandon, PT Physical Therapist                                 Physical Therapy Education       Title: PT OT SLP Therapies (Done)       Topic: Physical Therapy (Done)       Point: Mobility training (Done)       Learning Progress Summary            Patient Acceptance, E,D, VU,DU by GREY at 10/13/2024 1300   Family Acceptance, E,D, VU,DU by GREY at 10/13/2024 1300                      Point: Body mechanics (Done)       Learning Progress Summary            Patient Acceptance, E,D, VU,DU by GREY at 10/13/2024 1300   Family Acceptance, E,D, VU,DU by GREY at 10/13/2024 1300                      Point: Precautions (Done)       Learning Progress Summary            Patient Acceptance, E,D, VU,DU by BR at 10/13/2024 1300   Family Acceptance, E,D, VU,DU by GREY at 10/13/2024 1300                                      User Key       Initials Effective Dates Name Provider Type Angela EDMONDS 02/01/22 -  Gladys Hytlon, PT Physical Therapist PT                  PT Recommendation and Plan  Planned Therapy Interventions (PT): balance training, bed mobility training, gait training, home exercise program, patient/family education, neuromuscular re-education, strengthening, transfer training, ROM (range of motion), stair training  Outcome Evaluation: Natalee Noble presents as a  74 y.o. female with a CMH of anxiety and depression, chronic back pain, chronic renal failure stage III, hypertension, hyperlipidemia, heart failure reduced EF, paroxysmal  atrial fibrillation on anticoagulation who presented to [facility] on 10/11/2024 with complaints of several near syncopal and 1 syncopal episode associated with hypotension. Pt was admitted to [facility] from 10/5/2024 to 10/10/24. Pt had a fall at home and injured her left ankle. X-ray left ankle (-). CXR (-). CT head (-). Cardiac EP consult pending. Pt lethargic initially but wakened fully with conversation. Pt oriented x 4. Pt had daughter in room who is a PT. BP: 130/85, 116/72 in sitting, 112/75 post tx. Pt typically lives with adult son in [facility] with 1 HERMELINDA and she is independent with community mobility without AD. This date, pt transferred supine>sit with CGA of 1, sit<>stand with min assist of 1 and prt ambulated 15 feet with rolling walker with min assist of 1 with left ankle pain, forward flexed posture and decreased heel strike BLE. Pt relied heavily on the rolling walker. Pt was fatigued with minimal exertion. Tinetti Combined Gait and Balance Score was 8/28 indicating pt is a high risk far falls. Pt presents with generalized weakness, standing balance deficits and general unsteadiness up on her feet. Patient is a high risk of injurious falls and unsafe to return to prior living environment at this time.  Pt and daughter are requesting a referral to [facility]. PT will follow pt with PT recommendation of In-patient rehabilitation Facility.     Time Calculation:         PT Charges       Row Name 10/13/24 1301             Time Calculation    Start Time 0920  -BR      Stop Time 0952  -BR      Time Calculation (min) 32 min  -BR      PT Received On 10/13/24  -BR      PT - Next Appointment 10/14/24  -BR      PT Goal Re-Cert Due Date 10/27/24  -BR         Time Calculation- PT    Total Timed Code Minutes- PT 0 minute(s)  -BR                User Key  (r) = Recorded By, (t) = Taken By, (c) = Cosigned By      Initials Name Provider Type    Gladys Brandon PT Physical Therapist                   Therapy Charges for Today       Code Description Service Date Service Provider Modifiers Qty    49964213473 HC PT EVAL MOD COMPLEXITY 4 10/13/2024 Gladys Hylton, PT GP 1            PT G-Codes  Outcome Measure Options: AM-PAC 6 Clicks Basic Mobility (PT)  AM-PAC 6 Clicks Score (PT): 14  Tinetti Total Score: 8  PT Discharge Summary  Anticipated Discharge Disposition (PT): inpatient rehabilitation facility    Gladys Hylton, PT  10/13/2024

## 2024-10-13 NOTE — OP NOTE
ESOPHAGOGASTRODUODENOSCOPY Procedure Report    Patient Name:  Natalee Noble  YOB: 1950    Date of Surgery:  10/13/2024     Preoperative diagnosis:  Nausea  Anorexia  Mid abdominal pain    Postoperative diagnosis:  [place] grade a erosive esophagitis  4 cm hiatal hernia  Mild nonerosive gastritis        Procedure(s):  ESOPHAGOGASTRODUODENOSCOPY WITH BIOPSY X 1 AREA    Staff:  Surgeon(s):  Miguel Vega MD      Anesthesia: Monitored Anesthesia Care    DNR status: Patient wishes full code during the procedure    Implants:    Nothing was implanted during the procedure    Specimen:        See Below    Estimated blood loss: Minimal     Complications:  None    Description of Procedure:  Informed consent was obtained for the procedure, including sedation.  Risks of perforation, hemorrhage, adverse drug reaction and aspiration were discussed.  The patient was brought into the endoscopy suite. Continuous cardiopulmonary monitoring was performed. The patient was placed in the left lateral decubitus position.  The bite block was inserted into the patient's mouth. After adequate sedation was attained, the Olympus gastroscope was inserted into the patient's mouth and advanced to the second portion of the duodenum without difficulty.  Circumferential examination was performed. A retroflex exam was performed in the patient's stomach.  On completion of the exam, the bowel was decompressed, the scope was removed from the patient, the patient tolerated the procedure well, there were no immediate post-operative complications.     Examination of the esophagus: [place] grade a erosive esophagitis with a 4 cm hiatal hernia extending from 36 to 40 cm.  Examination of the stomach: Diffuse nonspecific nonerosive erythema throughout the stomach antrum and body consistent with gastritis.  Cold forceps biopsies obtained from antrum body for histology and to rule out H. pylori  Examination of the  duodenum: Normal to second duodenum with normal bile flow from the ampulla    Impression:  EGD shows mild esophagitis, hiatal hernia, and gastritis  Nausea and anorexia may be from reflux, medications, and constipation.  Recent CT was unremarkable.    Recommendations:  Follow-up on pathology  Reassured patient of benign findings of exam  Continue PPI twice daily while in hospital and then daily upon discharge  Antiemetics as needed  Advance to heart healthy diet as tolerated  Continue current bowel regimen  Okay to resume Xarelto 10/14/2024  No further GI workup as an inpatient, patient may follow-up in the office in 1 to 2 months or as needed  Please call with questions    We appreciate the referral    Electronically signed by Miguel Vega MD, 10/13/24, 2:52 PM EDT.

## 2024-10-13 NOTE — PROGRESS NOTES
Cardiology Progress Note    Patient Identification:  Name: Natalee Noble  Age: 74 y.o.  Sex: female  :  1950  MRN: [id number]                 Follow Up / Chief Complaint: Hypertension, A-fib, CHF  Chief Complaint   Patient presents with    Hypotension       Interval History: Patient recently was in [facility] with newly diagnosed LV dysfunction was started on guideline directed medical therapy presented to ER with hypotension and recurrent syncopal episodes.       Subjective: Patient seen and examined.  Chart reviewed.  Labs reviewed.  Patient denies any chest pain currently      Objective:  10/13/2024: EKG done March 10, 2013 2024 reveals A-fib with a rate of 123 bpm    History of present illness:      Natalee Noble is a 74-year-old female with a medical history to include:     Atrial fibrillation  [id number] SCORE   [id number] Score: 6 (10/12/2024  9:08 AM)     Long-term anticoagulation  Intolerant to flecainide and amiodarone in the past  HFrEF  Hypertension  Dyslipidemia  CKD stage III  History of CVA  Anxiety/depression  GERD  Diverticulosis  Stroke     Who presented to [facility] on 10/11/2024 following syncopal episode.  Patient reports that [place] [facility] for respiratory infection, CHF and atrial fibrillation.  She was discharged on Thursday.  Echocardiogram completed 10/5/2024 shows reduced LVEF of 35 to 40%, moderate global hypokinesis of left ventricle and mild to moderate mitral, tricuspid, and pulmonic valve regurgitation.  While hospitalized she was treated with IV antibiotics and started on Entresto, spironolactone, digoxin.  She reports 2 syncopal episodes yesterday.  Associated symptoms of shortness of breath and nausea.  She denies chest pain, palpitations, edema.  Radiology consulted for hypotension and syncope.  Upon admission patient noted to be significantly hypotensive at 69/47.  Pertinent labs include troponin 64, digoxin 1.15  creatinine 1.44, TSH unremarkable.  CXR without evidence of active disease.  Patient follows with Dr. Ordaz for cardiology.  She is also scheduled to see an electrophysiologist in near future to discuss atrial fibrilation ablation.  She reports she is intolerable to amiodarone and flecainide.           Assessment:  :     Dizziness near syncope  Hypotension  Recurrent syncope  Elevated troponin  Chronic HFrEF due to systolic dysfunction from dilated cardiomyopathy  Paroxysmal atrial fibrillation on long-term anticoagulation/Xarelto  Dyslipidemia  CKD  History of CVA        Recommendations / Plan:        Patient says she has recurrent syncope since age 12 with migraine headaches.  Recently was diagnosed with heart failure due to reduced ejection fraction LVEF of 35-40%, was put on guideline directed medical therapy with with Entresto, Aldactone, digoxin, metoprolol succinate, Farxiga.  Patient's blood pressure was low and was having near syncope and dizziness.  Also has been having nausea.    Patient is having recurrent falls and syncope.  Has history of A-fib and elevated [id number] score of 6.  Will benefit from long-term anticoagulation but due to her falls she is a poor candidate for long-term anticoagulation.  Will evaluate for watchman and ablation as outpatient.  Patient's proBNP is 2832.  Will hold off on diuretics due to significant hypotension at the current time.  Continue to monitor fluid status.  Patient's troponin is elevated at 68 has no particular trend has come down to 64.  Will monitor serial troponins.  Probably is due to congestive heart failure and A-fib..  Will monitor renal function.  Follow-up with nephrology for CKD.  Will consult EP in AM.  Patient is scheduled for endoscopy today.         Copied text in this portion of the note has been reviewed and is accurate as of 10/13/2024    Past Medical History:  Past Medical History:   Diagnosis Date    Anxiety and depression     Arthritis      Back pain     CRF (chronic renal failure), stage 3 (moderate)     Diverticulosis     Essential hypertension     Family history of colon cancer     Family history of colonic polyps     Fractures     R tibia/fibula; L ankle    GERD (gastroesophageal reflux disease)     History of adenomatous polyp of colon     History of cardiac cath     1/28/19 left main no significant disease. LAD with no significant disease. LCX no significant disease. RCA no significant disease.    History of cardiac monitoring     2/01/19 - ZIO PATCH - Underlying rhythm is sinus at 48-94 bpm. PSVT x23 with fastest 167 bpm, longest 24.4 seconds.   10/05/2018- ZIO PATCH - NSR most of time but did have episode of A fib ranging from .    History of colon polyps     History of echocardiogram     9/2018: Left ventricular systolic function is normal. EF 50%. Left ventricular diastolic dysfunction consistent with ipaired relaxation. Left atrial cavity size is mildly dilated. Mild MR. Mild to moderate TR. There is abnormal thickening noted on the RV that may represent a vegetation-this was discussed with Dr. Hammer and Dr. Reilly. No patent foramen ovale. No further work up needed at this time    Hyperlipidemia     Kidney disorder     Migraine     Migraines     Neck pain     Stroke 2018     Past Surgical History:  Past Surgical History:   Procedure Laterality Date    ABDOMINOPLASTY      BACK SURGERY      L2-5 fusion 2012    CHOLECYSTECTOMY      COLONOSCOPY  11/24/2014    Diverticulosis; Repeat 5 years    COLONOSCOPY  11/09/2010    Diverticulosis; Repeat 4 years    COLONOSCOPY  09/18/2007    Dr. Monzon-Normal; Repeat 3 years    COLONOSCOPY  06/02/2005    Dr. Monzon-Diminuitive polypectomy above the cecum; Otherwise normal colonoscopy; Repeat 2 years    COLONOSCOPY N/A 11/01/2019    Diverticulosis in the left colon; Two 5-6mm polyps in the cecum-path shows one tubular adenomatous and on hyperplastic polyp; One 8mm tubular adenomatous polyp in the  ascending colon; Two 4-5mm tubular adenomatous polyps in the transverse colon; Repeat 3 years    COLONOSCOPY N/A 6/12/2023    Procedure: COLONOSCOPY WITH ANESTHESIA;  Surgeon: Susan Lord MD;  Location: [facility] ENDOSCOPY;  Service: Gastroenterology;  Laterality: N/A;  preop hx of polyps   postop; polyps   PCP Woody Valentin MD    CYSTOCELE REPAIR      Cystocele and rectocele repair    ENDOSCOPY  09/28/2012    LA grade C esophagitis; HH    ENDOSCOPY N/A 11/01/2019    Small HH; Non-erosive gastritis-biopsied; Normal examined duodenum    FIBULA FRACTURE SURGERY      HYSTERECTOMY      NECK SURGERY  2012    ACDF C4-7    ORIF TIBIA FRACTURE Right         Social History:   Social History     Tobacco Use    Smoking status: Never    Smokeless tobacco: Never   Substance Use Topics    Alcohol use: No      Family History:  Family History   Problem Relation Age of Onset    Colon cancer Father 65    Colon polyps Daughter 38    Esophageal cancer Neg Hx     Liver cancer Neg Hx     Liver disease Neg Hx     Rectal cancer Neg Hx     Stomach cancer Neg Hx           Allergies:  Allergies   Allergen Reactions    Benzoin Anaphylaxis and Swelling     States when used on her neck it shut off her airway  Huge abscesses with surgery      Iodine Other (See Comments)     PT UNABLE TO TELL RN ABOUT REACTION AT THIS TIME, BUT FAMILY STATES PT HAD A REACTION TO BEING CLEANSED WITH IODINE IN SURGERY  IOBAN - used on neck, swelled to the point of shutting off airway    Vancomycin Other (See Comments)     Kidney failure  Vancomycin induced acute kidney injury      Levaquin [Levofloxacin] Unknown (See Comments)     Unknown    Sulfa Antibiotics     Acetaminophen Nausea Only    Latex Itching     Scheduled Meds:  aspirin, 81 mg, Daily  cholecalciferol, 1,000 Units, Daily  escitalopram, 20 mg, Daily  levothyroxine, 112 mcg, Q AM  Lifitegrast, 1 drop, Daily  liothyronine, 5 mcg, BID  memantine, 10 mg, Q12H  metoclopramide, 5 mg,  "Q6H  ondansetron, 4 mg, Q6H  pantoprazole, 40 mg, BID AC  polyethylene glycol, 17 g, BID  [Held by provider] rivaroxaban, 15 mg, Daily With Dinner  sodium bicarbonate, 650 mg, BID          Review of Systems:   ROS         Constitutional:  Temp:  [96.9 °F (36.1 °C)-98.2 °F (36.8 °C)] 98.2 °F (36.8 °C)  Heart Rate:  [] 107  Resp:  [14-18] 18  BP: ()/(52-78) 134/77    Physical Exam   /77 (BP Location: Right arm, Patient Position: Lying)   Pulse 107   Temp 98.2 °F (36.8 °C) (Oral)   Resp 18   Ht 165.1 cm (65\")   Wt 78.9 kg (174 lb)   SpO2 94%   BMI 28.96 kg/m²   General:  Appears in no acute distress  Eyes: Sclera is anicteric,  conjunctiva is clear   HEENT:  No JVD. Thyroid not visibly enlarged. No mucosal pallor or cyanosis  Respiratory: Respirations regular and unlabored at rest.  Clear to auscultation  Cardiovascular: S1,S2, irregularly irregular rate  Gastrointestinal: Abdomen nondistended.  Musculoskeletal:  No abnormal movements  Extremities: No digital clubbing or cyanosis  Skin: Color pink.   Neuro: Alert and awake.    INTAKE AND OUTPUT:    Intake/Output Summary (Last 24 hours) at 10/13/2024 0939  Last data filed at 10/13/2024 0400  Gross per 24 hour   Intake 480 ml   Output 500 ml   Net -20 ml       Cardiographics  Telemetry: A-fib    ECG:   ECG 12 Lead Other; hypotension   Final Result   HEART RATE=98  bpm   RR Irgiahsh=421  ms   ME Interval=  ms   P Horizontal Axis=  deg   P Front Axis=  deg   QRSD Interval=80  ms   QT Vichbsvu=959  ms   EDpK=088  ms   QRS Axis=55  deg   T Wave Axis=243  deg   - ABNORMAL ECG -   Atrial fibrillation   Repol abnrm suggests ischemia, anterolateral   No previous ECG available for comparison   Electronically Signed By: Fuad Macias (ORION) 2024-10-12 08:32:29   Date and Time of Study:2024-10-11 20:47:52      Telemetry Scan   Final Result      Telemetry Scan   Final Result      Telemetry Scan   Final Result      Telemetry Scan   Final Result      Telemetry " "Scan   Final Result      Telemetry Scan   Final Result        I have personally reviewed EKG    Echocardiogram: Results for orders placed during the hospital encounter of 09/23/22    Adult Transthoracic Echo Complete W/ Color, Spectral and Contrast if Necessary Per Protocol    Interpretation Summary  · Calculated left ventricular EF = 51.2% Estimated left ventricular EF was in agreement with the calculated left ventricular EF.  · Left ventricular diastolic function is consistent with (grade I) impaired relaxation.  · Moderate tricuspid valve regurgitation is present.      Lab Review   I have reviewed the labs  Results from last 7 days   Lab Units 10/12/24  0112 10/11/24  2142   HSTROP T ng/L 64* 68*         Results from last 7 days   Lab Units 10/11/24  2142   SODIUM mmol/L 141   POTASSIUM mmol/L 4.0   BUN mg/dL 35*   CREATININE mg/dL 1.44*   CALCIUM mg/dL 8.1*         Results from last 7 days   Lab Units 10/13/24  0532 10/12/24  0515 10/11/24  2058   WBC 10*3/mm3 6.79 9.35 11.05*   HEMOGLOBIN g/dL 11.4* 12.2 13.9   HEMATOCRIT % 38.1 39.9 45.7   PLATELETS 10*3/mm3 235 288 333           RADIOLOGY:  Imaging Results (Last 24 Hours)       ** No results found for the last 24 hours. **                  )10/13/2024  Josemanuel Sheriff MD      EMR Dragon/Transcription:   \"Dictated utilizing Dragon dictation\".   "

## 2024-10-13 NOTE — ANESTHESIA POSTPROCEDURE EVALUATION
Patient: Natalee Noble    Procedure Summary       Date: 10/13/24 Room / Location: [facility] ENDOSCOPY 1 / [facility] ENDOSCOPY    Anesthesia Start: 1440 Anesthesia Stop: 1451    Procedure: ESOPHAGOGASTRODUODENOSCOPY WITH BIOPSY X 1 AREA Diagnosis:       Nausea      (Nausea [R11.0])    Surgeons: Miguel Vega MD Provider: Jerome Nunez MD    Anesthesia Type: MAC ASA Status: 3            Anesthesia Type: MAC    Vitals  Vitals Value Taken Time   BP 75/51 10/13/24 1505   Temp     Pulse 103 10/13/24 1505   Resp 15 10/13/24 1505   SpO2 94 % 10/13/24 1505           Post Anesthesia Care and Evaluation    Patient location during evaluation: PACU  Patient participation: complete - patient participated  Level of consciousness: awake  Pain scale: See nurse's notes for pain score.  Pain management: adequate    Airway patency: patent  Anesthetic complications: No anesthetic complications  PONV Status: none  Cardiovascular status: acceptable  Respiratory status: acceptable and spontaneous ventilation  Hydration status: acceptable    Comments: Patient seen and examined postoperatively; vital signs stable; SpO2 greater than or equal to 90%; cardiopulmonary status stable; nausea/vomiting adequately controlled; pain adequately controlled; no apparent anesthesia complications; patient discharged from anesthesia care when discharge criteria were met

## 2024-10-14 LAB
ANION GAP SERPL CALCULATED.3IONS-SCNC: 9.8 MMOL/L (ref 5–15)
BASOPHILS # BLD AUTO: 0.01 10*3/MM3 (ref 0–0.2)
BASOPHILS NFR BLD AUTO: 0.1 % (ref 0–1.5)
BUN SERPL-MCNC: 21 MG/DL (ref 8–23)
BUN/CREAT SERPL: 17.2 (ref 7–25)
CALCIUM SPEC-SCNC: 8.6 MG/DL (ref 8.6–10.5)
CHLORIDE SERPL-SCNC: 111 MMOL/L (ref 98–107)
CO2 SERPL-SCNC: 19.2 MMOL/L (ref 22–29)
CREAT SERPL-MCNC: 1.22 MG/DL (ref 0.57–1)
DEPRECATED RDW RBC AUTO: 59.7 FL (ref 37–54)
DIGOXIN SERPL-MCNC: 0.77 NG/ML (ref 0.6–1.2)
EGFRCR SERPLBLD CKD-EPI 2021: 46.7 ML/MIN/1.73
EOSINOPHIL # BLD AUTO: 0.26 10*3/MM3 (ref 0–0.4)
EOSINOPHIL NFR BLD AUTO: 3.8 % (ref 0.3–6.2)
ERYTHROCYTE [DISTWIDTH] IN BLOOD BY AUTOMATED COUNT: 16.8 % (ref 12.3–15.4)
GLUCOSE SERPL-MCNC: 126 MG/DL (ref 65–99)
HCT VFR BLD AUTO: 37.5 % (ref 34–46.6)
HGB BLD-MCNC: 10.7 G/DL (ref 12–15.9)
IMM GRANULOCYTES # BLD AUTO: 0.05 10*3/MM3 (ref 0–0.05)
IMM GRANULOCYTES NFR BLD AUTO: 0.7 % (ref 0–0.5)
LYMPHOCYTES # BLD AUTO: 0.73 10*3/MM3 (ref 0.7–3.1)
LYMPHOCYTES NFR BLD AUTO: 10.8 % (ref 19.6–45.3)
MCH RBC QN AUTO: 28.5 PG (ref 26.6–33)
MCHC RBC AUTO-ENTMCNC: 28.5 G/DL (ref 31.5–35.7)
MCV RBC AUTO: 99.7 FL (ref 79–97)
MONOCYTES # BLD AUTO: 0.74 10*3/MM3 (ref 0.1–0.9)
MONOCYTES NFR BLD AUTO: 10.9 % (ref 5–12)
NEUTROPHILS NFR BLD AUTO: 5 10*3/MM3 (ref 1.7–7)
NEUTROPHILS NFR BLD AUTO: 73.7 % (ref 42.7–76)
NRBC BLD AUTO-RTO: 0 /100 WBC (ref 0–0.2)
PLATELET # BLD AUTO: 161 10*3/MM3 (ref 140–450)
PMV BLD AUTO: 11 FL (ref 6–12)
POTASSIUM SERPL-SCNC: 4.2 MMOL/L (ref 3.5–5.2)
QT INTERVAL: 311 MS
QT INTERVAL: 354 MS
QT INTERVAL: 373 MS
QTC INTERVAL: 437 MS
QTC INTERVAL: 445 MS
QTC INTERVAL: 483 MS
RBC # BLD AUTO: 3.76 10*6/MM3 (ref 3.77–5.28)
SODIUM SERPL-SCNC: 140 MMOL/L (ref 136–145)
WBC NRBC COR # BLD AUTO: 6.79 10*3/MM3 (ref 3.4–10.8)

## 2024-10-14 PROCEDURE — 99222 1ST HOSP IP/OBS MODERATE 55: CPT | Performed by: INTERNAL MEDICINE

## 2024-10-14 PROCEDURE — 85025 COMPLETE CBC W/AUTO DIFF WBC: CPT | Performed by: INTERNAL MEDICINE

## 2024-10-14 PROCEDURE — 93010 ELECTROCARDIOGRAM REPORT: CPT | Performed by: INTERNAL MEDICINE

## 2024-10-14 PROCEDURE — 93005 ELECTROCARDIOGRAM TRACING: CPT | Performed by: INTERNAL MEDICINE

## 2024-10-14 PROCEDURE — 80048 BASIC METABOLIC PNL TOTAL CA: CPT | Performed by: INTERNAL MEDICINE

## 2024-10-14 PROCEDURE — 99232 SBSQ HOSP IP/OBS MODERATE 35: CPT | Performed by: INTERNAL MEDICINE

## 2024-10-14 PROCEDURE — 25010000002 METOCLOPRAMIDE PER 10 MG: Performed by: INTERNAL MEDICINE

## 2024-10-14 PROCEDURE — 80162 ASSAY OF DIGOXIN TOTAL: CPT | Performed by: INTERNAL MEDICINE

## 2024-10-14 RX ORDER — METOPROLOL SUCCINATE 25 MG/1
12.5 TABLET, EXTENDED RELEASE ORAL EVERY 12 HOURS SCHEDULED
Status: DISCONTINUED | OUTPATIENT
Start: 2024-10-14 | End: 2024-10-15

## 2024-10-14 RX ADMIN — ACETAMINOPHEN 650 MG: 325 TABLET, FILM COATED ORAL at 16:50

## 2024-10-14 RX ADMIN — METOPROLOL SUCCINATE 12.5 MG: 25 TABLET, FILM COATED, EXTENDED RELEASE ORAL at 09:43

## 2024-10-14 RX ADMIN — RIVAROXABAN 15 MG: 15 TABLET, FILM COATED ORAL at 17:26

## 2024-10-14 RX ADMIN — PANTOPRAZOLE SODIUM 40 MG: 40 TABLET, DELAYED RELEASE ORAL at 16:47

## 2024-10-14 RX ADMIN — METOCLOPRAMIDE 5 MG: 5 INJECTION, SOLUTION INTRAMUSCULAR; INTRAVENOUS at 05:26

## 2024-10-14 RX ADMIN — POLYETHYLENE GLYCOL 3350 17 G: 17 POWDER, FOR SOLUTION ORAL at 21:30

## 2024-10-14 RX ADMIN — SODIUM BICARBONATE 650 MG: 650 TABLET ORAL at 08:16

## 2024-10-14 RX ADMIN — POLYETHYLENE GLYCOL 3350 17 G: 17 POWDER, FOR SOLUTION ORAL at 08:16

## 2024-10-14 RX ADMIN — PANTOPRAZOLE SODIUM 40 MG: 40 TABLET, DELAYED RELEASE ORAL at 08:15

## 2024-10-14 RX ADMIN — LEVOTHYROXINE SODIUM 112 MCG: 0.11 TABLET ORAL at 05:26

## 2024-10-14 RX ADMIN — SODIUM BICARBONATE 650 MG: 650 TABLET ORAL at 21:30

## 2024-10-14 RX ADMIN — ASPIRIN 81 MG: 81 TABLET, COATED ORAL at 08:16

## 2024-10-14 RX ADMIN — Medication 10 ML: at 08:16

## 2024-10-14 RX ADMIN — LIOTHYRONINE SODIUM 5 MCG: 5 TABLET ORAL at 08:17

## 2024-10-14 RX ADMIN — MEMANTINE 10 MG: 10 TABLET ORAL at 08:16

## 2024-10-14 RX ADMIN — METOCLOPRAMIDE 5 MG: 5 INJECTION, SOLUTION INTRAMUSCULAR; INTRAVENOUS at 11:45

## 2024-10-14 RX ADMIN — ESCITALOPRAM OXALATE 20 MG: 10 TABLET ORAL at 08:16

## 2024-10-14 RX ADMIN — ACETAMINOPHEN 650 MG: 325 TABLET, FILM COATED ORAL at 08:15

## 2024-10-14 RX ADMIN — MEMANTINE 10 MG: 10 TABLET ORAL at 21:30

## 2024-10-14 RX ADMIN — Medication 1000 UNITS: at 08:16

## 2024-10-14 RX ADMIN — LIOTHYRONINE SODIUM 5 MCG: 5 TABLET ORAL at 21:30

## 2024-10-14 RX ADMIN — ALPRAZOLAM 1 MG: 1 TABLET ORAL at 21:30

## 2024-10-14 NOTE — CONSULTS
Cardiology Consult-EP      REQUESTING PROVIDER  Cheko Gallagher MD    PATIENT IDENTIFICATION  Name: Natalee Noble  Age: 74 y.o.  Sex: female  :  1950  MRN: [id number]             REASON  FOR  CONSULTATION  74-year-old female previously followed by Dr. Jose Scott and now followed by Dr. Ordaz with past medical history of paroxysmal atrial fibrillation and syncope.  She was on anticoagulation with Xarelto.  Last office visit noted 10/4/2022.  Additional past medical history of dyslipidemia, stroke, LV dysfunction with EF 35-40%, valvular heart disease consisting of mild-moderate mitral, tricuspid and pulmonic regurgitation.  History of primary hypertension, chronic kidney disease stage III, GERD, heart failure with reduced ejection fraction, intolerance to flecainide and amiodarone in the past.    CC:  Syncope and collapse    HPI:  Patient presented to the emergency department at [facility] 10/11/2024 for syncopal episode.  Upon admission she was noted to be significantly hypotensive with blood pressure 69/47.  Patient was discharged from [facility] 7 days ago where she was treated for respiratory infection, heart failure and atrial fibrillation with RVR.  TTE performed 10/5/2024 showed reduced LV systolic function with EF 35-40%, moderate global hypokinesis of the LV and mild to moderate MR/TR/TX.  She was started on Entresto, spironolactone and digoxin upon discharge.  She reported this admission having 2 syncopal episodes the day prior.  She reported history of syncope off and on since age 12 with migrainous headache onset.  The patient was seen in consultation by on-call cardiologist Dr. Sheriff.  Electrophysiologist was asked to evaluate the patient due to history of recurrent syncope, elevated chads vascular score from A-fib and risk of long-term anticoagulation.  Recommendation for possible watchman implant as well as ablation.  Upon my evaluation today, the  patient is sitting up in bed eating breakfast.  She reports feeling much better and has been up with slight lightheadedness reported.  She denies any chest discomfort such as pain, pressure or tightness.  She denies any shortness of breath but she is noted to have slight expiratory wheezes in bases bilaterally.  Telemetry shows atrial fibrillation with controlled ventricular response at 99 bpm, blood pressure elevated today at 131/100.      REVIEW OF SYSTEMS:  Pertinent items are noted in HPI, all other systems reviewed and negative    OBJECTIVE   Diagnostics reviewed and unremarkable  EKG shows atrial fibrillation with controlled ventricular rate at 92 bpm    ASSESSMENT  Syncope and collapse secondary to hypotension  Dilated cardiomyopathy-recently diagnosed  Paroxysmal atrial fibrillation  History of heart failure with reduced ejection fraction  Elevated chads vascular score  Coagulopathy secondary to Xarelto  History of prior stroke  Valvular heart disease  Chronic kidney disease stage III  Intolerance to flecainide and amiodarone in the past        RECOMMENDATIONS  Patient had recent hospital admission with TTE revealing dilated cardiomyopathy.  She was started on goal-directed medical therapy to include Entresto, Farxiga, Aldactone and Lasix.  Digoxin was also added.  She then had significant hypotension causing dizziness, lightheadedness and syncope.  All blood pressure lowering agents currently on hold.  Her blood pressure is now elevating.  Agree with restarting beta-blocker-Toprol XL 12.5 mg daily for reduced LV function.  Can slowly add other guideline directed medications as blood pressure allows.  Continue aspirin.  Receiving Xarelto for stroke prevention from A-fib and elevated chads vascular score.  Procedure and indications for left atrial appendage closure device/watchman as well as ablation were reviewed with the patient and son at bedside.  Questions were answered.  We will ask Dr. Moses to also  "evaluate the patient for shared decision making.          CHF Guideline Directed Medical Therapy  Beta Blocker: Toprol XL 12.5 mg daily  ARNI/ACE/ARB: Currently on hold due to low blood pressure  SGLT 2 inhibitors: Currently on hold due to low blood pressure  Diuretics: Currently on hold due to low blood pressure  Aldosterone Antagonist: Currently on hold due to low blood pressure  Vasodilators & Nitrates:       Vital Signs  Visit Vitals  /94 (BP Location: Left arm, Patient Position: Lying)   Pulse 99   Temp 98.2 °F (36.8 °C) (Oral)   Resp 18   Ht 165.1 cm (65\")   Wt 78.9 kg (174 lb)   SpO2 100%   BMI 28.96 kg/m²     Oxygen Therapy  SpO2: 100 %  Pulse Oximetry Type: Continuous  Device (Oxygen Therapy): nasal cannula  Flow (L/min) (Oxygen Therapy): 2  Flowsheet Rows      Flowsheet Row First Filed Value   Admission Height 165.1 cm (65\") Documented at 10/11/2024 2040   Admission Weight 78.9 kg (174 lb) Documented at 10/11/2024 2040          Intake & Output (last 3 days)         10/11 0701  10/12 0700 10/12 0701  10/13 0700 10/13 0701  10/14 0700 10/14 0701  10/15 0700    P.O.  600      Total Intake(mL/kg)  600 (7.6)      Urine (mL/kg/hr)  500 (0.3) 500 (0.3)     Stool  0 0     Total Output  500 500     Net  +100 -500             Urine Unmeasured Occurrence  1 x 1 x     Stool Unmeasured Occurrence  0 x 0 x           Lines, Drains & Airways       Active LDAs       Name Placement date Placement time Site Days    Peripheral IV 06/12/23 1123 Posterior;Right Forearm 06/12/23  1123  Forearm  489    Peripheral IV 10/11/24 2059 Anterior;Right Forearm 10/11/24  2059  Forearm  2    External Urinary Catheter 10/12/24  2000  --  1                    MEDICAL HISTORY    Past Medical History:   Diagnosis Date    Anxiety and depression     Arthritis     Back pain     CRF (chronic renal failure), stage 3 (moderate)     Diverticulosis     Essential hypertension     Family history of colon cancer     Family history of colonic polyps "     Fractures     R tibia/fibula; L ankle    GERD (gastroesophageal reflux disease)     History of adenomatous polyp of colon     History of cardiac cath     1/28/19 left main no significant disease. LAD with no significant disease. LCX no significant disease. RCA no significant disease.    History of cardiac monitoring     2/01/19 - ZIO PATCH - Underlying rhythm is sinus at 48-94 bpm. PSVT x23 with fastest 167 bpm, longest 24.4 seconds.   10/05/2018- ZIO PATCH - NSR most of time but did have episode of A fib ranging from .    History of colon polyps     History of echocardiogram     9/2018: Left ventricular systolic function is normal. EF 50%. Left ventricular diastolic dysfunction consistent with ipaired relaxation. Left atrial cavity size is mildly dilated. Mild MR. Mild to moderate TR. There is abnormal thickening noted on the RV that may represent a vegetation-this was discussed with Dr. Hammer and Dr. Reilly. No patent foramen ovale. No further work up needed at this time    Hyperlipidemia     Kidney disorder     Migraine     Migraines     Neck pain     Stroke 2018        SURGICAL HISTORY    Past Surgical History:   Procedure Laterality Date    ABDOMINOPLASTY      BACK SURGERY      L2-5 fusion 2012    CHOLECYSTECTOMY      COLONOSCOPY  11/24/2014    Diverticulosis; Repeat 5 years    COLONOSCOPY  11/09/2010    Diverticulosis; Repeat 4 years    COLONOSCOPY  09/18/2007    Dr. Monzon-Normal; Repeat 3 years    COLONOSCOPY  06/02/2005    Dr. Monzon-Diminuitive polypectomy above the cecum; Otherwise normal colonoscopy; Repeat 2 years    COLONOSCOPY N/A 11/01/2019    Diverticulosis in the left colon; Two 5-6mm polyps in the cecum-path shows one tubular adenomatous and on hyperplastic polyp; One 8mm tubular adenomatous polyp in the ascending colon; Two 4-5mm tubular adenomatous polyps in the transverse colon; Repeat 3 years    COLONOSCOPY N/A 6/12/2023    Procedure: COLONOSCOPY WITH ANESTHESIA;  Surgeon: Susan Lord  "MD JOVANY;  Location: [facility] ENDOSCOPY;  Service: Gastroenterology;  Laterality: N/A;  preop hx of polyps   postop; polyps   PCP Woody Valentin MD    CYSTOCELE REPAIR      Cystocele and rectocele repair    ENDOSCOPY  09/28/2012    LA grade C esophagitis; HH    ENDOSCOPY N/A 11/01/2019    Small HH; Non-erosive gastritis-biopsied; Normal examined duodenum    FIBULA FRACTURE SURGERY      HYSTERECTOMY      NECK SURGERY  2012    ACDF C4-7    ORIF TIBIA FRACTURE Right         FAMILY HISTORY    Family History   Problem Relation Age of Onset    Colon cancer Father 65    Colon polyps Daughter 38    Esophageal cancer Neg Hx     Liver cancer Neg Hx     Liver disease Neg Hx     Rectal cancer Neg Hx     Stomach cancer Neg Hx        SOCIAL HISTORY    Social History     Tobacco Use    Smoking status: Never    Smokeless tobacco: Never   Substance Use Topics    Alcohol use: No        ALLERGIES    Allergies   Allergen Reactions    Benzoin Anaphylaxis and Swelling     States when used on her neck it shut off her airway  Huge abscesses with surgery      Iodine Other (See Comments)     PT UNABLE TO TELL RN ABOUT REACTION AT THIS TIME, BUT FAMILY STATES PT HAD A REACTION TO BEING CLEANSED WITH IODINE IN SURGERY  IOBAN - used on neck, swelled to the point of shutting off airway    Vancomycin Other (See Comments)     Kidney failure  Vancomycin induced acute kidney injury      Levaquin [Levofloxacin] Unknown (See Comments)     Unknown    Sulfa Antibiotics     Acetaminophen Nausea Only    Latex Itching              /94 (BP Location: Left arm, Patient Position: Lying)   Pulse 99   Temp 98.2 °F (36.8 °C) (Oral)   Resp 18   Ht 165.1 cm (65\")   Wt 78.9 kg (174 lb)   SpO2 100%   BMI 28.96 kg/m²   Intake/Output last 3 shifts:  I/O last 3 completed shifts:  In: 240 [P.O.:240]  Out: 1000 [Urine:1000]  Intake/Output this shift:  No intake/output data recorded.    PHYSICAL EXAM:    General: Alert, cooperative, no distress, appears " stated age  Head:  Normocephalic, atraumatic, mucous membranes moist  Eyes:  Conjunctivae/corneas clear, EOM's intact     Neck:  Supple,  no adenopathy; no JVD or bruit  Lungs: Slight inspiratory wheeze bilaterally  Chest wall: No tenderness  Heart::  Irregularly irregular rate and rhythm, S1 and S2 normal, no murmur, rub or gallop  Abdomen: Soft, nontender, nondistended, bowel sounds active  Extremities: No cyanosis, clubbing, or edema   Pulses: 2+ and symmetric all extremities  Skin:  No rashes or lesions  Neuro/psych: A&O x3. CN II through XII are grossly intact with appropriate affect    Scheduled Meds:      aspirin, 81 mg, Oral, Daily  cholecalciferol, 1,000 Units, Oral, Daily  escitalopram, 20 mg, Oral, Daily  levothyroxine, 112 mcg, Oral, Q AM  Lifitegrast, 1 drop, Both Eyes, Daily  liothyronine, 5 mcg, Oral, BID  memantine, 10 mg, Oral, Q12H  metoclopramide, 5 mg, Intravenous, Q6H  pantoprazole, 40 mg, Oral, BID AC  polyethylene glycol, 17 g, Oral, BID  rivaroxaban, 15 mg, Oral, Daily With Dinner  sodium bicarbonate, 650 mg, Oral, BID        Continuous Infusions:         PRN Meds:      acetaminophen **OR** acetaminophen **OR** acetaminophen    ALPRAZolam    senna-docusate sodium **AND** polyethylene glycol **AND** bisacodyl **AND** bisacodyl    guaifenesin    metoprolol tartrate    ondansetron ODT **OR** ondansetron    sodium chloride     Data Review:     Results Review:     I reviewed the patient's new clinical results.    CBC    Results from last 7 days   Lab Units 10/13/24  0532 10/12/24  0515 10/11/24  2058 10/10/24  0719 10/09/24  0801 10/08/24  0640   WBC 10*3/mm3 6.79 9.35 11.05* 9.5 8.7 9.7   HEMOGLOBIN g/dL 11.4* 12.2 13.9 11.9* 12.0 11.5*   PLATELETS 10*3/mm3 235 288 333 291 279 270     Cr Clearance Estimated Creatinine Clearance: 35.6 mL/min (A) (by C-G formula based on SCr of 1.44 mg/dL (H)).  [facility]     HbA1C   Lab Results   Component Value Date    HGBA1C 5.6 10/06/2024    HGBA1C 5.8 09/02/2018  "    Blood Glucose No results found for: \"POCGLU\"  Infection     CMP   Results from last 7 days   Lab Units 10/11/24  2142   SODIUM mmol/L 141   POTASSIUM mmol/L 4.0   CHLORIDE mmol/L 106   CO2 mmol/L 21.6*   BUN mg/dL 35*   CREATININE mg/dL 1.44*   GLUCOSE mg/dL 110*   ALBUMIN g/dL 3.5   BILIRUBIN mg/dL 0.4   ALK PHOS U/L 81   AST (SGOT) U/L 20   ALT (SGPT) U/L 20     ABG      UA      RAYMOND  No results found for: \"POCMETH\", \"POCAMPHET\", \"POCBARBITUR\", \"POCBENZO\", \"POCCOCAINE\", \"POCOPIATES\", \"POCOXYCODO\", \"POCPHENCYC\", \"POCPROPOXY\", \"POCTHC\", \"POCTRICYC\"  Lysis Labs   Results from last 7 days   Lab Units 10/13/24  0532 10/12/24  0515 10/11/24  2142 10/11/24  2058 10/10/24  0719 10/09/24  0801 10/08/24  0640   HEMOGLOBIN g/dL 11.4* 12.2  --  13.9 11.9* 12.0 11.5*   PLATELETS 10*3/mm3 235 288  --  333 291 279 270   CREATININE mg/dL  --   --  1.44*  --   --   --   --      Radiology(recent) No radiology results for the last day      Results from last 7 days   Lab Units 10/12/24  0112   HSTROP T ng/L 64*       X-rays, labs reviewed personally by physician.    ECG/EMG Results (most recent)       Procedure Component Value Units Date/Time    Telemetry Scan [[id number]] Resulted: 10/11/24     Updated: 10/12/24 0045    Telemetry Scan [[id number]] Resulted: 10/11/24     Updated: 10/12/24 0049    ECG 12 Lead Other; hypotension [[id number]] Collected: 10/11/24 2047     Updated: 10/12/24 0833     QT Interval 357 ms      QTC Interval 457 ms     Narrative:      HEART RATE=98  bpm  RR Mnyrakwb=702  ms  DC Interval=  ms  P Horizontal Axis=  deg  P Front Axis=  deg  QRSD Interval=80  ms  QT Eycvevfg=598  ms  TKcF=372  ms  QRS Axis=55  deg  T Wave Axis=243  deg  - ABNORMAL ECG -  Atrial fibrillation  Repol abnrm suggests ischemia, anterolateral  No previous ECG available for comparison  Electronically Signed By: Fuad Macias ([facility]) 2024-10-12 08:32:29  Date and Time of Study:2024-10-11 20:47:52    Telemetry Scan [[id number]] Resulted: 10/11/24 "     Updated: 10/12/24 1546    Telemetry Scan [[id number]] Resulted: 10/11/24     Updated: 10/12/24 1606    Telemetry Scan [[id number]] Resulted: 10/11/24     Updated: 10/12/24 1651    Telemetry Scan [[id number]] Resulted: 10/11/24     Updated: 10/12/24 1714    ECG 12 Lead Rhythm Change [[id number]] Collected: 10/13/24 1920     Updated: 10/13/24 1921     QT Interval 311 ms      QTC Interval 445 ms     Narrative:      HEART BMZW=916  bpm  RR Lprkwdyv=254  ms  AZ Interval=  ms  P Horizontal Axis=  deg  P Front Axis=  deg  QRSD Interval=83  ms  QT Sgxhxzyu=058  ms  OHpH=348  ms  QRS Axis=61  deg  T Wave Axis=250  deg  - ABNORMAL ECG -  Atrial fibrillation  Repol abnrm suggests ischemia, diffuse leads  Date and Time of Study:2024-10-13 19:20:53    Telemetry Scan [[id number]] Resulted: 10/11/24     Updated: 10/13/24 2245    Telemetry Scan [[id number]] Resulted: 10/11/24     Updated: 10/13/24 2303    Telemetry Scan [[id number]] Resulted: 10/11/24     Updated: 10/14/24 0036    Telemetry Scan [[id number]] Resulted: 10/11/24     Updated: 10/14/24 0214    Telemetry Scan [[id number]] Resulted: 10/11/24     Updated: 10/14/24 0300    Telemetry Scan [[id number]] Resulted: 10/11/24     Updated: 10/14/24 0417    Telemetry Scan [[id number]] Resulted: 10/11/24     Updated: 10/14/24 0457    ECG 12 Lead Drug Monitoring; Amiodarone [[id number]] Collected: 10/14/24 0523     Updated: 10/14/24 0525     QT Interval 354 ms      QTC Interval 437 ms     Narrative:      HEART RATE=92  bpm  RR Fuiazsnz=646  ms  AZ Interval=  ms  P Horizontal Axis=  deg  P Front Axis=  deg  QRSD Interval=89  ms  QT Cxhoqezb=040  ms  VExH=135  ms  QRS Axis=45  deg  T Wave Axis=261  deg  - ABNORMAL ECG -  Atrial fibrillation  Repol abnrm, severe global ischemia (LM/MVD)  Date and Time of Study:2024-10-14 05:23:18    Telemetry Scan [[id number]] Resulted: 10/11/24     Updated: 10/14/24 0623    Telemetry Scan [[id number]] Resulted: 10/11/24     Updated: 10/14/24 0623    Telemetry  Scan [[id number]] Resulted: 10/11/24     Updated: 10/14/24 0623    Telemetry Scan [[id number]] Resulted: 10/11/24     Updated: 10/14/24 0629              Medication Review:   I have reviewed the patient's current medication list  Scheduled Meds:aspirin, 81 mg, Oral, Daily  cholecalciferol, 1,000 Units, Oral, Daily  escitalopram, 20 mg, Oral, Daily  levothyroxine, 112 mcg, Oral, Q AM  Lifitegrast, 1 drop, Both Eyes, Daily  liothyronine, 5 mcg, Oral, BID  memantine, 10 mg, Oral, Q12H  metoclopramide, 5 mg, Intravenous, Q6H  pantoprazole, 40 mg, Oral, BID AC  polyethylene glycol, 17 g, Oral, BID  rivaroxaban, 15 mg, Oral, Daily With Dinner  sodium bicarbonate, 650 mg, Oral, BID      Continuous Infusions:   PRN Meds:.  acetaminophen **OR** acetaminophen **OR** acetaminophen    ALPRAZolam    senna-docusate sodium **AND** polyethylene glycol **AND** bisacodyl **AND** bisacodyl    guaifenesin    metoprolol tartrate    ondansetron ODT **OR** ondansetron    sodium chloride    Imaging:  Imaging Results (Last 72 Hours)       Procedure Component Value Units Date/Time    CT Head Without Contrast [[id number]] Collected: 10/11/24 2219     Updated: 10/11/24 2226    Narrative:      CT HEAD WO CONTRAST    Date of Exam: 10/11/2024 9:55 PM EDT    Indication: syncope.    Comparison: 8/6/2022    Technique: Axial CT images were obtained of the head without contrast administration.  Coronal reconstructions were performed.  Automated exposure control and iterative reconstruction methods were used.      Findings:  Stable left MCA distribution infarct with encephalomalacia. The ventricles have a normal size and configuration. There is mild generalized atrophy. No evidence of acute intracranial hemorrhage, mass or midline shift. No extra-axial fluid collections are   identified. Inflammatory changes and air-fluid levels are present in the visualized paranasal sinuses. No skull fractures are identified.      Impression:      Impression:    1. No  "acute intracranial abnormalities are identified.    2. Pansinusitis.      Electronically Signed: Jeet Reis MD    10/11/2024 10:24 PM EDT    Workstation ID: [id number]    XR Ankle 3+ View Left [[id number]] Collected: 10/11/24 2131     Updated: 10/11/24 2135    Narrative:      XR ANKLE 3+ VW LEFT    Date of Exam: 10/11/2024 9:10 PM EDT    Indication: fall    Comparison: 8/6/2022    Findings:  The left ankle mortise is intact. There is a plantar calcaneal enthesophyte. No fractures, dislocations or acute osseous abnormalities are identified. There are old fractures of the left fourth and fifth metatarsals.      Impression:      Impression:  No acute osseous abnormalities are identified.      Electronically Signed: Jeet Reis MD    10/11/2024 9:33 PM EDT    Workstation ID: [id number]    XR Chest 1 View [[id number]] Collected: 10/11/24 2124     Updated: 10/11/24 2127    Narrative:      XR CHEST 1 VW    Date of Exam: 10/11/2024 8:54 PM EDT    Indication: CHF/COPD Protocol  CHF/COPD Protocol    Comparison: 8/6/2022    FINDINGS:    The lungs are well-expanded. The heart and pulmonary vasculature are within normal limits. No pleural effusions are identified. There are no active appearing infiltrates. Scoliosis is present. Prior ACDF in the visualized lower cervical spine.      Impression:      No active disease.      Electronically Signed: Jeet Reis MD    10/11/2024 9:25 PM EDT    Workstation ID: [id number]              GREGORIA Jasso  10/14/24  07:21 EDT      EMR Dragon/Transcription:   \"Dictated utilizing Dragon dictation\".              Electronically signed by GREGORIA Jasso, 10/14/24, 7:21 AM EDT.  Copied text in this note has been reviewed by me and is accurate as of 10/14/24.    Patient seen and examined.  Patient has history of paroxysmal atrial fibrillation and echocardiogram performed in 2022 showed normal EF.  She was recently in [facility] hospital with recurrent AF and was noted to have LV " dysfunction.  Patient had a CT angio back in August 2024 showing mild nonobstructive disease  Patient was treated with guideline based therapy for LV dysfunction and presented with syncope drug-induced with hypotension and several medications were stopped  Patient has known history of stroke  CHADS2 Vasc score of 5  EP consultation requested for management of atrial fibrillation and also concomitant watchman implantation because of recurrent risk of falls on this patient.  Patient is comfortable and started back on low-dose of beta-blockers        Physical Exam    General:      well developed, well nourished, in no acute distress.    Head:      normocephalic and atraumatic.    Eyes:      PERRL/EOM intact, conjunctivae and sclerae clear without nystagmus.    Neck:      no  thyromegaly, trachea central with normal respiratory effort  Lungs:      clear bilaterally to auscultation.    Heart:       irregular rate and rhythm, S1, S2 without murmurs, rubs, or gallops  Skin:      intact without lesions or rashes.    Psych:      alert and cooperative; normal mood and affect; normal attention span and concentration.        Labs x-rays EKGs and prior notes and investigations reviewed  New onset of LV dysfunction with ongoing atrial fibrillation  Increase metoprolol to 25 mg twice daily  Patient intolerant to amiodarone and flecainide in the past  Patient's MELISSA is pending today to evaluate left atrial appendage  Patient will be a good candidate for AF ablation and concomitant left atrial appendage percutaneous closure with Watchman device  Discussed with patient  Will review the MELISSA and decide on timing of the procedure      Electronically signed by Sourav Pompa MD, 10/15/24, 11:50 AM EDT.

## 2024-10-14 NOTE — CASE MANAGEMENT/SOCIAL WORK
Continued Stay Note   Live     Patient Name: Natalee Noble  MRN: [id number]  Today's Date: 10/14/2024    Admit Date: 10/11/2024    Plan: D/C Plan: [facility]; No pre-cert required; NO PASRR needed.  Transport TBD   Discharge Plan       Row Name 10/14/24 1521       Plan    Plan D/C Plan: [facility]; No pre-cert required; NO PASRR needed.  Transport TBD                 Expected Discharge Date and Time       Expected Discharge Date Expected Discharge Time    Oct 15, 2024               Gregoria Gama RN

## 2024-10-14 NOTE — PROGRESS NOTES
Cardiology Progress Note    Patient Identification:  Name: Natalee Noble  Age: 74 y.o.  Sex: female  :  1950  MRN: [id number]                 Follow Up / Chief Complaint: Hypertension, A-fib, CHF  Chief Complaint   Patient presents with    Hypotension       Interval History: Patient recently was in [facility] with newly diagnosed LV dysfunction was started on guideline directed medical therapy presented to ER with hypotension and recurrent syncopal episodes.    NP note: Patient seen and examined.  Son-in-law at bedside no distress noted patient remains in A-fib rate controlled today.  Blood pressure improved and low-dose beta-blocker has been re-started.  Patient did have episode of A-fib with RVR overnight and amnio drip was ordered however patient and family refused due to previous side effects causing tremor and facial tics.  Will reintroduce guideline directed medical therapy when blood pressure improves    EP to evaluate     Electronically signed by GREGORIA Yuen, 10/14/24, 12:52 PM EDT.    Insert attestation       Subjective: Patient seen and examined.  Chart reviewed.  Labs reviewed.  Patient denies any chest pain currently.  Patient has been having intermittent A-fib with RVR.  Was given amiodarone but patient refused because she had neurological side effects in the past.      Objective:  10/13/2024: EKG done March 10, 2013 2024 reveals A-fib with a rate of 123 bpm  10/14/2024: Hemoglobin 10.7    History of present illness:      Natalee Noble is a 74-year-old female with a medical history to include:     Atrial fibrillation  [id number] SCORE   [id number] Score: 6 (10/12/2024  9:08 AM)     Long-term anticoagulation  Intolerant to flecainide and amiodarone in the past  HFrEF  Hypertension  Dyslipidemia  CKD stage III  History of CVA  Anxiety/depression  GERD  Diverticulosis  Stroke     Who presented to [facility] on 10/11/2024 following syncopal episode.  Patient reports that she was  recently hospitalized at [facility] for 6 days for respiratory infection, CHF and atrial fibrillation.  She was discharged on Thursday.  Echocardiogram completed 10/5/2024 shows reduced LVEF of 35 to 40%, moderate global hypokinesis of left ventricle and mild to moderate mitral, tricuspid, and pulmonic valve regurgitation.  While hospitalized she was treated with IV antibiotics and started on Entresto, spironolactone, digoxin.  She reports 2 syncopal episodes yesterday.  Associated symptoms of shortness of breath and nausea.  She denies chest pain, palpitations, edema.  Radiology consulted for hypotension and syncope.  Upon admission patient noted to be significantly hypotensive at 69/47.  Pertinent labs include troponin 64, digoxin 1.15 creatinine 1.44, TSH unremarkable.  CXR without evidence of active disease.  Patient follows with Dr. Ordaz for cardiology.  She is also scheduled to see an electrophysiologist in near future to discuss atrial fibrilation ablation.  She reports she is intolerable to amiodarone and flecainide.           Assessment:  :     Dizziness near syncope  Hypotension  Recurrent syncope  Elevated troponin  Chronic HFrEF due to systolic dysfunction from dilated cardiomyopathy  Paroxysmal atrial fibrillation on long-term anticoagulation/Xarelto  Dyslipidemia  CKD  History of CVA        Recommendations / Plan:        Patient says she has recurrent syncope since age 12 with migraine headaches.  Recently was diagnosed with heart failure due to reduced ejection fraction LVEF of 35-40%, was put on guideline directed medical therapy with with Entresto, Aldactone, digoxin, metoprolol succinate, Farxiga.  Patient's blood pressure was low and was having near syncope and dizziness.  Also has been having nausea.    Patient is having recurrent falls and syncope.  Has history of A-fib and elevated [id number] score of 6.  Will benefit from long-term anticoagulation but due to her falls she is a poor  candidate for long-term anticoagulation.  Will evaluate for watchman as outpatient.  Will consult EP .  Patient's proBNP is 2832.  Will hold off on diuretics due to significant hypotension at the current time.  Continue to monitor fluid status.  Patient's troponin is elevated at 68 has no particular trend has come down to 64.  Will monitor serial troponins.  Probably is due to congestive heart failure and A-fib..  Will monitor renal function.  Follow-up with nephrology for CKD.         Copied text in this portion of the note has been reviewed and is accurate as of 10/14/2024    Past Medical History:  Past Medical History:   Diagnosis Date    Anxiety and depression     Arthritis     Back pain     CRF (chronic renal failure), stage 3 (moderate)     Diverticulosis     Essential hypertension     Family history of colon cancer     Family history of colonic polyps     Fractures     R tibia/fibula; L ankle    GERD (gastroesophageal reflux disease)     History of adenomatous polyp of colon     History of cardiac cath     1/28/19 left main no significant disease. LAD with no significant disease. LCX no significant disease. RCA no significant disease.    History of cardiac monitoring     2/01/19 - ZIO PATCH - Underlying rhythm is sinus at 48-94 bpm. PSVT x23 with fastest 167 bpm, longest 24.4 seconds.   10/05/2018- ZIO PATCH - NSR most of time but did have episode of A fib ranging from .    History of colon polyps     History of echocardiogram     9/2018: Left ventricular systolic function is normal. EF 50%. Left ventricular diastolic dysfunction consistent with ipaired relaxation. Left atrial cavity size is mildly dilated. Mild MR. Mild to moderate TR. There is abnormal thickening noted on the RV that may represent a vegetation-this was discussed with Dr. Hammer and Dr. Reilly. No patent foramen ovale. No further work up needed at this time    Hyperlipidemia     Kidney disorder     Migraine     Migraines      Neck pain     Stroke 2018     Past Surgical History:  Past Surgical History:   Procedure Laterality Date    ABDOMINOPLASTY      BACK SURGERY      L2-5 fusion 2012    CHOLECYSTECTOMY      COLONOSCOPY  11/24/2014    Diverticulosis; Repeat 5 years    COLONOSCOPY  11/09/2010    Diverticulosis; Repeat 4 years    COLONOSCOPY  09/18/2007    Dr. Monzon-Normal; Repeat 3 years    COLONOSCOPY  06/02/2005    Dr. Monzon-Diminuitive polypectomy above the cecum; Otherwise normal colonoscopy; Repeat 2 years    COLONOSCOPY N/A 11/01/2019    Diverticulosis in the left colon; Two 5-6mm polyps in the cecum-path shows one tubular adenomatous and on hyperplastic polyp; One 8mm tubular adenomatous polyp in the ascending colon; Two 4-5mm tubular adenomatous polyps in the transverse colon; Repeat 3 years    COLONOSCOPY N/A 6/12/2023    Procedure: COLONOSCOPY WITH ANESTHESIA;  Surgeon: Susan oLrd MD;  Location: [facility] ENDOSCOPY;  Service: Gastroenterology;  Laterality: N/A;  preop hx of polyps   postop; polyps   PCP Woody Valentin MD    CYSTOCELE REPAIR      Cystocele and rectocele repair    ENDOSCOPY  09/28/2012    LA grade C esophagitis; HH    ENDOSCOPY N/A 11/01/2019    Small HH; Non-erosive gastritis-biopsied; Normal examined duodenum    FIBULA FRACTURE SURGERY      HYSTERECTOMY      NECK SURGERY  2012    ACDF C4-7    ORIF TIBIA FRACTURE Right         Social History:   Social History     Tobacco Use    Smoking status: Never    Smokeless tobacco: Never   Substance Use Topics    Alcohol use: No      Family History:  Family History   Problem Relation Age of Onset    Colon cancer Father 65    Colon polyps Daughter 38    Esophageal cancer Neg Hx     Liver cancer Neg Hx     Liver disease Neg Hx     Rectal cancer Neg Hx     Stomach cancer Neg Hx           Allergies:  Allergies   Allergen Reactions    Benzoin Anaphylaxis and Swelling     States when used on her neck it shut off her airway  Huge abscesses with surgery      Iodine  "Other (See Comments)     PT UNABLE TO TELL RN ABOUT REACTION AT THIS TIME, BUT FAMILY STATES PT HAD A REACTION TO BEING CLEANSED WITH IODINE IN SURGERY  IOBAN - used on neck, swelled to the point of shutting off airway    Vancomycin Other (See Comments)     Kidney failure  Vancomycin induced acute kidney injury      Levaquin [Levofloxacin] Unknown (See Comments)     Unknown    Sulfa Antibiotics     Acetaminophen Nausea Only    Latex Itching     Scheduled Meds:  aspirin, 81 mg, Daily  cholecalciferol, 1,000 Units, Daily  escitalopram, 20 mg, Daily  levothyroxine, 112 mcg, Q AM  Lifitegrast, 1 drop, Daily  liothyronine, 5 mcg, BID  memantine, 10 mg, Q12H  metoclopramide, 5 mg, Q6H  pantoprazole, 40 mg, BID AC  polyethylene glycol, 17 g, BID  rivaroxaban, 15 mg, Daily With Dinner  sodium bicarbonate, 650 mg, BID          Review of Systems:   ROS         Constitutional:  Temp:  [96.2 °F (35.7 °C)-98.2 °F (36.8 °C)] 98.2 °F (36.8 °C)  Heart Rate:  [] 99  Resp:  [13-20] 18  BP: ()/(40-94) 151/94    Physical Exam   /94 (BP Location: Left arm, Patient Position: Lying)   Pulse 99   Temp 98.2 °F (36.8 °C) (Oral)   Resp 18   Ht 165.1 cm (65\")   Wt 78.9 kg (174 lb)   SpO2 100%   BMI 28.96 kg/m²   General:  Appears in no acute distress  Eyes: Sclera is anicteric,  conjunctiva is clear   HEENT:  No JVD. Thyroid not visibly enlarged. No mucosal pallor or cyanosis  Respiratory: Respirations regular and unlabored at rest.  Clear to auscultation  Cardiovascular: S1,S2, irregularly irregular rate  Gastrointestinal: Abdomen nondistended.  Musculoskeletal:  No abnormal movements  Extremities: No digital clubbing or cyanosis  Skin: Color pink.   Neuro: Alert and awake.    INTAKE AND OUTPUT:    Intake/Output Summary (Last 24 hours) at 10/14/2024 0704  Last data filed at 10/13/2024 1413  Gross per 24 hour   Intake --   Output 500 ml   Net -500 ml       Cardiographics  Telemetry: A-fib    ECG:   ECG 12 Lead Drug " Monitoring; Amiodarone   Preliminary Result   HEART RATE=92  bpm   RR Wlyffzkf=771  ms   HI Interval=  ms   P Horizontal Axis=  deg   P Front Axis=  deg   QRSD Interval=89  ms   QT Yzxhdjmm=743  ms   YNoI=217  ms   QRS Axis=45  deg   T Wave Axis=261  deg   - ABNORMAL ECG -   Atrial fibrillation   Repol abnrm, severe global ischemia (LM/MVD)   Date and Time of Study:2024-10-14 05:23:18      ECG 12 Lead Rhythm Change   Preliminary Result   HEART IPWW=997  bpm   RR Cbznncrq=820  ms   HI Interval=  ms   P Horizontal Axis=  deg   P Front Axis=  deg   QRSD Interval=83  ms   QT Vbfhqjst=091  ms   UOwL=435  ms   QRS Axis=61  deg   T Wave Axis=250  deg   - ABNORMAL ECG -   Atrial fibrillation   Repol abnrm suggests ischemia, diffuse leads   Date and Time of Study:2024-10-13 19:20:53      ECG 12 Lead Other; hypotension   Final Result   HEART RATE=98  bpm   RR Zwvvwuwx=519  ms   HI Interval=  ms   P Horizontal Axis=  deg   P Front Axis=  deg   QRSD Interval=80  ms   QT Fzujvmjh=702  ms   QOvN=766  ms   QRS Axis=55  deg   T Wave Axis=243  deg   - ABNORMAL ECG -   Atrial fibrillation   Repol abnrm suggests ischemia, anterolateral   No previous ECG available for comparison   Electronically Signed By: Fuad Macias (ORION) 2024-10-12 08:32:29   Date and Time of Study:2024-10-11 20:47:52      Telemetry Scan   Final Result      Telemetry Scan   Final Result      Telemetry Scan   Final Result      Telemetry Scan   Final Result      Telemetry Scan   Final Result      Telemetry Scan   Final Result      Telemetry Scan   Final Result      Telemetry Scan   Final Result      Telemetry Scan   Final Result      Telemetry Scan   Final Result      Telemetry Scan   Final Result      Telemetry Scan   Final Result      Telemetry Scan   Final Result      Telemetry Scan   Final Result      Telemetry Scan   Final Result      Telemetry Scan   Final Result      Telemetry Scan   Final Result      Telemetry Scan   Final Result      ECG 12 Lead Drug  "Monitoring; Amiodarone    (Results Pending)     I have personally reviewed EKG    Echocardiogram: Results for orders placed during the hospital encounter of 09/23/22    Adult Transthoracic Echo Complete W/ Color, Spectral and Contrast if Necessary Per Protocol    Interpretation Summary  · Calculated left ventricular EF = 51.2% Estimated left ventricular EF was in agreement with the calculated left ventricular EF.  · Left ventricular diastolic function is consistent with (grade I) impaired relaxation.  · Moderate tricuspid valve regurgitation is present.      Lab Review   I have reviewed the labs  Results from last 7 days   Lab Units 10/12/24  0112 10/11/24  2142   HSTROP T ng/L 64* 68*         Results from last 7 days   Lab Units 10/11/24  2142   SODIUM mmol/L 141   POTASSIUM mmol/L 4.0   BUN mg/dL 35*   CREATININE mg/dL 1.44*   CALCIUM mg/dL 8.1*         Results from last 7 days   Lab Units 10/13/24  0532 10/12/24  0515 10/11/24  2058   WBC 10*3/mm3 6.79 9.35 11.05*   HEMOGLOBIN g/dL 11.4* 12.2 13.9   HEMATOCRIT % 38.1 39.9 45.7   PLATELETS 10*3/mm3 235 288 333           RADIOLOGY:  Imaging Results (Last 24 Hours)       ** No results found for the last 24 hours. **                  )10/14/2024  MD TOMAS Vargas/Transcription:   \"Dictated utilizing Dragon dictation\".   "

## 2024-10-14 NOTE — CONSULTS
Cardiology Consult Note      REQUESTING PHYSICIAN    Cheko Gallagher MD    PATIENT IDENTIFICATION  Name: Natalee Noble  Age: 74 y.o.  Sex: female  :  1950  MRN: [id number]             REASON FOR CONSULTATION:  74-year-old female previously followed by Dr. Jose Scott and now followed by Dr. Ordaz with past medical history of paroxysmal atrial fibrillation and syncope.  She was on anticoagulation with Xarelto.  Last office visit noted 10/4/2022.  Additional past medical history of dyslipidemia, stroke, LV dysfunction with EF 35-40%, valvular heart disease consisting of mild-moderate mitral, tricuspid and pulmonic regurgitation.  History of primary hypertension, chronic kidney disease stage III, GERD, heart failure with reduced ejection fraction, intolerance to flecainide and amiodarone in the past.     CC:  Syncope and collapse      HISTORY OF PRESENT ILLNESS:   Patient presented to the emergency department at [facility] 10/11/2024 for syncopal episode.  Upon admission she was noted to be significantly hypotensive with blood pressure 69/47.  Patient was discharged from [facility] 7 days ago where she was treated for respiratory infection, heart failure and atrial fibrillation with RVR.  TTE performed 10/5/2024 showed reduced LV systolic function with EF 35-40%, moderate global hypokinesis of the LV and mild to moderate MR/TR/IA.  She was started on Entresto, spironolactone and digoxin upon discharge.  She reported this admission having 2 syncopal episodes the day prior.  She reported history of syncope off and on since age 12 with migrainous headache onset.  The patient was seen in consultation by on-call cardiologist Dr. Sheriff.  Electrophysiologist was asked to evaluate the patient due to history of recurrent syncope, elevated chads vascular score from A-fib and risk of long-term anticoagulation.  Recommendation for possible watchman implant as well as ablation.  Upon my  "evaluation today, the patient is sitting up in bed eating breakfast.  She reports feeling much better and has been up with slight lightheadedness reported.  She denies any chest discomfort such as pain, pressure or tightness.  She denies any shortness of breath but she is noted to have slight expiratory wheezes in bases bilaterally.  Telemetry shows atrial fibrillation with controlled ventricular response at 99 bpm, blood pressure elevated today at 131/100.       REVIEW OF SYSTEMS:  Pertinent items are noted in HPI, all other systems reviewed and negative    OBJECTIVE   Diagnostics reviewed and unremarkable  EKG shows atrial fibrillation with controlled ventricular rate at 92 bpm    ASSESSMENT  Syncope and collapse  History of falls  Elevated chads vascular score  Coagulopathy on Xarelto    PLAN  Patient had recent hospital admission with TTE revealing dilated cardiomyopathy. She was started on goal-directed medical therapy to include Entresto, Farxiga, Aldactone and Lasix. Digoxin was also added. She then had significant hypotension causing dizziness, lightheadedness and syncope. All blood pressure lowering agents currently on hold. Her blood pressure is now elevating. Agree with restarting beta-blocker-Toprol XL 12.5 mg daily for reduced LV function. Can slowly add other guideline directed medications as blood pressure allows. Continue aspirin. Receiving Xarelto for stroke prevention from A-fib and elevated chads vascular score. Procedure and indications for left atrial appendage closure device/watchman as well as ablation were reviewed with the patient and son at bedside. Questions were answered.  Shared decision making per Dr. Moses      CHF Guideline Directed Medical Therapy  Beta Blocker:   ARNI/ACE/ARB:   SGLT 2 inhibitors:   Diuretics:   Aldosterone Antagonist:   Vasodilators & Nitrates:     Vital Signs  Visit Vitals  /67   Pulse 94   Temp 97.6 °F (36.4 °C) (Oral)   Resp 14   Ht 165.1 cm (65\")   Wt " "78.9 kg (174 lb)   SpO2 100%   BMI 28.96 kg/m²     Oxygen Therapy  SpO2: 100 %  Pulse Oximetry Type: Continuous  Device (Oxygen Therapy): nasal cannula  Flow (L/min) (Oxygen Therapy): 2  Flowsheet Rows      Flowsheet Row First Filed Value   Admission Height 165.1 cm (65\") Documented at 10/11/2024 2040   Admission Weight 78.9 kg (174 lb) Documented at 10/11/2024 2040          Intake & Output (last 3 days)         10/11 0701  10/12 0700 10/12 0701  10/13 0700 10/13 0701  10/14 0700 10/14 0701  10/15 0700    P.O.  600  240    Total Intake(mL/kg)  600 (7.6)  240 (3)    Urine (mL/kg/hr)  500 (0.3) 500 (0.3)     Stool  0 0     Total Output  500 500     Net  +100 -500 +240            Urine Unmeasured Occurrence  1 x 1 x     Stool Unmeasured Occurrence  0 x 0 x           Lines, Drains & Airways       Active LDAs       Name Placement date Placement time Site Days    Peripheral IV 06/12/23 1123 Posterior;Right Forearm 06/12/23  1123  Forearm  489    Peripheral IV 10/11/24 2059 Anterior;Right Forearm 10/11/24  2059  Forearm  2    External Urinary Catheter 10/12/24  2000  --  1                    MEDICAL HISTORY    Past Medical History:   Diagnosis Date    Anxiety and depression     Arthritis     Back pain     CRF (chronic renal failure), stage 3 (moderate)     Diverticulosis     Essential hypertension     Family history of colon cancer     Family history of colonic polyps     Fractures     R tibia/fibula; L ankle    GERD (gastroesophageal reflux disease)     History of adenomatous polyp of colon     History of cardiac cath     1/28/19 left main no significant disease. LAD with no significant disease. LCX no significant disease. RCA no significant disease.    History of cardiac monitoring     2/01/19 - ZIO PATCH - Underlying rhythm is sinus at 48-94 bpm. PSVT x23 with fastest 167 bpm, longest 24.4 seconds.   10/05/2018- ZIO PATCH - NSR most of time but did have episode of A fib ranging from .    History of colon polyps  "    History of echocardiogram     9/2018: Left ventricular systolic function is normal. EF 50%. Left ventricular diastolic dysfunction consistent with ipaired relaxation. Left atrial cavity size is mildly dilated. Mild MR. Mild to moderate TR. There is abnormal thickening noted on the RV that may represent a vegetation-this was discussed with Dr. Hammer and Dr. Reilly. No patent foramen ovale. No further work up needed at this time    Hyperlipidemia     Kidney disorder     Migraine     Migraines     Neck pain     Stroke 2018        SURGICAL HISTORY    Past Surgical History:   Procedure Laterality Date    ABDOMINOPLASTY      BACK SURGERY      L2-5 fusion 2012    CHOLECYSTECTOMY      COLONOSCOPY  11/24/2014    Diverticulosis; Repeat 5 years    COLONOSCOPY  11/09/2010    Diverticulosis; Repeat 4 years    COLONOSCOPY  09/18/2007    Dr. Monzon-Normal; Repeat 3 years    COLONOSCOPY  06/02/2005    Dr. Monzon-Diminuitive polypectomy above the cecum; Otherwise normal colonoscopy; Repeat 2 years    COLONOSCOPY N/A 11/01/2019    Diverticulosis in the left colon; Two 5-6mm polyps in the cecum-path shows one tubular adenomatous and on hyperplastic polyp; One 8mm tubular adenomatous polyp in the ascending colon; Two 4-5mm tubular adenomatous polyps in the transverse colon; Repeat 3 years    COLONOSCOPY N/A 6/12/2023    Procedure: COLONOSCOPY WITH ANESTHESIA;  Surgeon: Susan Lord MD;  Location: [facility] ENDOSCOPY;  Service: Gastroenterology;  Laterality: N/A;  preop hx of polyps   postop; polyps   PCP Woody Valentin MD    CYSTOCELE REPAIR      Cystocele and rectocele repair    ENDOSCOPY  09/28/2012    LA grade C esophagitis; HH    ENDOSCOPY N/A 11/01/2019    Small HH; Non-erosive gastritis-biopsied; Normal examined duodenum    FIBULA FRACTURE SURGERY      HYSTERECTOMY      NECK SURGERY  2012    ACDF C4-7    ORIF TIBIA FRACTURE Right         FAMILY HISTORY    Family History   Problem Relation Age of Onset    Colon cancer  "Father 65    Colon polyps Daughter 38    Esophageal cancer Neg Hx     Liver cancer Neg Hx     Liver disease Neg Hx     Rectal cancer Neg Hx     Stomach cancer Neg Hx        SOCIAL HISTORY    Social History     Tobacco Use    Smoking status: Never    Smokeless tobacco: Never   Substance Use Topics    Alcohol use: No        ALLERGIES    Allergies   Allergen Reactions    Benzoin Anaphylaxis and Swelling     States when used on her neck it shut off her airway  Huge abscesses with surgery      Iodine Other (See Comments)     PT UNABLE TO TELL RN ABOUT REACTION AT THIS TIME, BUT FAMILY STATES PT HAD A REACTION TO BEING CLEANSED WITH IODINE IN SURGERY  IOBAN - used on neck, swelled to the point of shutting off airway    Vancomycin Other (See Comments)     Kidney failure  Vancomycin induced acute kidney injury      Amiodarone Other (See Comments)     Tremor- hand and face     Levaquin [Levofloxacin] Unknown (See Comments)     Unknown    Sulfa Antibiotics     Acetaminophen Nausea Only    Latex Itching              /67   Pulse 94   Temp 97.6 °F (36.4 °C) (Oral)   Resp 14   Ht 165.1 cm (65\")   Wt 78.9 kg (174 lb)   SpO2 100%   BMI 28.96 kg/m²   Intake/Output last 3 shifts:  I/O last 3 completed shifts:  In: 240 [P.O.:240]  Out: 1000 [Urine:1000]  Intake/Output this shift:  I/O this shift:  In: 240 [P.O.:240]  Out: -     PHYSICAL EXAM:    General: Alert, cooperative, no distress, appears stated age  Head:  Normocephalic, atraumatic, mucous membranes moist  Eyes:  Conjunctivae/corneas clear, EOM's intact     Neck:  Supple,  no adenopathy; no JVD or bruit  Lungs: Inspiratory wheezes noted bilaterally  Chest wall: No tenderness  Heart::  Irregularly irregular rate and rhythm, S1 and S2 normal, no murmur, rub or gallop  Abdomen: Soft, nontender, nondistended, bowel sounds active  Extremities: No cyanosis, clubbing, or edema   Pulses: 2+ and symmetric all extremities  Skin:  No rashes or lesions  Neuro/psych: A&O x3. CN " "II through XII are grossly intact with appropriate affect      Scheduled Meds:      aspirin, 81 mg, Oral, Daily  cholecalciferol, 1,000 Units, Oral, Daily  escitalopram, 20 mg, Oral, Daily  levothyroxine, 112 mcg, Oral, Q AM  Lifitegrast, 1 drop, Both Eyes, Daily  liothyronine, 5 mcg, Oral, BID  memantine, 10 mg, Oral, Q12H  metoclopramide, 5 mg, Intravenous, Q6H  metoprolol succinate XL, 12.5 mg, Oral, Q12H  pantoprazole, 40 mg, Oral, BID AC  polyethylene glycol, 17 g, Oral, BID  rivaroxaban, 15 mg, Oral, Daily With Dinner  sodium bicarbonate, 650 mg, Oral, BID        Continuous Infusions:         PRN Meds:      acetaminophen **OR** acetaminophen **OR** acetaminophen    ALPRAZolam    senna-docusate sodium **AND** polyethylene glycol **AND** bisacodyl **AND** bisacodyl    guaifenesin    metoprolol tartrate    ondansetron ODT **OR** ondansetron    sodium chloride        Results Review:     I reviewed the patient's new clinical results.    CBC    Results from last 7 days   Lab Units 10/13/24  0532 10/12/24  0515 10/11/24  2058 10/10/24  0719 10/09/24  0801 10/08/24  0640   WBC 10*3/mm3 6.79 9.35 11.05* 9.5 8.7 9.7   HEMOGLOBIN g/dL 11.4* 12.2 13.9 11.9* 12.0 11.5*   PLATELETS 10*3/mm3 235 288 333 291 279 270     Cr Clearance Estimated Creatinine Clearance: 35.6 mL/min (A) (by C-G formula based on SCr of 1.44 mg/dL (H)).  Coag     HbA1C   Lab Results   Component Value Date    HGBA1C 5.6 10/06/2024    HGBA1C 5.8 09/02/2018     Blood Glucose No results found for: \"POCGLU\"  Infection     CMP   Results from last 7 days   Lab Units 10/11/24  2142   SODIUM mmol/L 141   POTASSIUM mmol/L 4.0   CHLORIDE mmol/L 106   CO2 mmol/L 21.6*   BUN mg/dL 35*   CREATININE mg/dL 1.44*   GLUCOSE mg/dL 110*   ALBUMIN g/dL 3.5   BILIRUBIN mg/dL 0.4   ALK PHOS U/L 81   AST (SGOT) U/L 20   ALT (SGPT) U/L 20     ABG      UA      RAYMOND  No results found for: \"POCMETH\", \"POCAMPHET\", \"POCBARBITUR\", \"POCBENZO\", \"POCCOCAINE\", \"POCOPIATES\", " "\"POCOXYCODO\", \"POCPHENCYC\", \"POCPROPOXY\", \"POCTHC\", \"POCTRICYC\"  Lysis Labs   Results from last 7 days   Lab Units 10/13/24  0532 10/12/24  0515 10/11/24  2142 10/11/24  2058 10/10/24  0719 10/09/24  0801 10/08/24  0640   HEMOGLOBIN g/dL 11.4* 12.2  --  13.9 11.9* 12.0 11.5*   PLATELETS 10*3/mm3 235 288  --  333 291 279 270   CREATININE mg/dL  --   --  1.44*  --   --   --   --      Radiology(recent) No radiology results for the last day      Results from last 7 days   Lab Units 10/12/24  0112   HSTROP T ng/L 64*       X-rays, labs reviewed personally by physician.    ECG/EMG Results (most recent)       Procedure Component Value Units Date/Time    Telemetry Scan [[id number]] Resulted: 10/11/24     Updated: 10/12/24 0045    Telemetry Scan [[id number]] Resulted: 10/11/24     Updated: 10/12/24 0049    ECG 12 Lead Other; hypotension [[id number]] Collected: 10/11/24 2047     Updated: 10/12/24 0833     QT Interval 357 ms      QTC Interval 457 ms     Narrative:      HEART RATE=98  bpm  RR Zmmnihqp=263  ms  ND Interval=  ms  P Horizontal Axis=  deg  P Front Axis=  deg  QRSD Interval=80  ms  QT Skrcanyy=848  ms  NDlH=767  ms  QRS Axis=55  deg  T Wave Axis=243  deg  - ABNORMAL ECG -  Atrial fibrillation  Repol abnrm suggests ischemia, anterolateral  No previous ECG available for comparison  Electronically Signed By: Fuad Macias ([facility]) 2024-10-12 08:32:29  Date and Time of Study:2024-10-11 20:47:52    Telemetry Scan [[id number]] Resulted: 10/11/24     Updated: 10/12/24 1546    Telemetry Scan [[id number]] Resulted: 10/11/24     Updated: 10/12/24 1606    Telemetry Scan [[id number]] Resulted: 10/11/24     Updated: 10/12/24 1651    Telemetry Scan [[id number]] Resulted: 10/11/24     Updated: 10/12/24 1714    ECG 12 Lead Rhythm Change [[id number]] Collected: 10/13/24 1920     Updated: 10/13/24 1921     QT Interval 311 ms      QTC Interval 445 ms     Narrative:      HEART DLDT=253  bpm  RR Qguwqcpw=372  ms  ND Interval=  ms  P Horizontal " Axis=  deg  P Front Axis=  deg  QRSD Interval=83  ms  QT Zotdleev=592  ms  OFbZ=132  ms  QRS Axis=61  deg  T Wave Axis=250  deg  - ABNORMAL ECG -  Atrial fibrillation  Repol abnrm suggests ischemia, diffuse leads  Date and Time of Study:2024-10-13 19:20:53    Telemetry Scan [[id number]] Resulted: 10/11/24     Updated: 10/13/24 2245    Telemetry Scan [[id number]] Resulted: 10/11/24     Updated: 10/13/24 2303    Telemetry Scan [[id number]] Resulted: 10/11/24     Updated: 10/14/24 0036    Telemetry Scan [[id number]] Resulted: 10/11/24     Updated: 10/14/24 0214    Telemetry Scan [[id number]] Resulted: 10/11/24     Updated: 10/14/24 0300    Telemetry Scan [[id number]] Resulted: 10/11/24     Updated: 10/14/24 0417    Telemetry Scan [[id number]] Resulted: 10/11/24     Updated: 10/14/24 0457    Telemetry Scan [[id number]] Resulted: 10/11/24     Updated: 10/14/24 0623    Telemetry Scan [[id number]] Resulted: 10/11/24     Updated: 10/14/24 0623    Telemetry Scan [[id number]] Resulted: 10/11/24     Updated: 10/14/24 0623    Telemetry Scan [[id number]] Resulted: 10/11/24     Updated: 10/14/24 0629    Telemetry Scan [[id number]] Resulted: 10/11/24     Updated: 10/14/24 0733    ECG 12 Lead Drug Monitoring; Amiodarone [[id number]] Collected: 10/14/24 0523     Updated: 10/14/24 0805     QT Interval 354 ms      QTC Interval 437 ms     Narrative:      HEART RATE=92  bpm  RR Xdgfpzut=930  ms  OR Interval=  ms  P Horizontal Axis=  deg  P Front Axis=  deg  QRSD Interval=89  ms  QT Szljrpkk=809  ms  GUkF=928  ms  QRS Axis=45  deg  T Wave Axis=261  deg  - ABNORMAL ECG -  Atrial fibrillation  Repol abnrm, severe global ischemia (LM/MVD)  When compared with ECG of 13-Oct-2024 19:20:53,  Significant rate decrease  Electronically Signed By: Josemanuel Sheriff ([facility]) 2024-10-14 08:04:32  Date and Time of Study:2024-10-14 05:23:18    Telemetry Scan [[id number]] Resulted: 10/11/24     Updated: 10/14/24 1013    ECG 12 Lead Drug Monitoring; Amiodarone  [[id number]] Collected: 10/14/24 1016     Updated: 10/14/24 1018     QT Interval 373 ms      QTC Interval 483 ms     Narrative:      HEART JTLW=164  bpm  RR Tfeygpyc=428  ms  AZ Interval=  ms  P Horizontal Axis=  deg  P Front Axis=  deg  QRSD Interval=79  ms  QT Qgsdsdnv=745  ms  ODvO=770  ms  QRS Axis=47  deg  T Wave Axis=236  deg  - ABNORMAL ECG -  Atrial fibrillation  Repol abnrm, severe global ischemia (LM/MVD)  Date and Time of Study:2024-10-14 10:16:06              Medication Review:   I have reviewed the patient's current medication list  Scheduled Meds:aspirin, 81 mg, Oral, Daily  cholecalciferol, 1,000 Units, Oral, Daily  escitalopram, 20 mg, Oral, Daily  levothyroxine, 112 mcg, Oral, Q AM  Lifitegrast, 1 drop, Both Eyes, Daily  liothyronine, 5 mcg, Oral, BID  memantine, 10 mg, Oral, Q12H  metoclopramide, 5 mg, Intravenous, Q6H  metoprolol succinate XL, 12.5 mg, Oral, Q12H  pantoprazole, 40 mg, Oral, BID AC  polyethylene glycol, 17 g, Oral, BID  rivaroxaban, 15 mg, Oral, Daily With Dinner  sodium bicarbonate, 650 mg, Oral, BID      Continuous Infusions:   PRN Meds:.  acetaminophen **OR** acetaminophen **OR** acetaminophen    ALPRAZolam    senna-docusate sodium **AND** polyethylene glycol **AND** bisacodyl **AND** bisacodyl    guaifenesin    metoprolol tartrate    ondansetron ODT **OR** ondansetron    sodium chloride    Imaging:  Imaging Results (Last 72 Hours)       Procedure Component Value Units Date/Time    CT Head Without Contrast [[id number]] Collected: 10/11/24 2219     Updated: 10/11/24 2226    Narrative:      CT HEAD WO CONTRAST    Date of Exam: 10/11/2024 9:55 PM EDT    Indication: syncope.    Comparison: 8/6/2022    Technique: Axial CT images were obtained of the head without contrast administration.  Coronal reconstructions were performed.  Automated exposure control and iterative reconstruction methods were used.      Findings:  Stable left MCA distribution infarct with encephalomalacia. The  "ventricles have a normal size and configuration. There is mild generalized atrophy. No evidence of acute intracranial hemorrhage, mass or midline shift. No extra-axial fluid collections are   identified. Inflammatory changes and air-fluid levels are present in the visualized paranasal sinuses. No skull fractures are identified.      Impression:      Impression:    1. No acute intracranial abnormalities are identified.    2. Pansinusitis.      Electronically Signed: Jeet Reis MD    10/11/2024 10:24 PM EDT    Workstation ID: [id number]    XR Ankle 3+ View Left [[id number]] Collected: 10/11/24 2131     Updated: 10/11/24 2135    Narrative:      XR ANKLE 3+ VW LEFT    Date of Exam: 10/11/2024 9:10 PM EDT    Indication: fall    Comparison: 8/6/2022    Findings:  The left ankle mortise is intact. There is a plantar calcaneal enthesophyte. No fractures, dislocations or acute osseous abnormalities are identified. There are old fractures of the left fourth and fifth metatarsals.      Impression:      Impression:  No acute osseous abnormalities are identified.      Electronically Signed: Jeet Reis MD    10/11/2024 9:33 PM EDT    Workstation ID: [id number]    XR Chest 1 View [[id number]] Collected: 10/11/24 2124     Updated: 10/11/24 2127    Narrative:      XR CHEST 1 VW    Date of Exam: 10/11/2024 8:54 PM EDT    Indication: CHF/COPD Protocol  CHF/COPD Protocol    Comparison: 8/6/2022    FINDINGS:    The lungs are well-expanded. The heart and pulmonary vasculature are within normal limits. No pleural effusions are identified. There are no active appearing infiltrates. Scoliosis is present. Prior ACDF in the visualized lower cervical spine.      Impression:      No active disease.      Electronically Signed: Jeet Reis MD    10/11/2024 9:25 PM EDT    Workstation ID: [id number]              GREGORIA Jasso  10/14/24  11:00 EDT       EMR Dragon/Transcription:   \"Dictated utilizing Dragon dictation\". "                 Electronically signed by GREGORIA Jasso, 10/14/24, 11:00 AM EDT.  Copied text in this note has been reviewed by me and is accurate as of 10/14/24.    Cardiology attending:  Seen and examined.  Chart and labs reviewed. Independent interpretations of cardiac testing was performed. History and exam findings are verified with above changes noted.  Assessment and plan notated by APC after being formulated by attending consultant.  Note that greater than 50% of the time spent in care of the patient was provided by attending consultant.    Long conversation with patient and daughter regarding Watchman indications and procedure itself.  They are interested in moving forward with the Watchman evaluation.  As such we will schedule her for a MELISSA tomorrow to evaluate the left atrial appendage for appropriateness of Watchman device.    Watchman Shared Decision:    This is a patient who carries a diagnosis of atrial fibrillation and has an elevated risk of stroke as evidenced by their [id number] score of at least 5 (hypertension, prior stroke, age, gender).  Additionally, this patient carries an elevated bleeding risk as evidenced by their HAS-BLED score of at least 3 (antiplatelet, age, and prior stroke)  .  It is reasonable to proceed with evaluation and potential placement of a Watchman Left Atrial Occlusion Device pending further work-up.    Physical Exam:    General: Alert, cooperative, no distress, appears stated age  Head:  Normocephalic, atraumatic, mucous membranes moist  Eyes:  Conjunctivae/corneas clear, EOM's intact     Neck:  Supple,  no bruit  Lungs:            Clear to auscultation bilaterally, no wheezes rhonchi rales are noted  Chest wall: No tenderness  Heart::  Irregular rate and rhythm, S1 and S2 normal, 1/6 holosystolic murmur.  No rub or gallop  Abdomen: Soft, nontender, nondistended bowel sounds active  Extremities: No cyanosis, clubbing, or edema  Pulses:            2+ and  symmetric all extremities  Skin:  No rashes or lesions  Neuro/psych: A&O x3. CN II through XII are grossly intact with appropriate affect        CHADS-VASc Risk Assessment               5 Total Score    1 Hypertension    2 PRIOR STROKE/TIA/THROMBO    1 Age 65-74    1 Sex: Female    Criteria that do not apply:    CHF    Age >/= 75    DM    Vascular Disease

## 2024-10-14 NOTE — NURSING NOTE
Patient found with HR between 120s-140s.  A stat EKG was obtained showing afib.  Patient already diagnosed with afib but has been without her medication due to hypotension.  Reached out to the hospitalist group who is going to look at her medication.  Pt stable at this time.

## 2024-10-14 NOTE — NURSING NOTE
Spoke with Dr. Sheriff regarding patient elevated heart rate.  Provider advised to start amiodarone drip.  Went to talk to family and patient about starting the drip.  Family refused the drip said it gave and started all of her neurological issus. Placed a second call out to Dr. Sheriff.

## 2024-10-14 NOTE — PLAN OF CARE
Goal Outcome Evaluation:              Outcome Evaluation: Patient heart rate when I came on shift was elevated 120-150s.  Due to patient lower BP a call was placed to the cardio.  Provider wanted to start patient on amiodarone drip.  Famiy and patient refused the drip saying it has caused her to have neuro issues.  A couple more calls placed to cardio with no call back.  Patient was given night time meds and PRN xanax.  Patient HR improved over night in the 90-110s.  Pt resting during the night.  BP and HR monitored.  Patient with no complaints.  pt stable at this time. Daughter at bedside.

## 2024-10-14 NOTE — PLAN OF CARE
Goal Outcome Evaluation:         Patient alert and oriented. Voices needs. Pain treated per MAR. Metoprolol restarted. Plan for MELISSA tomorrow afternoon.

## 2024-10-14 NOTE — ACP (ADVANCE CARE PLANNING)
Patient reports to have completed a Medical POA a long time ago. She reports that her daughter is MPOA. Patient reports wanting a copy of the POST form. Reviewed and discussed form. Patient wants to further discuss with daughter before completing.    Will follow up to answer any further questions.    Chaplain Javid Matta

## 2024-10-14 NOTE — PROGRESS NOTES
[facility] MEDICINE SERVICE  DAILY PROGRESS NOTE    NAME: Natalee Noble  : 1950  MRN: [id number]      LOS: 1 day     PROVIDER OF SERVICE: Cheko Gallagher MD    Chief Complaint: Hypotensive episode    Subjective:     Interval History: Patient states that she feels overall much better today.  Denies any dizziness, lightheadedness, chest pain or shortness of breath.        Review of Systems:   Review of Systems    Objective:     Vital Signs  Temp:  [96.8 °F (36 °C)-98.2 °F (36.8 °C)] 97.3 °F (36.3 °C)  Heart Rate:  [] 92  Resp:  [13-20] 17  BP: ()/() 115/81  Flow (L/min) (Oxygen Therapy):  [1-6] 1   Body mass index is 28.96 kg/m².    Physical Exam  Physical Exam  General Appearance:  Alert, cooperative, no distress, appears stated age  Head:  Normocephalic, without obvious abnormality, atraumatic  Eyes:  PERRL, conjunctiva/corneas clear, EOM's intact, fundi benign, both eyes  Ears:  Normal TM's and external ear canals, both ears  Nose: Nares normal, septum midline, mucosa normal, no drainage or sinus tenderness  Throat: Lips, mucosa, and tongue normal; teeth and gums normal  Neck: Supple, symmetrical, trachea midline, no adenopathy, thyroid: not enlarged, symmetric, no tenderness/mass/nodules, no carotid bruit or JVD  Lungs:   Clear to auscultation bilaterally, respirations unlabored  Heart:  Regular rate and rhythm, S1, S2 normal, no murmur, rub or gallop  Abdomen:  Soft, non-tender, bowel sounds active all four quadrants,  no masses, no organomegaly  Extremities: Extremities normal, atraumatic, no cyanosis or edema  Pulses: 2+ and symmetric  Skin: Skin color, texture, turgor normal, no rashes or lesions  Neurologic: Normal         Diagnostic Data    Results from last 7 days   Lab Units 10/14/24  1159 10/12/24  0515 10/11/24  2142   WBC 10*3/mm3 6.79   < >  --    HEMOGLOBIN g/dL 10.7*   < >  --    HEMATOCRIT % 37.5   < >  --    PLATELETS 10*3/mm3 161   < >  --    GLUCOSE mg/dL 126*   --  110*   CREATININE mg/dL 1.22*  --  1.44*   BUN mg/dL 21  --  35*   SODIUM mmol/L 140  --  141   POTASSIUM mmol/L 4.2  --  4.0   AST (SGOT) U/L  --   --  20   ALT (SGPT) U/L  --   --  20   ALK PHOS U/L  --   --  81   BILIRUBIN mg/dL  --   --  0.4   ANION GAP mmol/L 9.8  --  13.4    < > = values in this interval not displayed.       No radiology results for the last day      I reviewed the patient's new clinical results.    Assessment/Plan:     Active and Resolved Problems  Active Hospital Problems    Diagnosis  POA    **Hypotensive episode [I95.9]  Yes    Hypothyroidism (acquired) [E03.9]  Yes    Rheumatoid arthritis involving multiple sites [M06.9]  Yes    Acute on chronic renal insufficiency [N28.9, N18.9]  Yes    Elevated troponin [R79.89]  Yes    Chronic HFrEF (heart failure with reduced ejection fraction) [I50.22]  Yes    Anxiety disorder [F41.9]  Yes    Constipation [K59.00]  Yes    Long term current use of anticoagulant therapy [Z79.01]  Not Applicable    Nausea [R11.0]  Unknown    CKD (chronic kidney disease) stage 3, GFR 30-59 ml/min [N18.30]  Yes    Hyperlipidemia [E78.5]  Yes    Paroxysmal A-fib [I48.0]  Yes      Resolved Hospital Problems   No resolved problems to display.       Near syncopal episode  -Likely secondary to hypertension after initiation of multiple medications for recent heart failure diagnosis; this includes Entresto, metoprolol and diuretics  -Holding all antihypertensives currently although I will restart her on low-dose Toprol-XL for heart rate control  -Hydrated with IV fluids with improvement in BP    A-fib with RVR  -Resume home digoxin  Restarted Toprol-XL at lower dose and monitor for hypotension  -Continue Xarelto  -Patient has follow-up with her cardiologist as an outpatient  -Patient may benefit from ablation procedure; defer to cardiology for further evaluation, although the patient does have a follow-up appointment with her primary cardiologist in electrophysiology at [facility] on 10/25    Mild KATERINE  -Likely secondary to hypotension with volume depletion  Renal function back to baseline    Chronic systolic heart failure  -As above, patient was recently diagnosed with this and started on multiple medications that likely led to hypotension  -Holding most medications but restarted low-dose Toprol-XL  -Can likely resume diuretics on discharge but will continue to hold Entresto    Hypothyroidism  -Continue Synthroid and Cytomel    Non-MI related elevated troponin, type II demand ischemia  -Likely secondary to hypotension and A-fib with RVR causing mild subendocardial ischemia  -Likely not a true ACS event    Anxiety disorder  -Continue Lexapro    Dyslipidemia  -Continue statin therapy    Cognitive dysfunction  -Continue Namenda    Osteoarthritis  -Continue Plaquenil    VTE Prophylaxis:  Pharmacologic VTE prophylaxis orders are present.             Disposition Planning:     Barriers to Discharge: Cardiology follow-up for A-fib, arrangements of inpatient rehab placement  Anticipated Date of Discharge: 10/16  Place of Discharge: [facility] rehab      Time: 45 minutes     Code Status and Medical Interventions: CPR (Attempt to Resuscitate); Full Support   Ordered at: 10/11/24 2241     Code Status (Patient has no pulse and is not breathing):    CPR (Attempt to Resuscitate)     Medical Interventions (Patient has pulse or is breathing):    Full Support       Signature: Electronically signed by Cheko Gallagher MD, 10/14/24, 14:29 EDT.  [facility] Hospitalist Team

## 2024-10-15 ENCOUNTER — ANESTHESIA EVENT (OUTPATIENT)
Dept: CARDIOLOGY | Facility: HOSPITAL | Age: 74
End: 2024-10-15
Payer: MEDICARE

## 2024-10-15 ENCOUNTER — APPOINTMENT (OUTPATIENT)
Dept: CARDIOLOGY | Facility: HOSPITAL | Age: 74
DRG: 315 | End: 2024-10-15
Payer: MEDICARE

## 2024-10-15 ENCOUNTER — ANESTHESIA (OUTPATIENT)
Dept: CARDIOLOGY | Facility: HOSPITAL | Age: 74
End: 2024-10-15
Payer: MEDICARE

## 2024-10-15 ENCOUNTER — APPOINTMENT (OUTPATIENT)
Dept: GENERAL RADIOLOGY | Facility: HOSPITAL | Age: 74
DRG: 315 | End: 2024-10-15
Payer: MEDICARE

## 2024-10-15 LAB
ANION GAP SERPL CALCULATED.3IONS-SCNC: 10.1 MMOL/L (ref 5–15)
BASOPHILS # BLD AUTO: 0 10*3/MM3 (ref 0–0.2)
BASOPHILS NFR BLD AUTO: 0 % (ref 0–1.5)
BH CV ECHO MEAS - LAA 0 DEGREE LENGTH: 3.2 CM
BH CV ECHO MEAS - LAA 0 DEGREE WIDTH: 2 CM
BH CV ECHO MEAS - LAA 135 DEGREE LENGTH: 2.5 CM
BH CV ECHO MEAS - LAA 135 DEGREE WIDTH: 1.4 CM
BH CV ECHO MEAS - LAA 45 DEGREE LENGTH: 2.8 CM
BH CV ECHO MEAS - LAA 45 DEGREE WIDTH: 1.7 CM
BH CV ECHO MEAS - LAA 90 DEGREE LENGTH: 2.9 CM
BH CV ECHO MEAS - LAA 90 DEGREE WIDTH: 1.7 CM
BH CV ECHO MEAS - TR MAX PG: 35 MMHG
BH CV ECHO MEAS - TR MAX VEL: 296 CM/SEC
BH CV ECHO SHUNT ASSESSMENT PERFORMED (HIDDEN SCRIPTING): 1
BUN SERPL-MCNC: 19 MG/DL (ref 8–23)
BUN/CREAT SERPL: 14.6 (ref 7–25)
CALCIUM SPEC-SCNC: 8.7 MG/DL (ref 8.6–10.5)
CHLORIDE SERPL-SCNC: 110 MMOL/L (ref 98–107)
CO2 SERPL-SCNC: 20.9 MMOL/L (ref 22–29)
CREAT SERPL-MCNC: 1.3 MG/DL (ref 0.57–1)
DEPRECATED RDW RBC AUTO: 56.2 FL (ref 37–54)
EGFRCR SERPLBLD CKD-EPI 2021: 43.2 ML/MIN/1.73
EOSINOPHIL # BLD AUTO: 0.28 10*3/MM3 (ref 0–0.4)
EOSINOPHIL NFR BLD AUTO: 4.5 % (ref 0.3–6.2)
ERYTHROCYTE [DISTWIDTH] IN BLOOD BY AUTOMATED COUNT: 16.6 % (ref 12.3–15.4)
GLUCOSE SERPL-MCNC: 122 MG/DL (ref 65–99)
HCT VFR BLD AUTO: 35.6 % (ref 34–46.6)
HGB BLD-MCNC: 10.7 G/DL (ref 12–15.9)
IMM GRANULOCYTES # BLD AUTO: 0.04 10*3/MM3 (ref 0–0.05)
IMM GRANULOCYTES NFR BLD AUTO: 0.6 % (ref 0–0.5)
LAB AP CASE REPORT: NORMAL
LYMPHOCYTES # BLD AUTO: 0.84 10*3/MM3 (ref 0.7–3.1)
LYMPHOCYTES NFR BLD AUTO: 13.6 % (ref 19.6–45.3)
MCH RBC QN AUTO: 27.9 PG (ref 26.6–33)
MCHC RBC AUTO-ENTMCNC: 30.1 G/DL (ref 31.5–35.7)
MCV RBC AUTO: 92.7 FL (ref 79–97)
MONOCYTES # BLD AUTO: 0.68 10*3/MM3 (ref 0.1–0.9)
MONOCYTES NFR BLD AUTO: 11 % (ref 5–12)
NEUTROPHILS NFR BLD AUTO: 4.33 10*3/MM3 (ref 1.7–7)
NEUTROPHILS NFR BLD AUTO: 70.3 % (ref 42.7–76)
NRBC BLD AUTO-RTO: 0 /100 WBC (ref 0–0.2)
PATH REPORT.FINAL DX SPEC: NORMAL
PATH REPORT.GROSS SPEC: NORMAL
PLATELET # BLD AUTO: 203 10*3/MM3 (ref 140–450)
PMV BLD AUTO: 11 FL (ref 6–12)
POTASSIUM SERPL-SCNC: 4.2 MMOL/L (ref 3.5–5.2)
RBC # BLD AUTO: 3.84 10*6/MM3 (ref 3.77–5.28)
SODIUM SERPL-SCNC: 141 MMOL/L (ref 136–145)
WBC NRBC COR # BLD AUTO: 6.17 10*3/MM3 (ref 3.4–10.8)

## 2024-10-15 PROCEDURE — 25010000002 PROPOFOL 1000 MG/100ML EMULSION: Performed by: NURSE ANESTHETIST, CERTIFIED REGISTERED

## 2024-10-15 PROCEDURE — 25810000003 SODIUM CHLORIDE 0.9 % SOLUTION: Performed by: NURSE ANESTHETIST, CERTIFIED REGISTERED

## 2024-10-15 PROCEDURE — 93320 DOPPLER ECHO COMPLETE: CPT

## 2024-10-15 PROCEDURE — 76376 3D RENDER W/INTRP POSTPROCES: CPT

## 2024-10-15 PROCEDURE — 99232 SBSQ HOSP IP/OBS MODERATE 35: CPT | Performed by: INTERNAL MEDICINE

## 2024-10-15 PROCEDURE — 93312 ECHO TRANSESOPHAGEAL: CPT

## 2024-10-15 PROCEDURE — 80048 BASIC METABOLIC PNL TOTAL CA: CPT | Performed by: INTERNAL MEDICINE

## 2024-10-15 PROCEDURE — 85025 COMPLETE CBC W/AUTO DIFF WBC: CPT | Performed by: INTERNAL MEDICINE

## 2024-10-15 PROCEDURE — 93312 ECHO TRANSESOPHAGEAL: CPT | Performed by: INTERNAL MEDICINE

## 2024-10-15 PROCEDURE — 93325 DOPPLER ECHO COLOR FLOW MAPG: CPT | Performed by: INTERNAL MEDICINE

## 2024-10-15 PROCEDURE — 71045 X-RAY EXAM CHEST 1 VIEW: CPT

## 2024-10-15 PROCEDURE — 25010000002 PHENYLEPHRINE 10 MG/ML SOLUTION: Performed by: NURSE ANESTHETIST, CERTIFIED REGISTERED

## 2024-10-15 PROCEDURE — 25010000002 LIDOCAINE PF 2% 2 % SOLUTION: Performed by: NURSE ANESTHETIST, CERTIFIED REGISTERED

## 2024-10-15 PROCEDURE — 25010000002 FUROSEMIDE PER 20 MG: Performed by: INTERNAL MEDICINE

## 2024-10-15 PROCEDURE — B24BZZ4 ULTRASONOGRAPHY OF HEART WITH AORTA, TRANSESOPHAGEAL: ICD-10-PCS | Performed by: INTERNAL MEDICINE

## 2024-10-15 PROCEDURE — 76376 3D RENDER W/INTRP POSTPROCES: CPT | Performed by: INTERNAL MEDICINE

## 2024-10-15 PROCEDURE — 93321 DOPPLER ECHO F-UP/LMTD STD: CPT

## 2024-10-15 PROCEDURE — 93325 DOPPLER ECHO COLOR FLOW MAPG: CPT

## 2024-10-15 PROCEDURE — 97166 OT EVAL MOD COMPLEX 45 MIN: CPT

## 2024-10-15 PROCEDURE — 93321 DOPPLER ECHO F-UP/LMTD STD: CPT | Performed by: INTERNAL MEDICINE

## 2024-10-15 RX ORDER — DIPHENHYDRAMINE HYDROCHLORIDE 50 MG/ML
12.5 INJECTION INTRAMUSCULAR; INTRAVENOUS ONCE AS NEEDED
Status: CANCELLED | OUTPATIENT
Start: 2024-10-15

## 2024-10-15 RX ORDER — PHENYLEPHRINE HYDROCHLORIDE 10 MG/ML
INJECTION INTRAVENOUS AS NEEDED
Status: DISCONTINUED | OUTPATIENT
Start: 2024-10-15 | End: 2024-10-15 | Stop reason: SURG

## 2024-10-15 RX ORDER — OXYCODONE HYDROCHLORIDE 5 MG/1
10 TABLET ORAL EVERY 4 HOURS PRN
Status: CANCELLED | OUTPATIENT
Start: 2024-10-15

## 2024-10-15 RX ORDER — METOPROLOL SUCCINATE 25 MG/1
25 TABLET, EXTENDED RELEASE ORAL EVERY 12 HOURS SCHEDULED
Status: DISCONTINUED | OUTPATIENT
Start: 2024-10-15 | End: 2024-10-17 | Stop reason: HOSPADM

## 2024-10-15 RX ORDER — FLUMAZENIL 0.1 MG/ML
0.2 INJECTION INTRAVENOUS AS NEEDED
Status: CANCELLED | OUTPATIENT
Start: 2024-10-15 | End: 2024-10-16

## 2024-10-15 RX ORDER — NALOXONE HCL 0.4 MG/ML
0.4 VIAL (ML) INJECTION AS NEEDED
Status: CANCELLED | OUTPATIENT
Start: 2024-10-15 | End: 2024-10-16

## 2024-10-15 RX ORDER — PROPOFOL 10 MG/ML
INJECTION, EMULSION INTRAVENOUS CONTINUOUS PRN
Status: DISCONTINUED | OUTPATIENT
Start: 2024-10-15 | End: 2024-10-15 | Stop reason: SURG

## 2024-10-15 RX ORDER — FUROSEMIDE 10 MG/ML
20 INJECTION INTRAMUSCULAR; INTRAVENOUS ONCE
Status: COMPLETED | OUTPATIENT
Start: 2024-10-15 | End: 2024-10-15

## 2024-10-15 RX ORDER — HYDROMORPHONE HYDROCHLORIDE 1 MG/ML
0.5 INJECTION, SOLUTION INTRAMUSCULAR; INTRAVENOUS; SUBCUTANEOUS
Status: CANCELLED | OUTPATIENT
Start: 2024-10-15

## 2024-10-15 RX ORDER — DIPHENHYDRAMINE HYDROCHLORIDE 50 MG/ML
12.5 INJECTION INTRAMUSCULAR; INTRAVENOUS
Status: CANCELLED | OUTPATIENT
Start: 2024-10-15

## 2024-10-15 RX ORDER — LABETALOL HYDROCHLORIDE 5 MG/ML
5 INJECTION, SOLUTION INTRAVENOUS
Status: CANCELLED | OUTPATIENT
Start: 2024-10-15

## 2024-10-15 RX ORDER — ONDANSETRON 2 MG/ML
4 INJECTION INTRAMUSCULAR; INTRAVENOUS ONCE AS NEEDED
Status: CANCELLED | OUTPATIENT
Start: 2024-10-15

## 2024-10-15 RX ORDER — OXYCODONE HYDROCHLORIDE 5 MG/1
5 TABLET ORAL ONCE AS NEEDED
Status: CANCELLED | OUTPATIENT
Start: 2024-10-15

## 2024-10-15 RX ORDER — LIDOCAINE HYDROCHLORIDE 20 MG/ML
INJECTION, SOLUTION EPIDURAL; INFILTRATION; INTRACAUDAL; PERINEURAL AS NEEDED
Status: DISCONTINUED | OUTPATIENT
Start: 2024-10-15 | End: 2024-10-15 | Stop reason: SURG

## 2024-10-15 RX ORDER — EPHEDRINE SULFATE 5 MG/ML
5 INJECTION INTRAVENOUS ONCE AS NEEDED
Status: CANCELLED | OUTPATIENT
Start: 2024-10-15

## 2024-10-15 RX ORDER — HYDRALAZINE HYDROCHLORIDE 20 MG/ML
5 INJECTION INTRAMUSCULAR; INTRAVENOUS
Status: CANCELLED | OUTPATIENT
Start: 2024-10-15

## 2024-10-15 RX ORDER — IPRATROPIUM BROMIDE AND ALBUTEROL SULFATE 2.5; .5 MG/3ML; MG/3ML
3 SOLUTION RESPIRATORY (INHALATION) ONCE AS NEEDED
Status: CANCELLED | OUTPATIENT
Start: 2024-10-15

## 2024-10-15 RX ORDER — SODIUM CHLORIDE 9 MG/ML
INJECTION, SOLUTION INTRAVENOUS CONTINUOUS PRN
Status: DISCONTINUED | OUTPATIENT
Start: 2024-10-15 | End: 2024-10-15 | Stop reason: SURG

## 2024-10-15 RX ADMIN — SODIUM BICARBONATE 650 MG: 650 TABLET ORAL at 21:01

## 2024-10-15 RX ADMIN — ASPIRIN 81 MG: 81 TABLET, COATED ORAL at 05:11

## 2024-10-15 RX ADMIN — RIVAROXABAN 15 MG: 15 TABLET, FILM COATED ORAL at 17:11

## 2024-10-15 RX ADMIN — ALPRAZOLAM 1 MG: 1 TABLET ORAL at 22:01

## 2024-10-15 RX ADMIN — PHENYLEPHRINE HYDROCHLORIDE 100 MCG: 10 INJECTION INTRAVENOUS at 15:46

## 2024-10-15 RX ADMIN — ACETAMINOPHEN 650 MG: 325 TABLET, FILM COATED ORAL at 21:01

## 2024-10-15 RX ADMIN — ACETAMINOPHEN 650 MG: 325 TABLET, FILM COATED ORAL at 17:15

## 2024-10-15 RX ADMIN — ESCITALOPRAM OXALATE 20 MG: 10 TABLET ORAL at 05:11

## 2024-10-15 RX ADMIN — LIOTHYRONINE SODIUM 5 MCG: 5 TABLET ORAL at 05:12

## 2024-10-15 RX ADMIN — PANTOPRAZOLE SODIUM 40 MG: 40 TABLET, DELAYED RELEASE ORAL at 05:11

## 2024-10-15 RX ADMIN — MEMANTINE 10 MG: 10 TABLET ORAL at 21:01

## 2024-10-15 RX ADMIN — FUROSEMIDE 20 MG: 10 INJECTION, SOLUTION INTRAMUSCULAR; INTRAVENOUS at 11:19

## 2024-10-15 RX ADMIN — LEVOTHYROXINE SODIUM 112 MCG: 0.11 TABLET ORAL at 05:12

## 2024-10-15 RX ADMIN — Medication 10 ML: at 21:01

## 2024-10-15 RX ADMIN — LIDOCAINE HYDROCHLORIDE 60 MG: 20 INJECTION, SOLUTION EPIDURAL; INFILTRATION; INTRACAUDAL; PERINEURAL at 15:33

## 2024-10-15 RX ADMIN — MEMANTINE 10 MG: 10 TABLET ORAL at 05:11

## 2024-10-15 RX ADMIN — PANTOPRAZOLE SODIUM 40 MG: 40 TABLET, DELAYED RELEASE ORAL at 17:11

## 2024-10-15 RX ADMIN — Medication 1000 UNITS: at 05:12

## 2024-10-15 RX ADMIN — METOPROLOL SUCCINATE 25 MG: 25 TABLET, FILM COATED, EXTENDED RELEASE ORAL at 21:01

## 2024-10-15 RX ADMIN — PROPOFOL INJECTABLE EMULSION 140 MCG/KG/MIN: 10 INJECTION, EMULSION INTRAVENOUS at 15:33

## 2024-10-15 RX ADMIN — SODIUM BICARBONATE 650 MG: 650 TABLET ORAL at 05:12

## 2024-10-15 RX ADMIN — SODIUM CHLORIDE: 9 INJECTION, SOLUTION INTRAVENOUS at 15:29

## 2024-10-15 RX ADMIN — LIOTHYRONINE SODIUM 5 MCG: 5 TABLET ORAL at 21:01

## 2024-10-15 RX ADMIN — PHENYLEPHRINE HYDROCHLORIDE 50 MCG: 10 INJECTION INTRAVENOUS at 15:51

## 2024-10-15 RX ADMIN — POLYETHYLENE GLYCOL 3350 17 G: 17 POWDER, FOR SOLUTION ORAL at 21:02

## 2024-10-15 NOTE — PLAN OF CARE
Goal Outcome Evaluation:              Outcome Evaluation: Pt presents as a  74 y.o. female with a CMH of anxiety and depression, chronic back pain, chronic renal failure stage III, hypertension, hyperlipidemia, heart failure reduced EF, paroxysmal atrial fibrillation on anticoagulation who presented to [facility] on 10/11/2024 with complaints of several near syncopal and 1 syncopal episode associated with hypotension. Pt was admitted to [facility] from 10/5/2024 to 10/10/24. Pt had a fall at home and injured her left ankle. X-ray left ankle (-). CXR (-). CT head (-). Pt returned home with daughter prior to presenting to [facility] due to syncopal episode.  Reports chronic pain in back and neck.  Pt typically lives with adult son in [facility] with 1 HERMELINDA and she is independent with community mobility without AD.  This date pt able to come to EOB and transfer with [facility].  BP taken wiht mild drop with each position, though pt not symptomatic.  C/o pain in L ankle with weight bearing.  Pt high risk for falls and demos decreased activity tolerance.  Pt participates well with therapy and is eager to improve functionally.  Recommend acute IP rehab at discharge.    Anticipated Discharge Disposition (OT): inpatient rehabilitation facility

## 2024-10-15 NOTE — ANESTHESIA POSTPROCEDURE EVALUATION
Patient: Natalee Noble    Procedure Summary       Date: 10/15/24 Room / Location: [facility]    Anesthesia Start: 1529 Anesthesia Stop: 1606    Procedure: ADULT TRANSESOPHAGEAL ECHO (MELISSA) W/ CONT IF NECESSARY PER PROTOCOL Diagnosis:       (Guidance for Cardiac Intervention)      (Closure Device)    Scheduled Providers: Jareth Moses DO Provider: Jerome Nunez MD    Anesthesia Type: general ASA Status: 3            Anesthesia Type: general    Vitals  Vitals Value Taken Time   /92 10/15/24 1709   Temp     Pulse 106 10/15/24 1859   Resp 18 10/15/24 1709   SpO2 99 % 10/15/24 1859   Vitals shown include unfiled device data.        Post Anesthesia Care and Evaluation    Patient location during evaluation: PACU  Patient participation: complete - patient participated  Level of consciousness: awake  Pain scale: See nurse's notes for pain score.  Pain management: adequate    Airway patency: patent  Anesthetic complications: No anesthetic complications  PONV Status: none  Cardiovascular status: acceptable  Respiratory status: acceptable and spontaneous ventilation  Hydration status: acceptable    Comments: Patient seen and examined postoperatively; vital signs stable; SpO2 greater than or equal to 90%; cardiopulmonary status stable; nausea/vomiting adequately controlled; pain adequately controlled; no apparent anesthesia complications; patient discharged from anesthesia care when discharge criteria were met

## 2024-10-15 NOTE — ANESTHESIA PREPROCEDURE EVALUATION
Anesthesia Evaluation     Patient summary reviewed and Nursing notes reviewed   no history of anesthetic complications:   NPO Solid Status: > 8 hours  NPO Liquid Status: > 8 hours           Airway   Mallampati: II  TM distance: >3 FB  Neck ROM: full  No difficulty expected  Dental - normal exam     Pulmonary - normal exam   Cardiovascular - normal exam    (+) hypertension, dysrhythmias Atrial Fib, hyperlipidemia      Neuro/Psych  (+) CVA, headaches, psychiatric history  GI/Hepatic/Renal/Endo    (+) GERD, renal disease- CRI, thyroid problem hypothyroidism    Musculoskeletal     (+) back pain, neck pain  Abdominal  - normal exam    Bowel sounds: normal.   Substance History      OB/GYN          Other   arthritis,                     Anesthesia Plan    ASA 3     general   total IV anesthesia  intravenous induction     Anesthetic plan, risks, benefits, and alternatives have been provided, discussed and informed consent has been obtained with: patient.  Pre-procedure education provided  Plan discussed with CRNA.        CODE STATUS:    Code Status (Patient has no pulse and is not breathing): CPR (Attempt to Resuscitate)  Medical Interventions (Patient has pulse or is breathing): Full Support

## 2024-10-15 NOTE — PLAN OF CARE
Goal Outcome Evaluation:         Patient alert and oriented. Voices needs. MELISSA complete this shift.

## 2024-10-15 NOTE — PROGRESS NOTES
Cardiology Progress Note    Patient Identification:  Name: Natalee Noble  Age: 74 y.o.  Sex: female  :  1950  MRN: [id number]                 Follow Up / Chief Complaint: Hypertension, A-fib, CHF  Chief Complaint   Patient presents with    Hypotension       Interval History: Patient recently was in [facility] with newly diagnosed LV dysfunction was started on guideline directed medical therapy presented to ER with hypotension and recurrent syncopal episodes.    NP note: Patient seen and examined.  Sitting in chair no distress noted patient remains in A-fib with a rate controlled  EP has evaluated patient as well as Dr. Osman for shared decision making for Watchman procedure. Patient to have MELISSA later this afternoon.     Per nursing notes, she was orthostatic last evening when getting back to bed from sitting in chair.    Electronically signed by GREGORIA Yuen, 10/15/24, 12:47 PM EDT.    Cardiology attending addendum :    I have personally performed a face-to-face diagnostic evaluation, physical exam and reviewed data on this patient.  I have reviewed documentation done by me and nurse practitioner  and corrected as needed.  And agree with the different components of documentation.Greater than 50% of the time spent in the care of this patient was provided by attending consultant/me.           Subjective: Patient seen and examined.  Chart reviewed.  Labs reviewed.  Patient denies any chest pain currently.  Patient reportedly got dizzy with standing up from chair today was found to be orthostatic.      Objective:  10/13/2024: EKG done March 10, 2013 2024 reveals A-fib with a rate of 123 bpm  10/14/2024: Creatinine 1.22 hemoglobin 10.7  10/15/2024: Creatinine 1.30 EGFR 43.2 glucose 122 hemoglobin 10.7    History of present illness:      Natalee Noble is a 74-year-old female with a medical history to include:     Atrial fibrillation  [id number] SCORE   [id number] Score: 6 (10/12/2024   9:08 AM)     Long-term anticoagulation  Intolerant to flecainide and amiodarone in the past  HFrEF  Hypertension  Dyslipidemia  CKD stage III  History of CVA  Anxiety/depression  GERD  Diverticulosis  Stroke     Who presented to [facility] on 10/11/2024 following syncopal episode.  Patient reports that she was recently hospitalized at [facility] for 6 days for respiratory infection, CHF and atrial fibrillation.  She was discharged on Thursday.  Echocardiogram completed 10/5/2024 shows reduced LVEF of 35 to 40%, moderate global hypokinesis of left ventricle and mild to moderate mitral, tricuspid, and pulmonic valve regurgitation.  While hospitalized she was treated with IV antibiotics and started on Entresto, spironolactone, digoxin.  She reports 2 syncopal episodes yesterday.  Associated symptoms of shortness of breath and nausea.  She denies chest pain, palpitations, edema.  Radiology consulted for hypotension and syncope.  Upon admission patient noted to be significantly hypotensive at 69/47.  Pertinent labs include troponin 64, digoxin 1.15 creatinine 1.44, TSH unremarkable.  CXR without evidence of active disease.  Patient follows with Dr. Ordaz for cardiology.  She is also scheduled to see an electrophysiologist in near future to discuss atrial fibrilation ablation.  She reports she is intolerable to amiodarone and flecainide.           Assessment:  :     Dizziness near syncope  Hypotension  Recurrent syncope  Elevated troponin  Chronic HFrEF due to systolic dysfunction from dilated cardiomyopathy  Paroxysmal atrial fibrillation on long-term anticoagulation/Xarelto  Dyslipidemia  CKD  History of CVA        Recommendations / Plan:        Patient says she has recurrent syncope since age 12 with migraine headaches.  Recently was diagnosed with heart failure due to reduced ejection fraction LVEF of 35-40%, was put on guideline directed medical therapy with with Entresto, Aldactone, digoxin, metoprolol succinate,  Farxiga.  Patient's blood pressure was low and was having near syncope and dizziness.  Also has been having nausea.    Patient is having recurrent falls and syncope.  Has history of A-fib and elevated [id number] score of 6.  Will benefit from long-term anticoagulation but due to her falls she is a poor candidate for long-term anticoagulation.  Patient's troponin is elevated at 68 has no particular trend has come down to 64.  Will monitor serial troponins.  Probably is due to congestive heart failure and A-fib..  Will monitor renal function.  Follow-up with nephrology for CKD.  Patient is getting orthostatic and hypotensive.  Will decrease metoprolol to 12.5 twice daily..      Copied text in this portion of the note has been reviewed and is accurate as of 10/15/2024    Past Medical History:  Past Medical History:   Diagnosis Date    Anxiety and depression     Arthritis     Back pain     CRF (chronic renal failure), stage 3 (moderate)     Diverticulosis     Essential hypertension     Family history of colon cancer     Family history of colonic polyps     Fractures     R tibia/fibula; L ankle    GERD (gastroesophageal reflux disease)     History of adenomatous polyp of colon     History of cardiac cath     1/28/19 left main no significant disease. LAD with no significant disease. LCX no significant disease. RCA no significant disease.    History of cardiac monitoring     2/01/19 - ZIO PATCH - Underlying rhythm is sinus at 48-94 bpm. PSVT x23 with fastest 167 bpm, longest 24.4 seconds.   10/05/2018- ZIO PATCH - NSR most of time but did have episode of A fib ranging from .    History of colon polyps     History of echocardiogram     9/2018: Left ventricular systolic function is normal. EF 50%. Left ventricular diastolic dysfunction consistent with ipaired relaxation. Left atrial cavity size is mildly dilated. Mild MR. Mild to moderate TR. There is abnormal thickening noted on the RV that may represent a  vegetation-this was discussed with Dr. Hammer and Dr. Reilly. No patent foramen ovale. No further work up needed at this time    Hyperlipidemia     Kidney disorder     Migraine     Migraines     Neck pain     Stroke 2018     Past Surgical History:  Past Surgical History:   Procedure Laterality Date    ABDOMINOPLASTY      BACK SURGERY      L2-5 fusion 2012    CHOLECYSTECTOMY      COLONOSCOPY  11/24/2014    Diverticulosis; Repeat 5 years    COLONOSCOPY  11/09/2010    Diverticulosis; Repeat 4 years    COLONOSCOPY  09/18/2007    Dr. Monzon-Normal; Repeat 3 years    COLONOSCOPY  06/02/2005    Dr. Monzon-Diminuitive polypectomy above the cecum; Otherwise normal colonoscopy; Repeat 2 years    COLONOSCOPY N/A 11/01/2019    Diverticulosis in the left colon; Two 5-6mm polyps in the cecum-path shows one tubular adenomatous and on hyperplastic polyp; One 8mm tubular adenomatous polyp in the ascending colon; Two 4-5mm tubular adenomatous polyps in the transverse colon; Repeat 3 years    COLONOSCOPY N/A 6/12/2023    Procedure: COLONOSCOPY WITH ANESTHESIA;  Surgeon: Susan Lord MD;  Location: [facility] ENDOSCOPY;  Service: Gastroenterology;  Laterality: N/A;  preop hx of polyps   postop; polyps   PCP Woody Valentin MD    CYSTOCELE REPAIR      Cystocele and rectocele repair    ENDOSCOPY  09/28/2012    LA grade C esophagitis; HH    ENDOSCOPY N/A 11/01/2019    Small HH; Non-erosive gastritis-biopsied; Normal examined duodenum    ENDOSCOPY N/A 10/13/2024    Procedure: ESOPHAGOGASTRODUODENOSCOPY WITH BIOPSY X 1 AREA;  Surgeon: Miguel Vega MD;  Location: [facility] ENDOSCOPY;  Service: Gastroenterology;  Laterality: N/A;  Hiatal hernia, esophagitis, gastritis    FIBULA FRACTURE SURGERY      HYSTERECTOMY      NECK SURGERY  2012    ACDF C4-7    ORIF TIBIA FRACTURE Right         Social History:   Social History     Tobacco Use    Smoking status: Never    Smokeless tobacco: Never   Substance Use Topics    Alcohol use: No  "     Family History:  Family History   Problem Relation Age of Onset    Colon cancer Father 65    Colon polyps Daughter 38    Esophageal cancer Neg Hx     Liver cancer Neg Hx     Liver disease Neg Hx     Rectal cancer Neg Hx     Stomach cancer Neg Hx           Allergies:  Allergies   Allergen Reactions    Benzoin Anaphylaxis and Swelling     States when used on her neck it shut off her airway  Huge abscesses with surgery      Iodine Other (See Comments)     PT UNABLE TO TELL RN ABOUT REACTION AT THIS TIME, BUT FAMILY STATES PT HAD A REACTION TO BEING CLEANSED WITH IODINE IN SURGERY  IOBAN - used on neck, swelled to the point of shutting off airway    Vancomycin Other (See Comments)     Kidney failure  Vancomycin induced acute kidney injury      Amiodarone Other (See Comments)     Tremor- hand and face     Levaquin [Levofloxacin] Unknown (See Comments)     Unknown    Sulfa Antibiotics     Acetaminophen Nausea Only    Latex Itching     Scheduled Meds:  aspirin, 81 mg, Daily  cholecalciferol, 1,000 Units, Daily  escitalopram, 20 mg, Daily  levothyroxine, 112 mcg, Q AM  Lifitegrast, 1 drop, Daily  liothyronine, 5 mcg, BID  memantine, 10 mg, Q12H  metoprolol succinate XL, 25 mg, Q12H  pantoprazole, 40 mg, BID AC  polyethylene glycol, 17 g, BID  rivaroxaban, 15 mg, Daily With Dinner  sodium bicarbonate, 650 mg, BID          Review of Systems:   Review of Systems   Cardiovascular:  Negative for chest pain.   Neurological:  Positive for dizziness and weakness.            Constitutional:  Temp:  [97.3 °F (36.3 °C)-97.9 °F (36.6 °C)] 97.3 °F (36.3 °C)  Heart Rate:  [] 107  Resp:  [14-21] 20  BP: ()/(49-84) 99/69    Physical Exam   BP 99/69   Pulse 107   Temp 97.3 °F (36.3 °C)   Resp 20   Ht 165.1 cm (65\")   Wt 78.9 kg (174 lb)   SpO2 96%   BMI 28.96 kg/m²   General:  Appears in no acute distress  Eyes: Sclera is anicteric,  conjunctiva is clear   HEENT:  No JVD. Thyroid not visibly enlarged. No mucosal " pallor or cyanosis  Respiratory: Respirations regular and unlabored at rest.  Clear to auscultation  Cardiovascular: S1,S2, irregularly irregular rate  Gastrointestinal: Abdomen nondistended.  Musculoskeletal:  No abnormal movements  Extremities: No digital clubbing or cyanosis  Skin: Color pink.   Neuro: Alert and awake.    INTAKE AND OUTPUT:    Intake/Output Summary (Last 24 hours) at 10/15/2024 1644  Last data filed at 10/15/2024 1606  Gross per 24 hour   Intake 424.95 ml   Output 1000 ml   Net -575.05 ml       Cardiographics  Telemetry: A-fib    ECG:   ECG 12 Lead Drug Monitoring; Amiodarone   Preliminary Result   HEART GZNN=352  bpm   RR Lngnqztk=091  ms   RI Interval=  ms   P Horizontal Axis=  deg   P Front Axis=  deg   QRSD Interval=79  ms   QT Ltblucha=957  ms   JXdV=420  ms   QRS Axis=47  deg   T Wave Axis=236  deg   - ABNORMAL ECG -   Atrial fibrillation   Repol abnrm, severe global ischemia (LM/MVD)   Date and Time of Study:2024-10-14 10:16:06      ECG 12 Lead Drug Monitoring; Amiodarone   Final Result   HEART RATE=92  bpm   RR Xrcwwfab=554  ms   RI Interval=  ms   P Horizontal Axis=  deg   P Front Axis=  deg   QRSD Interval=89  ms   QT Stmsksnc=733  ms   CVxP=860  ms   QRS Axis=45  deg   T Wave Axis=261  deg   - ABNORMAL ECG -   Atrial fibrillation   Repol abnrm, severe global ischemia (LM/MVD)   When compared with ECG of 13-Oct-2024 19:20:53,   Significant rate decrease   Electronically Signed By: Josemanuel Sheriff ([facility]) 2024-10-14 08:04:32   Date and Time of Study:2024-10-14 05:23:18      ECG 12 Lead Rhythm Change   Final Result   HEART SCSS=564  bpm   RR Oeijnrqm=269  ms   RI Interval=  ms   P Horizontal Axis=  deg   P Front Axis=  deg   QRSD Interval=83  ms   QT Ihooidse=798  ms   BDwA=408  ms   QRS Axis=61  deg   T Wave Axis=250  deg   - ABNORMAL ECG -   Atrial fibrillation   Repol abnrm suggests ischemia, diffuse leads   When compared with ECG of 11-Oct-2024 20:47:52,   Significant rate increase    Electronically Signed By: Clarke Correa ([facility]) 2024-10-14 14:48:44   Date and Time of Study:2024-10-13 19:20:53      ECG 12 Lead Other; hypotension   Final Result   HEART RATE=98  bpm   RR Szqjmsir=984  ms   TX Interval=  ms   P Horizontal Axis=  deg   P Front Axis=  deg   QRSD Interval=80  ms   QT Exnjbtbk=056  ms   FJxC=673  ms   QRS Axis=55  deg   T Wave Axis=243  deg   - ABNORMAL ECG -   Atrial fibrillation   Repol abnrm suggests ischemia, anterolateral   No previous ECG available for comparison   Electronically Signed By: Fuad Macias ([facility]) 2024-10-12 08:32:29   Date and Time of Study:2024-10-11 20:47:52      Telemetry Scan   Final Result      Telemetry Scan   Final Result      Telemetry Scan   Final Result      Telemetry Scan   Final Result      Telemetry Scan   Final Result      Telemetry Scan   Final Result      Telemetry Scan   Final Result      Telemetry Scan   Final Result      Telemetry Scan   Final Result      Telemetry Scan   Final Result      Telemetry Scan   Final Result      Telemetry Scan   Final Result      Telemetry Scan   Final Result      Telemetry Scan   Final Result      Telemetry Scan   Final Result      Telemetry Scan   Final Result      Telemetry Scan   Final Result      Telemetry Scan   Final Result      Telemetry Scan   Final Result      Telemetry Scan   Final Result      Telemetry Scan   Final Result      Telemetry Scan   Final Result      Telemetry Scan   Final Result      Telemetry Scan   Final Result      Telemetry Scan   Final Result      Telemetry Scan   Final Result      Telemetry Scan   Final Result        I have personally reviewed EKG    Echocardiogram: Results for orders placed during the hospital encounter of 09/23/22    Adult Transthoracic Echo Complete W/ Color, Spectral and Contrast if Necessary Per Protocol    Interpretation Summary  · Calculated left ventricular EF = 51.2% Estimated left ventricular EF was in agreement with the calculated left ventricular EF.  · Left  "ventricular diastolic function is consistent with (grade I) impaired relaxation.  · Moderate tricuspid valve regurgitation is present.      Lab Review   I have reviewed the labs  Results from last 7 days   Lab Units 10/12/24  0112 10/11/24  2142   HSTROP T ng/L 64* 68*         Results from last 7 days   Lab Units 10/15/24  0523   SODIUM mmol/L 141   POTASSIUM mmol/L 4.2   BUN mg/dL 19   CREATININE mg/dL 1.30*   CALCIUM mg/dL 8.7         Results from last 7 days   Lab Units 10/15/24  0523 10/14/24  1159 10/13/24  0532   WBC 10*3/mm3 6.17 6.79 6.79   HEMOGLOBIN g/dL 10.7* 10.7* 11.4*   HEMATOCRIT % 35.6 37.5 38.1   PLATELETS 10*3/mm3 203 161 235           RADIOLOGY:  Imaging Results (Last 24 Hours)       Procedure Component Value Units Date/Time    XR Chest 1 View [[id number]] Collected: 10/15/24 0919     Updated: 10/15/24 0922    Narrative:      XR CHEST 1 VW    Date of Exam: 10/15/2024 9:07 AM EDT    Indication: Cough, congestion    Comparison: 10/11/2024.    Findings:  Heart and pulmonary vessels appear within normal limits. Lung fields are clear of acute infiltrates or effusions. There is no pneumothorax.      Impression:      Impression:  No acute process.      Electronically Signed: Chela Lennon MD    10/15/2024 9:20 AM EDT    Workstation ID: [id number]                  )10/15/2024  Josemanuel Sheriff MD      [facility] Dragon/Transcription:   \"Dictated utilizing Dragon dictation\".   "

## 2024-10-15 NOTE — THERAPY EVALUATION
Patient Name: Natalee Noble  : 1950    MRN: [id number]                              Today's Date: 10/15/2024       Admit Date: 10/11/2024    Visit Dx:     ICD-10-CM ICD-9-CM   1. Hypotension, unspecified hypotension type  I95.9 458.9   2. Elevated troponin  R79.89 790.6   3. Renal insufficiency  N28.9 593.9   4. Nausea  R11.0 787.02     Patient Active Problem List   Diagnosis    Cerebrovascular accident (CVA)    Paroxysmal A-fib    Hyperlipidemia    Migraines    History of echocardiogram    History of cardiac cath    History of cardiac monitoring    CKD (chronic kidney disease) stage 3, GFR 30-59 ml/min    Allergy to iodine    Adenomatous colon polyp    Epigastric pain    Nausea    Bloating    Belching    Long term current use of anticoagulant therapy    Atrial fibrillation with RVR    Metatarsal fracture, pathologic, left, initial encounter    FH: colon cancer    FH: colon polyps    Hypotensive episode    Acute on chronic renal insufficiency    Elevated troponin    Chronic HFrEF (heart failure with reduced ejection fraction)    Anxiety disorder    Constipation    Hypothyroidism (acquired)    Rheumatoid arthritis involving multiple sites     Past Medical History:   Diagnosis Date    Anxiety and depression     Arthritis     Back pain     CRF (chronic renal failure), stage 3 (moderate)     Diverticulosis     Essential hypertension     Family history of colon cancer     Family history of colonic polyps     Fractures     R tibia/fibula; L ankle    GERD (gastroesophageal reflux disease)     History of adenomatous polyp of colon     History of cardiac cath     19 left main no significant disease. LAD with no significant disease. LCX no significant disease. RCA no significant disease.    History of cardiac monitoring     19 - ZIO PATCH - Underlying rhythm is sinus at 48-94 bpm. PSVT x23 with fastest 167 bpm, longest 24.4 seconds.   10/05/2018- ZIO PATCH - NSR most of time but did have episode of A fib  ranging from .    History of colon polyps     History of echocardiogram     9/2018: Left ventricular systolic function is normal. EF 50%. Left ventricular diastolic dysfunction consistent with ipaired relaxation. Left atrial cavity size is mildly dilated. Mild MR. Mild to moderate TR. There is abnormal thickening noted on the RV that may represent a vegetation-this was discussed with Dr. Hammer and Dr. Reilly. No patent foramen ovale. No further work up needed at this time    Hyperlipidemia     Kidney disorder     Migraine     Migraines     Neck pain     Stroke 2018     Past Surgical History:   Procedure Laterality Date    ABDOMINOPLASTY      BACK SURGERY      L2-5 fusion 2012    CHOLECYSTECTOMY      COLONOSCOPY  11/24/2014    Diverticulosis; Repeat 5 years    COLONOSCOPY  11/09/2010    Diverticulosis; Repeat 4 years    COLONOSCOPY  09/18/2007    Dr. Monzon-Normal; Repeat 3 years    COLONOSCOPY  06/02/2005    Dr. Monzon-Diminuitive polypectomy above the cecum; Otherwise normal colonoscopy; Repeat 2 years    COLONOSCOPY N/A 11/01/2019    Diverticulosis in the left colon; Two 5-6mm polyps in the cecum-path shows one tubular adenomatous and on hyperplastic polyp; One 8mm tubular adenomatous polyp in the ascending colon; Two 4-5mm tubular adenomatous polyps in the transverse colon; Repeat 3 years    COLONOSCOPY N/A 6/12/2023    Procedure: COLONOSCOPY WITH ANESTHESIA;  Surgeon: Susan Lord MD;  Location: [facility] ENDOSCOPY;  Service: Gastroenterology;  Laterality: N/A;  preop hx of polyps   postop; polyps   PCP Woody Valentin MD    CYSTOCELE REPAIR      Cystocele and rectocele repair    ENDOSCOPY  09/28/2012    LA grade C esophagitis; HH    ENDOSCOPY N/A 11/01/2019    Small HH; Non-erosive gastritis-biopsied; Normal examined duodenum    ENDOSCOPY N/A 10/13/2024    Procedure: ESOPHAGOGASTRODUODENOSCOPY WITH BIOPSY X 1 AREA;  Surgeon: Miguel Vgea MD;  Location: [facility] ENDOSCOPY;  Service:  Gastroenterology;  Laterality: N/A;  Hiatal hernia, esophagitis, gastritis    FIBULA FRACTURE SURGERY      HYSTERECTOMY      NECK SURGERY  2012    ACDF C4-7    ORIF TIBIA FRACTURE Right       General Information       Row Name 10/15/24 1606          OT Time and Intention    Document Type evaluation  -SR     Mode of Treatment occupational therapy  -SR       Row Name 10/15/24 1606          General Information    Existing Precautions/Restrictions fall;orthostatic hypotension  -SR       Row Name 10/15/24 1606          Occupational Profile    Reason for Services/Referral (Occupational Profile) Pt presents as a  74 y.o. female with a CMH of anxiety and depression, chronic back pain, chronic renal failure stage III, hypertension, hyperlipidemia, heart failure reduced EF, paroxysmal atrial fibrillation on anticoagulation who presented to [facility] on 10/11/2024 with complaints of several near syncopal and 1 syncopal episode associated with hypotension. Pt was admitted to [facility] from 10/5/2024 to 10/10/24. Pt had a fall at home and injured her left ankle. X-ray left ankle (-). CXR (-). CT head (-). Pt returned home with daughter prior to presenting to [facility] due to syncopal episode.  Reports chronic pain in back and neck.  Pt typically lives with adult son in [facility] with 1 HERMELINDA and she is independent with community mobility without AD.  -SR       Row Name 10/15/24 1606          Living Environment    People in Home child(siobhan), adult  -SR       Row Name 10/15/24 1606          Cognition    Orientation Status (Cognition) oriented x 4  -SR       Row Name 10/15/24 1606          Safety Issues/Impairments Affecting Functional Mobility    Impairments Affecting Function (Mobility) balance;endurance/activity tolerance;strength;pain  -SR               User Key  (r) = Recorded By, (t) = Taken By, (c) = Cosigned By      Initials Name Provider Type    SR America Kennedy OT Occupational Therapist                      Mobility/ADL's       Row Name 10/15/24 1607          Bed Mobility    Bed Mobility supine-sit  -SR     Supine-Sit [place] (Bed Mobility) contact guard  -SR       Row Name 10/15/24 1607          Transfers    Transfers bed-chair transfer  -SR       Row Name 10/15/24 1607          Bed-Chair Transfer    Bed-Chair [place] (Transfers) contact guard  -SR     Assistive Device (Bed-Chair Transfers) walker, front-wheeled  -SR       Row Name 10/15/24 1607          Sit-Stand Transfer    Sit-Stand [place] (Transfers) contact guard  -SR     Assistive Device (Sit-Stand Transfers) walker, front-wheeled  -SR       Row Name 10/15/24 1607          Activities of Daily Living    BADL Assessment/Intervention grooming  -SR       Row Name 10/15/24 1607          Grooming Assessment/Training    [place] Level (Grooming) supervision  -SR               User Key  (r) = Recorded By, (t) = Taken By, (c) = Cosigned By      Initials Name Provider Type    SR America Kennedy OT Occupational Therapist                   Obj/Interventions       Row Name 10/15/24 1609          Range of Motion Comprehensive    General Range of Motion no range of motion deficits identified  -SR       Row Name 10/15/24 1609          Strength Comprehensive (MMT)    Comment, General Manual Muscle Testing (MMT) Assessment BUE grossly 4-/5  -SR       Row Name 10/15/24 1609          Balance    Balance Assessment standing dynamic balance  -SR     Static Sitting Balance supervision  -SR     Dynamic Sitting Balance contact guard  -SR     Static Standing Balance contact guard  -SR     Dynamic Standing Balance contact guard  -SR     Balance Interventions sitting;standing;sit to stand;supported;static;dynamic;minimal challenge  -SR               User Key  (r) = Recorded By, (t) = Taken By, (c) = Cosigned By      Initials Name Provider Type    SR America Kennedy OT Occupational Therapist                   Goals/Plan       Row Name 10/15/24  1612          Bathing Goal 1 (OT)    Activity/Device (Bathing Goal 1, OT) bathing skills, all  -SR     [place] Level/Cues Needed (Bathing Goal 1, OT) contact guard required  -SR     Time Frame (Bathing Goal 1, OT) 2 weeks  -SR       Row Name 10/15/24 1612          Dressing Goal 1 (OT)    Activity/Device (Dressing Goal 1, OT) lower body dressing  -SR     [place]/Cues Needed (Dressing Goal 1, OT) contact guard required  -SR     Time Frame (Dressing Goal 1, OT) 2 weeks  -SR       Row Name 10/15/24 1612          Toileting Goal 1 (OT)    Activity/Device (Toileting Goal 1, OT) toileting skills, all  -SR     [place] Level/Cues Needed (Toileting Goal 1, OT) supervision required  -SR     Time Frame (Toileting Goal 1, OT) 2 weeks  -SR       Row Name 10/15/24 1612          Therapy Assessment/Plan (OT)    Planned Therapy Interventions (OT) activity tolerance training;BADL retraining;IADL retraining;functional balance retraining;neuromuscular control/coordination retraining;ROM/therapeutic exercise;transfer/mobility retraining;strengthening exercise;occupation/activity based interventions  -SR               User Key  (r) = Recorded By, (t) = Taken By, (c) = Cosigned By      Initials Name Provider Type    SR America Kennedy, OT Occupational Therapist                   Clinical Impression       Row Name 10/15/24 1610          Pain Assessment    Pretreatment Pain Rating 4/10  -SR     Posttreatment Pain Rating 4/10  -SR     Pain Location back  -SR       Row Name 10/15/24 1610          Plan of Care Review    Outcome Evaluation Pt presents as a  74 y.o. female with a CMH of anxiety and depression, chronic back pain, chronic renal failure stage III, hypertension, hyperlipidemia, heart failure reduced EF, paroxysmal atrial fibrillation on anticoagulation who presented to [facility] on 10/11/2024 with complaints of several near syncopal and 1 syncopal episode associated with hypotension. Pt was admitted to  [facility] from 10/5/2024 to 10/10/24. Pt had a fall at home and injured her left ankle. X-ray left ankle (-). CXR (-). CT head (-). Pt returned home with daughter prior to presenting to [facility] due to syncopal episode.  Reports chronic pain in back and neck.  Pt typically lives with adult son in [facility] with 1 HERMELINDA and she is independent with community mobility without AD.  This date pt able to come to EOB and transfer with CGA.  BP taken wiht mild drop with each position, though pt not symptomatic.  C/o pain in L ankle with weight bearing.  Pt high risk for falls and demos decreased activity tolerance.  Pt participates well with therapy and is eager to improve functionally.  Recommend acute IP rehab at discharge.  -SR       Row Name 10/15/24 1610          Therapy Assessment/Plan (OT)    Rehab Potential (OT) good  -SR     Criteria for Skilled Therapeutic Interventions Met (OT) yes  -SR     Therapy Frequency (OT) 5 times/wk  -SR     Predicted Duration of Therapy Intervention (OT) Until discharge  -SR       Row Name 10/15/24 1610          Therapy Plan Review/Discharge Plan (OT)    Anticipated Discharge Disposition (OT) inpatient rehabilitation facility  -SR       Row Name 10/15/24 1610          Positioning and Restraints    Pre-Treatment Position in bed  -SR     Post Treatment Position chair  -SR     In Chair encouraged to call for assist;exit alarm on;call light within reach  -SR               User Key  (r) = Recorded By, (t) = Taken By, (c) = Cosigned By      Initials Name Provider Type    SR America Kennedy, OT Occupational Therapist                   Outcome Measures       Row Name 10/15/24 0821          How much help from another person do you currently need...    Turning from your back to your side while in flat bed without using bedrails? 3  -EP     Moving from lying on back to sitting on the side of a flat bed without bedrails? 3  -EP     Moving to and from a bed to a chair (including a  wheelchair)? 3  -EP     Standing up from a chair using your arms (e.g., wheelchair, bedside chair)? 3  -EP     Climbing 3-5 steps with a railing? 2  -EP     To walk in hospital room? 3  -EP     AM-PAC 6 Clicks Score (PT) 17  -EP     Highest Level of Mobility Goal 5 --> Static standing  -EP               User Key  (r) = Recorded By, (t) = Taken By, (c) = Cosigned By      Initials Name Provider Type    EP Ashley Barry, RN Registered Nurse                    Occupational Therapy Education       Title: PT OT SLP Therapies (In Progress)       Topic: Occupational Therapy (In Progress)       Point: ADL training (In Progress)       Description:   Instruct learner(s) on proper safety adaptation and remediation techniques during self care or transfers.   Instruct in proper use of assistive devices.                  Learning Progress Summary            Patient Acceptance, E,TB, NR by  at 10/15/2024 1613                                      User Key       Initials Effective Dates Name Provider Type Discipline     06/16/21 -  America Kennedy OT Occupational Therapist OT                  OT Recommendation and Plan  Planned Therapy Interventions (OT): activity tolerance training, BADL retraining, IADL retraining, functional balance retraining, neuromuscular control/coordination retraining, ROM/therapeutic exercise, transfer/mobility retraining, strengthening exercise, occupation/activity based interventions  Therapy Frequency (OT): 5 times/wk  Plan of Care Review  Outcome Evaluation: Pt presents as a  74 y.o. female with a CMH of anxiety and depression, chronic back pain, chronic renal failure stage III, hypertension, hyperlipidemia, heart failure reduced EF, paroxysmal atrial fibrillation on anticoagulation who presented to [facility] on 10/11/2024 with complaints of several near syncopal and 1 syncopal episode associated with hypotension. Pt was admitted to [facility] from  10/5/2024 to 10/10/24. Pt had a fall at home and injured her left ankle. X-ray left ankle (-). CXR (-). CT head (-). Pt returned home with daughter prior to presenting to [facility] due to syncopal episode.  Reports chronic pain in back and neck.  Pt typically lives with adult son in [facility] with 1 HERMELINDA and she is independent with community mobility without AD.  This date pt able to come to EOB and transfer with CGA.  BP taken wiht mild drop with each position, though pt not symptomatic.  C/o pain in L ankle with weight bearing.  Pt high risk for falls and demos decreased activity tolerance.  Pt participates well with therapy and is eager to improve functionally.  Recommend acute IP rehab at discharge.     Time Calculation:         Time Calculation- OT       Row Name 10/15/24 1613             Time Calculation- OT    OT Start Time 1023  -SR      OT Stop Time 1044  -SR      OT Time Calculation (min) 21 min  -SR      Total Timed Code Minutes- OT 0 minute(s)  -SR      OT Received On 10/15/24  -SR      OT - Next Appointment 10/16/24  -SR      OT Goal Re-Cert Due Date 10/29/24  -SR                User Key  (r) = Recorded By, (t) = Taken By, (c) = Cosigned By      Initials Name Provider Type    SR America Kennedy OT Occupational Therapist                  Therapy Charges for Today       Code Description Service Date Service Provider Modifiers Qty    [id number]  OT EVAL MOD COMPLEXITY 4 10/15/2024 America Kennedy OT GO 1                 America Kennedy OT  10/15/2024

## 2024-10-15 NOTE — PLAN OF CARE
Goal Outcome Evaluation:              Outcome Evaluation: Patient slept well during the night.  Pt NPO at midnight. Found patient to be orthostatic and medication was held see note.  Morning medications given early per cardio.  Pt resting with daughter at bedside.  Stable at this time.

## 2024-10-15 NOTE — NURSING NOTE
Patient was sitting in chair when I went into get her vitals.  Patient BP was 86/59 in her left arm and 86/51 in her right arm.  I moved patient to the bed and her BP was 137/80.  Patient is having orthostatic BP.  Metoprolol was help and heart rate is being monitored.  Pt stable at this time.

## 2024-10-15 NOTE — PROGRESS NOTES
[facility] MEDICINE SERVICE  DAILY PROGRESS NOTE    NAME: Nataele Noble  : 1950  MRN: [id number]      LOS: 2 days     PROVIDER OF SERVICE: Cheko Gallagher MD    Chief Complaint: Hypotensive episode    Subjective:     Interval History: Patient continues to feel well.  She did have some hypotension overnight, likely orthostatic hypotension, however she was asymptomatic.  She does complain of some cough with mild congestion this morning.        Review of Systems:   Review of Systems    Objective:     Vital Signs  Temp:  [97 °F (36.1 °C)-97.9 °F (36.6 °C)] 97.3 °F (36.3 °C)  Heart Rate:  [] 81  Resp:  [14-21] 18  BP: ()/(51-84) 129/84  Flow (L/min) (Oxygen Therapy):  [2] 2   Body mass index is 28.96 kg/m².    Physical Exam  Physical Exam  General Appearance:  Alert, cooperative, no distress, appears stated age  Head:  Normocephalic, without obvious abnormality, atraumatic  Eyes:  PERRL, conjunctiva/corneas clear, EOM's intact, fundi benign, both eyes  Ears:  Normal TM's and external ear canals, both ears  Nose: Nares normal, septum midline, mucosa normal, no drainage or sinus tenderness  Throat: Lips, mucosa, and tongue normal; teeth and gums normal  Neck: Supple, symmetrical, trachea midline, no adenopathy, thyroid: not enlarged, symmetric, no tenderness/mass/nodules, no carotid bruit or JVD  Lungs:   Bibasilar rales, respirations unlabored  Heart:  Regular rate and rhythm, S1, S2 normal, no murmur, rub or gallop  Abdomen:  Soft, non-tender, bowel sounds active all four quadrants,  no masses, no organomegaly  Extremities: Extremities normal, atraumatic, no cyanosis or edema  Pulses: 2+ and symmetric  Skin: Skin color, texture, turgor normal, no rashes or lesions  Neurologic: Normal         Diagnostic Data    Results from last 7 days   Lab Units 10/15/24  0523 10/12/24  0515 10/11/24  2142   WBC 10*3/mm3 6.17   < >  --    HEMOGLOBIN g/dL 10.7*   < >  --    HEMATOCRIT % 35.6   < >  --     PLATELETS 10*3/mm3 203   < >  --    GLUCOSE mg/dL 122*   < > 110*   CREATININE mg/dL 1.30*   < > 1.44*   BUN mg/dL 19   < > 35*   SODIUM mmol/L 141   < > 141   POTASSIUM mmol/L 4.2   < > 4.0   AST (SGOT) U/L  --   --  20   ALT (SGPT) U/L  --   --  20   ALK PHOS U/L  --   --  81   BILIRUBIN mg/dL  --   --  0.4   ANION GAP mmol/L 10.1   < > 13.4    < > = values in this interval not displayed.       XR Chest 1 View    Result Date: 10/15/2024  Impression: No acute process. Electronically Signed: Chela Lennon MD  10/15/2024 9:20 AM EDT  Workstation ID: [id number]       I reviewed the patient's new clinical results.    Assessment/Plan:     Active and Resolved Problems  Active Hospital Problems    Diagnosis  POA    **Hypotensive episode [I95.9]  Yes    Hypothyroidism (acquired) [E03.9]  Yes    Rheumatoid arthritis involving multiple sites [M06.9]  Yes    Acute on chronic renal insufficiency [N28.9, N18.9]  Yes    Elevated troponin [R79.89]  Yes    Chronic HFrEF (heart failure with reduced ejection fraction) [I50.22]  Yes    Anxiety disorder [F41.9]  Yes    Constipation [K59.00]  Yes    Long term current use of anticoagulant therapy [Z79.01]  Not Applicable    Nausea [R11.0]  Unknown    CKD (chronic kidney disease) stage 3, GFR 30-59 ml/min [N18.30]  Yes    Hyperlipidemia [E78.5]  Yes    Paroxysmal A-fib [I48.0]  Yes      Resolved Hospital Problems   No resolved problems to display.       Near syncopal episode  -Likely secondary to hypertension after initiation of multiple medications for recent heart failure diagnosis; this includes Entresto, metoprolol and diuretics  -Holding all antihypertensives currently but Toprol-XL was restarted for heart rate control  -Hydrated with IV fluids with improvement in BP    A-fib with RVR  -Resume home digoxin  Restarted Toprol-XL at lower dose and monitor for hypotension  -Continue Xarelto  -Patient has follow-up with her cardiologist as an outpatient  -Plan for MELISSA 10/15 to assess  whether patient may be candidate for Watchman device in addition to possible ablation for her A-fib    Mild KATERINE  -Likely secondary to hypotension with volume depletion  Renal function back to baseline    Chronic systolic heart failure  -As above, patient was recently diagnosed with this and started on multiple medications that likely led to hypotension  -Holding most medications but restarted low-dose Toprol-XL  -Patient does have some mild rales on exam and will be given 1 dose of IV Lasix today    Hypothyroidism  -Continue Synthroid and Cytomel    Non-MI related elevated troponin, type II demand ischemia  -Likely secondary to hypotension and A-fib with RVR causing mild subendocardial ischemia  -Likely not a true ACS event    Anxiety disorder  -Continue Lexapro    Dyslipidemia  -Continue statin therapy    Cognitive dysfunction  -Continue Namenda    Osteoarthritis  -Continue Plaquenil    VTE Prophylaxis:  Pharmacologic VTE prophylaxis orders are present.             Disposition Planning:     Barriers to Discharge: Cardiology follow-up for A-fib, arrangements of inpatient rehab placement  Anticipated Date of Discharge: 10/16  Place of Discharge: [facility] rehab      Time: 45 minutes     Code Status and Medical Interventions: CPR (Attempt to Resuscitate); Full Support   Ordered at: 10/11/24 2241     Code Status (Patient has no pulse and is not breathing):    CPR (Attempt to Resuscitate)     Medical Interventions (Patient has pulse or is breathing):    Full Support       Signature: Electronically signed by Cheko Gallagher MD, 10/15/24, 12:53 EDT.  [facility] Hospitalist Team

## 2024-10-15 NOTE — SIGNIFICANT NOTE
10/15/24 1435   OTHER   Discipline physical therapist   Rehab Time/Intention   Session Not Performed patient unavailable for treatment  (off the floor for MELISSA)   Therapy Assessment/Plan (PT)   Criteria for Skilled Interventions Met (PT) yes;meets criteria;skilled treatment is necessary   Recommendation   PT - Next Appointment 10/16/24

## 2024-10-16 ENCOUNTER — PREP FOR SURGERY (OUTPATIENT)
Dept: OTHER | Facility: HOSPITAL | Age: 74
End: 2024-10-16
Payer: MEDICARE

## 2024-10-16 DIAGNOSIS — Z91.81 PERSONAL HISTORY OF FALL: ICD-10-CM

## 2024-10-16 DIAGNOSIS — I48.19 ATRIAL FIBRILLATION, PERSISTENT: Primary | ICD-10-CM

## 2024-10-16 LAB
ANION GAP SERPL CALCULATED.3IONS-SCNC: 12.6 MMOL/L (ref 5–15)
BASOPHILS # BLD AUTO: 0.01 10*3/MM3 (ref 0–0.2)
BASOPHILS NFR BLD AUTO: 0.2 % (ref 0–1.5)
BUN SERPL-MCNC: 17 MG/DL (ref 8–23)
BUN/CREAT SERPL: 13.7 (ref 7–25)
CALCIUM SPEC-SCNC: 9.4 MG/DL (ref 8.6–10.5)
CHLORIDE SERPL-SCNC: 105 MMOL/L (ref 98–107)
CO2 SERPL-SCNC: 22.4 MMOL/L (ref 22–29)
CREAT SERPL-MCNC: 1.24 MG/DL (ref 0.57–1)
DEPRECATED RDW RBC AUTO: 56.1 FL (ref 37–54)
EGFRCR SERPLBLD CKD-EPI 2021: 45.8 ML/MIN/1.73
EOSINOPHIL # BLD AUTO: 0.21 10*3/MM3 (ref 0–0.4)
EOSINOPHIL NFR BLD AUTO: 3.7 % (ref 0.3–6.2)
ERYTHROCYTE [DISTWIDTH] IN BLOOD BY AUTOMATED COUNT: 17 % (ref 12.3–15.4)
GLUCOSE SERPL-MCNC: 83 MG/DL (ref 65–99)
HCT VFR BLD AUTO: 37.9 % (ref 34–46.6)
HGB BLD-MCNC: 11.1 G/DL (ref 12–15.9)
IMM GRANULOCYTES # BLD AUTO: 0.04 10*3/MM3 (ref 0–0.05)
IMM GRANULOCYTES NFR BLD AUTO: 0.7 % (ref 0–0.5)
LYMPHOCYTES # BLD AUTO: 0.82 10*3/MM3 (ref 0.7–3.1)
LYMPHOCYTES NFR BLD AUTO: 14.3 % (ref 19.6–45.3)
MCH RBC QN AUTO: 27.2 PG (ref 26.6–33)
MCHC RBC AUTO-ENTMCNC: 29.3 G/DL (ref 31.5–35.7)
MCV RBC AUTO: 92.9 FL (ref 79–97)
MONOCYTES # BLD AUTO: 0.69 10*3/MM3 (ref 0.1–0.9)
MONOCYTES NFR BLD AUTO: 12 % (ref 5–12)
NEUTROPHILS NFR BLD AUTO: 3.97 10*3/MM3 (ref 1.7–7)
NEUTROPHILS NFR BLD AUTO: 69.1 % (ref 42.7–76)
NRBC BLD AUTO-RTO: 0 /100 WBC (ref 0–0.2)
PLATELET # BLD AUTO: 212 10*3/MM3 (ref 140–450)
PMV BLD AUTO: 11.4 FL (ref 6–12)
POTASSIUM SERPL-SCNC: 4.3 MMOL/L (ref 3.5–5.2)
RBC # BLD AUTO: 4.08 10*6/MM3 (ref 3.77–5.28)
SODIUM SERPL-SCNC: 140 MMOL/L (ref 136–145)
WBC NRBC COR # BLD AUTO: 5.74 10*3/MM3 (ref 3.4–10.8)

## 2024-10-16 PROCEDURE — 99232 SBSQ HOSP IP/OBS MODERATE 35: CPT | Performed by: INTERNAL MEDICINE

## 2024-10-16 PROCEDURE — 85025 COMPLETE CBC W/AUTO DIFF WBC: CPT | Performed by: INTERNAL MEDICINE

## 2024-10-16 PROCEDURE — 25010000002 ONDANSETRON PER 1 MG: Performed by: INTERNAL MEDICINE

## 2024-10-16 PROCEDURE — 80048 BASIC METABOLIC PNL TOTAL CA: CPT | Performed by: INTERNAL MEDICINE

## 2024-10-16 PROCEDURE — 97530 THERAPEUTIC ACTIVITIES: CPT

## 2024-10-16 PROCEDURE — 97110 THERAPEUTIC EXERCISES: CPT

## 2024-10-16 PROCEDURE — 97535 SELF CARE MNGMENT TRAINING: CPT

## 2024-10-16 RX ORDER — ALPRAZOLAM 1 MG
1 TABLET ORAL 3 TIMES DAILY PRN
Status: DISCONTINUED | OUTPATIENT
Start: 2024-10-16 | End: 2024-10-17 | Stop reason: HOSPADM

## 2024-10-16 RX ORDER — SODIUM CHLORIDE 0.9 % (FLUSH) 0.9 %
3-10 SYRINGE (ML) INJECTION AS NEEDED
OUTPATIENT
Start: 2024-10-16

## 2024-10-16 RX ORDER — SODIUM CHLORIDE 0.9 % (FLUSH) 0.9 %
3 SYRINGE (ML) INJECTION EVERY 12 HOURS SCHEDULED
OUTPATIENT
Start: 2024-10-16

## 2024-10-16 RX ORDER — PREDNISONE 20 MG/1
20 TABLET ORAL TAKE AS DIRECTED
Qty: 2 TABLET | Refills: 0 | OUTPATIENT
Start: 2024-10-16 | End: 2024-10-17 | Stop reason: HOSPADM

## 2024-10-16 RX ADMIN — ESCITALOPRAM OXALATE 20 MG: 10 TABLET ORAL at 08:11

## 2024-10-16 RX ADMIN — SODIUM BICARBONATE 650 MG: 650 TABLET ORAL at 08:11

## 2024-10-16 RX ADMIN — PANTOPRAZOLE SODIUM 40 MG: 40 TABLET, DELAYED RELEASE ORAL at 08:11

## 2024-10-16 RX ADMIN — RIVAROXABAN 15 MG: 15 TABLET, FILM COATED ORAL at 17:13

## 2024-10-16 RX ADMIN — ALPRAZOLAM 1 MG: 1 TABLET ORAL at 08:24

## 2024-10-16 RX ADMIN — SENNOSIDES AND DOCUSATE SODIUM 2 TABLET: 50; 8.6 TABLET ORAL at 08:11

## 2024-10-16 RX ADMIN — MEMANTINE 10 MG: 10 TABLET ORAL at 21:19

## 2024-10-16 RX ADMIN — LIOTHYRONINE SODIUM 5 MCG: 5 TABLET ORAL at 21:19

## 2024-10-16 RX ADMIN — GUAIFENESIN 200 MG: 200 SOLUTION ORAL at 15:42

## 2024-10-16 RX ADMIN — POLYETHYLENE GLYCOL 3350 17 G: 17 POWDER, FOR SOLUTION ORAL at 08:11

## 2024-10-16 RX ADMIN — POLYETHYLENE GLYCOL 3350 17 G: 17 POWDER, FOR SOLUTION ORAL at 21:26

## 2024-10-16 RX ADMIN — MEMANTINE 10 MG: 10 TABLET ORAL at 08:11

## 2024-10-16 RX ADMIN — METOPROLOL SUCCINATE 25 MG: 25 TABLET, FILM COATED, EXTENDED RELEASE ORAL at 21:19

## 2024-10-16 RX ADMIN — ACETAMINOPHEN 650 MG: 325 TABLET, FILM COATED ORAL at 08:18

## 2024-10-16 RX ADMIN — LEVOTHYROXINE SODIUM 112 MCG: 0.11 TABLET ORAL at 05:48

## 2024-10-16 RX ADMIN — ASPIRIN 81 MG: 81 TABLET, COATED ORAL at 08:11

## 2024-10-16 RX ADMIN — ACETAMINOPHEN 650 MG: 325 TABLET, FILM COATED ORAL at 15:42

## 2024-10-16 RX ADMIN — GUAIFENESIN 200 MG: 200 SOLUTION ORAL at 21:18

## 2024-10-16 RX ADMIN — SODIUM BICARBONATE 650 MG: 650 TABLET ORAL at 21:18

## 2024-10-16 RX ADMIN — PANTOPRAZOLE SODIUM 40 MG: 40 TABLET, DELAYED RELEASE ORAL at 17:13

## 2024-10-16 RX ADMIN — ALPRAZOLAM 1 MG: 1 TABLET ORAL at 21:20

## 2024-10-16 RX ADMIN — ONDANSETRON 4 MG: 2 INJECTION, SOLUTION INTRAMUSCULAR; INTRAVENOUS at 15:42

## 2024-10-16 RX ADMIN — Medication 1000 UNITS: at 08:11

## 2024-10-16 RX ADMIN — LIOTHYRONINE SODIUM 5 MCG: 5 TABLET ORAL at 08:11

## 2024-10-16 RX ADMIN — GUAIFENESIN 200 MG: 200 SOLUTION ORAL at 06:14

## 2024-10-16 RX ADMIN — ACETAMINOPHEN 650 MG: 325 TABLET, FILM COATED ORAL at 19:34

## 2024-10-16 RX ADMIN — METOPROLOL SUCCINATE 25 MG: 25 TABLET, FILM COATED, EXTENDED RELEASE ORAL at 08:11

## 2024-10-16 NOTE — H&P (VIEW-ONLY)
CC--- persistent atrial fibrillation and history of falls    Sub  Patient A-fib rate is much better  She feels comfortable with no new symptoms  LV ejection fraction has normalized on the MELISSA  Atrial appendage is conducive for watchman      Past Medical History:   Diagnosis Date    Anxiety and depression     Arthritis     Back pain     CRF (chronic renal failure), stage 3 (moderate)     Diverticulosis     Essential hypertension     Family history of colon cancer     Family history of colonic polyps     Fractures     R tibia/fibula; L ankle    GERD (gastroesophageal reflux disease)     History of adenomatous polyp of colon     History of cardiac cath     1/28/19 left main no significant disease. LAD with no significant disease. LCX no significant disease. RCA no significant disease.    History of cardiac monitoring     2/01/19 - ZIO PATCH - Underlying rhythm is sinus at 48-94 bpm. PSVT x23 with fastest 167 bpm, longest 24.4 seconds.   10/05/2018- ZIO PATCH - NSR most of time but did have episode of A fib ranging from .    History of colon polyps     History of echocardiogram     9/2018: Left ventricular systolic function is normal. EF 50%. Left ventricular diastolic dysfunction consistent with ipaired relaxation. Left atrial cavity size is mildly dilated. Mild MR. Mild to moderate TR. There is abnormal thickening noted on the RV that may represent a vegetation-this was discussed with Dr. Hammer and Dr. Reilly. No patent foramen ovale. No further work up needed at this time    Hyperlipidemia     Kidney disorder     Migraine     Migraines     Neck pain     Stroke 2018     Past Surgical History:   Procedure Laterality Date    ABDOMINOPLASTY      BACK SURGERY      L2-5 fusion 2012    CHOLECYSTECTOMY      COLONOSCOPY  11/24/2014    Diverticulosis; Repeat 5 years    COLONOSCOPY  11/09/2010    Diverticulosis; Repeat 4 years    COLONOSCOPY  09/18/2007    Dr. Monzon-Normal; Repeat 3 years    COLONOSCOPY  06/02/2005      Nicolás-Diminuitive polypectomy above the cecum; Otherwise normal colonoscopy; Repeat 2 years    COLONOSCOPY N/A 11/01/2019    Diverticulosis in the left colon; Two 5-6mm polyps in the cecum-path shows one tubular adenomatous and on hyperplastic polyp; One 8mm tubular adenomatous polyp in the ascending colon; Two 4-5mm tubular adenomatous polyps in the transverse colon; Repeat 3 years    COLONOSCOPY N/A 6/12/2023    Procedure: COLONOSCOPY WITH ANESTHESIA;  Surgeon: Susan Lord MD;  Location:  [facility];  Service: Gastroenterology;  Laterality: N/A;  preop hx of polyps   postop; polyps   PCP Woody Valentin MD    CYSTOCELE REPAIR      Cystocele and rectocele repair    ENDOSCOPY  09/28/2012    LA grade C esophagitis; HH    ENDOSCOPY N/A 11/01/2019    Small HH; Non-erosive gastritis-biopsied; Normal examined duodenum    ENDOSCOPY N/A 10/13/2024    Procedure: ESOPHAGOGASTRODUODENOSCOPY WITH BIOPSY X 1 AREA;  Surgeon: Miguel Vega MD;  Location: [facility] END[facility];  Service: Gastroenterology;  Laterality: N/A;  Hiatal hernia, esophagitis, gastritis    FIBULA FRACTURE SURGERY      HYSTERECTOMY      NECK SURGERY  2012    ACDF C4-7    ORIF TIBIA FRACTURE Right          Physical Exam    General:      well developed, well nourished, in no acute distress.    Head:      normocephalic and atraumatic.    Eyes:      PERRL/EOM intact, conjunctivae and sclerae clear without nystagmus.    Neck:      no  thyromegaly, trachea central with normal respiratory effort  Lungs:      clear bilaterally to auscultation.    Heart:      irregular rate and rhythm, S1, S2 without murmurs, rubs, or gallops  Skin:      intact without lesions or rashes.    Psych:      alert and cooperative; normal mood and affect; normal attention span and concentration.        CBC    Results from last 7 days   Lab Units 10/16/24  0546 10/15/24  0523 10/14/24  1159 10/13/24  0532 10/12/24  0515 10/11/24  2058 10/10/24  0719   WBC 10*3/mm3  5.74 6.17 6.79 6.79 9.35 11.05* 9.5   HEMOGLOBIN g/dL 11.1* 10.7* 10.7* 11.4* 12.2 13.9 11.9*   PLATELETS 10*3/mm3 212 203 161 235 288 333 291     BMP   Results from last 7 days   Lab Units 10/16/24  0546 10/15/24  0523 10/14/24  1159 10/11/24  2142   SODIUM mmol/L 140 141 140 141   POTASSIUM mmol/L 4.3 4.2 4.2 4.0   CHLORIDE mmol/L 105 110* 111* 106   CO2 mmol/L 22.4 20.9* 19.2* 21.6*   BUN mg/dL 17 19 21 35*   CREATININE mg/dL 1.24* 1.30* 1.22* 1.44*   GLUCOSE mg/dL 83 122* 126* 110*     Radiology(recent) XR Chest 1 View    Result Date: 10/15/2024  Impression: No acute process. Electronically Signed: Chela Lennon MD  10/15/2024 9:20 AM EDT  Workstation ID: [id number]     Assessment and plan    Persistent atrial fibrillation  History of falls  Mild hypertension  LV dysfunction improved on recent MELISSA  Mild renal insufficiency stable    Patient's beta-blocker dose has been increased and heart rate is much better  Patient can be discharged home  MELISSA images reviewed  Patient's left atrial appendage conducive for watchman implantation    Patient to be scheduled for AF ablation and watchman implantation at the same time and patient educated and orders placed which will be scheduled as an outpatient        Electronically signed by Sourav Pompa MD, 10/16/24, 1:35 PM EDT.

## 2024-10-16 NOTE — PLAN OF CARE
Vitals: Orthostatic  Seated /69 (MAP 82) HR 82  Standing BP 76/45 (MAP 55) pt symptomatic after ~2 mins c/o feeling weak, dizzy, and visual changes. HR 80  Seated reclined /78 (MAP 97)     Assessment: Natalee Noble presents with symptomatic orthostatic hypotension in standing. BP does not stabilize with prolonged standing. Pt requires return to sitting and reclined position. Pt c/o onset of headache and nausea after orthostatic spell. RN present at end of session, addressing pain and nausea. Pt agreeable to remain sitting. Pt with upright tolerance and  functional mobility impairments which indicate the need for skilled intervention. She is  motivated and participates well but is limited by orthostatic hypotension, unsafe to attempt gait training trial today. Pt needs AIR to address deficits, after cardiac issues are addressed.  PT will continue to follow and progress as tolerated

## 2024-10-16 NOTE — PROGRESS NOTES
CC--- persistent atrial fibrillation and history of falls    Sub  Patient A-fib rate is much better  She feels comfortable with no new symptoms  LV ejection fraction has normalized on the MELISSA  Atrial appendage is conducive for watchman      Past Medical History:   Diagnosis Date    Anxiety and depression     Arthritis     Back pain     CRF (chronic renal failure), stage 3 (moderate)     Diverticulosis     Essential hypertension     Family history of colon cancer     Family history of colonic polyps     Fractures     R tibia/fibula; L ankle    GERD (gastroesophageal reflux disease)     History of adenomatous polyp of colon     History of cardiac cath     1/28/19 left main no significant disease. LAD with no significant disease. LCX no significant disease. RCA no significant disease.    History of cardiac monitoring     2/01/19 - ZIO PATCH - Underlying rhythm is sinus at 48-94 bpm. PSVT x23 with fastest 167 bpm, longest 24.4 seconds.   10/05/2018- ZIO PATCH - NSR most of time but did have episode of A fib ranging from .    History of colon polyps     History of echocardiogram     9/2018: Left ventricular systolic function is normal. EF 50%. Left ventricular diastolic dysfunction consistent with ipaired relaxation. Left atrial cavity size is mildly dilated. Mild MR. Mild to moderate TR. There is abnormal thickening noted on the RV that may represent a vegetation-this was discussed with Dr. Hammer and Dr. Reilly. No patent foramen ovale. No further work up needed at this time    Hyperlipidemia     Kidney disorder     Migraine     Migraines     Neck pain     Stroke 2018     Past Surgical History:   Procedure Laterality Date    ABDOMINOPLASTY      BACK SURGERY      L2-5 fusion 2012    CHOLECYSTECTOMY      COLONOSCOPY  11/24/2014    Diverticulosis; Repeat 5 years    COLONOSCOPY  11/09/2010    Diverticulosis; Repeat 4 years    COLONOSCOPY  09/18/2007    Dr. Monzon-Normal; Repeat 3 years    COLONOSCOPY  06/02/2005      Nicolás-Diminuitive polypectomy above the cecum; Otherwise normal colonoscopy; Repeat 2 years    COLONOSCOPY N/A 11/01/2019    Diverticulosis in the left colon; Two 5-6mm polyps in the cecum-path shows one tubular adenomatous and on hyperplastic polyp; One 8mm tubular adenomatous polyp in the ascending colon; Two 4-5mm tubular adenomatous polyps in the transverse colon; Repeat 3 years    COLONOSCOPY N/A 6/12/2023    Procedure: COLONOSCOPY WITH ANESTHESIA;  Surgeon: Susan Lord MD;  Location:  [facility];  Service: Gastroenterology;  Laterality: N/A;  preop hx of polyps   postop; polyps   PCP Woody Valentin MD    CYSTOCELE REPAIR      Cystocele and rectocele repair    ENDOSCOPY  09/28/2012    LA grade C esophagitis; HH    ENDOSCOPY N/A 11/01/2019    Small HH; Non-erosive gastritis-biopsied; Normal examined duodenum    ENDOSCOPY N/A 10/13/2024    Procedure: ESOPHAGOGASTRODUODENOSCOPY WITH BIOPSY X 1 AREA;  Surgeon: Miguel Vega MD;  Location: [facility] ENDOSCOPY;  Service: Gastroenterology;  Laterality: N/A;  Hiatal hernia, esophagitis, gastritis    FIBULA FRACTURE SURGERY      HYSTERECTOMY      NECK SURGERY  2012    ACDF C4-7    ORIF TIBIA FRACTURE Right          Physical Exam    General:      well developed, well nourished, in no acute distress.    Head:      normocephalic and atraumatic.    Eyes:      PERRL/EOM intact, conjunctivae and sclerae clear without nystagmus.    Neck:      no  thyromegaly, trachea central with normal respiratory effort  Lungs:      clear bilaterally to auscultation.    Heart:      irregular rate and rhythm, S1, S2 without murmurs, rubs, or gallops  Skin:      intact without lesions or rashes.    Psych:      alert and cooperative; normal mood and affect; normal attention span and concentration.        CBC    Results from last 7 days   Lab Units 10/16/24  0546 10/15/24  0523 10/14/24  1159 10/13/24  0532 10/12/24  0515 10/11/24  2058 10/10/24  0719   WBC 10*3/mm3  5.74 6.17 6.79 6.79 9.35 11.05* 9.5   HEMOGLOBIN g/dL 11.1* 10.7* 10.7* 11.4* 12.2 13.9 11.9*   PLATELETS 10*3/mm3 212 203 161 235 288 333 291     BMP   Results from last 7 days   Lab Units 10/16/24  0546 10/15/24  0523 10/14/24  1159 10/11/24  2142   SODIUM mmol/L 140 141 140 141   POTASSIUM mmol/L 4.3 4.2 4.2 4.0   CHLORIDE mmol/L 105 110* 111* 106   CO2 mmol/L 22.4 20.9* 19.2* 21.6*   BUN mg/dL 17 19 21 35*   CREATININE mg/dL 1.24* 1.30* 1.22* 1.44*   GLUCOSE mg/dL 83 122* 126* 110*     Radiology(recent) XR Chest 1 View    Result Date: 10/15/2024  Impression: No acute process. Electronically Signed: Chela Lennon MD  10/15/2024 9:20 AM EDT  Workstation ID: [id number]     Assessment and plan    Persistent atrial fibrillation  History of falls  Mild hypertension  LV dysfunction improved on recent MELISSA  Mild renal insufficiency stable    Patient's beta-blocker dose has been increased and heart rate is much better  Patient can be discharged home  MELISSA images reviewed  Patient's left atrial appendage conducive for watchman implantation    Patient to be scheduled for AF ablation and watchman implantation at the same time and patient educated and orders placed which will be scheduled as an outpatient        Electronically signed by Sourav Pompa MD, 10/16/24, 1:35 PM EDT.

## 2024-10-16 NOTE — PROGRESS NOTES
Cardiology Progress Note    Patient Identification:  Name: Natalee Noble  Age: 74 y.o.  Sex: female  :  1950  MRN: [id number]                 Follow Up / Chief Complaint: Hypertension, A-fib, CHF  Chief Complaint   Patient presents with    Hypotension       Interval History: Patient recently was in [facility] with newly diagnosed LV dysfunction was started on guideline directed medical therapy presented to ER with hypotension and recurrent syncopal episodes.    Patient underwent MELISSA on 10/15/2024 that showed EF of 56 to 60% with moderate to severe TR left atrial enlargement and right atrial enlargement.          NP note: Patient seen and examined.  Sleeping this morning.  She remains in A-fib with rate controlled in the 80s.  Patient underwent MELISSA yesterday.    Will continue metoprolol as tolerated.     Electronically signed by GREGORIA Yuen, 10/16/24, 12:19 PM EDT.    Cardiology attending addendum :    I have personally performed a face-to-face diagnostic evaluation, physical exam and reviewed data on this patient.  I have reviewed documentation done by me and nurse practitioner  and corrected as needed.  And agree with the different components of documentation.Greater than 50% of the time spent in the care of this patient was provided by attending consultant/me.           Subjective: Patient seen and examined.  Chart reviewed.  Labs reviewed.  Patient denies any chest pain currently.  Patient reportedly got dizzy with standing up from chair today was found to be orthostatic.      Objective:  10/13/2024: EKG done March 10, 2013 2024 reveals A-fib with a rate of 123 bpm  10/14/2024: Creatinine 1.22 hemoglobin 10.7  10/15/2024: Creatinine 1.30 EGFR 43.2 glucose 122 hemoglobin 10.7  10/16/2024: Creatinine 1.24 EGFR 45.8 hemoglobin 11.1    History of present illness:      Natalee Noble is a 74-year-old female with a medical history to include:     Atrial fibrillation  [id number] SCORE    [id number] Score: 6 (10/12/2024  9:08 AM)     Long-term anticoagulation  Intolerant to flecainide and amiodarone in the past  HFrEF  Hypertension  Dyslipidemia  CKD stage III  History of CVA  Anxiety/depression  GERD  Diverticulosis  Stroke     Who presented to [facility] on 10/11/2024 following syncopal episode.  Patient reports that she was recently hospitalized at [facility] for 6 days for respiratory infection, CHF and atrial fibrillation.  She was discharged on Thursday.  Echocardiogram completed 10/5/2024 shows reduced LVEF of 35 to 40%, moderate global hypokinesis of left ventricle and mild to moderate mitral, tricuspid, and pulmonic valve regurgitation.  While hospitalized she was treated with IV antibiotics and started on Entresto, spironolactone, digoxin.  She reports 2 syncopal episodes yesterday.  Associated symptoms of shortness of breath and nausea.  She denies chest pain, palpitations, edema.  Radiology consulted for hypotension and syncope.  Upon admission patient noted to be significantly hypotensive at 69/47.  Pertinent labs include troponin 64, digoxin 1.15 creatinine 1.44, TSH unremarkable.  CXR without evidence of active disease.  Patient follows with Dr. Ordaz for cardiology.  She is also scheduled to see an electrophysiologist in near future to discuss atrial fibrilation ablation.  She reports she is intolerable to amiodarone and flecainide.           Assessment:  :     Dizziness near syncope  Hypotension  Recurrent syncope  Elevated troponin  Chronic HFrEF due to systolic dysfunction from dilated cardiomyopathy  Paroxysmal atrial fibrillation on long-term anticoagulation/Xarelto  Dyslipidemia  CKD  History of CVA        Recommendations / Plan:        Patient says she has recurrent syncope since age 12 with migraine headaches.  Recently was diagnosed with heart failure due to reduced ejection fraction LVEF of 35-40%, was put on guideline directed medical therapy with with Entresto, Aldactone,  digoxin, metoprolol succinate, Farxiga.  Patient's blood pressure was low and was having near syncope and dizziness.  Also has been having nausea.    Patient is having recurrent falls and syncope.  Has history of A-fib and elevated [id number] score of 6.  Will benefit from long-term anticoagulation but due to her falls she is a poor candidate for long-term anticoagulation.  Patient's troponin is elevated at 68 has no particular trend has come down to 64.  Will monitor serial troponins.  Probably is due to congestive heart failure and A-fib..  Will monitor renal function.  Follow-up with nephrology for CKD.  Patient's blood cultures are growing for anemia.  Was seen by ID is requesting transesophageal echo.  Will schedule transesophageal echo.  Patient denies any nausea vomiting or hemoptysis.      Copied text in this portion of the note has been reviewed and is accurate as of 10/16/2024    Past Medical History:  Past Medical History:   Diagnosis Date    Anxiety and depression     Arthritis     Back pain     CRF (chronic renal failure), stage 3 (moderate)     Diverticulosis     Essential hypertension     Family history of colon cancer     Family history of colonic polyps     Fractures     R tibia/fibula; L ankle    GERD (gastroesophageal reflux disease)     History of adenomatous polyp of colon     History of cardiac cath     1/28/19 left main no significant disease. LAD with no significant disease. LCX no significant disease. RCA no significant disease.    History of cardiac monitoring     2/01/19 - ZIO PATCH - Underlying rhythm is sinus at 48-94 bpm. PSVT x23 with fastest 167 bpm, longest 24.4 seconds.   10/05/2018- ZIO PATCH - NSR most of time but did have episode of A fib ranging from .    History of colon polyps     History of echocardiogram     9/2018: Left ventricular systolic function is normal. EF 50%. Left ventricular diastolic dysfunction consistent with ipaired relaxation. Left atrial cavity size  is mildly dilated. Mild MR. Mild to moderate TR. There is abnormal thickening noted on the RV that may represent a vegetation-this was discussed with Dr. Hammer and Dr. Reilly. No patent foramen ovale. No further work up needed at this time    Hyperlipidemia     Kidney disorder     Migraine     Migraines     Neck pain     Stroke 2018     Past Surgical History:  Past Surgical History:   Procedure Laterality Date    ABDOMINOPLASTY      BACK SURGERY      L2-5 fusion 2012    CHOLECYSTECTOMY      COLONOSCOPY  11/24/2014    Diverticulosis; Repeat 5 years    COLONOSCOPY  11/09/2010    Diverticulosis; Repeat 4 years    COLONOSCOPY  09/18/2007    Dr. Monzon-Normal; Repeat 3 years    COLONOSCOPY  06/02/2005    Dr. Monzon-Diminuitive polypectomy above the cecum; Otherwise normal colonoscopy; Repeat 2 years    COLONOSCOPY N/A 11/01/2019    Diverticulosis in the left colon; Two 5-6mm polyps in the cecum-path shows one tubular adenomatous and on hyperplastic polyp; One 8mm tubular adenomatous polyp in the ascending colon; Two 4-5mm tubular adenomatous polyps in the transverse colon; Repeat 3 years    COLONOSCOPY N/A 6/12/2023    Procedure: COLONOSCOPY WITH ANESTHESIA;  Surgeon: Susan Lord MD;  Location:  [facility];  Service: Gastroenterology;  Laterality: N/A;  preop hx of polyps   postop; polyps   PCP Woody Valentin MD    CYSTOCELE REPAIR      Cystocele and rectocele repair    ENDOSCOPY  09/28/2012    LA grade C esophagitis; HH    ENDOSCOPY N/A 11/01/2019    Small HH; Non-erosive gastritis-biopsied; Normal examined duodenum    ENDOSCOPY N/A 10/13/2024    Procedure: ESOPHAGOGASTRODUODENOSCOPY WITH BIOPSY X 1 AREA;  Surgeon: Miguel Vega MD;  Location: [facility] ENDOSCOPY;  Service: Gastroenterology;  Laterality: N/A;  Hiatal hernia, esophagitis, gastritis    FIBULA FRACTURE SURGERY      HYSTERECTOMY      NECK SURGERY  2012    ACDF C4-7    ORIF TIBIA FRACTURE Right         Social History:   Social  "History     Tobacco Use    Smoking status: Never    Smokeless tobacco: Never   Substance Use Topics    Alcohol use: No      Family History:  Family History   Problem Relation Age of Onset    Colon cancer Father 65    Colon polyps Daughter 38    Esophageal cancer Neg Hx     Liver cancer Neg Hx     Liver disease Neg Hx     Rectal cancer Neg Hx     Stomach cancer Neg Hx           Allergies:  Allergies   Allergen Reactions    Benzoin Anaphylaxis and Swelling     States when used on her neck it shut off her airway  Huge abscesses with surgery      Iodine Other (See Comments)     PT UNABLE TO TELL RN ABOUT REACTION AT THIS TIME, BUT FAMILY STATES PT HAD A REACTION TO BEING CLEANSED WITH IODINE IN SURGERY  IOBAN - used on neck, swelled to the point of shutting off airway    Vancomycin Other (See Comments)     Kidney failure  Vancomycin induced acute kidney injury      Amiodarone Other (See Comments)     Tremor- hand and face     Levaquin [Levofloxacin] Unknown (See Comments)     Unknown    Sulfa Antibiotics     Acetaminophen Nausea Only    Latex Itching     Scheduled Meds:  aspirin, 81 mg, Daily  cholecalciferol, 1,000 Units, Daily  escitalopram, 20 mg, Daily  levothyroxine, 112 mcg, Q AM  Lifitegrast, 1 drop, Daily  liothyronine, 5 mcg, BID  memantine, 10 mg, Q12H  metoprolol succinate XL, 25 mg, Q12H  pantoprazole, 40 mg, BID AC  polyethylene glycol, 17 g, BID  rivaroxaban, 15 mg, Daily With Dinner  sodium bicarbonate, 650 mg, BID          Review of Systems:   Review of Systems   Cardiovascular:  Negative for chest pain.   Neurological:  Positive for dizziness and weakness.            Constitutional:  Temp:  [97.3 °F (36.3 °C)-97.9 °F (36.6 °C)] 97.9 °F (36.6 °C)  Heart Rate:  [] 88  Resp:  [14-22] 19  BP: ()/(49-92) 129/89    Physical Exam   /89 (BP Location: Left arm, Patient Position: Lying)   Pulse 88   Temp 97.9 °F (36.6 °C) (Oral)   Resp 19   Ht 165.1 cm (65\")   Wt 78.9 kg (174 lb)   SpO2 " 96%   BMI 28.96 kg/m²   General:  Appears in no acute distress  Eyes: Sclera is anicteric,  conjunctiva is clear   HEENT:  No JVD. Thyroid not visibly enlarged. No mucosal pallor or cyanosis  Respiratory: Respirations regular and unlabored at rest.  Clear to auscultation  Cardiovascular: S1,S2, irregularly irregular rate  Gastrointestinal: Abdomen nondistended.  Musculoskeletal:  No abnormal movements  Extremities: No digital clubbing or cyanosis  Skin: Color pink.   Neuro: Alert and awake.    INTAKE AND OUTPUT:    Intake/Output Summary (Last 24 hours) at 10/16/2024 0807  Last data filed at 10/16/2024 0518  Gross per 24 hour   Intake 904.95 ml   Output 2000 ml   Net -1095.05 ml       Cardiographics  Telemetry: A-fib    ECG:   ECG 12 Lead Drug Monitoring; Amiodarone   Preliminary Result   HEART GCKB=592  bpm   RR Gkvyocte=177  ms   WA Interval=  ms   P Horizontal Axis=  deg   P Front Axis=  deg   QRSD Interval=79  ms   QT Vsnkppzf=938  ms   NNbV=101  ms   QRS Axis=47  deg   T Wave Axis=236  deg   - ABNORMAL ECG -   Atrial fibrillation   Repol abnrm, severe global ischemia (LM/MVD)   Date and Time of Study:2024-10-14 10:16:06      ECG 12 Lead Drug Monitoring; Amiodarone   Final Result   HEART RATE=92  bpm   RR Ckbznfya=402  ms   WA Interval=  ms   P Horizontal Axis=  deg   P Front Axis=  deg   QRSD Interval=89  ms   QT Bruummoc=719  ms   BFeE=760  ms   QRS Axis=45  deg   T Wave Axis=261  deg   - ABNORMAL ECG -   Atrial fibrillation   Repol abnrm, severe global ischemia (LM/MVD)   When compared with ECG of 13-Oct-2024 19:20:53,   Significant rate decrease   Electronically Signed By: Josemanuel Sheriff ([facility]) 2024-10-14 08:04:32   Date and Time of Study:2024-10-14 05:23:18      ECG 12 Lead Rhythm Change   Final Result   HEART ZAZA=589  bpm   RR Mthyagxl=304  ms   WA Interval=  ms   P Horizontal Axis=  deg   P Front Axis=  deg   QRSD Interval=83  ms   QT Xnzzloeu=948  ms   YIvL=053  ms   QRS Axis=61  deg   T Wave  Axis=250  deg   - ABNORMAL ECG -   Atrial fibrillation   Repol abnrm suggests ischemia, diffuse leads   When compared with ECG of 11-Oct-2024 20:47:52,   Significant rate increase   Electronically Signed By: Clarke Correa ([facility]) 2024-10-14 14:48:44   Date and Time of Study:2024-10-13 19:20:53      ECG 12 Lead Other; hypotension   Final Result   HEART RATE=98  bpm   RR Tghvjqkp=649  ms   CT Interval=  ms   P Horizontal Axis=  deg   P Front Axis=  deg   QRSD Interval=80  ms   QT Exuawhwa=783  ms   BGnF=062  ms   QRS Axis=55  deg   T Wave Axis=243  deg   - ABNORMAL ECG -   Atrial fibrillation   Repol abnrm suggests ischemia, anterolateral   No previous ECG available for comparison   Electronically Signed By: Fuad Macias ([facility]) 2024-10-12 08:32:29   Date and Time of Study:2024-10-11 20:47:52      Telemetry Scan   Final Result      Telemetry Scan   Final Result      Telemetry Scan   Final Result      Telemetry Scan   Final Result      Telemetry Scan   Final Result      Telemetry Scan   Final Result      Telemetry Scan   Final Result      Telemetry Scan   Final Result      Telemetry Scan   Final Result      Telemetry Scan   Final Result      Telemetry Scan   Final Result      Telemetry Scan   Final Result      Telemetry Scan   Final Result      Telemetry Scan   Final Result      Telemetry Scan   Final Result      Telemetry Scan   Final Result      Telemetry Scan   Final Result      Telemetry Scan   Final Result      Telemetry Scan   Final Result      Telemetry Scan   Final Result      Telemetry Scan   Final Result      Telemetry Scan   Final Result      Telemetry Scan   Final Result      Telemetry Scan   Final Result      Telemetry Scan   Final Result      Telemetry Scan   Final Result      Telemetry Scan   Final Result      Telemetry Scan   Final Result      Telemetry Scan   Final Result      Telemetry Scan   Final Result      Telemetry Scan   Final Result        I have personally reviewed EKG    Echocardiogram: Results for  "orders placed during the hospital encounter of 10/11/24    Adult Transesophageal Echo (MELISSA) W/ Cont if Necessary Per Protocol    Interpretation Summary    Left ventricular systolic function is normal. Left ventricular ejection fraction appears to be 56 - 60%.    The left atrial cavity is severely dilated.    Saline test results are negative.    The right atrial cavity is moderately  dilated.    Moderate to severe tricuspid valve regurgitation is present.    Estimated right ventricular systolic pressure from tricuspid regurgitation is mildly elevated (35-45 mmHg).    Left atrial appendage adequate for watchman      Lab Review   I have reviewed the labs  Results from last 7 days   Lab Units 10/12/24  0112 10/11/24  2142   HSTROP T ng/L 64* 68*         Results from last 7 days   Lab Units 10/16/24  0546   SODIUM mmol/L 140   POTASSIUM mmol/L 4.3   BUN mg/dL 17   CREATININE mg/dL 1.24*   CALCIUM mg/dL 9.4         Results from last 7 days   Lab Units 10/16/24  0546 10/15/24  0523 10/14/24  1159   WBC 10*3/mm3 5.74 6.17 6.79   HEMOGLOBIN g/dL 11.1* 10.7* 10.7*   HEMATOCRIT % 37.9 35.6 37.5   PLATELETS 10*3/mm3 212 203 161           RADIOLOGY:  Imaging Results (Last 24 Hours)       Procedure Component Value Units Date/Time    XR Chest 1 View [[id number]] Collected: 10/15/24 0919     Updated: 10/15/24 0922    Narrative:      XR CHEST 1 VW    Date of Exam: 10/15/2024 9:07 AM EDT    Indication: Cough, congestion    Comparison: 10/11/2024.    Findings:  Heart and pulmonary vessels appear within normal limits. Lung fields are clear of acute infiltrates or effusions. There is no pneumothorax.      Impression:      Impression:  No acute process.      Electronically Signed: Chela Lennon MD    10/15/2024 9:20 AM EDT    Workstation ID: [id number]                  )10/16/2024  Josemanuel Sheriff MD      EMR Dragon/Transcription:   \"Dictated utilizing Dragon dictation\".   "

## 2024-10-16 NOTE — THERAPY TREATMENT NOTE
Subjective: Pt agreeable to therapeutic plan of care.  Cognition: oriented to Person, Place, Time, and Situation    Objective:     Precautions - orthostatic hypotension    Bed Mobility: Supervision   Functional Transfers: Supervision     Balance: supported and standing Supervision  Functional Ambulation: CGA and with rolling walker    Feeding:set up   ADL Position: supported sitting  ADL Comments: Pt able to feed herself     Therapeutic Exercise - 5 Reps B LE AROM unsupported sitting / EOB    Vitals: Orthostatic  135/82 Supine  123/74 sitting at EOB  85/62 standing     Pain: 0 VAS  Location:   Interventions for pain: N/A  Education: Verbal/Tactile Cues, ADL training, Transfer Training, and Energy conservation strategies      Assessment: Natalee Noble presents with ADL impairments affecting function including balance, coordination, endurance / activity tolerance, and strength. This date, her BP significantly dropped with positioning. She was educated on the importance of increasing activity to prevent further immobility. Demonstrated functioning below baseline abilities indicate the need for continued skilled intervention while inpatient. Tolerating session today without incident. Will continue to follow and progress as tolerated.     Plan/Recommendations:   High Intensity Therapy recommended post-acute care. This is recommended as therapy feels the patient would require 5-6 days per week, 2-3 hours per day. At this time, inpatient rehabilitation (acute rehab) would be the first choice and SNF would be second.. Pt requires no DME at discharge.     Pt desires Inpatient Rehabilitation placement at discharge. Pt cooperative; agreeable to therapeutic recommendations and plan of care.     Modified [place]: N/A = No pre-op stroke/TIA    Post-Tx Position: Up in Chair, Alarms activated, and Call light and personal items within reach  PPE: gloves

## 2024-10-16 NOTE — THERAPY TREATMENT NOTE
Subjective: Pt agreeable to therapeutic plan of care.    Objective:     Precautions - orthostatic hypotension    Bed mobility - N/A or Not attempted.  Transfers - CGA and with rolling walker sit to/from stand. Heavy reliance on UE for lifting into standing.   Static standing with walker support with CGA/close supervision and monitoring of pt vitals and subjective reports of upright tolerance.     Therapeutic Exercise - 10 Reps B LE AROM/RROM supported sitting / chair. Completed for improved BP and increased LE strength.     Vitals: Orthostatic  Seated /69 (MAP 82) HR 82  Standing BP 76/45 (MAP 55) pt symptomatic after ~2 mins c/o feeling weak, dizzy, and visual changes. HR 80  Seated reclined /78 (MAP 97)     Pain: 3 VAS   Location: L chest - at rest and unchanged with standing  Intervention for pain: Repositioned, RN notified, and Increased Activity    Education: Provided education on the importance of mobility in the acute care setting, Verbal/Tactile Cues, and Transfer Training    Assessment: Natalee Noble presents with symptomatic orthostatic hypotension in standing. BP does not stabilize with prolonged standing. Pt requires return to sitting and reclined position. Pt c/o onset of headache and nausea after orthostatic spell. RN present at end of session, addressing pain and nausea. Pt agreeable to remain sitting. Pt with upright tolerance and  functional mobility impairments which indicate the need for skilled intervention. She is  motivated and participates well but is limited by orthostatic hypotension, unsafe to attempt gait training trial today. Pt needs AIR to address deficits, after cardiac issues are addressed.  PT will continue to follow and progress as tolerated.     Plan/Recommendations:   If medically appropriate, High Intensity Therapy recommended post-acute care. This is recommended as therapy feels the patient would require 5-6 days per week, 2-3 hours per day. At this time,  inpatient rehabilitation (acute rehab) would be the first choice and SNF would be second. Pt requires no DME at discharge.     Pt desires Inpatient Rehabilitation placement at discharge. Pt cooperative; agreeable to therapeutic recommendations and plan of care.         Modified [place]: N/A = No pre-op stroke/TIA    Post-Tx Position: Up in Chair, Alarms activated, and Call light and personal items within reach, RN present  PPE: gloves

## 2024-10-16 NOTE — PLAN OF CARE
Assessment: Natalee Noble presents with ADL impairments affecting function including balance, coordination, endurance / activity tolerance, and strength. This date, her BP significantly dropped with positioning. She was educated on the importance of increasing activity to prevent further immobility. Demonstrated functioning below baseline abilities indicate the need for continued skilled intervention while inpatient. Tolerating session today without incident. Will continue to follow and progress as tolerated.     Plan/Recommendations:   High Intensity Therapy recommended post-acute care. This is recommended as therapy feels the patient would require 5-6 days per week, 2-3 hours per day. At this time, inpatient rehabilitation (acute rehab) would be the first choice and SNF would be second.. Pt requires no DME at discharge.

## 2024-10-16 NOTE — PLAN OF CARE
Problem: Adult Inpatient Plan of Care  Goal: Absence of Hospital-Acquired Illness or Injury  Intervention: Identify and Manage Fall Risk  Recent Flowsheet Documentation  Taken 10/16/2024 1638 by Trang Greer RN  Safety Promotion/Fall Prevention:   assistive device/personal items within reach   clutter free environment maintained   fall prevention program maintained   lighting adjusted   nonskid shoes/slippers when out of bed   room organization consistent   safety round/check completed  Taken 10/16/2024 1415 by Trang Greer RN  Safety Promotion/Fall Prevention:   assistive device/personal items within reach   clutter free environment maintained   fall prevention program maintained   lighting adjusted   nonskid shoes/slippers when out of bed   safety round/check completed   room organization consistent  Taken 10/16/2024 1246 by Trang Greer RN  Safety Promotion/Fall Prevention:   assistive device/personal items within reach   clutter free environment maintained   fall prevention program maintained   lighting adjusted   nonskid shoes/slippers when out of bed   room organization consistent   safety round/check completed  Taken 10/16/2024 1004 by Trang Greer RN  Safety Promotion/Fall Prevention:   assistive device/personal items within reach   clutter free environment maintained   fall prevention program maintained   lighting adjusted   nonskid shoes/slippers when out of bed   room organization consistent   safety round/check completed  Taken 10/16/2024 0810 by Trang Greer RN  Safety Promotion/Fall Prevention:   assistive device/personal items within reach   clutter free environment maintained   fall prevention program maintained   lighting adjusted   nonskid shoes/slippers when out of bed   safety round/check completed   room organization consistent  Intervention: Prevent Skin Injury  Recent Flowsheet Documentation  Taken 10/16/2024 1638 by Trang Greer, RN  Body Position: weight shifting  Taken 10/16/2024  1415 by Trang Greer RN  Body Position: weight shifting  Taken 10/16/2024 1246 by Trang Greer RN  Body Position: weight shifting  Taken 10/16/2024 1004 by Trang Greer RN  Body Position: weight shifting  Taken 10/16/2024 0810 by Trang Greer RN  Body Position: weight shifting  Intervention: Prevent Infection  Recent Flowsheet Documentation  Taken 10/16/2024 1638 by Trang Greer RN  Infection Prevention:   hand hygiene promoted   personal protective equipment utilized  Taken 10/16/2024 1415 by Trang Greer RN  Infection Prevention:   personal protective equipment utilized   hand hygiene promoted  Taken 10/16/2024 1246 by Trang Greer RN  Infection Prevention:   personal protective equipment utilized   hand hygiene promoted  Taken 10/16/2024 1004 by Trang Greer RN  Infection Prevention:   hand hygiene promoted   personal protective equipment utilized  Taken 10/16/2024 0810 by Trang Greer RN  Infection Prevention:   hand hygiene promoted   personal protective equipment utilized  Goal: Optimal Comfort and Wellbeing  Intervention: Provide Person-Centered Care  Recent Flowsheet Documentation  Taken 10/16/2024 1638 by Trang Greer RN  Trust Relationship/Rapport: care explained  Taken 10/16/2024 0810 by Trang Greer RN  Trust Relationship/Rapport: care explained   Goal Outcome Evaluation:         Pt cleared for DC by cardiology. Awaiting bed at [facility]. Bed will be available 10/17. Pt up in the chair, resting, family at bedside. Call light in reach. Plan of care to continue.

## 2024-10-16 NOTE — PLAN OF CARE
Problem: Adult Inpatient Plan of Care  Goal: Absence of Hospital-Acquired Illness or Injury  Intervention: Identify and Manage Fall Risk  Description: Perform standard risk assessment on admission using a validated tool or comprehensive approach appropriate to the patient; reassess fall risk frequently, with change in status or transfer to another level of care.  Communicate fall injury risk to interprofessional healthcare team.  Determine need for increased observation, equipment and environmental modification, such as low bed, signage and supportive, nonskid footwear.  Adjust safety measures to individual developmental age, stage and identified risk factors.  Reinforce the importance of safety and physical activity with patient and family.  Perform regular intentional rounding to assess need for position change, pain assessment and personal needs, including assistance with toileting.  Recent Flowsheet Documentation  Taken 10/16/2024 0200 by David Shafer LPN  Safety Promotion/Fall Prevention:   safety round/check completed   room organization consistent   nonskid shoes/slippers when out of bed   muscle strengthening facilitated   mobility aid in reach   lighting adjusted   fall prevention program maintained   assistive device/personal items within reach   clutter free environment maintained  Taken 10/16/2024 0000 by David Shafer LPN  Safety Promotion/Fall Prevention:   safety round/check completed   room organization consistent   nonskid shoes/slippers when out of bed   muscle strengthening facilitated   mobility aid in reach   lighting adjusted   fall prevention program maintained   clutter free environment maintained   assistive device/personal items within reach  Taken 10/15/2024 2200 by David Shafer LPN  Safety Promotion/Fall Prevention:   safety round/check completed   room organization consistent   nonskid shoes/slippers when out of bed   muscle strengthening facilitated   mobility  aid in reach   lighting adjusted   fall prevention program maintained   assistive device/personal items within reach   activity supervised   clutter free environment maintained  Taken 10/15/2024 1948 by David Shafer LPN  Safety Promotion/Fall Prevention:   safety round/check completed   room organization consistent   nonskid shoes/slippers when out of bed   muscle strengthening facilitated   mobility aid in reach   fall prevention program maintained   clutter free environment maintained   assistive device/personal items within reach   activity supervised  Intervention: Prevent Skin Injury  Description: Perform a screening for skin injury risk, such as pressure or moisture associated skin damage on admission and at regular intervals throughout hospital stay.  Keep all areas of skin (especially folds) clean and dry.  Maintain adequate skin hydration.  Relieve and redistribute pressure and protect bony prominences; implement measures based on patient-specific risk factors.  Match turning and repositioning schedule to clinical condition.  Encourage weight shift frequently; assist with reposition if unable to complete independently.  Float heels off bed; avoid pressure on the Achilles tendon.  Keep skin free from extended contact with medical devices.  Encourage functional activity and mobility, as early as tolerated.  Use aids (e.g., slide boards, mechanical lift) during transfer.  Recent Flowsheet Documentation  Taken 10/16/2024 0200 by David Shafer LPN  Body Position: position changed independently  Taken 10/16/2024 0000 by David Shafer LPN  Body Position: position changed independently  Taken 10/15/2024 2200 by David Shafer LPN  Body Position: position changed independently  Taken 10/15/2024 1948 by David Shafer LPN  Body Position: position changed independently  Intervention: Prevent and Manage VTE (Venous Thromboembolism) Risk  Description: Assess for VTE (venous  thromboembolism) risk.  Encourage and assist with early ambulation.  Initiate and maintain compression or other therapy, as indicated, based on identified risk in accordance with organizational protocol and provider order.  Encourage both active and passive leg exercises while in bed, if unable to ambulate.  Recent Flowsheet Documentation  Taken 10/15/2024 1948 by David Shafer LPN  VTE Prevention/Management:   bilateral   SCDs (sequential compression devices) off  Intervention: Prevent Infection  Description: Maintain skin and mucous membrane integrity; promote hand, oral and pulmonary hygiene.  Optimize fluid balance, nutrition, sleep and glycemic control to maximize infection resistance.  Identify potential sources of infection early to prevent or mitigate progression of infection (e.g., wound, lines, devices).  Evaluate ongoing need for invasive devices; remove promptly when no longer indicated.  Recent Flowsheet Documentation  Taken 10/16/2024 0200 by David Shafer LPN  Infection Prevention:   visitors restricted/screened   single patient room provided   personal protective equipment utilized   rest/sleep promoted   hand hygiene promoted  Taken 10/16/2024 0000 by David Shafer LPN  Infection Prevention:   visitors restricted/screened   single patient room provided   rest/sleep promoted   personal protective equipment utilized   hand hygiene promoted  Taken 10/15/2024 2200 by David Shafer LPN  Infection Prevention:   visitors restricted/screened   rest/sleep promoted   single patient room provided   personal protective equipment utilized   hand hygiene promoted   equipment surfaces disinfected  Taken 10/15/2024 1948 by David Shafer LPN  Infection Prevention:   visitors restricted/screened   hand hygiene promoted   personal protective equipment utilized   rest/sleep promoted   single patient room provided  Goal: Optimal Comfort and Wellbeing  Intervention: Monitor Pain and  Promote Comfort  Description: Assess pain level, treatment efficacy and patient response at regular intervals using a consistent pain scale.  Consider the presence and impact of preexisting chronic pain.  Encourage patient and caregiver involvement in pain assessment, interventions and safety measures.  Recent Flowsheet Documentation  Taken 10/16/2024 0000 by David Shafer LPN  Pain Management Interventions:   quiet environment facilitated   no interventions per patient request   pain management plan reviewed with patient/caregiver  Taken 10/15/2024 1948 by David Shafer LPN  Pain Management Interventions:   position adjusted   pillow support provided   pain management plan reviewed with patient/caregiver   Goal Outcome Evaluation: Plan of care reviewed, discharge plan to go to rehab, also a watchman procedure after discharge. Pt remain in afib family remain at bedside , assist x1 with ambulation, fall protocol in place, call light in reach. Will cont to monitor pt progress toward goal

## 2024-10-16 NOTE — PROGRESS NOTES
[facility] MEDICINE SERVICE  DAILY PROGRESS NOTE    NAME: Natalee Noble  : 1950  MRN: [id number]      LOS: 3 days     PROVIDER OF SERVICE: Cheko Gallagher MD    Chief Complaint: Hypotensive episode    Subjective:     Interval History: Patient continues to feel well.  She was somewhat orthostatic yesterday.  Her breathing is overall improved today and she has less of a cough.        Review of Systems:   Review of Systems    Objective:     Vital Signs  Temp:  [97.4 °F (36.3 °C)-98.1 °F (36.7 °C)] 98.1 °F (36.7 °C)  Heart Rate:  [] 88  Resp:  [17-22] 19  BP: ()/(49-92) 121/83  Flow (L/min) (Oxygen Therapy):  [2] 2   Body mass index is 28.96 kg/m².    Physical Exam  Physical Exam  General Appearance:  Alert, cooperative, no distress, appears stated age  Head:  Normocephalic, without obvious abnormality, atraumatic  Eyes:  PERRL, conjunctiva/corneas clear, EOM's intact, fundi benign, both eyes  Ears:  Normal TM's and external ear canals, both ears  Nose: Nares normal, septum midline, mucosa normal, no drainage or sinus tenderness  Throat: Lips, mucosa, and tongue normal; teeth and gums normal  Neck: Supple, symmetrical, trachea midline, no adenopathy, thyroid: not enlarged, symmetric, no tenderness/mass/nodules, no carotid bruit or JVD  Lungs:   Bibasilar rales, respirations unlabored  Heart:  Regular rate and rhythm, S1, S2 normal, no murmur, rub or gallop  Abdomen:  Soft, non-tender, bowel sounds active all four quadrants,  no masses, no organomegaly  Extremities: Extremities normal, atraumatic, no cyanosis or edema  Pulses: 2+ and symmetric  Skin: Skin color, texture, turgor normal, no rashes or lesions  Neurologic: Normal         Diagnostic Data    Results from last 7 days   Lab Units 10/16/24  0546 10/12/24  0515 10/11/24  2142   WBC 10*3/mm3 5.74   < >  --    HEMOGLOBIN g/dL 11.1*   < >  --    HEMATOCRIT % 37.9   < >  --    PLATELETS 10*3/mm3 212   < >  --    GLUCOSE mg/dL 83   < >  110*   CREATININE mg/dL 1.24*   < > 1.44*   BUN mg/dL 17   < > 35*   SODIUM mmol/L 140   < > 141   POTASSIUM mmol/L 4.3   < > 4.0   AST (SGOT) U/L  --   --  20   ALT (SGPT) U/L  --   --  20   ALK PHOS U/L  --   --  81   BILIRUBIN mg/dL  --   --  0.4   ANION GAP mmol/L 12.6   < > 13.4    < > = values in this interval not displayed.       XR Chest 1 View    Result Date: 10/15/2024  Impression: No acute process. Electronically Signed: Chela Lennon MD  10/15/2024 9:20 AM EDT  Workstation ID: [id number]       I reviewed the patient's new clinical results.    Assessment/Plan:     Active and Resolved Problems  Active Hospital Problems    Diagnosis  POA    **Hypotensive episode [I95.9]  Yes    Hypothyroidism (acquired) [E03.9]  Yes    Rheumatoid arthritis involving multiple sites [M06.9]  Yes    Acute on chronic renal insufficiency [N28.9, N18.9]  Yes    Elevated troponin [R79.89]  Yes    Chronic HFrEF (heart failure with reduced ejection fraction) [I50.22]  Yes    Anxiety disorder [F41.9]  Yes    Constipation [K59.00]  Yes    Long term current use of anticoagulant therapy [Z79.01]  Not Applicable    Nausea [R11.0]  Unknown    CKD (chronic kidney disease) stage 3, GFR 30-59 ml/min [N18.30]  Yes    Hyperlipidemia [E78.5]  Yes    Paroxysmal A-fib [I48.0]  Yes      Resolved Hospital Problems   No resolved problems to display.       Near syncopal episode  -Likely secondary to hypertension after initiation of multiple medications for recent heart failure diagnosis; this includes Entresto, metoprolol and diuretics  -Holding all antihypertensives currently but Toprol-XL was restarted for heart rate control  -Hydrated with IV fluids with improvement in BP    A-fib with RVR  -Resume home digoxin  -Restarted Toprol-XL at lower dose and monitor for hypotension  -Continue Xarelto  -Patient has follow-up with her cardiologist as an outpatient  -Underwent MELISSA on 10/15 which showed that YO was appropriate size for Watchman  device  -Defer to cardiology whether patient will have Watchman device performed and timing    Mild KATERINE  -Likely secondary to hypotension with volume depletion  -Renal function back to baseline    Chronic systolic heart failure  -As above, patient was recently diagnosed with this and started on multiple medications that likely led to hypotension  -Holding most medications but restarted low-dose Toprol-XL  -Patient does have some mild rales on exam and will be given 1 dose of IV Lasix today    Hypothyroidism  -Continue Synthroid and Cytomel    Non-MI related elevated troponin, type II demand ischemia  -Likely secondary to hypotension and A-fib with RVR causing mild subendocardial ischemia  -Likely not a true ACS event    Anxiety disorder  -Continue Lexapro    Dyslipidemia  -Continue statin therapy    Cognitive dysfunction  -Continue Namenda    Osteoarthritis  -Continue Plaquenil    VTE Prophylaxis:  Pharmacologic VTE prophylaxis orders are present.             Disposition Planning:     Barriers to Discharge: Cardiology follow-up for A-fib, arrangements of inpatient rehab placement  Anticipated Date of Discharge: 10/17  Place of Discharge: [facility] rehab      Time: 45 minutes     Code Status and Medical Interventions: CPR (Attempt to Resuscitate); Full Support   Ordered at: 10/11/24 2244     Code Status (Patient has no pulse and is not breathing):    CPR (Attempt to Resuscitate)     Medical Interventions (Patient has pulse or is breathing):    Full Support       Signature: Electronically signed by Cheko Gallagher MD, 10/16/24, 11:14 EDT.  [facility] Hospitalist Team

## 2024-10-17 VITALS
RESPIRATION RATE: 16 BRPM | OXYGEN SATURATION: 93 % | DIASTOLIC BLOOD PRESSURE: 74 MMHG | BODY MASS INDEX: 28.99 KG/M2 | TEMPERATURE: 98 F | WEIGHT: 174 LBS | SYSTOLIC BLOOD PRESSURE: 118 MMHG | HEIGHT: 65 IN | HEART RATE: 88 BPM

## 2024-10-17 LAB
ANION GAP SERPL CALCULATED.3IONS-SCNC: 10.2 MMOL/L (ref 5–15)
BASOPHILS # BLD AUTO: 0.01 10*3/MM3 (ref 0–0.2)
BASOPHILS NFR BLD AUTO: 0.2 % (ref 0–1.5)
BUN SERPL-MCNC: 16 MG/DL (ref 8–23)
BUN/CREAT SERPL: 13.2 (ref 7–25)
CALCIUM SPEC-SCNC: 9.4 MG/DL (ref 8.6–10.5)
CHLORIDE SERPL-SCNC: 107 MMOL/L (ref 98–107)
CO2 SERPL-SCNC: 25.8 MMOL/L (ref 22–29)
CREAT SERPL-MCNC: 1.21 MG/DL (ref 0.57–1)
DEPRECATED RDW RBC AUTO: 54 FL (ref 37–54)
EGFRCR SERPLBLD CKD-EPI 2021: 47.1 ML/MIN/1.73
EOSINOPHIL # BLD AUTO: 0.2 10*3/MM3 (ref 0–0.4)
EOSINOPHIL NFR BLD AUTO: 4.1 % (ref 0.3–6.2)
ERYTHROCYTE [DISTWIDTH] IN BLOOD BY AUTOMATED COUNT: 16.9 % (ref 12.3–15.4)
GLUCOSE SERPL-MCNC: 100 MG/DL (ref 65–99)
HCT VFR BLD AUTO: 34.3 % (ref 34–46.6)
HGB BLD-MCNC: 10.4 G/DL (ref 12–15.9)
IMM GRANULOCYTES # BLD AUTO: 0.02 10*3/MM3 (ref 0–0.05)
IMM GRANULOCYTES NFR BLD AUTO: 0.4 % (ref 0–0.5)
LYMPHOCYTES # BLD AUTO: 0.81 10*3/MM3 (ref 0.7–3.1)
LYMPHOCYTES NFR BLD AUTO: 16.6 % (ref 19.6–45.3)
MCH RBC QN AUTO: 27.5 PG (ref 26.6–33)
MCHC RBC AUTO-ENTMCNC: 30.3 G/DL (ref 31.5–35.7)
MCV RBC AUTO: 90.7 FL (ref 79–97)
MONOCYTES # BLD AUTO: 0.7 10*3/MM3 (ref 0.1–0.9)
MONOCYTES NFR BLD AUTO: 14.3 % (ref 5–12)
NEUTROPHILS NFR BLD AUTO: 3.14 10*3/MM3 (ref 1.7–7)
NEUTROPHILS NFR BLD AUTO: 64.4 % (ref 42.7–76)
NRBC BLD AUTO-RTO: 0 /100 WBC (ref 0–0.2)
PLATELET # BLD AUTO: 191 10*3/MM3 (ref 140–450)
PMV BLD AUTO: 10.9 FL (ref 6–12)
POTASSIUM SERPL-SCNC: 4.2 MMOL/L (ref 3.5–5.2)
RBC # BLD AUTO: 3.78 10*6/MM3 (ref 3.77–5.28)
SODIUM SERPL-SCNC: 143 MMOL/L (ref 136–145)
WBC NRBC COR # BLD AUTO: 4.88 10*3/MM3 (ref 3.4–10.8)

## 2024-10-17 PROCEDURE — 99232 SBSQ HOSP IP/OBS MODERATE 35: CPT | Performed by: INTERNAL MEDICINE

## 2024-10-17 PROCEDURE — 85025 COMPLETE CBC W/AUTO DIFF WBC: CPT | Performed by: INTERNAL MEDICINE

## 2024-10-17 PROCEDURE — 80048 BASIC METABOLIC PNL TOTAL CA: CPT | Performed by: INTERNAL MEDICINE

## 2024-10-17 RX ADMIN — SODIUM BICARBONATE 650 MG: 650 TABLET ORAL at 08:41

## 2024-10-17 RX ADMIN — Medication 1000 UNITS: at 08:42

## 2024-10-17 RX ADMIN — MEMANTINE 10 MG: 10 TABLET ORAL at 08:41

## 2024-10-17 RX ADMIN — ESCITALOPRAM OXALATE 20 MG: 10 TABLET ORAL at 08:42

## 2024-10-17 RX ADMIN — GUAIFENESIN 200 MG: 200 SOLUTION ORAL at 06:02

## 2024-10-17 RX ADMIN — METOPROLOL SUCCINATE 25 MG: 25 TABLET, FILM COATED, EXTENDED RELEASE ORAL at 08:41

## 2024-10-17 RX ADMIN — LIOTHYRONINE SODIUM 5 MCG: 5 TABLET ORAL at 08:41

## 2024-10-17 RX ADMIN — PANTOPRAZOLE SODIUM 40 MG: 40 TABLET, DELAYED RELEASE ORAL at 08:41

## 2024-10-17 RX ADMIN — POLYETHYLENE GLYCOL 3350 17 G: 17 POWDER, FOR SOLUTION ORAL at 08:41

## 2024-10-17 RX ADMIN — LEVOTHYROXINE SODIUM 112 MCG: 0.11 TABLET ORAL at 05:58

## 2024-10-17 RX ADMIN — ALPRAZOLAM 1 MG: 1 TABLET ORAL at 10:47

## 2024-10-17 RX ADMIN — ASPIRIN 81 MG: 81 TABLET, COATED ORAL at 08:41

## 2024-10-17 RX ADMIN — GUAIFENESIN 200 MG: 200 SOLUTION ORAL at 10:45

## 2024-10-17 NOTE — PROGRESS NOTES
Cardiology Progress Note    Patient Identification:  Name: Natalee Noble  Age: 74 y.o.  Sex: female  :  1950  MRN: [id number]                 Follow Up / Chief Complaint: Hypertension, A-fib, CHF  Chief Complaint   Patient presents with    Hypotension       Interval History: Patient recently was in [facility] with newly diagnosed LV dysfunction was started on guideline directed medical therapy presented to ER with hypotension and recurrent syncopal episodes.    Patient underwent MELISSA on 10/15/2024 that showed EF of 56 to 60% with moderate to severe TR left atrial enlargement and right atrial enlargement.          NP note: Patient seen and examined.   Awaiting discharge to rehab today.  Patient is scheduled next week for ablation and watchman will schedule 1 week follow-up post watchman.      Electronically signed by GREGORIA Yuen, 10/17/24, 12:59 PM EDT.      Cardiology attending addendum :    I have personally performed a face-to-face diagnostic evaluation, physical exam and reviewed data on this patient.  I have reviewed documentation done by me and nurse practitioner  and corrected as needed.  And agree with the different components of documentation.Greater than 50% of the time spent in the care of this patient was provided by attending consultant/me.           Subjective: Patient seen and examined.  Chart reviewed.  Labs reviewed.  Patient denies any chest pain currently.  Patient is feeling better today.  Had some dizziness with standing up yesterday      Objective:  10/13/2024: EKG done March 10, 2013 2024 reveals A-fib with a rate of 123 bpm  10/14/2024: Creatinine 1.22 hemoglobin 10.7  10/15/2024: Creatinine 1.30 EGFR 43.2 glucose 122 hemoglobin 10.7  10/16/2024: Creatinine 1.24 EGFR 45.8 hemoglobin 11.1  10/17/2024: Creatinine 1.21 EGFR 47.1 hemoglobin 10.4    History of present illness:      Natalee Noble is a 74-year-old female with a medical history to include:     Atrial  fibrillation  [id number] SCORE   [id number] Score: 6 (10/12/2024  9:08 AM)     Long-term anticoagulation  Intolerant to flecainide and amiodarone in the past  HFrEF  Hypertension  Dyslipidemia  CKD stage III  History of CVA  Anxiety/depression  GERD  Diverticulosis  Stroke     Who presented to [facility] on 10/11/2024 following syncopal episode.  Patient reports that she was recently hospitalized at [facility] for 6 days for respiratory infection, CHF and atrial fibrillation.  She was discharged on Thursday.  Echocardiogram completed 10/5/2024 shows reduced LVEF of 35 to 40%, moderate global hypokinesis of left ventricle and mild to moderate mitral, tricuspid, and pulmonic valve regurgitation.  While hospitalized she was treated with IV antibiotics and started on Entresto, spironolactone, digoxin.  She reports 2 syncopal episodes yesterday.  Associated symptoms of shortness of breath and nausea.  She denies chest pain, palpitations, edema.  Radiology consulted for hypotension and syncope.  Upon admission patient noted to be significantly hypotensive at 69/47.  Pertinent labs include troponin 64, digoxin 1.15 creatinine 1.44, TSH unremarkable.  CXR without evidence of active disease.  Patient follows with Dr. Ordaz for cardiology.  She is also scheduled to see an electrophysiologist in near future to discuss atrial fibrilation ablation.  She reports she is intolerable to amiodarone and flecainide.           Assessment:  :     Dizziness near syncope  Hypotension  Recurrent syncope  Elevated troponin  Chronic HFrEF due to systolic dysfunction from dilated cardiomyopathy  Paroxysmal atrial fibrillation on long-term anticoagulation/Xarelto  Dyslipidemia  CKD  History of CVA        Recommendations / Plan:        Patient says she has recurrent syncope since age 12 with migraine headaches.  Recently was diagnosed with heart failure due to reduced ejection fraction LVEF of 35-40%, was put on guideline directed medical therapy  with with Entresto, Aldactone, digoxin, metoprolol succinate, Farxiga.  Patient's blood pressure was low and was having near syncope and dizziness.  Also has been having nausea.    Patient is having recurrent falls and syncope.  Has history of A-fib and elevated [id number] score of 6.  Will benefit from long-term anticoagulation but due to her falls she is a poor candidate for long-term anticoagulation.  Patient's troponin is elevated at 68 has no particular trend has come down to 64.  Will monitor serial troponins.  Probably is due to congestive heart failure and A-fib..  Will monitor renal function.  Follow-up with nephrology for CKD.  Patient will be scheduled for outpatient watchman and ablation per EP.    Copied text in this portion of the note has been reviewed and is accurate as of 10/17/2024    Past Medical History:  Past Medical History:   Diagnosis Date    Anxiety and depression     Arthritis     Back pain     CRF (chronic renal failure), stage 3 (moderate)     Diverticulosis     Essential hypertension     Family history of colon cancer     Family history of colonic polyps     Fractures     R tibia/fibula; L ankle    GERD (gastroesophageal reflux disease)     History of adenomatous polyp of colon     History of cardiac cath     1/28/19 left main no significant disease. LAD with no significant disease. LCX no significant disease. RCA no significant disease.    History of cardiac monitoring     2/01/19 - ZIO PATCH - Underlying rhythm is sinus at 48-94 bpm. PSVT x23 with fastest 167 bpm, longest 24.4 seconds.   10/05/2018- ZIO PATCH - NSR most of time but did have episode of A fib ranging from .    History of colon polyps     History of echocardiogram     9/2018: Left ventricular systolic function is normal. EF 50%. Left ventricular diastolic dysfunction consistent with ipaired relaxation. Left atrial cavity size is mildly dilated. Mild MR. Mild to moderate TR. There is abnormal thickening noted on  the RV that may represent a vegetation-this was discussed with Dr. Hammer and Dr. Reilly. No patent foramen ovale. No further work up needed at this time    Hyperlipidemia     Kidney disorder     Migraine     Migraines     Neck pain     Stroke 2018     Past Surgical History:  Past Surgical History:   Procedure Laterality Date    ABDOMINOPLASTY      BACK SURGERY      L2-5 fusion 2012    CHOLECYSTECTOMY      COLONOSCOPY  11/24/2014    Diverticulosis; Repeat 5 years    COLONOSCOPY  11/09/2010    Diverticulosis; Repeat 4 years    COLONOSCOPY  09/18/2007    Dr. Monzon-Normal; Repeat 3 years    COLONOSCOPY  06/02/2005    Dr. Monzon-[facility] polypectomy above the cecum; Otherwise normal colonoscopy; Repeat 2 years    COLONOSCOPY N/A 11/01/2019    Diverticulosis in the left colon; Two 5-6mm polyps in the cecum-path shows one tubular adenomatous and on hyperplastic polyp; One 8mm tubular adenomatous polyp in the ascending colon; Two 4-5mm tubular adenomatous polyps in the transverse colon; Repeat 3 years    COLONOSCOPY N/A 6/12/2023    Procedure: COLONOSCOPY WITH ANESTHESIA;  Surgeon: Susan Lord MD;  Location:  [facility];  Service: Gastroenterology;  Laterality: N/A;  preop hx of polyps   postop; polyps   PCP Woody Valentin MD    CYSTOCELE REPAIR      Cystocele and rectocele repair    ENDOSCOPY  09/28/2012    LA grade C esophagitis; HH    ENDOSCOPY N/A 11/01/2019    Small HH; Non-erosive gastritis-biopsied; Normal examined duodenum    ENDOSCOPY N/A 10/13/2024    Procedure: ESOPHAGOGASTRODUODENOSCOPY WITH BIOPSY X 1 AREA;  Surgeon: Miguel Vega MD;  Location: [facility] ENDOSCOPY;  Service: Gastroenterology;  Laterality: N/A;  Hiatal hernia, esophagitis, gastritis    FIBULA FRACTURE SURGERY      HYSTERECTOMY      NECK SURGERY  2012    ACDF C4-7    ORIF TIBIA FRACTURE Right         Social History:   Social History     Tobacco Use    Smoking status: Never    Smokeless tobacco: Never   Substance  "Use Topics    Alcohol use: No      Family History:  Family History   Problem Relation Age of Onset    Colon cancer Father 65    Colon polyps Daughter 38    Esophageal cancer Neg Hx     Liver cancer Neg Hx     Liver disease Neg Hx     Rectal cancer Neg Hx     Stomach cancer Neg Hx           Allergies:  Allergies   Allergen Reactions    Benzoin Anaphylaxis and Swelling     States when used on her neck it shut off her airway  Huge abscesses with surgery      Iodine Other (See Comments)     PT UNABLE TO TELL RN ABOUT REACTION AT THIS TIME, BUT FAMILY STATES PT HAD A REACTION TO BEING CLEANSED WITH IODINE IN SURGERY  IOBAN - used on neck, swelled to the point of shutting off airway    Vancomycin Other (See Comments)     Kidney failure  Vancomycin induced acute kidney injury      Amiodarone Other (See Comments)     Tremor- hand and face     Levaquin [Levofloxacin] Unknown (See Comments)     Unknown    Sulfa Antibiotics     Acetaminophen Nausea Only    Latex Itching     Scheduled Meds:          Review of Systems:   Review of Systems   Cardiovascular:  Negative for chest pain.   Neurological:  Positive for dizziness and weakness.            Constitutional:  Temp:  [98 °F (36.7 °C)-98.3 °F (36.8 °C)] 98 °F (36.7 °C)  Heart Rate:  [76-91] 88  Resp:  [16-21] 16  BP: (107-118)/(69-74) 118/74    Physical Exam   /74 (BP Location: Right arm, Patient Position: Lying)   Pulse 88   Temp 98 °F (36.7 °C) (Oral)   Resp 16   Ht 165.1 cm (65\")   Wt 78.9 kg (174 lb)   SpO2 93%   BMI 28.96 kg/m²   General:  Appears in no acute distress  Eyes: Sclera is anicteric,  conjunctiva is clear   HEENT:  No JVD. Thyroid not visibly enlarged. No mucosal pallor or cyanosis  Respiratory: Respirations regular and unlabored at rest.  Clear to auscultation  Cardiovascular: S1,S2, irregularly irregular rate  Gastrointestinal: Abdomen nondistended.  Musculoskeletal:  No abnormal movements  Extremities: No digital clubbing or cyanosis  Skin: " Color pink.   Neuro: Alert and awake.    INTAKE AND OUTPUT:    Intake/Output Summary (Last 24 hours) at 10/17/2024 1906  Last data filed at 10/16/2024 1939  Gross per 24 hour   Intake 240 ml   Output 650 ml   Net -410 ml       Cardiographics  Telemetry: A-fib    ECG:   ECG 12 Lead Drug Monitoring; Amiodarone   Preliminary Result   HEART ATBW=937  bpm   RR Exdoylvh=420  ms   DE Interval=  ms   P Horizontal Axis=  deg   P Front Axis=  deg   QRSD Interval=79  ms   QT Ydiztshg=038  ms   RFbV=472  ms   QRS Axis=47  deg   T Wave Axis=236  deg   - ABNORMAL ECG -   Atrial fibrillation   Repol abnrm, severe global ischemia (LM/MVD)   Date and Time of Study:2024-10-14 10:16:06      ECG 12 Lead Drug Monitoring; Amiodarone   Final Result   HEART RATE=92  bpm   RR Jmsoqrjl=798  ms   DE Interval=  ms   P Horizontal Axis=  deg   P Front Axis=  deg   QRSD Interval=89  ms   QT Xfyjffgv=433  ms   UYvU=979  ms   QRS Axis=45  deg   T Wave Axis=261  deg   - ABNORMAL ECG -   Atrial fibrillation   Repol abnrm, severe global ischemia (LM/MVD)   When compared with ECG of 13-Oct-2024 19:20:53,   Significant rate decrease   Electronically Signed By: Josemanuel Sheriff ([facility]) 2024-10-14 08:04:32   Date and Time of Study:2024-10-14 05:23:18      ECG 12 Lead Rhythm Change   Final Result   HEART BWYH=978  bpm   RR Zdqomuwf=788  ms   DE Interval=  ms   P Horizontal Axis=  deg   P Front Axis=  deg   QRSD Interval=83  ms   QT Nohfoetw=881  ms   TIvW=890  ms   QRS Axis=61  deg   T Wave Axis=250  deg   - ABNORMAL ECG -   Atrial fibrillation   Repol abnrm suggests ischemia, diffuse leads   When compared with ECG of 11-Oct-2024 20:47:52,   Significant rate increase   Electronically Signed By: Clarke Correa ([facility]) 2024-10-14 14:48:44   Date and Time of Study:2024-10-13 19:20:53      ECG 12 Lead Other; hypotension   Final Result   HEART RATE=98  bpm   RR Foomlxue=239  ms   DE Interval=  ms   P Horizontal Axis=  deg   P Front Axis=  deg   QRSD Interval=80  ms    QT Segyhmdg=340  ms   ODsM=061  ms   QRS Axis=55  deg   T Wave Axis=243  deg   - ABNORMAL ECG -   Atrial fibrillation   Repol abnrm suggests ischemia, anterolateral   No previous ECG available for comparison   Electronically Signed By: Fuad Macias ([facility]) 2024-10-12 08:32:29   Date and Time of Study:2024-10-11 20:47:52      Telemetry Scan   Final Result      Telemetry Scan   Final Result      Telemetry Scan   Final Result      Telemetry Scan   Final Result      Telemetry Scan   Final Result      Telemetry Scan   Final Result      Telemetry Scan   Final Result      Telemetry Scan   Final Result      Telemetry Scan   Final Result      Telemetry Scan   Final Result      Telemetry Scan   Final Result      Telemetry Scan   Final Result      Telemetry Scan   Final Result      Telemetry Scan   Final Result      Telemetry Scan   Final Result      Telemetry Scan   Final Result      Telemetry Scan   Final Result      Telemetry Scan   Final Result      Telemetry Scan   Final Result      Telemetry Scan   Final Result      Telemetry Scan   Final Result      Telemetry Scan   Final Result      Telemetry Scan   Final Result      Telemetry Scan   Final Result      Telemetry Scan   Final Result      Telemetry Scan   Final Result      Telemetry Scan   Final Result      Telemetry Scan   Final Result      Telemetry Scan   Final Result      Telemetry Scan   Final Result      Telemetry Scan   Final Result      Telemetry Scan   Final Result      Telemetry Scan   Final Result      Telemetry Scan   Final Result      Telemetry Scan   Final Result      Telemetry Scan   Final Result      Telemetry Scan   Final Result      Telemetry Scan   Final Result      Telemetry Scan   Final Result        I have personally reviewed EKG    Echocardiogram: Results for orders placed during the hospital encounter of 10/11/24    Adult Transesophageal Echo (MELISSA) W/ Cont if Necessary Per Protocol    Interpretation Summary    Left ventricular systolic function is  "normal. Left ventricular ejection fraction appears to be 56 - 60%.    The left atrial cavity is severely dilated.    Saline test results are negative.    The right atrial cavity is moderately  dilated.    Moderate to severe tricuspid valve regurgitation is present.    Estimated right ventricular systolic pressure from tricuspid regurgitation is mildly elevated (35-45 mmHg).    Left atrial appendage adequate for watchman      Lab Review   I have reviewed the labs  Results from last 7 days   Lab Units 10/12/24  0112 10/11/24  2142   HSTROP T ng/L 64* 68*         Results from last 7 days   Lab Units 10/17/24  0601   SODIUM mmol/L 143   POTASSIUM mmol/L 4.2   BUN mg/dL 16   CREATININE mg/dL 1.21*   CALCIUM mg/dL 9.4         Results from last 7 days   Lab Units 10/17/24  0601 10/16/24  0546 10/15/24  0523   WBC 10*3/mm3 4.88 5.74 6.17   HEMOGLOBIN g/dL 10.4* 11.1* 10.7*   HEMATOCRIT % 34.3 37.9 35.6   PLATELETS 10*3/mm3 191 212 203           RADIOLOGY:  Imaging Results (Last 24 Hours)       ** No results found for the last 24 hours. **                  )10/17/2024  Josemanuel Sheriff MD      EMR Dragon/Transcription:   \"Dictated utilizing Dragon dictation\".   "

## 2024-10-17 NOTE — DISCHARGE SUMMARY
[facility] Medicine Services  Discharge Summary    Date of Service: 10/17/2024  Patient Name: Natalee Noble  : 1950  MRN: [id number]    Date of Admission: 10/11/2024  Discharge Diagnosis:   Near syncope/syncope secondary to hypotension  A-fib with RVR  CKD stage III without KATERINE  Chronic systolic heart failure  Hypothyroidism  Non-MI related elevated protein, type II demand ischemia  Anxiety disorder  Dyslipidemia  Cognitive dysfunction  Osteoarthritis    Date of Discharge: 10/17/2024  Primary Care Physician: Woody Valentin MD      Presenting Problem:   Renal insufficiency [N28.9]  Elevated troponin [R79.89]  Hypotensive episode [I95.9]  Hypotension, unspecified hypotension type [I95.9]  Chronic HFrEF (heart failure with reduced ejection fraction) [I50.22]    Active and Resolved Hospital Problems:  Active Hospital Problems    Diagnosis POA    **Hypotensive episode [I95.9] Yes    Hypothyroidism (acquired) [E03.9] Yes    Rheumatoid arthritis involving multiple sites [M06.9] Yes    Acute on chronic renal insufficiency [N28.9, N18.9] Yes    Elevated troponin [R79.89] Yes    Chronic HFrEF (heart failure with reduced ejection fraction) [I50.22] Yes    Anxiety disorder [F41.9] Yes    Constipation [K59.00] Yes    Long term current use of anticoagulant therapy [Z79.01] Not Applicable    Nausea [R11.0] Unknown    CKD (chronic kidney disease) stage 3, GFR 30-59 ml/min [N18.30] Yes    Hyperlipidemia [E78.5] Yes    Paroxysmal A-fib [I48.0] Yes      Resolved Hospital Problems   No resolved problems to display.         Hospital Course     HPI:    Patient is a 74-year-old female who presented to the hospital after a syncopal episode.  Please see H&P for details.    Hospital Course:  The patient was recently mated for acute decompensated systolic heart failure.  She was treated for that and discharged home on multiple medications per GDMT guidelines, including beta-blocker and Entresto as well  as Aldactone and furosemide.  The patient was feeling well on discharge however prior to this admission, she began having some dizziness and lightheadedness and had a syncopal episode and multiple near syncope episodes.  She was found to be hypotensive on admission here and all of her antihypertensive medications were stopped.  Her BP did improve however she was also in A-fib with RVR.  She was restarted on low-dose Toprol-XL for heart rate control and her BP remained fairly stable with this.  She did have some mild orthostatic hypotension but this resolved.  Her heart rate was also better controlled and she will also be discharged back on her digoxin in addition to the Toprol XL.  However her Entresto and and Aldactone will be held.  These can be restarted as an outpatient as her BP improves.  The patient was scheduled to follow-up with electrophysiology at [facility] as an outpatient on 10/25 to discuss possible ablation procedure for her atrial fibrillation.  She has a high risk candidate for anticoagulation due to multiple falls recently and therefore may not be able to tolerate anticoagulation long-term.  She was seen by cardiology here and workup was performed to assess whether she would be a candidate for Watchman device as well, including MELISSA which did show adequate YO size for Watchman device.  As such, she will be arranged to have ablation and Watchman device performed on 10/22 with [facility] cardiology.  She was seen by PT/OT who recommended acute IPR placement and the patient will be discharged to acute IPR facility today.  She will need to return for her procedure on 10/22.  She has been advised to not take her Xarelto for 48 hours prior to her procedure day.  She is stable for discharge.         DISCHARGE Follow Up Recommendations for labs and diagnostics: Follow-up with cardiology next week for planned procedures.        Day of Discharge     Vital Signs:  Temp:  [98 °F (36.7 °C)-98.2 °F  (36.8 °C)] 98.1 °F (36.7 °C)  Heart Rate:  [76-93] 83  Resp:  [18-21] 21  BP: (107-136)/(69-91) 107/69    Physical Exam:  Physical Exam   General Appearance:  Alert, cooperative, no distress, appears stated age  Head:  Normocephalic, without obvious abnormality, atraumatic  Eyes:  PERRL, conjunctiva/corneas clear, EOM's intact, fundi benign, both eyes  Ears:  Normal TM's and external ear canals, both ears  Nose: Nares normal, septum midline, mucosa normal, no drainage or sinus tenderness  Throat: Lips, mucosa, and tongue normal; teeth and gums normal  Neck: Supple, symmetrical, trachea midline, no adenopathy, thyroid: not enlarged, symmetric, no tenderness/mass/nodules, no carotid bruit or JVD  Lungs:   Clear to auscultation bilaterally, respirations unlabored  Heart:  Regular rate and rhythm, S1, S2 normal, no murmur, rub or gallop  Abdomen:  Soft, non-tender, bowel sounds active all four quadrants,  no masses, no organomegaly  Extremities: Extremities normal, atraumatic, no cyanosis or edema  Pulses: 2+ and symmetric  Skin: Skin color, texture, turgor normal, no rashes or lesions  Neurologic: Normal        Pertinent  and/or Most Recent Results     LAB RESULTS:      Lab 10/17/24  0601 10/16/24  0546 10/15/24  0523 10/14/24  1159 10/13/24  0532 10/12/24  0515   WBC 4.88 5.74 6.17 6.79 6.79 9.35   HEMOGLOBIN 10.4* 11.1* 10.7* 10.7* 11.4* 12.2   HEMATOCRIT 34.3 37.9 35.6 37.5 38.1 39.9   PLATELETS 191 212 203 161 235 288   NEUTROS ABS 3.14 3.97 4.33 5.00  --  7.62*   IMMATURE GRANS (ABS) 0.02 0.04 0.04 0.05  --  0.08*   LYMPHS ABS 0.81 0.82 0.84 0.73  --  0.81   MONOS ABS 0.70 0.69 0.68 0.74  --  0.75   EOS ABS 0.20 0.21 0.28 0.26  --  0.08   MCV 90.7 92.9 92.7 99.7* 92.0 90.3         Lab 10/17/24  0601 10/16/24  0546 10/15/24  0523 10/14/24  1159 10/12/24  0112 10/11/24  2142   SODIUM 143 140 141 140  --  141   POTASSIUM 4.2 4.3 4.2 4.2  --  4.0   CHLORIDE 107 105 110* 111*  --  106   CO2 25.8 22.4 20.9* 19.2*  --   21.6*   ANION GAP 10.2 12.6 10.1 9.8  --  13.4   BUN 16 17 19 21  --  35*   CREATININE 1.21* 1.24* 1.30* 1.22*  --  1.44*   EGFR 47.1* 45.8* 43.2* 46.7*  --  38.2*   GLUCOSE 100* 83 122* 126*  --  110*   CALCIUM 9.4 9.4 8.7 8.6  --  8.1*   TSH  --   --   --   --  0.641  --          Lab 10/11/24  2142   TOTAL PROTEIN 5.6*   ALBUMIN 3.5   GLOBULIN 2.1   ALT (SGPT) 20   AST (SGOT) 20   BILIRUBIN 0.4   ALK PHOS 81         Lab 10/12/24  0112 10/11/24  2142 10/11/24  2058   PROBNP  --   --  2,832.0*   HSTROP T 64* 68*  --                  Brief Urine Lab Results       None          Microbiology Results (last 10 days)       ** No results found for the last 240 hours. **            XR Chest 1 View    Result Date: 10/15/2024  Impression: Impression: No acute process. Electronically Signed: Chela Lennon MD  10/15/2024 9:20 AM EDT  Workstation ID: [id number]    CT Head Without Contrast    Result Date: 10/11/2024  Impression: Impression: 1. No acute intracranial abnormalities are identified. 2. Pansinusitis. Electronically Signed: Jeet Reis MD  10/11/2024 10:24 PM EDT  Workstation ID: [id number]    XR Ankle 3+ View Left    Result Date: 10/11/2024  Impression: Impression: No acute osseous abnormalities are identified. Electronically Signed: Jeet Reis MD  10/11/2024 9:33 PM EDT  Workstation ID: [id number]    XR Chest 1 View    Result Date: 10/11/2024  Impression: No active disease. Electronically Signed: Jeet Reis MD  10/11/2024 9:25 PM EDT  Workstation ID: [id number]    CTA CHEST FOR PULMONARY VEIN MAPPING    Result Date: 10/8/2024  Impression: IMPRESSION: 1. Standard 4 vessel pulmonary vein pattern. 2. Negative for YO thrombosis.    CT CHEST ABDOMEN PELVIS WO CONTRAST    Result Date: 10/5/2024  Impression: IMPRESSION: 1. Scattered groundglass attenuation and tree-in-bud nodularity throughout both lungs suggestive of an infectious or inflammatory process. 2. No acute process within the abdomen or pelvis.              Results for orders placed during the hospital encounter of 10/11/24    Adult Transesophageal Echo (MELISSA) W/ Cont if Necessary Per Protocol    Interpretation Summary    Left ventricular systolic function is normal. Left ventricular ejection fraction appears to be 56 - 60%.    The left atrial cavity is severely dilated.    Saline test results are negative.    The right atrial cavity is moderately  dilated.    Moderate to severe tricuspid valve regurgitation is present.    Estimated right ventricular systolic pressure from tricuspid regurgitation is mildly elevated (35-45 mmHg).    Left atrial appendage adequate for watchman      Labs Pending at Discharge:      Procedures Performed  Procedure(s):  ESOPHAGOGASTRODUODENOSCOPY WITH BIOPSY X 1 AREA         Consults:   Consults       Date and Time Order Name Status Description    10/12/2024 11:33 AM Inpatient Gastroenterology Consult      10/11/2024 10:41 PM Inpatient Cardiology Consult Completed     10/11/2024 10:19 PM Hospitalist (on-call MD unless specified)                Discharge Details        Discharge Medications        Continue These Medications        Instructions Start Date   ALPRAZolam 1 MG tablet  Commonly known as: XANAX   1 mg, Oral, 3 Times Daily PRN      aspirin 81 MG tablet   81 mg, Oral, Daily      atorvastatin 80 MG tablet  Commonly known as: LIPITOR   1 tablet, Oral, Daily      cholecalciferol 25 MCG (1000 UT) tablet  Commonly known as: VITAMIN D3   1,000 Units, Oral, Daily      dapagliflozin Propanediol 10 MG tablet   10 mg, Oral, Daily      digoxin 125 MCG tablet  Commonly known as: LANOXIN   125 mcg, Oral, Daily Digoxin      escitalopram 20 MG tablet  Commonly known as: LEXAPRO   20 mg, Oral, Daily      ezetimibe 10 MG tablet  Commonly known as: ZETIA   10 mg, Oral, Daily      FOLIC ACID PO   Oral, Daily      hydroxychloroquine 200 MG tablet  Commonly known as: PLAQUENIL   1 tablet, Oral, 2 Times Daily      levothyroxine 112 MCG  tablet  Commonly known as: SYNTHROID, LEVOTHROID   112 mcg, Oral, Daily      Lifitegrast 5 % ophthalmic solution  Commonly known as: XIIDRA   1 drop, Both Eyes, 2 Times Daily      liothyronine 5 MCG tablet  Commonly known as: CYTOMEL   5 mcg, Oral, 2 Times Daily      memantine 10 MG tablet  Commonly known as: NAMENDA   10 mg, Oral, 2 Times Daily      metoprolol succinate XL 25 MG 24 hr tablet  Commonly known as: TOPROL-XL   25 mg, Oral, 2 Times Daily      pantoprazole 40 MG EC tablet  Commonly known as: PROTONIX   40 mg, Oral, Daily PRN      PROLIA SC   Subcutaneous, Every 6 months      rivaroxaban 20 MG tablet  Commonly known as: XARELTO   20 mg, Oral, Daily      sodium bicarbonate 650 MG tablet   650 mg, Oral, 2 Times Daily      topiramate 200 MG tablet  Commonly known as: TOPAMAX   1 tablet, Oral, Daily             Stop These Medications      Entresto 24-26 MG tablet  Generic drug: sacubitril-valsartan     spironolactone 25 MG tablet  Commonly known as: ALDACTONE              Allergies   Allergen Reactions    Benzoin Anaphylaxis and Swelling     States when used on her neck it shut off her airway  Huge abscesses with surgery      Iodine Other (See Comments)     PT UNABLE TO TELL RN ABOUT REACTION AT THIS TIME, BUT FAMILY STATES PT HAD A REACTION TO BEING CLEANSED WITH IODINE IN SURGERY  IOBAN - used on neck, swelled to the point of shutting off airway    Vancomycin Other (See Comments)     Kidney failure  Vancomycin induced acute kidney injury      Amiodarone Other (See Comments)     Tremor- hand and face     Levaquin [Levofloxacin] Unknown (See Comments)     Unknown    Sulfa Antibiotics     Acetaminophen Nausea Only    Latex Itching         Discharge Disposition:     Rehab Facility or Unit (DC - External)    Diet:  Hospital:  Diet Order   Procedures    Diet: Cardiac; Healthy Heart (2-3 Na+); Fluid Consistency: Thin (IDDSI 0)         Discharge Activity:         CODE STATUS:  Code Status and Medical Interventions:  CPR (Attempt to Resuscitate); Full Support   Ordered at: 10/11/24 2241     Code Status (Patient has no pulse and is not breathing):    CPR (Attempt to Resuscitate)     Medical Interventions (Patient has pulse or is breathing):    Full Support         Future Appointments   Date Time Provider Department Center   10/22/2024  2:45 PM [facility]       Additional Instructions for the Follow-ups that You Need to Schedule       Discharge Follow-up with Specified Provider: Follow up with cardiology next week for ablation and Watchman device procedure; 1 Week   As directed      To: Follow up with cardiology next week for ablation and Watchman device procedure   Follow Up: 1 Week                Time spent on Discharge including face to face service: Greater than 30 minutes    Signature: Electronically signed by Cheko Gallagher MD, 10/17/24, 09:26 EDT.  [facility] Hospitalist Team

## 2024-10-17 NOTE — DISCHARGE PLACEMENT REQUEST
"Natalee Varma \"Margaret\" (74 y.o. Female)       Date of Birth   1950    Social Security Number       Address   [facility] [facility] [place] [address]    Home Phone   [phone]    MRN   [id number]       Mormonism   [facility]    Marital Status                               Admission Date   10/11/24    Admission Type   Emergency    Admitting Provider   Nayan Stuart MD    Attending Provider   Cheko Gallagher MD    Department, Room/Bed   [facility] 2D, 256/1       Discharge Date       Discharge Disposition   Rehab Facility or Unit (DC - External)    Discharge Destination                                 Attending Provider: Cheko Gallagher MD    Allergies: Benzoin, Iodine, Vancomycin, Amiodarone, Levaquin [Levofloxacin], Sulfa Antibiotics, Acetaminophen, Latex    Isolation: None   Infection: None   Code Status: CPR    Ht: 165.1 cm (65\")   Wt: 78.9 kg (174 lb)    Admission Cmt: None   Principal Problem: Hypotensive episode [I95.9]                   Active Insurance as of 10/11/2024       Primary Coverage       Payor Plan Insurance Group Employer/Plan Group    MEDICARE MEDICARE A & B        Payor Plan Address Payor Plan Phone Number Payor Plan Fax Number Effective Dates    [address] [phone]  9/1/2004 - None Entered    Formerly [facility] [address]         Subscriber Name Subscriber Birth Date Member ID       NATALEE VARMA 1950 [id number]               Secondary Coverage       Payor Plan Insurance Group Employer/Plan Group    [employer] [place] [employer] [place]        Payor Plan Address Payor Plan Phone Number Payor Plan Fax Number Effective Dates    [address] [place] [address] [phone]  6/1/2015 - None Entered    [place] [address]         Subscriber Name Subscriber Birth Date Member ID       NATALEE VARMA 1950 [id number]                     Emergency Contacts        (Rel.) Home Phone Work Phone Mobile Phone    Gaurang Elizondo (Daughter) [phone] -- --      "        Cheko Gallagher MD   Physician  Medicine     Discharge Summary     Signed     Date of Service: 10/17/24 0926  Creation Time: 10/17/24 0926     Signed       Expand All Collapse All[]Expand All by Default                   [facility] Medicine Services  Discharge Summary     Date of Service: 10/17/2024  Patient Name: Natalee Noble  : 1950  MRN: [id number]     Date of Admission: 10/11/2024  Discharge Diagnosis:   Near syncope/syncope secondary to hypotension  A-fib with RVR  Mild KATERINE  Chronic systolic heart failure  Hypothyroidism  Non-MI related elevated protein, type II demand ischemia  Anxiety disorder  Dyslipidemia  Cognitive dysfunction  Osteoarthritis     Date of Discharge: 10/17/2024  Primary Care Physician: Woody Valentin MD        Presenting Problem:   Renal insufficiency [N28.9]  Elevated troponin [R79.89]  Hypotensive episode [I95.9]  Hypotension, unspecified hypotension type [I95.9]  Chronic HFrEF (heart failure with reduced ejection fraction) [I50.22]     Active and Resolved Hospital Problems:       Active Hospital Problems     Diagnosis POA    **Hypotensive episode [I95.9] Yes    Hypothyroidism (acquired) [E03.9] Yes    Rheumatoid arthritis involving multiple sites [M06.9] Yes    Acute on chronic renal insufficiency [N28.9, N18.9] Yes    Elevated troponin [R79.89] Yes    Chronic HFrEF (heart failure with reduced ejection fraction) [I50.22] Yes    Anxiety disorder [F41.9] Yes    Constipation [K59.00] Yes    Long term current use of anticoagulant therapy [Z79.01] Not Applicable    Nausea [R11.0] Unknown    CKD (chronic kidney disease) stage 3, GFR 30-59 ml/min [N18.30] Yes    Hyperlipidemia [E78.5] Yes    Paroxysmal A-fib [I48.0] Yes       Resolved Hospital Problems   No resolved problems to display.            Hospital Course      HPI:     Patient is a 74-year-old female who presented to the hospital after a syncopal episode.  Please see H&P for details.     Hospital  Course:  The patient was recently mated for acute decompensated systolic heart failure.  She was treated for that and discharged home on multiple medications per GDMT guidelines, including beta-blocker and Entresto as well as Aldactone and furosemide.  The patient was feeling well on discharge however prior to this admission, she began having some dizziness and lightheadedness and had a syncopal episode and multiple near syncope episodes.  She was found to be hypotensive on admission here and all of her antihypertensive medications were stopped.  Her BP did improve however she was also in A-fib with RVR.  She was restarted on low-dose Toprol-XL for heart rate control and her BP remained fairly stable with this.  She did have some mild orthostatic hypotension but this resolved.  Her heart rate was also better controlled and she will also be discharged back on her digoxin in addition to the Toprol XL.  However her Entresto and and Aldactone will be held.  These can be restarted as an outpatient as her BP improves.  The patient was scheduled to follow-up with electrophysiology at [facility] as an outpatient on 10/25 to discuss possible ablation procedure for her atrial fibrillation.  She has a high risk candidate for anticoagulation due to multiple falls recently and therefore may not be able to tolerate anticoagulation long-term.  She was seen by cardiology here and workup was performed to assess whether she would be a candidate for Watchman device as well, including MELISSA which did show adequate YO size for Watchman device.  As such, she will be arranged to have ablation and Watchman device performed on 10/22 with [facility].  She was seen by PT/OT who recommended acute IPR placement and the patient will be discharged to acute IPR facility today.  She will need to return for her procedure on 10/22.  She has been advised to not take her Xarelto for 48 hours prior to her procedure day.  She is  stable for discharge.            DISCHARGE Follow Up Recommendations for labs and diagnostics: Follow-up with cardiology next week for planned procedures.           Day of Discharge      Vital Signs:  Temp:  [98 °F (36.7 °C)-98.2 °F (36.8 °C)] 98.1 °F (36.7 °C)  Heart Rate:  [76-93] 83  Resp:  [18-21] 21  BP: (107-136)/(69-91) 107/69     Physical Exam:  Physical Exam   General Appearance:  Alert, cooperative, no distress, appears stated age  Head:   Normocephalic, without obvious abnormality, atraumatic  Eyes:   PERRL, conjunctiva/corneas clear, EOM's intact, fundi benign, both eyes  Ears:    Normal TM's and external ear canals, both ears  Nose:Nares normal, septum midline, mucosa normal, no drainage or sinus tenderness  Throat:Lips, mucosa, and tongue normal; teeth and gums normal  Neck:Supple, symmetrical, trachea midline, no adenopathy, thyroid: not enlarged, symmetric, no tenderness/mass/nodules, no carotid bruit or JVD  Lungs:             Clear to auscultation bilaterally, respirations unlabored  Heart:  Regular rate and rhythm, S1, S2 normal, no murmur, rub or gallop  Abdomen:  Soft, non-tender, bowel sounds active all four quadrants,  no masses, no organomegaly  Extremities:Extremities normal, atraumatic, no cyanosis or edema  Pulses:2+ and symmetric  Skin:Skin color, texture, turgor normal, no rashes or lesions  Neurologic: Normal           Pertinent  and/or Most Recent Results      LAB RESULTS:               Lab 10/17/24  0601 10/16/24  0546 10/15/24  0523 10/14/24  1159 10/13/24  0532 10/12/24  0515   WBC 4.88 5.74 6.17 6.79 6.79 9.35   HEMOGLOBIN 10.4* 11.1* 10.7* 10.7* 11.4* 12.2   HEMATOCRIT 34.3 37.9 35.6 37.5 38.1 39.9   PLATELETS 191 212 203 161 235 288   NEUTROS ABS 3.14 3.97 4.33 5.00  --  7.62*   IMMATURE GRANS (ABS) 0.02 0.04 0.04 0.05  --  0.08*   LYMPHS ABS 0.81 0.82 0.84 0.73  --  0.81   MONOS ABS 0.70 0.69 0.68 0.74  --  0.75   EOS ABS 0.20 0.21 0.28 0.26  --  0.08   MCV 90.7 92.9 92.7 99.7*  92.0 90.3                   Lab 10/17/24  0601 10/16/24  0546 10/15/24  0523 10/14/24  1159 10/12/24  0112 10/11/24  2142   SODIUM 143 140 141 140  --  141   POTASSIUM 4.2 4.3 4.2 4.2  --  4.0   CHLORIDE 107 105 110* 111*  --  106   CO2 25.8 22.4 20.9* 19.2*  --  21.6*   ANION GAP 10.2 12.6 10.1 9.8  --  13.4   BUN 16 17 19 21  --  35*   CREATININE 1.21* 1.24* 1.30* 1.22*  --  1.44*   EGFR 47.1* 45.8* 43.2* 46.7*  --  38.2*   GLUCOSE 100* 83 122* 126*  --  110*   CALCIUM 9.4 9.4 8.7 8.6  --  8.1*   TSH  --   --   --   --  0.641  --               Lab 10/11/24  2142   TOTAL PROTEIN 5.6*   ALBUMIN 3.5   GLOBULIN 2.1   ALT (SGPT) 20   AST (SGOT) 20   BILIRUBIN 0.4   ALK PHOS 81                Lab 10/12/24  0112 10/11/24  2142 10/11/24  2058   PROBNP  --   --  2,832.0*   HSTROP T 64* 68*  --                   Brief Urine Lab Results         None             Microbiology Results (last 10 days)         ** No results found for the last 240 hours. **                  Last 30 day radiology impressions   XR Chest 1 View     Result Date: 10/15/2024  Impression: Impression: No acute process. Electronically Signed: Chela Lennon MD  10/15/2024 9:20 AM EDT  Workstation ID: [id number]     CT Head Without Contrast     Result Date: 10/11/2024  Impression: Impression: 1. No acute intracranial abnormalities are identified. 2. Pansinusitis. Electronically Signed: Jeet Reis MD  10/11/2024 10:24 PM EDT  Workstation ID: [id number]     XR Ankle 3+ View Left     Result Date: 10/11/2024  Impression: Impression: No acute osseous abnormalities are identified. Electronically Signed: Jeet Reis MD  10/11/2024 9:33 PM EDT  Workstation ID: [id number]     XR Chest 1 View     Result Date: 10/11/2024  Impression: No active disease. Electronically Signed: Jeet Reis MD  10/11/2024 9:25 PM EDT  Workstation ID: [id number]     CTA CHEST FOR PULMONARY VEIN MAPPING     Result Date: 10/8/2024  Impression: IMPRESSION: 1. Standard 4 vessel pulmonary vein  pattern. 2. Negative for YO thrombosis.     CT CHEST ABDOMEN PELVIS WO CONTRAST     Result Date: 10/5/2024  Impression: IMPRESSION: 1. Scattered groundglass attenuation and tree-in-bud nodularity throughout both lungs suggestive of an infectious or inflammatory process. 2. No acute process within the abdomen or pelvis.                     Results for orders placed during the hospital encounter of 10/11/24     Adult Transesophageal Echo (MELISSA) W/ Cont if Necessary Per Protocol     Interpretation Summary    Left ventricular systolic function is normal. Left ventricular ejection fraction appears to be 56 - 60%.    The left atrial cavity is severely dilated.    Saline test results are negative.    The right atrial cavity is moderately  dilated.    Moderate to severe tricuspid valve regurgitation is present.    Estimated right ventricular systolic pressure from tricuspid regurgitation is mildly elevated (35-45 mmHg).    Left atrial appendage adequate for watchman        Labs Pending at Discharge:        Procedures Performed  Procedure(s):  ESOPHAGOGASTRODUODENOSCOPY WITH BIOPSY X 1 AREA        Consults:   Consults         Date and Time Order Name Status Description     10/12/2024 11:33 AM Inpatient Gastroenterology Consult         10/11/2024 10:41 PM Inpatient Cardiology Consult Completed       10/11/2024 10:19 PM Hospitalist (on-call MD unless specified)                       Discharge Details          Discharge Medications          Continue These Medications         Instructions Start Date   ALPRAZolam 1 MG tablet  Commonly known as: XANAX    1 mg, Oral, 3 Times Daily PRN        aspirin 81 MG tablet    81 mg, Oral, Daily        atorvastatin 80 MG tablet  Commonly known as: LIPITOR    1 tablet, Oral, Daily        cholecalciferol 25 MCG (1000 UT) tablet  Commonly known as: VITAMIN D3    1,000 Units, Oral, Daily        dapagliflozin Propanediol 10 MG tablet    10 mg, Oral, Daily        digoxin 125 MCG tablet  Commonly  known as: LANOXIN    125 mcg, Oral, Daily Digoxin        escitalopram 20 MG tablet  Commonly known as: LEXAPRO    20 mg, Oral, Daily        ezetimibe 10 MG tablet  Commonly known as: ZETIA    10 mg, Oral, Daily        FOLIC ACID PO    Oral, Daily        hydroxychloroquine 200 MG tablet  Commonly known as: PLAQUENIL    1 tablet, Oral, 2 Times Daily        levothyroxine 112 MCG tablet  Commonly known as: SYNTHROID, LEVOTHROID    112 mcg, Oral, Daily        Lifitegrast 5 % ophthalmic solution  Commonly known as: XIIDRA    1 drop, Both Eyes, 2 Times Daily        liothyronine 5 MCG tablet  Commonly known as: CYTOMEL    5 mcg, Oral, 2 Times Daily        memantine 10 MG tablet  Commonly known as: NAMENDA    10 mg, Oral, 2 Times Daily        metoprolol succinate XL 25 MG 24 hr tablet  Commonly known as: TOPROL-XL    25 mg, Oral, 2 Times Daily        pantoprazole 40 MG EC tablet  Commonly known as: PROTONIX    40 mg, Oral, Daily PRN        PROLIA SC    Subcutaneous, Every 6 months        rivaroxaban 20 MG tablet  Commonly known as: XARELTO    20 mg, Oral, Daily        sodium bicarbonate 650 MG tablet    650 mg, Oral, 2 Times Daily        topiramate 200 MG tablet  Commonly known as: TOPAMAX    1 tablet, Oral, Daily                  Stop These Medications       Entresto 24-26 MG tablet  Generic drug: sacubitril-valsartan      spironolactone 25 MG tablet  Commonly known as: ALDACTONE                   Allergies         Allergies   Allergen Reactions    Benzoin Anaphylaxis and Swelling       States when used on her neck it shut off her airway  Huge abscesses with surgery       Iodine Other (See Comments)       PT UNABLE TO TELL RN ABOUT REACTION AT THIS TIME, BUT FAMILY STATES PT HAD A REACTION TO BEING CLEANSED WITH IODINE IN SURGERY  IOBAN - used on neck, swelled to the point of shutting off airway    Vancomycin Other (See Comments)       Kidney failure  Vancomycin induced acute kidney injury       Amiodarone Other (See  "Comments)       Tremor- hand and face     Levaquin [Levofloxacin] Unknown (See Comments)       Unknown    Sulfa Antibiotics      Acetaminophen Nausea Only    Latex Itching               Discharge Disposition:      Rehab Facility or Unit (DC - External)     Diet:  Hospital:      Diet Order   Procedures    Diet: Cardiac; Healthy Heart (2-3 Na+); Fluid Consistency: Thin (IDDSI 0)            Discharge Activity:            CODE STATUS:      Code Status and Medical Interventions: CPR (Attempt to Resuscitate); Full Support   Ordered at: 10/11/24 3061     Code Status (Patient has no pulse and is not breathing):     CPR (Attempt to Resuscitate)     Medical Interventions (Patient has pulse or is breathing):     Full Support                   Future Appointments   Date Time Provider Department Center   10/22/2024  2:45 PM  [facility]  [facility]         Additional Instructions for the Follow-ups that You Need to Schedule         Discharge Follow-up with Specified Provider: Follow up with cardiology next week for ablation and Watchman device procedure; 1 Week   As directed        To: Follow up with cardiology next week for ablation and Watchman device procedure   Follow Up: 1 Week                      Time spent on Discharge including face to face service: Greater than 30 minutes     Signature: Electronically signed by Cheko Gallagher MD, 10/17/24, 09:26 EDT.  [facility] Hospitalist Team              Routing History           AFTER VISIT SUMMARY  Natalee Noble \"Margaret\" MRN: [id number]     Hypotensive episode   10/11/2024 - 10/17/2024   [facility] 2D   Legal Name: Natalee Noble  Instructions    Your medications have changed   STOP taking:  Entresto 24-26 MG tablet (sacubitril-valsartan)   spironolactone 25 MG tablet (ALDACTONE)   Review your updated medication list below.  Your Next Steps   Go  OCT    22 TRANSESOPHAGEAL ECHOCARDIOGRAM VISIT 12:15 PM  Sourav Pompa  [facility]  [address] [facility] [place] [address] [phone]     If you have any questions about your recovery, please call the [facility] Nurse Call Center at [phone]. A registered nurse is available 24 hours a day 7 days a week to assist you.   If you have any COVID-19 related questions, please call [phone].     Conversations that Matter: Have you thought about who would speak for you if you could not speak for yourself?     Consider appointing someone to make healthcare decisions for you if you are unable to do so. We can help! Call our Advance Care Planning helpline at [phone], or visit   [employer]/[facility].    Diet instructions  Diet: Cardiac Diets; Healthy Heart (2-3 Na+); Thin (IDDSI 0)  Discharge Diet:  Cardiac Diets  Cardiac Diet:  Healthy Heart (2-3 Na+)  Fluid Consistency:  Thin (IDDSI 0)    Other instructions  Discharge Follow-up with Specified Provider: Follow up with cardiology next week for ablation and Watchman device procedure; 1 Week  Discharge Instructions  Do not take Xarelto for 48 hours prior to surgery day  What's Next  What's Next         Follow up with Woody Valentin [address] [place] [id number]  [phone]   OCT    22 TRANSESOPHAGEAL ECHOCARDIOGRAM VISIT with Sourav Pompa  Tuesday Oct 22, 2024 12:15 PM [facility]  [address] [facility] [place] [address] [phone]     Your Allergies  Date Reviewed: 10/13/2024  Your Allergies  Allergen Reactions   Benzoin Anaphylaxis  Swelling   States when used on her neck it shut off her airway  Huge abscesses with surgery       Iodine Other (See Comments)   PT UNABLE TO TELL RN ABOUT REACTION AT THIS TIME, BUT FAMILY STATES PT HAD A REACTION TO BEING CLEANSED WITH IODINE IN SURGERY  IOBAN - used on neck, swelled to the point of shutting off airway       Vancomycin Other (See Comments)   Kidney failure  Vancomycin induced acute kidney injury       Amiodarone Other (See Comments)   Tremor- hand and face       Levaquin  (Levofloxacin) Unknown (See Comments)   Unknown       Sulfa Antibiotics Not Noted       Acetaminophen Nausea Only       Latex Itching     Patient Belongings Returned  Document Return of Belongings Flowsheet  Were the patient bedside belongings sent home? N/A  Medication Retrieved from Unit & Sent Home Returned from Unit   Belongings Sent to Safe --  Belongings sent with: patient   Belongings Retrieved from Security & Sent Home N/A  Medication Retrieved from Unit & Sent Home --   Medications Retrieved from Pharmacy & Sent Home N/A        [facility] Signup  Our records indicate that you have an active [facility] account.     You can view your After Visit Summary by going to [employer] and logging in with your [facility] username and password.  If you don't have a [facility] username and password but a parent or guardian has access to your record, the parent or guardian should login with their own [facility] username and password and access your record to view the After Visit Summary.     If you have questions, you can email [email or web address] or call [phone] or toll free number [phone] to talk to our [facility] staff.  Remember, [facility] is NOT to be used for urgent needs.  For medical emergencies, dial 911.     Medication List  Medication List   Morning Afternoon Evening Bedtime As Needed   ALPRAZolam 1 MG tablet  Commonly known as: XANAX  Take 1 tablet by mouth 3 (Three) Times a Day As Needed for Anxiety.  Last time this was given: 1 mg on October 17, 2024 10:47 AM     1 tablet   aspirin 81 MG tablet  Take 1 tablet by mouth Daily.  Last time this was given: Ask your nurse or doctor  Signed by: Mayur Sweet 1 tablet       atorvastatin 80 MG tablet  Commonly known as: LIPITOR  Take 1 tablet by mouth Daily.  Last time this was given: 80 mg on October 12, 2024  8:40 AM 1 tablet       cholecalciferol 25 MCG (1000 UT) tablet  Commonly known as: VITAMIN D3  Take 1 tablet by mouth  Daily.  Last time this was given: 1,000 Units on October 17, 2024  8:42 AM 1 tablet       dapagliflozin Propanediol 10 MG tablet  Take 10 mg by mouth Daily. 10 mg       digoxin 125 MCG tablet  Commonly known as: LANOXIN  Take 1 tablet by mouth Daily.  1 tablet      escitalopram 20 MG tablet  Commonly known as: LEXAPRO  Take 1 tablet by mouth Daily.  Last time this was given: 20 mg on October 17, 2024  8:42 AM 1 tablet       ezetimibe 10 MG tablet  Commonly known as: ZETIA  Take 1 tablet by mouth Daily.  Signed by: Jose Scott 1 tablet       FOLIC ACID PO  Take by mouth Daily.    Take  by mouth Daily.   hydroxychloroquine 200 MG tablet  Commonly known as: PLAQUENIL  Take 1 tablet by mouth 2 (Two) Times a Day.  Last time this was given: 200 mg on October 12, 2024  8:40 AM 1 tablet   1 tablet    levothyroxine 112 MCG tablet  Commonly known as: SYNTHROID, LEVOTHROID  Take 1 tablet by mouth Daily.  Last time this was given: 112 mcg on October 17, 2024  5:58 AM 1 tablet       Lifitegrast 5 % ophthalmic solution  Commonly known as: XIIDRA  Administer 1 drop to both eyes 2 (Two) Times a Day.  Last time this was given: 1 drop on October 17, 2024  8:42 AM 1 drop   1 drop    liothyronine 5 MCG tablet  Commonly known as: CYTOMEL  Take 1 tablet by mouth 2 (Two) Times a Day.  Last time this was given: 5 mcg on October 17, 2024  8:41 AM 1 tablet   1 tablet    memantine 10 MG tablet  Commonly known as: NAMENDA  Take 1 tablet by mouth 2 (Two) Times a Day.  Last time this was given: 10 mg on October 17, 2024  8:41 AM 1 tablet  1 tablet     metoprolol succinate XL 25 MG 24 hr tablet  Commonly known as: TOPROL-XL  Take 1 tablet by mouth 2 (Two) Times a Day.  Last time this was given: 25 mg on October 17, 2024  8:41 AM 1 tablet   1 tablet    pantoprazole 40 MG EC tablet  Commonly known as: PROTONIX  Take 1 tablet by mouth Daily As Needed.  Last time this was given: 40 mg on October 17, 2024  8:41 AM     1 tablet   PROLIA SC  Inject  under the skin into the appropriate area as directed. Every 6 months    Inject  under the skin into the appropriate area as directed. Every 6 months   rivaroxaban 20 MG tablet  Commonly known as: XARELTO  Take 1 tablet by mouth Daily.  For diagnoses: Stroke  Last time this was given: 15 mg on October 16, 2024  5:13 PM  Signed by: Jose Scott 1 tablet       sodium bicarbonate 650 MG tablet  Take 1 tablet by mouth 2 (Two) Times a Day.  Last time this was given: 650 mg on October 17, 2024  8:41 AM 1 tablet   1 tablet    topiramate 200 MG tablet  Commonly known as: TOPAMAX  Take 1 tablet by mouth Daily. 1 tablet         Always carry an updated list of your medications with you. If there is an emergency, a responder can quickly see what medications you are taking. Take this paperwork with you the next time you see your health care provider.     [employer]  View your After Visit Summary and more online at [email or web address]     Stroke Symptoms  Call 911 or have someone take you to the Emergency Department if you have any of the following:  Sudden numbness or weakness of your face, arm or leg especially on one side of the body  Sudden confusion, difficulty speaking or trouble understanding   Changes in your vision or loss of sight in one eye  Sudden severe headache with no known cause  Sudden dizziness, trouble walking, loss of balance or coordination     It is important to seek emergency care right away if you have further stroke symptoms. If you get emergency help quickly, the powerful clot-dissolving medicines can reduce the disabilities caused by a stroke.      For more information:  American Stroke Association  4-770-7-STROKE  www.strokeassociation.org  Suicidal Feelings  If you feel like life is too tough and are thinking of suicide or injuring yourself, get help right away!  Call or text [phone] to speak to someone.     Patient Experience  Thank you for choosing [facility]. You may receive a  survey following your visit. Please take a moment to share what went well, where we need improvement, and which staff members deserve recognition. We value your input.                       YOU ARE THE MOST IMPORTANT FACTOR IN YOUR RECOVERY.      Follow all instructions carefully.      I have reviewed my discharge instructions with my nurse, including the following information, if applicable:                 Information about my illness and diagnosis              Follow up appointments (including lab draws)              Wound Care              Equipment Needs              Medications (new and continuing) along with side effects              Preventative information such as vaccines and smoking cessations              Diet              Pain              I know when to contact my Doctor's office or seek emergency care        I want my nurse to describe the side effects of my medications: YES NO   If the answer is no, I understand the side effects of my medications: YES NO   My nurse described the side effects of my medications in a way that I could understand: YES NO   I have taken my personal belongings and my own medications with me at discharge: YES NO      I have received this information and my questions have been answered. I have discussed any concerns I see with this plan with the nurse or physician. I understand these instructions.     Signature of Patient or Responsible Person: _____________________________________     Date: _________________  Time: __________________     Signature of Healthcare Provider: _______________________________________  Date: _________________  Time: __________________

## 2024-10-17 NOTE — PLAN OF CARE
Goal Outcome Evaluation:  Plan of Care Reviewed With: patient, child        Progress: improving  Outcome Evaluation: Pt has slept thoroughout the night, c/o neck/head pain gave prn Tylenol. Pt expected to discharge to [facility] in the am.Will continue to monitor.

## 2024-10-18 NOTE — CASE MANAGEMENT/SOCIAL WORK
Case Management Discharge Note      Final Note: Gama Garcia    Provided Post Acute Provider List?: N/A  Provided Post Acute Provider Quality & Resource List?: N/A            Transportation Services  Private: Car    Final Discharge Disposition Code: 62 - inpatient rehab facility

## 2024-10-22 ENCOUNTER — ANESTHESIA (OUTPATIENT)
Dept: CARDIOLOGY | Facility: HOSPITAL | Age: 74
End: 2024-10-22
Payer: MEDICARE

## 2024-10-22 ENCOUNTER — HOSPITAL ENCOUNTER (OUTPATIENT)
Dept: CARDIOLOGY | Facility: HOSPITAL | Age: 74
Discharge: HOME | End: 2024-10-22
Payer: MEDICARE

## 2024-10-22 ENCOUNTER — ANESTHESIA EVENT (OUTPATIENT)
Dept: CARDIOLOGY | Facility: HOSPITAL | Age: 74
End: 2024-10-22
Payer: MEDICARE

## 2024-10-22 ENCOUNTER — HOSPITAL ENCOUNTER (INPATIENT)
Facility: HOSPITAL | Age: 74
LOS: 1 days | Discharge: HOME | End: 2024-10-23
Attending: INTERNAL MEDICINE | Admitting: INTERNAL MEDICINE
Payer: MEDICARE

## 2024-10-22 DIAGNOSIS — Z91.81 PERSONAL HISTORY OF FALL: ICD-10-CM

## 2024-10-22 DIAGNOSIS — I48.19 ATRIAL FIBRILLATION, PERSISTENT: ICD-10-CM

## 2024-10-22 PROBLEM — I48.91 ATRIAL FIBRILLATION: Status: ACTIVE | Noted: 2024-10-22

## 2024-10-22 LAB
ACT BLD: 122 SECONDS (ref 89–137)
ACT BLD: 318 SECONDS (ref 89–137)
ACT BLD: 348 SECONDS (ref 89–137)
ACT BLD: 348 SECONDS (ref 89–137)

## 2024-10-22 PROCEDURE — 93656 COMPRE EP EVAL ABLTJ ATR FIB: CPT | Performed by: INTERNAL MEDICINE

## 2024-10-22 PROCEDURE — 02L73DK OCCLUSION OF LEFT ATRIAL APPENDAGE WITH INTRALUMINAL DEVICE, PERCUTANEOUS APPROACH: ICD-10-PCS | Performed by: INTERNAL MEDICINE

## 2024-10-22 PROCEDURE — 25010000002 ONDANSETRON PER 1 MG: Performed by: NURSE ANESTHETIST, CERTIFIED REGISTERED

## 2024-10-22 PROCEDURE — C1894 INTRO/SHEATH, NON-LASER: HCPCS | Performed by: INTERNAL MEDICINE

## 2024-10-22 PROCEDURE — C1769 GUIDE WIRE: HCPCS | Performed by: INTERNAL MEDICINE

## 2024-10-22 PROCEDURE — 25810000003 SODIUM CHLORIDE 0.9 % SOLUTION: Performed by: NURSE ANESTHETIST, CERTIFIED REGISTERED

## 2024-10-22 PROCEDURE — 85347 COAGULATION TIME ACTIVATED: CPT

## 2024-10-22 PROCEDURE — C1889 IMPLANT/INSERT DEVICE, NOC: HCPCS | Performed by: INTERNAL MEDICINE

## 2024-10-22 PROCEDURE — 25010000002 FENTANYL CITRATE (PF) 100 MCG/2ML SOLUTION: Performed by: NURSE ANESTHETIST, CERTIFIED REGISTERED

## 2024-10-22 PROCEDURE — 25010000002 LIDOCAINE PF 2% 2 % SOLUTION: Performed by: NURSE ANESTHETIST, CERTIFIED REGISTERED

## 2024-10-22 PROCEDURE — 25010000002 DIPHENHYDRAMINE PER 50 MG: Performed by: NURSE ANESTHETIST, CERTIFIED REGISTERED

## 2024-10-22 PROCEDURE — 25010000002 PROTAMINE SULFATE PER 10 MG: Performed by: NURSE ANESTHETIST, CERTIFIED REGISTERED

## 2024-10-22 PROCEDURE — C1893 INTRO/SHEATH, FIXED,NON-PEEL: HCPCS | Performed by: INTERNAL MEDICINE

## 2024-10-22 PROCEDURE — 5A2204Z RESTORATION OF CARDIAC RHYTHM, SINGLE: ICD-10-PCS | Performed by: INTERNAL MEDICINE

## 2024-10-22 PROCEDURE — 02K83ZZ MAP CONDUCTION MECHANISM, PERCUTANEOUS APPROACH: ICD-10-PCS | Performed by: INTERNAL MEDICINE

## 2024-10-22 PROCEDURE — 02583ZF DESTRUCTION OF CONDUCTION MECHANISM USING IRREVERSIBLE ELECTROPORATION, PERCUTANEOUS APPROACH: ICD-10-PCS | Performed by: INTERNAL MEDICINE

## 2024-10-22 PROCEDURE — 4A0234Z MEASUREMENT OF CARDIAC ELECTRICAL ACTIVITY, PERCUTANEOUS APPROACH: ICD-10-PCS | Performed by: INTERNAL MEDICINE

## 2024-10-22 PROCEDURE — 25010000002 GLYCOPYRROLATE 0.2 MG/ML SOLUTION: Performed by: NURSE ANESTHETIST, CERTIFIED REGISTERED

## 2024-10-22 PROCEDURE — C1766 INTRO/SHEATH,STRBLE,NON-PEEL: HCPCS | Performed by: INTERNAL MEDICINE

## 2024-10-22 PROCEDURE — 93657 TX L/R ATRIAL FIB ADDL: CPT | Performed by: INTERNAL MEDICINE

## 2024-10-22 PROCEDURE — 93355 ECHO TRANSESOPHAGEAL (TEE): CPT

## 2024-10-22 PROCEDURE — 25010000002 DEXAMETHASONE PER 1 MG: Performed by: NURSE ANESTHETIST, CERTIFIED REGISTERED

## 2024-10-22 PROCEDURE — C1732 CATH, EP, DIAG/ABL, 3D/VECT: HCPCS | Performed by: INTERNAL MEDICINE

## 2024-10-22 PROCEDURE — 25010000002 MORPHINE PER 10 MG: Performed by: INTERNAL MEDICINE

## 2024-10-22 PROCEDURE — 25010000002 HYDROCORTISONE SOD SUC (PF) 100 MG RECONSTITUTED SOLUTION: Performed by: NURSE ANESTHETIST, CERTIFIED REGISTERED

## 2024-10-22 PROCEDURE — 25010000002 HEPARIN (PORCINE) PER 1000 UNITS: Performed by: NURSE ANESTHETIST, CERTIFIED REGISTERED

## 2024-10-22 PROCEDURE — 02583ZZ DESTRUCTION OF CONDUCTION MECHANISM, PERCUTANEOUS APPROACH: ICD-10-PCS | Performed by: INTERNAL MEDICINE

## 2024-10-22 PROCEDURE — C1759 CATH, INTRA ECHOCARDIOGRAPHY: HCPCS | Performed by: INTERNAL MEDICINE

## 2024-10-22 PROCEDURE — C1730 CATH, EP, 19 OR FEW ELECT: HCPCS | Performed by: INTERNAL MEDICINE

## 2024-10-22 PROCEDURE — C1733 CATH, EP, OTHR THAN COOL-TIP: HCPCS | Performed by: INTERNAL MEDICINE

## 2024-10-22 PROCEDURE — 33340 PERQ CLSR TCAT L ATR APNDGE: CPT | Performed by: INTERNAL MEDICINE

## 2024-10-22 PROCEDURE — 25510000001 IOPAMIDOL PER 1 ML: Performed by: INTERNAL MEDICINE

## 2024-10-22 PROCEDURE — B245ZZ4 ULTRASONOGRAPHY OF LEFT HEART, TRANSESOPHAGEAL: ICD-10-PCS | Performed by: INTERNAL MEDICINE

## 2024-10-22 PROCEDURE — 25010000002 PROPOFOL 10 MG/ML EMULSION: Performed by: NURSE ANESTHETIST, CERTIFIED REGISTERED

## 2024-10-22 PROCEDURE — 25010000002 SUGAMMADEX 200 MG/2ML SOLUTION: Performed by: NURSE ANESTHETIST, CERTIFIED REGISTERED

## 2024-10-22 DEVICE — CP WATCHMAN FLX PRO PROC: Type: IMPLANTABLE DEVICE | Status: FUNCTIONAL

## 2024-10-22 DEVICE — CP SYS WATCHMAN FXD CURVE PROC: Type: IMPLANTABLE DEVICE | Status: FUNCTIONAL

## 2024-10-22 DEVICE — LEFT ATRIAL APPENDAGE CLOSURE DEVICE WITH DELIVERY SYSTEM
Type: IMPLANTABLE DEVICE | Site: HEART | Status: FUNCTIONAL
Brand: WATCHMAN FLX™ PRO

## 2024-10-22 RX ORDER — METOPROLOL SUCCINATE 25 MG/1
25 TABLET, EXTENDED RELEASE ORAL 2 TIMES DAILY
Status: DISCONTINUED | OUTPATIENT
Start: 2024-10-22 | End: 2024-10-23 | Stop reason: HOSPADM

## 2024-10-22 RX ORDER — NITROGLYCERIN 0.4 MG/1
0.4 TABLET SUBLINGUAL
Status: DISCONTINUED | OUTPATIENT
Start: 2024-10-22 | End: 2024-10-23 | Stop reason: HOSPADM

## 2024-10-22 RX ORDER — LEVOTHYROXINE SODIUM 112 UG/1
112 TABLET ORAL
Status: DISCONTINUED | OUTPATIENT
Start: 2024-10-23 | End: 2024-10-23 | Stop reason: HOSPADM

## 2024-10-22 RX ORDER — ONDANSETRON 2 MG/ML
4 INJECTION INTRAMUSCULAR; INTRAVENOUS ONCE AS NEEDED
Status: COMPLETED | OUTPATIENT
Start: 2024-10-22 | End: 2024-10-22

## 2024-10-22 RX ORDER — SODIUM CHLORIDE 0.9 % (FLUSH) 0.9 %
3 SYRINGE (ML) INJECTION EVERY 12 HOURS SCHEDULED
Status: DISCONTINUED | OUTPATIENT
Start: 2024-10-22 | End: 2024-10-22

## 2024-10-22 RX ORDER — DEXAMETHASONE SODIUM PHOSPHATE 4 MG/ML
INJECTION, SOLUTION INTRA-ARTICULAR; INTRALESIONAL; INTRAMUSCULAR; INTRAVENOUS; SOFT TISSUE AS NEEDED
Status: DISCONTINUED | OUTPATIENT
Start: 2024-10-22 | End: 2024-10-22 | Stop reason: SURG

## 2024-10-22 RX ORDER — ALPRAZOLAM 1 MG/1
1 TABLET ORAL 3 TIMES DAILY PRN
Status: DISCONTINUED | OUTPATIENT
Start: 2024-10-22 | End: 2024-10-23 | Stop reason: HOSPADM

## 2024-10-22 RX ORDER — DIPHENHYDRAMINE HYDROCHLORIDE 50 MG/ML
INJECTION INTRAMUSCULAR; INTRAVENOUS AS NEEDED
Status: DISCONTINUED | OUTPATIENT
Start: 2024-10-22 | End: 2024-10-22 | Stop reason: SURG

## 2024-10-22 RX ORDER — IPRATROPIUM BROMIDE AND ALBUTEROL SULFATE 2.5; .5 MG/3ML; MG/3ML
3 SOLUTION RESPIRATORY (INHALATION) ONCE AS NEEDED
Status: DISCONTINUED | OUTPATIENT
Start: 2024-10-22 | End: 2024-10-22

## 2024-10-22 RX ORDER — TOPIRAMATE 100 MG/1
100 TABLET, FILM COATED ORAL DAILY
Status: DISCONTINUED | OUTPATIENT
Start: 2024-10-22 | End: 2024-10-23 | Stop reason: HOSPADM

## 2024-10-22 RX ORDER — PROPOFOL 10 MG/ML
VIAL (ML) INTRAVENOUS AS NEEDED
Status: DISCONTINUED | OUTPATIENT
Start: 2024-10-22 | End: 2024-10-22 | Stop reason: SURG

## 2024-10-22 RX ORDER — ROCURONIUM BROMIDE 10 MG/ML
INJECTION, SOLUTION INTRAVENOUS AS NEEDED
Status: DISCONTINUED | OUTPATIENT
Start: 2024-10-22 | End: 2024-10-22 | Stop reason: SURG

## 2024-10-22 RX ORDER — PROTAMINE SULFATE 10 MG/ML
INJECTION, SOLUTION INTRAVENOUS AS NEEDED
Status: DISCONTINUED | OUTPATIENT
Start: 2024-10-22 | End: 2024-10-22 | Stop reason: SURG

## 2024-10-22 RX ORDER — NALOXONE HCL 0.4 MG/ML
0.4 VIAL (ML) INJECTION
Status: DISCONTINUED | OUTPATIENT
Start: 2024-10-22 | End: 2024-10-23 | Stop reason: HOSPADM

## 2024-10-22 RX ORDER — OXYCODONE HYDROCHLORIDE 5 MG/1
5 TABLET ORAL ONCE AS NEEDED
Status: DISCONTINUED | OUTPATIENT
Start: 2024-10-22 | End: 2024-10-22

## 2024-10-22 RX ORDER — ESCITALOPRAM OXALATE 10 MG/1
20 TABLET ORAL DAILY
Status: DISCONTINUED | OUTPATIENT
Start: 2024-10-22 | End: 2024-10-23 | Stop reason: HOSPADM

## 2024-10-22 RX ORDER — FAMOTIDINE 10 MG/ML
INJECTION, SOLUTION INTRAVENOUS AS NEEDED
Status: DISCONTINUED | OUTPATIENT
Start: 2024-10-22 | End: 2024-10-22 | Stop reason: SURG

## 2024-10-22 RX ORDER — GLYCOPYRROLATE 0.2 MG/ML
INJECTION INTRAMUSCULAR; INTRAVENOUS AS NEEDED
Status: DISCONTINUED | OUTPATIENT
Start: 2024-10-22 | End: 2024-10-22 | Stop reason: SURG

## 2024-10-22 RX ORDER — IOPAMIDOL 755 MG/ML
INJECTION, SOLUTION INTRAVASCULAR
Status: DISCONTINUED | OUTPATIENT
Start: 2024-10-22 | End: 2024-10-22 | Stop reason: HOSPADM

## 2024-10-22 RX ORDER — HYDROMORPHONE HYDROCHLORIDE 1 MG/ML
0.5 INJECTION, SOLUTION INTRAMUSCULAR; INTRAVENOUS; SUBCUTANEOUS
Status: DISCONTINUED | OUTPATIENT
Start: 2024-10-22 | End: 2024-10-22

## 2024-10-22 RX ORDER — DIPHENHYDRAMINE HYDROCHLORIDE 50 MG/ML
12.5 INJECTION INTRAMUSCULAR; INTRAVENOUS
Status: DISCONTINUED | OUTPATIENT
Start: 2024-10-22 | End: 2024-10-22

## 2024-10-22 RX ORDER — PHENYLEPHRINE HCL IN 0.9% NACL 1 MG/10 ML
SYRINGE (ML) INTRAVENOUS AS NEEDED
Status: DISCONTINUED | OUTPATIENT
Start: 2024-10-22 | End: 2024-10-22 | Stop reason: SURG

## 2024-10-22 RX ORDER — ATORVASTATIN CALCIUM 40 MG/1
80 TABLET, FILM COATED ORAL NIGHTLY
Status: DISCONTINUED | OUTPATIENT
Start: 2024-10-22 | End: 2024-10-23 | Stop reason: HOSPADM

## 2024-10-22 RX ORDER — FLUMAZENIL 0.1 MG/ML
0.2 INJECTION INTRAVENOUS AS NEEDED
Status: DISCONTINUED | OUTPATIENT
Start: 2024-10-22 | End: 2024-10-22

## 2024-10-22 RX ORDER — OXYCODONE HYDROCHLORIDE 5 MG/1
10 TABLET ORAL EVERY 4 HOURS PRN
Status: DISCONTINUED | OUTPATIENT
Start: 2024-10-22 | End: 2024-10-22

## 2024-10-22 RX ORDER — DIPHENHYDRAMINE HYDROCHLORIDE 50 MG/ML
12.5 INJECTION INTRAMUSCULAR; INTRAVENOUS ONCE AS NEEDED
Status: DISCONTINUED | OUTPATIENT
Start: 2024-10-22 | End: 2024-10-22

## 2024-10-22 RX ORDER — NALOXONE HCL 0.4 MG/ML
0.4 VIAL (ML) INJECTION AS NEEDED
Status: DISCONTINUED | OUTPATIENT
Start: 2024-10-22 | End: 2024-10-22

## 2024-10-22 RX ORDER — HEPARIN SODIUM 1000 [USP'U]/ML
INJECTION, SOLUTION INTRAVENOUS; SUBCUTANEOUS AS NEEDED
Status: DISCONTINUED | OUTPATIENT
Start: 2024-10-22 | End: 2024-10-22 | Stop reason: SURG

## 2024-10-22 RX ORDER — SODIUM BICARBONATE 650 MG/1
650 TABLET ORAL 2 TIMES DAILY
Status: DISCONTINUED | OUTPATIENT
Start: 2024-10-22 | End: 2024-10-23 | Stop reason: HOSPADM

## 2024-10-22 RX ORDER — LABETALOL HYDROCHLORIDE 5 MG/ML
5 INJECTION, SOLUTION INTRAVENOUS
Status: DISCONTINUED | OUTPATIENT
Start: 2024-10-22 | End: 2024-10-22

## 2024-10-22 RX ORDER — SODIUM CHLORIDE 0.9 % (FLUSH) 0.9 %
3-10 SYRINGE (ML) INJECTION AS NEEDED
Status: DISCONTINUED | OUTPATIENT
Start: 2024-10-22 | End: 2024-10-22

## 2024-10-22 RX ORDER — FENTANYL CITRATE 50 UG/ML
INJECTION, SOLUTION INTRAMUSCULAR; INTRAVENOUS AS NEEDED
Status: DISCONTINUED | OUTPATIENT
Start: 2024-10-22 | End: 2024-10-22 | Stop reason: SURG

## 2024-10-22 RX ORDER — HYDROXYCHLOROQUINE SULFATE 200 MG/1
200 TABLET, FILM COATED ORAL 2 TIMES DAILY
Status: DISCONTINUED | OUTPATIENT
Start: 2024-10-22 | End: 2024-10-23 | Stop reason: HOSPADM

## 2024-10-22 RX ORDER — ONDANSETRON 2 MG/ML
4 INJECTION INTRAMUSCULAR; INTRAVENOUS EVERY 6 HOURS PRN
Status: DISCONTINUED | OUTPATIENT
Start: 2024-10-22 | End: 2024-10-23 | Stop reason: HOSPADM

## 2024-10-22 RX ORDER — HYDROCODONE BITARTRATE AND ACETAMINOPHEN 5; 325 MG/1; MG/1
1 TABLET ORAL EVERY 4 HOURS PRN
Status: DISCONTINUED | OUTPATIENT
Start: 2024-10-22 | End: 2024-10-23 | Stop reason: HOSPADM

## 2024-10-22 RX ORDER — MORPHINE SULFATE 2 MG/ML
1 INJECTION, SOLUTION INTRAMUSCULAR; INTRAVENOUS EVERY 4 HOURS PRN
Status: DISCONTINUED | OUTPATIENT
Start: 2024-10-22 | End: 2024-10-23 | Stop reason: HOSPADM

## 2024-10-22 RX ORDER — GUAIFENESIN/DEXTROMETHORPHAN 100-10MG/5
10 SYRUP ORAL EVERY 4 HOURS PRN
Status: DISCONTINUED | OUTPATIENT
Start: 2024-10-22 | End: 2024-10-23 | Stop reason: HOSPADM

## 2024-10-22 RX ORDER — ACETAMINOPHEN 650 MG/1
650 SUPPOSITORY RECTAL EVERY 4 HOURS PRN
Status: DISCONTINUED | OUTPATIENT
Start: 2024-10-22 | End: 2024-10-23 | Stop reason: HOSPADM

## 2024-10-22 RX ORDER — ASPIRIN 81 MG/1
81 TABLET ORAL DAILY
Status: DISCONTINUED | OUTPATIENT
Start: 2024-10-22 | End: 2024-10-23 | Stop reason: HOSPADM

## 2024-10-22 RX ORDER — ONDANSETRON 2 MG/ML
INJECTION INTRAMUSCULAR; INTRAVENOUS AS NEEDED
Status: DISCONTINUED | OUTPATIENT
Start: 2024-10-22 | End: 2024-10-22 | Stop reason: SURG

## 2024-10-22 RX ORDER — LIOTHYRONINE SODIUM 5 UG/1
5 TABLET ORAL 2 TIMES DAILY
Status: DISCONTINUED | OUTPATIENT
Start: 2024-10-22 | End: 2024-10-23 | Stop reason: HOSPADM

## 2024-10-22 RX ORDER — SODIUM CHLORIDE 9 MG/ML
INJECTION, SOLUTION INTRAVENOUS CONTINUOUS PRN
Status: DISCONTINUED | OUTPATIENT
Start: 2024-10-22 | End: 2024-10-22 | Stop reason: SURG

## 2024-10-22 RX ORDER — ACETAMINOPHEN 325 MG/1
650 TABLET ORAL EVERY 4 HOURS PRN
Status: DISCONTINUED | OUTPATIENT
Start: 2024-10-22 | End: 2024-10-23 | Stop reason: HOSPADM

## 2024-10-22 RX ORDER — MEMANTINE HYDROCHLORIDE 10 MG/1
10 TABLET ORAL EVERY 12 HOURS SCHEDULED
Status: DISCONTINUED | OUTPATIENT
Start: 2024-10-22 | End: 2024-10-23 | Stop reason: HOSPADM

## 2024-10-22 RX ORDER — EPHEDRINE SULFATE 5 MG/ML
5 INJECTION INTRAVENOUS ONCE AS NEEDED
Status: DISCONTINUED | OUTPATIENT
Start: 2024-10-22 | End: 2024-10-22

## 2024-10-22 RX ORDER — HYDRALAZINE HYDROCHLORIDE 20 MG/ML
5 INJECTION INTRAMUSCULAR; INTRAVENOUS
Status: DISCONTINUED | OUTPATIENT
Start: 2024-10-22 | End: 2024-10-22

## 2024-10-22 RX ADMIN — SODIUM CHLORIDE: 9 INJECTION, SOLUTION INTRAVENOUS at 12:47

## 2024-10-22 RX ADMIN — DEXAMETHASONE SODIUM PHOSPHATE 4 MG: 4 INJECTION, SOLUTION INTRAMUSCULAR; INTRAVENOUS at 13:03

## 2024-10-22 RX ADMIN — TOPIRAMATE 100 MG: 100 TABLET, FILM COATED ORAL at 21:56

## 2024-10-22 RX ADMIN — FENTANYL CITRATE 50 MCG: 50 INJECTION, SOLUTION INTRAMUSCULAR; INTRAVENOUS at 12:48

## 2024-10-22 RX ADMIN — METOPROLOL SUCCINATE 25 MG: 25 TABLET, FILM COATED, EXTENDED RELEASE ORAL at 21:51

## 2024-10-22 RX ADMIN — Medication 100 MCG: at 13:43

## 2024-10-22 RX ADMIN — ROCURONIUM BROMIDE 50 MG: 10 INJECTION, SOLUTION INTRAVENOUS at 12:54

## 2024-10-22 RX ADMIN — FAMOTIDINE 20 MG: 10 INJECTION INTRAVENOUS at 13:16

## 2024-10-22 RX ADMIN — DEXTROMETHORPHAN HYDROBROMIDE, GUAIFENESIN 10 ML: 10; 100 LIQUID ORAL at 23:36

## 2024-10-22 RX ADMIN — GLYCOPYRROLATE 0.2 MG: 0.2 INJECTION, SOLUTION INTRAMUSCULAR; INTRAVENOUS at 13:16

## 2024-10-22 RX ADMIN — MEMANTINE 10 MG: 10 TABLET ORAL at 21:53

## 2024-10-22 RX ADMIN — PROTAMINE SULFATE 100 MG: 10 INJECTION, SOLUTION INTRAVENOUS at 14:56

## 2024-10-22 RX ADMIN — ATORVASTATIN CALCIUM 80 MG: 40 TABLET, FILM COATED ORAL at 21:51

## 2024-10-22 RX ADMIN — LIOTHYRONINE SODIUM 5 MCG: 5 TABLET ORAL at 22:31

## 2024-10-22 RX ADMIN — ESCITALOPRAM OXALATE 20 MG: 10 TABLET ORAL at 21:53

## 2024-10-22 RX ADMIN — DIPHENHYDRAMINE HYDROCHLORIDE 12.5 MG: 50 INJECTION, SOLUTION INTRAMUSCULAR; INTRAVENOUS at 13:16

## 2024-10-22 RX ADMIN — HYDROCODONE BITARTRATE AND ACETAMINOPHEN 1 TABLET: 5; 325 TABLET ORAL at 20:42

## 2024-10-22 RX ADMIN — Medication 50 MCG: at 13:33

## 2024-10-22 RX ADMIN — SUGAMMADEX 200 MG: 100 INJECTION, SOLUTION INTRAVENOUS at 15:02

## 2024-10-22 RX ADMIN — Medication 100 MCG: at 13:48

## 2024-10-22 RX ADMIN — Medication 100 MCG: at 13:01

## 2024-10-22 RX ADMIN — ALPRAZOLAM 1 MG: 1 TABLET ORAL at 21:57

## 2024-10-22 RX ADMIN — SODIUM BICARBONATE 650 MG: 650 TABLET ORAL at 21:53

## 2024-10-22 RX ADMIN — Medication 50 MCG: at 13:29

## 2024-10-22 RX ADMIN — HEPARIN SODIUM 7000 UNITS: 1000 INJECTION INTRAVENOUS; SUBCUTANEOUS at 13:38

## 2024-10-22 RX ADMIN — HEPARIN SODIUM 3000 UNITS: 1000 INJECTION INTRAVENOUS; SUBCUTANEOUS at 13:39

## 2024-10-22 RX ADMIN — ROCURONIUM BROMIDE 10 MG: 10 INJECTION, SOLUTION INTRAVENOUS at 14:25

## 2024-10-22 RX ADMIN — ONDANSETRON 4 MG: 2 INJECTION, SOLUTION INTRAMUSCULAR; INTRAVENOUS at 15:32

## 2024-10-22 RX ADMIN — HYDROXYCHLOROQUINE SULFATE 200 MG: 200 TABLET ORAL at 21:53

## 2024-10-22 RX ADMIN — ROCURONIUM BROMIDE 20 MG: 10 INJECTION, SOLUTION INTRAVENOUS at 13:55

## 2024-10-22 RX ADMIN — PROPOFOL 150 MG: 10 INJECTION, EMULSION INTRAVENOUS at 12:54

## 2024-10-22 RX ADMIN — HEPARIN SODIUM 4000 UNITS: 1000 INJECTION INTRAVENOUS; SUBCUTANEOUS at 13:53

## 2024-10-22 RX ADMIN — RIVAROXABAN 15 MG: 15 TABLET, FILM COATED ORAL at 22:32

## 2024-10-22 RX ADMIN — Medication 50 MCG: at 14:03

## 2024-10-22 RX ADMIN — ONDANSETRON 4 MG: 2 INJECTION, SOLUTION INTRAMUSCULAR; INTRAVENOUS at 14:43

## 2024-10-22 RX ADMIN — HYDROCORTISONE SODIUM SUCCINATE 100 MG: 100 INJECTION, POWDER, FOR SOLUTION INTRAMUSCULAR; INTRAVENOUS at 13:16

## 2024-10-22 RX ADMIN — ASPIRIN 81 MG: 81 TABLET, COATED ORAL at 21:51

## 2024-10-22 RX ADMIN — MORPHINE SULFATE 1 MG: 2 INJECTION, SOLUTION INTRAMUSCULAR; INTRAVENOUS at 20:49

## 2024-10-22 RX ADMIN — Medication 100 MCG: at 13:07

## 2024-10-22 RX ADMIN — LIDOCAINE HYDROCHLORIDE 100 MG: 20 INJECTION, SOLUTION EPIDURAL; INFILTRATION; INTRACAUDAL; PERINEURAL at 12:54

## 2024-10-22 RX ADMIN — FENTANYL CITRATE 50 MCG: 50 INJECTION, SOLUTION INTRAMUSCULAR; INTRAVENOUS at 13:19

## 2024-10-22 RX ADMIN — Medication 100 MCG: at 13:50

## 2024-10-22 RX ADMIN — Medication 50 MCG: at 14:49

## 2024-10-22 RX ADMIN — Medication 50 MCG: at 14:01

## 2024-10-22 NOTE — ANESTHESIA POSTPROCEDURE EVALUATION
Patient: Natalee Noble    Procedure Summary       Date: 10/22/24 Room / Location: [place] CATH LAB 3 / [facility] CATH INVASIVE LOCATION    Anesthesia Start: 1247 Anesthesia Stop: 1516    Procedures:       Ablation atrial fibrillation (Right: Groin)      Atrial Appendage Occlusion Diagnosis:       Atrial fibrillation, persistent      Personal history of fall      (Persistent AF with recurrent falls and history of syncope)    Providers: Sourav Pompa MD; Josemanuel Sheriff MD Provider: Javid Cardenas MD    Anesthesia Type: general ASA Status: 3            Anesthesia Type: general    Vitals  Vitals Value Taken Time   /73 10/22/24 1602   Temp 97.8 °F (36.6 °C) 10/22/24 1520   Pulse 65 10/22/24 1606   Resp 19 10/22/24 1550   SpO2 100 % 10/22/24 1606   Vitals shown include unfiled device data.        Post Anesthesia Care and Evaluation    Patient location during evaluation: PACU  Patient participation: complete - patient participated  Level of consciousness: awake  Pain scale: See nurse's notes for pain score.  Pain management: adequate    Airway patency: patent  Anesthetic complications: No anesthetic complications  PONV Status: none  Cardiovascular status: acceptable  Respiratory status: acceptable and spontaneous ventilation  Hydration status: acceptable    Comments: Patient seen and examined postoperatively; vital signs stable; SpO2 greater than or equal to 90%; cardiopulmonary status stable; nausea/vomiting adequately controlled; pain adequately controlled; no apparent anesthesia complications; patient discharged from anesthesia care when discharge criteria were met

## 2024-10-22 NOTE — Clinical Note
----- Message from JAGDEEP Tobias sent at 2/20/2019  7:41 AM EST -----  Regarding: FW: ANTIBIOTICS  Contact: [phone]  Please call her and let her know that I called in an antibiotic for her.  Thanks  ----- Message -----  From: Destiney Womack MA  Sent: 2/19/2019  11:56 AM  To: JAGDEEP Tobias  Subject: FW: ANTIBIOTICS                                      ----- Message -----  From: Akosua Rubio RegSched Rep  Sent: 2/19/2019  11:50 AM  To: Destiney Womack MA  Subject: ANTIBIOTICS                                      PT SAID SHE IS STILL NOT FEELING WELL AND WANTED LINO TO CALL IN AN ANTIBIOTIC I TOLD PT SHE NEEDED TO COME IN SO PLEASE LET HER KNOW IF LINO CAN CALL ONE IN OR NOT.     ACT = 348 (sec).

## 2024-10-22 NOTE — Clinical Note
Hemostasis started on the right femoral vein. Figure 8 suturing was used in achieving hemostasis. Closure device deployed in the vessel. Hemostasis achieved successfully. Closure device additional comment: Venous sheaths removed

## 2024-10-22 NOTE — ANESTHESIA PREPROCEDURE EVALUATION
Anesthesia Evaluation     Patient summary reviewed and Nursing notes reviewed   NPO Solid Status: > 8 hours             Airway   Mallampati: II  TM distance: >3 FB  Neck ROM: full  No difficulty expected  Dental - normal exam     Pulmonary - negative pulmonary ROS and normal exam   Cardiovascular - normal exam    ECG reviewed    (+) hypertension, dysrhythmias Atrial Fib, hyperlipidemia  (-) angina, COE      Neuro/Psych  (+) CVA residual symptoms  GI/Hepatic/Renal/Endo    (+) GERD, renal disease- CRI, thyroid problem hypothyroidism    Musculoskeletal     (+) back pain, neck pain  Abdominal  - normal exam    Bowel sounds: normal.   Substance History - negative use     OB/GYN negative ob/gyn ROS         Other                    Anesthesia Plan    ASA 3     general       Anesthetic plan, risks, benefits, and alternatives have been provided, discussed and informed consent has been obtained with: patient.    CODE STATUS:

## 2024-10-22 NOTE — ANESTHESIA PROCEDURE NOTES
Airway  Urgency: elective    Date/Time: 10/22/2024 12:58 PM  Airway not difficult    General Information and Staff    Patient location during procedure: OR  Anesthesiologist: Javid Cardenas MD  CRNA/CAA: Dominique Gonzalez CRNA    Indications and Patient Condition  Indications for airway management: airway protection    Preoxygenated: yes  Mask difficulty assessment: 1 - vent by mask    Final Airway Details  Final airway type: endotracheal airway      Successful airway: ETT  Cuffed: yes   Successful intubation technique: direct laryngoscopy  Facilitating devices/methods: intubating stylet  Endotracheal tube insertion site: oral  Blade: Loren  Blade size: 3  ETT size (mm): 7.0  Cormack-Lehane Classification: grade IIa - partial view of glottis  Placement verified by: chest auscultation and capnometry   Cuff volume (mL): 8  Measured from: teeth  ETT/EBT  to teeth (cm): 21  Number of attempts at approach: 1  Assessment: lips, teeth, and gum same as pre-op and atraumatic intubation

## 2024-10-23 ENCOUNTER — READMISSION MANAGEMENT (OUTPATIENT)
Dept: CALL CENTER | Facility: HOSPITAL | Age: 74
End: 2024-10-23
Payer: MEDICARE

## 2024-10-23 VITALS
BODY MASS INDEX: 29.13 KG/M2 | OXYGEN SATURATION: 92 % | HEART RATE: 60 BPM | WEIGHT: 174.82 LBS | HEIGHT: 65 IN | DIASTOLIC BLOOD PRESSURE: 56 MMHG | RESPIRATION RATE: 13 BRPM | TEMPERATURE: 97.5 F | SYSTOLIC BLOOD PRESSURE: 103 MMHG

## 2024-10-23 PROBLEM — Z95.818 PRESENCE OF WATCHMAN LEFT ATRIAL APPENDAGE CLOSURE DEVICE: Status: ACTIVE | Noted: 2024-10-23

## 2024-10-23 LAB
ANION GAP SERPL CALCULATED.3IONS-SCNC: 9.8 MMOL/L (ref 5–15)
BUN SERPL-MCNC: 16 MG/DL (ref 8–23)
BUN/CREAT SERPL: 16.7 (ref 7–25)
CALCIUM SPEC-SCNC: 8.9 MG/DL (ref 8.6–10.5)
CHLORIDE SERPL-SCNC: 107 MMOL/L (ref 98–107)
CO2 SERPL-SCNC: 23.2 MMOL/L (ref 22–29)
CREAT SERPL-MCNC: 0.96 MG/DL (ref 0.57–1)
DEPRECATED RDW RBC AUTO: 57.9 FL (ref 37–54)
EGFRCR SERPLBLD CKD-EPI 2021: 62.2 ML/MIN/1.73
ERYTHROCYTE [DISTWIDTH] IN BLOOD BY AUTOMATED COUNT: 16.9 % (ref 12.3–15.4)
GLUCOSE SERPL-MCNC: 141 MG/DL (ref 65–99)
HCT VFR BLD AUTO: 32.8 % (ref 34–46.6)
HGB BLD-MCNC: 9.4 G/DL (ref 12–15.9)
MCH RBC QN AUTO: 26.9 PG (ref 26.6–33)
MCHC RBC AUTO-ENTMCNC: 28.7 G/DL (ref 31.5–35.7)
MCV RBC AUTO: 94 FL (ref 79–97)
PLATELET # BLD AUTO: 167 10*3/MM3 (ref 140–450)
PMV BLD AUTO: 10.9 FL (ref 6–12)
POTASSIUM SERPL-SCNC: 4.5 MMOL/L (ref 3.5–5.2)
RBC # BLD AUTO: 3.49 10*6/MM3 (ref 3.77–5.28)
SODIUM SERPL-SCNC: 140 MMOL/L (ref 136–145)
WBC NRBC COR # BLD AUTO: 7.39 10*3/MM3 (ref 3.4–10.8)

## 2024-10-23 PROCEDURE — 99239 HOSP IP/OBS DSCHRG MGMT >30: CPT | Performed by: NURSE PRACTITIONER

## 2024-10-23 PROCEDURE — 93005 ELECTROCARDIOGRAM TRACING: CPT | Performed by: INTERNAL MEDICINE

## 2024-10-23 PROCEDURE — 93010 ELECTROCARDIOGRAM REPORT: CPT | Performed by: INTERNAL MEDICINE

## 2024-10-23 PROCEDURE — 85027 COMPLETE CBC AUTOMATED: CPT | Performed by: INTERNAL MEDICINE

## 2024-10-23 PROCEDURE — 80048 BASIC METABOLIC PNL TOTAL CA: CPT | Performed by: INTERNAL MEDICINE

## 2024-10-23 PROCEDURE — 25010000002 ONDANSETRON PER 1 MG: Performed by: INTERNAL MEDICINE

## 2024-10-23 RX ORDER — GUAIFENESIN/DEXTROMETHORPHAN 100-10MG/5
10 SYRUP ORAL EVERY 4 HOURS PRN
COMMUNITY
Start: 2024-10-23 | End: 2024-11-15

## 2024-10-23 RX ADMIN — SODIUM BICARBONATE 650 MG: 650 TABLET ORAL at 08:43

## 2024-10-23 RX ADMIN — LEVOTHYROXINE SODIUM 112 MCG: 0.11 TABLET ORAL at 05:39

## 2024-10-23 RX ADMIN — HYDROXYCHLOROQUINE SULFATE 200 MG: 200 TABLET ORAL at 08:44

## 2024-10-23 RX ADMIN — LIOTHYRONINE SODIUM 5 MCG: 5 TABLET ORAL at 08:43

## 2024-10-23 RX ADMIN — MEMANTINE 10 MG: 10 TABLET ORAL at 08:44

## 2024-10-23 RX ADMIN — ESCITALOPRAM OXALATE 20 MG: 10 TABLET ORAL at 08:43

## 2024-10-23 RX ADMIN — ONDANSETRON 4 MG: 2 INJECTION, SOLUTION INTRAMUSCULAR; INTRAVENOUS at 10:15

## 2024-10-23 RX ADMIN — TOPIRAMATE 100 MG: 100 TABLET, FILM COATED ORAL at 08:43

## 2024-10-23 RX ADMIN — METOPROLOL SUCCINATE 25 MG: 25 TABLET, FILM COATED, EXTENDED RELEASE ORAL at 08:43

## 2024-10-23 RX ADMIN — ASPIRIN 81 MG: 81 TABLET, COATED ORAL at 08:44

## 2024-10-23 RX ADMIN — HYDROCODONE BITARTRATE AND ACETAMINOPHEN 1 TABLET: 5; 325 TABLET ORAL at 03:45

## 2024-10-23 RX ADMIN — DEXTROMETHORPHAN HYDROBROMIDE, GUAIFENESIN 10 ML: 10; 100 LIQUID ORAL at 09:00

## 2024-10-23 RX ADMIN — HYDROCODONE BITARTRATE AND ACETAMINOPHEN 1 TABLET: 5; 325 TABLET ORAL at 08:44

## 2024-10-23 NOTE — DISCHARGE SUMMARY
[facility] CARDIOLOGY  [facility] [place]    DISCHARGE SUMMARY      Patient Name: Natalee Noble  :1950  74 y.o.    Date of Admit: 10/22/2024  Date of Discharge:  10/23/2024    Discharge Diagnosis:  Problems Addressed this Visit          Advance Directives and General Issues    Personal history of fall    Relevant Orders    EP/CRM Study (Completed)       Cardiac and Vasculature    Atrial fibrillation, persistent    Relevant Medications    metoprolol succinate XL (TOPROL-XL) 24 hr tablet 25 mg    nitroglycerin (NITROSTAT) SL tablet 0.4 mg    Other Relevant Orders    EP/CRM Study (Completed)     Diagnoses         Codes Comments    Atrial fibrillation, persistent     ICD-10-CM: I48.19  ICD-9-CM: 427.31     Personal history of fall     ICD-10-CM: Z91.81  ICD-9-CM: V15.88           1) atrial fibrillation status post ablation and Watchman implant 10/22  -Prior intolerance to flecainide and amiodarone  -continue on Xarelto and aspirin for 45 to 60 days  -She will stop digoxin and continue home beta-blocker.  -preop MELISSA showed an EF = 56-60% with mild to moderate MR and moderate to severe TR.      2) HTN    3) HLD    4) CKD stage 3    5) hx falls    6) hx CVA      Hospital Course:   CC--- persistent atrial fibrillation and history of falls     Sub  Patient A-fib rate is much better  She feels comfortable with no new symptoms  LV ejection fraction has normalized on the MELISSA  Atrial appendage is conducive for watchman    Patient to be scheduled for AF ablation and watchman implantation at the same time and patient educated and orders placed which will be scheduled as an outpatient     Patient underwent AF ablation and watchman implantation and was observed overnight.  She has no postop hematoma.  Telemetry shows sinus rhythm today.  She denies any chest pain or shortness of breath.  She is ambulated to the bathroom without unusual difficulty.  Postop renal function is stable.  As  discussed with Dr. Pompa she will be discharged home today.  She will continue on Xarelto and aspirin for 45 to 60 days.  She will stop digoxin and continue home beta-blocker.  We will repeat outpatient MELISSA in 45 days to check the seal of the YO.    Procedures Performed  Procedure(s):  Ablation atrial fibrillation  Atrial Appendage Occlusion      Indications    Atrial fibrillation, persistent [I48.19 (ICD-10-CM)]   Personal history of fall [Z91.81 (ICD-10-CM)]     Pre Procedure Diagnosis    Persistent AF with recurrent falls and history of syncope       Conclusion    Procedures done  1. Comprehensive EP study with pacing from multiple sites including coronary sinus ,RV and superior vena cava with induction of arrhythmias   2.  Pulsed field ablation of pulmonary veins with pulmonary vein isolation  3. Intracardiac echocardiography  4.  Fluoroscopy  5. Trans septal  access of left atrium  6.  Postablation EP study  7. THREE-DIMENSIONAL MAPPING WITH CARTO SYSTEM  8.  Additional pulsed field ablation in posterior left atrium for substrate modification for persistent atrial fibrillation   9.  Synchronized cardioversion  10.  Measurement of left atrial pressure  11.  Percutaneous insertion of left atrial appendage closure device with Watchman device  12.  Selective left atrial appendage angiography to delineate the anatomy for watchman implantation              Procedure indication-  Persistent atrial fibrillation associated with LV dysfunction  History of syncope with recurrent falls  History of hypertension  Prior history of stroke  Patient was felt to be poor candidate for long-term anticoagulation  Patient was recommended AF ablation with concomitant percutaneous left atrial appendage closure for symptomatic relief of atrial arrhythmia and to avoid long-term anticoagulation  Patient has high [id number] score           Timeout before procedure, patient placed under anesthesia by anesthesia team.  Pepcid before  procedure  Glycopyrrolate before procedure  Solu-Cortef, Benadryl before procedure  Complications none  Estimated blood loss less than 20 cc           Transesophageal echocardiography performed by Dr. Sheriff        Procedure dictation     After obtaining a valid consent , patient was draped in sterile fashion and placed under  general anesthesia.   Micropuncture kit access was used for accessing the right common femoral vein.  A MELISSA probe was placed initially in the upper to mid esophagus and imaging revealed absence of any left atrial appendage clot  A 18 Greenlandic and 9 Greenlandic venous sheath were placed in the right common femoral vein an ice catheter in the right atrium.  A preface  sheath along with Denver needle was used and transseptal access obtained under the guidance of ice catheter.  Heparin was given after transseptal access to keep the ACT above 300.  Multipolar catheter called Nabor was used to map the pulmonary vein antrum  The preface sheath was exchanged over a spiral wire to a PFA farapulse sheath for pulsed field  ablation   Ablation catheter was eventually positioned over the Osborne wire into the left superior  pulmonary vein  Pulsed field  ablation was carried initially in the flower  mode and in basket mode and later  in the olive mode  Similar protocol was used to ablate left inferior pulmonary vein and later right inferior pulmonary vein and later right superior pulmonary vein   After completion of pulsed field ablation of the pulmonary venous antrum, further ablation in the posterior wall adjoining the pulmonary venous structure was performed with pulsed field ablation in the flower mode  Synchronized cardioversion was then performed to sinus rhythm  Mapping was then performed with complete antral isolation and posterior wall isolation  Repeat pacing without further induction of atrial arrhythmia  The long sheath was then exchanged over a spiral wire to a double curve sheath and a pigtail  catheter was placed and directed to left atrial appendage  Left atrial appendage angiography was performed and a 27 mm Watchman device manufactured by [employer] was taken and prepped  The pigtail catheter was removed and the Watchman device was placed in the double curve sheath and directed to the left atrial appendage  The device was deployed at the ostium with excellent compression of more than 20%  Tug test was performed  The device passed all the Pass criteria  The device was then deployed prior to clockwise motion and the long sheath was removed  Catheters removed and sheath removed and figure-of-eight suture was placed for hemostasis and heparin was reversed with protamine and patient transferred to recovery           Findings     Patient had   moderate to severe left atrial enlargement    Patient had a large left superior pulmonary vein and a large left inferior pulmonary vein and  a  large right inferior pulmonary vein and right superior pulmonary vein  Pulmonary vein isolation with pulsed field  ablation done-Post pulsed field ablation complete antral isolation noted proved by voltage mapping and differential pacing  Clinical arrhythmias include persistent atrial fibrillation   Additional pulsed field ablation was done in the posterior wall for substrate ablation and post completion mapping revealed complete pulmonary vein isolation and posterior wall isolation  Post pulsed field ablation synchronized cardioversion to sinus rhythm  No retrograde conduction at 600 ms  Antegrade Wenckebach cycle length was 370 ms  AV kevin ERP was 500/240  No preexcitation  Post completion of ablation rapid pacing did induce nonsustained atypical left atrial flutter which could not be induced for mapping or ablation  Successful implantation of a 27 mm Watchman device in the ostium of the left atrial appendage with excellent seal with no leak and compression more than 20%  No postprocedure pericardial effusion         Plan     Digoxin can be stopped  Patient to be admitted as per protocol  Continue Xarelto and aspirin  Xarelto to be continued at least for 45 to 60 days since patient had ablation and watchman implantation           Electronically signed by Sourav Pompa MD, 10/22/24, 3:56 PM EDT.       Consults       Date and Time Order Name Status Description    10/11/2024 10:41 PM Inpatient Cardiology Consult Completed             Pertinent Test Results:   Results from last 7 days   Lab Units 10/23/24  0330   SODIUM mmol/L 140   POTASSIUM mmol/L 4.5   CHLORIDE mmol/L 107   CO2 mmol/L 23.2   BUN mg/dL 16   CREATININE mg/dL 0.96   CALCIUM mg/dL 8.9   GLUCOSE mg/dL 141*         @LABRCNT(bnp)@  Results from last 7 days   Lab Units 10/23/24  0330   WBC 10*3/mm3 7.39   HEMOGLOBIN g/dL 9.4*   HEMATOCRIT % 32.8*   PLATELETS 10*3/mm3 167                   ECHOCARDIOGRAM:    Results for orders placed during the hospital encounter of 10/22/24    Intra-Op Structural Heart MELISSA (Cardiology Read)    Interpretation Summary  MELISSA   procedure note    Procedure:  MELISSA    Indication:   A-fib, watchman implantation    Date of procedure  : 10/22/2024    :  Dr. Josemanuel Sheriff    Description of procedure:    Under conscious sedation given by anesthesiology and local anesthesia, a MELISSA probe was advanced into the esophagus and into the stomach 2D, color images were obtained, after a timeout was performed.  Patient underwent watchman implantation with septal puncture done with assistance of MELISSA.  After implantation of Watchman measurements were obtained to look at compression of left atrial appendage occluder device.  Post watchman implantation he was made sure there was no leak on color or clots of the device and device had good compression.  Then repeat sweep of MELISSA was done to make sure there was no pericardial effusion.  Patient tolerated procedure well complications were none.    Complications: none    Results:    Left atrial  appendage was chicken wing morphology.  Left atrial appendage closure device watchman was seated well.  There was good compression over 15%.  There was no leak around the watchman seen.  No thrombus seen on watchman.  No significant pericardial effusion seen.  Normal LV size and contractility EF of over 55%      Recommendations/plan:    Clinical correlation recommended      Physical Exam  Neuro: AAOx3, no gross deficits  CV: S1S2 RRR, no murmur  Resp: nonlabored, coarse throughout; clears some with cough  GI: BS+, abd soft  Ext: pedal pulses palp, no edema, right groin without hematoma  Tele: SR    Condition on Discharge: stable    Discharge Medications     Discharge Medications        New Medications        Instructions Start Date   guaiFENesin-dextromethorphan 100-10 MG/5ML syrup  Commonly known as: ROBITUSSIN DM   10 mL, Oral, Every 4 Hours PRN             Continue These Medications        Instructions Start Date   ALPRAZolam 1 MG tablet  Commonly known as: XANAX   1 mg, 3 Times Daily PRN      aspirin 81 MG tablet   81 mg, Oral, Daily      atorvastatin 80 MG tablet  Commonly known as: LIPITOR   1 tablet, Oral, Daily      cholecalciferol 25 MCG (1000 UT) tablet  Commonly known as: VITAMIN D3   1,000 Units, Daily      dapagliflozin Propanediol 10 MG tablet   10 mg, Daily      escitalopram 20 MG tablet  Commonly known as: LEXAPRO   20 mg, Daily      ezetimibe 10 MG tablet  Commonly known as: ZETIA   10 mg, Oral, Daily      FOLIC ACID PO   Daily      hydroxychloroquine 200 MG tablet  Commonly known as: PLAQUENIL   1 tablet, 2 Times Daily      levothyroxine 112 MCG tablet  Commonly known as: SYNTHROID, LEVOTHROID   112 mcg, Daily      Lifitegrast 5 % ophthalmic solution  Commonly known as: XIIDRA   1 drop, 2 Times Daily      liothyronine 5 MCG tablet  Commonly known as: CYTOMEL   5 mcg, 2 Times Daily      memantine 10 MG tablet  Commonly known as: NAMENDA   10 mg, 2 Times Daily      metoprolol succinate XL 25 MG 24 hr  tablet  Commonly known as: TOPROL-XL   25 mg, 2 Times Daily      pantoprazole 40 MG EC tablet  Commonly known as: PROTONIX   40 mg, Daily PRN      PROLIA SC   Subcutaneous, Every 6 months      rivaroxaban 20 MG tablet  Commonly known as: XARELTO   20 mg, Oral, Daily      sodium bicarbonate 650 MG tablet   650 mg, 2 Times Daily      topiramate 200 MG tablet  Commonly known as: TOPAMAX   1 tablet, Daily             Stop These Medications      digoxin 125 MCG tablet  Commonly known as: LANOXIN              Discharge Diet:     Activity at Discharge:   Activity Instructions       Bathing Restrictions      For one week    Type of Restriction: Bathing    Bathing Restrictions: No Tub Bath    Driving Restrictions      Type of Restriction: Driving    Driving Restrictions: No Driving (Time Limited)    Length: Other    Indicate Length of Restriction: 3 days post heart catheterization    Lifting Restrictions      Type of Restriction: Lifting    Lifting Restrictions: Lifting Restriction (Indicate Limit)    Weight Limit (Pounds): 4    Length of Lifting Restriction: 3 days post heart catherization            Discharge disposition: home    Follow-up Appointments  Future Appointments   Date Time Provider Department Center   11/11/2024  8:30 AM Nicol Kenny APRN [facility]         Test Results Pending at Discharge       Electronically signed by GREGORIA Lainez, 10/23/24, 10:27 AM EDT.     Time: > 30 minutes spent on discharge

## 2024-10-23 NOTE — OUTREACH NOTE
Prep Survey      Flowsheet Row Responses   [facility] facility patient discharged from? [facility]   Is LACE score < 7 ? No   Eligibility Readm Mgmt   Discharge diagnosis Atrial fibrillation   Does the patient have one of the following disease processes/diagnoses(primary or secondary)? Other   Does the patient have Home health ordered? No   Is there a DME ordered? No   Prep survey completed? Yes            Leslie JOLLY - Registered Nurse

## 2024-10-25 ENCOUNTER — APPOINTMENT (OUTPATIENT)
Dept: GENERAL RADIOLOGY | Facility: HOSPITAL | Age: 74
End: 2024-10-25
Payer: MEDICARE

## 2024-10-25 ENCOUNTER — HOSPITAL ENCOUNTER (INPATIENT)
Facility: HOSPITAL | Age: 74
LOS: 4 days | Discharge: NURSING, REHAB OR LONG-TERM CARE | End: 2024-10-31
Attending: EMERGENCY MEDICINE | Admitting: INTERNAL MEDICINE
Payer: MEDICARE

## 2024-10-25 ENCOUNTER — READMISSION MANAGEMENT (OUTPATIENT)
Dept: CALL CENTER | Facility: HOSPITAL | Age: 74
End: 2024-10-25
Payer: MEDICARE

## 2024-10-25 ENCOUNTER — APPOINTMENT (OUTPATIENT)
Dept: CT IMAGING | Facility: HOSPITAL | Age: 74
End: 2024-10-25
Payer: MEDICARE

## 2024-10-25 DIAGNOSIS — I50.22 CHRONIC HFREF (HEART FAILURE WITH REDUCED EJECTION FRACTION): ICD-10-CM

## 2024-10-25 DIAGNOSIS — R53.1 GENERAL WEAKNESS: Primary | ICD-10-CM

## 2024-10-25 DIAGNOSIS — Z95.818 PRESENCE OF WATCHMAN LEFT ATRIAL APPENDAGE CLOSURE DEVICE: ICD-10-CM

## 2024-10-25 DIAGNOSIS — I48.91 ATRIAL FIBRILLATION WITH RVR: ICD-10-CM

## 2024-10-25 DIAGNOSIS — I21.4 NON-STEMI (NON-ST ELEVATED MYOCARDIAL INFARCTION): ICD-10-CM

## 2024-10-25 DIAGNOSIS — I48.0 PAROXYSMAL A-FIB: ICD-10-CM

## 2024-10-25 LAB
ALBUMIN SERPL-MCNC: 3.4 G/DL (ref 3.5–5.2)
ALBUMIN/GLOB SERPL: 1.4 G/DL
ALP SERPL-CCNC: 87 U/L (ref 39–117)
ALT SERPL W P-5'-P-CCNC: 14 U/L (ref 1–33)
ANION GAP SERPL CALCULATED.3IONS-SCNC: 8.4 MMOL/L (ref 5–15)
ANION GAP SERPL CALCULATED.3IONS-SCNC: 9.4 MMOL/L (ref 5–15)
AST SERPL-CCNC: 32 U/L (ref 1–32)
BACTERIA UR QL AUTO: ABNORMAL /HPF
BASOPHILS # BLD AUTO: 0.01 10*3/MM3 (ref 0–0.2)
BASOPHILS NFR BLD AUTO: 0.1 % (ref 0–1.5)
BILIRUB SERPL-MCNC: 0.5 MG/DL (ref 0–1.2)
BILIRUB UR QL STRIP: NEGATIVE
BUN SERPL-MCNC: 14 MG/DL (ref 8–23)
BUN SERPL-MCNC: 16 MG/DL (ref 8–23)
BUN/CREAT SERPL: 13.3 (ref 7–25)
BUN/CREAT SERPL: 13.4 (ref 7–25)
CALCIUM SPEC-SCNC: 8.8 MG/DL (ref 8.6–10.5)
CALCIUM SPEC-SCNC: 9 MG/DL (ref 8.6–10.5)
CHLORIDE SERPL-SCNC: 108 MMOL/L (ref 98–107)
CHLORIDE SERPL-SCNC: 111 MMOL/L (ref 98–107)
CK SERPL-CCNC: 68 U/L (ref 20–180)
CLARITY UR: CLEAR
CO2 SERPL-SCNC: 22.6 MMOL/L (ref 22–29)
CO2 SERPL-SCNC: 23.6 MMOL/L (ref 22–29)
COLOR UR: ABNORMAL
CREAT SERPL-MCNC: 1.05 MG/DL (ref 0.57–1)
CREAT SERPL-MCNC: 1.19 MG/DL (ref 0.57–1)
D-LACTATE SERPL-SCNC: 0.9 MMOL/L (ref 0.5–2)
DEPRECATED RDW RBC AUTO: 60.8 FL (ref 37–54)
EGFRCR SERPLBLD CKD-EPI 2021: 48.1 ML/MIN/1.73
EGFRCR SERPLBLD CKD-EPI 2021: 55.9 ML/MIN/1.73
EOSINOPHIL # BLD AUTO: 0.1 10*3/MM3 (ref 0–0.4)
EOSINOPHIL NFR BLD AUTO: 1.4 % (ref 0.3–6.2)
ERYTHROCYTE [DISTWIDTH] IN BLOOD BY AUTOMATED COUNT: 17.4 % (ref 12.3–15.4)
GEN 5 2HR TROPONIN T REFLEX: 1146 NG/L
GLOBULIN UR ELPH-MCNC: 2.5 GM/DL
GLUCOSE SERPL-MCNC: 87 MG/DL (ref 65–99)
GLUCOSE SERPL-MCNC: 98 MG/DL (ref 65–99)
GLUCOSE UR STRIP-MCNC: NEGATIVE MG/DL
HCT VFR BLD AUTO: 30.8 % (ref 34–46.6)
HGB BLD-MCNC: 8.9 G/DL (ref 12–15.9)
HGB UR QL STRIP.AUTO: NEGATIVE
HOLD SPECIMEN: NORMAL
HOLD SPECIMEN: NORMAL
HYALINE CASTS UR QL AUTO: ABNORMAL /LPF
IMM GRANULOCYTES # BLD AUTO: 0.03 10*3/MM3 (ref 0–0.05)
IMM GRANULOCYTES NFR BLD AUTO: 0.4 % (ref 0–0.5)
KETONES UR QL STRIP: NEGATIVE
LEUKOCYTE ESTERASE UR QL STRIP.AUTO: ABNORMAL
LIPASE SERPL-CCNC: 37 U/L (ref 13–60)
LYMPHOCYTES # BLD AUTO: 0.75 10*3/MM3 (ref 0.7–3.1)
LYMPHOCYTES NFR BLD AUTO: 10.7 % (ref 19.6–45.3)
MCH RBC QN AUTO: 27.7 PG (ref 26.6–33)
MCHC RBC AUTO-ENTMCNC: 28.9 G/DL (ref 31.5–35.7)
MCV RBC AUTO: 96 FL (ref 79–97)
MONOCYTES # BLD AUTO: 0.62 10*3/MM3 (ref 0.1–0.9)
MONOCYTES NFR BLD AUTO: 8.9 % (ref 5–12)
NEUTROPHILS NFR BLD AUTO: 5.48 10*3/MM3 (ref 1.7–7)
NEUTROPHILS NFR BLD AUTO: 78.5 % (ref 42.7–76)
NITRITE UR QL STRIP: NEGATIVE
NRBC BLD AUTO-RTO: 0 /100 WBC (ref 0–0.2)
PH UR STRIP.AUTO: 8 [PH] (ref 5–8)
PLATELET # BLD AUTO: 166 10*3/MM3 (ref 140–450)
PMV BLD AUTO: 11 FL (ref 6–12)
POTASSIUM SERPL-SCNC: 3.9 MMOL/L (ref 3.5–5.2)
POTASSIUM SERPL-SCNC: 4.3 MMOL/L (ref 3.5–5.2)
PROT SERPL-MCNC: 5.9 G/DL (ref 6–8.5)
PROT UR QL STRIP: NEGATIVE
RBC # BLD AUTO: 3.21 10*6/MM3 (ref 3.77–5.28)
RBC # UR STRIP: ABNORMAL /HPF
REF LAB TEST METHOD: ABNORMAL
SODIUM SERPL-SCNC: 141 MMOL/L (ref 136–145)
SODIUM SERPL-SCNC: 142 MMOL/L (ref 136–145)
SP GR UR STRIP: 1.01 (ref 1–1.03)
SQUAMOUS #/AREA URNS HPF: ABNORMAL /HPF
TROPONIN T DELTA: -206 NG/L
TROPONIN T SERPL HS-MCNC: 1352 NG/L
UROBILINOGEN UR QL STRIP: ABNORMAL
WBC # UR STRIP: ABNORMAL /HPF
WBC NRBC COR # BLD AUTO: 6.99 10*3/MM3 (ref 3.4–10.8)
WHOLE BLOOD HOLD COAG: NORMAL
WHOLE BLOOD HOLD SPECIMEN: NORMAL
YEAST URNS QL MICRO: ABNORMAL /HPF

## 2024-10-25 PROCEDURE — G0378 HOSPITAL OBSERVATION PER HR: HCPCS

## 2024-10-25 PROCEDURE — 71045 X-RAY EXAM CHEST 1 VIEW: CPT

## 2024-10-25 PROCEDURE — 81001 URINALYSIS AUTO W/SCOPE: CPT | Performed by: EMERGENCY MEDICINE

## 2024-10-25 PROCEDURE — 85025 COMPLETE CBC W/AUTO DIFF WBC: CPT | Performed by: EMERGENCY MEDICINE

## 2024-10-25 PROCEDURE — 70450 CT HEAD/BRAIN W/O DYE: CPT

## 2024-10-25 PROCEDURE — 87086 URINE CULTURE/COLONY COUNT: CPT

## 2024-10-25 PROCEDURE — 84484 ASSAY OF TROPONIN QUANT: CPT | Performed by: EMERGENCY MEDICINE

## 2024-10-25 PROCEDURE — 83690 ASSAY OF LIPASE: CPT | Performed by: EMERGENCY MEDICINE

## 2024-10-25 PROCEDURE — 82550 ASSAY OF CK (CPK): CPT | Performed by: EMERGENCY MEDICINE

## 2024-10-25 PROCEDURE — 87040 BLOOD CULTURE FOR BACTERIA: CPT

## 2024-10-25 PROCEDURE — 99285 EMERGENCY DEPT VISIT HI MDM: CPT

## 2024-10-25 PROCEDURE — 83605 ASSAY OF LACTIC ACID: CPT

## 2024-10-25 PROCEDURE — 25010000002 ONDANSETRON PER 1 MG

## 2024-10-25 PROCEDURE — 25010000002 CEFTRIAXONE PER 250 MG

## 2024-10-25 PROCEDURE — 36415 COLL VENOUS BLD VENIPUNCTURE: CPT

## 2024-10-25 PROCEDURE — 93005 ELECTROCARDIOGRAM TRACING: CPT

## 2024-10-25 PROCEDURE — 93005 ELECTROCARDIOGRAM TRACING: CPT | Performed by: EMERGENCY MEDICINE

## 2024-10-25 PROCEDURE — 80053 COMPREHEN METABOLIC PANEL: CPT | Performed by: EMERGENCY MEDICINE

## 2024-10-25 PROCEDURE — 82607 VITAMIN B-12: CPT | Performed by: INTERNAL MEDICINE

## 2024-10-25 RX ORDER — SODIUM CHLORIDE 0.9 % (FLUSH) 0.9 %
10 SYRINGE (ML) INJECTION EVERY 12 HOURS SCHEDULED
Status: DISCONTINUED | OUTPATIENT
Start: 2024-10-25 | End: 2024-10-31 | Stop reason: HOSPADM

## 2024-10-25 RX ORDER — ACETAMINOPHEN 325 MG/1
650 TABLET ORAL EVERY 4 HOURS PRN
Status: DISCONTINUED | OUTPATIENT
Start: 2024-10-25 | End: 2024-10-31 | Stop reason: HOSPADM

## 2024-10-25 RX ORDER — CALCIUM CARBONATE 500 MG/1
2 TABLET, CHEWABLE ORAL 2 TIMES DAILY PRN
Status: DISCONTINUED | OUTPATIENT
Start: 2024-10-25 | End: 2024-10-31 | Stop reason: HOSPADM

## 2024-10-25 RX ORDER — ACETAMINOPHEN 160 MG/5ML
650 SOLUTION ORAL EVERY 4 HOURS PRN
Status: DISCONTINUED | OUTPATIENT
Start: 2024-10-25 | End: 2024-10-31 | Stop reason: HOSPADM

## 2024-10-25 RX ORDER — BISACODYL 10 MG
10 SUPPOSITORY, RECTAL RECTAL DAILY PRN
Status: DISCONTINUED | OUTPATIENT
Start: 2024-10-25 | End: 2024-10-31 | Stop reason: HOSPADM

## 2024-10-25 RX ORDER — ONDANSETRON 2 MG/ML
4 INJECTION INTRAMUSCULAR; INTRAVENOUS EVERY 6 HOURS PRN
Status: DISCONTINUED | OUTPATIENT
Start: 2024-10-25 | End: 2024-10-27

## 2024-10-25 RX ORDER — POLYETHYLENE GLYCOL 3350 17 G/17G
17 POWDER, FOR SOLUTION ORAL DAILY PRN
Status: DISCONTINUED | OUTPATIENT
Start: 2024-10-25 | End: 2024-10-31 | Stop reason: HOSPADM

## 2024-10-25 RX ORDER — ACETAMINOPHEN 650 MG/1
650 SUPPOSITORY RECTAL EVERY 4 HOURS PRN
Status: DISCONTINUED | OUTPATIENT
Start: 2024-10-25 | End: 2024-10-31 | Stop reason: HOSPADM

## 2024-10-25 RX ORDER — SODIUM CHLORIDE 0.9 % (FLUSH) 0.9 %
10 SYRINGE (ML) INJECTION AS NEEDED
Status: DISCONTINUED | OUTPATIENT
Start: 2024-10-25 | End: 2024-10-31 | Stop reason: HOSPADM

## 2024-10-25 RX ORDER — ASPIRIN 81 MG/1
324 TABLET, CHEWABLE ORAL ONCE
Status: DISCONTINUED | OUTPATIENT
Start: 2024-10-25 | End: 2024-10-25

## 2024-10-25 RX ORDER — AMOXICILLIN 250 MG
2 CAPSULE ORAL 2 TIMES DAILY
Status: DISCONTINUED | OUTPATIENT
Start: 2024-10-25 | End: 2024-10-31 | Stop reason: HOSPADM

## 2024-10-25 RX ORDER — NITROGLYCERIN 0.4 MG/1
0.4 TABLET SUBLINGUAL
Status: DISCONTINUED | OUTPATIENT
Start: 2024-10-25 | End: 2024-10-31 | Stop reason: HOSPADM

## 2024-10-25 RX ORDER — BISACODYL 5 MG/1
5 TABLET, DELAYED RELEASE ORAL DAILY PRN
Status: DISCONTINUED | OUTPATIENT
Start: 2024-10-25 | End: 2024-10-31 | Stop reason: HOSPADM

## 2024-10-25 RX ADMIN — ACETAMINOPHEN 650 MG: 325 TABLET, FILM COATED ORAL at 21:28

## 2024-10-25 RX ADMIN — Medication 10 ML: at 23:44

## 2024-10-25 RX ADMIN — CEFTRIAXONE 2000 MG: 2 INJECTION, POWDER, FOR SOLUTION INTRAMUSCULAR; INTRAVENOUS at 23:44

## 2024-10-25 RX ADMIN — ONDANSETRON 4 MG: 2 INJECTION, SOLUTION INTRAMUSCULAR; INTRAVENOUS at 21:29

## 2024-10-25 RX ADMIN — SENNOSIDES AND DOCUSATE SODIUM 2 TABLET: 50; 8.6 TABLET ORAL at 21:28

## 2024-10-25 NOTE — Clinical Note
Level of Care: Telemetry [5]   Admitting Physician: LLANES ALVAREZ, CARLOS [[id number]]   Attending Physician: LLANES ALVAREZ, CARLOS [[id number]]

## 2024-10-25 NOTE — H&P
[facility] Medicine Services  History & Physical    Patient Name: Natalee Noble  : 1950  MRN: [id number]  Primary Care Physician:  Woody Valentin MD  Date of admission: 10/25/2024  Date and Time of Service: 10/25/2024 at 2100      Assessment & Plan      Chief Complaint: generalized weakness, dysuria, frequency, lethargy    Plan:    UTI  -Dysuria, frequency  -UA reviewed, 1+ leukocytes, 6-10 WBCs, trace bacteria, 1+ yeast  -Urine culture pending  -Blood cultures ordered  -Rocephin started    Generalized Weakness  -Likely multifactorial secondary to UTI and recent hospitalizations with cardiac ablation  -CT of the head showed no acute abnormality  -PT/OT consult  -Fall Precautions  -Case management consult for discharge planning, patient's family is hoping she can return to [facility] ([place]), where her daughter works as a physical therapist    Elevated troponin  -Expected due to the ablation, no complaints of chest pain, shortness of breath, palpitations  -Troponin 1352, 1146  -S/p cardiac ablation on 10/22/2024  -EKG reviewed, SR, HR 64  -Continuous cardiac monitoring    Home medications will be restarted as appropriate when verified by pharmacy    History of Present Illness     History of Present Illness: Natalee Nolbe is a 74 y.o. female with a previous medical history of atrial fibrillation s/p cardiac ablation on 10/22/2024, CHF, CKD, RA who presented to [facility] on 10/25/2024 with complaints of generalized weakness and lethargy.  On exam, the patient is lethargic but will wake up and answer some questions.  Much of the HPI was taken from her daughter at bedside.  Her daughter reports that the patient began having frequency and dysuria on  [facility]  She transferred to [facility] on Tuesday to undergo a planned cardiac ablation with Dr. Pompa and was discharged home on Wednesday morning.  She reports that she was okay but very  tired yesterday.  Today, she woke up and had to use the bathroom but was unable to stand back up so she slid herself to the floor before crawling to the bedroom to call her daughter.    In the ED, initial troponin was 1352, repeat 1146.  Creatinine is 1.19 (baseline 0.96).  Otherwise, labs are unremarkable.  Her UA shows 1+ leukocytes, 6-10 WBCs, trace bacteria, 1+ yeast.  CT of her head showed no acute abnormality.  Chest x-ray also showed no acute process.  EKG was reviewed and showed SR, HR 64.  Hospitalist was consulted for further management.    12 point ROS reviewed and negative except as mentioned above    Objective      Vitals:   Temp:  [98.6 °F (37 °C)] 98.6 °F (37 °C)  Heart Rate:  [62-67] 62  Resp:  [18] 18  BP: (139-149)/(69-73) 149/73  Body mass index is 28.79 kg/m².    Physical Exam  Vitals and nursing note reviewed.   Constitutional:       Appearance: Normal appearance.   HENT:      Mouth/Throat:      Mouth: Mucous membranes are moist.   Cardiovascular:      Rate and Rhythm: Normal rate and regular rhythm.   Pulmonary:      Effort: Pulmonary effort is normal.      Breath sounds: Normal breath sounds.   Abdominal:      General: Bowel sounds are normal.      Palpations: Abdomen is soft.   Musculoskeletal:         General: Normal range of motion.   Skin:     General: Skin is warm and dry.   Neurological:      General: No focal deficit present.      Mental Status: She is oriented to person, place, and time. Mental status is at baseline. She is lethargic.      GCS: GCS eye subscore is 3. GCS verbal subscore is 5. GCS motor subscore is 6.      Motor: Weakness (generalized) present.   Psychiatric:         Mood and Affect: Mood normal.         Behavior: Behavior normal.        Personal History     This is a 74 y.o. female with:    Past Medical History:   Diagnosis Date    Anxiety and depression     Arthritis     Back pain     CRF (chronic renal failure), stage 3 (moderate)     Diverticulosis     Essential  hypertension     Family history of colon cancer     Family history of colonic polyps     Fractures     R tibia/fibula; L ankle    GERD (gastroesophageal reflux disease)     History of adenomatous polyp of colon     History of cardiac cath     1/28/19 left main no significant disease. LAD with no significant disease. LCX no significant disease. RCA no significant disease.    History of cardiac monitoring     2/01/19 - ZIO PATCH - Underlying rhythm is sinus at 48-94 bpm. PSVT x23 with fastest 167 bpm, longest 24.4 seconds.   10/05/2018- ZIO PATCH - NSR most of time but did have episode of A fib ranging from .    History of colon polyps     History of echocardiogram     9/2018: Left ventricular systolic function is normal. EF 50%. Left ventricular diastolic dysfunction consistent with ipaired relaxation. Left atrial cavity size is mildly dilated. Mild MR. Mild to moderate TR. There is abnormal thickening noted on the RV that may represent a vegetation-this was discussed with Dr. Hammer and Dr. Reilly. No patent foramen ovale. No further work up needed at this time    Hyperlipidemia     Kidney disorder     Migraine     Migraines     Neck pain     Stroke 2018       Past Surgical History:   Procedure Laterality Date    ABDOMINOPLASTY      ATRIAL APPENDAGE EXCLUSION LEFT WITH TRANSESOPHAGEAL ECHOCARDIOGRAM N/A 10/22/2024    Procedure: Atrial Appendage Occlusion;  Surgeon: Sourav Pompa MD;  Location: [facility] CATH INVASIVE LOCATION;  Service: Cardiovascular;  Laterality: N/A;    BACK SURGERY      L2-5 fusion 2012    CARDIAC ELECTROPHYSIOLOGY PROCEDURE Right 10/22/2024    Procedure: Ablation atrial fibrillation;  Surgeon: Sourav Pompa MD;  Location:  [facility] CATH INVASIVE LOCATION;  Service: Cardiovascular;  Laterality: Right;    CHOLECYSTECTOMY      COLONOSCOPY  11/24/2014    Diverticulosis; Repeat 5 years    COLONOSCOPY  11/09/2010    Diverticulosis; Repeat 4 years    COLONOSCOPY  09/18/2007      Nicolás-Normal; Repeat 3 years    COLONOSCOPY  06/02/2005    Dr. Monzon-Diminuitive polypectomy above the cecum; Otherwise normal colonoscopy; Repeat 2 years    COLONOSCOPY N/A 11/01/2019    Diverticulosis in the left colon; Two 5-6mm polyps in the cecum-path shows one tubular adenomatous and on hyperplastic polyp; One 8mm tubular adenomatous polyp in the ascending colon; Two 4-5mm tubular adenomatous polyps in the transverse colon; Repeat 3 years    COLONOSCOPY N/A 6/12/2023    Procedure: COLONOSCOPY WITH ANESTHESIA;  Surgeon: Susan Lord MD;  Location:  [facility];  Service: Gastroenterology;  Laterality: N/A;  preop hx of polyps   postop; polyps   PCP Woody Valentin MD    CYSTOCELE REPAIR      Cystocele and rectocele repair    ENDOSCOPY  09/28/2012    LA grade C esophagitis; HH    ENDOSCOPY N/A 11/01/2019    Small HH; Non-erosive gastritis-biopsied; Normal examined duodenum    ENDOSCOPY N/A 10/13/2024    Procedure: ESOPHAGOGASTRODUODENOSCOPY WITH BIOPSY X 1 AREA;  Surgeon: Miguel Vega MD;  Location: [facility] ENDOSCOPY;  Service: Gastroenterology;  Laterality: N/A;  Hiatal hernia, esophagitis, gastritis    FIBULA FRACTURE SURGERY      HYSTERECTOMY      NECK SURGERY  2012    ACDF C4-7    ORIF TIBIA FRACTURE Right        Active and Resolved Problems  Active Hospital Problems    Diagnosis  POA    **Generalized weakness [R53.1]  Yes    Atrial fibrillation [I48.91]  Yes    Rheumatoid arthritis involving multiple sites [M06.9]  Yes      Resolved Hospital Problems   No resolved problems to display.       Family History: family history includes Colon cancer (age of onset: 65) in her father; Colon polyps (age of onset: 38) in her daughter. Otherwise pertinent FHx was reviewed and not pertinent to current issue.    Social History:  reports that she has never smoked. She has never used smokeless tobacco. She reports that she does not drink alcohol and does not use drugs.    Home  Medications:  Prior to Admission Medications       Prescriptions Last Dose Informant Patient Reported? Taking?    ALPRAZolam (XANAX) 1 MG tablet  Pharmacy Yes No    Take 1 tablet by mouth 3 (Three) Times a Day As Needed for Anxiety.    aspirin 81 MG tablet   No No    Take 1 tablet by mouth Daily.    atorvastatin (LIPITOR) 80 MG tablet   Yes No    Take 1 tablet by mouth Daily.    Cholecalciferol 25 MCG (1000 UT) tablet   Yes No    Take 1 tablet by mouth Daily.    dapagliflozin Propanediol 10 MG tablet   Yes No    Take 10 mg by mouth Daily.    Denosumab (PROLIA SC)   Yes No    Inject  under the skin into the appropriate area as directed. Every 6 months    escitalopram (LEXAPRO) 20 MG tablet  Pharmacy Yes No    Take 1 tablet by mouth Daily.    ezetimibe (ZETIA) 10 MG tablet   No No    Take 1 tablet by mouth Daily.    FOLIC ACID PO   Yes No    Take  by mouth Daily.    guaiFENesin-dextromethorphan (ROBITUSSIN DM) 100-10 MG/5ML syrup   No No    Take 10 mL by mouth Every 4 (Four) Hours As Needed for Cough.    hydroxychloroquine (PLAQUENIL) 200 MG tablet   Yes No    Take 1 tablet by mouth 2 (Two) Times a Day.    levothyroxine (SYNTHROID, LEVOTHROID) 112 MCG tablet   Yes No    Take 1 tablet by mouth Daily.    Lifitegrast (XIIDRA) 5 % ophthalmic solution   Yes No    Administer 1 drop to both eyes 2 (Two) Times a Day.    liothyronine (CYTOMEL) 5 MCG tablet   Yes No    Take 1 tablet by mouth 2 (Two) Times a Day.    memantine (NAMENDA) 10 MG tablet   Yes No    Take 1 tablet by mouth 2 (Two) Times a Day.    metoprolol succinate XL (TOPROL-XL) 25 MG 24 hr tablet   Yes No    Take 1 tablet by mouth 2 (Two) Times a Day.    pantoprazole (PROTONIX) 40 MG EC tablet   Yes No    Take 1 tablet by mouth Daily As Needed.    rivaroxaban (XARELTO) 20 MG tablet   No No    Take 1 tablet by mouth Daily.    sodium bicarbonate 650 MG tablet   Yes No    Take 1 tablet by mouth 2 (Two) Times a Day.    topiramate (TOPAMAX) 200 MG tablet   Yes No     Take 1 tablet by mouth Daily.              Allergies:  Allergies   Allergen Reactions    Benzoin Anaphylaxis and Swelling     States when used on her neck it shut off her airway  Huge abscesses with surgery      Iodine Other (See Comments)     PT UNABLE TO TELL RN ABOUT REACTION AT THIS TIME, BUT FAMILY STATES PT HAD A REACTION TO BEING CLEANSED WITH IODINE IN SURGERY  IOBAN - used on neck, swelled to the point of shutting off airway    Vancomycin Other (See Comments)     Kidney failure  Vancomycin induced acute kidney injury      Amiodarone Other (See Comments)     Tremor- hand and face     Levaquin [Levofloxacin] Unknown (See Comments)     Unknown    Sulfa Antibiotics            VTE Prophylaxis:  Mechanical VTE prophylaxis orders are present.        CODE STATUS:    Code Status (Patient has no pulse and is not breathing): CPR (Attempt to Resuscitate)  Medical Interventions (Patient has pulse or is breathing): Full Support        Admission Status:  I believe this patient meets inpatient status.    I discussed the patient's findings and my recommendations with patient and family.    Signature:     This document has been electronically signed by Denise Leone, OKSANA, APRN, AGACNP-BC on October 25, 2024 21:50 EDT   [facility] Hospitalist Team

## 2024-10-25 NOTE — CASE MANAGEMENT/SOCIAL WORK
Case Management Discharge Note      Final Note: Patient discharged prior to CM assessment         Transportation Services  Private: Car (assumed with family)    Final Discharge Disposition Code: 01 - home or self-care

## 2024-10-25 NOTE — ED PROVIDER NOTES
Subjective   History of Present Illness  Chief complaint: General Weakness    74-year-old female presents with general weakness.  Patient has a history of atrial fibrillation and had ablation and Watchman procedure on Wednesday.  She states she felt well yesterday but starting last night she has gotten progressively weaker.  Today she went to the bathroom and could not get off the toilet.  She ended up on the floor and stayed on the floor for about 2 hours.  She states she is sore all over.  She denies any injuries or specific areas of pain.  She does report a headache.  She has had no fever.  She denies any vomiting or diarrhea.  No known alleviating or assessment factors.    History provided by:  Patient      Review of Systems   Constitutional:  Negative for fever.   HENT:  Negative for congestion.    Respiratory:  Negative for cough and shortness of breath.    Cardiovascular:  Negative for chest pain.   Gastrointestinal:  Negative for abdominal pain and vomiting.   Musculoskeletal:  Positive for arthralgias and myalgias. Negative for back pain.   Neurological:  Positive for weakness and headaches.   Psychiatric/Behavioral:  Negative for confusion.        Past Medical History:   Diagnosis Date    Anxiety and depression     Arthritis     Back pain     CRF (chronic renal failure), stage 3 (moderate)     Diverticulosis     Essential hypertension     Family history of colon cancer     Family history of colonic polyps     Fractures     R tibia/fibula; L ankle    GERD (gastroesophageal reflux disease)     History of adenomatous polyp of colon     History of cardiac cath     1/28/19 left main no significant disease. LAD with no significant disease. LCX no significant disease. RCA no significant disease.    History of cardiac monitoring     2/01/19 - CLOVERO PATCH - Underlying rhythm is sinus at 48-94 bpm. PSVT x23 with fastest 167 bpm, longest 24.4 seconds.   10/05/2018- ZIO PATCH - NSR most of time but did have episode of A  fib ranging from .    History of colon polyps     History of echocardiogram     9/2018: Left ventricular systolic function is normal. EF 50%. Left ventricular diastolic dysfunction consistent with ipaired relaxation. Left atrial cavity size is mildly dilated. Mild MR. Mild to moderate TR. There is abnormal thickening noted on the RV that may represent a vegetation-this was discussed with Dr. Hammer and Dr. Reilly. No patent foramen ovale. No further work up needed at this time    Hyperlipidemia     Kidney disorder     Migraine     Migraines     Neck pain     Stroke 2018       Allergies   Allergen Reactions    Benzoin Anaphylaxis and Swelling     States when used on her neck it shut off her airway  Huge abscesses with surgery      Iodine Other (See Comments)     PT UNABLE TO TELL RN ABOUT REACTION AT THIS TIME, BUT FAMILY STATES PT HAD A REACTION TO BEING CLEANSED WITH IODINE IN SURGERY  IOBAN - used on neck, swelled to the point of shutting off airway    Vancomycin Other (See Comments)     Kidney failure  Vancomycin induced acute kidney injury      Amiodarone Other (See Comments)     Tremor- hand and face     Levaquin [Levofloxacin] Unknown (See Comments)     Unknown    Sulfa Antibiotics        Past Surgical History:   Procedure Laterality Date    ABDOMINOPLASTY      ATRIAL APPENDAGE EXCLUSION LEFT WITH TRANSESOPHAGEAL ECHOCARDIOGRAM N/A 10/22/2024    Procedure: Atrial Appendage Occlusion;  Surgeon: Sourav Pompa MD;  Location:  [facility] CATH INVASIVE LOCATION;  Service: Cardiovascular;  Laterality: N/A;    BACK SURGERY      L2-5 fusion 2012    CARDIAC ELECTROPHYSIOLOGY PROCEDURE Right 10/22/2024    Procedure: Ablation atrial fibrillation;  Surgeon: Sourav Pompa MD;  Location:  [facility] CATH INVASIVE LOCATION;  Service: Cardiovascular;  Laterality: Right;    CHOLECYSTECTOMY      COLONOSCOPY  11/24/2014    Diverticulosis; Repeat 5 years    COLONOSCOPY  11/09/2010    Diverticulosis; Repeat 4 years     "COLONOSCOPY  09/18/2007    Dr. Monzon-Normal; Repeat 3 years    COLONOSCOPY  06/02/2005    Dr. Monzon-Diminuitive polypectomy above the cecum; Otherwise normal colonoscopy; Repeat 2 years    COLONOSCOPY N/A 11/01/2019    Diverticulosis in the left colon; Two 5-6mm polyps in the cecum-path shows one tubular adenomatous and on hyperplastic polyp; One 8mm tubular adenomatous polyp in the ascending colon; Two 4-5mm tubular adenomatous polyps in the transverse colon; Repeat 3 years    COLONOSCOPY N/A 6/12/2023    Procedure: COLONOSCOPY WITH ANESTHESIA;  Surgeon: Susan Lord MD;  Location: [facility] ENDOSCOPY;  Service: Gastroenterology;  Laterality: N/A;  preop hx of polyps   postop; polyps   PCP Woody Valentin MD    CYSTOCELE REPAIR      Cystocele and rectocele repair    ENDOSCOPY  09/28/2012    LA grade C esophagitis; HH    ENDOSCOPY N/A 11/01/2019    Small HH; Non-erosive gastritis-biopsied; Normal examined duodenum    ENDOSCOPY N/A 10/13/2024    Procedure: ESOPHAGOGASTRODUODENOSCOPY WITH BIOPSY X 1 AREA;  Surgeon: Miguel Vega MD;  Location: [facility] ENDOSCOPY;  Service: Gastroenterology;  Laterality: N/A;  Hiatal hernia, esophagitis, gastritis    FIBULA FRACTURE SURGERY      HYSTERECTOMY      NECK SURGERY  2012    ACDF C4-7    ORIF TIBIA FRACTURE Right        Family History   Problem Relation Age of Onset    Colon cancer Father 65    Colon polyps Daughter 38    Esophageal cancer Neg Hx     Liver cancer Neg Hx     Liver disease Neg Hx     Rectal cancer Neg Hx     Stomach cancer Neg Hx        Social History     Socioeconomic History    Marital status:    Tobacco Use    Smoking status: Never    Smokeless tobacco: Never   Vaping Use    Vaping status: Never Used   Substance and Sexual Activity    Alcohol use: No    Drug use: Never    Sexual activity: Defer       /69   Pulse 63   Temp 98.6 °F (37 °C) (Oral)   Resp 18   Ht 165.1 cm (65\")   Wt 78.5 kg (173 lb)   SpO2 93%   BMI 28.79 " kg/m²       Objective   Physical Exam  Vitals and nursing note reviewed.   Constitutional:       Appearance: Normal appearance.   HENT:      Head: Normocephalic and atraumatic.      Mouth/Throat:      Mouth: Mucous membranes are moist.   Cardiovascular:      Rate and Rhythm: Normal rate and regular rhythm.      Heart sounds: Normal heart sounds.   Pulmonary:      Effort: Pulmonary effort is normal. No respiratory distress.      Breath sounds: Normal breath sounds.   Abdominal:      Palpations: Abdomen is soft.      Tenderness: There is no abdominal tenderness.   Skin:     General: Skin is warm and dry.   Neurological:      General: No focal deficit present.      Mental Status: She is alert and oriented to person, place, and time.      Comments: General Weakness with no focal motor or sensory deficit appreciated         Procedures           ED Course      My interpretation of EKG shows sinus rhythm, rate of 64, no ST elevation                             Results for orders placed or performed during the hospital encounter of 10/25/24   ECG 12 Lead ED Triage Standing Order; Chest Pain    Collection Time: 10/25/24  2:36 PM   Result Value Ref Range    QT Interval 441 ms    QTC Interval 455 ms   Comprehensive Metabolic Panel    Collection Time: 10/25/24  5:10 PM    Specimen: Blood   Result Value Ref Range    Glucose 98 65 - 99 mg/dL    BUN 16 8 - 23 mg/dL    Creatinine 1.19 (H) 0.57 - 1.00 mg/dL    Sodium 141 136 - 145 mmol/L    Potassium 4.3 3.5 - 5.2 mmol/L    Chloride 108 (H) 98 - 107 mmol/L    CO2 23.6 22.0 - 29.0 mmol/L    Calcium 9.0 8.6 - 10.5 mg/dL    Total Protein 5.9 (L) 6.0 - 8.5 g/dL    Albumin 3.4 (L) 3.5 - 5.2 g/dL    ALT (SGPT) 14 1 - 33 U/L    AST (SGOT) 32 1 - 32 U/L    Alkaline Phosphatase 87 39 - 117 U/L    Total Bilirubin 0.5 0.0 - 1.2 mg/dL    Globulin 2.5 gm/dL    A/G Ratio 1.4 g/dL    BUN/Creatinine Ratio 13.4 7.0 - 25.0    Anion Gap 9.4 5.0 - 15.0 mmol/L    eGFR 48.1 (L) >60.0 mL/min/1.73   High  Sensitivity Troponin T    Collection Time: 10/25/24  5:10 PM    Specimen: Blood   Result Value Ref Range    HS Troponin T 1,352 (C) <14 ng/L   CBC Auto Differential    Collection Time: 10/25/24  5:10 PM    Specimen: Blood   Result Value Ref Range    WBC 6.99 3.40 - 10.80 10*3/mm3    RBC 3.21 (L) 3.77 - 5.28 10*6/mm3    Hemoglobin 8.9 (L) 12.0 - 15.9 g/dL    Hematocrit 30.8 (L) 34.0 - 46.6 %    MCV 96.0 79.0 - 97.0 fL    MCH 27.7 26.6 - 33.0 pg    MCHC 28.9 (L) 31.5 - 35.7 g/dL    RDW 17.4 (H) 12.3 - 15.4 %    RDW-SD 60.8 (H) 37.0 - 54.0 fl    MPV 11.0 6.0 - 12.0 fL    Platelets 166 140 - 450 10*3/mm3    Neutrophil % 78.5 (H) 42.7 - 76.0 %    Lymphocyte % 10.7 (L) 19.6 - 45.3 %    Monocyte % 8.9 5.0 - 12.0 %    Eosinophil % 1.4 0.3 - 6.2 %    Basophil % 0.1 0.0 - 1.5 %    Immature Grans % 0.4 0.0 - 0.5 %    Neutrophils, Absolute 5.48 1.70 - 7.00 10*3/mm3    Lymphocytes, Absolute 0.75 0.70 - 3.10 10*3/mm3    Monocytes, Absolute 0.62 0.10 - 0.90 10*3/mm3    Eosinophils, Absolute 0.10 0.00 - 0.40 10*3/mm3    Basophils, Absolute 0.01 0.00 - 0.20 10*3/mm3    Immature Grans, Absolute 0.03 0.00 - 0.05 10*3/mm3    nRBC 0.0 0.0 - 0.2 /100 WBC   Lipase    Collection Time: 10/25/24  5:10 PM    Specimen: Blood   Result Value Ref Range    Lipase 37 13 - 60 U/L   CK    Collection Time: 10/25/24  5:10 PM    Specimen: Blood   Result Value Ref Range    Creatine Kinase 68 20 - 180 U/L   Green Top (Gel)    Collection Time: 10/25/24  5:10 PM   Result Value Ref Range    Extra Tube Hold for add-ons.    Lavender Top    Collection Time: 10/25/24  5:10 PM   Result Value Ref Range    Extra Tube hold for add-on    Gold Top - SST    Collection Time: 10/25/24  5:10 PM   Result Value Ref Range    Extra Tube Hold for add-ons.    Light Blue Top    Collection Time: 10/25/24  5:10 PM   Result Value Ref Range    Extra Tube Hold for add-ons.    Urinalysis With Microscopic If Indicated (No Culture) - Urine, Clean Catch    Collection Time: 10/25/24  5:33  PM    Specimen: Urine, Clean Catch   Result Value Ref Range    Color, UA Dark Yellow (A) Yellow, Straw    Appearance, UA Clear Clear    pH, UA 8.0 5.0 - 8.0    Specific Gravity, UA 1.011 1.005 - 1.030    Glucose, UA Negative Negative    Ketones, UA Negative Negative    Bilirubin, UA Negative Negative    Blood, UA Negative Negative    Protein, UA Negative Negative    Leuk Esterase, UA Small (1+) (A) Negative    Nitrite, UA Negative Negative    Urobilinogen, UA 1.0 E.U./dL 0.2 - 1.0 E.U./dL   Urinalysis, Microscopic Only - Urine, Clean Catch    Collection Time: 10/25/24  5:33 PM    Specimen: Urine, Clean Catch   Result Value Ref Range    RBC, UA None Seen None Seen, 0-2 /HPF    WBC, UA 6-10 (A) None Seen, 0-2 /HPF    Bacteria, UA Trace (A) None Seen /HPF    Squamous Epithelial Cells, UA 0-2 None Seen, 0-2 /HPF    Yeast, UA Small/1+ Budding Yeast (A) None Seen /HPF    Hyaline Casts, UA None Seen None Seen /LPF    Methodology Manual Light Microscopy      CT Head Without Contrast    Result Date: 10/25/2024  Impression: Motion degraded study. No definite acute intracranial abnormality. Paranasal sinus disease. Please correlate clinically to exclude acute sinusitis. Electronically Signed: Jamie Mendez DO  10/25/2024 6:15 PM EDT  Workstation ID: [id number]    XR Chest 1 View    Result Date: 10/25/2024  Impression: No acute process. Electronically Signed: Chela Lennon MD  10/25/2024 3:24 PM EDT  Workstation ID: [id number]               Medical Decision Making    Patient had the above valuation.  Results were discussed with the patient.  CT head showed no acute intracranial abnormality.  My interpretation of chest x-ray shows no infiltrate or effusion.  EKG shows no acute ischemia.  Troponin is significantly elevated at 1352 however this is likely from her recent ablation procedure.  She is having no chest pain.  Total CK is normal.  White blood cell count is normal.  CMP is unremarkable.  Urinalysis shows no significant  urinary tract infection.  I discussed with the provider on-call for the hospitalist and the patient will be admitted for further evaluation and management.      Final diagnoses:   General weakness       ED Disposition  ED Disposition       ED Disposition   Decision to Admit    Condition   --    Comment   Level of Care: Telemetry [5]   Admitting Physician: LLANES ALVAREZ, CARLOS [[id number]   Attending Physician: LLANES ALVAREZ, CARLOS [[id number]]                 No follow-up provider specified.       Medication List      No changes were made to your prescriptions during this visit.            Tone Hidalgo MD  10/25/24 0932

## 2024-10-26 LAB
ANION GAP SERPL CALCULATED.3IONS-SCNC: 8.1 MMOL/L (ref 5–15)
BUN SERPL-MCNC: 14 MG/DL (ref 8–23)
BUN/CREAT SERPL: 13.5 (ref 7–25)
CALCIUM SPEC-SCNC: 8.6 MG/DL (ref 8.6–10.5)
CHLORIDE SERPL-SCNC: 111 MMOL/L (ref 98–107)
CO2 SERPL-SCNC: 22.9 MMOL/L (ref 22–29)
CREAT SERPL-MCNC: 1.04 MG/DL (ref 0.57–1)
DEPRECATED RDW RBC AUTO: 59 FL (ref 37–54)
EGFRCR SERPLBLD CKD-EPI 2021: 56.5 ML/MIN/1.73
ERYTHROCYTE [DISTWIDTH] IN BLOOD BY AUTOMATED COUNT: 17.2 % (ref 12.3–15.4)
FERRITIN SERPL-MCNC: 88.5 NG/ML (ref 13–150)
GLUCOSE SERPL-MCNC: 80 MG/DL (ref 65–99)
HCT VFR BLD AUTO: 27.5 % (ref 34–46.6)
HGB BLD-MCNC: 8.2 G/DL (ref 12–15.9)
IRON 24H UR-MRATE: 25 MCG/DL (ref 37–145)
IRON SATN MFR SERPL: 9 % (ref 20–50)
MCH RBC QN AUTO: 28.2 PG (ref 26.6–33)
MCHC RBC AUTO-ENTMCNC: 29.8 G/DL (ref 31.5–35.7)
MCV RBC AUTO: 94.5 FL (ref 79–97)
PLATELET # BLD AUTO: 152 10*3/MM3 (ref 140–450)
PMV BLD AUTO: 10.5 FL (ref 6–12)
POTASSIUM SERPL-SCNC: 3.9 MMOL/L (ref 3.5–5.2)
QT INTERVAL: 441 MS
QTC INTERVAL: 455 MS
RBC # BLD AUTO: 2.91 10*6/MM3 (ref 3.77–5.28)
SODIUM SERPL-SCNC: 142 MMOL/L (ref 136–145)
TIBC SERPL-MCNC: 289 MCG/DL (ref 298–536)
TRANSFERRIN SERPL-MCNC: 194 MG/DL (ref 200–360)
TROPONIN T SERPL HS-MCNC: 359 NG/L
TSH SERPL DL<=0.05 MIU/L-ACNC: 0.02 UIU/ML (ref 0.27–4.2)
WBC NRBC COR # BLD AUTO: 4.12 10*3/MM3 (ref 3.4–10.8)

## 2024-10-26 PROCEDURE — 84484 ASSAY OF TROPONIN QUANT: CPT | Performed by: INTERNAL MEDICINE

## 2024-10-26 PROCEDURE — 84443 ASSAY THYROID STIM HORMONE: CPT | Performed by: INTERNAL MEDICINE

## 2024-10-26 PROCEDURE — 25010000002 ONDANSETRON PER 1 MG

## 2024-10-26 PROCEDURE — 97162 PT EVAL MOD COMPLEX 30 MIN: CPT

## 2024-10-26 PROCEDURE — 82728 ASSAY OF FERRITIN: CPT | Performed by: INTERNAL MEDICINE

## 2024-10-26 PROCEDURE — 83540 ASSAY OF IRON: CPT | Performed by: INTERNAL MEDICINE

## 2024-10-26 PROCEDURE — 80048 BASIC METABOLIC PNL TOTAL CA: CPT

## 2024-10-26 PROCEDURE — 84466 ASSAY OF TRANSFERRIN: CPT | Performed by: INTERNAL MEDICINE

## 2024-10-26 PROCEDURE — 25010000002 CEFTRIAXONE PER 250 MG: Performed by: INTERNAL MEDICINE

## 2024-10-26 PROCEDURE — G0378 HOSPITAL OBSERVATION PER HR: HCPCS

## 2024-10-26 PROCEDURE — 25010000002 KETOROLAC TROMETHAMINE PER 15 MG: Performed by: STUDENT IN AN ORGANIZED HEALTH CARE EDUCATION/TRAINING PROGRAM

## 2024-10-26 PROCEDURE — 85027 COMPLETE CBC AUTOMATED: CPT

## 2024-10-26 RX ORDER — LEVOTHYROXINE SODIUM 112 UG/1
112 TABLET ORAL DAILY
Status: DISCONTINUED | OUTPATIENT
Start: 2024-10-26 | End: 2024-10-28

## 2024-10-26 RX ORDER — SODIUM BICARBONATE 650 MG/1
650 TABLET ORAL 2 TIMES DAILY
Status: DISCONTINUED | OUTPATIENT
Start: 2024-10-26 | End: 2024-10-31 | Stop reason: HOSPADM

## 2024-10-26 RX ORDER — MEMANTINE HYDROCHLORIDE 10 MG/1
10 TABLET ORAL DAILY
Status: DISCONTINUED | OUTPATIENT
Start: 2024-10-26 | End: 2024-10-31 | Stop reason: HOSPADM

## 2024-10-26 RX ORDER — ESCITALOPRAM OXALATE 10 MG/1
20 TABLET ORAL DAILY
Status: DISCONTINUED | OUTPATIENT
Start: 2024-10-26 | End: 2024-10-31 | Stop reason: HOSPADM

## 2024-10-26 RX ORDER — METOPROLOL SUCCINATE 25 MG/1
25 TABLET, EXTENDED RELEASE ORAL 2 TIMES DAILY
Status: DISCONTINUED | OUTPATIENT
Start: 2024-10-26 | End: 2024-10-31 | Stop reason: HOSPADM

## 2024-10-26 RX ORDER — TRAMADOL HYDROCHLORIDE 50 MG/1
50 TABLET ORAL EVERY 8 HOURS PRN
Status: DISCONTINUED | OUTPATIENT
Start: 2024-10-26 | End: 2024-10-29

## 2024-10-26 RX ORDER — HYDROXYCHLOROQUINE SULFATE 200 MG/1
200 TABLET, FILM COATED ORAL 2 TIMES DAILY
Status: DISCONTINUED | OUTPATIENT
Start: 2024-10-26 | End: 2024-10-31 | Stop reason: HOSPADM

## 2024-10-26 RX ORDER — KETOROLAC TROMETHAMINE 30 MG/ML
15 INJECTION, SOLUTION INTRAMUSCULAR; INTRAVENOUS ONCE
Status: COMPLETED | OUTPATIENT
Start: 2024-10-26 | End: 2024-10-26

## 2024-10-26 RX ORDER — TOPIRAMATE 100 MG/1
200 TABLET, FILM COATED ORAL DAILY
Status: DISCONTINUED | OUTPATIENT
Start: 2024-10-26 | End: 2024-10-31 | Stop reason: HOSPADM

## 2024-10-26 RX ORDER — LIOTHYRONINE SODIUM 5 UG/1
5 TABLET ORAL 2 TIMES DAILY
Status: DISCONTINUED | OUTPATIENT
Start: 2024-10-26 | End: 2024-10-27

## 2024-10-26 RX ORDER — CHOLECALCIFEROL (VITAMIN D3) 25 MCG
1000 TABLET ORAL DAILY
Status: DISCONTINUED | OUTPATIENT
Start: 2024-10-26 | End: 2024-10-31 | Stop reason: HOSPADM

## 2024-10-26 RX ORDER — PANTOPRAZOLE SODIUM 40 MG/1
40 TABLET, DELAYED RELEASE ORAL
Status: DISCONTINUED | OUTPATIENT
Start: 2024-10-27 | End: 2024-10-31 | Stop reason: HOSPADM

## 2024-10-26 RX ORDER — ATORVASTATIN CALCIUM 40 MG/1
80 TABLET, FILM COATED ORAL DAILY
Status: DISCONTINUED | OUTPATIENT
Start: 2024-10-26 | End: 2024-10-31 | Stop reason: HOSPADM

## 2024-10-26 RX ORDER — ASPIRIN 81 MG/1
81 TABLET ORAL DAILY
Status: DISCONTINUED | OUTPATIENT
Start: 2024-10-26 | End: 2024-10-31

## 2024-10-26 RX ORDER — ALPRAZOLAM 1 MG/1
1 TABLET ORAL 3 TIMES DAILY PRN
Status: DISCONTINUED | OUTPATIENT
Start: 2024-10-26 | End: 2024-10-31 | Stop reason: HOSPADM

## 2024-10-26 RX ADMIN — ATORVASTATIN CALCIUM 80 MG: 40 TABLET, FILM COATED ORAL at 11:51

## 2024-10-26 RX ADMIN — LIOTHYRONINE SODIUM 5 MCG: 5 TABLET ORAL at 13:53

## 2024-10-26 RX ADMIN — CEFTRIAXONE 2000 MG: 2 INJECTION, POWDER, FOR SOLUTION INTRAMUSCULAR; INTRAVENOUS at 21:18

## 2024-10-26 RX ADMIN — TRAMADOL HYDROCHLORIDE 50 MG: 50 TABLET ORAL at 10:30

## 2024-10-26 RX ADMIN — METOPROLOL SUCCINATE 25 MG: 25 TABLET, FILM COATED, EXTENDED RELEASE ORAL at 12:12

## 2024-10-26 RX ADMIN — Medication 1000 UNITS: at 11:52

## 2024-10-26 RX ADMIN — HYDROXYCHLOROQUINE SULFATE 200 MG: 200 TABLET ORAL at 11:55

## 2024-10-26 RX ADMIN — MEMANTINE 10 MG: 10 TABLET ORAL at 12:07

## 2024-10-26 RX ADMIN — SODIUM BICARBONATE 650 MG: 650 TABLET ORAL at 12:07

## 2024-10-26 RX ADMIN — Medication 10 ML: at 21:19

## 2024-10-26 RX ADMIN — LIOTHYRONINE SODIUM 5 MCG: 5 TABLET ORAL at 21:24

## 2024-10-26 RX ADMIN — HYDROXYCHLOROQUINE SULFATE 200 MG: 200 TABLET ORAL at 21:18

## 2024-10-26 RX ADMIN — ESCITALOPRAM OXALATE 20 MG: 10 TABLET ORAL at 11:55

## 2024-10-26 RX ADMIN — ONDANSETRON 4 MG: 2 INJECTION, SOLUTION INTRAMUSCULAR; INTRAVENOUS at 21:37

## 2024-10-26 RX ADMIN — ALPRAZOLAM 1 MG: 1 TABLET ORAL at 22:05

## 2024-10-26 RX ADMIN — SENNOSIDES AND DOCUSATE SODIUM 2 TABLET: 50; 8.6 TABLET ORAL at 09:03

## 2024-10-26 RX ADMIN — TRAMADOL HYDROCHLORIDE 50 MG: 50 TABLET ORAL at 21:17

## 2024-10-26 RX ADMIN — ASPIRIN 81 MG: 81 TABLET, COATED ORAL at 11:51

## 2024-10-26 RX ADMIN — KETOROLAC TROMETHAMINE 15 MG: 30 INJECTION, SOLUTION INTRAMUSCULAR at 05:51

## 2024-10-26 RX ADMIN — LEVOTHYROXINE SODIUM 112 MCG: 0.11 TABLET ORAL at 11:56

## 2024-10-26 RX ADMIN — METOPROLOL SUCCINATE 25 MG: 25 TABLET, FILM COATED, EXTENDED RELEASE ORAL at 21:18

## 2024-10-26 RX ADMIN — SODIUM BICARBONATE 650 MG: 650 TABLET ORAL at 21:18

## 2024-10-26 RX ADMIN — RIVAROXABAN 20 MG: 20 TABLET, FILM COATED ORAL at 17:20

## 2024-10-26 RX ADMIN — SENNOSIDES AND DOCUSATE SODIUM 2 TABLET: 50; 8.6 TABLET ORAL at 21:18

## 2024-10-26 RX ADMIN — Medication 5 MG: at 22:16

## 2024-10-26 RX ADMIN — Medication 10 ML: at 09:03

## 2024-10-26 RX ADMIN — TOPIRAMATE 200 MG: 100 TABLET, FILM COATED ORAL at 12:07

## 2024-10-26 NOTE — PROGRESS NOTES
[facility] MEDICINE SERVICE  DAILY PROGRESS NOTE    NAME: Natalee Noble  : 1950  MRN: [id number]      LOS: 0 days     PROVIDER OF SERVICE: Terrie Dao MD    Chief Complaint: Generalized weakness    Subjective:     Interval History:  History taken from: patient    No new complaint        Review of Systems:   Review of Systems   All other systems reviewed and are negative.      Objective:     Vital Signs  Temp:  [98 °F (36.7 °C)-98.5 °F (36.9 °C)] 98 °F (36.7 °C)  Heart Rate:  [57-72] 66  Resp:  [11-18] 11  BP: (103-154)/(47-75) 139/56   Body mass index is 28.79 kg/m².    Physical Exam  Physical Exam  Constitutional:       Appearance: Normal appearance.   HENT:      Head: Normocephalic and atraumatic.      Nose: Nose normal.      Mouth/Throat:      Mouth: Mucous membranes are moist.   Eyes:      Extraocular Movements: Extraocular movements intact.      Pupils: Pupils are equal, round, and reactive to light.   Cardiovascular:      Rate and Rhythm: Normal rate and regular rhythm.   Pulmonary:      Effort: Pulmonary effort is normal.      Breath sounds: Normal breath sounds.   Abdominal:      General: Abdomen is flat. Bowel sounds are normal.      Palpations: Abdomen is soft.   Musculoskeletal:         General: Normal range of motion.      Cervical back: Normal range of motion and neck supple.   Skin:     General: Skin is warm and dry.   Neurological:      General: No focal deficit present.      Mental Status: She is alert and oriented to person, place, and time.   Psychiatric:         Mood and Affect: Mood normal.         Behavior: Behavior normal.         Thought Content: Thought content normal.         Judgment: Judgment normal.         Current Medications:  Scheduled Meds:aspirin, 81 mg, Oral, Daily  atorvastatin, 80 mg, Oral, Daily  cefTRIAXone, 2,000 mg, Intravenous, Q24H  cholecalciferol, 1,000 Units, Oral, Daily  empagliflozin, 25 mg, Oral, Daily  escitalopram, 20 mg, Oral,  Daily  hydroxychloroquine, 200 mg, Oral, BID  levothyroxine, 112 mcg, Oral, Daily  liothyronine, 5 mcg, Oral, BID  memantine, 10 mg, Oral, Daily  metoprolol succinate XL, 25 mg, Oral, BID  [START ON 10/27/2024] pantoprazole, 40 mg, Oral, Q AM  rivaroxaban, 20 mg, Oral, Daily With Dinner  senna-docusate sodium, 2 tablet, Oral, BID  sodium bicarbonate, 650 mg, Oral, BID  sodium chloride, 10 mL, Intravenous, Q12H  topiramate, 200 mg, Oral, Daily      Continuous Infusions:   PRN Meds:.  acetaminophen **OR** acetaminophen **OR** acetaminophen    ALPRAZolam    senna-docusate sodium **AND** polyethylene glycol **AND** bisacodyl **AND** bisacodyl    calcium carbonate    influenza vaccine    melatonin    nitroglycerin    ondansetron    sodium chloride    sodium chloride    traMADol       Diagnostic Data    Results from last 7 days   Lab Units 10/26/24  0448 10/25/24  2222 10/25/24  1710   WBC 10*3/mm3 4.12  --  6.99   HEMOGLOBIN g/dL 8.2*  --  8.9*   HEMATOCRIT % 27.5*  --  30.8*   PLATELETS 10*3/mm3 152  --  166   GLUCOSE mg/dL 80   < > 98   CREATININE mg/dL 1.04*   < > 1.19*   BUN mg/dL 14   < > 16   SODIUM mmol/L 142   < > 141   POTASSIUM mmol/L 3.9   < > 4.3   AST (SGOT) U/L  --   --  32   ALT (SGPT) U/L  --   --  14   ALK PHOS U/L  --   --  87   BILIRUBIN mg/dL  --   --  0.5   ANION GAP mmol/L 8.1   < > 9.4    < > = values in this interval not displayed.       CT Head Without Contrast    Result Date: 10/25/2024  Impression: Motion degraded study. No definite acute intracranial abnormality. Paranasal sinus disease. Please correlate clinically to exclude acute sinusitis. Electronically Signed: Jamie Mendez DO  10/25/2024 6:15 PM EDT  Workstation ID: [id number]    XR Chest 1 View    Result Date: 10/25/2024  Impression: No acute process. Electronically Signed: Chela Lennon MD  10/25/2024 3:24 PM EDT  Workstation ID: [id number]       I reviewed the patient's new clinical results.    Assessment/Plan:     Active and  Resolved Problems  Active Hospital Problems    Diagnosis  POA    **Generalized weakness [R53.1]  Yes    Atrial fibrillation [I48.91]  Yes    Rheumatoid arthritis involving multiple sites [M06.9]  Yes      Resolved Hospital Problems   No resolved problems to display.       UTI  -Continue ceftriaxone follow-up and urine culture results.    Generalized Weakness  -Likely multifactorial secondary to UTI and recent hospitalizations with cardiac ablation  -CT of the head showed no acute abnormality  -PT/OT consult  -Check TSH  - Patient anemic.  Ordered anemia workup     Elevated troponin  -Expected due to the ablation, no complaints of chest pain, shortness of breath, palpitations  -Troponin 1352, 1146  -S/p cardiac ablation on 10/22/2024  -EKG reviewed, SR, HR 64  -Continuous cardiac monitoring    VTE Prophylaxis:  Pharmacologic & mechanical VTE prophylaxis orders are present.             Disposition Planning:     Barriers to Discharge: Pending clinical improvement  Anticipated Date of Discharge: Pending clinical course however likely 10/30/2024  Place of Discharge: Home           Code Status and Medical Interventions: CPR (Attempt to Resuscitate); Full Support   Ordered at: 10/25/24 1935     Code Status (Patient has no pulse and is not breathing):    CPR (Attempt to Resuscitate)     Medical Interventions (Patient has pulse or is breathing):    Full Support       Signature: Electronically signed by Terrie Dao MD, 10/26/24, 16:23 EDT.  [facility] Hospitalist Team

## 2024-10-26 NOTE — THERAPY EVALUATION
Patient Name: Natalee Noble  : 1950    MRN: [id number]                              Today's Date: 10/26/2024       Admit Date: 10/25/2024    Visit Dx:     ICD-10-CM ICD-9-CM   1. General weakness  R53.1 780.79     Patient Active Problem List   Diagnosis    Cerebrovascular accident (CVA)    Paroxysmal A-fib    Hyperlipidemia    Migraines    History of echocardiogram    History of cardiac cath    History of cardiac monitoring    CKD (chronic kidney disease) stage 3, GFR 30-59 ml/min    Allergy to iodine    Adenomatous colon polyp    Epigastric pain    Nausea    Bloating    Belching    Long term current use of anticoagulant therapy    Atrial fibrillation with RVR    Metatarsal fracture, pathologic, left, initial encounter    FH: colon cancer    FH: colon polyps    Hypotensive episode    Acute on chronic renal insufficiency    Elevated troponin    Chronic HFrEF (heart failure with reduced ejection fraction)    Anxiety disorder    Constipation    Hypothyroidism (acquired)    Rheumatoid arthritis involving multiple sites    Atrial fibrillation, persistent    Personal history of fall    Atrial fibrillation    Persistent atrial fibrillation    Presence of Watchman left atrial appendage closure device    Generalized weakness     Past Medical History:   Diagnosis Date    Anxiety and depression     Arthritis     Back pain     CRF (chronic renal failure), stage 3 (moderate)     Diverticulosis     Essential hypertension     Family history of colon cancer     Family history of colonic polyps     Fractures     R tibia/fibula; L ankle    GERD (gastroesophageal reflux disease)     History of adenomatous polyp of colon     History of cardiac cath     19 left main no significant disease. LAD with no significant disease. LCX no significant disease. RCA no significant disease.    History of cardiac monitoring     19 - ZIO PATCH - Underlying rhythm is sinus at 48-94 bpm. PSVT x23 with fastest 167 bpm, longest 24.4  seconds.   10/05/2018- ZIO PATCH - NSR most of time but did have episode of A fib ranging from .    History of colon polyps     History of echocardiogram     9/2018: Left ventricular systolic function is normal. EF 50%. Left ventricular diastolic dysfunction consistent with ipaired relaxation. Left atrial cavity size is mildly dilated. Mild MR. Mild to moderate TR. There is abnormal thickening noted on the RV that may represent a vegetation-this was discussed with Dr. Hammer and Dr. Reilly. No patent foramen ovale. No further work up needed at this time    Hyperlipidemia     Kidney disorder     Migraine     Migraines     Neck pain     Stroke 2018     Past Surgical History:   Procedure Laterality Date    ABDOMINOPLASTY      ATRIAL APPENDAGE EXCLUSION LEFT WITH TRANSESOPHAGEAL ECHOCARDIOGRAM N/A 10/22/2024    Procedure: Atrial Appendage Occlusion;  Surgeon: Sourav Pompa MD;  Location: [facility] CATH INVASIVE LOCATION;  Service: Cardiovascular;  Laterality: N/A;    BACK SURGERY      L2-5 fusion 2012    CARDIAC ELECTROPHYSIOLOGY PROCEDURE Right 10/22/2024    Procedure: Ablation atrial fibrillation;  Surgeon: Sourav Pompa MD;  Location:  [facility] CATH INVASIVE LOCATION;  Service: Cardiovascular;  Laterality: Right;    CHOLECYSTECTOMY      COLONOSCOPY  11/24/2014    Diverticulosis; Repeat 5 years    COLONOSCOPY  11/09/2010    Diverticulosis; Repeat 4 years    COLONOSCOPY  09/18/2007    Dr. Monzon-Normal; Repeat 3 years    COLONOSCOPY  06/02/2005    Dr. Monzon-Diminuitive polypectomy above the cecum; Otherwise normal colonoscopy; Repeat 2 years    COLONOSCOPY N/A 11/01/2019    Diverticulosis in the left colon; Two 5-6mm polyps in the cecum-path shows one tubular adenomatous and on hyperplastic polyp; One 8mm tubular adenomatous polyp in the ascending colon; Two 4-5mm tubular adenomatous polyps in the transverse colon; Repeat 3 years    COLONOSCOPY N/A 6/12/2023    Procedure: COLONOSCOPY WITH ANESTHESIA;   Surgeon: Susan Lord MD;  Location:  [facility];  Service: Gastroenterology;  Laterality: N/A;  preop hx of polyps   postop; polyps   PCP Woody Valentin MD    CYSTOCELE REPAIR      Cystocele and rectocele repair    ENDOSCOPY  09/28/2012    LA grade C esophagitis; HH    ENDOSCOPY N/A 11/01/2019    Small HH; Non-erosive gastritis-biopsied; Normal examined duodenum    ENDOSCOPY N/A 10/13/2024    Procedure: ESOPHAGOGASTRODUODENOSCOPY WITH BIOPSY X 1 AREA;  Surgeon: Miguel Vega MD;  Location: [facility] ENDOSCOPY;  Service: Gastroenterology;  Laterality: N/A;  Hiatal hernia, esophagitis, gastritis    FIBULA FRACTURE SURGERY      HYSTERECTOMY      NECK SURGERY  2012    ACDF C4-7    ORIF TIBIA FRACTURE Right       General Information       Row Name 10/26/24 1054          Physical Therapy Time and Intention    Document Type evaluation  -SS (r) AN (t) SS (c)     Mode of Treatment physical therapy  -SS (r) AN (t) SS (c)       Row Name 10/26/24 1054          General Information    Patient Profile Reviewed yes  -SS (r) AN (t) SS (c)     Prior Level of Function independent:;dressing;max assist:;driving  -SS (r) AN (t) SS (c)     Existing Precautions/Restrictions no known precautions/restrictions  -SS (r) AN (t) SS (c)       Row Name 10/26/24 1054          Living Environment    People in Home child(siobhan), dependent  -SS (r) AN (t) SS (c)     Name(s) of People in Home Son (Augustin)  -SS (r) AN (t) SS (c)       Row Name 10/26/24 1054          Home Main Entrance    Number of Stairs, Main Entrance three  -SS (r) AN (t) SS (c)     Stair Railings, Main Entrance railings safe and in good condition;railing on left side (ascending)  -SS (r) AN (t) SS (c)       Row Name 10/26/24 1052          Cognition    Orientation Status (Cognition) oriented x 4  -SS (r) AN (t) SS (c)               User Key  (r) = Recorded By, (t) = Taken By, (c) = Cosigned By      Initials Name Provider Type    SS Nichole Carter, PT  Physical Therapist    Rachel Greenberg, PT Student PT Student                   Mobility       Row Name 10/26/24 1320          Bed Mobility    Bed Mobility bed mobility (all) activities  -SS (r) AN (t) SS (c)     All Activities, [place] (Bed Mobility) modified independence  -SS (r) AN (t) SS (c)     Assistive Device (Bed Mobility) head of bed elevated;bed rails  -SS (r) AN (t) SS (c)     Comment, (Bed Mobility) supine-to-sit R side  -SS (r) AN (t) SS (c)       Row Name 10/26/24 1320          Sit-Stand Transfer    Sit-Stand [place] (Transfers) contact guard  -SS (r) AN (t) SS (c)     Assistive Device (Sit-Stand Transfers) walker, front-wheeled  -SS (r) AN (t) SS (c)       Row Name 10/26/24 1320          Gait/Stairs (Locomotion)    [place] Level (Gait) contact guard  -SS (r) AN (t) SS (c)     Assistive Device (Gait) walker, front-wheeled  -SS (r) AN (t) SS (c)     Patient was able to Ambulate yes  -SS (r) AN (t) SS (c)     Distance in Feet (Gait) 30  -SS (r) AN (t) SS (c)     Deviations/Abnormal Patterns (Gait) gait speed decreased;stride length decreased  -SS (r) AN (t) SS (c)     Bilateral Gait Deviations forward flexed posture  -SS (r) AN (t) SS (c)               User Key  (r) = Recorded By, (t) = Taken By, (c) = Cosigned By      Initials Name Provider Type    SS Nichole Carter, PT Physical Therapist    Rachel Greenberg, PT Student PT Student                   Obj/Interventions       Row Name 10/26/24 1321          Range of Motion Comprehensive    General Range of Motion lower extremity range of motion deficits identified  -SS (r) AN (t) SS (c)     Comment, General Range of Motion decreased knee extension R knee, c/o back pain  -SS (r) AN (t) SS (c)       Row Name 10/26/24 1321          Strength Comprehensive (MMT)    General Manual Muscle Testing (MMT) Assessment lower extremity strength deficits identified  -SS (r) AN (t) SS (c)     Comment, General Manual Muscle Testing (MMT) Assessment  Strength WFL for ADLs and amb, grossly 4/5 with exception of BLE hip flx 3+/5  -SS (r) AN (t) SS (c)       Row Name 10/26/24 1321          Sensory Assessment (Somatosensory)    Sensory Assessment (Somatosensory) sensation intact  -SS (r) AN (t) SS (c)               User Key  (r) = Recorded By, (t) = Taken By, (c) = Cosigned By      Initials Name Provider Type    SS Nichole Carter, PT Physical Therapist    Rachel Greenberg, PT Student PT Student                   Goals/Plan       Row Name 10/26/24 1351          Bed Mobility Goal 1 (PT)    Activity/Assistive Device (Bed Mobility Goal 1, PT) bed mobility activities, all  -SS (r) AN (t) SS (c)     [place] Level/Cues Needed (Bed Mobility Goal 1, PT) modified independence  -SS (r) AN (t) SS (c)     Time Frame (Bed Mobility Goal 1, PT) long term goal (LTG);2 weeks  -SS (r) AN (t) SS (c)       Row Name 10/26/24 1358          Transfer Goal 1 (PT)    Activity/Assistive Device (Transfer Goal 1, PT) transfers, all  -SS (r) AN (t) SS (c)     [place] Level/Cues Needed (Transfer Goal 1, PT) contact guard required  -SS (r) AN (t) SS (c)     Time Frame (Transfer Goal 1, PT) long term goal (LTG);2 weeks  -SS (r) AN (t) SS (c)       Row Name 10/26/24 1354          Gait Training Goal 1 (PT)    Activity/Assistive Device (Gait Training Goal 1, PT) gait (walking locomotion)  -SS (r) AN (t) SS (c)     [place] Level (Gait Training Goal 1, PT) contact guard required  -SS (r) AN (t) SS (c)     Distance (Gait Training Goal 1, PT) amb 40ft c/ rw, CGA  -SS (r) AN (t) SS (c)     Time Frame (Gait Training Goal 1, PT) long term goal (LTG);2 weeks  -SS (r) AN (t) SS (c)       Row Name 10/26/24 0952          Therapy Assessment/Plan (PT)    Planned Therapy Interventions (PT) balance training;gait training;strengthening;transfer training  -SS (r) AN (t) SS (c)               User Key  (r) = Recorded By, (t) = Taken By, (c) = Cosigned By      Initials Name Provider Type    SS  Nichole Carter, PT Physical Therapist    Rachel Greenberg, PT Student PT Student                   Clinical Impression       Row Name 10/26/24 1323          Pain    Pain Location back  -SS (r) AN (t) SS (c)     Pain Management Interventions exercise or physical activity utilized;positioning techniques utilized  -SS (r) AN (t) SS (c)     Additional Documentation Pain Scale: FACES Pre/Post-Treatment (Group)  -SS (r) AN (t) SS (c)       Row Name 10/26/24 1323          Pain Scale: FACES Pre/Post-Treatment    Pain: FACES Scale, Pretreatment 2-->hurts little bit  -SS (r) AN (t) SS (c)     Posttreatment Pain Rating 2-->hurts little bit  -SS (r) AN (t) SS (c)       Row Name 10/26/24 1323          Plan of Care Review    Plan of Care Reviewed With patient;child  -SS (r) AN (t) SS (c)     Outcome Evaluation Margaret is a 74 y.o. female with a previous medical history of atrial fibrillation s/p cardiac ablation on 10/22/2024, CHF, CKD, RA who presented to [facility] on 10/25/2024 with complaints of generalized weakness and lethargy. She lives in a [facility] that has 3STE (left ascending rails) with her son, Augustin. At baseline, she is indep with ADLs/IADLs, amb without an AD, and son drives her to appts and takes care of groceries. At entry, she is AOx4 and very lethargic. Agreeable to participate in PT eval. Mod-indep with bed mobility; supine-to-sit on R side of bed with HOB elevated and use of bed rail. Strength WFL for ADLs and amb, grossly 4/5 with exception of BLE hip flx 3+/5. BLE ROM WFL for ADLs, limited R knee extension pt reports low back pain. CGA for STS from EOB c/ rw. Amb 30 ft c/ rw, CGA; decreased gait speed, decreased step length, decreased bilat terminal knee extension, elevated shoulders, verbal cues for erect posture. Pt appears to be below baseline functional mobility, recommending d/c to IP rehab to address aforementioned deficits.  -SS (r) AN (t) SS (c)       Row Name 10/26/24 1323          Therapy  Assessment/Plan (PT)    Criteria for Skilled Interventions Met (PT) yes;meets criteria  -SS (r) AN (t) SS (c)     Therapy Frequency (PT) 5 times/wk  -SS (r) AN (t) SS (c)     Predicted Duration of Therapy Intervention (PT) until d/c  -SS (r) AN (t) SS (c)       Row Name 10/26/24 1323          Positioning and Restraints    Pre-Treatment Position in bed  -SS (r) AN (t) SS (c)     Post Treatment Position chair  -SS (r) AN (t) SS (c)     In Chair reclined;call light within reach;encouraged to call for assist;exit alarm on  -SS (r) AN (t) SS (c)               User Key  (r) = Recorded By, (t) = Taken By, (c) = Cosigned By      Initials Name Provider Type    SS Nichole Carter, PT Physical Therapist    Rachel Greenberg, PT Student PT Student                   Outcome Measures       Row Name 10/26/24 1600 10/26/24 1200       How much help from another person do you currently need...    Turning from your back to your side while in flat bed without using bedrails? 4  -TS 4  -TS    Moving from lying on back to sitting on the side of a flat bed without bedrails? 4  -TS 4  -TS    Moving to and from a bed to a chair (including a wheelchair)? 4  -TS 4  -TS    Standing up from a chair using your arms (e.g., wheelchair, bedside chair)? 4  -TS 4  -TS    Climbing 3-5 steps with a railing? 3  -TS 3  -TS    To walk in hospital room? 4  -TS 4  -TS    AM-PAC 6 Clicks Score (PT) 23  -TS 23  -TS      Row Name 10/26/24 0900          How much help from another person do you currently need...    Turning from your back to your side while in flat bed without using bedrails? 4  -TS     Moving from lying on back to sitting on the side of a flat bed without bedrails? 4  -TS     Moving to and from a bed to a chair (including a wheelchair)? 4  -TS     Standing up from a chair using your arms (e.g., wheelchair, bedside chair)? 4  -TS     Climbing 3-5 steps with a railing? 3  -TS     To walk in hospital room? 4  -TS     AM-PAC 6 Clicks Score (PT)  23  -TS               User Key  (r) = Recorded By, (t) = Taken By, (c) = Cosigned By      Initials Name Provider Type    TS Mary De Jesus LPN Licensed Nurse                                 Physical Therapy Education       Title: PT OT SLP Therapies (Done)       Topic: Physical Therapy (Done)       Point: Mobility training (Done)       Learning Progress Summary            Patient Acceptance, E, VU by AN at 10/26/2024 1352   Family Acceptance, E, VU by AN at 10/26/2024 1352                                      User Key       Initials Effective Dates Name Provider Type Discipline    AN 06/27/24 -  Rachel Phelps, PT Student PT Student PT                  PT Recommendation and Plan  Planned Therapy Interventions (PT): balance training, gait training, strengthening, transfer training  Outcome Evaluation: Margaret is a 74 y.o. female with a previous medical history of atrial fibrillation s/p cardiac ablation on 10/22/2024, CHF, CKD, RA who presented to [facility] on 10/25/2024 with complaints of generalized weakness and lethargy. She lives in a [facility] that has 3STE (left ascending rails) with her son, Augustin. At baseline, she is indep with ADLs/IADLs, amb without an AD, and son drives her to appts and takes care of groceries. At entry, she is AOx4 and very lethargic. Agreeable to participate in PT eval. Mod-indep with bed mobility; supine-to-sit on R side of bed with HOB elevated and use of bed rail. Strength WFL for ADLs and amb, grossly 4/5 with exception of BLE hip flx 3+/5. BLE ROM WFL for ADLs, limited R knee extension pt reports low back pain. CGA for STS from EOB c/ rw. Amb 30 ft c/ rw, CGA; decreased gait speed, decreased step length, decreased bilat terminal knee extension, elevated shoulders, verbal cues for erect posture. Pt appears to be below baseline functional mobility, recommending d/c to IP rehab to address aforementioned deficits.     Time Calculation:   PT Evaluation Complexity  History, PT Evaluation  Complexity: 1-2 personal factors and/or comorbidities  Examination of Body Systems (PT Eval Complexity): total of 3 or more elements  Clinical Presentation (PT Evaluation Complexity): evolving  Clinical Decision Making (PT Evaluation Complexity): moderate complexity  Overall Complexity (PT Evaluation Complexity): moderate complexity     PT Charges       Row Name 10/26/24 1353             Time Calculation    Start Time 1050  -SS (r) AN (t) SS (c)      Stop Time 1120  -SS (r) AN (t) SS (c)      Time Calculation (min) 30 min  -SS (r) AN (t)      PT Received On 10/26/24  -SS (r) AN (t) SS (c)      PT - Next Appointment 10/27/24  -SS (r) AN (t) SS (c)      PT Goal Re-Cert Due Date 11/09/24  -SS (r) AN (t) SS (c)         Time Calculation- PT    Total Timed Code Minutes- PT 0 minute(s)  -SS (r) AN (t) SS (c)                User Key  (r) = Recorded By, (t) = Taken By, (c) = Cosigned By      Initials Name Provider Type    Nichole Jaeger, PT Physical Therapist    Rachel Greenberg, PT Student PT Student                  Therapy Charges for Today       Code Description Service Date Service Provider Modifiers Qty    [id number] HC PT EVAL MOD COMPLEXITY 4 10/26/2024 Rachel Phelps PT Student GP 1            PT G-Codes  AM-PAC 6 Clicks Score (PT): 23  PT Discharge Summary  Anticipated Discharge Disposition (PT): inpatient rehabilitation facility    MARTÍN Dugan  10/26/2024

## 2024-10-26 NOTE — PLAN OF CARE
Goal Outcome Evaluation:  Plan of Care Reviewed With: patient, child           Outcome Evaluation: Margaret is a 74 y.o. female with a previous medical history of atrial fibrillation s/p cardiac ablation on 10/22/2024, CHF, CKD, RA who presented to [facility] on 10/25/2024 with complaints of generalized weakness and lethargy. She lives in a [facility] that has 3STE (left ascending rails) with her son, Augustin. At baseline, she is indep with ADLs/IADLs, amb without an AD, and son drives her to appts and takes care of groceries. At entry, she is AOx4 and very lethargic. Agreeable to participate in PT eval. Mod-indep with bed mobility; supine-to-sit on R side of bed with HOB elevated and use of bed rail. Strength WFL for ADLs and amb, grossly 4/5 with exception of BLE hip flx 3+/5. BLE ROM WFL for ADLs, limited R knee extension pt reports low back pain. CGA for STS from EOB c/ rw. Amb 30 ft c/ rw, CGA; decreased gait speed, decreased step length, decreased bilat terminal knee extension, elevated shoulders, verbal cues for erect posture. Pt appears to be below baseline functional mobility, recommending d/c to IP rehab to address aforementioned deficits.    Anticipated Discharge Disposition (PT): inpatient rehabilitation facility

## 2024-10-26 NOTE — PLAN OF CARE
Goal Outcome Evaluation:            Patient was up with physical therapy today and did well. She walked to the door. She did request pain medication and was able to get tramadol ordered for her. She did sit in the chair for most of the day today. Home meds were reordered.

## 2024-10-26 NOTE — OUTREACH NOTE
Medical Week 1 Survey      Flowsheet Row Responses   [facility] facility patient discharged from? [facility]   Does the patient have one of the following disease processes/diagnoses(primary or secondary)? Other   Week 1 attempt successful? No   Unsuccessful attempts Attempt 1   Revoke Readmitted            FRANCISCO MANZO - Registered Nurse

## 2024-10-27 LAB
ANION GAP SERPL CALCULATED.3IONS-SCNC: 9 MMOL/L (ref 5–15)
APTT PPP: 36.5 SECONDS (ref 61–76.5)
APTT PPP: >139 SECONDS (ref 61–76.5)
BACTERIA SPEC AEROBE CULT: ABNORMAL
BUN SERPL-MCNC: 12 MG/DL (ref 8–23)
BUN/CREAT SERPL: 11.1 (ref 7–25)
CALCIUM SPEC-SCNC: 8.4 MG/DL (ref 8.6–10.5)
CHLORIDE SERPL-SCNC: 108 MMOL/L (ref 98–107)
CO2 SERPL-SCNC: 23 MMOL/L (ref 22–29)
CREAT SERPL-MCNC: 1.08 MG/DL (ref 0.57–1)
DEPRECATED RDW RBC AUTO: 59.3 FL (ref 37–54)
EGFRCR SERPLBLD CKD-EPI 2021: 54 ML/MIN/1.73
ERYTHROCYTE [DISTWIDTH] IN BLOOD BY AUTOMATED COUNT: 17.2 % (ref 12.3–15.4)
GLUCOSE SERPL-MCNC: 111 MG/DL (ref 65–99)
HCT VFR BLD AUTO: 27.4 % (ref 34–46.6)
HGB BLD-MCNC: 8.1 G/DL (ref 12–15.9)
INR PPP: 1.36 (ref 0.93–1.1)
MCH RBC QN AUTO: 27.9 PG (ref 26.6–33)
MCHC RBC AUTO-ENTMCNC: 29.6 G/DL (ref 31.5–35.7)
MCV RBC AUTO: 94.5 FL (ref 79–97)
PLATELET # BLD AUTO: 154 10*3/MM3 (ref 140–450)
PMV BLD AUTO: 10.4 FL (ref 6–12)
POTASSIUM SERPL-SCNC: 4 MMOL/L (ref 3.5–5.2)
PROTHROMBIN TIME: 14.5 SECONDS (ref 9.6–11.7)
QT INTERVAL: 373 MS
QT INTERVAL: 446 MS
QTC INTERVAL: 463 MS
QTC INTERVAL: 483 MS
RBC # BLD AUTO: 2.9 10*6/MM3 (ref 3.77–5.28)
SODIUM SERPL-SCNC: 140 MMOL/L (ref 136–145)
T4 FREE SERPL-MCNC: 3.09 NG/DL (ref 0.93–1.7)
VIT B12 BLD-MCNC: 904 PG/ML (ref 211–946)
WBC NRBC COR # BLD AUTO: 4.22 10*3/MM3 (ref 3.4–10.8)

## 2024-10-27 PROCEDURE — 25010000002 HEPARIN (PORCINE) 25000-0.45 UT/250ML-% SOLUTION: Performed by: INTERNAL MEDICINE

## 2024-10-27 PROCEDURE — 25810000003 SODIUM CHLORIDE 0.9 % SOLUTION: Performed by: INTERNAL MEDICINE

## 2024-10-27 PROCEDURE — 85027 COMPLETE CBC AUTOMATED: CPT

## 2024-10-27 PROCEDURE — 99222 1ST HOSP IP/OBS MODERATE 55: CPT | Performed by: INTERNAL MEDICINE

## 2024-10-27 PROCEDURE — 80048 BASIC METABOLIC PNL TOTAL CA: CPT

## 2024-10-27 PROCEDURE — 84439 ASSAY OF FREE THYROXINE: CPT | Performed by: INTERNAL MEDICINE

## 2024-10-27 PROCEDURE — 25010000002 NA FERRIC GLUC CPLX PER 12.5 MG: Performed by: INTERNAL MEDICINE

## 2024-10-27 PROCEDURE — 85730 THROMBOPLASTIN TIME PARTIAL: CPT | Performed by: INTERNAL MEDICINE

## 2024-10-27 PROCEDURE — 85610 PROTHROMBIN TIME: CPT | Performed by: INTERNAL MEDICINE

## 2024-10-27 PROCEDURE — 97166 OT EVAL MOD COMPLEX 45 MIN: CPT | Performed by: OCCUPATIONAL THERAPIST

## 2024-10-27 RX ORDER — HEPARIN SODIUM 10000 [USP'U]/100ML
12 INJECTION, SOLUTION INTRAVENOUS
Status: DISCONTINUED | OUTPATIENT
Start: 2024-10-27 | End: 2024-10-28

## 2024-10-27 RX ORDER — FLUCONAZOLE 100 MG/1
200 TABLET ORAL EVERY 24 HOURS
Status: DISCONTINUED | OUTPATIENT
Start: 2024-10-27 | End: 2024-10-31 | Stop reason: HOSPADM

## 2024-10-27 RX ADMIN — LEVOTHYROXINE SODIUM 112 MCG: 0.11 TABLET ORAL at 08:12

## 2024-10-27 RX ADMIN — ALPRAZOLAM 1 MG: 1 TABLET ORAL at 06:38

## 2024-10-27 RX ADMIN — HEPARIN SODIUM 12 UNITS/KG/HR: 10000 INJECTION, SOLUTION INTRAVENOUS at 13:32

## 2024-10-27 RX ADMIN — ASPIRIN 81 MG: 81 TABLET, COATED ORAL at 08:12

## 2024-10-27 RX ADMIN — Medication 1000 UNITS: at 08:12

## 2024-10-27 RX ADMIN — SODIUM BICARBONATE 650 MG: 650 TABLET ORAL at 22:11

## 2024-10-27 RX ADMIN — ALPRAZOLAM 1 MG: 1 TABLET ORAL at 22:10

## 2024-10-27 RX ADMIN — TRAMADOL HYDROCHLORIDE 50 MG: 50 TABLET ORAL at 23:09

## 2024-10-27 RX ADMIN — METOPROLOL SUCCINATE 25 MG: 25 TABLET, FILM COATED, EXTENDED RELEASE ORAL at 22:11

## 2024-10-27 RX ADMIN — LIOTHYRONINE SODIUM 5 MCG: 5 TABLET ORAL at 08:11

## 2024-10-27 RX ADMIN — Medication 10 ML: at 22:16

## 2024-10-27 RX ADMIN — ACETAMINOPHEN 650 MG: 325 TABLET, FILM COATED ORAL at 08:18

## 2024-10-27 RX ADMIN — ATORVASTATIN CALCIUM 80 MG: 40 TABLET, FILM COATED ORAL at 08:12

## 2024-10-27 RX ADMIN — TOPIRAMATE 200 MG: 100 TABLET, FILM COATED ORAL at 08:12

## 2024-10-27 RX ADMIN — SENNOSIDES AND DOCUSATE SODIUM 2 TABLET: 50; 8.6 TABLET ORAL at 08:12

## 2024-10-27 RX ADMIN — TRAMADOL HYDROCHLORIDE 50 MG: 50 TABLET ORAL at 13:42

## 2024-10-27 RX ADMIN — HYDROXYCHLOROQUINE SULFATE 200 MG: 200 TABLET ORAL at 08:12

## 2024-10-27 RX ADMIN — ACETAMINOPHEN 650 MG: 325 TABLET, FILM COATED ORAL at 16:23

## 2024-10-27 RX ADMIN — SODIUM CHLORIDE 250 MG: 9 INJECTION, SOLUTION INTRAVENOUS at 22:11

## 2024-10-27 RX ADMIN — TRAMADOL HYDROCHLORIDE 50 MG: 50 TABLET ORAL at 06:38

## 2024-10-27 RX ADMIN — SODIUM BICARBONATE 650 MG: 650 TABLET ORAL at 08:11

## 2024-10-27 RX ADMIN — MEMANTINE 10 MG: 10 TABLET ORAL at 08:11

## 2024-10-27 RX ADMIN — HYDROXYCHLOROQUINE SULFATE 200 MG: 200 TABLET ORAL at 22:11

## 2024-10-27 RX ADMIN — SENNOSIDES AND DOCUSATE SODIUM 2 TABLET: 50; 8.6 TABLET ORAL at 22:11

## 2024-10-27 RX ADMIN — PANTOPRAZOLE SODIUM 40 MG: 40 TABLET, DELAYED RELEASE ORAL at 06:38

## 2024-10-27 RX ADMIN — FLUCONAZOLE 200 MG: 100 TABLET ORAL at 22:10

## 2024-10-27 RX ADMIN — ESCITALOPRAM OXALATE 20 MG: 10 TABLET ORAL at 08:12

## 2024-10-27 RX ADMIN — METOPROLOL SUCCINATE 25 MG: 25 TABLET, FILM COATED, EXTENDED RELEASE ORAL at 08:12

## 2024-10-27 NOTE — PROGRESS NOTES
[facility] MEDICINE SERVICE  DAILY PROGRESS NOTE    NAME: Natalee Noble  : 1950  MRN: [id number]      LOS: 0 days     PROVIDER OF SERVICE: Terrie Dao MD    Chief Complaint: Generalized weakness    Subjective:     Interval History:  History taken from: patient    No new complaint        Review of Systems:   Review of Systems   All other systems reviewed and are negative.      Objective:     Vital Signs  Temp:  [97.7 °F (36.5 °C)-98.7 °F (37.1 °C)] 98.5 °F (36.9 °C)  Heart Rate:  [61-73] 67  Resp:  [13-20] 16  BP: ()/(53-77) 104/56   Body mass index is 28.79 kg/m².    Physical Exam  Physical Exam  Constitutional:       Appearance: Normal appearance.   HENT:      Head: Normocephalic and atraumatic.      Nose: Nose normal.      Mouth/Throat:      Mouth: Mucous membranes are moist.   Eyes:      Extraocular Movements: Extraocular movements intact.      Pupils: Pupils are equal, round, and reactive to light.   Cardiovascular:      Rate and Rhythm: Normal rate and regular rhythm.   Pulmonary:      Effort: Pulmonary effort is normal.      Breath sounds: Normal breath sounds.   Abdominal:      General: Abdomen is flat. Bowel sounds are normal.      Palpations: Abdomen is soft.   Musculoskeletal:         General: Normal range of motion.      Cervical back: Normal range of motion and neck supple.   Skin:     General: Skin is warm and dry.   Neurological:      General: No focal deficit present.      Mental Status: She is alert and oriented to person, place, and time.   Psychiatric:         Mood and Affect: Mood normal.         Behavior: Behavior normal.         Thought Content: Thought content normal.         Judgment: Judgment normal.         Current Medications:  Scheduled Meds:aspirin, 81 mg, Oral, Daily  atorvastatin, 80 mg, Oral, Daily  cefTRIAXone, 2,000 mg, Intravenous, Q24H  cholecalciferol, 1,000 Units, Oral, Daily  empagliflozin, 25 mg, Oral, Daily  escitalopram, 20 mg, Oral,  Daily  ferric gluconate, 250 mg, Intravenous, Once  hydroxychloroquine, 200 mg, Oral, BID  levothyroxine, 112 mcg, Oral, Daily  memantine, 10 mg, Oral, Daily  metoprolol succinate XL, 25 mg, Oral, BID  pantoprazole, 40 mg, Oral, Q AM  senna-docusate sodium, 2 tablet, Oral, BID  sodium bicarbonate, 650 mg, Oral, BID  sodium chloride, 10 mL, Intravenous, Q12H  topiramate, 200 mg, Oral, Daily      Continuous Infusions:heparin, 12 Units/kg/hr, Last Rate: 12 Units/kg/hr (10/27/24 1332)      PRN Meds:.  acetaminophen **OR** acetaminophen **OR** acetaminophen    ALPRAZolam    senna-docusate sodium **AND** polyethylene glycol **AND** bisacodyl **AND** bisacodyl    calcium carbonate    heparin    heparin    influenza vaccine    melatonin    nitroglycerin    ondansetron    sodium chloride    sodium chloride    traMADol       Diagnostic Data    Results from last 7 days   Lab Units 10/27/24  0451 10/25/24  2222 10/25/24  1710   WBC 10*3/mm3 4.22   < > 6.99   HEMOGLOBIN g/dL 8.1*   < > 8.9*   HEMATOCRIT % 27.4*   < > 30.8*   PLATELETS 10*3/mm3 154   < > 166   GLUCOSE mg/dL 111*   < > 98   CREATININE mg/dL 1.08*   < > 1.19*   BUN mg/dL 12   < > 16   SODIUM mmol/L 140   < > 141   POTASSIUM mmol/L 4.0   < > 4.3   AST (SGOT) U/L  --   --  32   ALT (SGPT) U/L  --   --  14   ALK PHOS U/L  --   --  87   BILIRUBIN mg/dL  --   --  0.5   ANION GAP mmol/L 9.0   < > 9.4    < > = values in this interval not displayed.       CT Head Without Contrast    Result Date: 10/25/2024  Impression: Motion degraded study. No definite acute intracranial abnormality. Paranasal sinus disease. Please correlate clinically to exclude acute sinusitis. Electronically Signed: Jamie Mendez DO  10/25/2024 6:15 PM EDT  Workstation ID: [id number]       I reviewed the patient's new clinical results.    Assessment/Plan:     Active and Resolved Problems  Active Hospital Problems    Diagnosis  POA    **Generalized weakness [R53.1]  Yes    Atrial fibrillation  [I48.91]  Yes    Rheumatoid arthritis involving multiple sites [M06.9]  Yes      Resolved Hospital Problems   No resolved problems to display.       UTI  -UTI growing yeast.  Start Diflucan for yeast UTI/cystitis    Generalized Weakness  -Likely multifactorial secondary to UTI and recent hospitalizations with cardiac ablation  -CT of the head showed no acute abnormality  -PT/OT consult  -On levothyroxine.  TSH checked and is low.  Check free T4.  - Patient anemic.  Anemia workup reveals iron deficiency.  Start Ferrlecit.  Likely contributing to generalized weakness.      Elevated troponin/NSTEMI  -Expected due to the ablation, no complaints of chest pain, shortness of breath, palpitations. Going for cardiac cath however  -Troponin 1352, 1146 down to 359  -S/p cardiac ablation on 10/22/2024  -EKG reviewed, SR, HR 64  -Continuous cardiac monitoring    VTE Prophylaxis:  Pharmacologic & mechanical VTE prophylaxis orders are present.             Disposition Planning:     Barriers to Discharge: Pending clinical improvement  Anticipated Date of Discharge: Pending clinical course however likely 10/30/2024  Place of Discharge: Home           Code Status and Medical Interventions: CPR (Attempt to Resuscitate); Full Support   Ordered at: 10/25/24 1935     Code Status (Patient has no pulse and is not breathing):    CPR (Attempt to Resuscitate)     Medical Interventions (Patient has pulse or is breathing):    Full Support       Signature: Electronically signed by Terrie Dao MD, 10/27/24, 17:03 EDT.  [facility] Hospitalist Team

## 2024-10-27 NOTE — THERAPY EVALUATION
Patient Name: Natalee Noble  : 1950    MRN: [id number]                              Today's Date: 10/27/2024       Admit Date: 10/25/2024    Visit Dx:     ICD-10-CM ICD-9-CM   1. General weakness  R53.1 780.79     Patient Active Problem List   Diagnosis    Cerebrovascular accident (CVA)    Paroxysmal A-fib    Hyperlipidemia    Migraines    History of echocardiogram    History of cardiac cath    History of cardiac monitoring    CKD (chronic kidney disease) stage 3, GFR 30-59 ml/min    Allergy to iodine    Adenomatous colon polyp    Epigastric pain    Nausea    Bloating    Belching    Long term current use of anticoagulant therapy    Atrial fibrillation with RVR    Metatarsal fracture, pathologic, left, initial encounter    FH: colon cancer    FH: colon polyps    Hypotensive episode    Acute on chronic renal insufficiency    Elevated troponin    Chronic HFrEF (heart failure with reduced ejection fraction)    Anxiety disorder    Constipation    Hypothyroidism (acquired)    Rheumatoid arthritis involving multiple sites    Atrial fibrillation, persistent    Personal history of fall    Atrial fibrillation    Persistent atrial fibrillation    Presence of Watchman left atrial appendage closure device    Generalized weakness     Past Medical History:   Diagnosis Date    Anxiety and depression     Arthritis     Back pain     CRF (chronic renal failure), stage 3 (moderate)     Diverticulosis     Essential hypertension     Family history of colon cancer     Family history of colonic polyps     Fractures     R tibia/fibula; L ankle    GERD (gastroesophageal reflux disease)     History of adenomatous polyp of colon     History of cardiac cath     19 left main no significant disease. LAD with no significant disease. LCX no significant disease. RCA no significant disease.    History of cardiac monitoring     19 - ZIO PATCH - Underlying rhythm is sinus at 48-94 bpm. PSVT x23 with fastest 167 bpm, longest 24.4  seconds.   10/05/2018- ZIO PATCH - NSR most of time but did have episode of A fib ranging from .    History of colon polyps     History of echocardiogram     9/2018: Left ventricular systolic function is normal. EF 50%. Left ventricular diastolic dysfunction consistent with ipaired relaxation. Left atrial cavity size is mildly dilated. Mild MR. Mild to moderate TR. There is abnormal thickening noted on the RV that may represent a vegetation-this was discussed with Dr. Hammer and Dr. Reilly. No patent foramen ovale. No further work up needed at this time    Hyperlipidemia     Kidney disorder     Migraine     Migraines     Neck pain     Stroke 2018     Past Surgical History:   Procedure Laterality Date    ABDOMINOPLASTY      ATRIAL APPENDAGE EXCLUSION LEFT WITH TRANSESOPHAGEAL ECHOCARDIOGRAM N/A 10/22/2024    Procedure: Atrial Appendage Occlusion;  Surgeon: Sourav Pompa MD;  Location: [facility] CATH INVASIVE LOCATION;  Service: Cardiovascular;  Laterality: N/A;    BACK SURGERY      L2-5 fusion 2012    CARDIAC ELECTROPHYSIOLOGY PROCEDURE Right 10/22/2024    Procedure: Ablation atrial fibrillation;  Surgeon: Sourav Pompa MD;  Location:  [facility] CATH INVASIVE LOCATION;  Service: Cardiovascular;  Laterality: Right;    CHOLECYSTECTOMY      COLONOSCOPY  11/24/2014    Diverticulosis; Repeat 5 years    COLONOSCOPY  11/09/2010    Diverticulosis; Repeat 4 years    COLONOSCOPY  09/18/2007    Dr. Monzon-Normal; Repeat 3 years    COLONOSCOPY  06/02/2005    Dr. Monzon-Diminuitive polypectomy above the cecum; Otherwise normal colonoscopy; Repeat 2 years    COLONOSCOPY N/A 11/01/2019    Diverticulosis in the left colon; Two 5-6mm polyps in the cecum-path shows one tubular adenomatous and on hyperplastic polyp; One 8mm tubular adenomatous polyp in the ascending colon; Two 4-5mm tubular adenomatous polyps in the transverse colon; Repeat 3 years    COLONOSCOPY N/A 6/12/2023    Procedure: COLONOSCOPY WITH ANESTHESIA;   Surgeon: Susan Lord MD;  Location:  [facility];  Service: Gastroenterology;  Laterality: N/A;  preop hx of polyps   postop; polyps   PCP Woody Valentin MD    CYSTOCELE REPAIR      Cystocele and rectocele repair    ENDOSCOPY  09/28/2012    LA grade C esophagitis; HH    ENDOSCOPY N/A 11/01/2019    Small HH; Non-erosive gastritis-biopsied; Normal examined duodenum    ENDOSCOPY N/A 10/13/2024    Procedure: ESOPHAGOGASTRODUODENOSCOPY WITH BIOPSY X 1 AREA;  Surgeon: Miguel Vega MD;  Location:  [facility];  Service: Gastroenterology;  Laterality: N/A;  Hiatal hernia, esophagitis, gastritis    FIBULA FRACTURE SURGERY      HYSTERECTOMY      NECK SURGERY  2012    ACDF C4-7    ORIF TIBIA FRACTURE Right       General Information       Row Name 10/27/24 3638          OT Time and Intention    Document Type evaluation  -DT     Mode of Treatment occupational therapy  -DT       Row Name 10/27/24 2088          General Information    Patient Profile Reviewed yes  -DT     Prior Level of Function independent:;all household mobility;ADL's  Prior to 2 months ago. Pt has been having inc falls & weakness X 2 months prior  to admission. Has tub bench, uses rollator.  -DT     Existing Precautions/Restrictions orthostatic hypotension  hx of orthostatic hypotension. Monitor PRN.  -DT     Barriers to Rehab medically complex  -DT       Row Name 10/27/24 7873          Living Environment    People in Home child(siobhan), adult  Was living with son. Is planning to stay with dtr for a while after disharge/rehab  -DT       Row Name 10/27/24 1552          Home Main Entrance    Number of Stairs, Main Entrance three  -DT     Stair Railings, Main Entrance railings safe and in good condition  -DT       Row Name 10/27/24 7920          Stairs Within Home, Primary    Number of Stairs, Within Home, Primary none  -DT       Row Name 10/27/24 2665          Cognition    Orientation Status (Cognition) oriented x 4  -DT       Row  Name 10/27/24 1555          Safety Issues/Impairments Affecting Functional Mobility    Impairments Affecting Function (Mobility) balance;endurance/activity tolerance;strength  -DT               User Key  (r) = Recorded By, (t) = Taken By, (c) = Cosigned By      Initials Name Provider Type    DT Tiffany Sweet, OT Occupational Therapist                     Mobility/ADL's       Row Name 10/27/24 1604          Bed Mobility    Bed Mobility bed mobility (all) activities  -DT     All Activities, [place] (Bed Mobility) standby assist  -DT     Assistive Device (Bed Mobility) head of bed elevated;bed rails  -DT       Row Name 10/27/24 1604          Transfers    Transfers sit-stand transfer;stand-sit transfer  -DT       Row Name 10/27/24 1604          Sit-Stand Transfer    Sit-Stand [place] (Transfers) contact guard  -DT     Assistive Device (Sit-Stand Transfers) walker, front-wheeled  -DT       Row Name 10/27/24 1604          Stand-Sit Transfer    Stand-Sit [place] (Transfers) contact guard  -DT     Assistive Device (Stand-Sit Transfers) walker, front-wheeled  -DT       Row Name 10/27/24 1604          Functional Mobility    Functional Mobility- Ind. Level contact guard assist  -DT     Functional Mobility- Device walker, front-wheeled  -DT     Functional Mobility-Distance (Feet) 30  -DT       Row Name 10/27/24 1604          Activities of Daily Living    BADL Assessment/Intervention lower body dressing;toileting;grooming  -DT       Row Name 10/27/24 1604          Lower Body Dressing Assessment/Training    [place] Level (Lower Body Dressing) lower body dressing skills;minimum assist (75% patient effort)  -DT     Position (Lower Body Dressing) edge of bed sitting  -DT       Row Name 10/27/24 1604          Toileting Assessment/Training    [place] Level (Toileting) toileting skills;minimum assist (75% patient effort)  -DT       Row Name 10/27/24 1604          Grooming Assessment/Training     [place] Level (Grooming) grooming skills;standby assist;verbal cues;other (see comments)  v.c for thoroughness  -DT               User Key  (r) = Recorded By, (t) = Taken By, (c) = Cosigned By      Initials Name Provider Type    DT Tiffany Sweet OT Occupational Therapist                   Obj/Interventions       Row Name 10/27/24 1609          Sensory Assessment (Somatosensory)    Sensory Assessment (Somatosensory) sensation intact  -DT       Row Name 10/27/24 1609          Range of Motion Comprehensive    General Range of Motion bilateral upper extremity ROM WNL  -DT       Row Name 10/27/24 1609          Strength Comprehensive (MMT)    General Manual Muscle Testing (MMT) Assessment upper extremity strength deficits identified;lower extremity strength deficits identified  -DT     Comment, General Manual Muscle Testing (MMT) Assessment BUE= 4-/5  -DT       Row Name 10/27/24 1609          Motor Skills    Motor Skills functional endurance  -DT     Functional Endurance fair  -DT       Row Name 10/27/24 1609          Balance    Balance Assessment sitting static balance;sitting dynamic balance;standing static balance;standing dynamic balance  -DT     Static Sitting Balance supervision  -DT     Dynamic Sitting Balance standby assist  -DT     Position, Sitting Balance sitting edge of bed  -DT     Static Standing Balance contact guard  -DT     Dynamic Standing Balance contact guard  -DT     Position/Device Used, Standing Balance walker, front-wheeled  -DT               User Key  (r) = Recorded By, (t) = Taken By, (c) = Cosigned By      Initials Name Provider Type    Tiffany Kauffman OT Occupational Therapist                   Goals/Plan       Row Name 10/27/24 1618          Transfer Goal 1 (OT)    Activity/Assistive Device (Transfer Goal 1, OT) transfers, all  -DT     [place] Level/Cues Needed (Transfer Goal 1, OT) modified independence  -DT     Time Frame (Transfer Goal 1, OT) 2 weeks  -DT        Row Name 10/27/24 1615          Bathing Goal 1 (OT)    Activity/Device (Bathing Goal 1, OT) bathing skills, all  -DT     [place] Level/Cues Needed (Bathing Goal 1, OT) modified independence  -DT     Time Frame (Bathing Goal 1, OT) 2 weeks  -DT       Row Name 10/27/24 1615          Dressing Goal 1 (OT)    Activity/Device (Dressing Goal 1, OT) dressing skills, all  -DT     [place]/Cues Needed (Dressing Goal 1, OT) modified independence  -DT     Time Frame (Dressing Goal 1, OT) 2 weeks  -DT       Row Name 10/27/24 1615          Toileting Goal 1 (OT)    Activity/Device (Toileting Goal 1, OT) toileting skills, all  -DT     [place] Level/Cues Needed (Toileting Goal 1, OT) modified independence  -DT     Time Frame (Toileting Goal 1, OT) 2 weeks  -DT       Row Name 10/27/24 1618          Therapy Assessment/Plan (OT)    Planned Therapy Interventions (OT) activity tolerance training;BADL retraining;functional balance retraining;neuromuscular control/coordination retraining;occupation/activity based interventions;patient/caregiver education/training;ROM/therapeutic exercise;strengthening exercise;transfer/mobility retraining  -DT               User Key  (r) = Recorded By, (t) = Taken By, (c) = Cosigned By      Initials Name Provider Type    DT Tiffany Sweet, OT Occupational Therapist                   Clinical Impression       Row Name 10/27/24 1610          Pain Assessment    Pretreatment Pain Rating 7/10  -DT     Posttreatment Pain Rating 7/10  -DT     Pain Location back;shoulder  -DT     Pain Side/Orientation bilateral;generalized;other (see comments)  chronic pain  -DT     Pain Management Interventions exercise or physical activity utilized  -DT       Row Name 10/27/24 1610          Plan of Care Review    Plan of Care Reviewed With patient;daughter  -DT     Outcome Evaluation Pt is a 74 y.o. female with a previous medical history of atrial fibrillation s/p cardiac ablation on 10/22/2024, CHF,  CKD, RA who presented [facility] on [facility]24 with complaints of generalized weakness and lethargy. She normally lives in a H that has 1 [address] with her son, but reports she is planning on staying with her daughter after her hospitalization/rehab.Her daughter's home has 3 HERM[address] & her daughter works FT & pt would be home alone during the day. At baseline, she is indep with ADLs, amb without an AD.  She does have a rollator & tub bench at home. She has had a recent fall with c/o left ankle pain. X-ray were negative for a fx, but pt reports it is  at times. Upon eval, pt is A&O X4. She completed bed mobility with SBA using siderails & CGA for ADL transfers & room mobility with CGA. She requires min A for LB ADLs & toileting & SBA with v.c. for thoroughness for grooming tasks.  She presents with generalized weakness & dec activity tolerance especially with standing tasks.  Pt is functioning below her baseline status & would benefit from skilled OT tx. OT will continue to follow for tx & recommends inpt rehab upon discharge.  -DT       Row Name 10/27/24 1610          Therapy Assessment/Plan (OT)    Rehab Potential (OT) good  -DT     Criteria for Skilled Therapeutic Interventions Met (OT) yes;meets criteria;skilled treatment is necessary  -DT     Therapy Frequency (OT) 5 times/wk  -DT     Predicted Duration of Therapy Intervention (OT) until d/c  -DT       Row Name 10/27/24 1610          Therapy Plan Review/Discharge Plan (OT)    Anticipated Discharge Disposition (OT) inpatient rehabilitation facility  -DT       Row Name 10/27/24 1610          Vital Signs    Pre Systolic BP Rehab 113  supine  -DT     Pre Treatment Diastolic BP 61  -DT     Intra Systolic BP Rehab 104  sitting  -DT     Intra Treatment Diastolic BP 70  -DT     Post Systolic BP Rehab 96  standing  -DT     Post Treatment Diastolic BP 65  -DT       Row Name 10/27/24 1610          Positioning and Restraints    Pre-Treatment Position in bed   -DT     Post Treatment Position chair  -DT     In Chair notified nsg;sitting;call light within reach;encouraged to call for assist;with family/caregiver  Pt's daughter stated she is staying with pt at all times. Chair alarm not on.Nursing notified.  -DT               User Key  (r) = Recorded By, (t) = Taken By, (c) = Cosigned By      Initials Name Provider Type    DT Tiffany Sweet, OT Occupational Therapist                   Outcome Measures    No documentation.                   Occupational Therapy Education       Title: PT OT SLP Therapies (Done)       Topic: Occupational Therapy (Done)       Point: ADL training (Done)       Description:   Instruct learner(s) on proper safety adaptation and remediation techniques during self care or transfers.   Instruct in proper use of assistive devices.                  Learning Progress Summary            Patient Acceptance, E,TB, VU by DT at 10/27/2024 1615    Comment: Role of OT, goals & POC;safety prec.   Family Acceptance, E,TB, VU by DT at 10/27/2024 1615    Comment: Role of OT, goals & POC;safety prec.                      Point: Home exercise program (Done)       Description:   Instruct learner(s) on appropriate technique for monitoring, assisting and/or progressing therapeutic exercises/activities.                  Learning Progress Summary            Patient Acceptance, E,TB, VU by DT at 10/27/2024 1615    Comment: Role of OT, goals & POC;safety prec.   Family Acceptance, E,TB, VU by DT at 10/27/2024 1615    Comment: Role of OT, goals & POC;safety prec.                      Point: Precautions (Done)       Description:   Instruct learner(s) on prescribed precautions during self-care and functional transfers.                  Learning Progress Summary            Patient Acceptance, E,TB, VU by DT at 10/27/2024 1615    Comment: Role of OT, goals & POC;safety prec.   Family Acceptance, E,TB, VU by DT at 10/27/2024 1615    Comment: Role of OT, goals &  POC;safety prec.                      Point: Body mechanics (Done)       Description:   Instruct learner(s) on proper positioning and spine alignment during self-care, functional mobility activities and/or exercises.                  Learning Progress Summary            Patient Acceptance, E,TB, VU by DT at 10/27/2024 1615    Comment: Role of OT, goals & POC;safety prec.   Family Acceptance, E,TB, VU by DT at 10/27/2024 1615    Comment: Role of OT, goals & POC;safety prec.                                      User Key       Initials Effective Dates Name Provider Type Discipline    DT 07/11/23 -  Tiffany Sweet, OT Occupational Therapist OT                  OT Recommendation and Plan  Planned Therapy Interventions (OT): activity tolerance training, BADL retraining, functional balance retraining, neuromuscular control/coordination retraining, occupation/activity based interventions, patient/caregiver education/training, ROM/therapeutic exercise, strengthening exercise, transfer/mobility retraining  Therapy Frequency (OT): 5 times/wk  Plan of Care Review  Plan of Care Reviewed With: patient, daughter  Outcome Evaluation: Pt is a 74 y.o. female with a previous medical history of atrial fibrillation s/p cardiac ablation on 10/22/2024, CHF, CKD, RA who presented to [facility] on 10/25/2024 with complaints of generalized weakness and lethargy. She normally lives in a [facility] that has 1 [address] with her son, but reports she is planning on staying with her daughter after her hospitalization/rehab.Her daughter's home has 3 [address] & her daughter works FT & pt would be home alone during the day. At baseline, she is indep with ADLs, amb without an AD.  She does have a rollator & tub bench at home. She has had a recent fall with c/o left ankle pain. X-ray were negative for a fx, but pt reports it is  at times. Upon eval, pt is A&O X4. She completed bed mobility with SBA using siderails & CGA for ADL transfers  & room mobility with CGA. She requires min A for LB ADLs & toileting & SBA with v.c. for thoroughness for grooming tasks.  She presents with generalized weakness & dec activity tolerance especially with standing tasks.  Pt is functioning below her baseline status & would benefit from skilled OT tx. OT will continue to follow for tx & recommends inpt rehab upon discharge.     Time Calculation:         Time Calculation- OT       Row Name 10/27/24 1616             Time Calculation- OT    OT Start Time 0930  -DT      OT Stop Time 1002  -DT      OT Time Calculation (min) 32 min  -DT      OT Received On 10/27/24  -DT      OT - Next Appointment 10/28/24  -DT      OT Goal Re-Cert Due Date 11/10/24  -DT                User Key  (r) = Recorded By, (t) = Taken By, (c) = Cosigned By      Initials Name Provider Type    Tiffany Kauffman, OT Occupational Therapist                           Tiffany Sweet, OT  10/27/2024

## 2024-10-27 NOTE — CONSULTS
Cardiology Consult Note    Patient Identification:  Name: Natalee Noble  Age: 74 y.o.  Sex: female  :  1950  MRN: [id number]             Requesting Physician :  Terrie Dao     Reason for Consultation / Chief Complaint :   Elevated troponin    History of Present Illness:      Natalee Noble is a 74-year-old female with a medical history to include:     Atrial fibrillation  [id number] SCORE   CHA2D  Ablation and watchman 10/22/2024  S2-VASc Score: 6 (10/12/2024  9:08 AM)     Intolerant to flecainide and amiodarone in the past  HFmEF--> resolved to 56-60%  Hypertension  Dyslipidemia  CKD stage III  History of CVA  Anxiety/depression  GERD  Diverticulosis  Stroke    Presented to [facility] 10/25/2024 with a fall.  Patient says she has fatigue and weakness and got progressively worse she was in the bathroom could not get off the toilet and ended up on the floor for 2 hours.  Patient denies any chest pain or shortness of breath.    Patient has history of recurrent syncopal episodes was in the hospital 10/11/2024     Data:   Labs 10/27/2024 reveal elevated HS troponin of 1146, came down to 359.  proBNP is 2832.  Creatinine of 1.08, EGFR 54.  TSH low at 0.022        Assessment:  :     Fall  Recurrent syncope  Elevated troponin consistent with acute non-ST elevation myocardial infarction due to plaque rupture  Chronic HFrEF due to systolic dysfunction from dilated cardiomyopathy  Paroxysmal atrial fibrillation on long-term anticoagulation/Xarelto  Dyslipidemia  CKD  History of CVA        Recommendations / Plan:       Patient presented with a fall and possible syncope.  Patient has recurrent syncope since age 12 and migraine headaches.  Recently was in [place] and was found to have EF of 35-40 was put on guideline directed medical therapy, developed low blood pressure in the left ear.  Patient's previous admission troponin elevated at 68.     Patient says she has recurrent syncope since age  12 with migraine headaches.  Recently was diagnosed with heart failure due to reduced ejection fraction LVEF of 35-40%, was put on guideline directed medical therapy with with Entresto, Aldactone, digoxin, metoprolol succinate, Farxiga.  Patient's blood pressure was low and was having near syncope and dizziness.  Also has been having nausea.     Patient is having recurrent falls and syncope.  Has history of A-fib and elevated [id number] score of 6.  Will benefit from long-term anticoagulation but due to her falls she is a poor candidate for long-term anticoagulation.  Patient has frequent falls therefore underwent ablation and watchman implantation 10/12/2024.  Will discontinue Xarelto started on low-dose heparin protocol per ACS scale.  Will wait 48 hours and schedule cardiac cath, risk benefits alternatives explained.  Patient had watchman done recently still on Xarelto.  Will talk to EP   Discussed with her annual visit habits                    Diagnosis Plan   1. General weakness                   Past Medical History:  Past Medical History:   Diagnosis Date    Anxiety and depression     Arthritis     Back pain     CRF (chronic renal failure), stage 3 (moderate)     Diverticulosis     Essential hypertension     Family history of colon cancer     Family history of colonic polyps     Fractures     R tibia/fibula; L ankle    GERD (gastroesophageal reflux disease)     History of adenomatous polyp of colon     History of cardiac cath     1/28/19 left main no significant disease. LAD with no significant disease. LCX no significant disease. RCA no significant disease.    History of cardiac monitoring     2/01/19 - ZIO PATCH - Underlying rhythm is sinus at 48-94 bpm. PSVT x23 with fastest 167 bpm, longest 24.4 seconds.   10/05/2018- ZIO PATCH - NSR most of time but did have episode of A fib ranging from .    History of colon polyps     History of echocardiogram     9/2018: Left ventricular systolic  function is normal. EF 50%. Left ventricular diastolic dysfunction consistent with ipaired relaxation. Left atrial cavity size is mildly dilated. Mild MR. Mild to moderate TR. There is abnormal thickening noted on the RV that may represent a vegetation-this was discussed with Dr. Hammer and Dr. Reilly. No patent foramen ovale. No further work up needed at this time    Hyperlipidemia     Kidney disorder     Migraine     Migraines     Neck pain     Stroke 2018     Past Surgical History:  Past Surgical History:   Procedure Laterality Date    ABDOMINOPLASTY      ATRIAL APPENDAGE EXCLUSION LEFT WITH TRANSESOPHAGEAL ECHOCARDIOGRAM N/A 10/22/2024    Procedure: Atrial Appendage Occlusion;  Surgeon: Sourav Pompa MD;  Location: [facility] CATH INVASIVE LOCATION;  Service: Cardiovascular;  Laterality: N/A;    BACK SURGERY      L2-5 fusion 2012    CARDIAC ELECTROPHYSIOLOGY PROCEDURE Right 10/22/2024    Procedure: Ablation atrial fibrillation;  Surgeon: Sourav Pompa MD;  Location:  [facility] CATH INVASIVE LOCATION;  Service: Cardiovascular;  Laterality: Right;    CHOLECYSTECTOMY      COLONOSCOPY  11/24/2014    Diverticulosis; Repeat 5 years    COLONOSCOPY  11/09/2010    Diverticulosis; Repeat 4 years    COLONOSCOPY  09/18/2007    Dr. Monzon-Normal; Repeat 3 years    COLONOSCOPY  06/02/2005    Dr. Monzon-Diminuitive polypectomy above the cecum; Otherwise normal colonoscopy; Repeat 2 years    COLONOSCOPY N/A 11/01/2019    Diverticulosis in the left colon; Two 5-6mm polyps in the cecum-path shows one tubular adenomatous and on hyperplastic polyp; One 8mm tubular adenomatous polyp in the ascending colon; Two 4-5mm tubular adenomatous polyps in the transverse colon; Repeat 3 years    COLONOSCOPY N/A 6/12/2023    Procedure: COLONOSCOPY WITH ANESTHESIA;  Surgeon: Susan Lord MD;  Location:  [facility];  Service: Gastroenterology;  Laterality: N/A;  preop hx of polyps   postop; polyps   PCP Woody Valentin MD     CYSTOCELE REPAIR      Cystocele and rectocele repair    ENDOSCOPY  09/28/2012    LA grade C esophagitis; HH    ENDOSCOPY N/A 11/01/2019    Small HH; Non-erosive gastritis-biopsied; Normal examined duodenum    ENDOSCOPY N/A 10/13/2024    Procedure: ESOPHAGOGASTRODUODENOSCOPY WITH BIOPSY X 1 AREA;  Surgeon: Miguel Vega MD;  Location: [facility] ENDOSCOPY;  Service: Gastroenterology;  Laterality: N/A;  Hiatal hernia, esophagitis, gastritis    FIBULA FRACTURE SURGERY      HYSTERECTOMY      NECK SURGERY  2012    ACDF C4-7    ORIF TIBIA FRACTURE Right       Allergies:  Allergies   Allergen Reactions    Benzoin Anaphylaxis and Swelling     States when used on her neck it shut off her airway  Huge abscesses with surgery      Iodine Other (See Comments)     PT UNABLE TO TELL RN ABOUT REACTION AT THIS TIME, BUT FAMILY STATES PT HAD A REACTION TO BEING CLEANSED WITH IODINE IN SURGERY  IOBAN - used on neck, swelled to the point of shutting off airway    Vancomycin Other (See Comments)     Kidney failure  Vancomycin induced acute kidney injury      Amiodarone Other (See Comments)     Tremor- hand and face     Levaquin [Levofloxacin] Unknown (See Comments)     Unknown    Sulfa Antibiotics      Home Meds:  Medications Prior to Admission   Medication Sig Dispense Refill Last Dose/Taking    ALPRAZolam (XANAX) 1 MG tablet Take 1 tablet by mouth 3 (Three) Times a Day As Needed for Anxiety.   10/24/2024    aspirin 81 MG tablet Take 1 tablet by mouth Daily. 30 tablet 1 10/24/2024    atorvastatin (LIPITOR) 80 MG tablet Take 1 tablet by mouth Daily.   10/24/2024    Cholecalciferol 25 MCG (1000 UT) tablet Take 1 tablet by mouth Daily.   10/24/2024    escitalopram (LEXAPRO) 20 MG tablet Take 1 tablet by mouth Daily.   10/24/2024    dapagliflozin Propanediol 10 MG tablet Take 10 mg by mouth Daily.   Unknown    Denosumab (PROLIA SC) Inject  under the skin into the appropriate area as directed. Every 6 months   Unknown    ezetimibe  (ZETIA) 10 MG tablet Take 1 tablet by mouth Daily. 30 tablet 5 10/24/2024 Morning    FOLIC ACID PO Take  by mouth Daily.   10/24/2024    guaiFENesin-dextromethorphan (ROBITUSSIN DM) 100-10 MG/5ML syrup Take 10 mL by mouth Every 4 (Four) Hours As Needed for Cough.   Unknown    hydroxychloroquine (PLAQUENIL) 200 MG tablet Take 1 tablet by mouth 2 (Two) Times a Day.   10/24/2024    levothyroxine (SYNTHROID, LEVOTHROID) 112 MCG tablet Take 1 tablet by mouth Daily.   10/24/2024    Lifitegrast (XIIDRA) 5 % ophthalmic solution Administer 1 drop to both eyes 2 (Two) Times a Day.   Unknown    liothyronine (CYTOMEL) 5 MCG tablet Take 1 tablet by mouth 2 (Two) Times a Day.   10/24/2024    memantine (NAMENDA) 10 MG tablet Take 1 tablet by mouth 2 (Two) Times a Day.   10/24/2024    metoprolol succinate XL (TOPROL-XL) 25 MG 24 hr tablet Take 1 tablet by mouth 2 (Two) Times a Day.   10/24/2024    pantoprazole (PROTONIX) 40 MG EC tablet Take 1 tablet by mouth Daily As Needed.   10/24/2024    rivaroxaban (XARELTO) 20 MG tablet Take 1 tablet by mouth Daily. 90 tablet 3 10/24/2024    sodium bicarbonate 650 MG tablet Take 1 tablet by mouth 2 (Two) Times a Day.   10/24/2024    topiramate (TOPAMAX) 200 MG tablet Take 1 tablet by mouth Daily.        Current Meds:     Current Facility-Administered Medications:     acetaminophen (TYLENOL) tablet 650 mg, 650 mg, Oral, Q4H PRN, 650 mg at 10/27/24 0818 **OR** acetaminophen (TYLENOL) 160 MG/5ML oral solution 650 mg, 650 mg, Oral, Q4H PRN **OR** acetaminophen (TYLENOL) suppository 650 mg, 650 mg, Rectal, Q4H PRN, Denise Leone APRN    ALPRAZolam (XANAX) tablet 1 mg, 1 mg, Oral, TID PRN, Terrie Dao MD, 1 mg at 10/27/24 0638    aspirin EC tablet 81 mg, 81 mg, Oral, Daily, Terrie Dao MD, 81 mg at 10/27/24 0812    atorvastatin (LIPITOR) tablet 80 mg, 80 mg, Oral, Daily, Terrie Dao MD, 80 mg at 10/27/24 0812    sennosides-docusate (PERICOLACE) 8.6-50 MG per tablet 2  tablet, 2 tablet, Oral, BID, 2 tablet at 10/27/24 0812 **AND** polyethylene glycol (MIRALAX) packet 17 g, 17 g, Oral, Daily PRN **AND** bisacodyl (DULCOLAX) EC tablet 5 mg, 5 mg, Oral, Daily PRN **AND** bisacodyl (DULCOLAX) suppository 10 mg, 10 mg, Rectal, Daily PRN, Denise Leone, APRN    calcium carbonate (TUMS) chewable tablet 500 mg (200 mg elemental), 2 tablet, Oral, BID PRN, Denise Leone, GREGORIA    cefTRIAXone (ROCEPHIN) 2,000 mg in sodium chloride 0.9 % 100 mL MBP, 2,000 mg, Intravenous, Q24H, Terrie Dao MD, Last Rate: 200 mL/hr at 10/26/24 2118, 2,000 mg at 10/26/24 2118    cholecalciferol (VITAMIN D3) tablet 1,000 Units, 1,000 Units, Oral, Daily, Terrie Dao MD, 1,000 Units at 10/27/24 0812    empagliflozin (JARDIANCE) tablet 25 mg, 25 mg, Oral, Daily, Terrie Dao MD    escitalopram (LEXAPRO) tablet 20 mg, 20 mg, Oral, Daily, Terrie Dao MD, 20 mg at 10/27/24 0812    heparin 72725 units/250 mL (100 units/mL) in 0.45 % NaCl infusion, 12 Units/kg/hr, Intravenous, Titrated, Josemanuel Sheriff MD    heparin bolus from bag solution 2,400 Units, 30 Units/kg, Intravenous, Q6H PRN, Josemanuel Sheriff MD    heparin bolus from bag solution 4,700 Units, 60 Units/kg, Intravenous, Once, Josemanuel Sheriff MD    heparin bolus from bag solution 4,700 Units, 60 Units/kg, Intravenous, Q6H PRN, Josemanuel Sheriff MD    hydroxychloroquine (PLAQUENIL) tablet 200 mg, 200 mg, Oral, BID, Terrie Dao MD, 200 mg at 10/27/24 0812    influenza vac split high-dose (FLUZONE HIGH DOSE) injection 0.5 mL, 0.5 mL, Intramuscular, During Hospitalization, Llanes Alvarez, Carlos, MD    levothyroxine (SYNTHROID, LEVOTHROID) tablet 112 mcg, 112 mcg, Oral, Daily, Terrie Dao MD, 112 mcg at 10/27/24 0812    liothyronine (CYTOMEL) tablet 5 mcg, 5 mcg, Oral, BID, Terrie Dao MD, 5 mcg at 10/27/24 0811    melatonin tablet 5 mg, 5 mg, Oral, Nightly PRN,  Denise Leone APRN, 5 mg at 10/26/24 2216    memantine (NAMENDA) tablet 10 mg, 10 mg, Oral, Daily, Terrie Dao MD, 10 mg at 10/27/24 0811    metoprolol succinate XL (TOPROL-XL) 24 hr tablet 25 mg, 25 mg, Oral, BID, Terrie Dao MD, 25 mg at 10/27/24 0812    nitroglycerin (NITROSTAT) SL tablet 0.4 mg, 0.4 mg, Sublingual, Q5 Min PRN, Denise Leone APRN    ondansetron (ZOFRAN) injection 4 mg, 4 mg, Intravenous, Q6H PRN, Denise Leone APRN, 4 mg at 10/26/24 2137    pantoprazole (PROTONIX) EC tablet 40 mg, 40 mg, Oral, Q AM, Terrie Dao MD, 40 mg at 10/27/24 0638    sodium bicarbonate tablet 650 mg, 650 mg, Oral, BID, Terrie Dao MD, 650 mg at 10/27/24 0811    sodium chloride 0.9 % flush 10 mL, 10 mL, Intravenous, PRN, Tone Hidalgo MD    sodium chloride 0.9 % flush 10 mL, 10 mL, Intravenous, Q12H, Denise Leone APRN, 10 mL at 10/26/24 2119    sodium chloride 0.9 % flush 10 mL, 10 mL, Intravenous, PRN, Denise Leone APRN    topiramate (TOPAMAX) tablet 200 mg, 200 mg, Oral, Daily, Terrie Dao MD, 200 mg at 10/27/24 0812    traMADol (ULTRAM) tablet 50 mg, 50 mg, Oral, Q8H PRN, Terrie Dao MD, 50 mg at 10/27/24 0638  Social History:   Social History     Tobacco Use    Smoking status: Never    Smokeless tobacco: Never   Substance Use Topics    Alcohol use: No      Family History:  Family History   Problem Relation Age of Onset    Colon cancer Father 65    Colon polyps Daughter 38    Esophageal cancer Neg Hx     Liver cancer Neg Hx     Liver disease Neg Hx     Rectal cancer Neg Hx     Stomach cancer Neg Hx         Review of Systems : Review of Systems   All other systems reviewed and are negative.                Constitutional:  Temp:  [97.7 °F (36.5 °C)-98.7 °F (37.1 °C)] 98.2 °F (36.8 °C)  Heart Rate:  [61-73] 61  Resp:  [13-20] 16  BP: ()/(53-77) 98/53    Physical Exam   BP 98/53   Pulse 61   Temp 98.2 °F (36.8 °C) (Oral)   Resp 16   Ht  "165.1 cm (65\")   Wt 78.5 kg (173 lb)   SpO2 90%   BMI 28.79 kg/m²   Physical Exam  General:  Appears in no acute distress  Eyes: Sclerae are anicteric,  conjunctivae are clear   HEENT:  No JVD. Thyroid not visibly enlarged. No mucosal pallor or cyanosis  Respiratory: Respirations regular and unlabored at rest.  Bilaterally good breath sounds with good air entry in all fields. No crackles, rubs or wheezes auscultated  Cardiovascular: S1,S2 Regular rate and rhythm.   Gastrointestinal: Abdomen soft, flat, nontender. Bowel sounds present.   Musculoskeletal:  No abnormal movements  Extremities: No digital clubbing or cyanosis  Skin: Color pink. Skin warm and dry to touch. No rashes  No xanthoma  Neuro: Alert and awake, no lateralizing deficits appreciated    Cardiographics  ECG: EKG tracing was  personally reviewed/interpreted by me  ECG 12 Lead ED Triage Standing Order; Chest Pain   Final Result   HEART RATE=64  bpm   RR Rprtgowc=091  ms   OK Interval=62  ms   P Horizontal Axis=-10  deg   P Front Axis=Invalid  deg   QRSD Interval=87  ms   QT Riypoddx=612  ms   HTfH=330  ms   QRS Axis=69  deg   T Wave Axis=53  deg   - NORMAL ECG -   Sinus rhythm   When compared with ECG of 23-Oct-2024 03:26:00,   Nonspecific significant change   Electronically Signed By: Harry Ortega ([facility]) 2024-10-26 08:09:03   Date and Time of Study:2024-10-25 14:36:42      Telemetry Scan   Final Result      Telemetry Scan   Final Result      Telemetry Scan   Final Result      Telemetry Scan   Final Result      Telemetry Scan   Final Result      Telemetry Scan   Final Result      Telemetry Scan   Final Result          Telemetry: Sinus rhythm    Echocardiogram:   Results for orders placed during the hospital encounter of 10/22/24    Intra-Op Structural Heart MELISSA (Cardiology Read)    Interpretation Summary  MELISSA   procedure note    Procedure:  MELISSA    Indication:   A-fib, watchman implantation    Date of procedure  : 10/22/2024    :  Dr." Josemanuel Sheriff    Description of procedure:    Under conscious sedation given by anesthesiology and local anesthesia, a MELISSA probe was advanced into the esophagus and into the stomach 2D, color images were obtained, after a timeout was performed.  Patient underwent watchman implantation with septal puncture done with assistance of MELISSA.  After implantation of Watchman measurements were obtained to look at compression of left atrial appendage occluder device.  Post watchman implantation he was made sure there was no leak on color or clots of the device and device had good compression.  Then repeat sweep of MELISSA was done to make sure there was no pericardial effusion.  Patient tolerated procedure well complications were none.    Complications: none    Results:    Left atrial appendage was chicken wing morphology.  Left atrial appendage closure device watchman was seated well.  There was good compression over 15%.  There was no leak around the watchman seen.  No thrombus seen on watchman.  No significant pericardial effusion seen.  Normal LV size and contractility EF of over 55%      Recommendations/plan:    Clinical correlation recommended      Imaging  Chest X-ray:   Imaging Results (Last 24 Hours)       ** No results found for the last 24 hours. **            Lab Review: I have reviewed the labs  Results from last 7 days   Lab Units 10/26/24  1224 10/25/24  1911 10/25/24  1710   CK TOTAL U/L  --   --  68   HSTROP T ng/L 359* 1,146* 1,352*         Results from last 7 days   Lab Units 10/27/24  0451   SODIUM mmol/L 140   POTASSIUM mmol/L 4.0   BUN mg/dL 12   CREATININE mg/dL 1.08*   CALCIUM mg/dL 8.4*             Results from last 7 days   Lab Units 10/27/24  0451 10/26/24  0448 10/25/24  1710   WBC 10*3/mm3 4.22 4.12 6.99   HEMOGLOBIN g/dL 8.1* 8.2* 8.9*   HEMATOCRIT % 27.4* 27.5* 30.8*   PLATELETS 10*3/mm3 154 152 166                 Josemanuel Sheriff MD  10/27/2024, 12:25 EDT      EMR Dragon/Transcription:    Dictated utilizing Dragon dictation

## 2024-10-27 NOTE — PLAN OF CARE
Goal Outcome Evaluation:  Plan of Care Reviewed With: patient, daughter           Outcome Evaluation: Pt is a 74 y.o. female with a previous medical history of atrial fibrillation s/p cardiac ablation on 10/22/2024, CHF, CKD, RA who presented to [facility] on 10/25/2024 with complaints of generalized weakness and lethargy. She normally lives in a [facility] that has [address] with her son, but reports she is planning on staying with her daughter after her hospitalization/rehab.Her daughter's home has [address] & her daughter works FT & pt would be home alone during the day. At baseline, she is indep with ADLs, amb without an AD.  She does have a rollator & tub bench at home. She has had a recent fall with c/o left ankle pain. X-ray were negative for a fx, but pt reports it is  at times. Upon eval, pt is A&O X4. She completed bed mobility with SBA using siderails & CGA for ADL transfers & room mobility with CGA. She requires min A for LB ADLs & toileting & SBA with v.c. for thoroughness for grooming tasks.  She presents with generalized weakness & dec activity tolerance especially with standing tasks.  Pt is functioning below her baseline status & would benefit from skilled OT tx. OT will continue to follow for tx & recommends inpt rehab upon discharge.    Anticipated Discharge Disposition (OT): inpatient rehabilitation facility

## 2024-10-28 PROBLEM — I21.4 NON-STEMI (NON-ST ELEVATED MYOCARDIAL INFARCTION): Status: ACTIVE | Noted: 2024-10-25

## 2024-10-28 LAB
APTT PPP: 41.5 SECONDS (ref 61–76.5)
APTT PPP: >139 SECONDS (ref 61–76.5)
DEPRECATED RDW RBC AUTO: 58.9 FL (ref 37–54)
ERYTHROCYTE [DISTWIDTH] IN BLOOD BY AUTOMATED COUNT: 17.1 % (ref 12.3–15.4)
FOLATE SERPL-MCNC: 14.9 NG/ML (ref 4.78–24.2)
GLUCOSE BLDC GLUCOMTR-MCNC: 102 MG/DL (ref 70–105)
HCT VFR BLD AUTO: 29.9 % (ref 34–46.6)
HGB BLD-MCNC: 8.7 G/DL (ref 12–15.9)
MCH RBC QN AUTO: 27.8 PG (ref 26.6–33)
MCHC RBC AUTO-ENTMCNC: 29.1 G/DL (ref 31.5–35.7)
MCV RBC AUTO: 95.5 FL (ref 79–97)
PLATELET # BLD AUTO: 181 10*3/MM3 (ref 140–450)
PMV BLD AUTO: 10.2 FL (ref 6–12)
RBC # BLD AUTO: 3.13 10*6/MM3 (ref 3.77–5.28)
T3FREE SERPL-MCNC: 3.07 PG/ML (ref 2–4.4)
T4 FREE SERPL-MCNC: 2.61 NG/DL (ref 0.93–1.7)
WBC NRBC COR # BLD AUTO: 6.42 10*3/MM3 (ref 3.4–10.8)

## 2024-10-28 PROCEDURE — 84439 ASSAY OF FREE THYROXINE: CPT | Performed by: INTERNAL MEDICINE

## 2024-10-28 PROCEDURE — 85730 THROMBOPLASTIN TIME PARTIAL: CPT | Performed by: INTERNAL MEDICINE

## 2024-10-28 PROCEDURE — 25810000003 SODIUM CHLORIDE 0.9 % SOLUTION: Performed by: INTERNAL MEDICINE

## 2024-10-28 PROCEDURE — 82948 REAGENT STRIP/BLOOD GLUCOSE: CPT

## 2024-10-28 PROCEDURE — 97116 GAIT TRAINING THERAPY: CPT

## 2024-10-28 PROCEDURE — 05HY33Z INSERTION OF INFUSION DEVICE INTO UPPER VEIN, PERCUTANEOUS APPROACH: ICD-10-PCS | Performed by: INTERNAL MEDICINE

## 2024-10-28 PROCEDURE — 36410 VNPNXR 3YR/> PHY/QHP DX/THER: CPT

## 2024-10-28 PROCEDURE — 84481 FREE ASSAY (FT-3): CPT | Performed by: INTERNAL MEDICINE

## 2024-10-28 PROCEDURE — 99232 SBSQ HOSP IP/OBS MODERATE 35: CPT | Performed by: INTERNAL MEDICINE

## 2024-10-28 PROCEDURE — 25010000002 HEPARIN (PORCINE) 25000-0.45 UT/250ML-% SOLUTION: Performed by: INTERNAL MEDICINE

## 2024-10-28 PROCEDURE — 82746 ASSAY OF FOLIC ACID SERUM: CPT | Performed by: INTERNAL MEDICINE

## 2024-10-28 PROCEDURE — 25010000002 NA FERRIC GLUC CPLX PER 12.5 MG: Performed by: INTERNAL MEDICINE

## 2024-10-28 PROCEDURE — 97530 THERAPEUTIC ACTIVITIES: CPT

## 2024-10-28 PROCEDURE — 97112 NEUROMUSCULAR REEDUCATION: CPT

## 2024-10-28 PROCEDURE — C1751 CATH, INF, PER/CENT/MIDLINE: HCPCS

## 2024-10-28 PROCEDURE — 85027 COMPLETE CBC AUTOMATED: CPT

## 2024-10-28 RX ORDER — SODIUM CHLORIDE 0.9 % (FLUSH) 0.9 %
10 SYRINGE (ML) INJECTION EVERY 12 HOURS SCHEDULED
Status: DISCONTINUED | OUTPATIENT
Start: 2024-10-28 | End: 2024-10-31 | Stop reason: HOSPADM

## 2024-10-28 RX ORDER — SODIUM CHLORIDE 0.9 % (FLUSH) 0.9 %
10 SYRINGE (ML) INJECTION AS NEEDED
Status: DISCONTINUED | OUTPATIENT
Start: 2024-10-28 | End: 2024-10-31 | Stop reason: HOSPADM

## 2024-10-28 RX ORDER — LEVOTHYROXINE SODIUM 100 UG/1
100 TABLET ORAL
Status: DISCONTINUED | OUTPATIENT
Start: 2024-10-29 | End: 2024-10-31 | Stop reason: HOSPADM

## 2024-10-28 RX ADMIN — TRAMADOL HYDROCHLORIDE 50 MG: 50 TABLET ORAL at 06:24

## 2024-10-28 RX ADMIN — HYDROXYCHLOROQUINE SULFATE 200 MG: 200 TABLET ORAL at 08:13

## 2024-10-28 RX ADMIN — SENNOSIDES AND DOCUSATE SODIUM 2 TABLET: 50; 8.6 TABLET ORAL at 21:13

## 2024-10-28 RX ADMIN — ASPIRIN 81 MG: 81 TABLET, COATED ORAL at 08:14

## 2024-10-28 RX ADMIN — SODIUM CHLORIDE 250 MG: 9 INJECTION, SOLUTION INTRAVENOUS at 10:00

## 2024-10-28 RX ADMIN — TRAMADOL HYDROCHLORIDE 50 MG: 50 TABLET ORAL at 21:13

## 2024-10-28 RX ADMIN — Medication 10 ML: at 21:14

## 2024-10-28 RX ADMIN — METOPROLOL SUCCINATE 25 MG: 25 TABLET, FILM COATED, EXTENDED RELEASE ORAL at 08:11

## 2024-10-28 RX ADMIN — ACETAMINOPHEN 650 MG: 325 TABLET, FILM COATED ORAL at 02:28

## 2024-10-28 RX ADMIN — Medication 1000 UNITS: at 08:14

## 2024-10-28 RX ADMIN — HEPARIN SODIUM 12 UNITS/KG/HR: 10000 INJECTION, SOLUTION INTRAVENOUS at 08:16

## 2024-10-28 RX ADMIN — ACETAMINOPHEN 650 MG: 325 TABLET, FILM COATED ORAL at 08:09

## 2024-10-28 RX ADMIN — TOPIRAMATE 200 MG: 100 TABLET, FILM COATED ORAL at 08:13

## 2024-10-28 RX ADMIN — SENNOSIDES AND DOCUSATE SODIUM 2 TABLET: 50; 8.6 TABLET ORAL at 08:12

## 2024-10-28 RX ADMIN — SODIUM BICARBONATE 650 MG: 650 TABLET ORAL at 08:13

## 2024-10-28 RX ADMIN — Medication 10 ML: at 08:46

## 2024-10-28 RX ADMIN — ESCITALOPRAM OXALATE 20 MG: 10 TABLET ORAL at 08:09

## 2024-10-28 RX ADMIN — HYDROXYCHLOROQUINE SULFATE 200 MG: 200 TABLET ORAL at 21:14

## 2024-10-28 RX ADMIN — PANTOPRAZOLE SODIUM 40 MG: 40 TABLET, DELAYED RELEASE ORAL at 05:38

## 2024-10-28 RX ADMIN — SODIUM BICARBONATE 650 MG: 650 TABLET ORAL at 21:13

## 2024-10-28 RX ADMIN — MEMANTINE 10 MG: 10 TABLET ORAL at 08:12

## 2024-10-28 RX ADMIN — ATORVASTATIN CALCIUM 80 MG: 40 TABLET, FILM COATED ORAL at 08:11

## 2024-10-28 RX ADMIN — SODIUM CHLORIDE 500 ML: 9 INJECTION, SOLUTION INTRAVENOUS at 15:44

## 2024-10-28 RX ADMIN — LEVOTHYROXINE SODIUM 112 MCG: 0.11 TABLET ORAL at 08:13

## 2024-10-28 RX ADMIN — METOPROLOL SUCCINATE 25 MG: 25 TABLET, FILM COATED, EXTENDED RELEASE ORAL at 21:13

## 2024-10-28 RX ADMIN — FLUCONAZOLE 200 MG: 100 TABLET ORAL at 18:09

## 2024-10-28 RX ADMIN — EMPAGLIFLOZIN 25 MG: 25 TABLET, FILM COATED ORAL at 08:12

## 2024-10-28 RX ADMIN — ALPRAZOLAM 1 MG: 1 TABLET ORAL at 21:13

## 2024-10-28 NOTE — DISCHARGE PLACEMENT REQUEST
"Natalee Varma \"Margaret\" (74 y.o. Female)       Date of Birth   1950    Social Security Number       Address   [facility] [place] [address]    Home Phone   [phone]    MRN   [id number]       [facility]    Marital Status                               Admission Date   10/25/24    Admission Type   Emergency    Admitting Provider   Terrie Dao MD    Attending Provider   Terrie Dao MD    Department, Room/Bed   [facility] CARDIOVASCULAR CARE UNIT, 3123/1       Discharge Date       Discharge Disposition       Discharge Destination                                 Attending Provider: Terrie Dao MD    Allergies: Benzoin, Iodine, Vancomycin, Amiodarone, Levaquin [Levofloxacin], Sulfa Antibiotics    Isolation: None   Infection: None   Code Status: CPR    Ht: 165.1 cm (65\")   Wt: 78.5 kg (173 lb)    Admission Cmt: None   Principal Problem: Generalized weakness [R53.1]                   Active Insurance as of 10/25/2024       Primary Coverage       Payor Plan Insurance Group Employer/Plan Group    MEDICARE MEDICARE A & B        Payor Plan Address Payor Plan Phone Number Payor Plan Fax Number Effective Dates    [address] [phone]  9/1/2004 - None Entered    [facility] [id number]         Subscriber Name Subscriber Birth Date Member ID       NATALEE VARMA 1950 [id number]               Secondary Coverage       Payor Plan Insurance Group Employer/Plan Group    [employer] [place] [employer] [place]        Payor Plan Address Payor Plan Phone Number Payor Plan Fax Number Effective Dates    [address] [place] [address] [phone]  6/1/2015 - None Entered    [place] [address]         Subscriber Name Subscriber Birth Date Member ID       NATALEE VARMA 1950 [id number]                     Emergency Contacts        (Rel.) Home Phone Work Phone Mobile Phone    Gaurang Elizondo (Daughter) [phone] -- --                 History & " Physical        Denise Leone APRN at 10/25/24 1925       Attestation signed by Llanes Alvarez, Carlos, MD at 10/26/24 0405    I have reviewed this documentation and agree.                      [facility] Medicine Services  History & Physical    Patient Name: Natalee Noble  : 1950  MRN: [id number]  Primary Care Physician:  Woody Valentin MD  Date of admission: 10/25/2024  Date and Time of Service: 10/25/2024 at 2100      Assessment & Plan      Chief Complaint: generalized weakness, dysuria, frequency, lethargy    Plan:    UTI  -Dysuria, frequency  -UA reviewed, 1+ leukocytes, 6-10 WBCs, trace bacteria, 1+ yeast  -Urine culture pending  -Blood cultures ordered  -Rocephin started    Generalized Weakness  -Likely multifactorial secondary to UTI and recent hospitalizations with cardiac ablation  -CT of the head showed no acute abnormality  -PT/OT consult  -Fall Precautions  -Case management consult for discharge planning, patient's family is hoping she can return to [facility], where her daughter works as a physical therapist    Elevated troponin  -Expected due to the ablation, no complaints of chest pain, shortness of breath, palpitations  -Troponin 1352, 1146  -S/p cardiac ablation on 10/22/2024  -EKG reviewed, SR, HR 64  -Continuous cardiac monitoring    Home medications will be restarted as appropriate when verified by pharmacy    History of Present Illness     History of Present Illness: Natalee Noble is a 74 y.o. female with a previous medical history of atrial fibrillation s/p cardiac ablation on 10/22/2024, CHF, CKD, RA who presented to [facility] on 10/25/2024 with complaints of generalized weakness and lethargy.  On exam, the patient is lethargic but will wake up and answer some questions.  Much of the HPI was taken from her daughter at bedside.  Her daughter reports that the patient began having frequency and dysuria on  while she was still at [facility] rehab.   She transferred to [facility] on Tuesday to undergo a planned cardiac ablation with Dr. Pompa and was discharged home on Wednesday morning.  She reports that she was okay but very tired yesterday.  Today, she woke up and had to use the bathroom but was unable to stand back up so she slid herself to the floor before crawling to the bedroom to call her daughter.    In the ED, initial troponin was 1352, repeat 1146.  Creatinine is 1.19 (baseline 0.96).  Otherwise, labs are unremarkable.  Her UA shows 1+ leukocytes, 6-10 WBCs, trace bacteria, 1+ yeast.  CT of her head showed no acute abnormality.  Chest x-ray also showed no acute process.  EKG was reviewed and showed SR, HR 64.  Hospitalist was consulted for further management.    12 point ROS reviewed and negative except as mentioned above    Objective      Vitals:   Temp:  [98.6 °F (37 °C)] 98.6 °F (37 °C)  Heart Rate:  [62-67] 62  Resp:  [18] 18  BP: (139-149)/(69-73) 149/73  Body mass index is 28.79 kg/m².    Physical Exam  Vitals and nursing note reviewed.   Constitutional:       Appearance: Normal appearance.   HENT:      Mouth/Throat:      Mouth: Mucous membranes are moist.   Cardiovascular:      Rate and Rhythm: Normal rate and regular rhythm.   Pulmonary:      Effort: Pulmonary effort is normal.      Breath sounds: Normal breath sounds.   Abdominal:      General: Bowel sounds are normal.      Palpations: Abdomen is soft.   Musculoskeletal:         General: Normal range of motion.   Skin:     General: Skin is warm and dry.   Neurological:      General: No focal deficit present.      Mental Status: She is oriented to person, place, and time. Mental status is at baseline. She is lethargic.      GCS: GCS eye subscore is 3. GCS verbal subscore is 5. GCS motor subscore is 6.      Motor: Weakness (generalized) present.   Psychiatric:         Mood and Affect: Mood normal.         Behavior: Behavior normal.        Personal History     This is a 74 y.o.  female with:    Past Medical History:   Diagnosis Date    Anxiety and depression     Arthritis     Back pain     CRF (chronic renal failure), stage 3 (moderate)     Diverticulosis     Essential hypertension     Family history of colon cancer     Family history of colonic polyps     Fractures     R tibia/fibula; L ankle    GERD (gastroesophageal reflux disease)     History of adenomatous polyp of colon     History of cardiac cath     1/28/19 left main no significant disease. LAD with no significant disease. LCX no significant disease. RCA no significant disease.    History of cardiac monitoring     2/01/19 - ZIO PATCH - Underlying rhythm is sinus at 48-94 bpm. PSVT x23 with fastest 167 bpm, longest 24.4 seconds.   10/05/2018- ZIO PATCH - NSR most of time but did have episode of A fib ranging from .    History of colon polyps     History of echocardiogram     9/2018: Left ventricular systolic function is normal. EF 50%. Left ventricular diastolic dysfunction consistent with ipaired relaxation. Left atrial cavity size is mildly dilated. Mild MR. Mild to moderate TR. There is abnormal thickening noted on the RV that may represent a vegetation-this was discussed with Dr. Hammer and Dr. Reilly. No patent foramen ovale. No further work up needed at this time    Hyperlipidemia     Kidney disorder     Migraine     Migraines     Neck pain     Stroke 2018       Past Surgical History:   Procedure Laterality Date    ABDOMINOPLASTY      ATRIAL APPENDAGE EXCLUSION LEFT WITH TRANSESOPHAGEAL ECHOCARDIOGRAM N/A 10/22/2024    Procedure: Atrial Appendage Occlusion;  Surgeon: Sourav Pompa MD;  Location: [facility] CATH INVASIVE LOCATION;  Service: Cardiovascular;  Laterality: N/A;    BACK SURGERY      L2-5 fusion 2012    CARDIAC ELECTROPHYSIOLOGY PROCEDURE Right 10/22/2024    Procedure: Ablation atrial fibrillation;  Surgeon: Sourav Pompa MD;  Location: [facility] CATH INVASIVE LOCATION;  Service: Cardiovascular;  Laterality:  Right;    CHOLECYSTECTOMY      COLONOSCOPY  11/24/2014    Diverticulosis; Repeat 5 years    COLONOSCOPY  11/09/2010    Diverticulosis; Repeat 4 years    COLONOSCOPY  09/18/2007    Dr. Monzon-Normal; Repeat 3 years    COLONOSCOPY  06/02/2005    Dr. Monzon-Diminuitive polypectomy above the cecum; Otherwise normal colonoscopy; Repeat 2 years    COLONOSCOPY N/A 11/01/2019    Diverticulosis in the left colon; Two 5-6mm polyps in the cecum-path shows one tubular adenomatous and on hyperplastic polyp; One 8mm tubular adenomatous polyp in the ascending colon; Two 4-5mm tubular adenomatous polyps in the transverse colon; Repeat 3 years    COLONOSCOPY N/A 6/12/2023    Procedure: COLONOSCOPY WITH ANESTHESIA;  Surgeon: Susan Lord MD;  Location: [facility] ENDOSCOPY;  Service: Gastroenterology;  Laterality: N/A;  preop hx of polyps   postop; polyps   PCP Woody Valentin MD    CYSTOCELE REPAIR      Cystocele and rectocele repair    ENDOSCOPY  09/28/2012    LA grade C esophagitis; HH    ENDOSCOPY N/A 11/01/2019    Small HH; Non-erosive gastritis-biopsied; Normal examined duodenum    ENDOSCOPY N/A 10/13/2024    Procedure: ESOPHAGOGASTRODUODENOSCOPY WITH BIOPSY X 1 AREA;  Surgeon: Miguel Vega MD;  Location: [facility] ENDOSCOPY;  Service: Gastroenterology;  Laterality: N/A;  Hiatal hernia, esophagitis, gastritis    FIBULA FRACTURE SURGERY      HYSTERECTOMY      NECK SURGERY  2012    ACDF C4-7    ORIF TIBIA FRACTURE Right        Active and Resolved Problems  Active Hospital Problems    Diagnosis  POA    **Generalized weakness [R53.1]  Yes    Atrial fibrillation [I48.91]  Yes    Rheumatoid arthritis involving multiple sites [M06.9]  Yes      Resolved Hospital Problems   No resolved problems to display.       Family History: family history includes Colon cancer (age of onset: 65) in her father; Colon polyps (age of onset: 38) in her daughter. Otherwise pertinent FHx was reviewed and not pertinent to current  issue.    Social History:  reports that she has never smoked. She has never used smokeless tobacco. She reports that she does not drink alcohol and does not use drugs.    Home Medications:  Prior to Admission Medications       Prescriptions Last Dose Informant Patient Reported? Taking?    ALPRAZolam (XANAX) 1 MG tablet  Pharmacy Yes No    Take 1 tablet by mouth 3 (Three) Times a Day As Needed for Anxiety.    aspirin 81 MG tablet   No No    Take 1 tablet by mouth Daily.    atorvastatin (LIPITOR) 80 MG tablet   Yes No    Take 1 tablet by mouth Daily.    Cholecalciferol 25 MCG (1000 UT) tablet   Yes No    Take 1 tablet by mouth Daily.    dapagliflozin Propanediol 10 MG tablet   Yes No    Take 10 mg by mouth Daily.    Denosumab (PROLIA SC)   Yes No    Inject  under the skin into the appropriate area as directed. Every 6 months    escitalopram (LEXAPRO) 20 MG tablet  Pharmacy Yes No    Take 1 tablet by mouth Daily.    ezetimibe (ZETIA) 10 MG tablet   No No    Take 1 tablet by mouth Daily.    FOLIC ACID PO   Yes No    Take  by mouth Daily.    guaiFENesin-dextromethorphan (ROBITUSSIN DM) 100-10 MG/5ML syrup   No No    Take 10 mL by mouth Every 4 (Four) Hours As Needed for Cough.    hydroxychloroquine (PLAQUENIL) 200 MG tablet   Yes No    Take 1 tablet by mouth 2 (Two) Times a Day.    levothyroxine (SYNTHROID, LEVOTHROID) 112 MCG tablet   Yes No    Take 1 tablet by mouth Daily.    Lifitegrast (XIIDRA) 5 % ophthalmic solution   Yes No    Administer 1 drop to both eyes 2 (Two) Times a Day.    liothyronine (CYTOMEL) 5 MCG tablet   Yes No    Take 1 tablet by mouth 2 (Two) Times a Day.    memantine (NAMENDA) 10 MG tablet   Yes No    Take 1 tablet by mouth 2 (Two) Times a Day.    metoprolol succinate XL (TOPROL-XL) 25 MG 24 hr tablet   Yes No    Take 1 tablet by mouth 2 (Two) Times a Day.    pantoprazole (PROTONIX) 40 MG EC tablet   Yes No    Take 1 tablet by mouth Daily As Needed.    rivaroxaban (XARELTO) 20 MG tablet   No No     Take 1 tablet by mouth Daily.    sodium bicarbonate 650 MG tablet   Yes No    Take 1 tablet by mouth 2 (Two) Times a Day.    topiramate (TOPAMAX) 200 MG tablet   Yes No    Take 1 tablet by mouth Daily.              Allergies:  Allergies   Allergen Reactions    Benzoin Anaphylaxis and Swelling     States when used on her neck it shut off her airway  Huge abscesses with surgery      Iodine Other (See Comments)     PT UNABLE TO TELL RN ABOUT REACTION AT THIS TIME, BUT FAMILY STATES PT HAD A REACTION TO BEING CLEANSED WITH IODINE IN SURGERY  IOBAN - used on neck, swelled to the point of shutting off airway    Vancomycin Other (See Comments)     Kidney failure  Vancomycin induced acute kidney injury      Amiodarone Other (See Comments)     Tremor- hand and face     Levaquin [Levofloxacin] Unknown (See Comments)     Unknown    Sulfa Antibiotics            VTE Prophylaxis:  Mechanical VTE prophylaxis orders are present.        CODE STATUS:    Code Status (Patient has no pulse and is not breathing): CPR (Attempt to Resuscitate)  Medical Interventions (Patient has pulse or is breathing): Full Support        Admission Status:  I believe this patient meets inpatient status.    I discussed the patient's findings and my recommendations with patient and family.    Signature:     This document has been electronically signed by Denise Leone, DNP, APRN, AGACNP-BC on October 25, 2024 21:50 EDT   [facility] Hospitalist Team    Electronically signed by Llanes Alvarez, Carlos, MD at 10/26/24 0405          Physical Therapy Notes (most recent note)        Rachel Phelps, PT Student at 10/26/24 1358  Version 1 of 1      Attestation signed by Nichole Carter PT at 10/26/24 3490    Note reviewed and approved.     Nichole Carter PT, DPT, NCS                  Goal Outcome Evaluation:  Plan of Care Reviewed With: patient, child           Outcome Evaluation: Margaret is a 74 y.o. female with a previous medical history of atrial  fibrillation s/p cardiac ablation on 10/22/2024, CHF, CKD, RA who presented to [facility] on 10/25/2024 with complaints of generalized weakness and lethargy. She lives in a [facility] that has 3STE (left ascending rails) with her son, Augustin. At baseline, she is indep with ADLs/IADLs, amb without an AD, and son drives her to appts and takes care of groceries. At entry, she is AOx4 and very lethargic. Agreeable to participate in PT eval. Mod-indep with bed mobility; supine-to-sit on R side of bed with HOB elevated and use of bed rail. Strength WFL for ADLs and amb, grossly 4/5 with exception of BLE hip flx 3+/5. BLE ROM WFL for ADLs, limited R knee extension pt reports low back pain. CGA for STS from EOB c/ rw. Amb 30 ft c/ rw, CGA; decreased gait speed, decreased step length, decreased bilat terminal knee extension, elevated shoulders, verbal cues for erect posture. Pt appears to be below baseline functional mobility, recommending d/c to IP rehab to address aforementioned deficits.    Anticipated Discharge Disposition (PT): inpatient rehabilitation facility                          Electronically signed by Nichole Carter, PT at 10/26/24 1648          Occupational Therapy Notes (most recent note)        Tiffany Sweet, OT at 10/27/24 1616          Patient Name: Natalee Noble  : 1950    MRN: [id number]                              Today's Date: 10/27/2024       Admit Date: 10/25/2024    Visit Dx:     ICD-10-CM ICD-9-CM   1. General weakness  R53.1 780.79     Patient Active Problem List   Diagnosis    Cerebrovascular accident (CVA)    Paroxysmal A-fib    Hyperlipidemia    Migraines    History of echocardiogram    History of cardiac cath    History of cardiac monitoring    CKD (chronic kidney disease) stage 3, GFR 30-59 ml/min    Allergy to iodine    Adenomatous colon polyp    Epigastric pain    Nausea    Bloating    Belching    Long term current use of anticoagulant therapy    Atrial  fibrillation with RVR    Metatarsal fracture, pathologic, left, initial encounter    FH: colon cancer    FH: colon polyps    Hypotensive episode    Acute on chronic renal insufficiency    Elevated troponin    Chronic HFrEF (heart failure with reduced ejection fraction)    Anxiety disorder    Constipation    Hypothyroidism (acquired)    Rheumatoid arthritis involving multiple sites    Atrial fibrillation, persistent    Personal history of fall    Atrial fibrillation    Persistent atrial fibrillation    Presence of Watchman left atrial appendage closure device    Generalized weakness     Past Medical History:   Diagnosis Date    Anxiety and depression     Arthritis     Back pain     CRF (chronic renal failure), stage 3 (moderate)     Diverticulosis     Essential hypertension     Family history of colon cancer     Family history of colonic polyps     Fractures     R tibia/fibula; L ankle    GERD (gastroesophageal reflux disease)     History of adenomatous polyp of colon     History of cardiac cath     1/28/19 left main no significant disease. LAD with no significant disease. LCX no significant disease. RCA no significant disease.    History of cardiac monitoring     2/01/19 - ZIO PATCH - Underlying rhythm is sinus at 48-94 bpm. PSVT x23 with fastest 167 bpm, longest 24.4 seconds.   10/05/2018- ZIO PATCH - NSR most of time but did have episode of A fib ranging from .    History of colon polyps     History of echocardiogram     9/2018: Left ventricular systolic function is normal. EF 50%. Left ventricular diastolic dysfunction consistent with ipaired relaxation. Left atrial cavity size is mildly dilated. Mild MR. Mild to moderate TR. There is abnormal thickening noted on the RV that may represent a vegetation-this was discussed with Dr. Hammer and Dr. Reilly. No patent foramen ovale. No further work up needed at this time    Hyperlipidemia     Kidney disorder     Migraine     Migraines     Neck pain     Stroke 2018      Past Surgical History:   Procedure Laterality Date    ABDOMINOPLASTY      ATRIAL APPENDAGE EXCLUSION LEFT WITH TRANSESOPHAGEAL ECHOCARDIOGRAM N/A 10/22/2024    Procedure: Atrial Appendage Occlusion;  Surgeon: Sourav Pompa MD;  Location: [facility] CATH INVASIVE LOCATION;  Service: Cardiovascular;  Laterality: N/A;    BACK SURGERY      L2-5 fusion 2012    CARDIAC ELECTROPHYSIOLOGY PROCEDURE Right 10/22/2024    Procedure: Ablation atrial fibrillation;  Surgeon: Sourav Pompa MD;  Location: [facility] CATH INVASIVE LOCATION;  Service: Cardiovascular;  Laterality: Right;    CHOLECYSTECTOMY      COLONOSCOPY  11/24/2014    Diverticulosis; Repeat 5 years    COLONOSCOPY  11/09/2010    Diverticulosis; Repeat 4 years    COLONOSCOPY  09/18/2007    Dr. Monzon-Normal; Repeat 3 years    COLONOSCOPY  06/02/2005    Dr. Monzon-Diminuitive polypectomy above the cecum; Otherwise normal colonoscopy; Repeat 2 years    COLONOSCOPY N/A 11/01/2019    Diverticulosis in the left colon; Two 5-6mm polyps in the cecum-path shows one tubular adenomatous and on hyperplastic polyp; One 8mm tubular adenomatous polyp in the ascending colon; Two 4-5mm tubular adenomatous polyps in the transverse colon; Repeat 3 years    COLONOSCOPY N/A 6/12/2023    Procedure: COLONOSCOPY WITH ANESTHESIA;  Surgeon: Susan Lord MD;  Location: [facility] ENDOSCOPY;  Service: Gastroenterology;  Laterality: N/A;  preop hx of polyps   postop; polyps   PCP Woody Valentin MD    CYSTOCELE REPAIR      Cystocele and rectocele repair    ENDOSCOPY  09/28/2012    LA grade C esophagitis; HH    ENDOSCOPY N/A 11/01/2019    Small HH; Non-erosive gastritis-biopsied; Normal examined duodenum    ENDOSCOPY N/A 10/13/2024    Procedure: ESOPHAGOGASTRODUODENOSCOPY WITH BIOPSY X 1 AREA;  Surgeon: Miguel Vega MD;  Location: [facility] ENDOSCOPY;  Service: Gastroenterology;  Laterality: N/A;  Hiatal hernia, esophagitis, gastritis    FIBULA FRACTURE SURGERY       HYSTERECTOMY      NECK SURGERY  2012    ACDF C4-7    ORIF TIBIA FRACTURE Right       General Information       Row Name 10/27/24 1550          OT Time and Intention    Document Type evaluation  -DT     Mode of Treatment occupational therapy  -DT       Row Name 10/27/24 9768          General Information    Patient Profile Reviewed yes  -DT     Prior Level of Function independent:;all household mobility;ADL's  Prior to 2 months ago. Pt has been having inc falls & weakness X 2 months prior  to admission. Has tub bench, uses rollator.  -DT     Existing Precautions/Restrictions orthostatic hypotension  hx of orthostatic hypotension. Monitor PRN.  -DT     Barriers to Rehab medically complex  -DT       Row Name 10/27/24 1551          Living Environment    People in Home child(siobhan), adult  Was living with son. Is planning to stay with dtr for a while after disharge/rehab  -DT       Row Name 10/27/24 1553          Home Main Entrance    Number of Stairs, Main Entrance three  -DT     Stair Railings, Main Entrance railings safe and in good condition  -DT       Row Name 10/27/24 0947          Stairs Within Home, Primary    Number of Stairs, Within Home, Primary none  -DT       Row Name 10/27/24 1373          Cognition    Orientation Status (Cognition) oriented x 4  -DT       Row Name 10/27/24 6508          Safety Issues/Impairments Affecting Functional Mobility    Impairments Affecting Function (Mobility) balance;endurance/activity tolerance;strength  -DT               User Key  (r) = Recorded By, (t) = Taken By, (c) = Cosigned By      Initials Name Provider Type    DT Tiffany Sweet, OT Occupational Therapist                     Mobility/ADL's       Row Name 10/27/24 8133          Bed Mobility    Bed Mobility bed mobility (all) activities  -DT     All Activities, [place] (Bed Mobility) standby assist  -DT     Assistive Device (Bed Mobility) head of bed elevated;bed rails  -DT       Row Name 10/27/24 8617           Transfers    Transfers sit-stand transfer;stand-sit transfer  -DT       Row Name 10/27/24 1604          Sit-Stand Transfer    Sit-Stand [place] (Transfers) contact guard  -DT     Assistive Device (Sit-Stand Transfers) walker, front-wheeled  -DT       Row Name 10/27/24 1604          Stand-Sit Transfer    Stand-Sit [place] (Transfers) contact guard  -DT     Assistive Device (Stand-Sit Transfers) walker, front-wheeled  -DT       Row Name 10/27/24 1604          Functional Mobility    Functional Mobility- Ind. Level contact guard assist  -DT     Functional Mobility- Device walker, front-wheeled  -DT     Functional Mobility-Distance (Feet) 30  -DT       Row Name 10/27/24 1604          Activities of Daily Living    BADL Assessment/Intervention lower body dressing;toileting;grooming  -DT       Row Name 10/27/24 1604          Lower Body Dressing Assessment/Training    [place] Level (Lower Body Dressing) lower body dressing skills;minimum assist (75% patient effort)  -DT     Position (Lower Body Dressing) edge of bed sitting  -DT       Row Name 10/27/24 1604          Toileting Assessment/Training    [place] Level (Toileting) toileting skills;minimum assist (75% patient effort)  -DT       Row Name 10/27/24 1604          Grooming Assessment/Training    [place] Level (Grooming) grooming skills;standby assist;verbal cues;other (see comments)  v.c for thoroughness  -DT               User Key  (r) = Recorded By, (t) = Taken By, (c) = Cosigned By      Initials Name Provider Type    DT Tiffany Sweet, OT Occupational Therapist                   Obj/Interventions       Row Name 10/27/24 1609          Sensory Assessment (Somatosensory)    Sensory Assessment (Somatosensory) sensation intact  -DT       Row Name 10/27/24 1609          Range of Motion Comprehensive    General Range of Motion bilateral upper extremity ROM WNL  -DT       Row Name 10/27/24 1609          Strength Comprehensive (MMT)    General  Manual Muscle Testing (MMT) Assessment upper extremity strength deficits identified;lower extremity strength deficits identified  -DT     Comment, General Manual Muscle Testing (MMT) Assessment BUE= 4-/5  -DT       Row Name 10/27/24 1609          Motor Skills    Motor Skills functional endurance  -DT     Functional Endurance fair  -DT       Row Name 10/27/24 1609          Balance    Balance Assessment sitting static balance;sitting dynamic balance;standing static balance;standing dynamic balance  -DT     Static Sitting Balance supervision  -DT     Dynamic Sitting Balance standby assist  -DT     Position, Sitting Balance sitting edge of bed  -DT     Static Standing Balance contact guard  -DT     Dynamic Standing Balance contact guard  -DT     Position/Device Used, Standing Balance walker, front-wheeled  -DT               User Key  (r) = Recorded By, (t) = Taken By, (c) = Cosigned By      Initials Name Provider Type    DT Tiffany Sweet, OT Occupational Therapist                   Goals/Plan       Row Name 10/27/24 1615          Transfer Goal 1 (OT)    Activity/Assistive Device (Transfer Goal 1, OT) transfers, all  -DT     Keller Level/Cues Needed (Transfer Goal 1, OT) modified independence  -DT     Time Frame (Transfer Goal 1, OT) 2 weeks  -DT       Row Name 10/27/24 1615          Bathing Goal 1 (OT)    Activity/Device (Bathing Goal 1, OT) bathing skills, all  -DT     Keller Level/Cues Needed (Bathing Goal 1, OT) modified independence  -DT     Time Frame (Bathing Goal 1, OT) 2 weeks  -DT       Row Name 10/27/24 1615          Dressing Goal 1 (OT)    Activity/Device (Dressing Goal 1, OT) dressing skills, all  -DT     Keller/Cues Needed (Dressing Goal 1, OT) modified independence  -DT     Time Frame (Dressing Goal 1, OT) 2 weeks  -DT       Row Name 10/27/24 1615          Toileting Goal 1 (OT)    Activity/Device (Toileting Goal 1, OT) toileting skills, all  -DT     Keller Level/Cues  Needed (Toileting Goal 1, OT) modified independence  -DT     Time Frame (Toileting Goal 1, OT) 2 weeks  -DT       Row Name 10/27/24 1615          Therapy Assessment/Plan (OT)    Planned Therapy Interventions (OT) activity tolerance training;BADL retraining;functional balance retraining;neuromuscular control/coordination retraining;occupation/activity based interventions;patient/caregiver education/training;ROM/therapeutic exercise;strengthening exercise;transfer/mobility retraining  -DT               User Key  (r) = Recorded By, (t) = Taken By, (c) = Cosigned By      Initials Name Provider Type    DT Tiffany Sweet, SÁNCHEZ Occupational Therapist                   Clinical Impression       Row Name 10/27/24 1610          Pain Assessment    Pretreatment Pain Rating 7/10  -DT     Posttreatment Pain Rating 7/10  -DT     Pain Location back;shoulder  -DT     Pain Side/Orientation bilateral;generalized;other (see comments)  chronic pain  -DT     Pain Management Interventions exercise or physical activity utilized  -DT       Row Name 10/27/24 1610          Plan of Care Review    Plan of Care Reviewed With patient;daughter  -DT     Outcome Evaluation Pt is a 74 y.o. female with a previous medical history of atrial fibrillation s/p cardiac ablation on 10/22/2024, CHF, CKD, RA who presented to [facility] on 10/25/2024 with complaints of generalized weakness and lethargy. She normally lives in a [facility] that has [address] with her son, but reports she is planning on staying with her daughter after her hospitalization/rehab.Her daughter's home has 3 [address] & her daughter works FT & pt would be home alone during the day. At baseline, she is indep with ADLs, amb without an AD.  She does have a rollator & tub bench at home. She has had a recent fall with c/o left ankle pain. X-ray were negative for a fx, but pt reports it is  at times. Upon eval, pt is A&O X4. She completed bed mobility with SBA using siderails &  CGA for ADL transfers & room mobility with CGA. She requires min A for LB ADLs & toileting & SBA with v.c. for thoroughness for grooming tasks.  She presents with generalized weakness & dec activity tolerance especially with standing tasks.  Pt is functioning below her baseline status & would benefit from skilled OT tx. OT will continue to follow for tx & recommends inpt rehab upon discharge.  -DT       Row Name 10/27/24 1610          Therapy Assessment/Plan (OT)    Rehab Potential (OT) good  -DT     Criteria for Skilled Therapeutic Interventions Met (OT) yes;meets criteria;skilled treatment is necessary  -DT     Therapy Frequency (OT) 5 times/wk  -DT     Predicted Duration of Therapy Intervention (OT) until d/c  -DT       Row Name 10/27/24 1610          Therapy Plan Review/Discharge Plan (OT)    Anticipated Discharge Disposition (OT) inpatient rehabilitation facility  -DT       Row Name 10/27/24 1610          Vital Signs    Pre Systolic BP Rehab 113  supine  -DT     Pre Treatment Diastolic BP 61  -DT     Intra Systolic BP Rehab 104  sitting  -DT     Intra Treatment Diastolic BP 70  -DT     Post Systolic BP Rehab 96  standing  -DT     Post Treatment Diastolic BP 65  -DT       Row Name 10/27/24 1610          Positioning and Restraints    Pre-Treatment Position in bed  -DT     Post Treatment Position chair  -DT     In Chair notified nsg;sitting;call light within reach;encouraged to call for assist;with family/caregiver  Pt's daughter stated she is staying with pt at all times. Chair alarm not on.Nursing notified.  -DT               User Key  (r) = Recorded By, (t) = Taken By, (c) = Cosigned By      Initials Name Provider Type    DT Tiffany Sweet, OT Occupational Therapist                   Outcome Measures    No documentation.                   Occupational Therapy Education       Title: PT OT SLP Therapies (Done)       Topic: Occupational Therapy (Done)       Point: ADL training (Done)       Description:    Instruct learner(s) on proper safety adaptation and remediation techniques during self care or transfers.   Instruct in proper use of assistive devices.                  Learning Progress Summary            Patient Acceptance, E,TB, VU by DT at 10/27/2024 1615    Comment: Role of OT, goals & POC;safety prec.   Family Acceptance, E,TB, VU by DT at 10/27/2024 1615    Comment: Role of OT, goals & POC;safety prec.                      Point: Home exercise program (Done)       Description:   Instruct learner(s) on appropriate technique for monitoring, assisting and/or progressing therapeutic exercises/activities.                  Learning Progress Summary            Patient Acceptance, E,TB, VU by DT at 10/27/2024 1615    Comment: Role of OT, goals & POC;safety prec.   Family Acceptance, E,TB, VU by DT at 10/27/2024 1615    Comment: Role of OT, goals & POC;safety prec.                      Point: Precautions (Done)       Description:   Instruct learner(s) on prescribed precautions during self-care and functional transfers.                  Learning Progress Summary            Patient Acceptance, E,TB, VU by DT at 10/27/2024 1615    Comment: Role of OT, goals & POC;safety prec.   Family Acceptance, E,TB, VU by DT at 10/27/2024 1615    Comment: Role of OT, goals & POC;safety prec.                      Point: Body mechanics (Done)       Description:   Instruct learner(s) on proper positioning and spine alignment during self-care, functional mobility activities and/or exercises.                  Learning Progress Summary            Patient Acceptance, E,TB, VU by DT at 10/27/2024 1615    Comment: Role of OT, goals & POC;safety prec.   Family Acceptance, E,TB, VU by DT at 10/27/2024 1615    Comment: Role of OT, goals & POC;safety prec.                                      User Key       Initials Effective Dates Name Provider Type Discipline    DT 07/11/23 -  Tiffany Sweet, OT Occupational Therapist OT                   OT Recommendation and Plan  Planned Therapy Interventions (OT): activity tolerance training, BADL retraining, functional balance retraining, neuromuscular control/coordination retraining, occupation/activity based interventions, patient/caregiver education/training, ROM/therapeutic exercise, strengthening exercise, transfer/mobility retraining  Therapy Frequency (OT): 5 times/wk  Plan of Care Review  Plan of Care Reviewed With: patient, daughter  Outcome Evaluation: Pt is a 74 y.o. female with a previous medical history of atrial fibrillation s/p cardiac ablation on 10/22/2024, CHF, CKD, RA who presented to [facility] on 10/25/2024 with complaints of generalized weakness and lethargy. She normally lives in a [facility] that has 1 [address] with her son, but reports she is planning on staying with her daughter after her hospitalization/rehab.Her daughter's home has 3 [address] & her daughter works FT & pt would be home alone during the day. At baseline, she is indep with ADLs, amb without an AD.  She does have a rollator & tub bench at home. She has had a recent fall with c/o left ankle pain. X-ray were negative for a fx, but pt reports it is  at times. Upon eval, pt is A&O X4. She completed bed mobility with SBA using siderails & CGA for ADL transfers & room mobility with CGA. She requires min A for LB ADLs & toileting & SBA with v.c. for thoroughness for grooming tasks.  She presents with generalized weakness & dec activity tolerance especially with standing tasks.  Pt is functioning below her baseline status & would benefit from skilled OT tx. OT will continue to follow for tx & recommends inpt rehab upon discharge.     Time Calculation:         Time Calculation- OT       Row Name 10/27/24 1616             Time Calculation- OT    OT Start Time 0930  -DT      OT Stop Time 1002  -DT      OT Time Calculation (min) 32 min  -DT      OT Received On 10/27/24  -DT      OT - Next Appointment 10/28/24  -DT      OT  Goal Re-Cert Due Date 11/10/24  -DT                User Key  (r) = Recorded By, (t) = Taken By, (c) = Cosigned By      Initials Name Provider Type    DT Tiffany Sweet, OT Occupational Therapist                           Tiffany Sweet OT  10/27/2024    Electronically signed by Tiffany Swete OT at 10/27/24 9102

## 2024-10-28 NOTE — THERAPY TREATMENT NOTE
Subjective: Pt agreeable to therapeutic plan of care.    Objective:     Precautions - HFR, cardiac    Bed mobility - Min-A, Mod-A, and Assist x 2  Transfers - Mod-A, Assist x 2, and with rolling walker from bed, BSC and recliner  Ambulation - 6 feet Mod-A, Assist x 2, and with rolling walker    Vitals: WNL    Pain: 2 VAS   Location: generalized  Intervention for pain: Increased Activity and Therapeutic Presence    Education: Provided education on the importance of mobility in the acute care setting, Verbal/Tactile Cues, Transfer Training, and Energy conservation strategies    Assessment: Natalee Noble presents with functional mobility impairments which indicate the need for skilled intervention.Per chart review,pt's generalized weakness is likely multifactorial secondary to UTI and recent hospitalizations with cardiac ablation. CT of the head showed no acute abnormality.  Pt has left cardiac cath scheduled for tomorrow. Pt had general unsteadiness up on her feet with one loss of balance that pt required moderate assistance for recovery. Pt was able to transfer to [facility] for BM. Pt required total assistance for perianal clean up. Pt was very fatigued following physical exertion but overall she felt better sitting up in recliner. Tolerating session today without incident. Will continue to follow and progress as tolerated.     Plan/Recommendations:   If medically appropriate, High Intensity Therapy recommended post-acute care. This is recommended as therapy feels the patient would require 5-6 days per week, 2-3 hours per day. At this time, inpatient rehabilitation (acute rehab) would be the first choice and SNF would be second. Pt requires no DME at discharge.     Pt desires Inpatient Rehabilitation placement at discharge. Pt cooperative; agreeable to therapeutic recommendations and plan of care.         Basic Mobility 6-click:  Rollin = Total, A lot = 2, A little = 3; 4 = None  Supine>Sit:   1 = Total, A  lot = 2, A little = 3; 4 = None   Sit>Stand with arms:  1 = Total, A lot = 2, A little = 3; 4 = None  Bed>Chair:   1 = Total, A lot = 2, A little = 3; 4 = None  Ambulate in room:  1 = Total, A lot = 2, A little = 3; 4 = None  3-5 Steps with railin = Total, A lot = 2, A little = 3; 4 = None  Score: 12    Modified [place]: N/A = No pre-op stroke/TIA    Post-Tx Position: Up in Chair, Staff Present, Alarms activated, and Call light and personal items within reach  PPE: gloves    Therapy Charges for Today       Code Description Service Date Service Provider Modifiers Qty    [id number]  GAIT TRAINING EA 15 MIN 10/28/2024 Gladys Hylton, PT GP 1    [id number]  PT NEUROMUSC RE EDUCATION EA 15 MIN 10/28/2024 Gladys Hylton, PT GP 1

## 2024-10-28 NOTE — SIGNIFICANT NOTE
10/28/24 1243   Readmission Indications   Is the patient and/or family able to complete the readmission assessment questions? Yes   Is this hospitalization related to the prior hospital diagnosis? No   Recommendation for rehospitalization   Did you speak with your physician prior to coming to the hospital No   Follow-up Appointments   Do you have a PCP? Yes   Did you have an appointment with PCP after your hospitalization? Yes   When was your appointment scheduled? 11/11/24   Did you go to appointment?   (not yet)   Did you have an appointment with a Specialist? Yes   When was your appointment scheduled? 11/15/24   Did you go to appointment?   (not yet)   Are you current with the Pulmonary Clinic? No   Are you current with the CHF Clinic? No   Medications   Did you have newly prescribed medications at discharge? No   Did you understand the reasons for your medications at discharge and how to take them? Yes   Did you understand the side effects of your medications? Yes   Are you taking all of you prescribed medications? Yes   Discharge Instructions   Did you understand your discharge instructions? Yes   Did your family/caregiver hear your instructions? Yes   Were you told to eat a special diet? No   Were you given a number of someone to call if you had questions or concerns? Yes   Index discharge location/services   Where did you go upon discharge? Home  (patient was current with [facility] when she was dc'd home from [facility] 10/18 but has not been seen yet)   Do you have supportive family or friends in the home? Yes   What services were arranged at discharge? Home Health   Discharge Readiness   On a scale of 1-5 (5 being well prepared), how ready were you for discharge 5   Palliative Care/Hospice   Are you current with Palliative Care? No   Are you current with Hospice Care? No   Advance Directives (For Healthcare)   Pre-existing AND/MOST/POLST Order No   Advance Directive Status Patient has advance directive, copy  requested   Type of Advance Directive Health care directive/Living will   Have you reviewed your Advance Directive and is it valid for this stay? Yes   Literature Provided on Advance Directives No   Patient Requests Assistance on Advance Directives Patient Declined   Readmission Assessment Final Comments   Final Comments PREVIOUS ADMIT 10/22-10/23/24:  Planned admit for [facility]/Ablation - kept overnight for obs then dc home with [facility].  PRESENT ADMIT 10/25/27:  Weakness - UTI.  IV Abxs, HGB 8.7 - monitor. Troponin elevated but declining - Heparin gtt until cleared by Cardio. Plan to dc to IRF.

## 2024-10-28 NOTE — PLAN OF CARE
Goal Outcome Evaluation:    Assessment: Natalee Noble presents with functional mobility impairments which indicate the need for skilled intervention.Per chart review,pt's generalized weakness is likely multifactorial secondary to UTI and recent hospitalizations with cardiac ablation. CT of the head showed no acute abnormality.  Pt has left cardiac cath scheduled for tomorrow. Pt had general unsteadiness up on her feet with one loss of balance that pt required moderate assistance for recovery. Pt was able to transfer to [facility] for BM. Pt required total assistance for perianal clean up. Pt was very fatigued following physical exertion but overall she felt better sitting up in recliner. Tolerating session today without incident. Will continue to follow and progress as tolerated.     Plan/Recommendations:   If medically appropriate, High Intensity Therapy recommended post-acute care. This is recommended as therapy feels the patient would require 5-6 days per week, 2-3 hours per day. At this time, inpatient rehabilitation (acute rehab) would be the first choice and SNF would be second. Pt requires no DME at discharge.     Pt desires Inpatient Rehabilitation placement at discharge. Pt cooperative; agreeable to therapeutic recommendations and plan of care.

## 2024-10-28 NOTE — CONSULTS
Critical Care Consult Note   Natalee Noble : 1950 MRN:[id number] LOS:1 ROOM: [facility]/     Reason for admission: Generalized weakness     Assessment / Plan     NSTEMI  Atherosclerotic heart disease  Elevated troponin  Chronic and stable.   c/w aspirin, beta blockers, and statins.  Xarelto discontinued per cardiology. Awaiting washout period for invasive ischemic workup  Cardiac catheterization planned for tomorrow (10/29)    Chronic Systolic heart failure:   Currently compensated.   Echocardiogram completed 10/5/2024 shows reduced LVEF of 35 to 40%   Outpatient guideline directed medical therapy with with Entresto, Aldactone, digoxin, metoprolol succinate, Farxiga   Monitor Input/Output very closely. Follow daily weights.   Net IO Since Admission: -1,360 mL [10/28/24 1804]    Paroxysmal atrial fibrillation :   Rate controlled well with metoprolol.   Rhythm control not indicated, s/p ablation and Watchman  Outpatient chronic anticoagulation with Xarelto, currently on hold for cardiac cath.  [id number] SCORE   [id number] Score: 7 (10/28/2024  6:04 PM)    Frequent falls  PT/OT evaluation and treatment recommendations  Discharge planning    Essential Hypertension: well controlled.   Continue metoprolol  Titrate medications as needed.      Chronic kidney disease stage III  Anxiety/depression--continue Lexapro  GERD--continue Protonix  CVA  Dyslipidemia--continue atorvastatin  Hypothyroidism--endocrinology managing.  Continued on levothyroxine      Code Status (Patient has no pulse and is not breathing): CPR (Attempt to Resuscitate)  Medical Interventions (Patient has pulse or is breathing): Full Support       Nutrition: Diet: Cardiac; Healthy Heart (2-3 Na+); Fluid Consistency: Thin (IDDSI 0) Patient isn't on Tube Feeding     VTE Prophylaxis:  Pharmacologic & mechanical VTE prophylaxis orders are present.         History of Present illness     The patient is a 74-year-old female who initially presented to  the emergency department on 10/25/2024 for evaluation of generalized weakness.  The patient stated that she was so weak that she was unable to rise off the toilet that morning and had slid to the floor.  She denies that she lost consciousness and denies that she sustained any traumatic injuries.  She reports that she has had some nausea without vomiting and she has had a poor appetite.  She denies any fever, chills, palpitations, headaches, dizziness, syncope.  She does report that she has had some shortness of air.  On 10/22/2024 the patient had undergone and ablation for atrial fibrillation as well as a Watchman procedure.  She reports that she felt well after the procedure and the following days however the day prior to presentation she noticed increase shortness of air and weakness. In the ED, the patient had a CT head which showed no intracranial abnormality.  EKG revealed no acute ischemia however her troponin was significantly elevated at 1352.  The patient was admitted to the hospitalist for further evaluation and treatment.  Cardiology was consulted and the patient was assessed as having a non-ST elevation myocardial infarction.  Unfortunately the patient could not undergo an invasive ischemic workup as she has been on Xarelto with her last dose being 10/27/2024.  Cardiac catheterization is planned for tomorrow.  In the interim there was concern that the patient would require vasopressor support and she was upgraded to ICU level although currently her MAP is stable at 80 mmHg without pressors.    ACP: no ACP documentation on file    Patient was seen and examined on 10/28/24 at 18:04 EDT .    Past Medical/Surgical/Social/Family History & Allergies     Past Medical History:   Diagnosis Date    Anxiety and depression     Arthritis     Back pain     CRF (chronic renal failure), stage 3 (moderate)     Diverticulosis     Essential hypertension     Family history of colon cancer     Family history of colonic  polyps     Fractures     R tibia/fibula; L ankle    GERD (gastroesophageal reflux disease)     History of adenomatous polyp of colon     History of cardiac cath     1/28/19 left main no significant disease. LAD with no significant disease. LCX no significant disease. RCA no significant disease.    History of cardiac monitoring     2/01/19 - ZIO PATCH - Underlying rhythm is sinus at 48-94 bpm. PSVT x23 with fastest 167 bpm, longest 24.4 seconds.   10/05/2018- ZIO PATCH - NSR most of time but did have episode of A fib ranging from .    History of colon polyps     History of echocardiogram     9/2018: Left ventricular systolic function is normal. EF 50%. Left ventricular diastolic dysfunction consistent with ipaired relaxation. Left atrial cavity size is mildly dilated. Mild MR. Mild to moderate TR. There is abnormal thickening noted on the RV that may represent a vegetation-this was discussed with Dr. Hammer and Dr. Reilly. No patent foramen ovale. No further work up needed at this time    Hyperlipidemia     Kidney disorder     Migraine     Migraines     Neck pain     Stroke 2018      Past Surgical History:   Procedure Laterality Date    ABDOMINOPLASTY      ATRIAL APPENDAGE EXCLUSION LEFT WITH TRANSESOPHAGEAL ECHOCARDIOGRAM N/A 10/22/2024    Procedure: Atrial Appendage Occlusion;  Surgeon: Sourav Pompa MD;  Location:  [facility];  Service: Cardiovascular;  Laterality: N/A;    BACK SURGERY      L2-5 fusion 2012    CARDIAC ELECTROPHYSIOLOGY PROCEDURE Right 10/22/2024    Procedure: Ablation atrial fibrillation;  Surgeon: Sourav Pompa MD;  Location:  [facility];  Service: Cardiovascular;  Laterality: Right;    CHOLECYSTECTOMY      COLONOSCOPY  11/24/2014    Diverticulosis; Repeat 5 years    COLONOSCOPY  11/09/2010    Diverticulosis; Repeat 4 years    COLONOSCOPY  09/18/2007    Dr. Monzon-Normal; Repeat 3 years    COLONOSCOPY  06/02/2005    Dr. Monzon-[facility]  polypectomy above the cecum; Otherwise normal colonoscopy; Repeat 2 years    COLONOSCOPY N/A 11/01/2019    Diverticulosis in the left colon; Two 5-6mm polyps in the cecum-path shows one tubular adenomatous and on hyperplastic polyp; One 8mm tubular adenomatous polyp in the ascending colon; Two 4-5mm tubular adenomatous polyps in the transverse colon; Repeat 3 years    COLONOSCOPY N/A 6/12/2023    Procedure: COLONOSCOPY WITH ANESTHESIA;  Surgeon: Susan Lord MD;  Location:  [facility];  Service: Gastroenterology;  Laterality: N/A;  preop hx of polyps   postop; polyps   PCP Woody Valentin MD    CYSTOCELE REPAIR      Cystocele and rectocele repair    ENDOSCOPY  09/28/2012    LA grade C esophagitis; HH    ENDOSCOPY N/A 11/01/2019    Small HH; Non-erosive gastritis-biopsied; Normal examined duodenum    ENDOSCOPY N/A 10/13/2024    Procedure: ESOPHAGOGASTRODUODENOSCOPY WITH BIOPSY X 1 AREA;  Surgeon: Miguel Vega MD;  Location: [facility];  Service: Gastroenterology;  Laterality: N/A;  Hiatal hernia, esophagitis, gastritis    FIBULA FRACTURE SURGERY      HYSTERECTOMY      NECK SURGERY  2012    ACDF C4-7    ORIF TIBIA FRACTURE Right       Social History     Socioeconomic History    Marital status:    Tobacco Use    Smoking status: Never    Smokeless tobacco: Never   Vaping Use    Vaping status: Never Used   Substance and Sexual Activity    Alcohol use: No    Drug use: Never    Sexual activity: Defer      Family History   Problem Relation Age of Onset    Colon cancer Father 65    Colon polyps Daughter 38    Esophageal cancer Neg Hx     Liver cancer Neg Hx     Liver disease Neg Hx     Rectal cancer Neg Hx     Stomach cancer Neg Hx       Allergies   Allergen Reactions    Benzoin Anaphylaxis and Swelling     States when used on her neck it shut off her airway  Huge abscesses with surgery      Iodine Other (See Comments)     PT UNABLE TO TELL RN ABOUT REACTION AT THIS TIME, BUT  FAMILY STATES PT HAD A REACTION TO BEING CLEANSED WITH IODINE IN SURGERY  IOBAN - used on neck, swelled to the point of shutting off airway    Vancomycin Other (See Comments)     Kidney failure  Vancomycin induced acute kidney injury      Amiodarone Other (See Comments)     Tremor- hand and face     Levaquin [Levofloxacin] Unknown (See Comments)     Unknown    Sulfa Antibiotics         Home Medications     Prior to Admission medications    Medication Sig Start Date End Date Taking? Authorizing Provider   ALPRAZolam (XANAX) 1 MG tablet Take 1 tablet by mouth 3 (Three) Times a Day As Needed for Anxiety.   Yes Jacklyn Rahman MD   aspirin 81 MG tablet Take 1 tablet by mouth Daily. 9/3/18  Yes Mayur Sweet MD   atorvastatin (LIPITOR) 80 MG tablet Take 1 tablet by mouth Daily. 4/23/21  Yes Jacklyn Rahman MD   Cholecalciferol 25 MCG (1000 UT) tablet Take 1 tablet by mouth Daily.   Yes Jacklyn Rahman MD   escitalopram (LEXAPRO) 20 MG tablet Take 1 tablet by mouth Daily.   Yes Jacklyn Rahman MD   dapagliflozin Propanediol 10 MG tablet Take 10 mg by mouth Daily.    Jacklyn Rahman MD   Denosumab (PROLIA SC) Inject  under the skin into the appropriate area as directed. Every 6 months    Jacklyn Rahman MD   ezetimibe (ZETIA) 10 MG tablet Take 1 tablet by mouth Daily. 8/5/20   Jose Scott MD   FOLIC ACID PO Take  by mouth Daily.    Jacklyn Rahman MD   guaiFENesin-dextromethorphan (ROBITUSSIN DM) 100-10 MG/5ML syrup Take 10 mL by mouth Every 4 (Four) Hours As Needed for Cough. 10/23/24   Nicol Kenny APRN   hydroxychloroquine (PLAQUENIL) 200 MG tablet Take 1 tablet by mouth 2 (Two) Times a Day.    Jacklyn Rahman MD   levothyroxine (SYNTHROID, LEVOTHROID) 112 MCG tablet Take 1 tablet by mouth Daily.    Jacklyn Rahman MD   Lifitegrast (XIIDRA) 5 % ophthalmic solution Administer 1 drop to both eyes 2 (Two) Times a Day.    Jacklyn Rahman MD    liothyronine (CYTOMEL) 5 MCG tablet Take 1 tablet by mouth 2 (Two) Times a Day.    Jacklyn Rahman MD   memantine (NAMENDA) 10 MG tablet Take 1 tablet by mouth 2 (Two) Times a Day.    Jacklyn Rahman MD   metoprolol succinate XL (TOPROL-XL) 25 MG 24 hr tablet Take 1 tablet by mouth 2 (Two) Times a Day.    Jacklyn Rahman MD   pantoprazole (PROTONIX) 40 MG EC tablet Take 1 tablet by mouth Daily As Needed.    Jacklyn Rahman MD   rivaroxaban (XARELTO) 20 MG tablet Take 1 tablet by mouth Daily. 8/12/22   Jose Scott MD   sodium bicarbonate 650 MG tablet Take 1 tablet by mouth 2 (Two) Times a Day.    Jacklyn Rahman MD   topiramate (TOPAMAX) 200 MG tablet Take 1 tablet by mouth Daily. 6/4/19   Jacklyn Rahman MD        Objective / Physical Exam     Vital signs:  Temp: 97.4 °F (36.3 °C)  BP: 98/60  Heart Rate: 57  Resp: 14  SpO2: 92 %  Weight: 78.5 kg (173 lb)    Admission Weight: Weight: 78.5 kg (173 lb)    Physical Exam  Vitals and nursing note reviewed.   Constitutional:       General: She is not in acute distress.     Appearance: Normal appearance. She is obese.   HENT:      Head: Normocephalic and atraumatic.      Right Ear: External ear normal.      Left Ear: External ear normal.      Nose: Nose normal.      Mouth/Throat:      Mouth: Mucous membranes are moist.      Pharynx: Oropharynx is clear.   Eyes:      General: No scleral icterus.     Conjunctiva/sclera: Conjunctivae normal.      Pupils: Pupils are equal, round, and reactive to light.   Cardiovascular:      Heart sounds: S1 normal and S2 normal. Murmur heard.   Pulmonary:      Effort: Pulmonary effort is normal. No respiratory distress.      Breath sounds: Normal breath sounds. No wheezing or rhonchi.   Abdominal:      General: Bowel sounds are normal. There is no distension.      Palpations: Abdomen is soft.      Comments: Morbid body habitus   Musculoskeletal:      Cervical back: Neck supple. No rigidity.       Right lower leg: No edema.      Left lower leg: No edema.   Skin:     General: Skin is warm and dry.   Neurological:      General: No focal deficit present.      Mental Status: She is alert and oriented to person, place, and time.          Labs     Results from last 7 days   Lab Units 10/28/24  0613 10/27/24  0451 10/26/24  0448 10/25/24  1710 10/23/24  0330   WBC 10*3/mm3 6.42 4.22 4.12 6.99 7.39   HEMATOCRIT % 29.9* 27.4* 27.5* 30.8* 32.8*   PLATELETS 10*3/mm3 181 154 152 166 167      Results from last 7 days   Lab Units 10/27/24  0451 10/26/24  0448 10/25/24  2222 10/25/24  1710 10/23/24  0330   SODIUM mmol/L 140 142 142 141 140   POTASSIUM mmol/L 4.0 3.9 3.9 4.3 4.5   CHLORIDE mmol/L 108* 111* 111* 108* 107   CO2 mmol/L 23.0 22.9 22.6 23.6 23.2   BUN mg/dL 12 14 14 16 16   CREATININE mg/dL 1.08* 1.04* 1.05* 1.19* 0.96        Imaging     No radiology results for the last day     Chest X ray: My independent assessment showed no infiltrates or effusions    EKG: My independent evaluation showed sinus rhythm, no ST -T changes    Current Medications     Scheduled Meds:  aspirin, 81 mg, Oral, Daily  atorvastatin, 80 mg, Oral, Daily  cholecalciferol, 1,000 Units, Oral, Daily  empagliflozin, 25 mg, Oral, Daily  escitalopram, 20 mg, Oral, Daily  ferric gluconate, 250 mg, Intravenous, Daily  fluconazole, 200 mg, Oral, Q24H  hydroxychloroquine, 200 mg, Oral, BID  [START ON 10/29/2024] levothyroxine, 100 mcg, Oral, Q AM  memantine, 10 mg, Oral, Daily  metoprolol succinate XL, 25 mg, Oral, BID  pantoprazole, 40 mg, Oral, Q AM  senna-docusate sodium, 2 tablet, Oral, BID  sodium bicarbonate, 650 mg, Oral, BID  sodium chloride, 10 mL, Intravenous, Q12H  sodium chloride, 10 mL, Intravenous, Q12H  topiramate, 200 mg, Oral, Daily         Continuous Infusions:        Tiffany PEREIRA Day, APRN   Critical Care  10/28/24   18:04 EDT

## 2024-10-28 NOTE — PROGRESS NOTES
ENDOCRINE CONSULT PROGRESS NOTE  DATE OF SERVICE: 10/28/24        PATIENT NAME: Natalee Noble  PATIENT : 1950 AGE: 74 y.o.  MRN NUMBER: [id number]    ==========================================================================    CHIEF COMPLAINT: Hypothyroidism with iatrogenic hyperthyroidism    CARE TEAM:   Patient Care Team:  Woody Valentin MD as PCP - General (Family Medicine)  Susan Lord MD as Consulting Physician (Gastroenterology)  Jose Scott MD as Consulting Physician (Cardiology)    SUBJECTIVE    Pt seen and examined.  Denied any active complaint.  Daughter at bedside.    ==========================================================================    CURRENT ACTIVE HOSPITAL MEDICATIONS    Scheduled Medications:  aspirin, 81 mg, Oral, Daily  atorvastatin, 80 mg, Oral, Daily  cholecalciferol, 1,000 Units, Oral, Daily  empagliflozin, 25 mg, Oral, Daily  escitalopram, 20 mg, Oral, Daily  ferric gluconate, 250 mg, Intravenous, Daily  fluconazole, 200 mg, Oral, Q24H  hydroxychloroquine, 200 mg, Oral, BID  [START ON 10/29/2024] levothyroxine, 100 mcg, Oral, Q AM  memantine, 10 mg, Oral, Daily  metoprolol succinate XL, 25 mg, Oral, BID  pantoprazole, 40 mg, Oral, Q AM  senna-docusate sodium, 2 tablet, Oral, BID  sodium bicarbonate, 650 mg, Oral, BID  sodium chloride, 10 mL, Intravenous, Q12H  sodium chloride, 10 mL, Intravenous, Q12H  topiramate, 200 mg, Oral, Daily         PRN Medications:    acetaminophen **OR** acetaminophen **OR** acetaminophen    ALPRAZolam    senna-docusate sodium **AND** polyethylene glycol **AND** bisacodyl **AND** bisacodyl    calcium carbonate    heparin    heparin    influenza vaccine    melatonin    nitroglycerin    sodium chloride    sodium chloride    sodium chloride    traMADol     ==========================================================================    OBJECTIVE    Vitals:    10/28/24 1640   BP: 98/60   Pulse:    Resp: 14   Temp: 97.4 °F (36.3  °C)   SpO2:       Body mass index is 28.79 kg/m².     General - A&Ox3, NAD, Calm    ==========================================================================    LAB EVALUATION    Lab Results   Component Value Date    GLUCOSE 111 (H) 10/27/2024    BUN 12 10/27/2024    CREATININE 1.08 (H) 10/27/2024    EGFRIFNONA 49 (A) 05/09/2022    EGFRIFAFRI >59 05/09/2022    BCR 11.1 10/27/2024    K 4.0 10/27/2024    CO2 23.0 10/27/2024    CALCIUM 8.4 (L) 10/27/2024    PROTENTOTREF 6.2 10/05/2020    ALBUMIN 3.4 (L) 10/25/2024    AST 32 10/25/2024    ALT 14 10/25/2024       Lab Results   Component Value Date    HGBA1C 5.6 10/06/2024    HGBA1C 5.8 09/02/2018     Lab Results   Component Value Date    CREATININE 1.08 (H) 10/27/2024     Results from last 7 days   Lab Units 10/28/24  1504   GLUCOSE mg/dL 102      Latest Reference Range & Units 10/05/24 12:51 10/07/24 06:48 10/12/24 01:12 10/26/24 12:24 10/27/24 19:17 10/28/24 06:13   TSH, High Sensitivity 0.55 - 4.78 uIU/ML 0.79 (E)        TSH Baseline 0.270 - 4.200 uIU/mL   0.641 0.022 (L)     Free T4 0.93 - 1.70 ng/dL  1.72 (H) (E)   3.09 (H) 2.61 (H)   T3, Free 2.00 - 4.40 pg/mL      3.07   (L): Data is abnormally low  (H): Data is abnormally high  (E): External lab result    ==========================================================================    ASSESSMENT AND PLAN    # Hypothyroidism with iatrogenic hyperthyroidism  - Reviewed blood work from 10/28/2024 and compared from 10/27/2024 and 10/26/2024  - Free T4 improving  - Currently on adjusted levothyroxine dose 100 mcg p.o. daily  - If patient continues to remain inpatient then repeat blood work on 10/31/2024 otherwise repeat thyroid function with TSH and free T4 as outpatient in 4 to 6 weeks time  - Patient is currently following endocrinology as outpatient with Dr. Thompson    Will follow with you, endocrine team will follow-up on 10/31/2024.  Rest as per primary team.    Part of this note may be an electronic  transcription/translation of spoken language to printed text using the Dragon Dictation System.     Note: Portions of this note may have been copied from previous notes but documentation have been reviewed and edited as necessary to support clinical decision making for today's visit.  The time of this note does not reflect the time I saw the patient but the time that this note was written.    ==========================================================================  Savage Monroe MD  Department of Endocrine, Diabetes and Metabolism  [place], [place]  ==========================================================================

## 2024-10-28 NOTE — PROGRESS NOTES
[facility] [address] MEDICINE SERVICE  DAILY PROGRESS NOTE    NAME: Natalee Noble  : 1950  MRN: [id number]      LOS: 1 day     PROVIDER OF SERVICE: Terrie Dao MD    Chief Complaint: Generalized weakness    Subjective:     Interval History:  History taken from: patient    No new complaint        Review of Systems:   Review of Systems   All other systems reviewed and are negative.      Objective:     Vital Signs  Temp:  [97.5 °F (36.4 °C)-98.5 °F (36.9 °C)] 97.7 °F (36.5 °C)  Heart Rate:  [54-71] 57  Resp:  [14-18] 14  BP: ()/(52-83) 92/53  Flow (L/min) (Oxygen Therapy):  [2-3] 3   Body mass index is 28.79 kg/m².    Physical Exam  Physical Exam  Constitutional:       Appearance: Normal appearance.   HENT:      Head: Normocephalic and atraumatic.      Nose: Nose normal.      Mouth/Throat:      Mouth: Mucous membranes are moist.   Eyes:      Extraocular Movements: Extraocular movements intact.      Pupils: Pupils are equal, round, and reactive to light.   Cardiovascular:      Rate and Rhythm: Normal rate and regular rhythm.   Pulmonary:      Effort: Pulmonary effort is normal.      Breath sounds: Normal breath sounds.   Abdominal:      General: Abdomen is flat. Bowel sounds are normal.      Palpations: Abdomen is soft.   Musculoskeletal:         General: Normal range of motion.      Cervical back: Normal range of motion and neck supple.   Skin:     General: Skin is warm and dry.   Neurological:      General: No focal deficit present.      Mental Status: She is alert and oriented to person, place, and time.   Psychiatric:         Mood and Affect: Mood normal.         Behavior: Behavior normal.         Thought Content: Thought content normal.         Judgment: Judgment normal.         Current Medications:  Scheduled Meds:aspirin, 81 mg, Oral, Daily  atorvastatin, 80 mg, Oral, Daily  cholecalciferol, 1,000 Units, Oral, Daily  empagliflozin, 25 mg, Oral, Daily  escitalopram, 20 mg, Oral,  Daily  ferric gluconate, 250 mg, Intravenous, Daily  fluconazole, 200 mg, Oral, Q24H  hydroxychloroquine, 200 mg, Oral, BID  [START ON 10/29/2024] levothyroxine, 100 mcg, Oral, Q AM  memantine, 10 mg, Oral, Daily  metoprolol succinate XL, 25 mg, Oral, BID  pantoprazole, 40 mg, Oral, Q AM  senna-docusate sodium, 2 tablet, Oral, BID  sodium bicarbonate, 650 mg, Oral, BID  sodium chloride, 500 mL, Intravenous, Once  sodium chloride, 10 mL, Intravenous, Q12H  topiramate, 200 mg, Oral, Daily      Continuous Infusions:heparin, 12 Units/kg/hr, Last Rate: 12 Units/kg/hr (10/28/24 0816)  phenylephrine, 0.5-3 mcg/kg/min      PRN Meds:.  acetaminophen **OR** acetaminophen **OR** acetaminophen    ALPRAZolam    senna-docusate sodium **AND** polyethylene glycol **AND** bisacodyl **AND** bisacodyl    calcium carbonate    heparin    heparin    influenza vaccine    melatonin    nitroglycerin    sodium chloride    sodium chloride    traMADol       Diagnostic Data    Results from last 7 days   Lab Units 10/28/24  0613 10/27/24  0451 10/25/24  2222 10/25/24  1710   WBC 10*3/mm3 6.42 4.22   < > 6.99   HEMOGLOBIN g/dL 8.7* 8.1*   < > 8.9*   HEMATOCRIT % 29.9* 27.4*   < > 30.8*   PLATELETS 10*3/mm3 181 154   < > 166   GLUCOSE mg/dL  --  111*   < > 98   CREATININE mg/dL  --  1.08*   < > 1.19*   BUN mg/dL  --  12   < > 16   SODIUM mmol/L  --  140   < > 141   POTASSIUM mmol/L  --  4.0   < > 4.3   AST (SGOT) U/L  --   --   --  32   ALT (SGPT) U/L  --   --   --  14   ALK PHOS U/L  --   --   --  87   BILIRUBIN mg/dL  --   --   --  0.5   ANION GAP mmol/L  --  9.0   < > 9.4    < > = values in this interval not displayed.       No radiology results for the last day      I reviewed the patient's new clinical results.    Assessment/Plan:     Active and Resolved Problems  Active Hospital Problems    Diagnosis  POA    **Generalized weakness [R53.1]  Yes    Non-STEMI (non-ST elevated myocardial infarction) [I21.4]  Unknown    Presence of Watchman left  atrial appendage closure device [Z95.818]  Unknown    Atrial fibrillation [I48.91]  Yes    Rheumatoid arthritis involving multiple sites [M06.9]  Yes    Paroxysmal A-fib [I48.0]  Unknown      Resolved Hospital Problems   No resolved problems to display.       UTI  -Complaining of bilateral flank pain and urinary frequency  -Urine culture growing yeast.  Start Diflucan for yeast UTI/yeast cystitis    Elevated troponin/NSTEMI  -Going for cardiac cath tomorrow.  Continue heparin drip, antiplatelet therapy, beta-blocker therapy, statin therapy, ARB/ACE inhibitor if renal function tolerates.    Hypotension  - Bolus normal saline.  Cardiology has ordered Alfred-Synephrine.  Consult ICU to take over care due to pressors    Generalized Weakness  -Likely multifactorial secondary to UTI and recent hospitalizations with cardiac ablation  -CT of the head showed no acute abnormality  -PT/OT consult  -On levothyroxine.  TSH checked and is low.  Free T4 is elevated.  Decrease dose of levothyroxine.  - Patient anemic.  Anemia workup reveals iron deficiency.  Start Ferrlecit.  Likely contributing to generalized weakness.      VTE Prophylaxis:  Pharmacologic & mechanical VTE prophylaxis orders are present.             Disposition Planning:     Barriers to Discharge: Pending clinical improvement  Anticipated Date of Discharge: Pending clinical course however  Place of Discharge: Home           Code Status and Medical Interventions: CPR (Attempt to Resuscitate); Full Support   Ordered at: 10/25/24 1935     Code Status (Patient has no pulse and is not breathing):    CPR (Attempt to Resuscitate)     Medical Interventions (Patient has pulse or is breathing):    Full Support       Signature: Electronically signed by Terrie Dao MD, 10/28/24, 15:14 EDT.  [facility] Hospitalist Team

## 2024-10-28 NOTE — CASE MANAGEMENT/SOCIAL WORK
Discharge Planning Assessment   Live     Patient Name: Natalee Noble  MRN: [id number]  Today's Date: 10/28/2024    Admit Date: 10/25/2024    Plan: Plan to dc to [facility],  No Precert required.  No PASRR required.   Discharge Needs Assessment       Row Name 10/28/24 1231       Living Environment    People in Home child(siobhan), adult    Name(s) of People in Home Augustin - son    Current Living Arrangements home    Potentially Unsafe Housing Conditions none    In the past 12 months has the electric, gas, oil, or water company threatened to shut off services in your home? No    Primary Care Provided by self    Provides Primary Care For no one    Family Caregiver if Needed child(siobhan), adult    Family Caregiver Names Augustin - son and Gaurang - dgt    Quality of Family Relationships helpful;involved;supportive    Able to Return to Prior Arrangements yes       Resource/Environmental Concerns    Resource/Environmental Concerns none    Transportation Concerns none       Transportation Needs    In the past 12 months, has lack of transportation kept you from medical appointments or from getting medications? no    In the past 12 months, has lack of transportation kept you from meetings, work, or from getting things needed for daily living? No       Food Insecurity    Within the past 12 months, you worried that your food would run out before you got the money to buy more. Never true    Within the past 12 months, the food you bought just didn't last and you didn't have money to get more. Never true       Transition Planning    Patient/Family Anticipates Transition to home with family    Patient/Family Anticipated Services at Transition none    Transportation Anticipated car, drives self;family or friend will provide       Discharge Needs Assessment    Readmission Within the Last 30 Days no previous admission in last 30 days    Equipment Currently Used at Home walker, rolling    Concerns to be Addressed discharge planning     Anticipated Changes Related to Illness none    Equipment Needed After Discharge none                   Discharge Plan       Row Name 10/28/24 1232       Plan    Plan Plan to dc to [facility],  No Precert required.  No PASRR required.    Patient/Family in Agreement with Plan yes    Plan Comments CM met with patient at bedside. Patient A&Ox4. Patient lives at home with son Augustin. Family will transport at discharge. Patient performs ADLs but does have a RW at home. PCP and pharmacy confirmed. Agreeable to M2B.  Denies financial assistance needs for medication and/or food. Patient was dc to [facility] 10/18, came in for Watchman/Ablation 10/22 then went home with [facility] PT (have not seen her yet).  Agreeable to dc to [facility], referral in [facility], liajeff Gottlieb notified, pending acceptance.  DC Barriers:  3L NC, Heparin gtt, monitor labs, cardiac monitoring, Endo/Cardio following.                  Continued Care and Services - Admitted Since 10/25/2024       Destination       Service Provider Request Status Services Address Phone Fax Patient Preferred    [facility] - Request Sent -- [address] [id number] [phone] --                  Expected Discharge Date and Time       Expected Discharge Date Expected Discharge Time    Oct 30, 2024            Demographic Summary       Row Name 10/28/24 1231       General Information    Admission Type inpatient    Arrived From emergency department    Required Notices Provided Important Message from Medicare    Referral Source admission list    Reason for Consult discharge planning    Preferred Language English                   Functional Status       Row Name 10/28/24 1231       Functional Status    Usual Activity Tolerance moderate    Current Activity Tolerance moderate       Functional Status, IADL    Medications independent    Meal Preparation independent    Housekeeping independent    Laundry  independent    Shopping independent       Mental Status    General Appearance WDL WDL       Mental Status Summary    Recent Changes in Mental Status/Cognitive Functioning no changes                Patient Forms       Row Name 10/28/24 1230       Patient Forms    Important Message from Medicare (IMM) Delivered  IMM reviewed, signed, copy given 10/28    Delivered to Patient    Method of delivery In person                      ENRIQUE Multani RN  ICU/CVU   O: [phone]  C: [phone]  [email or web address]

## 2024-10-28 NOTE — PROGRESS NOTES
Cardiology Progress Note    Patient Identification:  Name: Natalee Noble  Age: 74 y.o.  Sex: female  :  1950  MRN: [id number]                 Follow Up / Chief Complaint: Hypertension, A-fib, CHF  Chief Complaint   Patient presents with    Weakness - Generalized     Weakness, started this morning, Pt states was unable to get up of commode and slid to the floor.  Pt has been on the floor all morning family just got up up. Having pain to shoulders, neck hips and back  Family states pt is pale,       Interval History: Patient recently was in [facility] with newly diagnosed LV dysfunction was started on guideline directed medical therapy presented to ER with hypotension and recurrent syncopal episodes.    Patient underwent MELISSA on 10/15/2024 that showed EF of 56 to 60% with moderate to severe TR left atrial enlargement and right atrial enlargement.      Patient had Watchman and A-fib ablation on        NP note: Patient seen and examined.  Discussed plans for cardiac cath tomorrow    Electronically signed by GREGORIA Yuen, 10/28/24, 5:03 PM EDT.    Cardiology attending addendum :    I have personally performed a face-to-face diagnostic evaluation, physical exam and reviewed data on this patient.  I have reviewed documentation done by me and nurse practitioner  and corrected as needed.  And agree with the different components of documentation.Greater than 50% of the time spent in the care of this patient was provided by attending consultant/me.                 Subjective: Patient seen and examined.  Chart reviewed.  Labs reviewed.  Events noted.  Patient became tachycardic and hypotensive was transferred to C[facility]U.  Is feeling poorly.  This morning blood pressure was in 70s in spite of fluid boluses.      Objective:  10/13/2024: EKG done March 10, 2013 2024 reveals A-fib with a rate of 123 bpm  10/14/2024: Creatinine 1.22 hemoglobin 10.7  10/15/2024: Creatinine 1.30 EGFR 43.2 glucose 122 hemoglobin  10.7  10/16/2024: Creatinine 1.24 EGFR 45.8 hemoglobin 11.1  10/17/2024: Creatinine 1.21 EGFR 47.1 hemoglobin 10.4    History of present illness:      Natalee Noble is a 74-year-old female with a medical history to include:     Atrial fibrillation  [id number] SCORE   [id number] Score: 6 (10/12/2024  9:08 AM)     Long-term anticoagulation  Intolerant to flecainide and amiodarone in the past  HFrEF  Hypertension  Dyslipidemia  CKD stage III  History of CVA  Anxiety/depression  GERD  Diverticulosis  Stroke     Who presented to [facility] on 10/11/2024 following syncopal episode.  Patient reports that she was recently hospitalized at [facility] for 6 days for respiratory infection, CHF and atrial fibrillation.  She was discharged on Thursday.  Echocardiogram completed 10/5/2024 shows reduced LVEF of 35 to 40%, moderate global hypokinesis of left ventricle and mild to moderate mitral, tricuspid, and pulmonic valve regurgitation.  While hospitalized she was treated with IV antibiotics and started on Entresto, spironolactone, digoxin.  She reports 2 syncopal episodes yesterday.  Associated symptoms of shortness of breath and nausea.  She denies chest pain, palpitations, edema.  Radiology consulted for hypotension and syncope.  Upon admission patient noted to be significantly hypotensive at 69/47.  Pertinent labs include troponin 64, digoxin 1.15 creatinine 1.44, TSH unremarkable.  CXR without evidence of active disease.  Patient follows with Dr. Ordaz for cardiology.  She is also scheduled to see an electrophysiologist in near future to discuss atrial fibrilation ablation.  She reports she is intolerable to amiodarone and flecainide.           Assessment:  :     Dizziness near syncope  Hypotension  Recurrent syncope  Elevated troponin  Chronic HFrEF due to systolic dysfunction from dilated cardiomyopathy  Paroxysmal atrial fibrillation on long-term anticoagulation/Xarelto  Dyslipidemia  CKD  History of CVA         Recommendations / Plan:        Patient says she has recurrent syncope since age 12 with migraine headaches.  Recently was diagnosed with heart failure due to reduced ejection fraction LVEF of 35-40%, was put on guideline directed medical therapy with with Entresto, Aldactone, digoxin, metoprolol succinate, Farxiga.  Patient's blood pressure was low and was having near syncope and dizziness.  Also has been having nausea.  Patient is severely hypotensive.  Will start on intravenous vasopressors with Alfred-Synephrine  Discussed with intensivist team attending nurse practitioner Tiffany day  Will hold off on heparin since patient is developing low hematocrit.  Patient's troponin is elevated previously had ischemic workup.  I suspect she is developing may be Takotsubo cardiomyopathy.  Will follow-up and schedule her for cardiac catheter risk-benefit alternatives discussed.    Copied text in this portion of the note has been reviewed and is accurate as of 10/28/2024    Past Medical History:  Past Medical History:   Diagnosis Date    Anxiety and depression     Arthritis     Back pain     CRF (chronic renal failure), stage 3 (moderate)     Diverticulosis     Essential hypertension     Family history of colon cancer     Family history of colonic polyps     Fractures     R tibia/fibula; L ankle    GERD (gastroesophageal reflux disease)     History of adenomatous polyp of colon     History of cardiac cath     1/28/19 left main no significant disease. LAD with no significant disease. LCX no significant disease. RCA no significant disease.    History of cardiac monitoring     2/01/19 - ZIO PATCH - Underlying rhythm is sinus at 48-94 bpm. PSVT x23 with fastest 167 bpm, longest 24.4 seconds.   10/05/2018- ZIO PATCH - NSR most of time but did have episode of A fib ranging from .    History of colon polyps     History of echocardiogram     9/2018: Left ventricular systolic function is normal. EF 50%. Left ventricular diastolic  dysfunction consistent with ipaired relaxation. Left atrial cavity size is mildly dilated. Mild MR. Mild to moderate TR. There is abnormal thickening noted on the RV that may represent a vegetation-this was discussed with Dr. Hammer and Dr. Reilly. No patent foramen ovale. No further work up needed at this time    Hyperlipidemia     Kidney disorder     Migraine     Migraines     Neck pain     Stroke 2018     Past Surgical History:  Past Surgical History:   Procedure Laterality Date    ABDOMINOPLASTY      ATRIAL APPENDAGE EXCLUSION LEFT WITH TRANSESOPHAGEAL ECHOCARDIOGRAM N/A 10/22/2024    Procedure: Atrial Appendage Occlusion;  Surgeon: Sourav Pompa MD;  Location: [facility] CATH INVASIVE LOCATION;  Service: Cardiovascular;  Laterality: N/A;    BACK SURGERY      L2-5 fusion 2012    CARDIAC ELECTROPHYSIOLOGY PROCEDURE Right 10/22/2024    Procedure: Ablation atrial fibrillation;  Surgeon: Sourav Pompa MD;  Location: [facility] CATH INVASIVE LOCATION;  Service: Cardiovascular;  Laterality: Right;    CHOLECYSTECTOMY      COLONOSCOPY  11/24/2014    Diverticulosis; Repeat 5 years    COLONOSCOPY  11/09/2010    Diverticulosis; Repeat 4 years    COLONOSCOPY  09/18/2007    Dr. Monzon-Normal; Repeat 3 years    COLONOSCOPY  06/02/2005    Dr. Monzon-Diminuitive polypectomy above the cecum; Otherwise normal colonoscopy; Repeat 2 years    COLONOSCOPY N/A 11/01/2019    Diverticulosis in the left colon; Two 5-6mm polyps in the cecum-path shows one tubular adenomatous and on hyperplastic polyp; One 8mm tubular adenomatous polyp in the ascending colon; Two 4-5mm tubular adenomatous polyps in the transverse colon; Repeat 3 years    COLONOSCOPY N/A 6/12/2023    Procedure: COLONOSCOPY WITH ANESTHESIA;  Surgeon: Susan Lord MD;  Location:  [facility];  Service: Gastroenterology;  Laterality: N/A;  preop hx of polyps   postop; polyps   PCP Woody Valentin MD    CYSTOCELE REPAIR      Cystocele and rectocele repair     ENDOSCOPY  09/28/2012    LA grade C esophagitis; HH    ENDOSCOPY N/A 11/01/2019    Small HH; Non-erosive gastritis-biopsied; Normal examined duodenum    ENDOSCOPY N/A 10/13/2024    Procedure: ESOPHAGOGASTRODUODENOSCOPY WITH BIOPSY X 1 AREA;  Surgeon: Miguel Vega MD;  Location: [facility] ENDOSCOPY;  Service: Gastroenterology;  Laterality: N/A;  Hiatal hernia, esophagitis, gastritis    FIBULA FRACTURE SURGERY      HYSTERECTOMY      NECK SURGERY  2012    ACDF C4-7    ORIF TIBIA FRACTURE Right         Social History:   Social History     Tobacco Use    Smoking status: Never    Smokeless tobacco: Never   Substance Use Topics    Alcohol use: No      Family History:  Family History   Problem Relation Age of Onset    Colon cancer Father 65    Colon polyps Daughter 38    Esophageal cancer Neg Hx     Liver cancer Neg Hx     Liver disease Neg Hx     Rectal cancer Neg Hx     Stomach cancer Neg Hx           Allergies:  Allergies   Allergen Reactions    Benzoin Anaphylaxis and Swelling     States when used on her neck it shut off her airway  Huge abscesses with surgery      Iodine Other (See Comments)     PT UNABLE TO TELL RN ABOUT REACTION AT THIS TIME, BUT FAMILY STATES PT HAD A REACTION TO BEING CLEANSED WITH IODINE IN SURGERY  IOBAN - used on neck, swelled to the point of shutting off airway    Vancomycin Other (See Comments)     Kidney failure  Vancomycin induced acute kidney injury      Amiodarone Other (See Comments)     Tremor- hand and face     Levaquin [Levofloxacin] Unknown (See Comments)     Unknown    Sulfa Antibiotics      Scheduled Meds:  aspirin, 81 mg, Daily  atorvastatin, 80 mg, Daily  cholecalciferol, 1,000 Units, Daily  empagliflozin, 25 mg, Daily  escitalopram, 20 mg, Daily  ferric gluconate, 250 mg, Daily  fluconazole, 200 mg, Q24H  hydroxychloroquine, 200 mg, BID  [START ON 10/29/2024] levothyroxine, 100 mcg, Q AM  memantine, 10 mg, Daily  metoprolol succinate XL, 25 mg, BID  pantoprazole, 40  "mg, Q AM  senna-docusate sodium, 2 tablet, BID  sodium bicarbonate, 650 mg, BID  sodium chloride, 10 mL, Q12H  sodium chloride, 10 mL, Q12H  topiramate, 200 mg, Daily            Review of Systems:   Review of Systems   Cardiovascular:  Negative for chest pain.   Neurological:  Positive for dizziness and weakness.            Constitutional:  Temp:  [97.4 °F (36.3 °C)-98 °F (36.7 °C)] 97.4 °F (36.3 °C)  Heart Rate:  [54-71] 57  Resp:  [14-18] 14  BP: ()/(52-83) 98/60    Physical Exam   BP 98/60   Pulse 57   Temp 97.4 °F (36.3 °C) (Axillary)   Resp 14   Ht 165.1 cm (65\")   Wt 78.5 kg (173 lb)   SpO2 92%   BMI 28.79 kg/m²   General:  Appears in no acute distress  Eyes: Sclera is anicteric,  conjunctiva is clear   HEENT:  No JVD. Thyroid not visibly enlarged. No mucosal pallor or cyanosis  Respiratory: Respirations regular and unlabored at rest.  Clear to auscultation  Cardiovascular: S1,S2, irregularly irregular rate  Gastrointestinal: Abdomen nondistended.  Musculoskeletal:  No abnormal movements  Extremities: No digital clubbing or cyanosis  Skin: Color pink.   Neuro: Alert and awake.    INTAKE AND OUTPUT:    Intake/Output Summary (Last 24 hours) at 10/28/2024 1934  Last data filed at 10/28/2024 0600  Gross per 24 hour   Intake 350 ml   Output 900 ml   Net -550 ml       Cardiographics  Telemetry: A-fib    ECG:   ECG 12 Lead ED Triage Standing Order; Chest Pain   Final Result   HEART RATE=64  bpm   RR Rsvqlrld=145  ms   NV Interval=62  ms   P Horizontal Axis=-10  deg   P Front Axis=Invalid  deg   QRSD Interval=87  ms   QT Abhsegyx=603  ms   BPvO=159  ms   QRS Axis=69  deg   T Wave Axis=53  deg   - NORMAL ECG -   Sinus rhythm   When compared with ECG of 23-Oct-2024 03:26:00,   Nonspecific significant change   Electronically Signed By: Harry Ortega (Vicente) 2024-10-26 08:09:03   Date and Time of Study:2024-10-25 14:36:42      Telemetry Scan   Final Result      Telemetry Scan   Final Result      Telemetry Scan "   Final Result      Telemetry Scan   Final Result      Telemetry Scan   Final Result      Telemetry Scan   Final Result      Telemetry Scan   Final Result      Telemetry Scan   Final Result      Telemetry Scan   Final Result      Telemetry Scan   Final Result      Telemetry Scan   Final Result      Telemetry Scan   Final Result      Telemetry Scan   Final Result      Telemetry Scan   Final Result      Telemetry Scan   Final Result      Telemetry Scan   Final Result        I have personally reviewed EKG    Echocardiogram: Results for orders placed during the hospital encounter of 10/22/24    Intra-Op Structural Heart MELISSA (Cardiology Read)    Interpretation Summary  MELISSA   procedure note    Procedure:  MELISSA    Indication:   A-fib, watchman implantation    Date of procedure  : 10/22/2024    :  Dr. Josemanuel Sheirff    Description of procedure:    Under conscious sedation given by anesthesiology and local anesthesia, a MELISSA probe was advanced into the esophagus and into the stomach 2D, color images were obtained, after a timeout was performed.  Patient underwent watchman implantation with septal puncture done with assistance of MELISSA.  After implantation of Watchman measurements were obtained to look at compression of left atrial appendage occluder device.  Post watchman implantation he was made sure there was no leak on color or clots of the device and device had good compression.  Then repeat sweep of MELISSA was done to make sure there was no pericardial effusion.  Patient tolerated procedure well complications were none.    Complications: none    Results:    Left atrial appendage was chicken wing morphology.  Left atrial appendage closure device watchman was seated well.  There was good compression over 15%.  There was no leak around the watchman seen.  No thrombus seen on watchman.  No significant pericardial effusion seen.  Normal LV size and contractility EF of over 55%      Recommendations/plan:    Clinical  "correlation recommended      Lab Review   I have reviewed the labs  Results from last 7 days   Lab Units 10/26/24  1224 10/25/24  1911 10/25/24  1710   CK TOTAL U/L  --   --  68   HSTROP T ng/L 359* 1,146* 1,352*         Results from last 7 days   Lab Units 10/27/24  0451   SODIUM mmol/L 140   POTASSIUM mmol/L 4.0   BUN mg/dL 12   CREATININE mg/dL 1.08*   CALCIUM mg/dL 8.4*         Results from last 7 days   Lab Units 10/28/24  0613 10/27/24  0451 10/26/24  0448   WBC 10*3/mm3 6.42 4.22 4.12   HEMOGLOBIN g/dL 8.7* 8.1* 8.2*   HEMATOCRIT % 29.9* 27.4* 27.5*   PLATELETS 10*3/mm3 181 154 152     Results from last 7 days   Lab Units 10/28/24  1430 10/28/24  0613 10/27/24  1917 10/27/24  1259   INR   --   --   --  1.36*   APTT seconds >139.0* 41.5* >139.0* 36.5*       RADIOLOGY:  Imaging Results (Last 24 Hours)       ** No results found for the last 24 hours. **                  )10/28/2024  MD TOMAS Vargas/Transcription:   \"Dictated utilizing Dragon dictation\".   "

## 2024-10-29 PROBLEM — I21.4 NON-STEMI (NON-ST ELEVATED MYOCARDIAL INFARCTION): Chronic | Status: ACTIVE | Noted: 2024-10-25

## 2024-10-29 LAB
ANION GAP SERPL CALCULATED.3IONS-SCNC: 8.1 MMOL/L (ref 5–15)
APTT PPP: 43.9 SECONDS (ref 61–76.5)
BASOPHILS # BLD AUTO: 0.01 10*3/MM3 (ref 0–0.2)
BASOPHILS NFR BLD AUTO: 0.2 % (ref 0–1.5)
BUN SERPL-MCNC: 10 MG/DL (ref 8–23)
BUN/CREAT SERPL: 8.8 (ref 7–25)
CALCIUM SPEC-SCNC: 8.7 MG/DL (ref 8.6–10.5)
CHLORIDE SERPL-SCNC: 109 MMOL/L (ref 98–107)
CO2 SERPL-SCNC: 23.9 MMOL/L (ref 22–29)
CREAT SERPL-MCNC: 1.14 MG/DL (ref 0.57–1)
DEPRECATED RDW RBC AUTO: 60.4 FL (ref 37–54)
EGFRCR SERPLBLD CKD-EPI 2021: 50.6 ML/MIN/1.73
EOSINOPHIL # BLD AUTO: 0.26 10*3/MM3 (ref 0–0.4)
EOSINOPHIL NFR BLD AUTO: 6.4 % (ref 0.3–6.2)
ERYTHROCYTE [DISTWIDTH] IN BLOOD BY AUTOMATED COUNT: 17.6 % (ref 12.3–15.4)
GLUCOSE SERPL-MCNC: 85 MG/DL (ref 65–99)
HCT VFR BLD AUTO: 30.2 % (ref 34–46.6)
HGB BLD-MCNC: 8.7 G/DL (ref 12–15.9)
IMM GRANULOCYTES # BLD AUTO: 0.03 10*3/MM3 (ref 0–0.05)
IMM GRANULOCYTES NFR BLD AUTO: 0.7 % (ref 0–0.5)
INR PPP: 1.14 (ref 0.93–1.1)
LYMPHOCYTES # BLD AUTO: 0.56 10*3/MM3 (ref 0.7–3.1)
LYMPHOCYTES NFR BLD AUTO: 13.7 % (ref 19.6–45.3)
MCH RBC QN AUTO: 27.7 PG (ref 26.6–33)
MCHC RBC AUTO-ENTMCNC: 28.8 G/DL (ref 31.5–35.7)
MCV RBC AUTO: 96.2 FL (ref 79–97)
MONOCYTES # BLD AUTO: 0.45 10*3/MM3 (ref 0.1–0.9)
MONOCYTES NFR BLD AUTO: 11 % (ref 5–12)
NEUTROPHILS NFR BLD AUTO: 2.78 10*3/MM3 (ref 1.7–7)
NEUTROPHILS NFR BLD AUTO: 68 % (ref 42.7–76)
NRBC BLD AUTO-RTO: 0 /100 WBC (ref 0–0.2)
PLATELET # BLD AUTO: 201 10*3/MM3 (ref 140–450)
PMV BLD AUTO: 10 FL (ref 6–12)
POTASSIUM SERPL-SCNC: 4.4 MMOL/L (ref 3.5–5.2)
PROTHROMBIN TIME: 12.3 SECONDS (ref 9.6–11.7)
RBC # BLD AUTO: 3.14 10*6/MM3 (ref 3.77–5.28)
SODIUM SERPL-SCNC: 141 MMOL/L (ref 136–145)
WBC NRBC COR # BLD AUTO: 4.09 10*3/MM3 (ref 3.4–10.8)

## 2024-10-29 PROCEDURE — 25810000003 SODIUM CHLORIDE 0.9 % SOLUTION: Performed by: INTERNAL MEDICINE

## 2024-10-29 PROCEDURE — B2151ZZ FLUOROSCOPY OF LEFT HEART USING LOW OSMOLAR CONTRAST: ICD-10-PCS | Performed by: INTERNAL MEDICINE

## 2024-10-29 PROCEDURE — 93458 L HRT ARTERY/VENTRICLE ANGIO: CPT | Performed by: INTERNAL MEDICINE

## 2024-10-29 PROCEDURE — C1894 INTRO/SHEATH, NON-LASER: HCPCS | Performed by: INTERNAL MEDICINE

## 2024-10-29 PROCEDURE — 99232 SBSQ HOSP IP/OBS MODERATE 35: CPT | Performed by: INTERNAL MEDICINE

## 2024-10-29 PROCEDURE — 80048 BASIC METABOLIC PNL TOTAL CA: CPT | Performed by: NURSE PRACTITIONER

## 2024-10-29 PROCEDURE — 25010000002 DIPHENHYDRAMINE PER 50 MG: Performed by: INTERNAL MEDICINE

## 2024-10-29 PROCEDURE — C1769 GUIDE WIRE: HCPCS | Performed by: INTERNAL MEDICINE

## 2024-10-29 PROCEDURE — 85025 COMPLETE CBC W/AUTO DIFF WBC: CPT | Performed by: INTERNAL MEDICINE

## 2024-10-29 PROCEDURE — C1760 CLOSURE DEV, VASC: HCPCS

## 2024-10-29 PROCEDURE — 99152 MOD SED SAME PHYS/QHP 5/>YRS: CPT | Performed by: INTERNAL MEDICINE

## 2024-10-29 PROCEDURE — 4A023N7 MEASUREMENT OF CARDIAC SAMPLING AND PRESSURE, LEFT HEART, PERCUTANEOUS APPROACH: ICD-10-PCS | Performed by: INTERNAL MEDICINE

## 2024-10-29 PROCEDURE — 85730 THROMBOPLASTIN TIME PARTIAL: CPT | Performed by: INTERNAL MEDICINE

## 2024-10-29 PROCEDURE — 85610 PROTHROMBIN TIME: CPT | Performed by: NURSE PRACTITIONER

## 2024-10-29 PROCEDURE — 25510000001 IOPAMIDOL PER 1 ML: Performed by: INTERNAL MEDICINE

## 2024-10-29 PROCEDURE — 25010000002 LIDOCAINE 1 % SOLUTION: Performed by: INTERNAL MEDICINE

## 2024-10-29 PROCEDURE — 25010000002 NA FERRIC GLUC CPLX PER 12.5 MG: Performed by: INTERNAL MEDICINE

## 2024-10-29 PROCEDURE — B2111ZZ FLUOROSCOPY OF MULTIPLE CORONARY ARTERIES USING LOW OSMOLAR CONTRAST: ICD-10-PCS | Performed by: INTERNAL MEDICINE

## 2024-10-29 RX ORDER — IOPAMIDOL 755 MG/ML
INJECTION, SOLUTION INTRAVASCULAR
Status: DISCONTINUED | OUTPATIENT
Start: 2024-10-29 | End: 2024-10-29 | Stop reason: HOSPADM

## 2024-10-29 RX ORDER — OXYCODONE HYDROCHLORIDE 5 MG/1
5 TABLET ORAL EVERY 4 HOURS PRN
Status: DISCONTINUED | OUTPATIENT
Start: 2024-10-29 | End: 2024-10-31 | Stop reason: HOSPADM

## 2024-10-29 RX ORDER — DIPHENHYDRAMINE HYDROCHLORIDE 50 MG/ML
INJECTION INTRAMUSCULAR; INTRAVENOUS
Status: DISCONTINUED | OUTPATIENT
Start: 2024-10-29 | End: 2024-10-29 | Stop reason: HOSPADM

## 2024-10-29 RX ORDER — ACETAMINOPHEN 325 MG/1
650 TABLET ORAL EVERY 4 HOURS PRN
Status: DISCONTINUED | OUTPATIENT
Start: 2024-10-29 | End: 2024-10-29 | Stop reason: SDUPTHER

## 2024-10-29 RX ORDER — SODIUM CHLORIDE 9 MG/ML
3 INJECTION, SOLUTION INTRAVENOUS CONTINUOUS
Status: DISPENSED | OUTPATIENT
Start: 2024-10-29 | End: 2024-10-29

## 2024-10-29 RX ORDER — LIDOCAINE HYDROCHLORIDE 10 MG/ML
INJECTION, SOLUTION INFILTRATION; PERINEURAL
Status: DISCONTINUED | OUTPATIENT
Start: 2024-10-29 | End: 2024-10-29 | Stop reason: HOSPADM

## 2024-10-29 RX ADMIN — HYDROXYCHLOROQUINE SULFATE 200 MG: 200 TABLET ORAL at 20:55

## 2024-10-29 RX ADMIN — ASPIRIN 81 MG: 81 TABLET, COATED ORAL at 08:46

## 2024-10-29 RX ADMIN — SODIUM CHLORIDE 250 MG: 9 INJECTION, SOLUTION INTRAVENOUS at 08:46

## 2024-10-29 RX ADMIN — METOPROLOL SUCCINATE 25 MG: 25 TABLET, FILM COATED, EXTENDED RELEASE ORAL at 08:46

## 2024-10-29 RX ADMIN — Medication 10 ML: at 08:48

## 2024-10-29 RX ADMIN — FLUCONAZOLE 200 MG: 100 TABLET ORAL at 18:16

## 2024-10-29 RX ADMIN — ESCITALOPRAM OXALATE 20 MG: 10 TABLET ORAL at 08:46

## 2024-10-29 RX ADMIN — SODIUM BICARBONATE 650 MG: 650 TABLET ORAL at 08:46

## 2024-10-29 RX ADMIN — SODIUM BICARBONATE 650 MG: 650 TABLET ORAL at 20:55

## 2024-10-29 RX ADMIN — ALPRAZOLAM 1 MG: 1 TABLET ORAL at 21:01

## 2024-10-29 RX ADMIN — SODIUM CHLORIDE 3 ML/KG/HR: 9 INJECTION, SOLUTION INTRAVENOUS at 18:41

## 2024-10-29 RX ADMIN — Medication 10 ML: at 20:55

## 2024-10-29 RX ADMIN — ACETAMINOPHEN 650 MG: 325 TABLET, FILM COATED ORAL at 20:55

## 2024-10-29 RX ADMIN — SENNOSIDES AND DOCUSATE SODIUM 2 TABLET: 50; 8.6 TABLET ORAL at 20:55

## 2024-10-29 RX ADMIN — LEVOTHYROXINE SODIUM 100 MCG: 0.1 TABLET ORAL at 06:05

## 2024-10-29 RX ADMIN — TOPIRAMATE 200 MG: 100 TABLET, FILM COATED ORAL at 08:46

## 2024-10-29 RX ADMIN — PANTOPRAZOLE SODIUM 40 MG: 40 TABLET, DELAYED RELEASE ORAL at 06:05

## 2024-10-29 RX ADMIN — SENNOSIDES AND DOCUSATE SODIUM 2 TABLET: 50; 8.6 TABLET ORAL at 08:46

## 2024-10-29 RX ADMIN — EMPAGLIFLOZIN 25 MG: 25 TABLET, FILM COATED ORAL at 08:46

## 2024-10-29 RX ADMIN — Medication 10 ML: at 08:47

## 2024-10-29 RX ADMIN — METOPROLOL SUCCINATE 25 MG: 25 TABLET, FILM COATED, EXTENDED RELEASE ORAL at 20:55

## 2024-10-29 RX ADMIN — MEMANTINE 10 MG: 10 TABLET ORAL at 08:46

## 2024-10-29 RX ADMIN — ATORVASTATIN CALCIUM 80 MG: 40 TABLET, FILM COATED ORAL at 08:46

## 2024-10-29 RX ADMIN — HYDROXYCHLOROQUINE SULFATE 200 MG: 200 TABLET ORAL at 08:46

## 2024-10-29 RX ADMIN — Medication 1000 UNITS: at 08:46

## 2024-10-29 NOTE — CASE MANAGEMENT/SOCIAL WORK
Continued Stay Note  [facility]     Patient Name: Natalee Nbole  MRN: [id number]  Today's Date: 10/29/2024    Admit Date: 10/25/2024    Plan: Plan to dc to [facility], No Precert required. No PASRR required.   Discharge Plan       Row Name 10/29/24 0903       Plan    Plan Plan to dc to [facility], No Precert required. No PASRR required.    Plan Comments DC Barriers: Plan [facility] 10/29, 3l NC, Midline, IV IOron, ext cath, monitor labs, Endo/Cardio following.               Expected Discharge Date and Time       Expected Discharge Date Expected Discharge Time    Oct 30, 2024               ENRIQUE Multani RN  ICU/CVU   O: [phone]  C: [phone]  [email or web address]

## 2024-10-29 NOTE — SIGNIFICANT NOTE
10/29/24 1406   OTHER   Discipline physical therapist   Rehab Time/Intention   Session Not Performed other (see comments)  (Pt was scheduled for cardiac cath at 14:00; PT will hold off on PT this PM.)   Therapy Assessment/Plan (PT)   Criteria for Skilled Interventions Met (PT) yes   Recommendation   PT - Next Appointment 10/30/24

## 2024-10-29 NOTE — SIGNIFICANT NOTE
Discussed wit hospitalist, signing off for critical care, as patient did not require vasopressors.  Hospitalist will take over as attending.      Electronically signed by GREGORIA Diane, 10/29/24, 9:13 AM EDT.

## 2024-10-29 NOTE — PLAN OF CARE
Goal Outcome Evaluation:  Plan of Care Reviewed With: patient        Progress: no change  Outcome Evaluation: Patient remains on 2L NC; patient went to cath lab for heart cath this shift; see results.  Ambulating to restroom with assist x1 and walker.  Plans for inpatient rehab at discharge when medically ready.

## 2024-10-29 NOTE — SIGNIFICANT NOTE
10/29/24 1408   OTHER   Discipline occupational therapist   Rehab Time/Intention   Session Not Performed patient/family declined treatment  (Pt not feeling well after NPO all day for heart cath procedure. Anticipating heart cath at 2:00pm and pt seen at 2:00pm. Will follow up tomorrow)   Therapy Assessment/Plan (PT)   Criteria for Skilled Interventions Met (PT) yes;skilled treatment is necessary   Recommendation   OT - Next Appointment 10/30/24

## 2024-10-29 NOTE — PROGRESS NOTES
[facility] MEDICINE SERVICE  DAILY PROGRESS NOTE    NAME: Natalee Noble  : 1950  MRN: [id number]      LOS: 2 days     PROVIDER OF SERVICE: Terrie Dao MD    Chief Complaint: Non-STEMI (non-ST elevated myocardial infarction)    Subjective:     Interval History:  History taken from: patient    No new complaint        Review of Systems:   Review of Systems   All other systems reviewed and are negative.      Objective:     Vital Signs  Temp:  [97 °F (36.1 °C)-98.5 °F (36.9 °C)] 98.5 °F (36.9 °C)  Heart Rate:  [60-76] 70  Resp:  [15-18] 16  BP: (113-143)/(55-82) 139/74  Flow (L/min) (Oxygen Therapy):  [2-3] 2   Body mass index is 28.79 kg/m².    Physical Exam  Physical Exam  Constitutional:       Appearance: Normal appearance.   HENT:      Head: Normocephalic and atraumatic.      Nose: Nose normal.      Mouth/Throat:      Mouth: Mucous membranes are moist.   Eyes:      Extraocular Movements: Extraocular movements intact.      Pupils: Pupils are equal, round, and reactive to light.   Cardiovascular:      Rate and Rhythm: Normal rate and regular rhythm.   Pulmonary:      Effort: Pulmonary effort is normal.      Breath sounds: Normal breath sounds.   Abdominal:      General: Abdomen is flat. Bowel sounds are normal.      Palpations: Abdomen is soft.   Musculoskeletal:         General: Normal range of motion.      Cervical back: Normal range of motion and neck supple.   Skin:     General: Skin is warm and dry.   Neurological:      General: No focal deficit present.      Mental Status: She is alert and oriented to person, place, and time.   Psychiatric:         Mood and Affect: Mood normal.         Behavior: Behavior normal.         Thought Content: Thought content normal.         Judgment: Judgment normal.         Current Medications:  Scheduled Meds:[Transfer Hold] aspirin, 81 mg, Oral, Daily  [Transfer Hold] atorvastatin, 80 mg, Oral, Daily  [Transfer Hold] cholecalciferol, 1,000 Units, Oral,  Daily  [Transfer Hold] empagliflozin, 25 mg, Oral, Daily  [Transfer Hold] escitalopram, 20 mg, Oral, Daily  [Transfer Hold] ferric gluconate, 250 mg, Intravenous, Daily  fluconazole, 200 mg, Oral, Q24H  [Transfer Hold] hydroxychloroquine, 200 mg, Oral, BID  [Transfer Hold] levothyroxine, 100 mcg, Oral, Q AM  [Transfer Hold] memantine, 10 mg, Oral, Daily  metoprolol succinate XL, 25 mg, Oral, BID  [Transfer Hold] pantoprazole, 40 mg, Oral, Q AM  [Transfer Hold] senna-docusate sodium, 2 tablet, Oral, BID  [Transfer Hold] sodium bicarbonate, 650 mg, Oral, BID  [Transfer Hold] sodium chloride, 10 mL, Intravenous, Q12H  [Transfer Hold] sodium chloride, 10 mL, Intravenous, Q12H  topiramate, 200 mg, Oral, Daily      Continuous Infusions:     PRN Meds:.  [Transfer Hold] acetaminophen **OR** [Transfer Hold] acetaminophen **OR** [Transfer Hold] acetaminophen    [Transfer Hold] ALPRAZolam    [Transfer Hold] senna-docusate sodium **AND** [Transfer Hold] polyethylene glycol **AND** [Transfer Hold] bisacodyl **AND** [Transfer Hold] bisacodyl    [Transfer Hold] calcium carbonate    [Transfer Hold] influenza vaccine    [Transfer Hold] melatonin    [Transfer Hold] nitroglycerin    [Transfer Hold] oxyCODONE    [Transfer Hold] sodium chloride    [Transfer Hold] sodium chloride    [Transfer Hold] sodium chloride       Diagnostic Data    Results from last 7 days   Lab Units 10/29/24  1034 10/29/24  0418 10/25/24  2222 10/25/24  1710   WBC 10*3/mm3  --  4.09   < > 6.99   HEMOGLOBIN g/dL  --  8.7*   < > 8.9*   HEMATOCRIT %  --  30.2*   < > 30.8*   PLATELETS 10*3/mm3  --  201   < > 166   GLUCOSE mg/dL 85  --    < > 98   CREATININE mg/dL 1.14*  --    < > 1.19*   BUN mg/dL 10  --    < > 16   SODIUM mmol/L 141  --    < > 141   POTASSIUM mmol/L 4.4  --    < > 4.3   AST (SGOT) U/L  --   --   --  32   ALT (SGPT) U/L  --   --   --  14   ALK PHOS U/L  --   --   --  87   BILIRUBIN mg/dL  --   --   --  0.5   ANION GAP mmol/L 8.1  --    < > 9.4    <  > = values in this interval not displayed.       No radiology results for the last day      I reviewed the patient's new clinical results.    Assessment/Plan:     Active and Resolved Problems  Active Hospital Problems    Diagnosis  POA    **Non-STEMI (non-ST elevated myocardial infarction) [I21.4]  Unknown    Generalized weakness [R53.1]  Yes    Presence of Watchman left atrial appendage closure device [Z95.818]  Unknown    Atrial fibrillation [I48.91]  Yes    Rheumatoid arthritis involving multiple sites [M06.9]  Yes    Paroxysmal A-fib [I48.0]  Unknown      Resolved Hospital Problems   No resolved problems to display.       UTI  -Complaining of bilateral flank pain and urinary frequency  -Urine culture growing yeast.  Continue Diflucan for yeast UTI/yeast cystitis    Elevated troponin/NSTEMI  -Going for cardiac cath today.  Continue heparin drip, antiplatelet therapy, beta-blocker therapy, statin therapy, ARB/ACE inhibitor if renal function tolerates.    Generalized Weakness  -Likely multifactorial secondary to UTI and recent hospitalizations with cardiac ablation  -CT of the head showed no acute abnormality  -PT/OT consult  -On levothyroxine.  TSH checked and is low.  Free T4 is elevated.  Decrease dose of levothyroxine.  - Patient anemic.  Anemia workup reveals iron deficiency.  Continue Ferrlecit.  Likely contributing to generalized weakness.      VTE Prophylaxis:  Mechanical VTE prophylaxis orders are present.             Disposition Planning:     Barriers to Discharge: Pending clinical improvement  Anticipated Date of Discharge: Pending clinical course however  Place of Discharge: Home           Code Status and Medical Interventions: CPR (Attempt to Resuscitate); Full Support   Ordered at: 10/25/24 1935     Code Status (Patient has no pulse and is not breathing):    CPR (Attempt to Resuscitate)     Medical Interventions (Patient has pulse or is breathing):    Full Support       Signature: Electronically  signed by Terrie Dao MD, 10/29/24, 16:46 EDT.  [facility]

## 2024-10-30 LAB
ANION GAP SERPL CALCULATED.3IONS-SCNC: 5.4 MMOL/L (ref 5–15)
BACTERIA SPEC AEROBE CULT: NORMAL
BACTERIA SPEC AEROBE CULT: NORMAL
BUN SERPL-MCNC: 10 MG/DL (ref 8–23)
BUN/CREAT SERPL: 9.5 (ref 7–25)
CALCIUM SPEC-SCNC: 8.9 MG/DL (ref 8.6–10.5)
CHLORIDE SERPL-SCNC: 111 MMOL/L (ref 98–107)
CO2 SERPL-SCNC: 25.6 MMOL/L (ref 22–29)
CREAT SERPL-MCNC: 1.05 MG/DL (ref 0.57–1)
DEPRECATED RDW RBC AUTO: 60.6 FL (ref 37–54)
EGFRCR SERPLBLD CKD-EPI 2021: 55.9 ML/MIN/1.73
ERYTHROCYTE [DISTWIDTH] IN BLOOD BY AUTOMATED COUNT: 17.6 % (ref 12.3–15.4)
GLUCOSE SERPL-MCNC: 93 MG/DL (ref 65–99)
HCT VFR BLD AUTO: 28.5 % (ref 34–46.6)
HGB BLD-MCNC: 8.3 G/DL (ref 12–15.9)
MCH RBC QN AUTO: 27.8 PG (ref 26.6–33)
MCHC RBC AUTO-ENTMCNC: 29.1 G/DL (ref 31.5–35.7)
MCV RBC AUTO: 95.3 FL (ref 79–97)
PLATELET # BLD AUTO: 190 10*3/MM3 (ref 140–450)
PMV BLD AUTO: 9.9 FL (ref 6–12)
POTASSIUM SERPL-SCNC: 4.2 MMOL/L (ref 3.5–5.2)
QT INTERVAL: 411 MS
QTC INTERVAL: 451 MS
RBC # BLD AUTO: 2.99 10*6/MM3 (ref 3.77–5.28)
SODIUM SERPL-SCNC: 142 MMOL/L (ref 136–145)
WBC NRBC COR # BLD AUTO: 5.33 10*3/MM3 (ref 3.4–10.8)

## 2024-10-30 PROCEDURE — 25010000002 NA FERRIC GLUC CPLX PER 12.5 MG: Performed by: INTERNAL MEDICINE

## 2024-10-30 PROCEDURE — 85027 COMPLETE CBC AUTOMATED: CPT | Performed by: INTERNAL MEDICINE

## 2024-10-30 PROCEDURE — 97168 OT RE-EVAL EST PLAN CARE: CPT

## 2024-10-30 PROCEDURE — 25810000003 SODIUM CHLORIDE 0.9 % SOLUTION: Performed by: INTERNAL MEDICINE

## 2024-10-30 PROCEDURE — 99232 SBSQ HOSP IP/OBS MODERATE 35: CPT | Performed by: INTERNAL MEDICINE

## 2024-10-30 PROCEDURE — 97116 GAIT TRAINING THERAPY: CPT

## 2024-10-30 PROCEDURE — 97530 THERAPEUTIC ACTIVITIES: CPT

## 2024-10-30 PROCEDURE — 80048 BASIC METABOLIC PNL TOTAL CA: CPT | Performed by: INTERNAL MEDICINE

## 2024-10-30 PROCEDURE — 97535 SELF CARE MNGMENT TRAINING: CPT

## 2024-10-30 PROCEDURE — 93005 ELECTROCARDIOGRAM TRACING: CPT | Performed by: INTERNAL MEDICINE

## 2024-10-30 PROCEDURE — 97112 NEUROMUSCULAR REEDUCATION: CPT

## 2024-10-30 RX ORDER — DIPHENHYDRAMINE HYDROCHLORIDE AND LIDOCAINE HYDROCHLORIDE AND ALUMINUM HYDROXIDE AND MAGNESIUM HYDRO
5 KIT EVERY 6 HOURS
Status: DISCONTINUED | OUTPATIENT
Start: 2024-10-30 | End: 2024-10-31 | Stop reason: HOSPADM

## 2024-10-30 RX ORDER — NYSTATIN 100000 [USP'U]/G
POWDER TOPICAL EVERY 12 HOURS SCHEDULED
Status: DISCONTINUED | OUTPATIENT
Start: 2024-10-30 | End: 2024-10-31 | Stop reason: HOSPADM

## 2024-10-30 RX ORDER — COSYNTROPIN 0.25 MG/ML
0.25 INJECTION, POWDER, FOR SOLUTION INTRAMUSCULAR; INTRAVENOUS ONCE
Status: COMPLETED | OUTPATIENT
Start: 2024-10-31 | End: 2024-10-31

## 2024-10-30 RX ADMIN — DIPHENHYDRAMINE HYDROCHLORIDE AND LIDOCAINE HYDROCHLORIDE AND ALUMINUM HYDROXIDE AND MAGNESIUM HYDRO 5 ML: KIT at 21:45

## 2024-10-30 RX ADMIN — Medication 10 ML: at 09:27

## 2024-10-30 RX ADMIN — TOPIRAMATE 200 MG: 100 TABLET, FILM COATED ORAL at 09:26

## 2024-10-30 RX ADMIN — SENNOSIDES AND DOCUSATE SODIUM 2 TABLET: 50; 8.6 TABLET ORAL at 20:20

## 2024-10-30 RX ADMIN — SENNOSIDES AND DOCUSATE SODIUM 2 TABLET: 50; 8.6 TABLET ORAL at 09:25

## 2024-10-30 RX ADMIN — RIVAROXABAN 20 MG: 20 TABLET, FILM COATED ORAL at 17:50

## 2024-10-30 RX ADMIN — HYDROXYCHLOROQUINE SULFATE 200 MG: 200 TABLET ORAL at 20:20

## 2024-10-30 RX ADMIN — ALPRAZOLAM 1 MG: 1 TABLET ORAL at 21:44

## 2024-10-30 RX ADMIN — SODIUM CHLORIDE 250 MG: 9 INJECTION, SOLUTION INTRAVENOUS at 09:27

## 2024-10-30 RX ADMIN — OXYCODONE 5 MG: 5 TABLET ORAL at 21:45

## 2024-10-30 RX ADMIN — LEVOTHYROXINE SODIUM 100 MCG: 0.1 TABLET ORAL at 06:34

## 2024-10-30 RX ADMIN — ASPIRIN 81 MG: 81 TABLET, COATED ORAL at 09:25

## 2024-10-30 RX ADMIN — PANTOPRAZOLE SODIUM 40 MG: 40 TABLET, DELAYED RELEASE ORAL at 06:34

## 2024-10-30 RX ADMIN — SODIUM BICARBONATE 650 MG: 650 TABLET ORAL at 09:25

## 2024-10-30 RX ADMIN — FLUCONAZOLE 200 MG: 100 TABLET ORAL at 17:50

## 2024-10-30 RX ADMIN — SODIUM BICARBONATE 650 MG: 650 TABLET ORAL at 20:20

## 2024-10-30 RX ADMIN — Medication 1000 UNITS: at 09:26

## 2024-10-30 RX ADMIN — HYDROXYCHLOROQUINE SULFATE 200 MG: 200 TABLET ORAL at 09:26

## 2024-10-30 RX ADMIN — METOPROLOL SUCCINATE 25 MG: 25 TABLET, FILM COATED, EXTENDED RELEASE ORAL at 09:25

## 2024-10-30 RX ADMIN — ACETAMINOPHEN 650 MG: 325 TABLET, FILM COATED ORAL at 20:20

## 2024-10-30 RX ADMIN — METOPROLOL SUCCINATE 25 MG: 25 TABLET, FILM COATED, EXTENDED RELEASE ORAL at 20:20

## 2024-10-30 RX ADMIN — MEMANTINE 10 MG: 10 TABLET ORAL at 09:26

## 2024-10-30 RX ADMIN — ESCITALOPRAM OXALATE 20 MG: 10 TABLET ORAL at 09:25

## 2024-10-30 RX ADMIN — EMPAGLIFLOZIN 25 MG: 25 TABLET, FILM COATED ORAL at 09:27

## 2024-10-30 RX ADMIN — ATORVASTATIN CALCIUM 80 MG: 40 TABLET, FILM COATED ORAL at 09:25

## 2024-10-30 NOTE — CASE MANAGEMENT/SOCIAL WORK
Continued Stay Note  [facility]     Patient Name: Natalee Noble  MRN: [id number]  Today's Date: 10/30/2024    Admit Date: 10/25/2024    Plan: Plan to dc to [facility], No Precert required. No PASRR required.   Discharge Plan       Row Name 10/30/24 1218       Plan    Plan Plan to dc to [facility], No Precert required. No PASRR required.    Plan Comments DC Barriers: [facility], [place] bed ready 10/31, 3l NC, Midline, IV IOron, ext cath, monitor labs, Endo/Cardio following.               Expected Discharge Date and Time       Expected Discharge Date Expected Discharge Time    Oct 30, 2024               ENRIQUE Multani RN  ICU/CVU   O: [phone]  C: [phone]  [email or web address]

## 2024-10-30 NOTE — PLAN OF CARE
Goal Outcome Evaluation:  Plan of Care Reviewed With: patient           Outcome Evaluation: Re-eval s/p cardiac cath with decline noted following procedure. Pt c/o 3-4/10 pain in groin. Agreeable to work with therapy. Requires Min A for bed mobility, and noted with posterior learn while seated unsupported. Transfers with Mod  A. Unable to ambulate. Pt requires Max A for toileting and LB ADLs, Min A for UB ADLs. She is mildly confused, unaware of the time / date, but is able to provide good history for previous medical encounters with dates that appear consistent with charting. She is far below stated baselien, limited with impaired balance, decreased activity tolerance, generalized weakness, and mild cognitive deficits this date. She is highly motivated to improve, and would be good candidate for IP rehab.    Anticipated Discharge Disposition (OT): inpatient rehabilitation facility

## 2024-10-30 NOTE — PROGRESS NOTES
CC--- atrial fibrillation and recurrent falls    Sub  Patient presented with recurrent falls and was noted to have UTI  Patient had recent AF ablation and also placement of watchman implantation simultaneously without any postprocedure complications  Patient denies any chest pain or shortness of breath  Incidental note of elevated troponin noted and underwent cardiac catheterization without significant coronary stenosis  Continues to feel fatigued          Past Medical History:   Diagnosis Date    Anxiety and depression     Arthritis     Back pain     CRF (chronic renal failure), stage 3 (moderate)     Diverticulosis     Essential hypertension     Family history of colon cancer     Family history of colonic polyps     Fractures     R tibia/fibula; L ankle    GERD (gastroesophageal reflux disease)     History of adenomatous polyp of colon     History of cardiac cath     1/28/19 left main no significant disease. LAD with no significant disease. LCX no significant disease. RCA no significant disease.    History of cardiac monitoring     2/01/19 - ZIO PATCH - Underlying rhythm is sinus at 48-94 bpm. PSVT x23 with fastest 167 bpm, longest 24.4 seconds.   10/05/2018- ZIO PATCH - NSR most of time but did have episode of A fib ranging from .    History of colon polyps     History of echocardiogram     9/2018: Left ventricular systolic function is normal. EF 50%. Left ventricular diastolic dysfunction consistent with ipaired relaxation. Left atrial cavity size is mildly dilated. Mild MR. Mild to moderate TR. There is abnormal thickening noted on the RV that may represent a vegetation-this was discussed with Dr. Hammer and Dr. Reilly. No patent foramen ovale. No further work up needed at this time    Hyperlipidemia     Kidney disorder     Migraine     Migraines     Neck pain     Stroke 2018     Past Surgical History:   Procedure Laterality Date    ABDOMINOPLASTY      ATRIAL APPENDAGE EXCLUSION LEFT WITH TRANSESOPHAGEAL  ECHOCARDIOGRAM N/A 10/22/2024    Procedure: Atrial Appendage Occlusion;  Surgeon: Sourav Pompa MD;  Location: [facility] CATH INVASIVE LOCATION;  Service: Cardiovascular;  Laterality: N/A;    BACK SURGERY      L2-5 fusion 2012    CARDIAC CATHETERIZATION N/A 10/29/2024    Procedure: Left Heart Cath, possible pci;  Surgeon: Josemanuel Sheriff MD;  Location: [facility] CATH INVASIVE LOCATION;  Service: Cardiovascular;  Laterality: N/A;    CARDIAC ELECTROPHYSIOLOGY PROCEDURE Right 10/22/2024    Procedure: Ablation atrial fibrillation;  Surgeon: Sourav Pompa MD;  Location: [facility] CATH INVASIVE LOCATION;  Service: Cardiovascular;  Laterality: Right;    CHOLECYSTECTOMY      COLONOSCOPY  11/24/2014    Diverticulosis; Repeat 5 years    COLONOSCOPY  11/09/2010    Diverticulosis; Repeat 4 years    COLONOSCOPY  09/18/2007    Dr. Monzon-Normal; Repeat 3 years    COLONOSCOPY  06/02/2005    Dr. Monzon-Diminuitive polypectomy above the cecum; Otherwise normal colonoscopy; Repeat 2 years    COLONOSCOPY N/A 11/01/2019    Diverticulosis in the left colon; Two 5-6mm polyps in the cecum-path shows one tubular adenomatous and on hyperplastic polyp; One 8mm tubular adenomatous polyp in the ascending colon; Two 4-5mm tubular adenomatous polyps in the transverse colon; Repeat 3 years    COLONOSCOPY N/A 6/12/2023    Procedure: COLONOSCOPY WITH ANESTHESIA;  Surgeon: Susan Lord MD;  Location: [facility] ENDOSCOPY;  Service: Gastroenterology;  Laterality: N/A;  preop hx of polyps   postop; polyps   PCP Woody Valentin MD    CYSTOCELE REPAIR      Cystocele and rectocele repair    ENDOSCOPY  09/28/2012    LA grade C esophagitis; HH    ENDOSCOPY N/A 11/01/2019    Small HH; Non-erosive gastritis-biopsied; Normal examined duodenum    ENDOSCOPY N/A 10/13/2024    Procedure: ESOPHAGOGASTRODUODENOSCOPY WITH BIOPSY X 1 AREA;  Surgeon: Miguel Vega MD;  Location: [facility] ENDOSCOPY;  Service: Gastroenterology;  Laterality:  N/A;  Hiatal hernia, esophagitis, gastritis    FIBULA FRACTURE SURGERY      HYSTERECTOMY      NECK SURGERY  2012    ACDF C4-7    ORIF TIBIA FRACTURE Right        Physical Exam    General:      well developed, well nourished, in no acute distress.    Head:      normocephalic and atraumatic.    Eyes:      PERRL/EOM intact, conjunctivae and sclerae clear without nystagmus.    Neck:      no  thyromegaly, trachea central with normal respiratory effort  Lungs:      clear bilaterally to auscultation.    Heart:       regular rate and rhythm, S1, S2 without murmurs, rubs, or gallops  Skin:      intact without lesions or rashes.    Psych:      alert and cooperative; normal mood and affect; normal attention span and concentration.          CBC    Results from last 7 days   Lab Units 10/30/24  0633 10/29/24  0418 10/28/24  0613 10/27/24  0451 10/26/24  0448 10/25/24  1710   WBC 10*3/mm3 5.33 4.09 6.42 4.22 4.12 6.99   HEMOGLOBIN g/dL 8.3* 8.7* 8.7* 8.1* 8.2* 8.9*   PLATELETS 10*3/mm3 190 201 181 154 152 166     BMP   Results from last 7 days   Lab Units 10/30/24  0633 10/29/24  1034 10/27/24  0451 10/26/24  0448 10/25/24  2222 10/25/24  1710   SODIUM mmol/L 142 141 140 142 142 141   POTASSIUM mmol/L 4.2 4.4 4.0 3.9 3.9 4.3   CHLORIDE mmol/L 111* 109* 108* 111* 111* 108*   CO2 mmol/L 25.6 23.9 23.0 22.9 22.6 23.6   BUN mg/dL 10 10 12 14 14 16   CREATININE mg/dL 1.05* 1.14* 1.08* 1.04* 1.05* 1.19*   GLUCOSE mg/dL 93 85 111* 80 87 98         Assessment and plan    Troponin  elevation secondary to AF ablation which is expected after recent ablation  Recent AF ablation with placement of Watchman device  Patient has UTI being treated by hospitalist  Iron deficiency anemia on iron infusion which will help patient's symptoms  Patient will benefit with physical therapy to get muscle strengthening to help prevent recurrent falls  Cardiac wise patient is stable  Continue current treatment  Restart Xarelto since patient had recent AF  ablation  Xarelto can be potentially be stopped after 6 to 8 weeks    Electronically signed by Sourav Pompa MD, 10/30/24, 3:49 PM EDT.

## 2024-10-30 NOTE — PROGRESS NOTES
Cardiology Progress Note    Patient Identification:  Name: Natalee Noble  Age: 74 y.o.  Sex: female  :  1950  MRN: [id number]                 Follow Up / Chief Complaint: Hypertension, A-fib, CHF  Chief Complaint   Patient presents with    Weakness - Generalized     Weakness, started this morning, Pt states was unable to get up of commode and slid to the floor.  Pt has been on the floor all morning family just got up up. Having pain to shoulders, neck hips and back  Family states pt is pale,       Interval History: Patient recently was in [facility] with newly diagnosed LV dysfunction was started on guideline directed medical therapy presented to ER with hypotension and recurrent syncopal episodes.    Patient underwent MELISSA on 10/15/2024 that showed EF of 56 to 60% with moderate to severe TR left atrial enlargement and right atrial enlargement.      Patient had Watchman and A-fib ablation on 10/22/2024.  Patient presented 10/25/2024 with weakness and fell in the bathroom could not get.  Had troponin which was elevated.  Patient underwent cardiac cath 10/29/2024 which revealed no obstructive CAD normal LV systolic function.       Subjective: Patient seen and examined.  Chart reviewed.  Labs reviewed.  Patient is complaining of fatigue and being weak and frail.      Objective:  10/29/2024: Creatinine is 1.14, hemoglobin 8.7  10/30/2024: Creatinine is 1.05, hemoglobin 8.3    History of present illness:      Natalee Noble is a 74-year-old female with a medical history to include:     Atrial fibrillation  [id number] SCORE   [id number] Score: 6 (10/12/2024  9:08 AM)     Long-term anticoagulation  Intolerant to flecainide and amiodarone in the past  HFrEF  Hypertension  Dyslipidemia  CKD stage III  History of CVA  Anxiety/depression  GERD  Diverticulosis  Stroke     Presented to [place] 10/25/2024 with a fall.  Patient says she has fatigue and weakness and got progressively worse she was in the  bathroom could not get off the toilet and ended up on the floor for 2 hours.  Patient denies any chest pain or shortness of breath.     Patient has history of recurrent syncopal episodes was in the hospital 10/11/2024     Data:   Labs 10/27/2024 reveal elevated HS troponin of 1146, came down to 359.  proBNP is 2832.  Creatinine of 1.08, EGFR 54.  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  And underwent watchman and A-fib ablation 10/22/2024.  Presented back in on 10/25/2024 with feeling poorly and multiple falls.  Had elevated troponin which was initially thought to be due to non-ST elevation MI because he was over 1300.  Therefore patient underwent cardiac cath which revealed no obstructive CAD with normal LV systolic function.  Patient had an episode of hypotension 10/28/2024 requiring vasopressors to.  Now blood pressure is improved.  Patient might end up requiring midodrine  Patient's elevated troponin is probably due to recent A-fib ablation.  Patient's LV function has improved therefore we will hold off on medications.  Patient has chronic intermittent hypotension of unclear etiology.  Patient is to receive 6 weeks of Xarelto post ablation and watchman.  Will continue aspirin as tolerated indefinitely.    Patient underwent cardiac cath 10/29/2024 which revealed normal LV systolic function and no obstructive CAD.  Patient  previously could have had tachycardia induced cardiomyopathy causing LV dysfunction.  Now with A-fib controlled LV function has normalized.  Patient troponin elevation could have been due to ablation.  Patient hypotension is due to unclear etiology.  Will hold off on antihypertensives  Patient is having episodic hypotension.  Will check a.m. cortisol and ACTH stim test in AM.  Will consider starting her on midodrine.  Okay to start Xarelto    Copied text in this portion of the note has been reviewed and is accurate as of 10/30/2024    Past Medical History:  Past Medical History:   Diagnosis Date    Anxiety and depression     Arthritis     Back pain     CRF (chronic renal failure), stage 3 (moderate)     Diverticulosis     Essential hypertension     Family history of colon cancer     Family history of colonic polyps     Fractures     R tibia/fibula; L ankle    GERD (gastroesophageal reflux disease)     History of adenomatous polyp of colon     History of cardiac cath     1/28/19 left main no significant disease. LAD with no significant disease. LCX no significant disease. RCA no significant disease.    History of cardiac monitoring     2/01/19 - ZIO PATCH - Underlying rhythm is sinus at 48-94 bpm. PSVT x23 with fastest 167 bpm, longest 24.4 seconds.   10/05/2018- ZIO PATCH - NSR most of time but did have episode of A fib ranging from .    History of colon polyps     History of echocardiogram     9/2018: Left ventricular systolic function is normal. EF 50%. Left ventricular diastolic dysfunction consistent with ipaired relaxation. Left atrial cavity size is mildly dilated. Mild MR. Mild to moderate TR. There is abnormal thickening noted on the RV that may represent a vegetation-this was discussed with Dr. Hammer and Dr. Reilly. No patent foramen ovale. No further work up needed at this time    Hyperlipidemia     Kidney disorder     Migraine     Migraines     Neck pain     Stroke 2018     Past Surgical  History:  Past Surgical History:   Procedure Laterality Date    ABDOMINOPLASTY      ATRIAL APPENDAGE EXCLUSION LEFT WITH TRANSESOPHAGEAL ECHOCARDIOGRAM N/A 10/22/2024    Procedure: Atrial Appendage Occlusion;  Surgeon: Sourav Pompa MD;  Location: [facility] CATH INVASIVE LOCATION;  Service: Cardiovascular;  Laterality: N/A;    BACK SURGERY      L2-5 fusion 2012    CARDIAC CATHETERIZATION N/A 10/29/2024    Procedure: Left Heart Cath, possible pci;  Surgeon: Josemanuel Sheriff MD;  Location: [facility] CATH INVASIVE LOCATION;  Service: Cardiovascular;  Laterality: N/A;    CARDIAC ELECTROPHYSIOLOGY PROCEDURE Right 10/22/2024    Procedure: Ablation atrial fibrillation;  Surgeon: Sourav Pompa MD;  Location: [facility] CATH INVASIVE LOCATION;  Service: Cardiovascular;  Laterality: Right;    CHOLECYSTECTOMY      COLONOSCOPY  11/24/2014    Diverticulosis; Repeat 5 years    COLONOSCOPY  11/09/2010    Diverticulosis; Repeat 4 years    COLONOSCOPY  09/18/2007    Dr. Monzon-Normal; Repeat 3 years    COLONOSCOPY  06/02/2005    Dr. Monzon-Diminuitive polypectomy above the cecum; Otherwise normal colonoscopy; Repeat 2 years    COLONOSCOPY N/A 11/01/2019    Diverticulosis in the left colon; Two 5-6mm polyps in the cecum-path shows one tubular adenomatous and on hyperplastic polyp; One 8mm tubular adenomatous polyp in the ascending colon; Two 4-5mm tubular adenomatous polyps in the transverse colon; Repeat 3 years    COLONOSCOPY N/A 6/12/2023    Procedure: COLONOSCOPY WITH ANESTHESIA;  Surgeon: Susan Lord MD;  Location:  [facility];  Service: Gastroenterology;  Laterality: N/A;  preop hx of polyps   postop; polyps   PCP Woody Valentin MD    CYSTOCELE REPAIR      Cystocele and rectocele repair    ENDOSCOPY  09/28/2012    LA grade C esophagitis; HH    ENDOSCOPY N/A 11/01/2019    Small HH; Non-erosive gastritis-biopsied; Normal examined duodenum    ENDOSCOPY N/A 10/13/2024    Procedure:  ESOPHAGOGASTRODUODENOSCOPY WITH BIOPSY X 1 AREA;  Surgeon: Miguel Vega MD;  Location: [facility] ENDOSCOPY;  Service: Gastroenterology;  Laterality: N/A;  Hiatal hernia, esophagitis, gastritis    FIBULA FRACTURE SURGERY      HYSTERECTOMY      NECK SURGERY  2012    ACDF C4-7    ORIF TIBIA FRACTURE Right         Social History:   Social History     Tobacco Use    Smoking status: Never    Smokeless tobacco: Never   Substance Use Topics    Alcohol use: No      Family History:  Family History   Problem Relation Age of Onset    Colon cancer Father 65    Colon polyps Daughter 38    Esophageal cancer Neg Hx     Liver cancer Neg Hx     Liver disease Neg Hx     Rectal cancer Neg Hx     Stomach cancer Neg Hx           Allergies:  Allergies   Allergen Reactions    Benzoin Anaphylaxis and Swelling     States when used on her neck it shut off her airway  Huge abscesses with surgery      Iodine Other (See Comments)     PT UNABLE TO TELL RN ABOUT REACTION AT THIS TIME, BUT FAMILY STATES PT HAD A REACTION TO BEING CLEANSED WITH IODINE IN SURGERY  IOBAN - used on neck, swelled to the point of shutting off airway    Vancomycin Other (See Comments)     Kidney failure  Vancomycin induced acute kidney injury      Amiodarone Other (See Comments)     Tremor- hand and face     Levaquin [Levofloxacin] Unknown (See Comments)     Unknown    Sulfa Antibiotics      Scheduled Meds:  aspirin, 81 mg, Daily  atorvastatin, 80 mg, Daily  cholecalciferol, 1,000 Units, Daily  empagliflozin, 25 mg, Daily  escitalopram, 20 mg, Daily  ferric gluconate, 250 mg, Daily  fluconazole, 200 mg, Q24H  hydroxychloroquine, 200 mg, BID  levothyroxine, 100 mcg, Q AM  memantine, 10 mg, Daily  metoprolol succinate XL, 25 mg, BID  pantoprazole, 40 mg, Q AM  senna-docusate sodium, 2 tablet, BID  sodium bicarbonate, 650 mg, BID  sodium chloride, 10 mL, Q12H  sodium chloride, 10 mL, Q12H  topiramate, 200 mg, Daily            Review of Systems:   Review of Systems  "  Cardiovascular:  Negative for chest pain.   Neurological:  Positive for dizziness and weakness.            Constitutional:  Temp:  [97.9 °F (36.6 °C)-98.5 °F (36.9 °C)] 98.3 °F (36.8 °C)  Heart Rate:  [63-76] 67  Resp:  [16-18] 17  BP: (113-153)/(63-82) 148/75    Physical Exam   /75   Pulse 67   Temp 98.3 °F (36.8 °C) (Oral)   Resp 17   Ht 165.1 cm (65\")   Wt 78.5 kg (173 lb)   SpO2 100%   BMI 28.79 kg/m²   General:  Appears in no acute distress  Eyes: Sclera is anicteric,  conjunctiva is clear   HEENT:  No JVD. Thyroid not visibly enlarged. No mucosal pallor or cyanosis  Respiratory: Respirations regular and unlabored at rest.  Clear to auscultation  Cardiovascular: S1,S2, irregularly irregular rate  Gastrointestinal: Abdomen nondistended.  Musculoskeletal:  No abnormal movements  Extremities: No digital clubbing or cyanosis  Skin: Color pink.   Neuro: Alert and awake.    INTAKE AND OUTPUT:    Intake/Output Summary (Last 24 hours) at 10/30/2024 0758  Last data filed at 10/30/2024 0600  Gross per 24 hour   Intake 753 ml   Output 1700 ml   Net -947 ml       Cardiographics  Telemetry: Sinus rhythm    ECG:   ECG 12 Lead ED Triage Standing Order; Chest Pain   Final Result   HEART RATE=64  bpm   RR Dnxnciwt=246  ms   CA Interval=62  ms   P Horizontal Axis=-10  deg   P Front Axis=Invalid  deg   QRSD Interval=87  ms   QT Kkemxbtd=865  ms   IZwY=970  ms   QRS Axis=69  deg   T Wave Axis=53  deg   - NORMAL ECG -   Sinus rhythm   When compared with ECG of 23-Oct-2024 03:26:00,   Nonspecific significant change   Electronically Signed By: Harry Ortega (Vicente) 2024-10-26 08:09:03   Date and Time of Study:2024-10-25 14:36:42      Telemetry Scan   Final Result      Telemetry Scan   Final Result      Telemetry Scan   Final Result      Telemetry Scan   Final Result      Telemetry Scan   Final Result      Telemetry Scan   Final Result      Telemetry Scan   Final Result      Telemetry Scan   Final Result      Telemetry " Scan   Final Result      Telemetry Scan   Final Result      Telemetry Scan   Final Result      Telemetry Scan   Final Result      Telemetry Scan   Final Result      Telemetry Scan   Final Result      Telemetry Scan   Final Result      Telemetry Scan   Final Result      Telemetry Scan   Final Result      Telemetry Scan   Final Result      Telemetry Scan   Final Result      Telemetry Scan   Final Result      Telemetry Scan   Final Result      Telemetry Scan   Final Result      Telemetry Scan   Final Result      Telemetry Scan   Final Result      Telemetry Scan   Final Result      ECG 12 Lead Rhythm Change    (Results Pending)     I have personally reviewed EKG    Echocardiogram: Results for orders placed during the hospital encounter of 10/22/24    Intra-Op Structural Heart MELISSA (Cardiology Read)    Interpretation Summary  MELISSA   procedure note    Procedure:  MELISSA    Indication:   A-fib, watchman implantation    Date of procedure  : 10/22/2024    :  Dr. Josemanuel Sheriff    Description of procedure:    Under conscious sedation given by anesthesiology and local anesthesia, a MELISSA probe was advanced into the esophagus and into the stomach 2D, color images were obtained, after a timeout was performed.  Patient underwent watchman implantation with septal puncture done with assistance of MELISSA.  After implantation of Watchman measurements were obtained to look at compression of left atrial appendage occluder device.  Post watchman implantation he was made sure there was no leak on color or clots of the device and device had good compression.  Then repeat sweep of MELISSA was done to make sure there was no pericardial effusion.  Patient tolerated procedure well complications were none.    Complications: none    Results:    Left atrial appendage was chicken wing morphology.  Left atrial appendage closure device watchman was seated well.  There was good compression over 15%.  There was no leak around the watchman seen.  No  "thrombus seen on watchman.  No significant pericardial effusion seen.  Normal LV size and contractility EF of over 55%      Recommendations/plan:    Clinical correlation recommended      Lab Review   I have reviewed the labs  Results from last 7 days   Lab Units 10/26/24  1224 10/25/24  1911 10/25/24  1710   CK TOTAL U/L  --   --  68   HSTROP T ng/L 359* 1,146* 1,352*         Results from last 7 days   Lab Units 10/30/24  0633   SODIUM mmol/L 142   POTASSIUM mmol/L 4.2   BUN mg/dL 10   CREATININE mg/dL 1.05*   CALCIUM mg/dL 8.9         Results from last 7 days   Lab Units 10/30/24  0633 10/29/24  0418 10/28/24  0613   WBC 10*3/mm3 5.33 4.09 6.42   HEMOGLOBIN g/dL 8.3* 8.7* 8.7*   HEMATOCRIT % 28.5* 30.2* 29.9*   PLATELETS 10*3/mm3 190 201 181     Results from last 7 days   Lab Units 10/29/24  0418 10/28/24  1430 10/28/24  0613 10/27/24  1917 10/27/24  1259   INR  1.14*  --   --   --  1.36*   APTT seconds 43.9* >139.0* 41.5*   < > 36.5*    < > = values in this interval not displayed.       RADIOLOGY:  Imaging Results (Last 24 Hours)       ** No results found for the last 24 hours. **                  )10/30/2024  Josemanuel Sheriff MD      EMR Dragon/Transcription:   \"Dictated utilizing Dragon dictation\".   "

## 2024-10-30 NOTE — THERAPY TREATMENT NOTE
Subjective: Pt agreeable to therapeutic plan of care.    Objective:     Precautions - HFR, cardiac    Bed mobility - Mod-A  Transfers - Mod-A with cues for task segmentation  Ambulation - 20 x 2 feet Mod-A and with rolling walker    Therapeutic Exercise - 5 Reps B LE AROM lying supine and supported sitting / chair    Vitals: Hypotensive  O2 sats were 93% on room air    Pain: 5 VAS   Location: back  Intervention for pain: Increased Activity    Education: Provided education on the importance of mobility in the acute care setting, Verbal/Tactile Cues, Transfer Training, Gait Training, and Energy conservation strategies    Assessment: Natalee Noble presents with functional mobility impairments which indicate the need for skilled intervention. Pt is under cardiology care for NSTEMI. Pt had cardiac cath yesterday. Pt is lethargic and agreeable to mobilize. PT assisted pt with ambulating to and from the toilet. PT cued pt to keep her ead up and eyes open while she was on toilet. Pt was hypotensive with gait- Nsg aware. PT provided pt with 100% cueing to stay more centered in the rolling walker and for assistance with maneuvering rolling walker around obstacles. Pt fatigued very easily with minimal exertion. Patient is a high risk of injurious falls and unsafe to return to prior living environment at this time.  Tolerating session today without incident. Will continue to follow and progress as tolerated.     Plan/Recommendations:   If medically appropriate, High Intensity Therapy recommended post-acute care. This is recommended as therapy feels the patient would require 5-6 days per week, 2-3 hours per day. At this time, inpatient rehabilitation (acute rehab) would be the first choice and SNF would be second. Pt requires no DME at discharge.     Pt desires Inpatient Rehabilitation placement at discharge. Pt cooperative; agreeable to therapeutic recommendations and plan of care.         Basic Mobility 6-click:  Rolling:          1 = Total, A lot = 2, A little = 3; 4 = None  Supine>Sit:   1 = Total, A lot = 2, A little = 3; 4 = None   Sit>Stand with arms:  1 = Total, A lot = 2, A little = 3; 4 = None  Bed>Chair:   1 = Total, A lot = 2, A little = 3; 4 = None  Ambulate in room:  1 = Total, A lot = 2, A little = 3; 4 = None  3-5 Steps with railin = Total, A lot = 2, A little = 3; 4 = None  Score: 11    Modified [place]: N/A = No pre-op stroke/TIA    Post-Tx Position: Up in Chair, Staff Present, Alarms activated, and Call light and personal items within reach  PPE: gloves    Therapy Charges for Today       Code Description Service Date Service Provider Modifiers Qty    12433857223 HC GAIT TRAINING EA 15 MIN 10/30/2024 Gladys Hylton, PT GP 1    30358725984 HC PT NEUROMUSC RE EDUCATION EA 15 MIN 10/30/2024 Gladys Hylton, PT GP 1    65858926438 HC PT THERAPEUTIC ACT EA 15 MIN 10/30/2024 Gladys Hylton, PT GP 1           PT Charges       Row Name 10/30/24 1441             Time Calculation    Start Time 1005  -BR      Stop Time 1035  -BR      Time Calculation (min) 30 min  -BR      PT Received On 10/30/24  -BR      PT - Next Appointment 10/31/24  -BR         Time Calculation- PT    Total Timed Code Minutes- PT 30 minute(s)  -BR                User Key  (r) = Recorded By, (t) = Taken By, (c) = Cosigned By      Initials Name Provider Type    BR Gladys Hylton, PT Physical Therapist

## 2024-10-30 NOTE — PROGRESS NOTES
[facility] MEDICINE SERVICE  DAILY PROGRESS NOTE    NAME: Natalee Noble  : 1950  MRN: [id number]      LOS: 3 days     PROVIDER OF SERVICE: Terrie Dao MD    Chief Complaint: Non-STEMI (non-ST elevated myocardial infarction)    Subjective:     Interval History:  History taken from: patient    No new complaint        Review of Systems:   Review of Systems   All other systems reviewed and are negative.      Objective:     Vital Signs  Temp:  [97.9 °F (36.6 °C)-98.9 °F (37.2 °C)] 98.9 °F (37.2 °C)  Heart Rate:  [63-77] 67  Resp:  [17-25] 19  BP: (115-157)/(65-85) 143/67  Flow (L/min) (Oxygen Therapy):  [2] 2   Body mass index is 28.79 kg/m².    Physical Exam  Physical Exam  Constitutional:       Appearance: Normal appearance.   HENT:      Head: Normocephalic and atraumatic.      Nose: Nose normal.      Mouth/Throat:      Mouth: Mucous membranes are moist.   Eyes:      Extraocular Movements: Extraocular movements intact.      Pupils: Pupils are equal, round, and reactive to light.   Cardiovascular:      Rate and Rhythm: Normal rate and regular rhythm.   Pulmonary:      Effort: Pulmonary effort is normal.      Breath sounds: Normal breath sounds.   Abdominal:      General: Abdomen is flat. Bowel sounds are normal.      Palpations: Abdomen is soft.   Musculoskeletal:         General: Normal range of motion.      Cervical back: Normal range of motion and neck supple.   Skin:     General: Skin is warm and dry.   Neurological:      General: No focal deficit present.      Mental Status: She is alert and oriented to person, place, and time.   Psychiatric:         Mood and Affect: Mood normal.         Behavior: Behavior normal.         Thought Content: Thought content normal.         Judgment: Judgment normal.         Current Medications:  Scheduled Meds:aspirin, 81 mg, Oral, Daily  atorvastatin, 80 mg, Oral, Daily  cholecalciferol, 1,000 Units, Oral, Daily  [START ON 10/31/2024] cosyntropin, 0.25  mg, Intravenous, Once  empagliflozin, 25 mg, Oral, Daily  escitalopram, 20 mg, Oral, Daily  fluconazole, 200 mg, Oral, Q24H  hydroxychloroquine, 200 mg, Oral, BID  levothyroxine, 100 mcg, Oral, Q AM  memantine, 10 mg, Oral, Daily  metoprolol succinate XL, 25 mg, Oral, BID  pantoprazole, 40 mg, Oral, Q AM  rivaroxaban, 20 mg, Oral, Daily With Dinner  senna-docusate sodium, 2 tablet, Oral, BID  sodium bicarbonate, 650 mg, Oral, BID  sodium chloride, 10 mL, Intravenous, Q12H  sodium chloride, 10 mL, Intravenous, Q12H  topiramate, 200 mg, Oral, Daily      Continuous Infusions:     PRN Meds:.  acetaminophen **OR** acetaminophen **OR** acetaminophen    ALPRAZolam    senna-docusate sodium **AND** polyethylene glycol **AND** bisacodyl **AND** bisacodyl    calcium carbonate    influenza vaccine    melatonin    nitroglycerin    oxyCODONE    sodium chloride    sodium chloride    sodium chloride       Diagnostic Data    Results from last 7 days   Lab Units 10/30/24  0633 10/25/24  2222 10/25/24  1710   WBC 10*3/mm3 5.33   < > 6.99   HEMOGLOBIN g/dL 8.3*   < > 8.9*   HEMATOCRIT % 28.5*   < > 30.8*   PLATELETS 10*3/mm3 190   < > 166   GLUCOSE mg/dL 93   < > 98   CREATININE mg/dL 1.05*   < > 1.19*   BUN mg/dL 10   < > 16   SODIUM mmol/L 142   < > 141   POTASSIUM mmol/L 4.2   < > 4.3   AST (SGOT) U/L  --   --  32   ALT (SGPT) U/L  --   --  14   ALK PHOS U/L  --   --  87   BILIRUBIN mg/dL  --   --  0.5   ANION GAP mmol/L 5.4   < > 9.4    < > = values in this interval not displayed.       No radiology results for the last day      I reviewed the patient's new clinical results.    Assessment/Plan:     Active and Resolved Problems  Active Hospital Problems    Diagnosis  POA    **Non-STEMI (non-ST elevated myocardial infarction) [I21.4]  Unknown    Generalized weakness [R53.1]  Yes    Presence of Watchman left atrial appendage closure device [Z95.818]  Unknown    Atrial fibrillation [I48.91]  Yes    Rheumatoid arthritis involving  multiple sites [M06.9]  Yes    Paroxysmal A-fib [I48.0]  Unknown      Resolved Hospital Problems   No resolved problems to display.       UTI  -Complained of of bilateral flank pain and urinary frequency  -Urine culture growing yeast.  Continue Diflucan for yeast UTI/yeast cystitis    Elevated troponin/NSTEMI type II likely secondary to ablation which was very recent  -Status post cardiac cath which showed nonobstructive coronary artery disease with a normal left ventricular function  No obstructive CAD, normal LV function         Generalized Weakness  -Likely multifactorial secondary to UTI and recent hospitalizations with cardiac ablation  -CT of the head showed no acute abnormality  -PT/OT consult  -On levothyroxine.  TSH checked and is low.  Free T4 is elevated.  Decrease dose of levothyroxine.  - Patient anemic.  Anemia workup reveals iron deficiency.  Continue Ferrlecit.  Likely contributing to generalized weakness.      VTE Prophylaxis:  Pharmacologic & mechanical VTE prophylaxis orders are present.             Disposition Planning:     Barriers to Discharge: Pending clinical improvement  Anticipated Date of Discharge: Pending clinical course however  Place of Discharge: [facility]           Code Status and Medical Interventions: CPR (Attempt to Resuscitate); Full Support   Ordered at: 10/25/24 1935     Code Status (Patient has no pulse and is not breathing):    CPR (Attempt to Resuscitate)     Medical Interventions (Patient has pulse or is breathing):    Full Support       Signature: Electronically signed by Terrie Dao MD, 10/30/24, 16:21 EDT.  [facility] Hospitalist Team

## 2024-10-30 NOTE — PLAN OF CARE
Goal Outcome Evaluation:    Assessment: Natalee Noble presents with functional mobility impairments which indicate the need for skilled intervention. Pt is under cardiology care for NSTEMI. Pt had cardiac cath yesterday. Pt is lethargic and agreeable to mobilize. PT assisted pt with ambulating to and from the toilet. PT cued pt to keep her ead up and eyes open while she was on toilet. Pt was hypotensive with gait- Nsg aware. PT provided pt with 100% cueing to stay more centered in the rolling walker and for assistance with maneuvering rolling walker around obstacles. Pt fatigued very easily with minimal exertion. Patient is a high risk of injurious falls and unsafe to return to prior living environment at this time.  Tolerating session today without incident. Will continue to follow and progress as tolerated.     Plan/Recommendations:   If medically appropriate, High Intensity Therapy recommended post-acute care. This is recommended as therapy feels the patient would require 5-6 days per week, 2-3 hours per day. At this time, inpatient rehabilitation (acute rehab) would be the first choice and SNF would be second. Pt requires no DME at discharge.     Pt desires Inpatient Rehabilitation placement at discharge. Pt cooperative; agreeable to therapeutic recommendations and plan of care.

## 2024-10-30 NOTE — THERAPY EVALUATION
Patient Name: Natalee Noble  : 1950    MRN: [id number]                              Today's Date: 10/30/2024       Admit Date: 10/25/2024    Visit Dx:     ICD-10-CM ICD-9-CM   1. General weakness  R53.1 780.79   2. Non-STEMI (non-ST elevated myocardial infarction)  I21.4 410.70   3. Atrial fibrillation with RVR  I48.91 427.31   4. Paroxysmal A-fib  I48.0 427.31   5. Presence of Watchman left atrial appendage closure device  Z95.818 V45.09     Patient Active Problem List   Diagnosis    Cerebrovascular accident (CVA)    Paroxysmal A-fib    Hyperlipidemia    Migraines    History of echocardiogram    History of cardiac cath    History of cardiac monitoring    CKD (chronic kidney disease) stage 3, GFR 30-59 ml/min    Allergy to iodine    Adenomatous colon polyp    Epigastric pain    Nausea    Bloating    Belching    Long term current use of anticoagulant therapy    Atrial fibrillation with RVR    Metatarsal fracture, pathologic, left, initial encounter    FH: colon cancer    FH: colon polyps    Hypotensive episode    Acute on chronic renal insufficiency    Elevated troponin    Chronic HFrEF (heart failure with reduced ejection fraction)    Anxiety disorder    Constipation    Hypothyroidism (acquired)    Rheumatoid arthritis involving multiple sites    Atrial fibrillation, persistent    Personal history of fall    Atrial fibrillation    Persistent atrial fibrillation    Presence of Watchman left atrial appendage closure device    Generalized weakness    Non-STEMI (non-ST elevated myocardial infarction)     Past Medical History:   Diagnosis Date    Anxiety and depression     Arthritis     Back pain     CRF (chronic renal failure), stage 3 (moderate)     Diverticulosis     Essential hypertension     Family history of colon cancer     Family history of colonic polyps     Fractures     R tibia/fibula; L ankle    GERD (gastroesophageal reflux disease)     History of adenomatous polyp of colon     History of cardiac  cath     1/28/19 left main no significant disease. LAD with no significant disease. LCX no significant disease. RCA no significant disease.    History of cardiac monitoring     2/01/19 - ZIO PATCH - Underlying rhythm is sinus at 48-94 bpm. PSVT x23 with fastest 167 bpm, longest 24.4 seconds.   10/05/2018- ZIO PATCH - NSR most of time but did have episode of A fib ranging from .    History of colon polyps     History of echocardiogram     9/2018: Left ventricular systolic function is normal. EF 50%. Left ventricular diastolic dysfunction consistent with ipaired relaxation. Left atrial cavity size is mildly dilated. Mild MR. Mild to moderate TR. There is abnormal thickening noted on the RV that may represent a vegetation-this was discussed with Dr. Hammer and Dr. Reilly. No patent foramen ovale. No further work up needed at this time    Hyperlipidemia     Kidney disorder     Migraine     Migraines     Neck pain     Stroke 2018     Past Surgical History:   Procedure Laterality Date    ABDOMINOPLASTY      ATRIAL APPENDAGE EXCLUSION LEFT WITH TRANSESOPHAGEAL ECHOCARDIOGRAM N/A 10/22/2024    Procedure: Atrial Appendage Occlusion;  Surgeon: Sourav Pompa MD;  Location: [facility] CATH INVASIVE LOCATION;  Service: Cardiovascular;  Laterality: N/A;    BACK SURGERY      L2-5 fusion 2012    CARDIAC CATHETERIZATION N/A 10/29/2024    Procedure: Left Heart Cath, possible pci;  Surgeon: Josemanuel Sheriff MD;  Location: [facility] CATH INVASIVE LOCATION;  Service: Cardiovascular;  Laterality: N/A;    CARDIAC ELECTROPHYSIOLOGY PROCEDURE Right 10/22/2024    Procedure: Ablation atrial fibrillation;  Surgeon: Sourav Pompa MD;  Location: [facility] CATH INVASIVE LOCATION;  Service: Cardiovascular;  Laterality: Right;    CHOLECYSTECTOMY      COLONOSCOPY  11/24/2014    Diverticulosis; Repeat 5 years    COLONOSCOPY  11/09/2010    Diverticulosis; Repeat 4 years    COLONOSCOPY  09/18/2007    Dr. Monzon-Normal; Repeat 3 years     COLONOSCOPY  06/02/2005    Dr. Monzon-Diminuitive polypectomy above the cecum; Otherwise normal colonoscopy; Repeat 2 years    COLONOSCOPY N/A 11/01/2019    Diverticulosis in the left colon; Two 5-6mm polyps in the cecum-path shows one tubular adenomatous and on hyperplastic polyp; One 8mm tubular adenomatous polyp in the ascending colon; Two 4-5mm tubular adenomatous polyps in the transverse colon; Repeat 3 years    COLONOSCOPY N/A 6/12/2023    Procedure: COLONOSCOPY WITH ANESTHESIA;  Surgeon: Susan Lord MD;  Location:  [facility];  Service: Gastroenterology;  Laterality: N/A;  preop hx of polyps   postop; polyps   PCP Woody Valentin MD    CYSTOCELE REPAIR      Cystocele and rectocele repair    ENDOSCOPY  09/28/2012    LA grade C esophagitis; HH    ENDOSCOPY N/A 11/01/2019    Small HH; Non-erosive gastritis-biopsied; Normal examined duodenum    ENDOSCOPY N/A 10/13/2024    Procedure: ESOPHAGOGASTRODUODENOSCOPY WITH BIOPSY X 1 AREA;  Surgeon: Miguel Vega MD;  Location: [facility] ENDOSCOPY;  Service: Gastroenterology;  Laterality: N/A;  Hiatal hernia, esophagitis, gastritis    FIBULA FRACTURE SURGERY      HYSTERECTOMY      NECK SURGERY  2012    ACDF C4-7    ORIF TIBIA FRACTURE Right       General Information       Row Name 10/30/24 1153          OT Time and Intention    Subjective Information complains of;weakness;fatigue  -ES     Document Type re-evaluation  -ES     Mode of Treatment occupational therapy  -ES     Session Not Performed --  Pt not feeling well after NPO all day for heart cath procedure. Anticipating heart cath at 2:00pm and pt seen at 2:00pm. Will follow up tomorrow  -ES       Row Name 10/30/24 1156          General Information    Patient Profile Reviewed yes  -ES     Existing Precautions/Restrictions orthostatic hypotension  hx of orthostatic hypotension. Monitor PRN.  -ES       Row Name 10/30/24 8635          Occupational Profile    Reason for Services/Referral  (Occupational Profile) Re-eval s/p cardiac cath with decline noted following procedure.  -ES     Successful Occupations (Occupational Profile) RN, [employer]  -ES       Row Name 10/30/24 1153          Living Environment    People in Home child(siobhan), adult  -ES       Row Name 10/30/24 1153          Home Main Entrance    Number of Stairs, Main Entrance three  -ES       Row Name 10/30/24 1153          Stairs Within Home, Primary    Number of Stairs, Within Home, Primary none  -ES       Row Name 10/30/24 1153          Cognition    Orientation Status (Cognition) oriented x 4  -ES       Row Name 10/30/24 1153          Safety Issues/Impairments Affecting Functional Mobility    Safety Issues Affecting Function (Mobility) ability to follow commands;awareness of need for assistance;impulsivity;insight into deficits/self-awareness;safety precaution awareness;safety precautions follow-through/compliance;sequencing abilities  -ES     Impairments Affecting Function (Mobility) balance;endurance/activity tolerance;strength  -ES               User Key  (r) = Recorded By, (t) = Taken By, (c) = Cosigned By      Initials Name Provider Type    ES Ashley Crouch OT Occupational Therapist                     Mobility/ADL's       Row Name 10/30/24 1155          Bed Mobility    Bed Mobility bed mobility (all) activities  -ES     All Activities, [place] (Bed Mobility) minimum assist (75% patient effort)  -ES     Assistive Device (Bed Mobility) head of bed elevated;bed rails  -ES       Row Name 10/30/24 1155          Transfers    Transfers sit-stand transfer;stand-sit transfer  -ES       Row Name 10/30/24 1155          Sit-Stand Transfer    Sit-Stand [place] (Transfers) moderate assist (50% patient effort)  -ES     Assistive Device (Sit-Stand Transfers) walker, front-wheeled  -ES       Row Name 10/30/24 1155          Stand-Sit Transfer    Stand-Sit [place] (Transfers) moderate assist (50% patient effort)  -ES      Assistive Device (Stand-Sit Transfers) walker, front-wheeled  -ES       Row Name 10/30/24 1155          Functional Mobility    Functional Mobility- Ind. Level unable to perform  -ES       Row Name 10/30/24 1155          Activities of Daily Living    BADL Assessment/Intervention upper body dressing;lower body dressing;toileting  -ES       Row Name 10/30/24 1155          Lower Body Dressing Assessment/Training    [place] Level (Lower Body Dressing) lower body dressing skills;maximum assist (25% patient effort)  -ES     Position (Lower Body Dressing) edge of bed sitting  -ES       Row Name 10/30/24 1155          Toileting Assessment/Training    [place] Level (Toileting) toileting skills;maximum assist (25% patient effort)  -ES       Row Name 10/30/24 1155          Grooming Assessment/Training    [place] Level (Grooming) --  v.c for thoroughness  -ES               User Key  (r) = Recorded By, (t) = Taken By, (c) = Cosigned By      Initials Name Provider Type    ES Ashley Crouch OT Occupational Therapist                   Obj/Interventions       Row Name 10/30/24 1155          Sensory Assessment (Somatosensory)    Sensory Assessment (Somatosensory) sensation intact  -ES       Row Name 10/30/24 1155          Vision Assessment/Intervention    Visual Impairment/Limitations WNL  -ES     Vision Assessment Comment Cataract removal with lens placement  -ES       Row Name 10/30/24 1155          Range of Motion Comprehensive    General Range of Motion bilateral upper extremity ROM WNL  -ES       Row Name 10/30/24 1155          Strength Comprehensive (MMT)    General Manual Muscle Testing (MMT) Assessment upper extremity strength deficits identified;lower extremity strength deficits identified  -ES     Comment, General Manual Muscle Testing (MMT) Assessment BUE 3+/5  -ES       Row Name 10/30/24 1155          Motor Skills    Motor Skills functional endurance  -ES     Functional Endurance limited  -ES       Row  Name 10/30/24 1155          Balance    Balance Assessment sitting static balance;sitting dynamic balance;standing dynamic balance;standing static balance  -ES     Static Sitting Balance minimal assist  -ES     Dynamic Sitting Balance moderate assist  -ES     Static Standing Balance moderate assist  -ES     Dynamic Standing Balance maximum assist  -ES     Balance Interventions sitting;standing;static;dynamic  -ES               User Key  (r) = Recorded By, (t) = Taken By, (c) = Cosigned By      Initials Name Provider Type    Ashley Ramon, OT Occupational Therapist                   Goals/Plan       Row Name 10/30/24 1303          Transfer Goal 1 (OT)    Activity/Assistive Device (Transfer Goal 1, OT) transfers, all  -ES     [place] Level/Cues Needed (Transfer Goal 1, OT) minimum assist (75% or more patient effort)  -ES     Time Frame (Transfer Goal 1, OT) 2 weeks  -ES     Progress/Outcome (Transfer Goal 1, OT) goal revised this date  -ES       Row Name 10/30/24 1302          Bathing Goal 1 (OT)    Activity/Device (Bathing Goal 1, OT) bathing skills, all  -ES     [place] Level/Cues Needed (Bathing Goal 1, OT) minimum assist (75% or more patient effort)  -ES     Time Frame (Bathing Goal 1, OT) 2 weeks  -ES     Progress/Outcomes (Bathing Goal 1, OT) goal revised this date  -ES       Row Name 10/30/24 1303          Dressing Goal 1 (OT)    Activity/Device (Dressing Goal 1, OT) dressing skills, all  -ES     [place]/Cues Needed (Dressing Goal 1, OT) minimum assist (75% or more patient effort)  -ES     Time Frame (Dressing Goal 1, OT) 2 weeks  -ES     Progress/Outcome (Dressing Goal 1, OT) goal revised this date  -ES       Row Name 10/30/24 1305          Toileting Goal 1 (OT)    Activity/Device (Toileting Goal 1, OT) toileting skills, all  -ES     [place] Level/Cues Needed (Toileting Goal 1, OT) modified independence  -ES     Time Frame (Toileting Goal 1, OT) 2 weeks  -ES       Row Name 10/30/24  1307          Therapy Assessment/Plan (OT)    Planned Therapy Interventions (OT) activity tolerance training;BADL retraining;cognitive/visual perception retraining;strengthening exercise;neuromuscular control/coordination retraining;occupation/activity based interventions;ROM/therapeutic exercise;functional balance retraining  -ES               User Key  (r) = Recorded By, (t) = Taken By, (c) = Cosigned By      Initials Name Provider Type    ES Ashley Crouch OT Occupational Therapist                   Clinical Impression       Row Name 10/30/24 1156          Pain Assessment    Pretreatment Pain Rating 3/10  -ES     Posttreatment Pain Rating 4/10  -ES     Pain Location groin  -ES       Row Name 10/30/24 1156          Plan of Care Review    Plan of Care Reviewed With patient  -ES     Outcome Evaluation Re-eval s/p cardiac cath with decline noted following procedure. Pt c/o 3-4/10 pain in groin. Agreeable to work with therapy. Requires Min A for bed mobility, and noted with posterior learn while seated unsupported. Transfers with Mod  A. Unable to ambulate. Pt requires Max A for toileting and LB ADLs, Min A for UB ADLs. She is mildly confused, unaware of the time / date, but is able to provide good history for previous medical encounters with dates that appear consistent with charting. She is far below stated baselien, limited with impaired balance, decreased activity tolerance, generalized weakness, and mild cognitive deficits this date. She is highly motivated to improve, and would be good candidate for IP rehab.  -ES       Row Name 10/30/24 8842          Therapy Assessment/Plan (OT)    Rehab Potential (OT) good  -ES     Criteria for Skilled Therapeutic Interventions Met (OT) yes;meets criteria;skilled treatment is necessary  -ES     Therapy Frequency (OT) 5 times/wk  -ES     Predicted Duration of Therapy Intervention (OT) until dc  -ES       Row Name 10/30/24 6615          Therapy Plan Review/Discharge Plan  (OT)    Anticipated Discharge Disposition (OT) inpatient rehabilitation facility  -ES       Row Name 10/30/24 1156          Vital Signs    Pre Patient Position Supine  -ES     Intra Patient Position Sitting  -ES     Post Patient Position Supine  -ES     Recovery Time VSS  -ES       Row Name 10/30/24 1156          Positioning and Restraints    Pre-Treatment Position in bed  -ES     Post Treatment Position bed  -ES     In Bed with nsg  -ES               User Key  (r) = Recorded By, (t) = Taken By, (c) = Cosigned By      Initials Name Provider Type    Ashley Ramon OT Occupational Therapist                   Outcome Measures       Row Name 10/30/24 1307          How much help from another is currently needed...    Putting on and taking off regular lower body clothing? 2  -ES     Bathing (including washing, rinsing, and drying) 2  -ES     Toileting (which includes using toilet bed pan or urinal) 2  -ES     Putting on and taking off regular upper body clothing 3  -ES     Taking care of personal grooming (such as brushing teeth) 3  -ES     Eating meals 3  -ES     AM-PAC 6 Clicks Score (OT) 15  -ES       Row Name 10/30/24 1307          Functional Assessment    Outcome Measure Options AM-PAC 6 Clicks Daily Activity (OT)  -ES               User Key  (r) = Recorded By, (t) = Taken By, (c) = Cosigned By      Initials Name Provider Type    Ashley Ramon OT Occupational Therapist                    Occupational Therapy Education       Title: PT OT SLP Therapies (Done)       Topic: Occupational Therapy (Done)       Point: ADL training (Done)       Description:   Instruct learner(s) on proper safety adaptation and remediation techniques during self care or transfers.   Instruct in proper use of assistive devices.                  Learning Progress Summary            Patient Acceptance, E,TB, VU by DT at 10/27/2024 1615    Comment: Role of OT, goals & POC;safety prec.   Family Acceptance, E,TB, VU by DT at  10/27/2024 1615    Comment: Role of OT, goals & POC;safety prec.                      Point: Home exercise program (Done)       Description:   Instruct learner(s) on appropriate technique for monitoring, assisting and/or progressing therapeutic exercises/activities.                  Learning Progress Summary            Patient Acceptance, E,TB, VU by DT at 10/27/2024 1615    Comment: Role of OT, goals & POC;safety prec.   Family Acceptance, E,TB, VU by DT at 10/27/2024 1615    Comment: Role of OT, goals & POC;safety prec.                      Point: Precautions (Done)       Description:   Instruct learner(s) on prescribed precautions during self-care and functional transfers.                  Learning Progress Summary            Patient Acceptance, E,TB, VU by DT at 10/27/2024 1615    Comment: Role of OT, goals & POC;safety prec.   Family Acceptance, E,TB, VU by DT at 10/27/2024 1615    Comment: Role of OT, goals & POC;safety prec.                      Point: Body mechanics (Done)       Description:   Instruct learner(s) on proper positioning and spine alignment during self-care, functional mobility activities and/or exercises.                  Learning Progress Summary            Patient Acceptance, E,TB, VU by DT at 10/27/2024 1615    Comment: Role of OT, goals & POC;safety prec.   Family Acceptance, E,TB, VU by DT at 10/27/2024 1615    Comment: Role of OT, goals & POC;safety prec.                                      User Key       Initials Effective Dates Name Provider Type Discipline    DT 07/11/23 -  Tiffany Sweet, OT Occupational Therapist OT                  OT Recommendation and Plan  Planned Therapy Interventions (OT): activity tolerance training, BADL retraining, cognitive/visual perception retraining, strengthening exercise, neuromuscular control/coordination retraining, occupation/activity based interventions, ROM/therapeutic exercise, functional balance retraining  Therapy Frequency (OT): 5  times/wk  Plan of Care Review  Plan of Care Reviewed With: patient  Outcome Evaluation: Re-eval s/p cardiac cath with decline noted following procedure. Pt c/o 3-4/10 pain in groin. Agreeable to work with therapy. Requires Min A for bed mobility, and noted with posterior learn while seated unsupported. Transfers with Mod  A. Unable to ambulate. Pt requires Max A for toileting and LB ADLs, Min A for UB ADLs. She is mildly confused, unaware of the time / date, but is able to provide good history for previous medical encounters with dates that appear consistent with charting. She is far below stated baselien, limited with impaired balance, decreased activity tolerance, generalized weakness, and mild cognitive deficits this date. She is highly motivated to improve, and would be good candidate for IP rehab.     Time Calculation:         Time Calculation- OT       Row Name 10/30/24 1307             Time Calculation- OT    OT Start Time 1140  -ES      OT Stop Time 1205  -ES      OT Time Calculation (min) 25 min  -ES      Total Timed Code Minutes- OT 10 minute(s)  -ES      OT Received On 10/30/24  -ES      OT - Next Appointment 10/31/24  -ES      OT Goal Re-Cert Due Date 11/13/24  -ES                User Key  (r) = Recorded By, (t) = Taken By, (c) = Cosigned By      Initials Name Provider Type    Ashley Ramon OT Occupational Therapist                           Ashley Crouch OT  10/30/2024

## 2024-10-31 VITALS
WEIGHT: 173 LBS | SYSTOLIC BLOOD PRESSURE: 100 MMHG | OXYGEN SATURATION: 93 % | TEMPERATURE: 98.1 F | HEIGHT: 65 IN | RESPIRATION RATE: 14 BRPM | HEART RATE: 58 BPM | DIASTOLIC BLOOD PRESSURE: 55 MMHG | BODY MASS INDEX: 28.82 KG/M2

## 2024-10-31 LAB
CORTIS SERPL-MCNC: 14.4 MCG/DL
CORTIS SERPL-MCNC: 2.14 MCG/DL
CORTIS SERPL-MCNC: 20.6 MCG/DL
DEPRECATED RDW RBC AUTO: 62.2 FL (ref 37–54)
ERYTHROCYTE [DISTWIDTH] IN BLOOD BY AUTOMATED COUNT: 17.9 % (ref 12.3–15.4)
HCT VFR BLD AUTO: 28.3 % (ref 34–46.6)
HGB BLD-MCNC: 8.1 G/DL (ref 12–15.9)
MCH RBC QN AUTO: 27.5 PG (ref 26.6–33)
MCHC RBC AUTO-ENTMCNC: 28.6 G/DL (ref 31.5–35.7)
MCV RBC AUTO: 95.9 FL (ref 79–97)
PLATELET # BLD AUTO: 207 10*3/MM3 (ref 140–450)
PMV BLD AUTO: 10.1 FL (ref 6–12)
RBC # BLD AUTO: 2.95 10*6/MM3 (ref 3.77–5.28)
T3FREE SERPL-MCNC: 2.38 PG/ML (ref 2–4.4)
T4 FREE SERPL-MCNC: 2.07 NG/DL (ref 0.93–1.7)
TSH SERPL DL<=0.05 MIU/L-ACNC: 0.02 UIU/ML (ref 0.27–4.2)
WBC NRBC COR # BLD AUTO: 4.73 10*3/MM3 (ref 3.4–10.8)

## 2024-10-31 PROCEDURE — 84443 ASSAY THYROID STIM HORMONE: CPT | Performed by: INTERNAL MEDICINE

## 2024-10-31 PROCEDURE — 82533 TOTAL CORTISOL: CPT | Performed by: INTERNAL MEDICINE

## 2024-10-31 PROCEDURE — 99232 SBSQ HOSP IP/OBS MODERATE 35: CPT | Performed by: INTERNAL MEDICINE

## 2024-10-31 PROCEDURE — 84481 FREE ASSAY (FT-3): CPT | Performed by: INTERNAL MEDICINE

## 2024-10-31 PROCEDURE — 85027 COMPLETE CBC AUTOMATED: CPT | Performed by: INTERNAL MEDICINE

## 2024-10-31 PROCEDURE — 84439 ASSAY OF FREE THYROXINE: CPT | Performed by: INTERNAL MEDICINE

## 2024-10-31 PROCEDURE — 25010000002 COSYNTROPIN PER 0.25 MG: Performed by: INTERNAL MEDICINE

## 2024-10-31 RX ORDER — LEVOTHYROXINE SODIUM 100 UG/1
100 TABLET ORAL
Qty: 30 TABLET | Refills: 0 | Status: SHIPPED | OUTPATIENT
Start: 2024-11-01 | End: 2024-12-01

## 2024-10-31 RX ORDER — FLUCONAZOLE 200 MG/1
200 TABLET ORAL EVERY 24 HOURS
Qty: 10 TABLET | Refills: 0 | Status: SHIPPED | OUTPATIENT
Start: 2024-10-31 | End: 2024-11-10

## 2024-10-31 RX ORDER — FERROUS SULFATE 325(65) MG
325 TABLET ORAL
Qty: 30 TABLET | Refills: 0 | Status: SHIPPED | OUTPATIENT
Start: 2024-10-31 | End: 2024-11-30

## 2024-10-31 RX ADMIN — COSYNTROPIN 0.25 MG: 0.25 INJECTION, POWDER, LYOPHILIZED, FOR SOLUTION INTRAMUSCULAR; INTRAVENOUS at 05:32

## 2024-10-31 RX ADMIN — Medication 10 ML: at 08:47

## 2024-10-31 RX ADMIN — METOPROLOL SUCCINATE 25 MG: 25 TABLET, FILM COATED, EXTENDED RELEASE ORAL at 08:46

## 2024-10-31 RX ADMIN — EMPAGLIFLOZIN 25 MG: 25 TABLET, FILM COATED ORAL at 08:46

## 2024-10-31 RX ADMIN — LEVOTHYROXINE SODIUM 100 MCG: 0.1 TABLET ORAL at 05:32

## 2024-10-31 RX ADMIN — MEMANTINE 10 MG: 10 TABLET ORAL at 08:47

## 2024-10-31 RX ADMIN — ESCITALOPRAM OXALATE 20 MG: 10 TABLET ORAL at 08:46

## 2024-10-31 RX ADMIN — Medication 1000 UNITS: at 08:45

## 2024-10-31 RX ADMIN — ATORVASTATIN CALCIUM 80 MG: 40 TABLET, FILM COATED ORAL at 08:46

## 2024-10-31 RX ADMIN — SODIUM BICARBONATE 650 MG: 650 TABLET ORAL at 08:45

## 2024-10-31 RX ADMIN — ASPIRIN 81 MG: 81 TABLET, COATED ORAL at 08:46

## 2024-10-31 RX ADMIN — PANTOPRAZOLE SODIUM 40 MG: 40 TABLET, DELAYED RELEASE ORAL at 05:32

## 2024-10-31 RX ADMIN — DIPHENHYDRAMINE HYDROCHLORIDE AND LIDOCAINE HYDROCHLORIDE AND ALUMINUM HYDROXIDE AND MAGNESIUM HYDRO 5 ML: KIT at 09:54

## 2024-10-31 RX ADMIN — HYDROXYCHLOROQUINE SULFATE 200 MG: 200 TABLET ORAL at 08:46

## 2024-10-31 RX ADMIN — SENNOSIDES AND DOCUSATE SODIUM 2 TABLET: 50; 8.6 TABLET ORAL at 08:46

## 2024-10-31 RX ADMIN — OXYCODONE 5 MG: 5 TABLET ORAL at 08:41

## 2024-10-31 RX ADMIN — TOPIRAMATE 200 MG: 100 TABLET, FILM COATED ORAL at 08:46

## 2024-10-31 NOTE — PROGRESS NOTES
Cardiology Progress Note    Patient Identification:  Name: Natalee Noble  Age: 74 y.o.  Sex: female  :  1950  MRN: [id number]                 Follow Up / Chief Complaint: Hypertension, A-fib, CHF  Chief Complaint   Patient presents with    Weakness - Generalized     Weakness, started this morning, Pt states was unable to get up of commode and slid to the floor.  Pt has been on the floor all morning family just got up up. Having pain to shoulders, neck hips and back  Family states pt is pale,       Interval History: Patient recently was in [facility] with newly diagnosed LV dysfunction was started on guideline directed medical therapy presented to ER with hypotension and recurrent syncopal episodes.    Patient underwent MELISSA on 10/15/2024 that showed EF of 56 to 60% with moderate to severe TR left atrial enlargement and right atrial enlargement.      Patient had Watchman and A-fib ablation on 10/22/2024.  Patient presented 10/25/2024 with weakness and fell in the bathroom could not get.  Had troponin which was elevated.  Patient underwent cardiac cath 10/29/2024 which revealed no obstructive CAD normal LV systolic function.       Subjective: Patient seen and examined.  Chart reviewed.  Labs reviewed.  Complaining of being weak.  Denies any chest pain or shortness of breath.    Objective:  10/29/2024: Creatinine is 1.14, hemoglobin 8.7  10/30/2024: Creatinine is 1.05, hemoglobin 8.3  10/31/2024: Cortisol stim test is revealing normal response.  Hemoglobin 8.1    History of present illness:      Natalee Noble is a 74-year-old female with a medical history to include:     Atrial fibrillation  [id number] SCORE   [id number] Score: 6 (10/12/2024  9:08 AM)     Long-term anticoagulation  Intolerant to flecainide and amiodarone in the past  HFrEF  Hypertension  Dyslipidemia  CKD stage III  History of CVA  Anxiety/depression  GERD  Diverticulosis  Stroke     Presented to [place] 10/25/2024 with a fall.   Patient says she has fatigue and weakness and got progressively worse she was in the bathroom could not get off the toilet and ended up on the floor for 2 hours.  Patient denies any chest pain or shortness of breath.     Patient has history of recurrent syncopal episodes was in the hospital 10/11/2024     Data:   Labs 10/27/2024 reveal elevated HS troponin of 1146, came down to 359.  proBNP is 2832.  Creatinine of 1.08, EGFR 54.  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  And underwent watchman and A-fib ablation 10/22/2024.  Presented back in on 10/25/2024 with feeling poorly and multiple falls.  Had elevated troponin which was initially thought to be due to non-ST elevation MI because he was over 1300.  Therefore patient underwent cardiac cath which revealed no obstructive CAD with normal LV systolic function.  Patient had an episode of hypotension 10/28/2024 requiring vasopressors to.  Now blood pressure is improved.  Patient has been started on midodrine.  Patient's elevated troponin is probably due to recent A-fib ablation.  Patient's LV function has improved therefore we will hold off on medications.  Patient has chronic intermittent hypotension of unclear etiology.  ACTH stim test is normal.  Patient has been started on midodrine.  Patient is to receive 6 weeks of Xarelto post ablation and watchman.  Will continue aspirin as  tolerated indefinitely.    Patient underwent cardiac cath 10/29/2024 which revealed normal LV systolic function and no obstructive CAD.  Patient previously could have had tachycardia induced cardiomyopathy causing LV dysfunction.  Now with A-fib controlled LV function has normalized.  Discussed with hospitalist Dr. Dao, Terrie RUOSE MD.  Patient is being discharged to rehab.  Will follow-up as needed.    Copied text in this portion of the note has been reviewed and is accurate as of 10/31/2024    Past Medical History:  Past Medical History:   Diagnosis Date    Anxiety and depression     Arthritis     Back pain     CRF (chronic renal failure), stage 3 (moderate)     Diverticulosis     Essential hypertension     Family history of colon cancer     Family history of colonic polyps     Fractures     R tibia/fibula; L ankle    GERD (gastroesophageal reflux disease)     History of adenomatous polyp of colon     History of cardiac cath     1/28/19 left main no significant disease. LAD with no significant disease. LCX no significant disease. RCA no significant disease.    History of cardiac monitoring     2/01/19 - ZIO PATCH - Underlying rhythm is sinus at 48-94 bpm. PSVT x23 with fastest 167 bpm, longest 24.4 seconds.   10/05/2018- ZIO PATCH - NSR most of time but did have episode of A fib ranging from .    History of colon polyps     History of echocardiogram     9/2018: Left ventricular systolic function is normal. EF 50%. Left ventricular diastolic dysfunction consistent with ipaired relaxation. Left atrial cavity size is mildly dilated. Mild MR. Mild to moderate TR. There is abnormal thickening noted on the RV that may represent a vegetation-this was discussed with Dr. Hammer and Dr. Reilly. No patent foramen ovale. No further work up needed at this time    Hyperlipidemia     Kidney disorder     Migraine     Migraines     Neck pain     Stroke 2018     Past Surgical History:  Past Surgical History:   Procedure  Laterality Date    ABDOMINOPLASTY      ATRIAL APPENDAGE EXCLUSION LEFT WITH TRANSESOPHAGEAL ECHOCARDIOGRAM N/A 10/22/2024    Procedure: Atrial Appendage Occlusion;  Surgeon: Sourav Pompa MD;  Location: [facility] CATH INVASIVE LOCATION;  Service: Cardiovascular;  Laterality: N/A;    BACK SURGERY      L2-5 fusion 2012    CARDIAC CATHETERIZATION N/A 10/29/2024    Procedure: Left Heart Cath, possible pci;  Surgeon: Josemanuel Sheriff MD;  Location: [facility] CATH INVASIVE LOCATION;  Service: Cardiovascular;  Laterality: N/A;    CARDIAC ELECTROPHYSIOLOGY PROCEDURE Right 10/22/2024    Procedure: Ablation atrial fibrillation;  Surgeon: Sourav Pompa MD;  Location: [facility] CATH INVASIVE LOCATION;  Service: Cardiovascular;  Laterality: Right;    CHOLECYSTECTOMY      COLONOSCOPY  11/24/2014    Diverticulosis; Repeat 5 years    COLONOSCOPY  11/09/2010    Diverticulosis; Repeat 4 years    COLONOSCOPY  09/18/2007    Dr. Monzon-Normal; Repeat 3 years    COLONOSCOPY  06/02/2005    Dr. Monzon-Diminuitive polypectomy above the cecum; Otherwise normal colonoscopy; Repeat 2 years    COLONOSCOPY N/A 11/01/2019    Diverticulosis in the left colon; Two 5-6mm polyps in the cecum-path shows one tubular adenomatous and on hyperplastic polyp; One 8mm tubular adenomatous polyp in the ascending colon; Two 4-5mm tubular adenomatous polyps in the transverse colon; Repeat 3 years    COLONOSCOPY N/A 6/12/2023    Procedure: COLONOSCOPY WITH ANESTHESIA;  Surgeon: Susan Lord MD;  Location:  [facility];  Service: Gastroenterology;  Laterality: N/A;  preop hx of polyps   postop; polyps   PCP Woody Valentin MD    CYSTOCELE REPAIR      Cystocele and rectocele repair    ENDOSCOPY  09/28/2012    LA grade C esophagitis; HH    ENDOSCOPY N/A 11/01/2019    Small HH; Non-erosive gastritis-biopsied; Normal examined duodenum    ENDOSCOPY N/A 10/13/2024    Procedure: ESOPHAGOGASTRODUODENOSCOPY WITH BIOPSY X 1 AREA;  Surgeon:  Miguel Vega MD;  Location: [facility] ENDOSCOPY;  Service: Gastroenterology;  Laterality: N/A;  Hiatal hernia, esophagitis, gastritis    FIBULA FRACTURE SURGERY      HYSTERECTOMY      NECK SURGERY  2012    ACDF C4-7    ORIF TIBIA FRACTURE Right         Social History:   Social History     Tobacco Use    Smoking status: Never    Smokeless tobacco: Never   Substance Use Topics    Alcohol use: No      Family History:  Family History   Problem Relation Age of Onset    Colon cancer Father 65    Colon polyps Daughter 38    Esophageal cancer Neg Hx     Liver cancer Neg Hx     Liver disease Neg Hx     Rectal cancer Neg Hx     Stomach cancer Neg Hx           Allergies:  Allergies   Allergen Reactions    Benzoin Anaphylaxis and Swelling     States when used on her neck it shut off her airway  Huge abscesses with surgery      Iodine Other (See Comments)     PT UNABLE TO TELL RN ABOUT REACTION AT THIS TIME, BUT FAMILY STATES PT HAD A REACTION TO BEING CLEANSED WITH IODINE IN SURGERY  IOBAN - used on neck, swelled to the point of shutting off airway    Vancomycin Other (See Comments)     Kidney failure  Vancomycin induced acute kidney injury      Amiodarone Other (See Comments)     Tremor- hand and face     Levaquin [Levofloxacin] Unknown (See Comments)     Unknown    Sulfa Antibiotics      Scheduled Meds:  aspirin, 81 mg, Daily  atorvastatin, 80 mg, Daily  cholecalciferol, 1,000 Units, Daily  empagliflozin, 25 mg, Daily  escitalopram, 20 mg, Daily  First Mouthwash (Magic Mouthwash), 5 mL, Q6H  fluconazole, 200 mg, Q24H  hydroxychloroquine, 200 mg, BID  levothyroxine, 100 mcg, Q AM  memantine, 10 mg, Daily  metoprolol succinate XL, 25 mg, BID  nystatin, , Q12H  pantoprazole, 40 mg, Q AM  rivaroxaban, 20 mg, Daily With Dinner  senna-docusate sodium, 2 tablet, BID  sodium bicarbonate, 650 mg, BID  sodium chloride, 10 mL, Q12H  sodium chloride, 10 mL, Q12H  topiramate, 200 mg, Daily            Review of Systems:   Review  "of Systems   Cardiovascular:  Negative for chest pain.   Neurological:  Positive for dizziness and weakness.            Constitutional:  Temp:  [97.8 °F (36.6 °C)-98.9 °F (37.2 °C)] 97.8 °F (36.6 °C)  Heart Rate:  [61-77] 63  Resp:  [15-25] 16  BP: (125-157)/(66-85) 135/71    Physical Exam   /71 (BP Location: Right arm, Patient Position: Lying)   Pulse 63   Temp 97.8 °F (36.6 °C) (Axillary)   Resp 16   Ht 165.1 cm (65\")   Wt 78.5 kg (173 lb)   SpO2 93%   BMI 28.79 kg/m²   General:  Appears in no acute distress  Eyes: Sclera is anicteric,  conjunctiva is clear   HEENT:  No JVD. Thyroid not visibly enlarged. No mucosal pallor or cyanosis  Respiratory: Respirations regular and unlabored at rest.  Clear to auscultation  Cardiovascular: S1,S2, irregularly irregular rate  Gastrointestinal: Abdomen nondistended.  Musculoskeletal:  No abnormal movements  Extremities: No digital clubbing or cyanosis  Skin: Color pink.   Neuro: Alert and awake.    INTAKE AND OUTPUT:    Intake/Output Summary (Last 24 hours) at 10/31/2024 0733  Last data filed at 10/31/2024 0330  Gross per 24 hour   Intake 242 ml   Output 2000 ml   Net -1758 ml       Cardiographics  Telemetry: Sinus rhythm    ECG:   ECG 12 Lead Rhythm Change   Preliminary Result   HEART RATE=72  bpm   RR Kguhpprg=546  ms   IA Ifchkbjb=485  ms   P Horizontal Axis=-48  deg   P Front Axis=57  deg   QRSD Interval=93  ms   QT Rsurvmsd=809  ms   WQnB=378  ms   QRS Axis=73  deg   T Wave Axis=65  deg   - ABNORMAL ECG -   Sinus rhythm   Prolonged IA interval   Date and Time of Study:2024-10-30 08:06:11      ECG 12 Lead ED Triage Standing Order; Chest Pain   Final Result   HEART RATE=64  bpm   RR Qqbtujhu=869  ms   IA Interval=62  ms   P Horizontal Axis=-10  deg   P Front Axis=Invalid  deg   QRSD Interval=87  ms   QT Aeeenoau=194  ms   FWrD=684  ms   QRS Axis=69  deg   T Wave Axis=53  deg   - NORMAL ECG -   Sinus rhythm   When compared with ECG of 23-Oct-2024 03:26:00, "   Nonspecific significant change   Electronically Signed By: Harry Ortega (Vicente) 2024-10-26 08:09:03   Date and Time of Study:2024-10-25 14:36:42      Telemetry Scan   Final Result      Telemetry Scan   Final Result      Telemetry Scan   Final Result      Telemetry Scan   Final Result      Telemetry Scan   Final Result      Telemetry Scan   Final Result      Telemetry Scan   Final Result      Telemetry Scan   Final Result      Telemetry Scan   Final Result      Telemetry Scan   Final Result      Telemetry Scan   Final Result      Telemetry Scan   Final Result      Telemetry Scan   Final Result      Telemetry Scan   Final Result      Telemetry Scan   Final Result      Telemetry Scan   Final Result      Telemetry Scan   Final Result      Telemetry Scan   Final Result      Telemetry Scan   Final Result      Telemetry Scan   Final Result      Telemetry Scan   Final Result      Telemetry Scan   Final Result      Telemetry Scan   Final Result      Telemetry Scan   Final Result      Telemetry Scan   Final Result      Telemetry Scan   Final Result      Telemetry Scan   Final Result      Telemetry Scan   Final Result      Telemetry Scan   Final Result      Telemetry Scan   Final Result      Telemetry Scan   Final Result        I have personally reviewed EKG    Echocardiogram: Results for orders placed during the hospital encounter of 10/22/24    Intra-Op Structural Heart MELISSA (Cardiology Read)    Interpretation Summary  MELISSA   procedure note    Procedure:  MELISSA    Indication:   A-fib, watchman implantation    Date of procedure  : 10/22/2024    :  Dr. Josemanuel Sheriff    Description of procedure:    Under conscious sedation given by anesthesiology and local anesthesia, a MELISSA probe was advanced into the esophagus and into the stomach 2D, color images were obtained, after a timeout was performed.  Patient underwent watchman implantation with septal puncture done with assistance of MELISSA.  After implantation of Watchman  "measurements were obtained to look at compression of left atrial appendage occluder device.  Post watchman implantation he was made sure there was no leak on color or clots of the device and device had good compression.  Then repeat sweep of MELISSA was done to make sure there was no pericardial effusion.  Patient tolerated procedure well complications were none.    Complications: none    Results:    Left atrial appendage was chicken wing morphology.  Left atrial appendage closure device watchman was seated well.  There was good compression over 15%.  There was no leak around the watchman seen.  No thrombus seen on watchman.  No significant pericardial effusion seen.  Normal LV size and contractility EF of over 55%      Recommendations/plan:    Clinical correlation recommended      Lab Review   I have reviewed the labs  Results from last 7 days   Lab Units 10/26/24  1224 10/25/24  1911 10/25/24  1710   CK TOTAL U/L  --   --  68   HSTROP T ng/L 359* 1,146* 1,352*         Results from last 7 days   Lab Units 10/30/24  0633   SODIUM mmol/L 142   POTASSIUM mmol/L 4.2   BUN mg/dL 10   CREATININE mg/dL 1.05*   CALCIUM mg/dL 8.9         Results from last 7 days   Lab Units 10/31/24  0550 10/30/24  0633 10/29/24  0418   WBC 10*3/mm3 4.73 5.33 4.09   HEMOGLOBIN g/dL 8.1* 8.3* 8.7*   HEMATOCRIT % 28.3* 28.5* 30.2*   PLATELETS 10*3/mm3 207 190 201     Results from last 7 days   Lab Units 10/29/24  0418 10/28/24  1430 10/28/24  0613 10/27/24  1917 10/27/24  1259   INR  1.14*  --   --   --  1.36*   APTT seconds 43.9* >139.0* 41.5*   < > 36.5*    < > = values in this interval not displayed.       RADIOLOGY:  Imaging Results (Last 24 Hours)       ** No results found for the last 24 hours. **                  )10/31/2024  Josemanuel Sheriff MD      EMR Dragon/Transcription:   \"Dictated utilizing Dragon dictation\".   "

## 2024-10-31 NOTE — PROGRESS NOTES
"Enter Query Response Below      Query Response: NSTEMI type II with elevated troponin secondary to recent ablation.  Cardiac cath showed nonobstructive coronary artery disease with a normal left ventricular function.             If applicable, please update the problem list.   Patient: Natalee Noble \"Margaret\"        : 1950  Account: [id number]           Admit Date:         How to Respond to this query:       a. Click New Note     b. Answer query within the yellow box.                c. Update the Problem List, if applicable.      If you have any questions about this query contact me at: [email or web address]     :     Patient s/p ablation for atrial fibrillation and placement of watchman device on 10/22. Presented to ED on 10/25 with complaints of generalized weakness with fall at home.  HS Troponin T 1,352, 1,146, 359, Troponin T Delta -206.  10/25 EKG \" Sinus rhythm, When compared with ECG of 2024 112622, Nonspecific significant change.\"  10/27 Per Cardiology consult note \"Patient denies any chest pain or shortness of breath.\"  10/29 Per heart cath report \"No obstructive CAD, normal LV function.\"  10/30 Per EP progress note \"Incidental note of elevated troponin noted and underwent cardiac catheterization without significant coronary stenosis.\" \"Troponin elevation secondary to AF ablation which is expected after recent ablation.\"  10/30 Per Hospitalist progress note \"Elevated troponin/NSTEMI type II likely secondary to ablation which was very recent.\"    After study, was NSTEMI type II clinically supported during this admission?    NSTEMI type II was supported with additional clinical indicators:____________  NSTEMI type II was not supported, non-ischemic myocardial injury only  Other- specify_____________  Unable to determine    By submitting this query, we are merely seeking further clarification of documentation to accurately reflect all conditions that you are monitoring, evaluating, " treating or that extend the hospitalization or utilize additional resources of care. Please utilize your independent clinical judgment when addressing the question(s) above.     This query and your response, once completed, will be entered into the legal medical record.    Sincerely,  Suzie Choi RN  Clinical Documentation Integrity Program

## 2024-10-31 NOTE — DISCHARGE SUMMARY
[facility] Medicine Services  Discharge Summary    Date of Service: 10/31/2024  Patient Name: Natalee Noble  : 1950  MRN: [id number]    Date of Admission: 10/25/2024  Discharge Diagnosis:     Yeast UTI  NSTEMI type II/elevated troponins secondary to recent ablation for atrial fibrillation on Xarelto  Generalized weakness  Hypothyroidism  Iron deficiency anemia  Atrial fibrillation status post recent cardiac ablation    Date of Discharge: 10/31/2024  Primary Care Physician: Woody Valentin MD      Presenting Problem:   General weakness [R53.1]  Generalized weakness [R53.1]    Active and Resolved Hospital Problems:  Active Hospital Problems    Diagnosis POA    **Non-STEMI (non-ST elevated myocardial infarction) [I21.4] Unknown    Generalized weakness [R53.1] Yes    Presence of Watchman left atrial appendage closure device [Z95.818] Unknown    Atrial fibrillation [I48.91] Yes    Rheumatoid arthritis involving multiple sites [M06.9] Yes    Paroxysmal A-fib [I48.0] Unknown      Resolved Hospital Problems   No resolved problems to display.         Hospital Course       Hospital Course:  This patient is a 74-year-old lady who presented with generalized weakness with dysuria and urinary frequency.  She was diagnosed with a urinary tract infection as indicated on her urinalysis and urine culture eventually grew yeast.  Prior to obtaining urine culture results she was started on IV ceftriaxone.  Upon getting urine culture showing yeast she was started on Diflucan for treatment of her symptomatic urinary tract infection.  She gradually improved with regard to her symptoms.    With regards to generalized weakness she was found to be severely iron deficient and started on Ferrlecit.  Iron-deficiency anemia was thought to likely be the cause of her generalized weakness.  A TSH level was also checked and was found to be low with an elevated free T4.  She does have a history of hypothyroidism  and was on 112 mcg of levothyroxine.  Due to the elevated free T4 the levothyroxine was decreased to 100 mcg.  Endocrinology did see her in consult.  She will follow-up with endocrinology in the outpatient setting for recheck of her thyroid panel and monitoring.    She was diagnosed with an NSTEMI type II secondary to recent ablation due to atrial fibrillation causing elevated troponins.  Cardiology still took her for cardiac catheterization which showed nonobstructive coronary artery disease with a normal left ventricular function.  There was no obstructive coronary artery disease.    She was seen by physical therapy and occupational therapy and recommendation was for rehab placement.  [facility].        DISCHARGE Follow Up Recommendations for labs and diagnostics: Follow-up with PCP in 1 week, follow-up with cardiology in 1 week, follow-up with endocrinology in 1 week.        Day of Discharge     Vital Signs:  Temp:  [97.8 °F (36.6 °C)-98.5 °F (36.9 °C)] 98.1 °F (36.7 °C)  Heart Rate:  [53-73] 58  Resp:  [13-23] 14  BP: (100-147)/(53-78) 100/55  Flow (L/min) (Oxygen Therapy):  [2] 2    Physical Exam:  Physical Exam  Constitutional:       Appearance: Normal appearance.   HENT:      Head: Normocephalic and atraumatic.      Nose: Nose normal.      Mouth/Throat:      Mouth: Mucous membranes are moist.   Eyes:      Extraocular Movements: Extraocular movements intact.      Pupils: Pupils are equal, round, and reactive to light.   Cardiovascular:      Rate and Rhythm: Normal rate and regular rhythm.   Pulmonary:      Effort: Pulmonary effort is normal.      Breath sounds: Normal breath sounds.   Abdominal:      General: Abdomen is flat. Bowel sounds are normal.      Palpations: Abdomen is soft.   Musculoskeletal:         General: Normal range of motion.      Cervical back: Normal range of motion and neck supple.   Skin:     General: Skin is warm and dry.   Neurological:      General:  No focal deficit present.      Mental Status: She is alert and oriented to person, place, and time.   Psychiatric:         Mood and Affect: Mood normal.         Behavior: Behavior normal.         Thought Content: Thought content normal.         Judgment: Judgment normal.           Pertinent  and/or Most Recent Results     LAB RESULTS:      Lab 10/31/24  0550 10/30/24  0633 10/29/24  0418 10/28/24  1430 10/28/24  0613 10/27/24  1917 10/27/24  1259 10/27/24  0451 10/26/24  0448 10/25/24  2222 10/25/24  1710   WBC 4.73 5.33 4.09  --  6.42  --   --  4.22   < >  --  6.99   HEMOGLOBIN 8.1* 8.3* 8.7*  --  8.7*  --   --  8.1*   < >  --  8.9*   HEMATOCRIT 28.3* 28.5* 30.2*  --  29.9*  --   --  27.4*   < >  --  30.8*   PLATELETS 207 190 201  --  181  --   --  154   < >  --  166   NEUTROS ABS  --   --  2.78  --   --   --   --   --   --   --  5.48   IMMATURE GRANS (ABS)  --   --  0.03  --   --   --   --   --   --   --  0.03   LYMPHS ABS  --   --  0.56*  --   --   --   --   --   --   --  0.75   MONOS ABS  --   --  0.45  --   --   --   --   --   --   --  0.62   EOS ABS  --   --  0.26  --   --   --   --   --   --   --  0.10   MCV 95.9 95.3 96.2  --  95.5  --   --  94.5   < >  --  96.0   LACTATE  --   --   --   --   --   --   --   --   --  0.9  --    PROTIME  --   --  12.3*  --   --   --  14.5*  --   --   --   --    APTT  --   --  43.9* >139.0* 41.5* >139.0* 36.5*  --   --   --   --     < > = values in this interval not displayed.         Lab 10/31/24  0550 10/30/24  0633 10/29/24  1034 10/27/24  0451 10/26/24  1224 10/26/24  0448 10/25/24  2222   SODIUM  --  142 141 140  --  142 142   POTASSIUM  --  4.2 4.4 4.0  --  3.9 3.9   CHLORIDE  --  111* 109* 108*  --  111* 111*   CO2  --  25.6 23.9 23.0  --  22.9 22.6   ANION GAP  --  5.4 8.1 9.0  --  8.1 8.4   BUN  --  10 10 12  --  14 14   CREATININE  --  1.05* 1.14* 1.08*  --  1.04* 1.05*   EGFR  --  55.9* 50.6* 54.0*  --  56.5* 55.9*   GLUCOSE  --  93 85 111*  --  80 87   CALCIUM  --   8.9 8.7 8.4*  --  8.6 8.8   TSH 0.023*  --   --   --  0.022*  --   --          Lab 10/25/24  1710   TOTAL PROTEIN 5.9*   ALBUMIN 3.4*   GLOBULIN 2.5   ALT (SGPT) 14   AST (SGOT) 32   BILIRUBIN 0.5   ALK PHOS 87   LIPASE 37         Lab 10/29/24  0418 10/27/24  1259 10/26/24  1224 10/25/24  1911 10/25/24  1710   HSTROP T  --   --  359* 1,146* 1,352*   PROTIME 12.3* 14.5*  --   --   --    INR 1.14* 1.36*  --   --   --              Lab 10/28/24  0613 10/26/24  1224 10/25/24  1710   IRON  --  25*  --    IRON SATURATION (TSAT)  --  9*  --    TIBC  --  289*  --    TRANSFERRIN  --  194*  --    FERRITIN  --  88.50  --    FOLATE 14.90  --   --    VITAMIN B 12  --   --  904         Brief Urine Lab Results  (Last result in the past 365 days)        Color   Clarity   Blood   Leuk Est   Nitrite   Protein   CREAT   Urine HCG        10/25/24 1733 Dark Yellow   Clear   Negative   Small (1+)   Negative   Negative                 Microbiology Results (last 10 days)       Procedure Component Value - Date/Time    Blood Culture - Blood, Arm, Left [[id number]]  (Normal) Collected: 10/25/24 2222    Lab Status: Final result Specimen: Blood from Arm, Left Updated: 10/30/24 2232     Blood Culture No growth at 5 days    Narrative:      Less than seven (7) mL's of blood was collected.  Insufficient quantity may yield false negative results.    Blood Culture - Blood, Arm, Right [[id number]]  (Normal) Collected: 10/25/24 2222    Lab Status: Final result Specimen: Blood from Arm, Right Updated: 10/30/24 2232     Blood Culture No growth at 5 days    Narrative:      Less than seven (7) mL's of blood was collected.  Insufficient quantity may yield false negative results.    Urine Culture - Urine, Urine, Clean Catch [[id number]]  (Abnormal) Collected: 10/25/24 1733    Lab Status: Edited Result - FINAL Specimen: Urine, Clean Catch Updated: 10/27/24 1310     Urine Culture Yeast isolated     Comment:   Corrected result. Previously Reported Organism Changed.  Previous result was Normal Urogenital Deepti on 10/27/2024 at 1153 EDT.       Narrative:      No further workup            CT Head Without Contrast    Result Date: 10/25/2024  Impression: Impression: Motion degraded study. No definite acute intracranial abnormality. Paranasal sinus disease. Please correlate clinically to exclude acute sinusitis. Electronically Signed: Jamie Mendez DO  10/25/2024 6:15 PM EDT  Workstation ID: [id number]    XR Chest 1 View    Result Date: 10/25/2024  Impression: Impression: No acute process. Electronically Signed: Chela Lennon MD  10/25/2024 3:24 PM EDT  Workstation ID: [id number]    XR Chest 1 View    Result Date: 10/15/2024  Impression: Impression: No acute process. Electronically Signed: Chela Lennon MD  10/15/2024 9:20 AM EDT  Workstation ID: [id number]    CT Head Without Contrast    Result Date: 10/11/2024  Impression: Impression: 1. No acute intracranial abnormalities are identified. 2. Pansinusitis. Electronically Signed: Jeet Reis MD  10/11/2024 10:24 PM EDT  Workstation ID: [id number]    XR Ankle 3+ View Left    Result Date: 10/11/2024  Impression: Impression: No acute osseous abnormalities are identified. Electronically Signed: Jeet Reis MD  10/11/2024 9:33 PM EDT  Workstation ID: [id number]    XR Chest 1 View    Result Date: 10/11/2024  Impression: No active disease. Electronically Signed: Jeet Reis MD  10/11/2024 9:25 PM EDT  Workstation ID: [id number]    CTA CHEST FOR PULMONARY VEIN MAPPING    Result Date: 10/8/2024  Impression: IMPRESSION: 1. Standard 4 vessel pulmonary vein pattern. 2. Negative for YO thrombosis.    CT CHEST ABDOMEN PELVIS WO CONTRAST    Result Date: 10/5/2024  Impression: IMPRESSION: 1. Scattered groundglass attenuation and tree-in-bud nodularity throughout both lungs suggestive of an infectious or inflammatory process. 2. No acute process within the abdomen or pelvis.             Results for orders placed during the hospital encounter of  10/22/24    Intra-Op Structural Heart MELISSA (Cardiology Read)    Interpretation Summary  MELISSA   procedure note    Procedure:  MELISSA    Indication:   A-fib, watchman implantation    Date of procedure  : 10/22/2024    :  Dr. Josemanuel Sheriff    Description of procedure:    Under conscious sedation given by anesthesiology and local anesthesia, a MELISSA probe was advanced into the esophagus and into the stomach 2D, color images were obtained, after a timeout was performed.  Patient underwent watchman implantation with septal puncture done with assistance of MELISSA.  After implantation of Watchman measurements were obtained to look at compression of left atrial appendage occluder device.  Post watchman implantation he was made sure there was no leak on color or clots of the device and device had good compression.  Then repeat sweep of MELISSA was done to make sure there was no pericardial effusion.  Patient tolerated procedure well complications were none.    Complications: none    Results:    Left atrial appendage was chicken wing morphology.  Left atrial appendage closure device watchman was seated well.  There was good compression over 15%.  There was no leak around the watchman seen.  No thrombus seen on watchman.  No significant pericardial effusion seen.  Normal LV size and contractility EF of over 55%      Recommendations/plan:    Clinical correlation recommended      Labs Pending at Discharge:      Procedures Performed  Procedure(s):  Left Heart Cath, possible pci         Consults:   Consults       Date and Time Order Name Status Description    10/26/2024 11:17 AM Inpatient Cardiology Consult Completed     10/11/2024 10:41 PM Inpatient Cardiology Consult Completed               Discharge Details        Discharge Medications        New Medications        Instructions Start Date   ferrous sulfate 325 (65 FE) MG tablet   325 mg, Oral, Daily With Breakfast      fluconazole 200 MG tablet  Commonly known as: DIFLUCAN   200  mg, Oral, Every 24 Hours             Changes to Medications        Instructions Start Date   levothyroxine 100 MCG tablet  Commonly known as: SYNTHROID, LEVOTHROID  What changed:   medication strength  how much to take  when to take this   100 mcg, Oral, Every Early Morning   Start Date: November 1, 2024            Continue These Medications        Instructions Start Date   ALPRAZolam 1 MG tablet  Commonly known as: XANAX   1 mg, 3 Times Daily PRN      aspirin 81 MG tablet   81 mg, Oral, Daily      atorvastatin 80 MG tablet  Commonly known as: LIPITOR   1 tablet, Daily      cholecalciferol 25 MCG (1000 UT) tablet  Commonly known as: VITAMIN D3   1,000 Units, Daily      dapagliflozin Propanediol 10 MG tablet   10 mg, Daily      escitalopram 20 MG tablet  Commonly known as: LEXAPRO   20 mg, Daily      ezetimibe 10 MG tablet  Commonly known as: ZETIA   10 mg, Oral, Daily      FOLIC ACID PO   Daily      guaiFENesin-dextromethorphan 100-10 MG/5ML syrup  Commonly known as: ROBITUSSIN DM   10 mL, Oral, Every 4 Hours PRN      hydroxychloroquine 200 MG tablet  Commonly known as: PLAQUENIL   1 tablet, 2 Times Daily      Lifitegrast 5 % ophthalmic solution  Commonly known as: XIIDRA   1 drop, 2 Times Daily      liothyronine 5 MCG tablet  Commonly known as: CYTOMEL   5 mcg, 2 Times Daily      memantine 10 MG tablet  Commonly known as: NAMENDA   10 mg, 2 Times Daily      metoprolol succinate XL 25 MG 24 hr tablet  Commonly known as: TOPROL-XL   25 mg, 2 Times Daily      pantoprazole 40 MG EC tablet  Commonly known as: PROTONIX   40 mg, Daily PRN      PROLIA SC   Subcutaneous, Every 6 months      rivaroxaban 20 MG tablet  Commonly known as: XARELTO   20 mg, Oral, Daily      sodium bicarbonate 650 MG tablet   650 mg, 2 Times Daily      topiramate 200 MG tablet  Commonly known as: TOPAMAX   1 tablet, Daily               Allergies   Allergen Reactions    Benzoin Anaphylaxis and Swelling     States when used on her neck it shut off  her airway  Huge abscesses with surgery      Iodine Other (See Comments)     PT UNABLE TO TELL RN ABOUT REACTION AT THIS TIME, BUT FAMILY STATES PT HAD A REACTION TO BEING CLEANSED WITH IODINE IN SURGERY  IOBAN - used on neck, swelled to the point of shutting off airway    Vancomycin Other (See Comments)     Kidney failure  Vancomycin induced acute kidney injury      Amiodarone Other (See Comments)     Tremor- hand and face     Levaquin [Levofloxacin] Unknown (See Comments)     Unknown    Sulfa Antibiotics          [facility] (DC - External)    Diet: Cardiac diet          Discharge Activity:   Activity Instructions       Activity as Tolerated                CODE STATUS:  Code Status and Medical Interventions: CPR (Attempt to Resuscitate); Full Support   Ordered at: 10/25/24 1935     Code Status (Patient has no pulse and is not breathing):    CPR (Attempt to Resuscitate)     Medical Interventions (Patient has pulse or is breathing):    Full Support         Future Appointments   Date Time Provider Department Center   11/15/2024  9:00 AM Suorav Pompa MD [facility]       Additional Instructions for the Follow-ups that You Need to Schedule       Ambulatory Referral to Cardiac Rehab   As directed      Discharge Follow-up with PCP   As directed       Currently Documented PCP:    Woody Valentin MD    PCP Phone Number:    [phone]     Follow Up Details: 1 week        Discharge Follow-up with Specified Provider: Endocrinology; 1 Week   As directed      To: Endocrinology   Follow Up: 1 Week        Discharge Follow-up with Specified Provider: cardiology; 1 Week   As directed      To: cardiology   Follow Up: 1 Week                Time spent on Discharge including face to face service: Greater than 30 minutes    Signature: Electronically signed by Terrie Dao MD, 10/31/24, 19:25 EDT.  [facility] Hospitalist Team.

## 2024-10-31 NOTE — PROGRESS NOTES
CC--- atrial fibrillation and recurrent falls    Sub  Patient presented with recurrent falls and was noted to have UTI  Patient had recent AF ablation and also placement of watchman implantation simultaneously without any postprocedure complications  Patient denies any chest pain or shortness of breath  Incidental note of elevated troponin noted and underwent cardiac catheterization without significant coronary stenosis  Continues to feel fatigued  Patient is resting and hemodynamically stable and remains in sinus rhythm          Past Medical History:   Diagnosis Date    Anxiety and depression     Arthritis     Back pain     CRF (chronic renal failure), stage 3 (moderate)     Diverticulosis     Essential hypertension     Family history of colon cancer     Family history of colonic polyps     Fractures     R tibia/fibula; L ankle    GERD (gastroesophageal reflux disease)     History of adenomatous polyp of colon     History of cardiac cath     1/28/19 left main no significant disease. LAD with no significant disease. LCX no significant disease. RCA no significant disease.    History of cardiac monitoring     2/01/19 - ZIO PATCH - Underlying rhythm is sinus at 48-94 bpm. PSVT x23 with fastest 167 bpm, longest 24.4 seconds.   10/05/2018- ZIO PATCH - NSR most of time but did have episode of A fib ranging from .    History of colon polyps     History of echocardiogram     9/2018: Left ventricular systolic function is normal. EF 50%. Left ventricular diastolic dysfunction consistent with ipaired relaxation. Left atrial cavity size is mildly dilated. Mild MR. Mild to moderate TR. There is abnormal thickening noted on the RV that may represent a vegetation-this was discussed with Dr. Hammer and Dr. Reilly. No patent foramen ovale. No further work up needed at this time    Hyperlipidemia     Kidney disorder     Migraine     Migraines     Neck pain     Stroke 2018     Past Surgical History:   Procedure Laterality Date     ABDOMINOPLASTY      ATRIAL APPENDAGE EXCLUSION LEFT WITH TRANSESOPHAGEAL ECHOCARDIOGRAM N/A 10/22/2024    Procedure: Atrial Appendage Occlusion;  Surgeon: Sourav Pompa MD;  Location: [facility] CATH INVASIVE LOCATION;  Service: Cardiovascular;  Laterality: N/A;    BACK SURGERY      L2-5 fusion 2012    CARDIAC CATHETERIZATION N/A 10/29/2024    Procedure: Left Heart Cath, possible pci;  Surgeon: Josemanuel Sheriff MD;  Location: [facility] CATH INVASIVE LOCATION;  Service: Cardiovascular;  Laterality: N/A;    CARDIAC ELECTROPHYSIOLOGY PROCEDURE Right 10/22/2024    Procedure: Ablation atrial fibrillation;  Surgeon: Sourav Pompa MD;  Location: [facility] CATH INVASIVE LOCATION;  Service: Cardiovascular;  Laterality: Right;    CHOLECYSTECTOMY      COLONOSCOPY  11/24/2014    Diverticulosis; Repeat 5 years    COLONOSCOPY  11/09/2010    Diverticulosis; Repeat 4 years    COLONOSCOPY  09/18/2007    Dr. Monzon-Normal; Repeat 3 years    COLONOSCOPY  06/02/2005    Dr. Monzon-Diminuitive polypectomy above the cecum; Otherwise normal colonoscopy; Repeat 2 years    COLONOSCOPY N/A 11/01/2019    Diverticulosis in the left colon; Two 5-6mm polyps in the cecum-path shows one tubular adenomatous and on hyperplastic polyp; One 8mm tubular adenomatous polyp in the ascending colon; Two 4-5mm tubular adenomatous polyps in the transverse colon; Repeat 3 years    COLONOSCOPY N/A 6/12/2023    Procedure: COLONOSCOPY WITH ANESTHESIA;  Surgeon: Susan Lord MD;  Location:  [facility];  Service: Gastroenterology;  Laterality: N/A;  preop hx of polyps   postop; polyps   PCP Woody Valentin MD    CYSTOCELE REPAIR      Cystocele and rectocele repair    ENDOSCOPY  09/28/2012    LA grade C esophagitis; HH    ENDOSCOPY N/A 11/01/2019    Small HH; Non-erosive gastritis-biopsied; Normal examined duodenum    ENDOSCOPY N/A 10/13/2024    Procedure: ESOPHAGOGASTRODUODENOSCOPY WITH BIOPSY X 1 AREA;  Surgeon: Miguel Vega,  MD;  Location: [facility] ENDOSCOPY;  Service: Gastroenterology;  Laterality: N/A;  Hiatal hernia, esophagitis, gastritis    FIBULA FRACTURE SURGERY      HYSTERECTOMY      NECK SURGERY  2012    ACDF C4-7    ORIF TIBIA FRACTURE Right        Physical Exam    General:      well developed, well nourished, in no acute distress.    Head:      normocephalic and atraumatic.    Eyes:      PERRL/EOM intact, conjunctivae and sclerae clear without nystagmus.    Neck:      no  thyromegaly, trachea central with normal respiratory effort  Lungs:      clear bilaterally to auscultation.    Heart:       regular rate and rhythm, S1, S2 without murmurs, rubs, or gallops  Skin:      intact without lesions or rashes.    Psych:      alert and cooperative; normal mood and affect; normal attention span and concentration.          CBC    Results from last 7 days   Lab Units 10/31/24  0550 10/30/24  0633 10/29/24  0418 10/28/24  0613 10/27/24  0451 10/26/24  0448 10/25/24  1710   WBC 10*3/mm3 4.73 5.33 4.09 6.42 4.22 4.12 6.99   HEMOGLOBIN g/dL 8.1* 8.3* 8.7* 8.7* 8.1* 8.2* 8.9*   PLATELETS 10*3/mm3 207 190 201 181 154 152 166     BMP   Results from last 7 days   Lab Units 10/30/24  0633 10/29/24  1034 10/27/24  0451 10/26/24  0448 10/25/24  2222 10/25/24  1710   SODIUM mmol/L 142 141 140 142 142 141   POTASSIUM mmol/L 4.2 4.4 4.0 3.9 3.9 4.3   CHLORIDE mmol/L 111* 109* 108* 111* 111* 108*   CO2 mmol/L 25.6 23.9 23.0 22.9 22.6 23.6   BUN mg/dL 10 10 12 14 14 16   CREATININE mg/dL 1.05* 1.14* 1.08* 1.04* 1.05* 1.19*   GLUCOSE mg/dL 93 85 111* 80 87 98         Assessment and plan    Troponin  elevation secondary to AF ablation which is expected after recent ablation--- patient started back on Xarelto  Since patient is on Xarelto , aspirin can be stopped  Xarelto can typically be stopped after 6 to 8 weeks and resume back on aspirin  Recent AF ablation with placement of Watchman device  Patient has UTI being treated by hospitalist  Iron deficiency  anemia on iron infusion which will help patient's symptoms--- continues to be anemic and monitor CBC  Patient will benefit with physical therapy to get muscle strengthening to help prevent recurrent falls  Cardiac wise patient is stable  Medications reviewed and follow-up appointments made        Electronically signed by Sourav Pompa MD, 10/31/24, 9:33 AM EDT.

## 2024-11-01 NOTE — CASE MANAGEMENT/SOCIAL WORK
Case Management Discharge Note      Final Note: IRF - [facility] Continued Care - Discharged on 10/31/2024 Admission date: 10/25/2024 - Discharge disposition: Rehab Facility or Unit (DC - External)      Destination Coordination complete.      Service Provider Services Address Phone Fax Patient Preferred    [facility] [address] [address] [phone] --               Transportation Services  Private: Car    Final Discharge Disposition Code: 04 - intermediate care facility      ENRIQUE Multani RN  ICU/CVU   O: [phone]  C: [phone]  [email or web address]

## 2024-11-04 ENCOUNTER — TELEPHONE (OUTPATIENT)
Dept: CARDIOLOGY | Facility: CLINIC | Age: 74
End: 2024-11-04
Payer: MEDICARE

## 2024-11-07 LAB
QT INTERVAL: 411 MS
QTC INTERVAL: 451 MS

## 2024-11-08 ENCOUNTER — TELEPHONE (OUTPATIENT)
Dept: CARDIAC REHAB | Facility: HOSPITAL | Age: 74
End: 2024-11-08
Payer: MEDICARE

## 2024-11-12 ENCOUNTER — TELEPHONE (OUTPATIENT)
Dept: CARDIAC REHAB | Facility: HOSPITAL | Age: 74
End: 2024-11-12
Payer: MEDICARE

## 2024-11-14 ENCOUNTER — TELEPHONE (OUTPATIENT)
Dept: CARDIOLOGY | Facility: CLINIC | Age: 74
End: 2024-11-14

## 2024-11-14 ENCOUNTER — TELEPHONE (OUTPATIENT)
Dept: CARDIOLOGY | Facility: CLINIC | Age: 74
End: 2024-11-14
Payer: MEDICARE

## 2024-11-14 NOTE — TELEPHONE ENCOUNTER
Caller: WINSTON ESPARZA-OT WITH [facility]    Relationship to patient:     Best call back number: [phone]    Patient is needing: WINSTON ESPARZA IS OCCUPATIONAL THERAPIST WITH [facility] CALLED TO REPORT THATS ASSESSMENT COMPLETED. RECOMMENDED 2X A WEEK FOR 4 WEEKS TO CONTINUE THERAPY. THERAPIST DOES NOT NEED A CALL BACK UNLESS THERE IS A PROBLEM WITH THIS ASSESSMENT.

## 2024-11-14 NOTE — TELEPHONE ENCOUNTER
Spoke with Gilberto I let him know she will be monitored closely but when she sees Dr Pompa tomorrow they can discuss further.

## 2024-11-14 NOTE — TELEPHONE ENCOUNTER
Caller: CLAUDIA WITH [facility]    Best call back number: [phone]    What was the call regarding: CLAUDIA FROM [facility] CALLING TO GET MESSAGE TO DR FORD ABOUT A MEDICATION INTERACTION HE NOTICED, PT IS DUE TO BE SEEN TOMORROW 11.15.24    PT WAS DISCHARGED FROM THE HOSPITAL AND SHE IS ADMITTED FOR HOMEHEALTH CARE AND WILL BE SERVICED FOR A 60 DAY DURATION    THERE IS AN INTERACTION WITH TWO OF HER MEDICATIONS THAT COULD PROLONG HER QT INTERVALS - THE MEDICATIONS ARE:    LEXAPRO  PLAQUENIL

## 2024-11-15 ENCOUNTER — OFFICE VISIT (OUTPATIENT)
Dept: CARDIOLOGY | Facility: CLINIC | Age: 74
End: 2024-11-15
Payer: MEDICARE

## 2024-11-15 ENCOUNTER — PREP FOR SURGERY (OUTPATIENT)
Dept: OTHER | Facility: HOSPITAL | Age: 74
End: 2024-11-15
Payer: MEDICARE

## 2024-11-15 VITALS
DIASTOLIC BLOOD PRESSURE: 64 MMHG | HEART RATE: 68 BPM | BODY MASS INDEX: 27.32 KG/M2 | SYSTOLIC BLOOD PRESSURE: 110 MMHG | WEIGHT: 164 LBS | HEIGHT: 65 IN

## 2024-11-15 DIAGNOSIS — I10 PRIMARY HYPERTENSION: ICD-10-CM

## 2024-11-15 DIAGNOSIS — I48.0 PAROXYSMAL ATRIAL FIBRILLATION: Primary | ICD-10-CM

## 2024-11-15 DIAGNOSIS — Z95.818 PRESENCE OF WATCHMAN LEFT ATRIAL APPENDAGE CLOSURE DEVICE: ICD-10-CM

## 2024-11-15 DIAGNOSIS — D50.8 OTHER IRON DEFICIENCY ANEMIA: ICD-10-CM

## 2024-11-15 RX ORDER — FERROUS SULFATE 325(65) MG
325 TABLET ORAL
Qty: 30 TABLET | Refills: 2 | Status: SHIPPED | OUTPATIENT
Start: 2024-11-15 | End: 2025-02-13

## 2024-11-15 NOTE — PROGRESS NOTES
CC--- atrial fibrillation post ablation and watchman implantation    Sub  74-year-old patient was evaluated in the hospital and underwent concomitant AF ablation with watchman implantation October 2024.  Post ablation patient had recurrent admission with UTI and has chronic anemia and underwent iron infusions and underwent cardiac catheterization without significant coronary artery stenosis and troponin elevation was secondary to recent ablation.  Catheterization revealed EF of 55% with mild MR.  Patient has known history of stroke in the past  He had a history of anemia and recurrent falls and henceforth watchman was done simultaneously during AF ablation.  Also has history of memory problems and also has hypothyroidism    Patient completed a rehab and feels much better and she is unable to tolerate metoprolol twice a day  Awaiting to see endocrinologist to adjust her thyroid dose because of normal TSH and T4      Past Medical History:   Diagnosis Date    Anxiety and depression     Arthritis     Back pain     CRF (chronic renal failure), stage 3 (moderate)     Diverticulosis     Essential hypertension     Family history of colon cancer     Family history of colonic polyps     Fractures     R tibia/fibula; L ankle    GERD (gastroesophageal reflux disease)     History of adenomatous polyp of colon     History of cardiac cath     1/28/19 left main no significant disease. LAD with no significant disease. LCX no significant disease. RCA no significant disease.    History of cardiac monitoring     2/01/19 - ZIO PATCH - Underlying rhythm is sinus at 48-94 bpm. PSVT x23 with fastest 167 bpm, longest 24.4 seconds.   10/05/2018- ZIO PATCH - NSR most of time but did have episode of A fib ranging from .    History of colon polyps     History of echocardiogram     9/2018: Left ventricular systolic function is normal. EF 50%. Left ventricular diastolic dysfunction consistent with ipaired relaxation. Left atrial cavity size  is mildly dilated. Mild MR. Mild to moderate TR. There is abnormal thickening noted on the RV that may represent a vegetation-this was discussed with Dr. Hammer and Dr. Reilly. No patent foramen ovale. No further work up needed at this time    Hyperlipidemia     Kidney disorder     Migraine     Migraines     Neck pain     Stroke 2018     Past Surgical History:   Procedure Laterality Date    ABDOMINOPLASTY      ATRIAL APPENDAGE EXCLUSION LEFT WITH TRANSESOPHAGEAL ECHOCARDIOGRAM N/A 10/22/2024    Procedure: Atrial Appendage Occlusion;  Surgeon: Sourav Pompa MD;  Location:  [facility];  Service: Cardiovascular;  Laterality: N/A;    BACK SURGERY      L2-5 fusion 2012    CARDIAC CATHETERIZATION N/A 10/29/2024    Procedure: Left Heart Cath, possible pci;  Surgeon: Josemanuel Sheriff MD;  Location:  [facility];  Service: Cardiovascular;  Laterality: N/A;    CARDIAC ELECTROPHYSIOLOGY PROCEDURE Right 10/22/2024    Procedure: Ablation atrial fibrillation;  Surgeon: Sourav Pompa MD;  Location:  [facility];  Service: Cardiovascular;  Laterality: Right;    CHOLECYSTECTOMY      COLONOSCOPY  11/24/2014    Diverticulosis; Repeat 5 years    COLONOSCOPY  11/09/2010    Diverticulosis; Repeat 4 years    COLONOSCOPY  09/18/2007    Dr. Monzon-Normal; Repeat 3 years    COLONOSCOPY  06/02/2005    Dr. Monzon-Diminuitive polypectomy above the cecum; Otherwise normal colonoscopy; Repeat 2 years    COLONOSCOPY N/A 11/01/2019    Diverticulosis in the left colon; Two 5-6mm polyps in the cecum-path shows one tubular adenomatous and on hyperplastic polyp; One 8mm tubular adenomatous polyp in the ascending colon; Two 4-5mm tubular adenomatous polyps in the transverse colon; Repeat 3 years    COLONOSCOPY N/A 6/12/2023    Procedure: COLONOSCOPY WITH ANESTHESIA;  Surgeon: Susan Lord MD;  Location:  [facility];  Service: Gastroenterology;  Laterality: N/A;  preop hx of  polyps   postop; polyps   PCP Woody Valentin MD    CYSTOCELE REPAIR      Cystocele and rectocele repair    ENDOSCOPY  09/28/2012    LA grade C esophagitis; HH    ENDOSCOPY N/A 11/01/2019    Small HH; Non-erosive gastritis-biopsied; Normal examined duodenum    ENDOSCOPY N/A 10/13/2024    Procedure: ESOPHAGOGASTRODUODENOSCOPY WITH BIOPSY X 1 AREA;  Surgeon: Miguel Vega MD;  Location: [facility];  Service: Gastroenterology;  Laterality: N/A;  Hiatal hernia, esophagitis, gastritis    FIBULA FRACTURE SURGERY      HYSTERECTOMY      NECK SURGERY  2012    ACDF C4-7    ORIF TIBIA FRACTURE Right        Physical Exam    General:      well developed, well nourished, in no acute distress.    Head:      normocephalic and atraumatic.    Eyes:      PERRL/EOM intact, conjunctivae and sclerae clear without nystagmus.    Neck:      no  thyromegaly, trachea central with normal respiratory effort  Lungs:      clear bilaterally to auscultation.    Heart:       regular rate and rhythm, S1, S2 without murmurs, rubs, or gallops  Skin:      intact without lesions or rashes.    Psych:      alert and cooperative; normal mood and affect; normal attention span and concentration.          Assessment and plan    Latest hemoglobin according to the daughter is 8.9  Concomitant AF ablation and watchman implantation without complications  Post watchman and AF ablation, recurrent admission for UTI and elevated troponin secondary to ablation and cardiac catheterization without stenosis with normal EF and mild MR  Iron deficiency anemia and prescription of iron tablets given and importance of replenishing iron educated  CBC and CMP ordered  Hypothyroidism with abnormal thyroid function test and endocrine evaluation pending next week currently on levothyroxine  Low-dose of metoprolol 12.5 mg to be continued at nighttime  Memory problems on Namenda  Patient and clinically improved after finishing rehab  Current  anticoagulation on rivaroxaban which can be stopped post surveillance MELISSA and orders for MELISSA placed as per protocol  Remote history of stroke on aspirin and currently on also on Zetia  Medications reviewed and several orders placed  History of orthostatic hypotension clinically improved after rehab and metoprolol to be taken only at nighttime at 12.5 mg a day which was discussed with patient and family    ECG 12 Lead    Date/Time: 11/15/2024 10:29 AM  Performed by: Sourav Pompa MD    Authorized by: Sourav Pompa MD  Comparison: compared with previous ECG   Similar to previous ECG  Rhythm: sinus rhythm  Rate: normal  Conduction: conduction normal  Other findings: left ventricular hypertrophy        Electronically signed by Sourav Pompa MD, 11/15/24, 10:29 AM EST.

## 2024-11-18 ENCOUNTER — TELEPHONE (OUTPATIENT)
Dept: CARDIOLOGY | Facility: CLINIC | Age: 74
End: 2024-11-18
Payer: MEDICARE

## 2024-11-18 NOTE — TELEPHONE ENCOUNTER
Caller: RAND SIMON    Relationship: SPEECH THERAPIST    Best call back number: [phone]          What was the call regarding: CALLED TO INFORM DR. FORD OF THE FOLLOWING:        PLAN OF CARE, STARTED 11/15    1 WEEK FOR THREE    2 WEEK TWO    SPEECH THERAPY FOCUSING ON MEMORY

## 2024-11-21 ENCOUNTER — LAB (OUTPATIENT)
Dept: LAB | Facility: HOSPITAL | Age: 74
End: 2024-11-21
Payer: MEDICARE

## 2024-11-21 ENCOUNTER — TRANSCRIBE ORDERS (OUTPATIENT)
Dept: ADMINISTRATIVE | Facility: HOSPITAL | Age: 74
End: 2024-11-21
Payer: MEDICARE

## 2024-11-21 DIAGNOSIS — I48.0 PAROXYSMAL ATRIAL FIBRILLATION: ICD-10-CM

## 2024-11-21 DIAGNOSIS — E03.9 HYPOTHYROIDISM, UNSPECIFIED TYPE: ICD-10-CM

## 2024-11-21 DIAGNOSIS — E03.9 HYPOTHYROIDISM, UNSPECIFIED TYPE: Primary | ICD-10-CM

## 2024-11-21 DIAGNOSIS — D50.8 OTHER IRON DEFICIENCY ANEMIA: ICD-10-CM

## 2024-11-22 ENCOUNTER — LAB (OUTPATIENT)
Dept: LAB | Facility: HOSPITAL | Age: 74
End: 2024-11-22
Payer: MEDICARE

## 2024-11-22 ENCOUNTER — TRANSCRIBE ORDERS (OUTPATIENT)
Dept: ADMINISTRATIVE | Facility: HOSPITAL | Age: 74
End: 2024-11-22
Payer: MEDICARE

## 2024-11-22 DIAGNOSIS — N28.9: ICD-10-CM

## 2024-11-22 DIAGNOSIS — E03.9 PRIMARY HYPOTHYROIDISM: Primary | ICD-10-CM

## 2024-11-22 DIAGNOSIS — E03.9 PRIMARY HYPOTHYROIDISM: ICD-10-CM

## 2024-11-22 LAB
ALBUMIN SERPL-MCNC: 4 G/DL (ref 3.5–5.2)
ALBUMIN/GLOB SERPL: 1.5 G/DL
ALP SERPL-CCNC: 103 U/L (ref 39–117)
ALT SERPL W P-5'-P-CCNC: 20 U/L (ref 1–33)
ANION GAP SERPL CALCULATED.3IONS-SCNC: 11.9 MMOL/L (ref 5–15)
AST SERPL-CCNC: 28 U/L (ref 1–32)
BASOPHILS # BLD AUTO: 0.02 10*3/MM3 (ref 0–0.2)
BASOPHILS NFR BLD AUTO: 0.5 % (ref 0–1.5)
BILIRUB SERPL-MCNC: 0.3 MG/DL (ref 0–1.2)
BUN SERPL-MCNC: 10 MG/DL (ref 8–23)
BUN/CREAT SERPL: 9.5 (ref 7–25)
CALCIUM SPEC-SCNC: 9.5 MG/DL (ref 8.6–10.5)
CHLORIDE SERPL-SCNC: 104 MMOL/L (ref 98–107)
CO2 SERPL-SCNC: 23.1 MMOL/L (ref 22–29)
CREAT SERPL-MCNC: 1.05 MG/DL (ref 0.57–1)
DEPRECATED RDW RBC AUTO: 66 FL (ref 37–54)
EGFRCR SERPLBLD CKD-EPI 2021: 55.9 ML/MIN/1.73
EOSINOPHIL # BLD AUTO: 0 10*3/MM3 (ref 0–0.4)
EOSINOPHIL NFR BLD AUTO: 0 % (ref 0.3–6.2)
ERYTHROCYTE [DISTWIDTH] IN BLOOD BY AUTOMATED COUNT: 18.4 % (ref 12.3–15.4)
GLOBULIN UR ELPH-MCNC: 2.7 GM/DL
GLUCOSE SERPL-MCNC: 98 MG/DL (ref 65–99)
HCT VFR BLD AUTO: 39.1 % (ref 34–46.6)
HGB BLD-MCNC: 11.5 G/DL (ref 12–15.9)
IMM GRANULOCYTES # BLD AUTO: 0.02 10*3/MM3 (ref 0–0.05)
IMM GRANULOCYTES NFR BLD AUTO: 0.5 % (ref 0–0.5)
LYMPHOCYTES # BLD AUTO: 0.58 10*3/MM3 (ref 0.7–3.1)
LYMPHOCYTES NFR BLD AUTO: 13.4 % (ref 19.6–45.3)
MCH RBC QN AUTO: 28.5 PG (ref 26.6–33)
MCHC RBC AUTO-ENTMCNC: 29.4 G/DL (ref 31.5–35.7)
MCV RBC AUTO: 96.8 FL (ref 79–97)
MONOCYTES # BLD AUTO: 0.4 10*3/MM3 (ref 0.1–0.9)
MONOCYTES NFR BLD AUTO: 9.2 % (ref 5–12)
NEUTROPHILS NFR BLD AUTO: 3.32 10*3/MM3 (ref 1.7–7)
NEUTROPHILS NFR BLD AUTO: 76.4 % (ref 42.7–76)
NRBC BLD AUTO-RTO: 0 /100 WBC (ref 0–0.2)
PLATELET # BLD AUTO: 185 10*3/MM3 (ref 140–450)
PMV BLD AUTO: 10.9 FL (ref 6–12)
POTASSIUM SERPL-SCNC: 3.7 MMOL/L (ref 3.5–5.2)
PROT SERPL-MCNC: 6.7 G/DL (ref 6–8.5)
RBC # BLD AUTO: 4.04 10*6/MM3 (ref 3.77–5.28)
SODIUM SERPL-SCNC: 139 MMOL/L (ref 136–145)
T3 SERPL-MCNC: 82 NG/DL (ref 80–200)
T4 FREE SERPL-MCNC: 1.59 NG/DL (ref 0.93–1.7)
T4 SERPL-MCNC: 9.93 MCG/DL (ref 4.5–11.7)
TSH SERPL DL<=0.05 MIU/L-ACNC: 0.57 UIU/ML (ref 0.27–4.2)
WBC NRBC COR # BLD AUTO: 4.34 10*3/MM3 (ref 3.4–10.8)

## 2024-11-22 PROCEDURE — 84439 ASSAY OF FREE THYROXINE: CPT

## 2024-11-22 PROCEDURE — 80053 COMPREHEN METABOLIC PANEL: CPT

## 2024-11-22 PROCEDURE — 36415 COLL VENOUS BLD VENIPUNCTURE: CPT

## 2024-11-22 PROCEDURE — 85025 COMPLETE CBC W/AUTO DIFF WBC: CPT

## 2024-11-22 PROCEDURE — 84480 ASSAY TRIIODOTHYRONINE (T3): CPT

## 2024-11-22 PROCEDURE — 84443 ASSAY THYROID STIM HORMONE: CPT

## 2024-12-04 RX ORDER — METOPROLOL TARTRATE 25 MG/1
12.5 TABLET, FILM COATED ORAL DAILY
COMMUNITY

## 2024-12-05 ENCOUNTER — ANESTHESIA EVENT (OUTPATIENT)
Dept: CARDIOLOGY | Facility: HOSPITAL | Age: 74
End: 2024-12-05
Payer: MEDICARE

## 2024-12-05 ENCOUNTER — HOSPITAL ENCOUNTER (OUTPATIENT)
Dept: CARDIOLOGY | Facility: HOSPITAL | Age: 74
Setting detail: HOSPITAL OUTPATIENT SURGERY
Discharge: HOME | End: 2024-12-05
Payer: MEDICARE

## 2024-12-05 ENCOUNTER — ANESTHESIA (OUTPATIENT)
Dept: CARDIOLOGY | Facility: HOSPITAL | Age: 74
End: 2024-12-05
Payer: MEDICARE

## 2024-12-05 VITALS
HEIGHT: 67 IN | TEMPERATURE: 97.6 F | RESPIRATION RATE: 16 BRPM | BODY MASS INDEX: 26.92 KG/M2 | HEART RATE: 76 BPM | WEIGHT: 171.52 LBS | DIASTOLIC BLOOD PRESSURE: 73 MMHG | SYSTOLIC BLOOD PRESSURE: 156 MMHG | OXYGEN SATURATION: 99 %

## 2024-12-05 DIAGNOSIS — Z95.818 PRESENCE OF WATCHMAN LEFT ATRIAL APPENDAGE CLOSURE DEVICE: ICD-10-CM

## 2024-12-05 DIAGNOSIS — I48.0 PAROXYSMAL ATRIAL FIBRILLATION: ICD-10-CM

## 2024-12-05 LAB — BH CV ECHO SHUNT ASSESSMENT PERFORMED (HIDDEN SCRIPTING): 1

## 2024-12-05 PROCEDURE — 25010000002 PROPOFOL 10 MG/ML EMULSION: Performed by: NURSE ANESTHETIST, CERTIFIED REGISTERED

## 2024-12-05 PROCEDURE — 93321 DOPPLER ECHO F-UP/LMTD STD: CPT

## 2024-12-05 PROCEDURE — 25010000002 LIDOCAINE PF 2% 2 % SOLUTION: Performed by: NURSE ANESTHETIST, CERTIFIED REGISTERED

## 2024-12-05 PROCEDURE — 93325 DOPPLER ECHO COLOR FLOW MAPG: CPT

## 2024-12-05 PROCEDURE — 93312 ECHO TRANSESOPHAGEAL: CPT

## 2024-12-05 RX ORDER — LABETALOL HYDROCHLORIDE 5 MG/ML
5 INJECTION, SOLUTION INTRAVENOUS
OUTPATIENT
Start: 2024-12-05

## 2024-12-05 RX ORDER — ACETAMINOPHEN 325 MG/1
650 TABLET ORAL ONCE AS NEEDED
OUTPATIENT
Start: 2024-12-05

## 2024-12-05 RX ORDER — NALOXONE HCL 0.4 MG/ML
0.4 VIAL (ML) INJECTION AS NEEDED
OUTPATIENT
Start: 2024-12-05

## 2024-12-05 RX ORDER — DIPHENHYDRAMINE HYDROCHLORIDE 50 MG/ML
12.5 INJECTION INTRAMUSCULAR; INTRAVENOUS
OUTPATIENT
Start: 2024-12-05

## 2024-12-05 RX ORDER — SODIUM CHLORIDE 9 MG/ML
INJECTION, SOLUTION INTRAVENOUS CONTINUOUS PRN
Status: DISCONTINUED | OUTPATIENT
Start: 2024-12-05 | End: 2024-12-05 | Stop reason: SURG

## 2024-12-05 RX ORDER — ALBUTEROL SULFATE 0.83 MG/ML
2.5 SOLUTION RESPIRATORY (INHALATION) ONCE AS NEEDED
OUTPATIENT
Start: 2024-12-05

## 2024-12-05 RX ORDER — HYDRALAZINE HYDROCHLORIDE 20 MG/ML
5 INJECTION INTRAMUSCULAR; INTRAVENOUS
OUTPATIENT
Start: 2024-12-05

## 2024-12-05 RX ORDER — ONDANSETRON 2 MG/ML
4 INJECTION INTRAMUSCULAR; INTRAVENOUS ONCE AS NEEDED
OUTPATIENT
Start: 2024-12-05

## 2024-12-05 RX ORDER — EPHEDRINE SULFATE 5 MG/ML
INJECTION INTRAVENOUS AS NEEDED
Status: DISCONTINUED | OUTPATIENT
Start: 2024-12-05 | End: 2024-12-05 | Stop reason: SURG

## 2024-12-05 RX ORDER — ACETAMINOPHEN 650 MG/1
650 SUPPOSITORY RECTAL EVERY 4 HOURS PRN
OUTPATIENT
Start: 2024-12-05

## 2024-12-05 RX ORDER — LIDOCAINE HYDROCHLORIDE 20 MG/ML
INJECTION, SOLUTION EPIDURAL; INFILTRATION; INTRACAUDAL; PERINEURAL AS NEEDED
Status: DISCONTINUED | OUTPATIENT
Start: 2024-12-05 | End: 2024-12-05 | Stop reason: SURG

## 2024-12-05 RX ORDER — PROPOFOL 10 MG/ML
VIAL (ML) INTRAVENOUS AS NEEDED
Status: DISCONTINUED | OUTPATIENT
Start: 2024-12-05 | End: 2024-12-05 | Stop reason: SURG

## 2024-12-05 RX ADMIN — EPHEDRINE SULFATE 5 MG: 5 INJECTION INTRAVENOUS at 12:21

## 2024-12-05 RX ADMIN — PROPOFOL 70 MG: 10 INJECTION, EMULSION INTRAVENOUS at 12:14

## 2024-12-05 RX ADMIN — PROPOFOL 10 MG: 10 INJECTION, EMULSION INTRAVENOUS at 12:18

## 2024-12-05 RX ADMIN — PROPOFOL 30 MG: 10 INJECTION, EMULSION INTRAVENOUS at 12:16

## 2024-12-05 RX ADMIN — SODIUM CHLORIDE: 9 INJECTION, SOLUTION INTRAVENOUS at 12:11

## 2024-12-05 RX ADMIN — LIDOCAINE HYDROCHLORIDE 70 MG: 20 INJECTION, SOLUTION EPIDURAL; INFILTRATION; INTRACAUDAL; PERINEURAL at 12:14

## 2024-12-05 RX ADMIN — EPHEDRINE SULFATE 5 MG: 5 INJECTION INTRAVENOUS at 12:23

## 2024-12-05 RX ADMIN — EPHEDRINE SULFATE 5 MG: 5 INJECTION INTRAVENOUS at 12:20

## 2024-12-05 NOTE — NURSING NOTE
Spoke with Dr. Combs about stopping anticoagulants following her surveillance MELISSA post watchman, Dr. Combs said Xarelto could be stopped at this time. Will do discharge medication reconciliation to reflect this change.

## 2024-12-05 NOTE — ANESTHESIA PREPROCEDURE EVALUATION
Anesthesia Evaluation     Patient summary reviewed and Nursing notes reviewed   no history of anesthetic complications:   NPO Solid Status: > 8 hours  NPO Liquid Status: > 2 hours           Airway   Dental      Pulmonary    Cardiovascular     ECG reviewed  PT is on anticoagulation therapy    (+) hypertension, valvular problems/murmurs TI and MR, past MI , dysrhythmias Atrial Fib, hyperlipidemia      Neuro/Psych  (+) CVA, headaches, psychiatric history Anxiety and Depression  GI/Hepatic/Renal/Endo    (+) GERD, renal disease- CRI, thyroid problem hypothyroidism    Musculoskeletal     (+) back pain, neck pain  Abdominal    Substance History      OB/GYN          Other   arthritis, autoimmune disease rheumatoid arthritis, blood dyscrasia anemia,     ROS/Med Hx Other: Additional History:  Afib/RVR, epigastric pain    Echo:    Echocardiogram Findings    Left Ventricle Left ventricular systolic function is normal. Left ventricular ejection fraction appears to be 56 - 60%.     Normal left ventricular cavity size and wall thickness noted.  Right Ventricle Normal right ventricular cavity size and systolic function noted.  Left Atrium The left atrial cavity is severely dilated. No evidence of left atrial thrombus or mass present. There is light spontaneous echo contrast present in the atrial body and in the atrial appendage. No evidence of cardiac transplantation present. Left atrial appendage was found to be multilobar in nature.  Left atrial appendage morphology best described as cactus. The left atrial appendage was visualized through multiple planes. Doppler interrogation shows normal flow within the left atrial appendage. No evidence of a left atrial appendage thrombus was present.  The interatrial septum does not appear to be redundant. No interatrial septal aneurysm present. No lipomatous hypertrophy of the interatrial septum present. No evidence of a patent foramen ovale. No evidence of an atrial septal defect present.   Saline test results are negative.  Right Atrium The right atrial cavity is moderately dilated.  Aortic Valve The aortic valve is structurally normal with no regurgitation or stenosis present.  Mitral Valve The mitral valve is normal in structure. Mild to moderate mitral valve regurgitation is present.  Tricuspid Valve The tricuspid valve is grossly normal in structure. Moderate to severe tricuspid valve regurgitation is present. Estimated right ventricular systolic pressure from tricuspid regurgitation is mildly elevated (35-45 mmHg).  Pulmonic Valve The pulmonic valve is grossly normal in structure. There is trace pulmonic valve regurgitation present.  Greater Vessels No dilation of the aortic root is present.  Pericardium There is no evidence of pericardial effusion. .      Cath:  Hemodynamics     Pressures     Ao:                                           132/58 mmHg     LV:                                           143/16 mmHg  End-diastolic pressure:           16        mmHg  No significant aortic valvular gradient on pullback              Coronary Angiography     Left Main :  The left main   normal     Left Anterior Descending : Proximal LAD is normal.  Mid LAD has intramyocardial course and calcification.  There is mild to moderate luminal irregularities throughout mid to distal LAD.     Ramus intermedius : Small caliber vessel without disease     Left Circumflex : Ends up as principal distal marginal without disease     Right Coronary Artery :  The right coronary artery   is dominant vessel without disease    Left Ventriculography:      Estimated Ejection Fraction:   55 %   Wall motion abnormalities:  None   Mitral Regurgitation:  None      Impression:     1. No obstructive CAD, normal LV function     Recommendations:     1. Patient's LV dysfunction could have been due to tachycardia induced cardiomyopathy.  With ablation and rate control her LV function is improved.  2. Elevated troponin is probably due to  recent ablation.         PSH:  NECK SURGERY BACK SURGERY  HYSTERECTOMY CHOLECYSTECTOMY  ORIF TIBIA FRACTURE FIBULA FRACTURE SURGERY  ABDOMINOPLASTY COLONOSCOPY  ENDOSCOPY COLONOSCOPY  COLONOSCOPY COLONOSCOPY  CYSTOCELE REPAIR ENDOSCOPY  COLONOSCOPY COLONOSCOPY  ENDOSCOPY CARDIAC ELECTROPHYSIOLOGY PROCEDURE  ATRIAL APPENDAGE EXCLUSION LEFT WITH TRANSESOPHAGEAL ECHOCARDIOGRAM CARDIAC CATHETERIZATION                Anesthesia Plan    ASA 3     general   total IV anesthesia  (Patient identified; pre-operative vital signs, all relevant labs/studies, complete medical/surgical/anesthetic history, full medication list, full allergy list, and NPO status obtained/reviewed; physical assessment performed; anesthetic options, side effects, potential complications, risks, and benefits discussed; questions answered; written anesthesia consent obtained; patient cleared for procedure; anesthesia machine and equipment checked and functioning)  intravenous induction     Anesthetic plan, risks, benefits, and alternatives have been provided, discussed and informed consent has been obtained with: patient.    Plan discussed with CRNA and CAA.    CODE STATUS:

## 2024-12-05 NOTE — DISCHARGE INSTRUCTIONS
MELISSA DC Instructions  The medication, which was used to put you to sleep, will be acting in your body for the next twenty-four (24) hours, so you might feel a little sleepy; this feeling will slowly wear off.  Because the medicine is still in your system for the next twenty-four (24) hours:  Do not drive a car, operate machinery, or power tools  Do not drink any alcoholic drinks (not even beer)  Make any important decisions such as to sign important papers    We strongly suggest that a responsible adult be with the patient the rest of the day.    What to do for pain: acetaminophen (Tylenol) and eating cool, soothing foods (e.g. ice cream, smoothies) can help with a sore throat. If your pain is unmanageable with these methods, call the Cardiologist's office.     It may be better to start with liquids such as water, then soups, and graduate up to solid foods    Call the cardiologist with any problems of:  Excessive mucus  Spitting up blood  Sore throat more than 72 hours    Go to the nearest Emergency Department if you're vomiting blood or having difficulty breathing.

## 2024-12-05 NOTE — ANESTHESIA POSTPROCEDURE EVALUATION
Patient: Natalee Noble    Procedure Summary       Date: 12/05/24 Room / Location: [facility]    Anesthesia Start: 1211 Anesthesia Stop: 1225    Procedure: ADULT TRANSESOPHAGEAL ECHO (MELISSA) W/ CONT IF NECESSARY PER PROTOCOL Diagnosis:       Paroxysmal atrial fibrillation      Presence of Watchman left atrial appendage closure device      (Arrhythmia)    Scheduled Providers: Ramiro Combs MD Provider: Harry Patel MD    Anesthesia Type: general ASA Status: 3            Anesthesia Type: general    Vitals  Vitals Value Taken Time   /61 12/05/24 1231   Temp     Pulse 74 12/05/24 1257   Resp 16 12/05/24 1230   SpO2 98 % 12/05/24 1257   Vitals shown include unfiled device data.        Post Anesthesia Care and Evaluation    Patient location during evaluation: bedside  Patient participation: complete - patient participated  Level of consciousness: awake  Pain scale: See nurse's notes for pain score.  Pain management: adequate    Airway patency: patent  Anesthetic complications: No anesthetic complications  PONV Status: none  Cardiovascular status: acceptable  Respiratory status: acceptable and spontaneous ventilation  Hydration status: acceptable    Comments: Patient seen and examined postoperatively; vital signs stable; SpO2 greater than or equal to 90%; cardiopulmonary status stable; nausea/vomiting adequately controlled; pain adequately controlled; no apparent anesthesia complications; patient discharged from anesthesia care when discharge criteria were met

## 2024-12-09 ENCOUNTER — HOSPITAL ENCOUNTER (EMERGENCY)
Facility: HOSPITAL | Age: 74
Discharge: HOME | End: 2024-12-09
Attending: EMERGENCY MEDICINE | Admitting: EMERGENCY MEDICINE
Payer: MEDICARE

## 2024-12-09 VITALS
TEMPERATURE: 97.7 F | BODY MASS INDEX: 26.79 KG/M2 | RESPIRATION RATE: 16 BRPM | OXYGEN SATURATION: 96 % | HEART RATE: 80 BPM | SYSTOLIC BLOOD PRESSURE: 151 MMHG | DIASTOLIC BLOOD PRESSURE: 80 MMHG | WEIGHT: 166.67 LBS | HEIGHT: 66 IN

## 2024-12-09 DIAGNOSIS — T78.40XA ALLERGIC REACTION, INITIAL ENCOUNTER: ICD-10-CM

## 2024-12-09 DIAGNOSIS — N39.0 URINARY TRACT INFECTION WITHOUT HEMATURIA, SITE UNSPECIFIED: Primary | ICD-10-CM

## 2024-12-09 LAB
ANION GAP SERPL CALCULATED.3IONS-SCNC: 11 MMOL/L (ref 5–15)
BACTERIA UR QL AUTO: ABNORMAL /HPF
BASOPHILS # BLD AUTO: 0.01 10*3/MM3 (ref 0–0.2)
BASOPHILS NFR BLD AUTO: 0.2 % (ref 0–1.5)
BILIRUB UR QL STRIP: NEGATIVE
BUN SERPL-MCNC: 16 MG/DL (ref 8–23)
BUN/CREAT SERPL: 15.2 (ref 7–25)
CALCIUM SPEC-SCNC: 8.2 MG/DL (ref 8.6–10.5)
CHLORIDE SERPL-SCNC: 107 MMOL/L (ref 98–107)
CLARITY UR: ABNORMAL
CO2 SERPL-SCNC: 21 MMOL/L (ref 22–29)
COLOR UR: ABNORMAL
CREAT SERPL-MCNC: 1.05 MG/DL (ref 0.57–1)
DEPRECATED RDW RBC AUTO: 70.2 FL (ref 37–54)
EGFRCR SERPLBLD CKD-EPI 2021: 55.9 ML/MIN/1.73
EOSINOPHIL # BLD AUTO: 0.12 10*3/MM3 (ref 0–0.4)
EOSINOPHIL NFR BLD AUTO: 2.2 % (ref 0.3–6.2)
ERYTHROCYTE [DISTWIDTH] IN BLOOD BY AUTOMATED COUNT: 19 % (ref 12.3–15.4)
GLUCOSE SERPL-MCNC: 177 MG/DL (ref 65–99)
GLUCOSE UR STRIP-MCNC: NEGATIVE MG/DL
HCT VFR BLD AUTO: 43.7 % (ref 34–46.6)
HGB BLD-MCNC: 13 G/DL (ref 12–15.9)
HGB UR QL STRIP.AUTO: NEGATIVE
HYALINE CASTS UR QL AUTO: ABNORMAL /LPF
IMM GRANULOCYTES # BLD AUTO: 0.01 10*3/MM3 (ref 0–0.05)
IMM GRANULOCYTES NFR BLD AUTO: 0.2 % (ref 0–0.5)
KETONES UR QL STRIP: ABNORMAL
LEUKOCYTE ESTERASE UR QL STRIP.AUTO: ABNORMAL
LYMPHOCYTES # BLD AUTO: 0.59 10*3/MM3 (ref 0.7–3.1)
LYMPHOCYTES NFR BLD AUTO: 11 % (ref 19.6–45.3)
MCH RBC QN AUTO: 29.4 PG (ref 26.6–33)
MCHC RBC AUTO-ENTMCNC: 29.7 G/DL (ref 31.5–35.7)
MCV RBC AUTO: 98.9 FL (ref 79–97)
MONOCYTES # BLD AUTO: 0.4 10*3/MM3 (ref 0.1–0.9)
MONOCYTES NFR BLD AUTO: 7.4 % (ref 5–12)
NEUTROPHILS NFR BLD AUTO: 4.25 10*3/MM3 (ref 1.7–7)
NEUTROPHILS NFR BLD AUTO: 79 % (ref 42.7–76)
NITRITE UR QL STRIP: NEGATIVE
NRBC BLD AUTO-RTO: 0 /100 WBC (ref 0–0.2)
PH UR STRIP.AUTO: <=5 [PH] (ref 5–8)
PLATELET # BLD AUTO: 190 10*3/MM3 (ref 140–450)
PMV BLD AUTO: 9.9 FL (ref 6–12)
POTASSIUM SERPL-SCNC: 3.8 MMOL/L (ref 3.5–5.2)
PROT UR QL STRIP: ABNORMAL
RBC # BLD AUTO: 4.42 10*6/MM3 (ref 3.77–5.28)
RBC # UR STRIP: ABNORMAL /HPF
REF LAB TEST METHOD: ABNORMAL
SODIUM SERPL-SCNC: 139 MMOL/L (ref 136–145)
SP GR UR STRIP: 1.03 (ref 1–1.03)
SQUAMOUS #/AREA URNS HPF: ABNORMAL /HPF
UROBILINOGEN UR QL STRIP: ABNORMAL
WBC # UR STRIP: ABNORMAL /HPF
WBC NRBC COR # BLD AUTO: 5.38 10*3/MM3 (ref 3.4–10.8)

## 2024-12-09 PROCEDURE — 99283 EMERGENCY DEPT VISIT LOW MDM: CPT

## 2024-12-09 PROCEDURE — 25010000002 CEFTRIAXONE PER 250 MG: Performed by: EMERGENCY MEDICINE

## 2024-12-09 PROCEDURE — 87086 URINE CULTURE/COLONY COUNT: CPT | Performed by: NURSE PRACTITIONER

## 2024-12-09 PROCEDURE — 81001 URINALYSIS AUTO W/SCOPE: CPT | Performed by: NURSE PRACTITIONER

## 2024-12-09 PROCEDURE — 96365 THER/PROPH/DIAG IV INF INIT: CPT

## 2024-12-09 PROCEDURE — 96375 TX/PRO/DX INJ NEW DRUG ADDON: CPT

## 2024-12-09 PROCEDURE — 80048 BASIC METABOLIC PNL TOTAL CA: CPT | Performed by: NURSE PRACTITIONER

## 2024-12-09 PROCEDURE — 25010000002 METHYLPREDNISOLONE PER 125 MG: Performed by: EMERGENCY MEDICINE

## 2024-12-09 PROCEDURE — 85025 COMPLETE CBC W/AUTO DIFF WBC: CPT | Performed by: NURSE PRACTITIONER

## 2024-12-09 RX ORDER — PREDNISONE 20 MG/1
60 TABLET ORAL DAILY
Qty: 15 TABLET | Refills: 0 | Status: SHIPPED | OUTPATIENT
Start: 2024-12-09 | End: 2024-12-14

## 2024-12-09 RX ORDER — FLUCONAZOLE 150 MG/1
150 TABLET ORAL ONCE
Status: COMPLETED | OUTPATIENT
Start: 2024-12-09 | End: 2024-12-09

## 2024-12-09 RX ORDER — METHYLPREDNISOLONE SODIUM SUCCINATE 125 MG/2ML
125 INJECTION, POWDER, LYOPHILIZED, FOR SOLUTION INTRAMUSCULAR; INTRAVENOUS ONCE
Status: COMPLETED | OUTPATIENT
Start: 2024-12-09 | End: 2024-12-09

## 2024-12-09 RX ORDER — FLUCONAZOLE 150 MG/1
150 TABLET ORAL ONCE
Qty: 2 TABLET | Refills: 0 | Status: SHIPPED | OUTPATIENT
Start: 2024-12-09 | End: 2024-12-09

## 2024-12-09 RX ADMIN — FLUCONAZOLE 150 MG: 150 TABLET ORAL at 23:17

## 2024-12-09 RX ADMIN — CEFTRIAXONE SODIUM 1000 MG: 1 INJECTION, POWDER, FOR SOLUTION INTRAMUSCULAR; INTRAVENOUS at 21:58

## 2024-12-09 RX ADMIN — METHYLPREDNISOLONE SODIUM SUCCINATE 125 MG: 125 INJECTION, POWDER, FOR SOLUTION INTRAMUSCULAR; INTRAVENOUS at 21:58

## 2024-12-09 NOTE — ED PROVIDER NOTES
Subjective   Provider in Triage Note  Dysuria frequency urgency.  Patient had an IV placed in her left upper extremity for a MELISSA 4 days ago and has had a spreading rash on her arm and thoracic chest wall.  No fever.    Due to significant overcrowding in the emergency department patient was initially seen and evaluated in triage.  Provider in triage recommended patient placement in the treatment area to initiate therapy and movement to an ER bed as soon as possible.   Orders placed; medications will be deferred to main provider per protocol.        History of Present Illness    Review of Systems   Constitutional:  Negative for fever.   HENT:  Negative for congestion.    Respiratory:  Negative for cough and shortness of breath.    Cardiovascular:  Negative for chest pain.   Gastrointestinal:  Negative for abdominal pain and vomiting.   Genitourinary:  Positive for dysuria, frequency and urgency.   Skin:  Positive for rash.   Neurological:  Negative for headaches.       Past Medical History:   Diagnosis Date    Anxiety and depression     Arthritis     Back pain     CRF (chronic renal failure), stage 3 (moderate)     Diverticulosis     Essential hypertension     Family history of colon cancer     Family history of colonic polyps     Fractures     R tibia/fibula; L ankle    GERD (gastroesophageal reflux disease)     History of adenomatous polyp of colon     History of cardiac cath     1/28/19 left main no significant disease. LAD with no significant disease. LCX no significant disease. RCA no significant disease.    History of cardiac monitoring     2/01/19 - [facility] P[facility] - Underlying rhythm is sinus at 48-94 bpm. PSVT x23 with fastest 167 bpm, longest 24.4 seconds.   10/05/2018- [facility]IO PATCH - NSR most of time but did have episode of A fib ranging from .    History of colon polyps     History of echocardiogram     9/2018: Left ventricular systolic function is normal. EF 50%. Left ventricular diastolic dysfunction  consistent with ipaired relaxation. Left atrial cavity size is mildly dilated. Mild MR. Mild to moderate TR. There is abnormal thickening noted on the RV that may represent a vegetation-this was discussed with Dr. Hammer and Dr. Reilly. No patent foramen ovale. No further work up needed at this time    Hyperlipidemia     Kidney disorder     Migraine     Migraines     Neck pain     Stroke 2018       Allergies   Allergen Reactions    Benzoin Anaphylaxis and Swelling     States when used on her neck it shut off her airway  Huge abscesses with surgery      Iodine Other (See Comments)     PT UNABLE TO TELL RN ABOUT REACTION AT THIS TIME, BUT FAMILY STATES PT HAD A REACTION TO BEING CLEANSED WITH IODINE IN SURGERY  IOBAN - used on neck, swelled to the point of shutting off airway    Vancomycin Other (See Comments)     Kidney failure  Vancomycin induced acute kidney injury      Amiodarone Other (See Comments)     Tremor- hand and face     Levaquin [Levofloxacin] Unknown (See Comments)     Unknown    Sulfa Antibiotics        Past Surgical History:   Procedure Laterality Date    ABDOMINOPLASTY      ATRIAL APPENDAGE EXCLUSION LEFT WITH TRANSESOPHAGEAL ECHOCARDIOGRAM N/A 10/22/2024    Procedure: Atrial Appendage Occlusion;  Surgeon: Sourav Pompa MD;  Location: [facility] CATH INVASIVE LOCATION;  Service: Cardiovascular;  Laterality: N/A;    BACK SURGERY      L2-5 fusion 2012    CARDIAC CATHETERIZATION N/A 10/29/2024    Procedure: Left Heart Cath, possible pci;  Surgeon: Josemanuel Sheriff MD;  Location:  [facility] CATH INVASIVE LOCATION;  Service: Cardiovascular;  Laterality: N/A;    CARDIAC ELECTROPHYSIOLOGY PROCEDURE Right 10/22/2024    Procedure: Ablation atrial fibrillation;  Surgeon: Sourav Pompa MD;  Location: [facility] CATH INVASIVE LOCATION;  Service: Cardiovascular;  Laterality: Right;    CHOLECYSTECTOMY      COLONOSCOPY  11/24/2014    Diverticulosis; Repeat 5 years    COLONOSCOPY  11/09/2010     "Diverticulosis; Repeat 4 years    COLONOSCOPY  09/18/2007    Dr. Monzon-Normal; Repeat 3 years    COLONOSCOPY  06/02/2005    Dr. Monzon-Diminuitive polypectomy above the cecum; Otherwise normal colonoscopy; Repeat 2 years    COLONOSCOPY N/A 11/01/2019    Diverticulosis in the left colon; Two 5-6mm polyps in the cecum-path shows one tubular adenomatous and on hyperplastic polyp; One 8mm tubular adenomatous polyp in the ascending colon; Two 4-5mm tubular adenomatous polyps in the transverse colon; Repeat 3 years    COLONOSCOPY N/A 6/12/2023    Procedure: COLONOSCOPY WITH ANESTHESIA;  Surgeon: Susan Lord MD;  Location:  [facility];  Service: Gastroenterology;  Laterality: N/A;  preop hx of polyps   postop; polyps   PCP Woody Valentin MD    CYSTOCELE REPAIR      Cystocele and rectocele repair    ENDOSCOPY  09/28/2012    LA grade C esophagitis; HH    ENDOSCOPY N/A 11/01/2019    Small HH; Non-erosive gastritis-biopsied; Normal examined duodenum    ENDOSCOPY N/A 10/13/2024    Procedure: ESOPHAGOGASTRODUODENOSCOPY WITH BIOPSY X 1 AREA;  Surgeon: Miguel Vega MD;  Location: [facility] ENDOSCOPY;  Service: Gastroenterology;  Laterality: N/A;  Hiatal hernia, esophagitis, gastritis    FIBULA FRACTURE SURGERY      HYSTERECTOMY      NECK SURGERY  2012    ACDF C4-7    ORIF TIBIA FRACTURE Right        Family History   Problem Relation Age of Onset    Colon cancer Father 65    Colon polyps Daughter 38    Esophageal cancer Neg Hx     Liver cancer Neg Hx     Liver disease Neg Hx     Rectal cancer Neg Hx     Stomach cancer Neg Hx        Social History     Socioeconomic History    Marital status:    Tobacco Use    Smoking status: Never    Smokeless tobacco: Never   Vaping Use    Vaping status: Never Used   Substance and Sexual Activity    Alcohol use: No    Drug use: Never    Sexual activity: Defer       /63   Pulse 75   Temp 97.7 °F (36.5 °C) (Oral)   Resp 18   Ht 166.4 cm (65.5\")   Wt " 75.6 kg (166 lb 10.7 oz)   SpO2 99%   BMI 27.31 kg/m²       Objective   Physical Exam  Vitals and nursing note reviewed.   Constitutional:       Appearance: Normal appearance.   HENT:      Head: Normocephalic and atraumatic.      Mouth/Throat:      Mouth: Mucous membranes are moist.   Cardiovascular:      Rate and Rhythm: Normal rate and regular rhythm.      Heart sounds: Normal heart sounds.   Pulmonary:      Effort: Pulmonary effort is normal. No respiratory distress.      Breath sounds: Normal breath sounds.   Abdominal:      Palpations: Abdomen is soft.      Tenderness: There is no abdominal tenderness.   Skin:     General: Skin is warm and dry.      Comments: There is a patchy erythematous maculopapular rash to the left upper arm and spreading into the left axilla and left side of the chest.   Neurological:      Mental Status: She is alert and oriented to person, place, and time.         Procedures           ED Course      Results for orders placed or performed during the hospital encounter of 12/09/24   CBC Auto Differential    Collection Time: 12/09/24  6:30 PM    Specimen: Arm, Right; Blood   Result Value Ref Range    WBC 5.38 3.40 - 10.80 10*3/mm3    RBC 4.42 3.77 - 5.28 10*6/mm3    Hemoglobin 13.0 12.0 - 15.9 g/dL    Hematocrit 43.7 34.0 - 46.6 %    MCV 98.9 (H) 79.0 - 97.0 fL    MCH 29.4 26.6 - 33.0 pg    MCHC 29.7 (L) 31.5 - 35.7 g/dL    RDW 19.0 (H) 12.3 - 15.4 %    RDW-SD 70.2 (H) 37.0 - 54.0 fl    MPV 9.9 6.0 - 12.0 fL    Platelets 190 140 - 450 10*3/mm3    Neutrophil % 79.0 (H) 42.7 - 76.0 %    Lymphocyte % 11.0 (L) 19.6 - 45.3 %    Monocyte % 7.4 5.0 - 12.0 %    Eosinophil % 2.2 0.3 - 6.2 %    Basophil % 0.2 0.0 - 1.5 %    Immature Grans % 0.2 0.0 - 0.5 %    Neutrophils, Absolute 4.25 1.70 - 7.00 10*3/mm3    Lymphocytes, Absolute 0.59 (L) 0.70 - 3.10 10*3/mm3    Monocytes, Absolute 0.40 0.10 - 0.90 10*3/mm3    Eosinophils, Absolute 0.12 0.00 - 0.40 10*3/mm3    Basophils, Absolute 0.01 0.00 - 0.20  10*3/mm3    Immature Grans, Absolute 0.01 0.00 - 0.05 10*3/mm3    nRBC 0.0 0.0 - 0.2 /100 WBC   Basic Metabolic Panel    Collection Time: 12/09/24  7:06 PM    Specimen: Arm, Right; Blood   Result Value Ref Range    Glucose 177 (H) 65 - 99 mg/dL    BUN 16 8 - 23 mg/dL    Creatinine 1.05 (H) 0.57 - 1.00 mg/dL    Sodium 139 136 - 145 mmol/L    Potassium 3.8 3.5 - 5.2 mmol/L    Chloride 107 98 - 107 mmol/L    CO2 21.0 (L) 22.0 - 29.0 mmol/L    Calcium 8.2 (L) 8.6 - 10.5 mg/dL    BUN/Creatinine Ratio 15.2 7.0 - 25.0    Anion Gap 11.0 5.0 - 15.0 mmol/L    eGFR 55.9 (L) >60.0 mL/min/1.73   Urinalysis With Culture If Indicated - Urine, Clean Catch    Collection Time: 12/09/24  8:09 PM    Specimen: Urine, Clean Catch   Result Value Ref Range    Color, UA Dark Yellow (A) Yellow, Straw    Appearance, UA Cloudy (A) Clear    pH, UA <=5.0 5.0 - 8.0    Specific Gravity, UA 1.027 1.005 - 1.030    Glucose, UA Negative Negative    Ketones, UA Trace (A) Negative    Bilirubin, UA Negative Negative    Blood, UA Negative Negative    Protein, UA 30 mg/dL (1+) (A) Negative    Leuk Esterase, UA Small (1+) (A) Negative    Nitrite, UA Negative Negative    Urobilinogen, UA 1.0 E.U./dL 0.2 - 1.0 E.U./dL   Urinalysis, Microscopic Only - Urine, Clean Catch    Collection Time: 12/09/24  8:09 PM    Specimen: Urine, Clean Catch   Result Value Ref Range    RBC, UA 0-2 None Seen, 0-2 /HPF    WBC, UA Too Numerous to Count (A) None Seen, 0-2 /HPF    Bacteria, UA Trace (A) None Seen /HPF    Squamous Epithelial Cells, UA 3-6 (A) None Seen, 0-2 /HPF    Hyaline Casts, UA 7-12 None Seen /LPF    Methodology Manual Light Microscopy                                                       Medical Decision Making    Patient had the above valuation.  Results were discussed with the patient.  White blood cell count is normal.  BMP is unremarkable.  Urinalysis does show evidence of urinary tract infection with small leukocyte esterase, too numerous to count white  blood cells and trace bacteria.  Patient's rash seems to be more related to some type of reaction rather than infection.  She was given a dose of Rocephin and Solu-Medrol in the emergency room.  She will be discharged with prescriptions for Augmentin and prednisone.  She will continue to take Benadryl as needed.  She will follow-up with her primary doctor.      Final diagnoses:   Urinary tract infection without hematuria, site unspecified   Allergic reaction, initial encounter       ED Disposition  ED Disposition       ED Disposition   Discharge    Condition   Stable    Comment   --               Woody Valentin MD  [address] [place] [id number]  [phone]    Call in 1 day           Medication List        New Prescriptions      amoxicillin-clavulanate 875-125 MG per tablet  Commonly known as: AUGMENTIN  Take 1 tablet by mouth 2 (Two) Times a Day for 7 days.     fluconazole 150 MG tablet  Commonly known as: DIFLUCAN  Take 1 tablet by mouth 1 (One) Time for 1 dose.     predniSONE 20 MG tablet  Commonly known as: DELTASONE  Take 3 tablets by mouth Daily for 5 days.               Where to Get Your Medications        These medications were sent to [facility] [facility], [place] - [address] - [phone]  - [phone] FX  [address] [facility] [place] [address]      Phone: [phone]   amoxicillin-clavulanate 875-125 MG per tablet  fluconazole 150 MG tablet  predniSONE 20 MG tablet            Tone Hidalgo MD  12/09/24 [id number]

## 2024-12-11 LAB — BACTERIA SPEC AEROBE CULT: NO GROWTH

## 2024-12-27 ENCOUNTER — TELEPHONE (OUTPATIENT)
Dept: CARDIOLOGY | Facility: CLINIC | Age: 74
End: 2024-12-27
Payer: MEDICARE

## 2024-12-27 NOTE — TELEPHONE ENCOUNTER
"  Caller: Natalee Noble \"Margaret\"    Relationship: Self    Best call back number: [phone]    PATIENT HAD MELISSA DONE 12.5.24 AND WAS WANTING TO KNOW WHEN HER FOLLOW UP FROM THAT SHOULD BE.    SHE HAS AN APPT IN FEB      PATIENT WAS ALSO RECENTLY IN ED FOR CELLULITIS   "

## 2024-12-27 NOTE — TELEPHONE ENCOUNTER
Spoke with pt she is aware to keep 2/21/25 appt, and we will call her once referral is placed for cardiac rehab.

## 2025-01-01 ENCOUNTER — APPOINTMENT (OUTPATIENT)
Dept: GENERAL RADIOLOGY | Facility: HOSPITAL | Age: 75
End: 2025-01-01
Payer: MEDICARE

## 2025-01-01 ENCOUNTER — HOSPITAL ENCOUNTER (OUTPATIENT)
Facility: HOSPITAL | Age: 75
Setting detail: OBSERVATION
Discharge: HOME | End: 2025-01-02
Attending: EMERGENCY MEDICINE | Admitting: EMERGENCY MEDICINE
Payer: MEDICARE

## 2025-01-01 DIAGNOSIS — G43.C1 INTRACTABLE PERIODIC HEADACHE SYNDROME: ICD-10-CM

## 2025-01-01 DIAGNOSIS — R53.1 WEAKNESS: ICD-10-CM

## 2025-01-01 DIAGNOSIS — R74.01 TRANSAMINITIS: ICD-10-CM

## 2025-01-01 DIAGNOSIS — E86.0 DEHYDRATION: ICD-10-CM

## 2025-01-01 DIAGNOSIS — U07.1 COVID: Primary | ICD-10-CM

## 2025-01-01 LAB
ALBUMIN SERPL-MCNC: 4 G/DL (ref 3.5–5.2)
ALBUMIN/GLOB SERPL: 1.6 G/DL
ALP SERPL-CCNC: 332 U/L (ref 39–117)
ALT SERPL W P-5'-P-CCNC: 155 U/L (ref 1–33)
AMPHET+METHAMPHET UR QL: NEGATIVE
AMPHETAMINES UR QL: NEGATIVE
ANION GAP SERPL CALCULATED.3IONS-SCNC: 14.1 MMOL/L (ref 5–15)
AST SERPL-CCNC: 95 U/L (ref 1–32)
B PARAPERT DNA SPEC QL NAA+PROBE: NOT DETECTED
B PERT DNA SPEC QL NAA+PROBE: NOT DETECTED
BACTERIA UR QL AUTO: NORMAL /HPF
BARBITURATES UR QL SCN: NEGATIVE
BASOPHILS # BLD AUTO: 0.01 10*3/MM3 (ref 0–0.2)
BASOPHILS NFR BLD AUTO: 0.2 % (ref 0–1.5)
BENZODIAZ UR QL SCN: POSITIVE
BILIRUB SERPL-MCNC: 1 MG/DL (ref 0–1.2)
BILIRUB UR QL STRIP: NEGATIVE
BUN SERPL-MCNC: 17 MG/DL (ref 8–23)
BUN/CREAT SERPL: 14.5 (ref 7–25)
BUPRENORPHINE SERPL-MCNC: NEGATIVE NG/ML
C PNEUM DNA NPH QL NAA+NON-PROBE: NOT DETECTED
CALCIUM SPEC-SCNC: 9.4 MG/DL (ref 8.6–10.5)
CANNABINOIDS SERPL QL: NEGATIVE
CHLORIDE SERPL-SCNC: 109 MMOL/L (ref 98–107)
CK SERPL-CCNC: 32 U/L (ref 20–180)
CLARITY UR: CLEAR
CO2 SERPL-SCNC: 16.9 MMOL/L (ref 22–29)
COCAINE UR QL: NEGATIVE
COLOR UR: YELLOW
CREAT SERPL-MCNC: 1.17 MG/DL (ref 0.57–1)
D-LACTATE SERPL-SCNC: 1.1 MMOL/L (ref 0.3–2)
DEPRECATED RDW RBC AUTO: 74 FL (ref 37–54)
EGFRCR SERPLBLD CKD-EPI 2021: 49.1 ML/MIN/1.73
EOSINOPHIL # BLD AUTO: 0.02 10*3/MM3 (ref 0–0.4)
EOSINOPHIL NFR BLD AUTO: 0.4 % (ref 0.3–6.2)
ERYTHROCYTE [DISTWIDTH] IN BLOOD BY AUTOMATED COUNT: 19.2 % (ref 12.3–15.4)
ETHANOL UR QL: <0.01 %
FLUAV SUBTYP SPEC NAA+PROBE: NOT DETECTED
FLUBV RNA ISLT QL NAA+PROBE: NOT DETECTED
GLOBULIN UR ELPH-MCNC: 2.5 GM/DL
GLUCOSE SERPL-MCNC: 124 MG/DL (ref 65–99)
GLUCOSE UR STRIP-MCNC: NEGATIVE MG/DL
HADV DNA SPEC NAA+PROBE: NOT DETECTED
HCOV 229E RNA SPEC QL NAA+PROBE: NOT DETECTED
HCOV HKU1 RNA SPEC QL NAA+PROBE: NOT DETECTED
HCOV NL63 RNA SPEC QL NAA+PROBE: NOT DETECTED
HCOV OC43 RNA SPEC QL NAA+PROBE: NOT DETECTED
HCT VFR BLD AUTO: 44.4 % (ref 34–46.6)
HGB BLD-MCNC: 13.2 G/DL (ref 12–15.9)
HGB UR QL STRIP.AUTO: NEGATIVE
HMPV RNA NPH QL NAA+NON-PROBE: NOT DETECTED
HOLD SPECIMEN: NORMAL
HPIV1 RNA ISLT QL NAA+PROBE: NOT DETECTED
HPIV2 RNA SPEC QL NAA+PROBE: NOT DETECTED
HPIV3 RNA NPH QL NAA+PROBE: NOT DETECTED
HPIV4 P GENE NPH QL NAA+PROBE: NOT DETECTED
HYALINE CASTS UR QL AUTO: NORMAL /LPF
IMM GRANULOCYTES # BLD AUTO: 0.02 10*3/MM3 (ref 0–0.05)
IMM GRANULOCYTES NFR BLD AUTO: 0.4 % (ref 0–0.5)
KETONES UR QL STRIP: NEGATIVE
LEUKOCYTE ESTERASE UR QL STRIP.AUTO: ABNORMAL
LYMPHOCYTES # BLD AUTO: 0.24 10*3/MM3 (ref 0.7–3.1)
LYMPHOCYTES NFR BLD AUTO: 4.5 % (ref 19.6–45.3)
M PNEUMO IGG SER IA-ACNC: NOT DETECTED
MAGNESIUM SERPL-MCNC: 2 MG/DL (ref 1.6–2.4)
MCH RBC QN AUTO: 30.6 PG (ref 26.6–33)
MCHC RBC AUTO-ENTMCNC: 29.7 G/DL (ref 31.5–35.7)
MCV RBC AUTO: 102.8 FL (ref 79–97)
METHADONE UR QL SCN: NEGATIVE
MONOCYTES # BLD AUTO: 0.28 10*3/MM3 (ref 0.1–0.9)
MONOCYTES NFR BLD AUTO: 5.2 % (ref 5–12)
MUCOUS THREADS URNS QL MICRO: NORMAL /HPF
NEUTROPHILS NFR BLD AUTO: 4.77 10*3/MM3 (ref 1.7–7)
NEUTROPHILS NFR BLD AUTO: 89.3 % (ref 42.7–76)
NITRITE UR QL STRIP: NEGATIVE
NRBC BLD AUTO-RTO: 0 /100 WBC (ref 0–0.2)
OPIATES UR QL: NEGATIVE
OXYCODONE UR QL SCN: NEGATIVE
PCP UR QL SCN: NEGATIVE
PH UR STRIP.AUTO: 7 [PH] (ref 5–8)
PLATELET # BLD AUTO: 164 10*3/MM3 (ref 140–450)
PMV BLD AUTO: 10.7 FL (ref 6–12)
POTASSIUM SERPL-SCNC: 3.9 MMOL/L (ref 3.5–5.2)
PROCALCITONIN SERPL-MCNC: 0.16 NG/ML (ref 0–0.25)
PROT SERPL-MCNC: 6.5 G/DL (ref 6–8.5)
PROT UR QL STRIP: ABNORMAL
RBC # BLD AUTO: 4.32 10*6/MM3 (ref 3.77–5.28)
RBC # UR STRIP: NORMAL /HPF
REF LAB TEST METHOD: NORMAL
RHINOVIRUS RNA SPEC NAA+PROBE: NOT DETECTED
RSV RNA NPH QL NAA+NON-PROBE: NOT DETECTED
SARS-COV-2 RNA RESP QL NAA+PROBE: DETECTED
SODIUM SERPL-SCNC: 140 MMOL/L (ref 136–145)
SP GR UR STRIP: 1.02 (ref 1–1.03)
SQUAMOUS #/AREA URNS HPF: NORMAL /HPF
T4 FREE SERPL-MCNC: 1.56 NG/DL (ref 0.93–1.7)
TRICYCLICS UR QL SCN: NEGATIVE
TSH SERPL DL<=0.05 MIU/L-ACNC: 0.22 UIU/ML (ref 0.27–4.2)
UROBILINOGEN UR QL STRIP: ABNORMAL
WBC # UR STRIP: NORMAL /HPF
WBC NRBC COR # BLD AUTO: 5.34 10*3/MM3 (ref 3.4–10.8)

## 2025-01-01 PROCEDURE — G0378 HOSPITAL OBSERVATION PER HR: HCPCS

## 2025-01-01 PROCEDURE — 84443 ASSAY THYROID STIM HORMONE: CPT | Performed by: EMERGENCY MEDICINE

## 2025-01-01 PROCEDURE — 81001 URINALYSIS AUTO W/SCOPE: CPT | Performed by: EMERGENCY MEDICINE

## 2025-01-01 PROCEDURE — 94799 UNLISTED PULMONARY SVC/PX: CPT

## 2025-01-01 PROCEDURE — 25010000002 METOCLOPRAMIDE PER 10 MG: Performed by: NURSE PRACTITIONER

## 2025-01-01 PROCEDURE — P9612 CATHETERIZE FOR URINE SPEC: HCPCS

## 2025-01-01 PROCEDURE — 63710000001 ONDANSETRON ODT 4 MG TABLET DISPERSIBLE: Performed by: NURSE PRACTITIONER

## 2025-01-01 PROCEDURE — 93005 ELECTROCARDIOGRAM TRACING: CPT | Performed by: EMERGENCY MEDICINE

## 2025-01-01 PROCEDURE — 80053 COMPREHEN METABOLIC PANEL: CPT | Performed by: EMERGENCY MEDICINE

## 2025-01-01 PROCEDURE — 87040 BLOOD CULTURE FOR BACTERIA: CPT | Performed by: EMERGENCY MEDICINE

## 2025-01-01 PROCEDURE — 25010000002 MORPHINE PER 10 MG: Performed by: NURSE PRACTITIONER

## 2025-01-01 PROCEDURE — 80306 DRUG TEST PRSMV INSTRMNT: CPT | Performed by: EMERGENCY MEDICINE

## 2025-01-01 PROCEDURE — 0202U NFCT DS 22 TRGT SARS-COV-2: CPT | Performed by: EMERGENCY MEDICINE

## 2025-01-01 PROCEDURE — 71045 X-RAY EXAM CHEST 1 VIEW: CPT

## 2025-01-01 PROCEDURE — 99285 EMERGENCY DEPT VISIT HI MDM: CPT

## 2025-01-01 PROCEDURE — 84145 PROCALCITONIN (PCT): CPT | Performed by: EMERGENCY MEDICINE

## 2025-01-01 PROCEDURE — 25010000002 DIPHENHYDRAMINE PER 50 MG: Performed by: NURSE ANESTHETIST, CERTIFIED REGISTERED

## 2025-01-01 PROCEDURE — 94640 AIRWAY INHALATION TREATMENT: CPT

## 2025-01-01 PROCEDURE — 82550 ASSAY OF CK (CPK): CPT | Performed by: EMERGENCY MEDICINE

## 2025-01-01 PROCEDURE — 96374 THER/PROPH/DIAG INJ IV PUSH: CPT

## 2025-01-01 PROCEDURE — 83605 ASSAY OF LACTIC ACID: CPT

## 2025-01-01 PROCEDURE — 96375 TX/PRO/DX INJ NEW DRUG ADDON: CPT

## 2025-01-01 PROCEDURE — 84439 ASSAY OF FREE THYROXINE: CPT | Performed by: EMERGENCY MEDICINE

## 2025-01-01 PROCEDURE — 25810000003 SODIUM CHLORIDE 0.9 % SOLUTION: Performed by: NURSE PRACTITIONER

## 2025-01-01 PROCEDURE — 25810000003 SODIUM CHLORIDE 0.9 % SOLUTION: Performed by: EMERGENCY MEDICINE

## 2025-01-01 PROCEDURE — 94761 N-INVAS EAR/PLS OXIMETRY MLT: CPT

## 2025-01-01 PROCEDURE — 36415 COLL VENOUS BLD VENIPUNCTURE: CPT

## 2025-01-01 PROCEDURE — 82077 ASSAY SPEC XCP UR&BREATH IA: CPT | Performed by: EMERGENCY MEDICINE

## 2025-01-01 PROCEDURE — 85025 COMPLETE CBC W/AUTO DIFF WBC: CPT | Performed by: EMERGENCY MEDICINE

## 2025-01-01 PROCEDURE — 83735 ASSAY OF MAGNESIUM: CPT | Performed by: EMERGENCY MEDICINE

## 2025-01-01 RX ORDER — FERROUS SULFATE 325(65) MG
325 TABLET ORAL
Status: DISCONTINUED | OUTPATIENT
Start: 2025-01-01 | End: 2025-01-02 | Stop reason: HOSPADM

## 2025-01-01 RX ORDER — ALPRAZOLAM 1 MG/1
1 TABLET ORAL 3 TIMES DAILY PRN
Status: DISCONTINUED | OUTPATIENT
Start: 2025-01-01 | End: 2025-01-02 | Stop reason: HOSPADM

## 2025-01-01 RX ORDER — ACETAMINOPHEN 650 MG/1
650 SUPPOSITORY RECTAL EVERY 4 HOURS PRN
Status: DISCONTINUED | OUTPATIENT
Start: 2025-01-01 | End: 2025-01-02 | Stop reason: HOSPADM

## 2025-01-01 RX ORDER — ALBUTEROL SULFATE 0.83 MG/ML
2.5 SOLUTION RESPIRATORY (INHALATION) ONCE AS NEEDED
Status: DISCONTINUED | OUTPATIENT
Start: 2025-01-01 | End: 2025-01-02 | Stop reason: HOSPADM

## 2025-01-01 RX ORDER — PANTOPRAZOLE SODIUM 40 MG/1
40 TABLET, DELAYED RELEASE ORAL DAILY
Status: DISCONTINUED | OUTPATIENT
Start: 2025-01-01 | End: 2025-01-02 | Stop reason: HOSPADM

## 2025-01-01 RX ORDER — IPRATROPIUM BROMIDE AND ALBUTEROL SULFATE 2.5; .5 MG/3ML; MG/3ML
3 SOLUTION RESPIRATORY (INHALATION)
Status: DISCONTINUED | OUTPATIENT
Start: 2025-01-01 | End: 2025-01-01

## 2025-01-01 RX ORDER — DIPHENHYDRAMINE HYDROCHLORIDE 50 MG/ML
12.5 INJECTION INTRAMUSCULAR; INTRAVENOUS
Status: DISCONTINUED | OUTPATIENT
Start: 2025-01-01 | End: 2025-01-02 | Stop reason: HOSPADM

## 2025-01-01 RX ORDER — ONDANSETRON 4 MG/1
4 TABLET, ORALLY DISINTEGRATING ORAL EVERY 6 HOURS PRN
Status: DISCONTINUED | OUTPATIENT
Start: 2025-01-01 | End: 2025-01-02 | Stop reason: HOSPADM

## 2025-01-01 RX ORDER — TRAMADOL HYDROCHLORIDE 50 MG/1
50 TABLET ORAL EVERY 6 HOURS PRN
Status: DISCONTINUED | OUTPATIENT
Start: 2025-01-01 | End: 2025-01-02 | Stop reason: HOSPADM

## 2025-01-01 RX ORDER — METOCLOPRAMIDE HYDROCHLORIDE 5 MG/ML
10 INJECTION INTRAMUSCULAR; INTRAVENOUS ONCE
Status: COMPLETED | OUTPATIENT
Start: 2025-01-01 | End: 2025-01-01

## 2025-01-01 RX ORDER — POLYETHYLENE GLYCOL 3350 17 G/17G
17 POWDER, FOR SOLUTION ORAL DAILY PRN
Status: DISCONTINUED | OUTPATIENT
Start: 2025-01-01 | End: 2025-01-02 | Stop reason: HOSPADM

## 2025-01-01 RX ORDER — BISACODYL 10 MG
10 SUPPOSITORY, RECTAL RECTAL DAILY PRN
Status: DISCONTINUED | OUTPATIENT
Start: 2025-01-01 | End: 2025-01-02 | Stop reason: HOSPADM

## 2025-01-01 RX ORDER — LABETALOL HYDROCHLORIDE 5 MG/ML
5 INJECTION, SOLUTION INTRAVENOUS
Status: DISCONTINUED | OUTPATIENT
Start: 2025-01-01 | End: 2025-01-02 | Stop reason: HOSPADM

## 2025-01-01 RX ORDER — AMOXICILLIN 250 MG
2 CAPSULE ORAL 2 TIMES DAILY PRN
Status: DISCONTINUED | OUTPATIENT
Start: 2025-01-01 | End: 2025-01-02 | Stop reason: HOSPADM

## 2025-01-01 RX ORDER — SODIUM CHLORIDE 9 MG/ML
50 INJECTION, SOLUTION INTRAVENOUS CONTINUOUS
Status: DISPENSED | OUTPATIENT
Start: 2025-01-01 | End: 2025-01-02

## 2025-01-01 RX ORDER — ACETAMINOPHEN 160 MG/5ML
650 SOLUTION ORAL EVERY 4 HOURS PRN
Status: DISCONTINUED | OUTPATIENT
Start: 2025-01-01 | End: 2025-01-02 | Stop reason: HOSPADM

## 2025-01-01 RX ORDER — HYDRALAZINE HYDROCHLORIDE 20 MG/ML
5 INJECTION INTRAMUSCULAR; INTRAVENOUS
Status: DISCONTINUED | OUTPATIENT
Start: 2025-01-01 | End: 2025-01-02 | Stop reason: HOSPADM

## 2025-01-01 RX ORDER — ONDANSETRON 2 MG/ML
4 INJECTION INTRAMUSCULAR; INTRAVENOUS ONCE AS NEEDED
Status: DISCONTINUED | OUTPATIENT
Start: 2025-01-01 | End: 2025-01-02 | Stop reason: HOSPADM

## 2025-01-01 RX ORDER — SODIUM CHLORIDE 9 MG/ML
40 INJECTION, SOLUTION INTRAVENOUS AS NEEDED
Status: DISCONTINUED | OUTPATIENT
Start: 2025-01-01 | End: 2025-01-02 | Stop reason: HOSPADM

## 2025-01-01 RX ORDER — LEVOTHYROXINE SODIUM 100 UG/1
100 TABLET ORAL
Status: DISCONTINUED | OUTPATIENT
Start: 2025-01-02 | End: 2025-01-02 | Stop reason: HOSPADM

## 2025-01-01 RX ORDER — SODIUM CHLORIDE 0.9 % (FLUSH) 0.9 %
10 SYRINGE (ML) INJECTION AS NEEDED
Status: DISCONTINUED | OUTPATIENT
Start: 2025-01-01 | End: 2025-01-02 | Stop reason: HOSPADM

## 2025-01-01 RX ORDER — GUAIFENESIN 600 MG/1
600 TABLET, EXTENDED RELEASE ORAL EVERY 12 HOURS SCHEDULED
Status: DISCONTINUED | OUTPATIENT
Start: 2025-01-01 | End: 2025-01-02 | Stop reason: HOSPADM

## 2025-01-01 RX ORDER — SODIUM CHLORIDE 0.9 % (FLUSH) 0.9 %
10 SYRINGE (ML) INJECTION EVERY 12 HOURS SCHEDULED
Status: DISCONTINUED | OUTPATIENT
Start: 2025-01-01 | End: 2025-01-02 | Stop reason: HOSPADM

## 2025-01-01 RX ORDER — NALOXONE HCL 0.4 MG/ML
0.4 VIAL (ML) INJECTION AS NEEDED
Status: DISCONTINUED | OUTPATIENT
Start: 2025-01-01 | End: 2025-01-02 | Stop reason: HOSPADM

## 2025-01-01 RX ORDER — MEMANTINE HYDROCHLORIDE 10 MG/1
10 TABLET ORAL EVERY 12 HOURS SCHEDULED
Status: DISCONTINUED | OUTPATIENT
Start: 2025-01-01 | End: 2025-01-02 | Stop reason: HOSPADM

## 2025-01-01 RX ORDER — ALBUTEROL SULFATE 90 UG/1
2 INHALANT RESPIRATORY (INHALATION)
Status: DISCONTINUED | OUTPATIENT
Start: 2025-01-01 | End: 2025-01-02 | Stop reason: HOSPADM

## 2025-01-01 RX ORDER — ONDANSETRON 2 MG/ML
4 INJECTION INTRAMUSCULAR; INTRAVENOUS EVERY 6 HOURS PRN
Status: DISCONTINUED | OUTPATIENT
Start: 2025-01-01 | End: 2025-01-02 | Stop reason: HOSPADM

## 2025-01-01 RX ORDER — ACETAMINOPHEN 325 MG/1
650 TABLET ORAL EVERY 4 HOURS PRN
Status: DISCONTINUED | OUTPATIENT
Start: 2025-01-01 | End: 2025-01-02 | Stop reason: HOSPADM

## 2025-01-01 RX ORDER — FOLIC ACID 0.4 MG
400 TABLET ORAL DAILY
Status: DISCONTINUED | OUTPATIENT
Start: 2025-01-01 | End: 2025-01-02 | Stop reason: HOSPADM

## 2025-01-01 RX ORDER — MORPHINE SULFATE 2 MG/ML
1 INJECTION, SOLUTION INTRAMUSCULAR; INTRAVENOUS EVERY 4 HOURS PRN
Status: DISPENSED | OUTPATIENT
Start: 2025-01-01 | End: 2025-01-02

## 2025-01-01 RX ORDER — ASPIRIN 81 MG/1
81 TABLET ORAL DAILY
Status: DISCONTINUED | OUTPATIENT
Start: 2025-01-01 | End: 2025-01-02 | Stop reason: HOSPADM

## 2025-01-01 RX ORDER — CHOLECALCIFEROL (VITAMIN D3) 25 MCG
1000 TABLET ORAL DAILY
COMMUNITY

## 2025-01-01 RX ORDER — TOPIRAMATE 100 MG/1
200 TABLET, FILM COATED ORAL DAILY
Status: DISCONTINUED | OUTPATIENT
Start: 2025-01-01 | End: 2025-01-02 | Stop reason: HOSPADM

## 2025-01-01 RX ORDER — NITROGLYCERIN 0.4 MG/1
0.4 TABLET SUBLINGUAL
Status: DISCONTINUED | OUTPATIENT
Start: 2025-01-01 | End: 2025-01-01

## 2025-01-01 RX ORDER — NITROGLYCERIN 0.4 MG/1
0.4 TABLET SUBLINGUAL
Status: DISCONTINUED | OUTPATIENT
Start: 2025-01-01 | End: 2025-01-02 | Stop reason: HOSPADM

## 2025-01-01 RX ORDER — ESCITALOPRAM OXALATE 10 MG/1
20 TABLET ORAL DAILY
Status: DISCONTINUED | OUTPATIENT
Start: 2025-01-01 | End: 2025-01-02 | Stop reason: HOSPADM

## 2025-01-01 RX ORDER — LEVOTHYROXINE SODIUM 100 UG/1
100 TABLET ORAL
COMMUNITY

## 2025-01-01 RX ORDER — SODIUM BICARBONATE 650 MG/1
650 TABLET ORAL 2 TIMES DAILY
Status: DISCONTINUED | OUTPATIENT
Start: 2025-01-01 | End: 2025-01-02 | Stop reason: HOSPADM

## 2025-01-01 RX ORDER — DIHYDROERGOTAMINE MESYLATE 1 MG/ML
0.5 INJECTION, SOLUTION INTRAMUSCULAR; INTRAVENOUS; SUBCUTANEOUS ONCE
Status: DISCONTINUED | OUTPATIENT
Start: 2025-01-01 | End: 2025-01-01

## 2025-01-01 RX ORDER — ACETAMINOPHEN 325 MG/1
650 TABLET ORAL ONCE AS NEEDED
Status: DISCONTINUED | OUTPATIENT
Start: 2025-01-01 | End: 2025-01-01

## 2025-01-01 RX ORDER — HYDROXYCHLOROQUINE SULFATE 200 MG/1
200 TABLET, FILM COATED ORAL 2 TIMES DAILY
Status: DISCONTINUED | OUTPATIENT
Start: 2025-01-01 | End: 2025-01-02 | Stop reason: HOSPADM

## 2025-01-01 RX ORDER — BISACODYL 5 MG/1
5 TABLET, DELAYED RELEASE ORAL DAILY PRN
Status: DISCONTINUED | OUTPATIENT
Start: 2025-01-01 | End: 2025-01-02 | Stop reason: HOSPADM

## 2025-01-01 RX ADMIN — ESCITALOPRAM 20 MG: 10 TABLET, FILM COATED ORAL at 12:23

## 2025-01-01 RX ADMIN — GUAIFENESIN 600 MG: 600 TABLET, EXTENDED RELEASE ORAL at 15:18

## 2025-01-01 RX ADMIN — HYDROXYCHLOROQUINE SULFATE 200 MG: 200 TABLET ORAL at 12:23

## 2025-01-01 RX ADMIN — ALBUTEROL SULFATE 2 PUFF: 108 AEROSOL, METERED RESPIRATORY (INHALATION) at 19:30

## 2025-01-01 RX ADMIN — SODIUM CHLORIDE 100 ML/HR: 9 INJECTION, SOLUTION INTRAVENOUS at 11:45

## 2025-01-01 RX ADMIN — SODIUM BICARBONATE 650 MG: 650 TABLET ORAL at 12:23

## 2025-01-01 RX ADMIN — Medication 10 ML: at 20:55

## 2025-01-01 RX ADMIN — PANTOPRAZOLE SODIUM 40 MG: 40 TABLET, DELAYED RELEASE ORAL at 12:24

## 2025-01-01 RX ADMIN — SODIUM CHLORIDE 1000 ML: 9 INJECTION, SOLUTION INTRAVENOUS at 05:35

## 2025-01-01 RX ADMIN — FOLIC ACID TAB 400 MCG 400 MCG: 400 TAB at 12:24

## 2025-01-01 RX ADMIN — SODIUM BICARBONATE 650 MG: 650 TABLET ORAL at 20:52

## 2025-01-01 RX ADMIN — MEMANTINE 10 MG: 10 TABLET ORAL at 12:24

## 2025-01-01 RX ADMIN — TRAMADOL HYDROCHLORIDE 50 MG: 50 TABLET, COATED ORAL at 12:24

## 2025-01-01 RX ADMIN — MEMANTINE 10 MG: 10 TABLET ORAL at 20:53

## 2025-01-01 RX ADMIN — ONDANSETRON 4 MG: 4 TABLET, ORALLY DISINTEGRATING ORAL at 12:23

## 2025-01-01 RX ADMIN — MORPHINE SULFATE 1 MG: 2 INJECTION, SOLUTION INTRAMUSCULAR; INTRAVENOUS at 10:01

## 2025-01-01 RX ADMIN — FERROUS SULFATE TAB 325 MG (65 MG ELEMENTAL FE) 325 MG: 325 (65 FE) TAB at 12:24

## 2025-01-01 RX ADMIN — TRAMADOL HYDROCHLORIDE 50 MG: 50 TABLET, COATED ORAL at 20:52

## 2025-01-01 RX ADMIN — ALBUTEROL SULFATE 2 PUFF: 108 AEROSOL, METERED RESPIRATORY (INHALATION) at 15:41

## 2025-01-01 RX ADMIN — Medication 12.5 MG: at 12:23

## 2025-01-01 RX ADMIN — ALPRAZOLAM 1 MG: 1 TABLET ORAL at 20:52

## 2025-01-01 RX ADMIN — ASPIRIN 81 MG: 81 TABLET, COATED ORAL at 12:23

## 2025-01-01 RX ADMIN — GUAIFENESIN 600 MG: 600 TABLET, EXTENDED RELEASE ORAL at 21:08

## 2025-01-01 RX ADMIN — TOPIRAMATE 200 MG: 100 TABLET, FILM COATED ORAL at 12:24

## 2025-01-01 RX ADMIN — Medication 10 ML: at 10:02

## 2025-01-01 RX ADMIN — HYDROXYCHLOROQUINE SULFATE 200 MG: 200 TABLET ORAL at 20:53

## 2025-01-01 RX ADMIN — DIPHENHYDRAMINE HYDROCHLORIDE 12.5 MG: 50 INJECTION, SOLUTION INTRAMUSCULAR; INTRAVENOUS at 17:01

## 2025-01-01 RX ADMIN — Medication 12.5 MG: at 20:53

## 2025-01-01 RX ADMIN — ALPRAZOLAM 1 MG: 1 TABLET ORAL at 12:23

## 2025-01-01 RX ADMIN — METOCLOPRAMIDE 10 MG: 5 INJECTION, SOLUTION INTRAMUSCULAR; INTRAVENOUS at 16:44

## 2025-01-01 NOTE — H&P
[facility] H&P    Patient Name: Natalee Noble  : 1950  MRN: [id number]  Primary Care Physician: Woody Valentin MD  Date of admission: 2025     Patient Care Team:  Woody Valentin MD as PCP - General (Family Medicine)  Susan Lord MD as Consulting Physician (Gastroenterology)  Jose Scott MD as Consulting Physician (Cardiology)  Sourav Pompa MD as Consulting Physician (Cardiac Electrophysiology)  Magnolia Thompson MD as Consulting Physician (Endocrinology)          Subjective   History Present Illness     Chief Complaint:   Chief Complaint   Patient presents with    Weakness - Generalized     Weakness    Ms. Noble is a 74 y.o.  presents to [facility] complaining of weakness      History of Present Illness    ED 25: 74-year-old female states she felt feverish and weak tonight. She has had some slight cough and congestion as well as some dysuria. She reports no chest or abdominal pain or headache or focal numbness or weakness. States she was unable to get up out of the bed due to severe weakness.     Observation 25: Patient is a 74-year-old female presenting to the hospital with complaints of weakness cough and congestion.  Patient denies fever chest pain or shortness of breath but does report weakness and near syncopal episode at home.     Review of Systems   Constitutional: Positive for malaise/fatigue. Negative for fever.   HENT:  Positive for congestion.    Eyes: Negative.    Cardiovascular:  Negative for chest pain and dyspnea on exertion.   Respiratory:  Positive for cough.    Endocrine: Negative.    Musculoskeletal:  Positive for back pain.   Gastrointestinal: Negative.    Genitourinary:  Positive for bladder incontinence.   Neurological:  Positive for headaches and weakness.   Psychiatric/Behavioral:  Positive for memory loss.            Personal History     Past Medical History:   Past Medical History:   Diagnosis Date    Anxiety and  depression     Arthritis     Back pain     CRF (chronic renal failure), stage 3 (moderate)     Diverticulosis     Essential hypertension     Family history of colon cancer     Family history of colonic polyps     Fractures     R tibia/fibula; L ankle    GERD (gastroesophageal reflux disease)     History of adenomatous polyp of colon     History of cardiac cath     1/28/19 left main no significant disease. LAD with no significant disease. LCX no significant disease. RCA no significant disease.    History of cardiac monitoring     2/01/19 - ZIO PATCH - Underlying rhythm is sinus at 48-94 bpm. PSVT x23 with fastest 167 bpm, longest 24.4 seconds.   10/05/2018- ZIO PATCH - NSR most of time but did have episode of A fib ranging from .    History of colon polyps     History of echocardiogram     9/2018: Left ventricular systolic function is normal. EF 50%. Left ventricular diastolic dysfunction consistent with ipaired relaxation. Left atrial cavity size is mildly dilated. Mild MR. Mild to moderate TR. There is abnormal thickening noted on the RV that may represent a vegetation-this was discussed with Dr. Hammer and Dr. Reilly. No patent foramen ovale. No further work up needed at this time    Hyperlipidemia     Kidney disorder     Migraine     Migraines     Neck pain     Stroke 2018       Surgical History:      Past Surgical History:   Procedure Laterality Date    ABDOMINOPLASTY      ATRIAL APPENDAGE EXCLUSION LEFT WITH TRANSESOPHAGEAL ECHOCARDIOGRAM N/A 10/22/2024    Procedure: Atrial Appendage Occlusion;  Surgeon: Sourav Pompa MD;  Location:  [facility];  Service: Cardiovascular;  Laterality: N/A;    BACK SURGERY      L2-5 fusion 2012    CARDIAC CATHETERIZATION N/A 10/29/2024    Procedure: Left Heart Cath, possible pci;  Surgeon: Josemanuel Sheriff MD;  Location:  [facility];  Service: Cardiovascular;  Laterality: N/A;    CARDIAC ELECTROPHYSIOLOGY PROCEDURE Right  10/22/2024    Procedure: Ablation atrial fibrillation;  Surgeon: Sourav Pompa MD;  Location: [facility] CATH INVASIVE LOCATION;  Service: Cardiovascular;  Laterality: Right;    CHOLECYSTECTOMY      COLONOSCOPY  11/24/2014    Diverticulosis; Repeat 5 years    COLONOSCOPY  11/09/2010    Diverticulosis; Repeat 4 years    COLONOSCOPY  09/18/2007    Dr. Monzon-Normal; Repeat 3 years    COLONOSCOPY  06/02/2005    Dr. Monzon-Diminuitive polypectomy above the cecum; Otherwise normal colonoscopy; Repeat 2 years    COLONOSCOPY N/A 11/01/2019    Diverticulosis in the left colon; Two 5-6mm polyps in the cecum-path shows one tubular adenomatous and on hyperplastic polyp; One 8mm tubular adenomatous polyp in the ascending colon; Two 4-5mm tubular adenomatous polyps in the transverse colon; Repeat 3 years    COLONOSCOPY N/A 6/12/2023    Procedure: COLONOSCOPY WITH ANESTHESIA;  Surgeon: Susan Lord MD;  Location: [facility] ENDOSCOPY;  Service: Gastroenterology;  Laterality: N/A;  preop hx of polyps   postop; polyps   PCP Woody Valentin MD    CYSTOCELE REPAIR      Cystocele and rectocele repair    ENDOSCOPY  09/28/2012    LA grade C esophagitis; HH    ENDOSCOPY N/A 11/01/2019    Small HH; Non-erosive gastritis-biopsied; Normal examined duodenum    ENDOSCOPY N/A 10/13/2024    Procedure: ESOPHAGOGASTRODUODENOSCOPY WITH BIOPSY X 1 AREA;  Surgeon: Miguel Vega MD;  Location: [facility] ENDOSCOPY;  Service: Gastroenterology;  Laterality: N/A;  Hiatal hernia, esophagitis, gastritis    FIBULA FRACTURE SURGERY      HYSTERECTOMY      NECK SURGERY  2012    ACDF C4-7    ORIF TIBIA FRACTURE Right            Family History: family history includes Colon cancer (age of onset: 65) in her father; Colon polyps (age of onset: 38) in her daughter. Otherwise pertinent FHx was reviewed and unremarkable.     Social History:  reports that she has never smoked. She has never used smokeless tobacco. She reports that she does not drink  alcohol and does not use drugs.      Medications:  Prior to Admission medications    Medication Sig Start Date End Date Taking? Authorizing Provider   ALPRAZolam (XANAX) 1 MG tablet Take 1 tablet by mouth 3 (Three) Times a Day As Needed for Anxiety.   Yes Jacklyn Rahman MD   aspirin 81 MG tablet Take 1 tablet by mouth Daily. 9/3/18  Yes Mayur Sweet MD   Cholecalciferol 25 MCG (1000 UT) tablet Take 1 tablet by mouth Daily.   Yes Jacklyn Rahman MD   escitalopram (LEXAPRO) 20 MG tablet Take 1 tablet by mouth Daily.   Yes Jacklyn Rahman MD   ezetimibe (ZETIA) 10 MG tablet Take 1 tablet by mouth Daily. 8/5/20  Yes Jose Scott MD   ferrous sulfate 325 (65 FE) MG tablet Take 1 tablet by mouth Daily With Breakfast for 90 days. 11/15/24 2/13/25 Yes Sourav Pompa MD   FOLIC ACID PO Take 1 tablet by mouth Daily.   Yes Jacklyn Rahman MD   hydroxychloroquine (PLAQUENIL) 200 MG tablet Take 1 tablet by mouth 2 (Two) Times a Day.   Yes Jacklyn Rahman MD   levothyroxine (SYNTHROID, LEVOTHROID) 100 MCG tablet Take 1 tablet by mouth Every Morning.   Yes Jacklyn Rahman MD   Lifitegrast (XIIDRA) 5 % ophthalmic solution Administer 1 drop to both eyes 2 (Two) Times a Day.   Yes Jacklyn Rahman MD   memantine (NAMENDA) 10 MG tablet Take 1 tablet by mouth 2 (Two) Times a Day.   Yes Jacklyn Rahman MD   metoprolol tartrate (LOPRESSOR) 25 MG tablet Take 0.5 tablets by mouth 2 (Two) Times a Day.   Yes Jacklyn Rahman MD   pantoprazole (PROTONIX) 40 MG EC tablet Take 1 tablet by mouth Daily.   Yes Jacklyn Rahman MD   sodium bicarbonate 650 MG tablet Take 1 tablet by mouth 2 (Two) Times a Day.   Yes Jacklyn Rahman MD   topiramate (TOPAMAX) 200 MG tablet Take 1 tablet by mouth Daily. 6/4/19  Yes Jacklny Rahman MD   Denosumab (PROLIA SC) Inject  under the skin into the appropriate area as directed. Every 6 months    Jacklyn Rahman MD    dapagliflozin Propanediol 10 MG tablet Take 10 mg by mouth Daily.  1/1/25  Provider, MD Jacklyn   levothyroxine (SYNTHROID, LEVOTHROID) 100 MCG tablet Take 1 tablet by mouth Every Morning for 30 days. 11/1/24 1/1/25  Terrie Dao MD       Allergies:    Allergies   Allergen Reactions    Benzoin Anaphylaxis and Swelling     States when used on her neck it shut off her airway  Huge abscesses with surgery      Iodine Other (See Comments)     PT UNABLE TO TELL RN ABOUT REACTION AT THIS TIME, BUT FAMILY STATES PT HAD A REACTION TO BEING CLEANSED WITH IODINE IN SURGERY  IOBAN - used on neck, swelled to the point of shutting off airway    Vancomycin Other (See Comments)     Kidney failure  Vancomycin induced acute kidney injury      Amiodarone Other (See Comments)     Tremor- hand and face     Levaquin [Levofloxacin] Unknown (See Comments)     Unknown    Sulfa Antibiotics        Objective   Objective     Vital Signs  Temp:  [98.6 °F (37 °C)-99.1 °F (37.3 °C)] 98.6 °F (37 °C)  Heart Rate:  [81-93] 82  Resp:  [16-21] 16  BP: (152-162)/(74-88) 156/79  SpO2:  [94 %-98 %] 97 %  on   ;   Device (Oxygen Therapy): room air  Body mass index is 28.98 kg/m².    Physical Exam  Vitals and nursing note reviewed.   Constitutional:       Appearance: Normal appearance. She is obese.   HENT:      Head: Normocephalic and atraumatic.      Right Ear: External ear normal.      Left Ear: External ear normal.      Nose: Congestion present.      Mouth/Throat:      Pharynx: Oropharynx is clear.   Eyes:      Extraocular Movements: Extraocular movements intact.      Conjunctiva/sclera: Conjunctivae normal.      Pupils: Pupils are equal, round, and reactive to light.   Cardiovascular:      Rate and Rhythm: Normal rate.      Pulses: Normal pulses.   Pulmonary:      Effort: Pulmonary effort is normal.      Breath sounds: Wheezing present.   Abdominal:      General: Bowel sounds are normal.      Palpations: Abdomen is soft.    Musculoskeletal:         General: Normal range of motion.      Cervical back: Normal range of motion.   Skin:     General: Skin is warm.      Capillary Refill: Capillary refill takes less than 2 seconds.   Neurological:      Mental Status: She is alert and oriented to person, place, and time.      Motor: Weakness present.   Psychiatric:         Behavior: Behavior normal.         Thought Content: Thought content normal.         Judgment: Judgment normal.           Results Review:  I have personally reviewed most recent cardiac tracings, lab results, microbiology results, and radiology images and interpretations and agree with findings, most notably: CBC, CMP, CXR, EKG.    Results from last 7 days   Lab Units 01/01/25  0536   WBC 10*3/mm3 5.34   HEMOGLOBIN g/dL 13.2   HEMATOCRIT % 44.4   PLATELETS 10*3/mm3 164     Results from last 7 days   Lab Units 01/01/25  0653 01/01/25  0650 01/01/25  0536   SODIUM mmol/L 140  --   --    POTASSIUM mmol/L 3.9  --   --    CHLORIDE mmol/L 109*  --   --    CO2 mmol/L 16.9*  --   --    BUN mg/dL 17  --   --    CREATININE mg/dL 1.17*  --   --    GLUCOSE mg/dL 124*  --   --    CALCIUM mg/dL 9.4  --   --    ALK PHOS U/L 332*  --   --    ALT (SGPT) U/L 155*  --   --    AST (SGOT) U/L 95*  --   --    LACTATE mmol/L  --  1.1  --    PROCALCITONIN ng/mL  --   --  0.16     Estimated Creatinine Clearance: 43.8 mL/min (A) (by C-G formula based on SCr of 1.17 mg/dL (H)).  Brief Urine Lab Results  (Last result in the past 365 days)        Color   Clarity   Blood   Leuk Est   Nitrite   Protein   CREAT   Urine HCG        01/01/25 0618 Yellow   Clear   Negative   Trace   Negative   30 mg/dL (1+)                   Microbiology Results (last 10 days)       Procedure Component Value - Date/Time    Respiratory Panel PCR w/COVID-19(SARS-CoV-2) [facility] In-House, NP Swab in UTM/VTM, 2 HR TAT - Swab, Nasopharynx [[id number]]  (Abnormal) Collected: 01/01/25 0546    Lab Status: Final result  Specimen: Swab from Nasopharynx Updated: 01/01/25 0655     ADENOVIRUS, PCR Not Detected     Coronavirus 229E Not Detected     Coronavirus HKU1 Not Detected     Coronavirus NL63 Not Detected     Coronavirus OC43 Not Detected     COVID19 Detected     Human Metapneumovirus Not Detected     Human Rhinovirus/Enterovirus Not Detected     Influenza A PCR Not Detected     Influenza B PCR Not Detected     Parainfluenza Virus 1 Not Detected     Parainfluenza Virus 2 Not Detected     Parainfluenza Virus 3 Not Detected     Parainfluenza Virus 4 Not Detected     RSV, PCR Not Detected     Bordetella pertussis pcr Not Detected     Bordetella parapertussis PCR Not Detected     Chlamydophila pneumoniae PCR Not Detected     Mycoplasma pneumo by PCR Not Detected    Narrative:      In the setting of a positive respiratory panel with a viral infection PLUS a negative procalcitonin without other underlying concern for bacterial infection, consider observing off antibiotics or discontinuation of antibiotics and continue supportive care. If the respiratory panel is positive for atypical bacterial infection (Bordetella pertussis, Chlamydophila pneumoniae, or Mycoplasma pneumoniae), consider antibiotic de-escalation to target atypical bacterial infection.            ECG/EMG Results (most recent)       Procedure Component Value Units Date/Time    ECG 12 Lead Altered Mental Status [[id number]] Collected: 01/01/25 0453     Updated: 01/01/25 0453     QT Interval 414 ms      QTC Interval 495 ms     Narrative:      HEART RATE=86  bpm  RR Irxdutkw=558  ms  DE Cisyhnzo=060  ms  P Horizontal Axis=-16  deg  P Front Axis=40  deg  QRSD Interval=84  ms  QT Bhtslzkv=275  ms  AQjC=382  ms  QRS Axis=52  deg  T Wave Axis=50  deg  - NORMAL ECG -  Sinus rhythm  Date and Time of Study:2025-01-01 04:53:00                Results for orders placed during the hospital encounter of 12/05/24    Adult Transesophageal Echo (MELISSA) W/ Cont if Necessary Per  Protocol    Interpretation Summary    Left ventricular systolic function is normal.    The left atrial cavity is mildly dilated.    The right atrial cavity is moderate to severely  dilated.    Moderate to severe tricuspid valve regurgitation is present.    27 mm watchman in place. There is complete seal of the left atrial appendage, with no leakage around the appendage, no thrombus noted.      XR Chest 1 View    Result Date: 1/1/2025  Impression: No acute cardiopulmonary process. Electronically Signed: Leda Arauz MD  1/1/2025 5:14 AM EST  Workstation ID: [id number]       Estimated Creatinine Clearance: 43.8 mL/min (A) (by C-G formula based on SCr of 1.17 mg/dL (H)).    Assessment & Plan   Assessment/Plan       Active Hospital Problems    Diagnosis  POA    **COVID [U07.1]  Yes      Resolved Hospital Problems   No resolved problems to display.     COVID-19  Lab Results   Component Value Date    WBC 5.34 01/01/2025    LACTATE 1.1 01/01/2025    PROCALCITO 0.16 01/01/2025   -Chest x-ray: No acute cardiopulmonary disease seen  -CK 32  -Respiratory panel positive for COVID  -Isolation precautions   -Blood cultures pending   -IV fluids   -Breathing treatments, Tessalon and incentive spirometry ordered  -Continue pulse oximetry and telemetry  -PT consulted     History of atrial fibrillation  -Post Watchman   -Continue ASA  -EKG shows sinus rhythm without acute wave change    Elevated LFTs  Lab Results   Component Value Date    WBC 5.34 01/01/2025    AST 95 (H) 01/01/2025     (H) 01/01/2025    ALKPHOS 332 (H) 01/01/2025    BILITOT 1.0 01/01/2025    LIPASE 37 10/25/2024   -Abdominal US ordered  -UA: trace leuks and protein but otherwise unremarkable  -Ethanol negative   -Monitor CMP while admitted    CKD  Lab Results   Component Value Date    CREATININE 1.17 (H) 01/01/2025    BUN 17 01/01/2025    BCR 14.5 01/01/2025    EGFR 49.1 (L) 01/01/2025   -Continue sodium bicarbonate   -Avoid nephrotoxic medication IV dye unless  urgently needed  -Monitor BMP and I's and O's while admitted    Anemia  Lab Results   Component Value Date    HGB 13.2 01/01/2025    .8 (H) 01/01/2025    MCHC 29.7 (L) 01/01/2025   -Continue folic acid and ferrous sulfate      Hypothyroidism  -Continue levothyroxine  -TSH 0.222, T4 1.56    Rheumatoid arthritis  -Continue Plaquenil    GERD  -Continue PPI    Memory loss  -Continue Namenda    Anxiety Disorder  -Continue Lexapro, Xanax     VTE Prophylaxis - Active VTE Prophylaxis  Mechanical:        Start        01/01/25 0733  Maintain Sequential Compression Device  Continuous                          Select Pharmacologic VTE Prophylaxis if Desired & Appropriate      CODE STATUS:    Code Status and Medical Interventions: CPR (Attempt to Resuscitate); Full Support   Ordered at: 01/01/25 0707     Code Status (Patient has no pulse and is not breathing):    CPR (Attempt to Resuscitate)     Medical Interventions (Patient has pulse or is breathing):    Full Support       This patient has been examined wearing personal protective equipment.     I discussed the patient's findings and my recommendations with patient, family, nursing staff, primary care team, and consulting provider.      Signature:Electronically signed by GREGORIA Cook, 01/01/25, 12:21 PM EST.      I spent 35 minutes caring for Natalee on this date of service. This time includes time spent by me in the following activities: reviewing tests, performing a medically appropriate examination and/or evaluation, counseling and educating the patient/family/caregiver, referring and communicating with other health care professionals, documenting information in the medical record, independently interpreting results and communicating that information with the patient/family/caregiver, care coordination, ordering medications, ordering test(s), ordering procedure(s), obtaining a separately obtained history, and reviewing a separately obtained history.

## 2025-01-01 NOTE — ED NOTES
Nursing report ED to floor  Natalee Noble  74 y.o.  female    HPI:   Chief Complaint   Patient presents with    Weakness - Generalized       Admitting doctor:   Raghavendra Hidalgo MD    Admitting diagnosis:   The primary encounter diagnosis was COVID. Diagnoses of Weakness and Dehydration were also pertinent to this visit.    Code status:   Current Code Status       Date Active Code Status Order ID Comments User Context       1/1/2025 0707 CPR (Attempt to Resuscitate) [id number]  Krystle Greer APRN ED        Question Answer    Code Status (Patient has no pulse and is not breathing) CPR (Attempt to Resuscitate)    Medical Interventions (Patient has pulse or is breathing) Full Support                    Allergies:   Benzoin, Iodine, Vancomycin, Amiodarone, Levaquin [levofloxacin], and Sulfa antibiotics    Isolation:  Airborne     Fall Risk:  Fall Risk Assessment was completed, and patient is at high risk for falls.   Predictive Model Details         14 (Low) Factor Value    Calculated 1/1/2025 09:19 Age 74    Risk of Fall Model Active Peripheral IV Present     Imaging order in this encounter Present     Respiratory Rate 21     Magnesium 2 mg/dL     Miky Scale not on file     Chloride 109 mmol/L     Total Bilirubin 1 mg/dL      U/L     Creatinine 1.17 mg/dL     Diastolic BP 83     Number of Distinct Medication Classes administered 1     Albumin 4 g/dL     Days after Admission 0.233     Calcium 9.4 mg/dL     Potassium 3.9 mmol/L         Weight:       01/01/25  0315   Weight: 75 kg (165 lb 5.5 oz)       Intake and Output  No intake or output data in the 24 hours ending 01/01/25 0930    Diet:   Dietary Orders (From admission, onward)       Start     Ordered    01/01/25 0733  Diet: Cardiac; Healthy Heart (2-3 Na+); Fluid Consistency: Thin (IDDSI 0)  Diet Effective Now        References:    Diet Order Definitions   Question Answer Comment   Diets: Cardiac    Cardiac Diet: Healthy Heart (2-3 Na+)    Fluid  Consistency: Thin (IDDSI 0)        01/01/25 0732                     Most recent vitals:   Vitals:    01/01/25 0646 01/01/25 0701 01/01/25 0750 01/01/25 0800   BP: 158/77 152/74 160/83    Pulse: 87 93 87 88   Resp:       Temp:       TempSrc:       SpO2: 96% 97% 96% 96%   Weight:       Height:           Active LDAs/IV Access:   Lines, Drains & Airways       Active LDAs       Name Placement date Placement time Site Days    Peripheral IV 01/01/25 0535 Anterior;Distal;Right Forearm 01/01/25  0535  Forearm  less than 1                    Skin Condition:   Skin Assessments (last day)       None             Labs (abnormal labs have a star):   Labs Reviewed   RESPIRATORY PANEL PCR W/ COVID-19 (SARS-COV-2), NP SWAB IN UTM/VTP, 2 HR TAT - Abnormal; Notable for the following components:       Result Value    COVID19 Detected (*)     All other components within normal limits    Narrative:     In the setting of a positive respiratory panel with a viral infection PLUS a negative procalcitonin without other underlying concern for bacterial infection, consider observing off antibiotics or discontinuation of antibiotics and continue supportive care. If the respiratory panel is positive for atypical bacterial infection (Bordetella pertussis, Chlamydophila pneumoniae, or Mycoplasma pneumoniae), consider antibiotic de-escalation to target atypical bacterial infection.   COMPREHENSIVE METABOLIC PANEL - Abnormal; Notable for the following components:    Glucose 124 (*)     Creatinine 1.17 (*)     Chloride 109 (*)     CO2 16.9 (*)     ALT (SGPT) 155 (*)     AST (SGOT) 95 (*)     Alkaline Phosphatase 332 (*)     eGFR 49.1 (*)     All other components within normal limits    Narrative:     GFR Categories in Chronic Kidney Disease (CKD)      GFR Category          GFR (mL/min/1.73)    Interpretation  G1                     90 or greater         Normal or high (1)  G2                      60-89                Mild decrease (1)  G3a                    45-59                Mild to moderate decrease  G3b                   30-44                Moderate to severe decrease  G4                    15-29                Severe decrease  G5                    14 or less           Kidney failure          (1)In the absence of evidence of kidney disease, neither GFR category G1 or G2 fulfill the criteria for CKD.    eGFR calculation 2021 CKD-EPI creatinine equation, which does not include race as a factor   URINALYSIS W/ CULTURE IF INDICATED - Abnormal; Notable for the following components:    Protein, UA 30 mg/dL (1+) (*)     Leuk Esterase, UA Trace (*)     All other components within normal limits    Narrative:     In absence of clinical symptoms, the presence of pyuria, bacteria, and/or nitrites on the urinalysis result does not correlate with infection.   TSH RFX ON ABNORMAL TO FREE T4 - Abnormal; Notable for the following components:    TSH 0.222 (*)     All other components within normal limits   URINE DRUG SCREEN - Abnormal; Notable for the following components:    Benzodiazepine Screen, Urine Positive (*)     All other components within normal limits    Narrative:     Cutoff For Drugs Screened:    Amphetamines               500 ng/ml  Barbiturates               200 ng/ml  Benzodiazepines            150 ng/ml  Cocaine                    150 ng/ml  Methadone                  200 ng/ml  Opiates                    100 ng/ml  Phencyclidine               25 ng/ml  THC                         50 ng/ml  Methamphetamine            500 ng/ml  Tricyclic Antidepressants  300 ng/ml  Oxycodone                  100 ng/ml  Buprenorphine               10 ng/ml    The normal value for all drugs tested is negative. This report includes unconfirmed screening results, with the cutoff values listed, to be used for medical treatment purposes only.  Unconfirmed results must not be used for non-medical purposes such as employment or legal testing.  Clinical consideration should be  "applied to any drug of abuse test, particularly when unconfirmed results are used.    All urine drugs of abuse requests without chain of custody are for medical screening purposes only.  False positives are possible.     CBC WITH AUTO DIFFERENTIAL - Abnormal; Notable for the following components:    .8 (*)     MCHC 29.7 (*)     RDW 19.2 (*)     RDW-SD 74.0 (*)     Neutrophil % 89.3 (*)     Lymphocyte % 4.5 (*)     Lymphocytes, Absolute 0.24 (*)     All other components within normal limits   MAGNESIUM - Normal   CK - Normal   PROCALCITONIN - Normal    Narrative:     As a Marker for Sepsis (Non-Neonates):    1. <0.5 ng/mL represents a low risk of severe sepsis and/or septic shock.  2. >2 ng/mL represents a high risk of severe sepsis and/or septic shock.    As a Marker for Lower Respiratory Tract Infections that require antibiotic therapy:    PCT on Admission    Antibiotic Therapy       6-12 Hrs later    >0.5                Strongly Recommended  >0.25 - <0.5        Recommended   0.1 - 0.25          Discouraged              Remeasure/reassess PCT  <0.1                Strongly Discouraged     Remeasure/reassess PCT    As 28 day mortality risk marker: \"Change in Procalcitonin Result\" (>80% or <=80%) if Day 0 (or Day 1) and Day 4 values are available. Refer to http://www.[employer]-pct-calculator.com    Change in PCT <=80%  A decrease of PCT levels below or equal to 80% defines a positive change in PCT test result representing a higher risk for 28-day all-cause mortality of patients diagnosed with severe sepsis for septic shock.    Change in PCT >80%  A decrease of PCT levels of more than 80% defines a negative change in PCT result representing a lower risk for 28-day all-cause mortality of patients diagnosed with severe sepsis or septic shock.      T4, FREE - Normal   POC LACTATE - Normal   BLOOD CULTURE   BLOOD CULTURE   ETHANOL    Narrative:     Plasma Ethanol Clinical Symptoms:    ETOH (%)               Clinical " Symptom  .01-.05              No apparent influence  .03-.12              Euphoria, Diminished judgment and attention   .09-.25              Impaired comprehension, Muscle incoordination  .18-.30              Confusion, Staggered gait, Slurred speech  .25-.40              Markedly decreased response to stimuli, unable to stand or                        walk, vomitting, sleep or stupor  .35-.50              Comatose, Anesthesia, Subnormal body temperature       URINALYSIS, MICROSCOPIC ONLY   POC LACTATE   CBC AND DIFFERENTIAL    Narrative:     The following orders were created for panel order CBC & Differential.  Procedure                               Abnormality         Status                     ---------                               -----------         ------                     CBC Auto Differential[[id number]]        Abnormal            Final result                 Please view results for these tests on the individual orders.   EXTRA TUBES    Narrative:     The following orders were created for panel order Extra Tubes.  Procedure                               Abnormality         Status                     ---------                               -----------         ------                     [facility] - [facility][[id number]]                                   Final result                 Please view results for these tests on the individual orders.   [facility] - [facility]       LOC: Person, Place, Time, and Situation    Telemetry:  Observation Unit    Cardiac Monitoring Ordered: yes    EKG:   ECG 12 Lead Altered Mental Status   Preliminary Result   HEART RATE=86  bpm   RR Zblvrgtk=408  ms   MI Pfamgiiu=631  ms   P Horizontal Axis=-16  deg   P Front Axis=40  deg   QRSD Interval=84  ms   QT Ehjhreoe=395  ms   PBmW=167  ms   QRS Axis=52  deg   T Wave Axis=50  deg   - NORMAL ECG -   Sinus rhythm   Date and Time of Study:2025-01-01 04:53:00          Medications Given in the ED:   Medications   sodium chloride 0.9 % flush 10 mL (has  no administration in time range)   acetaminophen (TYLENOL) suppository 650 mg (has no administration in time range)   naloxone (NARCAN) injection 0.4 mg (has no administration in time range)   ondansetron (ZOFRAN) injection 4 mg (has no administration in time range)   labetalol (NORMODYNE,TRANDATE) injection 5 mg (has no administration in time range)   hydrALAZINE (APRESOLINE) injection 5 mg (has no administration in time range)   diphenhydrAMINE (BENADRYL) injection 12.5 mg (has no administration in time range)   albuterol (PROVENTIL) nebulizer solution 0.083% 2.5 mg/3mL (has no administration in time range)   sodium chloride 0.9 % flush 10 mL (has no administration in time range)   sodium chloride 0.9 % flush 10 mL (has no administration in time range)   sodium chloride 0.9 % infusion 40 mL (has no administration in time range)   nitroglycerin (NITROSTAT) SL tablet 0.4 mg (has no administration in time range)   acetaminophen (TYLENOL) tablet 650 mg (has no administration in time range)     Or   acetaminophen (TYLENOL) 160 MG/5ML oral solution 650 mg (has no administration in time range)     Or   acetaminophen (TYLENOL) suppository 650 mg (has no administration in time range)   sodium chloride 0.9 % infusion (has no administration in time range)   sennosides-docusate (PERICOLACE) 8.6-50 MG per tablet 2 tablet (has no administration in time range)     And   polyethylene glycol (MIRALAX) packet 17 g (has no administration in time range)     And   bisacodyl (DULCOLAX) EC tablet 5 mg (has no administration in time range)     And   bisacodyl (DULCOLAX) suppository 10 mg (has no administration in time range)   ondansetron ODT (ZOFRAN-ODT) disintegrating tablet 4 mg (has no administration in time range)     Or   ondansetron (ZOFRAN) injection 4 mg (has no administration in time range)   sodium chloride 0.9 % bolus 1,000 mL (1,000 mL Intravenous New Bag 1/1/25 0535)       Imaging results:  XR Chest 1 View    Result Date:  1/1/2025  Impression: No acute cardiopulmonary process. Electronically Signed: Leda Arauz MD  1/1/2025 5:14 AM EST  Workstation ID: [id number]     Social issues:   Social History     Socioeconomic History    Marital status:    Tobacco Use    Smoking status: Never    Smokeless tobacco: Never   Vaping Use    Vaping status: Never Used   Substance and Sexual Activity    Alcohol use: No    Drug use: Never    Sexual activity: Defer       NIH Stroke Scale:  Interval: (not recorded)  1a. Level of Consciousness: (not recorded)  1b. LOC Questions: (not recorded)  1c. LOC Commands: (not recorded)  2. Best Gaze: (not recorded)  3. Visual: (not recorded)  4. Facial Palsy: (not recorded)  5a. Motor Arm, Left: (not recorded)  5b. Motor Arm, Right: (not recorded)  6a. Motor Leg, Left: (not recorded)  6b. Motor Leg, Right: (not recorded)  7. Limb Ataxia: (not recorded)  8. Sensory: (not recorded)  9. Best Language: (not recorded)  10. Dysarthria: (not recorded)  11. Extinction and Inattention (formerly Neglect): (not recorded)    Total (NIH Stroke Scale): (not recorded)     Additional notable assessment information:     Nursing report ED to floor:  Jaya meredith Obs     Deepti Branham RN   01/01/25 09:30 EST

## 2025-01-01 NOTE — ED PROVIDER NOTES
Subjective   History of Present Illness  74-year-old female states she felt feverish and weak tonight.  She has had some slight cough and congestion as well as some dysuria.  She reports no chest or abdominal pain or headache or focal numbness or weakness.  States she was unable to get up out of the bed due to severe weakness.  Review of Systems    Past Medical History:   Diagnosis Date    Anxiety and depression     Arthritis     Back pain     CRF (chronic renal failure), stage 3 (moderate)     Diverticulosis     Essential hypertension     Family history of colon cancer     Family history of colonic polyps     Fractures     R tibia/fibula; L ankle    GERD (gastroesophageal reflux disease)     History of adenomatous polyp of colon     History of cardiac cath     1/28/19 left main no significant disease. LAD with no significant disease. LCX no significant disease. RCA no significant disease.    History of cardiac monitoring     2/01/19 - ZIO PATCH - Underlying rhythm is sinus at 48-94 bpm. PSVT x23 with fastest 167 bpm, longest 24.4 seconds.   10/05/2018- ZIO PATCH - NSR most of time but did have episode of A fib ranging from .    History of colon polyps     History of echocardiogram     9/2018: Left ventricular systolic function is normal. EF 50%. Left ventricular diastolic dysfunction consistent with ipaired relaxation. Left atrial cavity size is mildly dilated. Mild MR. Mild to moderate TR. There is abnormal thickening noted on the RV that may represent a vegetation-this was discussed with Dr. Hammer and Dr. Reilly. No patent foramen ovale. No further work up needed at this time    Hyperlipidemia     Kidney disorder     Migraine     Migraines     Neck pain     Stroke 2018       Allergies   Allergen Reactions    Benzoin Anaphylaxis and Swelling     States when used on her neck it shut off her airway  Huge abscesses with surgery      Iodine Other (See Comments)     PT UNABLE TO TELL RN ABOUT REACTION AT THIS  TIME, BUT FAMILY STATES PT HAD A REACTION TO BEING CLEANSED WITH IODINE IN SURGERY  IOBAN - used on neck, swelled to the point of shutting off airway    Vancomycin Other (See Comments)     Kidney failure  Vancomycin induced acute kidney injury      Amiodarone Other (See Comments)     Tremor- hand and face     Levaquin [Levofloxacin] Unknown (See Comments)     Unknown    Sulfa Antibiotics        Past Surgical History:   Procedure Laterality Date    ABDOMINOPLASTY      ATRIAL APPENDAGE EXCLUSION LEFT WITH TRANSESOPHAGEAL ECHOCARDIOGRAM N/A 10/22/2024    Procedure: Atrial Appendage Occlusion;  Surgeon: Sourav Pompa MD;  Location:  [facility] CATH INVASIVE [facility]ATION;  Service: Cardiovascular;  Laterality: N/A;    BACK SURGERY      L2-5 fusion 2012    CARDIAC CATHETERIZATION N/A 10/29/2024    Procedure: Left Heart Cath, possible pci;  Surgeon: Josemanuel Sheriff MD;  Location: [facility] CATH INVASIVE LOCATION;  Service: Cardiovascular;  Laterality: N/A;    CARDIAC ELECTROPHYSIOLOGY PROCEDURE Right 10/22/2024    Procedure: Ablation atrial fibrillation;  Surgeon: Sourav Pompa MD;  Location: [facility] CATH INVASIVE LOCATION;  Service: Cardiovascular;  Laterality: Right;    CHOLECYSTECTOMY      COLONOSCOPY  11/24/2014    Diverticulosis; Repeat 5 years    COLONOSCOPY  11/09/2010    Diverticulosis; Repeat 4 years    COLONOSCOPY  09/18/2007    Dr. Monzon-Normal; Repeat 3 years    COLONOSCOPY  06/02/2005    Dr. Monzon-Diminuitive polypectomy above the cecum; Otherwise normal colonoscopy; Repeat 2 years    COLONOSCOPY N/A 11/01/2019    Diverticulosis in the left colon; Two 5-6mm polyps in the cecum-path shows one tubular adenomatous and on hyperplastic polyp; One 8mm tubular adenomatous polyp in the ascending colon; Two 4-5mm tubular adenomatous polyps in the transverse colon; Repeat 3 years    COLONOSCOPY N/A 6/12/2023    Procedure: COLONOSCOPY WITH ANESTHESIA;  Surgeon: Susan Lord MD;  Location:  [facility];   Service: Gastroenterology;  Laterality: N/A;  preop hx of polyps   postop; polyps   PCP Woody Valentin MD    CYSTOCELE REPAIR      Cystocele and rectocele repair    ENDOSCOPY  09/28/2012    LA grade C esophagitis; HH    ENDOSCOPY N/A 11/01/2019    Small HH; Non-erosive gastritis-biopsied; Normal examined duodenum    ENDOSCOPY N/A 10/13/2024    Procedure: ESOPHAGOGASTRODUODENOSCOPY WITH BIOPSY X 1 AREA;  Surgeon: Miguel Vega MD;  Location: [facility] ENDOSCOPY;  Service: Gastroenterology;  Laterality: N/A;  Hiatal hernia, esophagitis, gastritis    FIBULA FRACTURE SURGERY      HYSTERECTOMY      NECK SURGERY  2012    ACDF C4-7    OR[facility] TIBIA FRACTURE Right        Family History   Problem Relation Age of Onset    Colon cancer Father 65    Colon polyps Daughter 38    Esophageal cancer Neg Hx     Liver cancer Neg Hx     Liver disease Neg Hx     Rectal cancer Neg Hx     Stomach cancer Neg Hx        Social History     Socioeconomic History    Marital status:    Tobacco Use    Smoking status: Never    Smokeless tobacco: Never   Vaping Use    Vaping status: Never Used   Substance and Sexual Activity    Alcohol use: No    Drug use: Never    Sexual activity: Defer     Prior to Admission medications    Medication Sig Start Date End Date Taking? Authorizing Provider   ALPRAZolam (XANAX) 1 MG tablet Take 1 tablet by mouth 3 (Three) Times a Day As Needed for Anxiety.    ProviderJacklyn MD   aspirin 81 MG tablet Take 1 tablet by mouth Daily. 9/3/18   Mauyr Sweet MD   dapagliflozin Propanediol 10 MG tablet Take 10 mg by mouth Daily.    ProviderJacklyn MD   Denosumab (PROLIA SC) Inject  under the skin into the appropriate area as directed. Every 6 months    Jacklyn Rahman MD   escitalopram (LEXAPRO) 20 MG tablet Take 1 tablet by mouth Daily.    Jacklyn Rahman MD   ezetimibe (ZETIA) 10 MG tablet Take 1 tablet by mouth Daily. 8/5/20   Jose Scott MD   ferrous sulfate  "325 (65 FE) MG tablet Take 1 tablet by mouth Daily With Breakfast for 90 days. 11/15/24 2/13/25  Sourav Pompa MD   FOLIC ACID PO Take  by mouth Daily.    Jacklyn Rahman MD   hydroxychloroquine (PLAQUENIL) 200 MG tablet Take 1 tablet by mouth 2 (Two) Times a Day.    Jacklyn Rahman MD   levothyroxine (SYNTHROID, LEVOTHROID) 100 MCG tablet Take 1 tablet by mouth Every Morning for 30 days. 11/1/24 12/1/24  Terrie Dao MD   Lifitegrast (XIIDRA) 5 % ophthalmic solution Administer 1 drop to both eyes 2 (Two) Times a Day.    Jacklyn Rahman MD   memantine (NAMENDA) 10 MG tablet Take 1 tablet by mouth 2 (Two) Times a Day.    Jacklyn Rahman MD   metoprolol tartrate (LOPRESSOR) 25 MG tablet Take 0.5 tablets by mouth Daily.    Jacklyn Rahman MD   pantoprazole (PROTONIX) 40 MG EC tablet Take 1 tablet by mouth Daily As Needed.    Jacklyn Rahman MD   sodium bicarbonate 650 MG tablet Take 1 tablet by mouth 2 (Two) Times a Day.    Jacklyn Rahman MD   topiramate (TOPAMAX) 200 MG tablet Take 1 tablet by mouth Daily. 6/4/19   Jacklyn Rahman MD     /74   Pulse 93   Temp 99.1 °F (37.3 °C) (Oral)   Resp 21   Ht 165.1 cm (65\")   Wt 75 kg (165 lb 5.5 oz)   SpO2 97%   BMI 27.51 kg/m²         Objective   Physical Exam  General: Well-developed, no acute distress, alert and appropriate  Eyes: Pupils round and reactive, sclera nonicteric  HEENT: Mucous membranes dry, no mucosal swelling  Neck: Supple, no nuchal rigidity, no JVD  Respirations: Respirations nonlabored, equal breath sounds bilaterally, clear lungs, occasional raspy cough  Heart regular rate and rhythm, no murmurs rubs or gallops,   Abdomen soft nontender nondistended, no hepatosplenomegaly, no hernia, no mass, normal bowel sounds, no CVA tenderness  Extremities no clubbing cyanosis or edema, calves are symmetric and nontender  Neuro cranial nerves grossly intact, no focal limb deficits, generally " weak  Psych oriented, pleasant affect  Skin no rash, brisk cap refill  Procedures           ED Course      Results for orders placed or performed during the hospital encounter of 01/01/25   ECG 12 Lead Altered Mental Status    Collection Time: 01/01/25  4:53 AM   Result Value Ref Range    QT Interval 414 ms    QTC Interval 495 ms   TSH Rfx On Abnormal To Free T4    Collection Time: 01/01/25  5:36 AM    Specimen: Blood   Result Value Ref Range    TSH 0.222 (L) 0.270 - 4.200 uIU/mL   Ethanol    Collection Time: 01/01/25  5:36 AM    Specimen: Blood   Result Value Ref Range    Ethanol % <0.010 %   Procalcitonin    Collection Time: 01/01/25  5:36 AM    Specimen: Blood   Result Value Ref Range    Procalcitonin 0.16 0.00 - 0.25 ng/mL   CBC Auto Differential    Collection Time: 01/01/25  5:36 AM    Specimen: Blood   Result Value Ref Range    WBC 5.34 3.40 - 10.80 10*3/mm3    RBC 4.32 3.77 - 5.28 10*6/mm3    Hemoglobin 13.2 12.0 - 15.9 g/dL    Hematocrit 44.4 34.0 - 46.6 %    .8 (H) 79.0 - 97.0 fL    MCH 30.6 26.6 - 33.0 pg    MCHC 29.7 (L) 31.5 - 35.7 g/dL    RDW 19.2 (H) 12.3 - 15.4 %    RDW-SD 74.0 (H) 37.0 - 54.0 fl    MPV 10.7 6.0 - 12.0 fL    Platelets 164 140 - 450 10*3/mm3    Neutrophil % 89.3 (H) 42.7 - 76.0 %    Lymphocyte % 4.5 (L) 19.6 - 45.3 %    Monocyte % 5.2 5.0 - 12.0 %    Eosinophil % 0.4 0.3 - 6.2 %    Basophil % 0.2 0.0 - 1.5 %    Immature Grans % 0.4 0.0 - 0.5 %    Neutrophils, Absolute 4.77 1.70 - 7.00 10*3/mm3    Lymphocytes, Absolute 0.24 (L) 0.70 - 3.10 10*3/mm3    Monocytes, Absolute 0.28 0.10 - 0.90 10*3/mm3    Eosinophils, Absolute 0.02 0.00 - 0.40 10*3/mm3    Basophils, Absolute 0.01 0.00 - 0.20 10*3/mm3    Immature Grans, Absolute 0.02 0.00 - 0.05 10*3/mm3    nRBC 0.0 0.0 - 0.2 /100 WBC   Gold Top - SST    Collection Time: 01/01/25  5:36 AM   Result Value Ref Range    Extra Tube Hold for add-ons.    Respiratory Panel PCR w/COVID-19(SARS-CoV-2) [facility] In-House, NP Swab in  UTM/VTM, 2 HR TAT - Swab, Nasopharynx    Collection Time: 01/01/25  5:46 AM    Specimen: Nasopharynx; Swab   Result Value Ref Range    ADENOVIRUS, PCR Not Detected Not Detected    Coronavirus 229E Not Detected Not Detected    Coronavirus HKU1 Not Detected Not Detected    Coronavirus NL63 Not Detected Not Detected    Coronavirus OC43 Not Detected Not Detected    COVID19 Detected (C) Not Detected - Ref. Range    Human Metapneumovirus Not Detected Not Detected    Human Rhinovirus/Enterovirus Not Detected Not Detected    Influenza A PCR Not Detected Not Detected    Influenza B PCR Not Detected Not Detected    Parainfluenza Virus 1 Not Detected Not Detected    Parainfluenza Virus 2 Not Detected Not Detected    Parainfluenza Virus 3 Not Detected Not Detected    Parainfluenza Virus 4 Not Detected Not Detected    RSV, PCR Not Detected Not Detected    Bordetella pertussis pcr Not Detected Not Detected    Bordetella parapertussis PCR Not Detected Not Detected    Chlamydophila pneumoniae PCR Not Detected Not Detected    Mycoplasma pneumo by PCR Not Detected Not Detected   Urinalysis With Culture If Indicated - Straight Cath    Collection Time: 01/01/25  6:18 AM    Specimen: Straight Cath; Urine   Result Value Ref Range    Color, UA Yellow Yellow, Straw    Appearance, UA Clear Clear    pH, UA 7.0 5.0 - 8.0    Specific Gravity, UA 1.017 1.005 - 1.030    Glucose, UA Negative Negative    Ketones, UA Negative Negative    Bilirubin, UA Negative Negative    Blood, UA Negative Negative    Protein, UA 30 mg/dL (1+) (A) Negative    Leuk Esterase, UA Trace (A) Negative    Nitrite, UA Negative Negative    Urobilinogen, UA 1.0 E.U./dL 0.2 - 1.0 E.U./dL   Urine Drug Screen - Straight Cath    Collection Time: 01/01/25  6:18 AM    Specimen: Straight Cath; Urine   Result Value Ref Range    THC, Screen, Urine Negative Negative    Phencyclidine (PCP), Urine Negative Negative    Cocaine Screen, Urine Negative Negative    Methamphetamine, Ur  Negative Negative    Opiate Screen Negative Negative    Amphetamine Screen, Urine Negative Negative    Benzodiazepine Screen, Urine Positive (A) Negative    Tricyclic Antidepressants Screen Negative Negative    Methadone Screen, Urine Negative Negative    Barbiturates Screen, Urine Negative Negative    Oxycodone Screen, Urine Negative Negative    Buprenorphine, Screen, Urine Negative Negative   Urinalysis, Microscopic Only - Straight Cath    Collection Time: 01/01/25  6:18 AM    Specimen: Straight Cath; Urine   Result Value Ref Range    RBC, UA 0-2 None Seen, 0-2 /HPF    WBC, UA 0-2 None Seen, 0-2 /HPF    Bacteria, UA None Seen None Seen /HPF    Squamous Epithelial Cells, UA 0-2 None Seen, 0-2 /HPF    Hyaline Casts, UA None Seen None Seen /LPF    Mucus, UA Trace None Seen, Trace /HPF    Methodology Manual Light Microscopy    POC Lactate    Collection Time: 01/01/25  6:50 AM    Specimen: Blood   Result Value Ref Range    Lactate 1.1 0.3 - 2.0 mmol/L     XR Chest 1 View    Result Date: 1/1/2025  Impression: No acute cardiopulmonary process. Electronically Signed: Leda Arauz MD  1/1/2025 5:14 AM EST  Workstation ID: [id number]                                                    Medical Decision Making  Differential diagnosis including pneumonia, COVID, urinary tract infection, sepsis, dehydration    Patient does appear dehydrated.  She was ordered IV fluids.  She was stable on the monitor.  Respiratory panel was positive for COVID which I suspect is contributing to her presenting symptoms of increased weakness.  Notably she is in no acute respiratory distress.  She has no hypoxia.  She is agreeable to plan of hospital observation for further hydration and supportive care    Problems Addressed:  COVID: complicated acute illness or injury  Dehydration: complicated acute illness or injury  Weakness: complicated acute illness or injury    Amount and/or Complexity of Data Reviewed  Labs: ordered. Decision-making details  documented in ED Course.     Details: Lactate normal, urinalysis negative for bacteria, drug screen positive for benzodiazepines which is on her med list, CBC no leukocytosis, procalcitonin normal, TSH low patient is on thyroid replacement therapy, comprehensive metabolic panel pending due to initial hemolysis  Radiology: ordered and independent interpretation performed.     Details: My independent interpretation of chest x-ray image no apparent acute pneumonia  ECG/medicine tests: ordered and independent interpretation performed.     Details: My EKG interpretation sinus rhythm rate of 86, no acute ST or T wave abnormality    Risk  Prescription drug management.  Decision regarding hospitalization.        Final diagnoses:   COVID   Weakness   Dehydration       ED Disposition  ED Disposition       ED Disposition   Decision to Admit    Condition   --    Comment   --               No follow-up provider specified.       Medication List      No changes were made to your prescriptions during this visit.            Raghavendra Hidalgo MD  01/01/25 0794     Immature Grans % 0.6 (H) 0.0 - 0.5 %    Neutrophils, Absolute 2.18 1.70 - 7.00 10*3/mm3    Lymphocytes, Absolute 0.47 (L) 0.70 - 3.10 10*3/mm3    Monocytes, Absolute 0.41 0.10 - 0.90 10*3/mm3    Eosinophils, Absolute 0.04 0.00 - 0.40 10*3/mm3    Basophils, Absolute 0.00 0.00 - 0.20 10*3/mm3    Immature Grans, Absolute 0.02 0.00 - 0.05 10*3/mm3    nRBC 0.0 0.0 - 0.2 /100 WBC   Hepatic Function Panel    Collection Time: 01/02/25  2:27 AM    Specimen: Blood   Result Value Ref Range    Total Protein 5.7 (L) 6.0 - 8.5 g/dL    Albumin 3.4 (L) 3.5 - 5.2 g/dL    ALT (SGPT) 108 (H) 1 - 33 U/L    AST (SGOT) 62 (H) 1 - 32 U/L    Alkaline Phosphatase 277 (H) 39 - 117 U/L    Total Bilirubin 0.7 0.0 - 1.2 mg/dL    Bilirubin, Direct 0.5 (H) 0.0 - 0.3 mg/dL    Bilirubin, Indirect 0.2 mg/dL     No radiology results for the last day                                                   Medical Decision Making  Differential diagnosis including pneumonia, COVID, urinary tract infection, sepsis, dehydration    Patient does appear dehydrated.  She was ordered IV fluids.  She was stable on the monitor.  Respiratory panel was positive for COVID which I suspect is contributing to her presenting symptoms of increased weakness.  Notably she is in no acute respiratory distress.  She has no hypoxia.  She is agreeable to plan of hospital observation for further hydration and supportive care    Problems Addressed:  COVID: complicated acute illness or injury  Dehydration: complicated acute illness or injury  Weakness: complicated acute illness or injury    Amount and/or Complexity of Data Reviewed  Labs: ordered. Decision-making details documented in ED Course.     Details: Lactate normal, urinalysis negative for bacteria, drug screen positive for benzodiazepines which is on her med list, CBC no leukocytosis, procalcitonin normal, TSH low patient is on thyroid replacement therapy, comprehensive metabolic panel pending due to initial  hemolysis  Radiology: ordered and independent interpretation performed.     Details: My independent interpretation of chest x-ray image no apparent acute pneumonia  ECG/medicine tests: ordered and independent interpretation performed.     Details: My EKG interpretation sinus rhythm rate of 86, no acute ST or T wave abnormality    Risk  Prescription drug management.  Decision regarding hospitalization.        Final diagnoses:   COVID   Weakness   Dehydration   Intractable periodic headache syndrome   Transaminitis       ED Disposition  ED Disposition       ED Disposition   Decision to Admit    Condition   --    Comment   --               Woody Valentin MD  [address] [facility] [address]  [phone]      5 to 7 days         Medication List        New Prescriptions      benzonatate 100 MG capsule  Commonly known as: Tessalon Perles  Take 1 capsule by mouth 3 (Three) Times a Day As Needed for Cough.     butalbital-acetaminophen-caffeine -40 MG per tablet  Commonly known as: FIORICET, ESGIC  Take 1 tablet by mouth Every 8 (Eight) Hours As Needed for Headache.               Where to Get Your Medications        These medications were sent to [facility] [address] [facility] [place] - [address] - [phone]  - [phone]   [address] [facility] [place] [address]      Phone: [phone]   benzonatate 100 MG capsule  butalbital-acetaminophen-caffeine -40 MG per tablet            Raghavendra Hidalgo MD  01/01/25 8540       Raghavendra Hidalgo MD  01/10/25 1125

## 2025-01-01 NOTE — PLAN OF CARE
Problem: Adult Inpatient Plan of Care  Goal: Plan of Care Review  1/1/2025 1535 by Jaya Stone RN  Outcome: Progressing  Flowsheets (Taken 1/1/2025 1535)  Progress: improving  Plan of Care Reviewed With: patient  1/1/2025 1530 by Jaya Stone RN  Outcome: Progressing  Goal: Patient-Specific Goal (Individualized)  1/1/2025 1535 by Jaya Stone RN  Outcome: Progressing  1/1/2025 1530 by Jaya Stone RN  Outcome: Progressing  Goal: Absence of Hospital-Acquired Illness or Injury  1/1/2025 1535 by Jaya Stone RN  Outcome: Progressing  1/1/2025 1530 by Jaya Stone RN  Outcome: Progressing  Goal: Optimal Comfort and Wellbeing  1/1/2025 1535 by Jaya Stone RN  Outcome: Progressing  1/1/2025 1530 by Jaya Stone RN  Outcome: Progressing  Goal: Readiness for Transition of Care  1/1/2025 1535 by Jaya Stone RN  Outcome: Progressing  1/1/2025 1530 by Jaya Stone RN  Outcome: Progressing   Goal Outcome Evaluation:  Plan of Care Reviewed With: patient        Progress: improving

## 2025-01-01 NOTE — ED NOTES
Pt. Has had recent multiple admissions to hospital and PT and is currently in home PT. Family and patient states around 1:00 am this morning patient tried to get out of bed to use the restroom and slid/fell onto floor. She was found on floor by family and assisted back into bed and EMS was called. While pt. Complains of pain all over, she denies specific injury from this fall. Pt reports general worsening weakness and states she can't walk. States she felt fine yesterday and does not use walker/cane to ambulate.

## 2025-01-02 ENCOUNTER — APPOINTMENT (OUTPATIENT)
Dept: ULTRASOUND IMAGING | Facility: HOSPITAL | Age: 75
End: 2025-01-02
Payer: MEDICARE

## 2025-01-02 ENCOUNTER — READMISSION MANAGEMENT (OUTPATIENT)
Dept: CALL CENTER | Facility: HOSPITAL | Age: 75
End: 2025-01-02
Payer: MEDICARE

## 2025-01-02 VITALS
SYSTOLIC BLOOD PRESSURE: 144 MMHG | RESPIRATION RATE: 18 BRPM | OXYGEN SATURATION: 95 % | HEART RATE: 69 BPM | WEIGHT: 174.16 LBS | TEMPERATURE: 98.3 F | HEIGHT: 65 IN | DIASTOLIC BLOOD PRESSURE: 72 MMHG | BODY MASS INDEX: 29.02 KG/M2

## 2025-01-02 LAB
ALBUMIN SERPL-MCNC: 3.4 G/DL (ref 3.5–5.2)
ALP SERPL-CCNC: 277 U/L (ref 39–117)
ALT SERPL W P-5'-P-CCNC: 108 U/L (ref 1–33)
ANION GAP SERPL CALCULATED.3IONS-SCNC: 11.7 MMOL/L (ref 5–15)
AST SERPL-CCNC: 62 U/L (ref 1–32)
BASOPHILS # BLD AUTO: 0 10*3/MM3 (ref 0–0.2)
BASOPHILS NFR BLD AUTO: 0 % (ref 0–1.5)
BILIRUB CONJ SERPL-MCNC: 0.5 MG/DL (ref 0–0.3)
BILIRUB INDIRECT SERPL-MCNC: 0.2 MG/DL
BILIRUB SERPL-MCNC: 0.7 MG/DL (ref 0–1.2)
BUN SERPL-MCNC: 16 MG/DL (ref 8–23)
BUN/CREAT SERPL: 17.2 (ref 7–25)
CALCIUM SPEC-SCNC: 8.1 MG/DL (ref 8.6–10.5)
CHLORIDE SERPL-SCNC: 110 MMOL/L (ref 98–107)
CO2 SERPL-SCNC: 18.3 MMOL/L (ref 22–29)
CREAT SERPL-MCNC: 0.93 MG/DL (ref 0.57–1)
DEPRECATED RDW RBC AUTO: 71.7 FL (ref 37–54)
EGFRCR SERPLBLD CKD-EPI 2021: 64.6 ML/MIN/1.73
EOSINOPHIL # BLD AUTO: 0.04 10*3/MM3 (ref 0–0.4)
EOSINOPHIL NFR BLD AUTO: 1.3 % (ref 0.3–6.2)
ERYTHROCYTE [DISTWIDTH] IN BLOOD BY AUTOMATED COUNT: 19.5 % (ref 12.3–15.4)
GLUCOSE SERPL-MCNC: 92 MG/DL (ref 65–99)
HCT VFR BLD AUTO: 35.7 % (ref 34–46.6)
HGB BLD-MCNC: 11 G/DL (ref 12–15.9)
IMM GRANULOCYTES # BLD AUTO: 0.02 10*3/MM3 (ref 0–0.05)
IMM GRANULOCYTES NFR BLD AUTO: 0.6 % (ref 0–0.5)
LYMPHOCYTES # BLD AUTO: 0.47 10*3/MM3 (ref 0.7–3.1)
LYMPHOCYTES NFR BLD AUTO: 15.1 % (ref 19.6–45.3)
MCH RBC QN AUTO: 30.6 PG (ref 26.6–33)
MCHC RBC AUTO-ENTMCNC: 30.8 G/DL (ref 31.5–35.7)
MCV RBC AUTO: 99.4 FL (ref 79–97)
MONOCYTES # BLD AUTO: 0.41 10*3/MM3 (ref 0.1–0.9)
MONOCYTES NFR BLD AUTO: 13.1 % (ref 5–12)
NEUTROPHILS NFR BLD AUTO: 2.18 10*3/MM3 (ref 1.7–7)
NEUTROPHILS NFR BLD AUTO: 69.9 % (ref 42.7–76)
NRBC BLD AUTO-RTO: 0 /100 WBC (ref 0–0.2)
PLATELET # BLD AUTO: 148 10*3/MM3 (ref 140–450)
PMV BLD AUTO: 10.7 FL (ref 6–12)
POTASSIUM SERPL-SCNC: 3.5 MMOL/L (ref 3.5–5.2)
PROT SERPL-MCNC: 5.7 G/DL (ref 6–8.5)
QT INTERVAL: 414 MS
QTC INTERVAL: 495 MS
RBC # BLD AUTO: 3.59 10*6/MM3 (ref 3.77–5.28)
SODIUM SERPL-SCNC: 140 MMOL/L (ref 136–145)
WBC NRBC COR # BLD AUTO: 3.12 10*3/MM3 (ref 3.4–10.8)

## 2025-01-02 PROCEDURE — 97161 PT EVAL LOW COMPLEX 20 MIN: CPT

## 2025-01-02 PROCEDURE — 94799 UNLISTED PULMONARY SVC/PX: CPT

## 2025-01-02 PROCEDURE — G0378 HOSPITAL OBSERVATION PER HR: HCPCS

## 2025-01-02 PROCEDURE — 76705 ECHO EXAM OF ABDOMEN: CPT

## 2025-01-02 PROCEDURE — 80076 HEPATIC FUNCTION PANEL: CPT | Performed by: PHYSICIAN ASSISTANT

## 2025-01-02 PROCEDURE — 97165 OT EVAL LOW COMPLEX 30 MIN: CPT

## 2025-01-02 PROCEDURE — 94761 N-INVAS EAR/PLS OXIMETRY MLT: CPT

## 2025-01-02 PROCEDURE — 85025 COMPLETE CBC W/AUTO DIFF WBC: CPT | Performed by: NURSE PRACTITIONER

## 2025-01-02 PROCEDURE — 80048 BASIC METABOLIC PNL TOTAL CA: CPT | Performed by: NURSE PRACTITIONER

## 2025-01-02 RX ORDER — BUTALBITAL, ACETAMINOPHEN AND CAFFEINE 50; 325; 40 MG/1; MG/1; MG/1
1 TABLET ORAL EVERY 4 HOURS PRN
Status: DISCONTINUED | OUTPATIENT
Start: 2025-01-02 | End: 2025-01-02 | Stop reason: HOSPADM

## 2025-01-02 RX ORDER — BUTALBITAL, ACETAMINOPHEN AND CAFFEINE 50; 325; 40 MG/1; MG/1; MG/1
1 TABLET ORAL EVERY 8 HOURS PRN
Qty: 8 TABLET | Refills: 0 | Status: SHIPPED | OUTPATIENT
Start: 2025-01-02

## 2025-01-02 RX ORDER — BENZONATATE 100 MG/1
100 CAPSULE ORAL 3 TIMES DAILY PRN
Qty: 30 CAPSULE | Refills: 0 | Status: SHIPPED | OUTPATIENT
Start: 2025-01-02

## 2025-01-02 RX ADMIN — PANTOPRAZOLE SODIUM 40 MG: 40 TABLET, DELAYED RELEASE ORAL at 08:58

## 2025-01-02 RX ADMIN — ALBUTEROL SULFATE 2 PUFF: 108 AEROSOL, METERED RESPIRATORY (INHALATION) at 09:21

## 2025-01-02 RX ADMIN — LEVOTHYROXINE SODIUM 100 MCG: 0.1 TABLET ORAL at 05:40

## 2025-01-02 RX ADMIN — FERROUS SULFATE TAB 325 MG (65 MG ELEMENTAL FE) 325 MG: 325 (65 FE) TAB at 07:39

## 2025-01-02 RX ADMIN — Medication 10 ML: at 08:58

## 2025-01-02 RX ADMIN — ALBUTEROL SULFATE 2 PUFF: 108 AEROSOL, METERED RESPIRATORY (INHALATION) at 12:57

## 2025-01-02 RX ADMIN — TRAMADOL HYDROCHLORIDE 50 MG: 50 TABLET, COATED ORAL at 07:44

## 2025-01-02 RX ADMIN — TOPIRAMATE 200 MG: 100 TABLET, FILM COATED ORAL at 08:57

## 2025-01-02 RX ADMIN — ACETAMINOPHEN 650 MG: 325 TABLET, FILM COATED ORAL at 03:08

## 2025-01-02 RX ADMIN — HYDROXYCHLOROQUINE SULFATE 200 MG: 200 TABLET ORAL at 08:57

## 2025-01-02 RX ADMIN — MEMANTINE 10 MG: 10 TABLET ORAL at 08:58

## 2025-01-02 RX ADMIN — ESCITALOPRAM 20 MG: 10 TABLET, FILM COATED ORAL at 08:58

## 2025-01-02 RX ADMIN — ASPIRIN 81 MG: 81 TABLET, COATED ORAL at 08:57

## 2025-01-02 RX ADMIN — BUTALBITAL, ACETAMINOPHEN, AND CAFFEINE 1 TABLET: 50; 325; 40 TABLET ORAL at 13:04

## 2025-01-02 RX ADMIN — GUAIFENESIN 600 MG: 600 TABLET, EXTENDED RELEASE ORAL at 08:57

## 2025-01-02 RX ADMIN — SODIUM BICARBONATE 650 MG: 650 TABLET ORAL at 08:58

## 2025-01-02 RX ADMIN — FOLIC ACID TAB 400 MCG 400 MCG: 400 TAB at 08:58

## 2025-01-02 RX ADMIN — Medication 12.5 MG: at 08:57

## 2025-01-02 NOTE — PLAN OF CARE
"Goal Outcome Evaluation:  Plan of Care Reviewed With: patient           Outcome Evaluation: Natalee \"Margaret\" Aide is a 74 y.o. who presents to [facility] on 1/1/25 complaining of weakness. Respiratory panel positive for COVID. Pt is A&Ox4 and reports PLOF as IND with ADLs, mobility w/o AD, and is an active . Pt lives with her daughter in a H with 2STE. Today she is IND with bed mobility and SBA for transfers and ambulation w/o AD. She demos good dynamic standing balance and her strength/ROM is WFL for ADLs. She has a hip hike on R which she reports is due to soreness after a recent fall, not concerning for fracture. Pt O2 remains >90 throughout eval and all other vitals WNL. Pt appears to be at functional baseline and is safe to return home with daughter upon d/c. PT will sign off at this time.    Anticipated Discharge Disposition (PT): home                        "

## 2025-01-02 NOTE — CASE MANAGEMENT/SOCIAL WORK
Case Management Discharge Note      Final Note: Home         Selected Continued Care - Discharged on 1/2/2025 Admission date: 1/1/2025 - Discharge disposition: Home or Self Care       Transportation Services  Private: Car    Final Discharge Disposition Code: 01 - home or self-care

## 2025-01-02 NOTE — THERAPY EVALUATION
Patient Name: Natalee Noble  : 1950    MRN: [id number]                              Today's Date: 2025       Admit Date: 2025    Visit Dx:     ICD-10-CM ICD-9-CM   1. COVID  U07.1 079.89   2. Weakness  R53.1 780.79   3. Dehydration  E86.0 276.51   4. Intractable periodic headache syndrome  G43.C1 346.21     Patient Active Problem List   Diagnosis    Cerebrovascular accident (CVA)    Paroxysmal A-fib    Hyperlipidemia    Migraines    History of echocardiogram    History of cardiac cath    History of cardiac monitoring    CKD (chronic kidney disease) stage 3, GFR 30-59 ml/min    Allergy to iodine    Adenomatous colon polyp    Epigastric pain    Nausea    Bloating    Belching    Long term current use of anticoagulant therapy    Atrial fibrillation with RVR    Metatarsal fracture, pathologic, left, initial encounter    FH: colon cancer    FH: colon polyps    Hypotensive episode    Acute on chronic renal insufficiency    Elevated troponin    Chronic HFrEF (heart failure with reduced ejection fraction)    Anxiety disorder    Constipation    Hypothyroidism (acquired)    Rheumatoid arthritis involving multiple sites    Atrial fibrillation, persistent    Personal history of fall    Atrial fibrillation    Persistent atrial fibrillation    Presence of Watchman left atrial appendage closure device    Generalized weakness    Non-STEMI (non-ST elevated myocardial infarction)    COVID     Past Medical History:   Diagnosis Date    Anxiety and depression     Arthritis     Back pain     CRF (chronic renal failure), stage 3 (moderate)     Diverticulosis     Essential hypertension     Family history of colon cancer     Family history of colonic polyps     Fractures     R tibia/fibula; L ankle    GERD (gastroesophageal reflux disease)     History of adenomatous polyp of colon     History of cardiac cath     19 left main no significant disease. LAD with no significant disease. LCX no significant disease. RCA no  significant disease.    History of cardiac monitoring     2/01/19 - ZIO PATCH - Underlying rhythm is sinus at 48-94 bpm. PSVT x23 with fastest 167 bpm, longest 24.4 seconds.   10/05/2018- ZIO PATCH - NSR most of time but did have episode of A fib ranging from .    History of colon polyps     History of echocardiogram     9/2018: Left ventricular systolic function is normal. EF 50%. Left ventricular diastolic dysfunction consistent with ipaired relaxation. Left atrial cavity size is mildly dilated. Mild MR. Mild to moderate TR. There is abnormal thickening noted on the RV that may represent a vegetation-this was discussed with Dr. Hammer and Dr. Reilly. No patent foramen ovale. No further work up needed at this time    Hyperlipidemia     Kidney disorder     Migraine     Migraines     Neck pain     Stroke 2018     Past Surgical History:   Procedure Laterality Date    ABDOMINOPLASTY      ATRIAL APPENDAGE EXCLUSION LEFT WITH TRANSESOPHAGEAL ECHOCARDIOGRAM N/A 10/22/2024    Procedure: Atrial Appendage Occlusion;  Surgeon: Sourav Pompa MD;  Location:  [facility];  Service: Cardiovascular;  Laterality: N/A;    BACK SURGERY      L2-5 fusion 2012    CARDIAC CATHETERIZATION N/A 10/29/2024    Procedure: Left Heart Cath, possible pci;  Surgeon: Josemanuel Sheriff MD;  Location:  [facility];  Service: Cardiovascular;  Laterality: N/A;    CARDIAC ELECTROPHYSIOLOGY PROCEDURE Right 10/22/2024    Procedure: Ablation atrial fibrillation;  Surgeon: Sourav Pompa MD;  Location:  [facility];  Service: Cardiovascular;  Laterality: Right;    CHOLECYSTECTOMY      COLONOSCOPY  11/24/2014    Diverticulosis; Repeat 5 years    COLONOSCOPY  11/09/2010    Diverticulosis; Repeat 4 years    COLONOSCOPY  09/18/2007    Dr. Monzon-Normal; Repeat 3 years    COLONOSCOPY  06/02/2005    Dr. Monzon-Diminuitive polypectomy above the cecum; Otherwise normal colonoscopy; Repeat 2  years    COLONOSCOPY N/A 11/01/2019    Diverticulosis in the left colon; Two 5-6mm polyps in the cecum-path shows one tubular adenomatous and on hyperplastic polyp; One 8mm tubular adenomatous polyp in the ascending colon; Two 4-5mm tubular adenomatous polyps in the transverse colon; Repeat 3 years    COLONOSCOPY N/A 6/12/2023    Procedure: COLONOSCOPY WITH ANESTHESIA;  Surgeon: Susan Lord MD;  Location: [facility] ENDOSCOPY;  Service: Gastroenterology;  Laterality: N/A;  preop hx of polyps   postop; polyps   PCP Woody Valentin MD    CYSTOCELE REPAIR      Cystocele and rectocele repair    ENDOSCOPY  09/28/2012    LA grade C esophagitis; HH    ENDOSCOPY N/A 11/01/2019    Small HH; Non-erosive gastritis-biopsied; Normal examined duodenum    ENDOSCOPY N/A 10/13/2024    Procedure: ESOPHAGOGASTRODUODENOSCOPY WITH BIOPSY X 1 AREA;  Surgeon: Miguel Vega MD;  Location: [facility] ENDOSCOPY;  Service: Gastroenterology;  Laterality: N/A;  Hiatal hernia, esophagitis, gastritis    FIBULA FRACTURE SURGERY      HYSTERECTOMY      NECK SURGERY  2012    ACDF C4-7    [facility] TIBIA FRACTURE Right       General Information       Row Name 01/02/25 1344          Physical Therapy Time and Intention    Document Type evaluation  -     Mode of Treatment physical therapy  -       Row Name 01/02/25 1344          General Information    Patient Profile Reviewed yes  -     Prior Level of Function independent:;all household mobility;community mobility;ADL's;driving  -     Existing Precautions/Restrictions no known precautions/restrictions  -     Barriers to Rehab none identified  -       Row Name 01/02/25 1344          Living Environment    People in Home child(siobhan), adult  -       Row Name 01/02/25 1344          Home Main Entrance    Number of Stairs, Main Entrance two  -     Stair Railings, Main Entrance none  -       Row Name 01/02/25 1344          Stairs Within Home, Primary    Number of Stairs, Within  Home, Primary none  -       Row Name 01/02/25 1344          Cognition    Orientation Status (Cognition) oriented x 4  -       Row Name 01/02/25 1344          Safety Issues/Impairments Affecting Functional Mobility    Impairments Affecting Function (Mobility) shortness of breath  -               User Key  (r) = Recorded By, (t) = Taken By, (c) = Cosigned By      Initials Name Provider Type    Alice Dillon, MARTÍN Physical Therapist                   Mobility       Row Name 01/02/25 1349          Bed Mobility    Bed Mobility bed mobility (all) activities  -     All Activities, [place] (Bed Mobility) modified independence  -     Assistive Device (Bed Mobility) bed rails  -       Row Name 01/02/25 1349          Bed-Chair Transfer    Bed-Chair [place] (Transfers) standby assist  -       Row Name 01/02/25 1349          Sit-Stand Transfer    Sit-Stand [place] (Transfers) standby assist  -       Row Name 01/02/25 1349          Gait/Stairs (Locomotion)    [place] Level (Gait) standby assist  -     Patient was able to Ambulate yes  -     Distance in Feet (Gait) 20  -               User Key  (r) = Recorded By, (t) = Taken By, (c) = Cosigned By      Initials Name Provider Type    Alice Dillon, PT Physical Therapist                   Obj/Interventions       Row Name 01/02/25 1357          Range of Motion Comprehensive    General Range of Motion no range of motion deficits identified  -[facility] Name 01/02/25 1357          Strength Comprehensive (MMT)    General Manual Muscle Testing (MMT) Assessment no strength deficits identified  -[facility] Name 01/02/25 1357          Balance    Balance Assessment sitting static balance;sitting dynamic balance;standing static balance;standing dynamic balance  -     Static Sitting Balance independent  -     Dynamic Sitting Balance independent  -     Position, Sitting Balance unsupported;sitting edge of bed  -      "Static Standing Balance independent  -     Dynamic Standing Balance independent  -     Position/Device Used, Standing Balance unsupported  -       Row Name 01/02/25 2674          Sensory Assessment (Somatosensory)    Sensory Assessment (Somatosensory) sensation intact  -               User Key  (r) = Recorded By, (t) = Taken By, (c) = Cosigned By      Initials Name Provider Type     Alice Brand, PT Physical Therapist                   Goals/Plan    No documentation.                  Clinical Impression       Row Name 01/02/25 1351          Pain    Pretreatment Pain Rating 0/10 - no pain  -     Posttreatment Pain Rating 0/10 - no pain  -       Row Name 01/02/25 1355          Plan of Care Review    Plan of Care Reviewed With patient  -     Outcome Evaluation Schritta \"Margaret\" Aide is a 74 y.o. who presents to [facility] on 1/1/25 complaining of weakness. Respiratory panel positive for COVID. Pt is A&Ox4 and reports PLOF as IND with ADLs, mobility w/o AD, and is an active . Pt lives with her daughter in a H with 2STE. Today she is IND with bed mobility and SBA for transfers and ambulation w/o AD. She demos good dynamic standing balance and her strength/ROM is WFL for ADLs. She has a hip hike on R which she reports is due to soreness after a recent fall, not concerning for fracture. Pt O2 remains >90 throughout eval and all other vitals WNL. Pt appears to be at functional baseline and is safe to return home with daughter upon d/c. PT will sign off at this time.  -       Row Name 01/02/25 4474          Therapy Assessment/Plan (PT)    Criteria for Skilled Interventions Met (PT) no  -     Therapy Frequency (PT) evaluation only  -       Row Name 01/02/25 1359          Positioning and Restraints    Pre-Treatment Position in bed  -     Post Treatment Position chair  -     In Chair notified nsg;reclined;call light within reach;encouraged to call for assist  -               User Key  " "(r) = Recorded By, (t) = Taken By, (c) = Cosigned By      Initials Name Provider Type     Alice Brand, PT Physical Therapist                   Outcome Measures    No documentation.                                Physical Therapy Education       Title: PT OT SLP Therapies (Done)       Topic: Physical Therapy (Done)       Point: Mobility training (Done)       Learning Progress Summary            Patient Acceptance, E, VU by  at 1/2/2025 1405                      Point: Body mechanics (Done)       Learning Progress Summary            Patient Acceptance, E, VU by  at 1/2/2025 1405                      Point: Precautions (Done)       Learning Progress Summary            Patient Acceptance, E, VU by  at 1/2/2025 1405                                      User Key       Initials Effective Dates Name Provider Type [facility] 08/12/24 -  Alice Brand, MARTÍN Physical Therapist PT                  PT Recommendation and Plan     Outcome Evaluation: Schritta \"Margaret\" Aide is a 74 y.o. who presents to [facility] on 1/1/25 complaining of weakness. Respiratory panel positive for COVID. Pt is A&Ox4 and reports PLOF as IND with ADLs, mobility w/o AD, and is an active . Pt lives with her daughter in a [facility] with 2STE. Today she is IND with bed mobility and SBA for transfers and ambulation w/o AD. She demos good dynamic standing balance and her strength/ROM is WFL for ADLs. She has a hip hike on R which she reports is due to soreness after a recent fall, not concerning for fracture. Pt O2 remains >90 throughout eval and all other vitals WNL. Pt appears to be at functional baseline and is safe to return home with daughter upon d/c. PT will sign off at this time.     Time Calculation:         PT Charges       Row Name 01/02/25 1406             Time Calculation    Start Time 0922  -      Stop Time 0943  -      Time Calculation (min) 21 min  -      PT Received On 01/02/25  -                User Key  " (r) = Recorded By, (t) = Taken By, (c) = Cosigned By      Initials Name Provider Type     Alice Brand, PT Physical Therapist                  Therapy Charges for Today       Code Description Service Date Service Provider Modifiers Qty    87208831353 HC PT EVAL LOW COMPLEXITY 4 1/2/2025 Alice Brand, PT GP 1            PT G-Codes  AM-PAC 6 Clicks Score (PT): 19  PT Discharge Summary  Anticipated Discharge Disposition (PT): home    Alice Brand, PT  1/2/2025

## 2025-01-02 NOTE — DISCHARGE SUMMARY
[facility]    Woody Valentin MD    CHIEF COMPLAINT:     Weakness/lightheadedness    HISTORY OF PRESENT ILLNESS:    Ms. Noble is a 74 y.o.  presents to [facility] complaining of weakness        History of Present Illness     ED 1/1/25: 74-year-old female states she felt feverish and weak tonight. She has had some slight cough and congestion as well as some dysuria. She reports no chest or abdominal pain or headache or focal numbness or weakness. States she was unable to get up out of the bed due to severe weakness.      Observation 1/1/25: Patient is a 74-year-old female presenting to the hospital with complaints of weakness cough and congestion.  Patient denies fever chest pain or shortness of breath but does report weakness and near syncopal episode at home.      1/2/2025: Patient reports she is feeling improved however she continues to experience a persistent headache with no focal neurologic deficits.  No other new or acute symptoms are noted and cough is beginning to improve            Past Medical History:   Diagnosis Date    Anxiety and depression     Arthritis     Back pain     CRF (chronic renal failure), stage 3 (moderate)     Diverticulosis     Essential hypertension     Family history of colon cancer     Family history of colonic polyps     Fractures     R tibia/fibula; L ankle    GERD (gastroesophageal reflux disease)     History of adenomatous polyp of colon     History of cardiac cath     1/28/19 left main no significant disease. LAD with no significant disease. LCX no significant disease. RCA no significant disease.    History of cardiac monitoring     2/01/19 - ZIO PATCH - Underlying rhythm is sinus at 48-94 bpm. PSVT x23 with fastest 167 bpm, longest 24.4 seconds.   10/05/2018- ZIO PATCH - NSR most of time but did have episode of A fib ranging from .    History of colon polyps     History of echocardiogram     9/2018: Left ventricular systolic  function is normal. EF 50%. Left ventricular diastolic dysfunction consistent with ipaired relaxation. Left atrial cavity size is mildly dilated. Mild MR. Mild to moderate TR. There is abnormal thickening noted on the RV that may represent a vegetation-this was discussed with Dr. Hammer and Dr. Reilly. No patent foramen ovale. No further work up needed at this time    Hyperlipidemia     Kidney disorder     Migraine     Migraines     Neck pain     Stroke 2018     Past Surgical History:   Procedure Laterality Date    ABDOMINOPLASTY      ATRIAL APPENDAGE EXCLUSION LEFT WITH TRANSESOPHAGEAL ECHOCARDIOGRAM N/A 10/22/2024    Procedure: Atrial Appendage Occlusion;  Surgeon: Sourav Pompa MD;  Location:  [facility] CATH INVASIVE [facility];  Service: Cardiovascular;  Laterality: N/A;    BACK SURGERY      L2-5 fusion 2012    CARDIAC CATHETERIZATION N/A 10/29/2024    Procedure: Left Heart Cath, possible pci;  Surgeon: Josemanuel Sheriff MD;  Location: [facility] CATH INVASIVE LOCATION;  Service: Cardiovascular;  Laterality: N/A;    CARDIAC ELECTROPHYSIOLOGY PROCEDURE Right 10/22/2024    Procedure: Ablation atrial fibrillation;  Surgeon: Sourav Pompa MD;  Location: [facility] CATH INVASIVE LOCATION;  Service: Cardiovascular;  Laterality: Right;    CHOLECYSTECTOMY      COLONOSCOPY  11/24/2014    Diverticulosis; Repeat 5 years    COLONOSCOPY  11/09/2010    Diverticulosis; Repeat 4 years    COLONOSCOPY  09/18/2007    Dr. Monzon-Normal; Repeat 3 years    COLONOSCOPY  06/02/2005    Dr. Monzon-Diminuitive polypectomy above the cecum; Otherwise normal colonoscopy; Repeat 2 years    COLONOSCOPY N/A 11/01/2019    Diverticulosis in the left colon; Two 5-6mm polyps in the cecum-path shows one tubular adenomatous and on hyperplastic polyp; One 8mm tubular adenomatous polyp in the ascending colon; Two 4-5mm tubular adenomatous polyps in the transverse colon; Repeat 3 years    COLONOSCOPY N/A 6/12/2023    Procedure: COLONOSCOPY WITH  ANESTHESIA;  Surgeon: Susan Lord MD;  Location:  [facility];  Service: Gastroenterology;  Laterality: N/A;  preop hx of polyps   postop; polyps   PCP Woody Valentin MD    CYSTOCELE REPAIR      Cystocele and rectocele repair    ENDOSCOPY  09/28/2012    LA grade C esophagitis; HH    ENDOSCOPY N/A 11/01/2019    Small HH; Non-erosive gastritis-biopsied; Normal examined duodenum    ENDOSCOPY N/A 10/13/2024    Procedure: ESOPHAGOGASTRODUODENOSCOPY WITH BIOPSY X 1 AREA;  Surgeon: Miguel Vega MD;  Location: [facility] ENDOSCOPY;  Service: Gastroenterology;  Laterality: N/A;  Hiatal hernia, esophagitis, gastritis    FIBULA FRACTURE SURGERY      HYSTERECTOMY      NECK SURGERY  2012    ACDF C4-7    OR[facility] TIBIA FRACTURE Right      Family History   Problem Relation Age of Onset    Colon cancer Father 65    Colon polyps Daughter 38    Esophageal cancer Neg Hx     Liver cancer Neg Hx     Liver disease Neg Hx     Rectal cancer Neg Hx     Stomach cancer Neg Hx      Social History     Tobacco Use    Smoking status: Never    Smokeless tobacco: Never   Vaping Use    Vaping status: Never Used   Substance Use Topics    Alcohol use: No    Drug use: Never     Medications Prior to Admission   Medication Sig Dispense Refill Last Dose/Taking    ALPRAZolam (XANAX) 1 MG tablet Take 1 tablet by mouth 3 (Three) Times a Day As Needed for Anxiety.   Taking As Needed    aspirin 81 MG tablet Take 1 tablet by mouth Daily. 30 tablet 1 12/31/2024 Morning    Cholecalciferol 25 MCG (1000 UT) tablet Take 1 tablet by mouth Daily.   12/31/2024    escitalopram (LEXAPRO) 20 MG tablet Take 1 tablet by mouth Daily.   12/31/2024    ezetimibe (ZETIA) 10 MG tablet Take 1 tablet by mouth Daily. 30 tablet 5 12/31/2024    ferrous sulfate 325 (65 FE) MG tablet Take 1 tablet by mouth Daily With Breakfast for 90 days. 30 tablet 2 12/31/2024    FOLIC ACID PO Take 1 tablet by mouth Daily.   12/31/2024    hydroxychloroquine (PLAQUENIL) 200 MG  tablet Take 1 tablet by mouth 2 (Two) Times a Day.   12/31/2024    levothyroxine (SYNTHROID, LEVOTHROID) 100 MCG tablet Take 1 tablet by mouth Every Morning.   12/31/2024    Lifitegrast (XIIDRA) 5 % ophthalmic solution Administer 1 drop to both eyes 2 (Two) Times a Day.   12/31/2024    memantine (NAMENDA) 10 MG tablet Take 1 tablet by mouth 2 (Two) Times a Day.   12/31/2024    metoprolol tartrate (LOPRESSOR) 25 MG tablet Take 0.5 tablets by mouth 2 (Two) Times a Day.   12/31/2024    pantoprazole (PROTONIX) 40 MG EC tablet Take 1 tablet by mouth Daily.   12/31/2024    sodium bicarbonate 650 MG tablet Take 1 tablet by mouth 2 (Two) Times a Day.   12/31/2024    topiramate (TOPAMAX) 200 MG tablet Take 1 tablet by mouth Daily.   12/31/2024    Denosumab (PROLIA SC) Inject  under the skin into the appropriate area as directed. Every 6 months   More than a month     Allergies:  Benzoin, Iodine, Vancomycin, Amiodarone, Levaquin [levofloxacin], and Sulfa antibiotics    Immunization History   Administered Date(s) Administered    COVID-19 (MODERNA) 1st,2nd,3rd Dose Monovalent 03/30/2021, 04/27/2021           REVIEW OF SYSTEMS:    Review of Systems   Constitutional: Positive for malaise/fatigue. Negative for fever.   HENT:  Positive for congestion.    Eyes: Negative.    Cardiovascular:  Negative for chest pain and dyspnea on exertion.   Respiratory:  Positive for cough.    Endocrine: Negative.    Musculoskeletal:  Positive for back pain.   Gastrointestinal: Negative.    Genitourinary:  Positive for bladder incontinence.   Neurological:  Positive for headaches and weakness.   Psychiatric/Behavioral:  Positive for memory loss.      Vital Signs  Temp:  [98.1 °F (36.7 °C)-98.3 °F (36.8 °C)] 98.3 °F (36.8 °C)  Heart Rate:  [67-85] 69  Resp:  [14-18] 18  BP: (123-153)/(66-86) 144/72          Physical Exam:  Physical Exam  Vitals reviewed.   Constitutional:       General: She is not in acute distress.     Appearance: Normal appearance.  She is normal weight. She is not ill-appearing, toxic-appearing or diaphoretic.   HENT:      Head: Normocephalic.      Right Ear: External ear normal.      Left Ear: External ear normal.      Nose: Nose normal.      Mouth/Throat:      Mouth: Mucous membranes are moist.   Eyes:      Extraocular Movements: Extraocular movements intact.   Cardiovascular:      Rate and Rhythm: Normal rate and regular rhythm.      Pulses: Normal pulses.   Pulmonary:      Effort: Pulmonary effort is normal.      Breath sounds: Normal breath sounds.   Abdominal:      General: Bowel sounds are normal.      Palpations: Abdomen is soft.   Musculoskeletal:         General: Normal range of motion.      Cervical back: Normal range of motion.      Right lower leg: No edema.      Left lower leg: No edema.   Skin:     General: Skin is warm and dry.      Capillary Refill: Capillary refill takes less than 2 seconds.   Neurological:      General: No focal deficit present.      Mental Status: She is alert.   Psychiatric:         Mood and Affect: Mood normal.         Behavior: Behavior normal.         Thought Content: Thought content normal.         Judgment: Judgment normal.         Emotional Behavior:   Normal   Debilities:  None  Results Review:    I reviewed the patient's new clinical results.  Lab Results (most recent)       Procedure Component Value Units Date/Time    Blood Culture - Blood, Arm, Right [[id number]]  (Normal) Collected: 01/01/25 0653    Specimen: Blood from Arm, Right Updated: 01/02/25 0945     Blood Culture No growth at 24 hours    Hepatic Function Panel [[id number]]  (Abnormal) Collected: 01/02/25 0227    Specimen: Blood Updated: 01/02/25 0737     Total Protein 5.7 g/dL      Albumin 3.4 g/dL      ALT (SGPT) 108 U/L      AST (SGOT) 62 U/L      Alkaline Phosphatase 277 U/L      Total Bilirubin 0.7 mg/dL      Bilirubin, Direct 0.5 mg/dL      Bilirubin, Indirect 0.2 mg/dL     Blood Culture - Blood, Wrist, Right [[id number]]  (Normal)  Collected: 01/01/25 0536    Specimen: Blood from Wrist, Right Updated: 01/02/25 0545     Blood Culture No growth at 24 hours    Basic Metabolic Panel [[id number]]  (Abnormal) Collected: 01/02/25 0227    Specimen: Blood Updated: 01/02/25 0317     Glucose 92 mg/dL      BUN 16 mg/dL      Creatinine 0.93 mg/dL      Sodium 140 mmol/L      Potassium 3.5 mmol/L      Chloride 110 mmol/L      CO2 18.3 mmol/L      Calcium 8.1 mg/dL      BUN/Creatinine Ratio 17.2     Anion Gap 11.7 mmol/L      eGFR 64.6 mL/min/1.73     Narrative:      GFR Categories in Chronic Kidney Disease (CKD)      GFR Category          GFR (mL/min/1.73)    Interpretation  G1                     90 or greater         Normal or high (1)  G2                      60-89                Mild decrease (1)  G3a                   45-59                Mild to moderate decrease  G3b                   30-44                Moderate to severe decrease  G4                    15-29                Severe decrease  G5                    14 or less           Kidney failure          (1)In the absence of evidence of kidney disease, neither GFR category G1 or G2 fulfill the criteria for CKD.    eGFR calculation 2021 CKD-EPI creatinine equation, which does not include race as a factor    CBC & Differential [[id number]]  (Abnormal) Collected: 01/02/25 0227    Specimen: Blood Updated: 01/02/25 0304    Narrative:      The following orders were created for panel order CBC & Differential.  Procedure                               Abnormality         Status                     ---------                               -----------         ------                     CBC Auto Differential[[id number]]        Abnormal            Final result                 Please view results for these tests on the individual orders.    CBC Auto Differential [[id number]]  (Abnormal) Collected: 01/02/25 0227    Specimen: Blood Updated: 01/02/25 0304     WBC 3.12 10*3/mm3      RBC 3.59 10*6/mm3      Hemoglobin  11.0 g/dL      Hematocrit 35.7 %      MCV 99.4 fL      MCH 30.6 pg      MCHC 30.8 g/dL      RDW 19.5 %      RDW-SD 71.7 fl      MPV 10.7 fL      Platelets 148 10*3/mm3      Neutrophil % 69.9 %      Lymphocyte % 15.1 %      Monocyte % 13.1 %      Eosinophil % 1.3 %      Basophil % 0.0 %      Immature Grans % 0.6 %      Neutrophils, Absolute 2.18 10*3/mm3      Lymphocytes, Absolute 0.47 10*3/mm3      Monocytes, Absolute 0.41 10*3/mm3      Eosinophils, Absolute 0.04 10*3/mm3      Basophils, Absolute 0.00 10*3/mm3      Immature Grans, Absolute 0.02 10*3/mm3      nRBC 0.0 /100 WBC     Magnesium [[id number]]  (Normal) Collected: 01/01/25 0653    Specimen: Blood from Arm, Right Updated: 01/01/25 0746     Magnesium 2.0 mg/dL     Comprehensive Metabolic Panel [[id number]]  (Abnormal) Collected: 01/01/25 0653    Specimen: Blood from Arm, Right Updated: 01/01/25 0746     Glucose 124 mg/dL      BUN 17 mg/dL      Creatinine 1.17 mg/dL      Sodium 140 mmol/L      Potassium 3.9 mmol/L      Comment: Slight hemolysis detected by analyzer. Result may be falsely elevated.        Chloride 109 mmol/L      CO2 16.9 mmol/L      Calcium 9.4 mg/dL      Total Protein 6.5 g/dL      Albumin 4.0 g/dL      ALT (SGPT) 155 U/L      AST (SGOT) 95 U/L      Comment: Slight hemolysis detected by analyzer. Result may be falsely elevated.        Alkaline Phosphatase 332 U/L      Total Bilirubin 1.0 mg/dL      Globulin 2.5 gm/dL      A/G Ratio 1.6 g/dL      BUN/Creatinine Ratio 14.5     Anion Gap 14.1 mmol/L      eGFR 49.1 mL/min/1.73     Narrative:      GFR Categories in Chronic Kidney Disease (CKD)      GFR Category          GFR (mL/min/1.73)    Interpretation  G1                     90 or greater         Normal or high (1)  G2                      60-89                Mild decrease (1)  G3a                   45-59                Mild to moderate decrease  G3b                   30-44                Moderate to severe decrease  G4                    15-29                 Severe decrease  G5                    14 or less           Kidney failure          (1)In the absence of evidence of kidney disease, neither GFR category G1 or G2 fulfill the criteria for CKD.    eGFR calculation 2021 CKD-EPI creatinine equation, which does not include race as a factor    CK [[id number]]  (Normal) Collected: 01/01/25 0653    Specimen: Blood from Arm, Right Updated: 01/01/25 0733     Creatine Kinase 32 U/L     T4, Free [[id number]]  (Normal) Collected: 01/01/25 0653    Specimen: Blood from Arm, Right Updated: 01/01/25 0733     Free T4 1.56 ng/dL     Respiratory Panel PCR w/COVID-19(SARS-CoV-2) [facility] In-House, NP Swab in UTM/VTM, 2 HR TAT - Swab, Nasopharynx [[id number]]  (Abnormal) Collected: 01/01/25 0546    Specimen: Swab from Nasopharynx Updated: 01/01/25 0655     ADENOVIRUS, PCR Not Detected     Coronavirus 229E Not Detected     Coronavirus HKU1 Not Detected     Coronavirus NL63 Not Detected     Coronavirus OC43 Not Detected     COVID19 Detected     Human Metapneumovirus Not Detected     Human Rhinovirus/Enterovirus Not Detected     Influenza A PCR Not Detected     Influenza B PCR Not Detected     Parainfluenza Virus 1 Not Detected     Parainfluenza Virus 2 Not Detected     Parainfluenza Virus 3 Not Detected     Parainfluenza Virus 4 Not Detected     RSV, PCR Not Detected     Bordetella pertussis pcr Not Detected     Bordetella parapertussis PCR Not Detected     Chlamydophila pneumoniae PCR Not Detected     Mycoplasma pneumo by PCR Not Detected    Narrative:      In the setting of a positive respiratory panel with a viral infection PLUS a negative procalcitonin without other underlying concern for bacterial infection, consider observing off antibiotics or discontinuation of antibiotics and continue supportive care. If the respiratory panel is positive for atypical bacterial infection (Bordetella pertussis, Chlamydophila pneumoniae, or Mycoplasma pneumoniae), consider  antibiotic de-escalation to target atypical bacterial infection.    POC Lactate [[id number]]  (Normal) Collected: 01/01/25 0650    Specimen: Blood Updated: 01/01/25 0652     Lactate 1.1 mmol/L      Comment: Serial Number: [id number]:  [id number]       Urinalysis, Microscopic Only - Straight Cath [[id number]] Collected: 01/01/25 0618    Specimen: Urine from Straight Cath Updated: 01/01/25 0649     RBC, UA 0-2 /HPF      WBC, UA 0-2 /HPF      Comment: Urine culture not indicated.        Bacteria, UA None Seen /HPF      Squamous Epithelial Cells, UA 0-2 /HPF      Hyaline Casts, UA None Seen /LPF      Mucus, UA Trace /HPF      Methodology Manual Light Microscopy    Urine Drug Screen - Straight Cath [[id number]]  (Abnormal) Collected: 01/01/25 0618    Specimen: Urine from Straight Cath Updated: 01/01/25 0642     THC, Screen, Urine Negative     Phencyclidine (PCP), Urine Negative     Cocaine Screen, Urine Negative     Methamphetamine, Ur Negative     Opiate Screen Negative     Amphetamine Screen, Urine Negative     Benzodiazepine Screen, Urine Positive     Tricyclic Antidepressants Screen Negative     Methadone Screen, Urine Negative     Barbiturates Screen, Urine Negative     Oxycodone Screen, Urine Negative     Buprenorphine, Screen, Urine Negative    Narrative:      Cutoff For Drugs Screened:    Amphetamines               500 ng/ml  Barbiturates               200 ng/ml  Benzodiazepines            150 ng/ml  Cocaine                    150 ng/ml  Methadone                  200 ng/ml  Opiates                    100 ng/ml  Phencyclidine               25 ng/ml  THC                         50 ng/ml  Methamphetamine            500 ng/ml  Tricyclic Antidepressants  300 ng/ml  Oxycodone                  100 ng/ml  Buprenorphine               10 ng/ml    The normal value for all drugs tested is negative. This report includes unconfirmed screening results, with the cutoff values listed, to be used for medical treatment purposes  only.  Unconfirmed results must not be used for non-medical purposes such as employment or legal testing.  Clinical consideration should be applied to any drug of abuse test, particularly when unconfirmed results are used.    All urine drugs of abuse requests without chain of custody are for medical screening purposes only.  False positives are possible.      Urinalysis With Culture If Indicated - Straight Cath [[id number]]  (Abnormal) Collected: 01/01/25 0618    Specimen: Urine from Straight Cath Updated: 01/01/25 0638     Color, UA Yellow     Appearance, UA Clear     pH, UA 7.0     Specific Gravity, UA 1.017     Glucose, UA Negative     Ketones, UA Negative     Bilirubin, UA Negative     Blood, UA Negative     Protein, UA 30 mg/dL (1+)     Leuk Esterase, UA Trace     Nitrite, UA Negative     Urobilinogen, UA 1.0 E.U./dL    Narrative:      In absence of clinical symptoms, the presence of pyuria, bacteria, and/or nitrites on the urinalysis result does not correlate with infection.    TSH Rfx On Abnormal To Free T4 [[id number]]  (Abnormal) Collected: 01/01/25 0536    Specimen: Blood Updated: 01/01/25 0617     TSH 0.222 uIU/mL     Ethanol [[id number]] Collected: 01/01/25 0536    Specimen: Blood Updated: 01/01/25 0617     Ethanol % <0.010 %     Narrative:      Plasma Ethanol Clinical Symptoms:    ETOH (%)               Clinical Symptom  .01-.05              No apparent influence  .03-.12              Euphoria, Diminished judgment and attention   .09-.25              Impaired comprehension, Muscle incoordination  .18-.30              Confusion, Staggered gait, Slurred speech  .25-.40              Markedly decreased response to stimuli, unable to stand or                        walk, vomitting, sleep or stupor  .35-.50              Comatose, Anesthesia, Subnormal body temperature        Procalcitonin [[id number]]  (Normal) Collected: 01/01/25 0536    Specimen: Blood Updated: 01/01/25 0617     Procalcitonin 0.16 ng/mL      "Narrative:      As a Marker for Sepsis (Non-Neonates):    1. <0.5 ng/mL represents a low risk of severe sepsis and/or septic shock.  2. >2 ng/mL represents a high risk of severe sepsis and/or septic shock.    As a Marker for Lower Respiratory Tract Infections that require antibiotic therapy:    PCT on Admission    Antibiotic Therapy       6-12 Hrs later    >0.5                Strongly Recommended  >0.25 - <0.5        Recommended   0.1 - 0.25          Discouraged              Remeasure/reassess PCT  <0.1                Strongly Discouraged     Remeasure/reassess PCT    As 28 day mortality risk marker: \"Change in Procalcitonin Result\" (>80% or <=80%) if Day 0 (or Day 1) and Day 4 values are available. Refer to [email or web address]    Change in PCT <=80%  A decrease of PCT levels below or equal to 80% defines a positive change in PCT test result representing a higher risk for 28-day all-cause mortality of patients diagnosed with severe sepsis for septic shock.    Change in PCT >80%  A decrease of PCT levels of more than 80% defines a negative change in PCT result representing a lower risk for 28-day all-cause mortality of patients diagnosed with severe sepsis or septic shock.       CBC & Differential [[id number]]  (Abnormal) Collected: 01/01/25 0536    Specimen: Blood Updated: 01/01/25 0546    Narrative:      The following orders were created for panel order CBC & Differential.  Procedure                               Abnormality         Status                     ---------                               -----------         ------                     CBC Auto Differential[[id number]]        Abnormal            Final result                 Please view results for these tests on the individual orders.    CBC Auto Differential [[id number]]  (Abnormal) Collected: 01/01/25 0536    Specimen: Blood Updated: 01/01/25 0546     WBC 5.34 10*3/mm3      RBC 4.32 10*6/mm3      Hemoglobin 13.2 g/dL      Hematocrit 44.4 %  "     .8 fL      MCH 30.6 pg      MCHC 29.7 g/dL      RDW 19.2 %      RDW-SD 74.0 fl      MPV 10.7 fL      Platelets 164 10*3/mm3      Neutrophil % 89.3 %      Lymphocyte % 4.5 %      Monocyte % 5.2 %      Eosinophil % 0.4 %      Basophil % 0.2 %      Immature Grans % 0.4 %      Neutrophils, Absolute 4.77 10*3/mm3      Lymphocytes, Absolute 0.24 10*3/mm3      Monocytes, Absolute 0.28 10*3/mm3      Eosinophils, Absolute 0.02 10*3/mm3      Basophils, Absolute 0.01 10*3/mm3      Immature Grans, Absolute 0.02 10*3/mm3      nRBC 0.0 /100 WBC     Extra Tubes [[id number]] Collected: 01/01/25 0536    Specimen: Blood, Venous Line Updated: 01/01/25 0545    Narrative:      The following orders were created for panel order Extra Tubes.  Procedure                               Abnormality         Status                     ---------                               -----------         ------                     Gold Top - SST[[id number]]                                   Final result                 Please view results for these tests on the individual orders.    Gold Top - SST [[id number]] Collected: 01/01/25 0536    Specimen: Blood Updated: 01/01/25 0545     Extra Tube Hold for add-ons.     Comment: Auto resulted.               Imaging Results (Most Recent)       Procedure Component Value Units Date/Time    US Abdomen Limited [[id number]] Collected: 01/02/25 0835     Updated: 01/02/25 0839    Narrative:      US ABDOMEN LIMITED    Date of Exam: 1/2/2025 8:15 AM EST    Indication: elevated LFTS.    Comparison: CT abdomen and pelvis 9/5/2012    Technique: Grayscale and color Doppler ultrasound evaluation of the right upper quadrant was performed.      Findings:  LIVER:  The liver appears normal in echogenicity.No focal lesions identified. Normal flow is present within the hepatic vasculature.     GALLBLADDER: The gallbladder is surgically absent. The common bile duct measures 4 mm.    PANCREAS:  Visualized portions are  unremarkable.    RIGHT KIDNEY:  Normal in size with no focal lesions. No evidence of nephrolithiasis or hydronephrosis.          Impression:      Impression:  Unremarkable right upper quadrant ultrasound.        Electronically Signed: Sophie Mcduffie MD    1/2/2025 8:37 AM EST    Workstation ID: [id number]    XR Chest 1 View [[id number]] Collected: 01/01/25 0514     Updated: 01/01/25 0517    Narrative:      XR CHEST 1 VW    Date of Exam: 1/1/2025 4:58 AM EST    Indication: weaknesss    Comparison: 10/25/2024.    Findings:  There are no airspace consolidations. No pleural fluid. No pneumothorax. The pulmonary vasculature appears within normal limits. The cardiac and mediastinal silhouette appear unremarkable. No acute osseous abnormality identified.      Impression:      Impression:  No acute cardiopulmonary process.    Electronically Signed: Leda Arauz MD    1/1/2025 5:14 AM EST    Workstation ID: [id number]          reviewed    ECG/EMG Results (most recent)       Procedure Component Value Units Date/Time    ECG 12 Lead Altered Mental Status [[id number]] Collected: 01/01/25 0453     Updated: 01/02/25 0611     QT Interval 414 ms      QTC Interval 495 ms     Narrative:      HEART RATE=86  bpm  RR Lfiiswqx=274  ms  GA Obulxnks=331  ms  P Horizontal Axis=-16  deg  P Front Axis=40  deg  QRSD Interval=84  ms  QT Hthxwrqz=700  ms  IRfI=550  ms  QRS Axis=52  deg  T Wave Axis=50  deg  - NORMAL ECG -  Sinus rhythm  When compared with ECG of 30-Oct-2024 08:06:11,  Nonspecific significant change  Electronically Signed By: Raghavendra Hidalgo ([facility]) 2025-01-02 06:11:04  Date and Time of Study:2025-01-01 04:53:00    Telemetry Scan [[id number]] Resulted: 01/01/25     Updated: 01/02/25 0835          reviewed        Results for orders placed during the hospital encounter of 12/05/24    Adult Transesophageal Echo (MELISSA) W/ Cont if Necessary Per Protocol    Interpretation Summary    Left ventricular systolic function is normal.    The left  atrial cavity is mildly dilated.    The right atrial cavity is moderate to severely  dilated.    Moderate to severe tricuspid valve regurgitation is present.    27 mm watchman in place. There is complete seal of the left atrial appendage, with no leakage around the appendage, no thrombus noted.      Microbiology Results (last 10 days)       Procedure Component Value - Date/Time    Blood Culture - Blood, Arm, Right [[id number]]  (Normal) Collected: 01/01/25 0653    Lab Status: Preliminary result Specimen: Blood from Arm, Right Updated: 01/02/25 0945     Blood Culture No growth at 24 hours    Respiratory Panel PCR w/COVID-19(SARS-CoV-2) [facility] In-House, NP Swab in UTM/VTM, 2 HR TAT - Swab, Nasopharynx [[id number]]  (Abnormal) Collected: 01/01/25 0546    Lab Status: Final result Specimen: Swab from Nasopharynx Updated: 01/01/25 0655     ADENOVIRUS, PCR Not Detected     Coronavirus 229E Not Detected     Coronavirus HKU1 Not Detected     Coronavirus NL63 Not Detected     Coronavirus OC43 Not Detected     COVID19 Detected     Human Metapneumovirus Not Detected     Human Rhinovirus/Enterovirus Not Detected     Influenza A PCR Not Detected     Influenza B PCR Not Detected     Parainfluenza Virus 1 Not Detected     Parainfluenza Virus 2 Not Detected     Parainfluenza Virus 3 Not Detected     Parainfluenza Virus 4 Not Detected     RSV, PCR Not Detected     Bordetella pertussis pcr Not Detected     Bordetella parapertussis PCR Not Detected     Chlamydophila pneumoniae PCR Not Detected     Mycoplasma pneumo by PCR Not Detected    Narrative:      In the setting of a positive respiratory panel with a viral infection PLUS a negative procalcitonin without other underlying concern for bacterial infection, consider observing off antibiotics or discontinuation of antibiotics and continue supportive care. If the respiratory panel is positive for atypical bacterial infection (Bordetella pertussis, Chlamydophila  pneumoniae, or Mycoplasma pneumoniae), consider antibiotic de-escalation to target atypical bacterial infection.    Blood Culture - Blood, Wrist, Right [[id number]]  (Normal) Collected: 01/01/25 0536    Lab Status: Preliminary result Specimen: Blood from Wrist, Right Updated: 01/02/25 0545     Blood Culture No growth at 24 hours            Assessment & Plan     COVID        COVID-19        Lab Results   Component Value Date     WBC 5.34 01/01/2025     LACTATE 1.1 01/01/2025     PROCALCITO 0.16 01/01/2025   -Chest x-ray: No acute cardiopulmonary disease seen  -CK 32  -Respiratory panel positive for COVID  -Isolation precautions   -Blood cultures pending   -IV fluids   -Breathing treatments, Tessalon and incentive spirometry ordered  -Continue pulse oximetry and telemetry  -PT consulted who recommended no further therapy     History of atrial fibrillation  -Post Watchman   -Continue ASA  -EKG shows sinus rhythm without acute wave change     Elevated LFTs (likely related to COVID-19 infection)        Lab Results   Component Value Date     WBC 5.34 01/01/2025     AST 95 (H) 01/01/2025      (H) 01/01/2025     ALKPHOS 332 (H) 01/01/2025     BILITOT 1.0 01/01/2025     LIPASE 37 10/25/2024   -1/2/2025, ALT: 108, AST: 62, Alk phos: 277  -Abdominal ultrasound reported as unremarkable  -UA: trace leuks and protein but otherwise unremarkable  -Ethanol negative   -Monitor CMP while admitted     CKD        Lab Results   Component Value Date     CREATININE 1.17 (H) 01/01/2025     BUN 17 01/01/2025     BCR 14.5 01/01/2025     EGFR 49.1 (L) 01/01/2025   -Continue sodium bicarbonate   -Avoid nephrotoxic medication IV dye unless urgently needed  -Monitor BMP and I's and O's while admitted     Anemia        Lab Results   Component Value Date     HGB 13.2 01/01/2025     .8 (H) 01/01/2025     MCHC 29.7 (L) 01/01/2025   -Continue folic acid and ferrous sulfate       Hypothyroidism  -Continue levothyroxine  -TSH 0.222, T4  1.56     Rheumatoid arthritis  -Continue Plaquenil     GERD  -Continue PPI     Memory loss  -Continue Namenda     Anxiety Disorder  -Continue Lexapro, Xanax     I discussed the patients findings and my recommendations with patient and nursing staff.     Discharge Diagnosis:      COVID      Hospital Course  Patient is a 74 y.o. female presented with weakness/lightheadedness with cough and an HPI noted above.  WBCs were initially found to be 5.34 with procalcitonin of 0.16 and a lactate of 1.1.  Creatinine was slightly elevated at 1.17 with a BUN of 17 and a BUN/creatinine ratio of 14.4 with ALT elevated 155, AST of 95 with an alk phos of 332.  UA showed 1+ protein and trace leukocyte esterase but was otherwise unremarkable.  Blood cultures were obtained and remain pending and respiratory panel was found to be positive for COVID-19 infection.  Ethanol level was less than 0.010 and UA was positive for benzodiazepines but was otherwise unremarkable.  Chest x-ray showed no acute cardiopulmonary process and ultrasound of abdomen was ordered which was reported as generally unremarkable.  On the morning following admission WBCs had trended down to 3.12 with a decreased lymphocyte count noted on differential.  TSH was somewhat low at 0.222 with a free T4 of 1.56.  Creatinine improved to 0.93 and ALT and AST had also improved to 108 and 62 respectively with an alk phos of 277.  Patient did continue to report some cough and headache and Fioricet was subsequently ordered.  PT evaluated patient who noted she did generally well and was at her baseline recommending no further therapy.  A short course of Fioricet along with Tessalon will be prescribed at discharge.  Patient reports she is from out of town currently visiting family and does not have a PCP in this area.  A repeat CMP order has been placed for this facility and patient was instructed to come to the lab here for reassessment of liver function.  At this time patient is  felt to be in good condition for discharge with close follow-up with her PCP on an outpatient basis.  Her full testing/results and plan were discussed with patient along with concerning/alarm symptoms for which to call 911/return to the ED.  All questions were answered and she verbalizes her understanding and agreement.    Past Medical History:     Past Medical History:   Diagnosis Date    Anxiety and depression     Arthritis     Back pain     CRF (chronic renal failure), stage 3 (moderate)     Diverticulosis     Essential hypertension     Family history of colon cancer     Family history of colonic polyps     Fractures     R tibia/fibula; L ankle    GERD (gastroesophageal reflux disease)     History of adenomatous polyp of colon     History of cardiac cath     1/28/19 left main no significant disease. LAD with no significant disease. LCX no significant disease. RCA no significant disease.    History of cardiac monitoring     2/01/19 - ZIO PATCH - Underlying rhythm is sinus at 48-94 bpm. PSVT x23 with fastest 167 bpm, longest 24.4 seconds.   10/05/2018- ZIO PATCH - NSR most of time but did have episode of A fib ranging from .    History of colon polyps     History of echocardiogram     9/2018: Left ventricular systolic function is normal. EF 50%. Left ventricular diastolic dysfunction consistent with ipaired relaxation. Left atrial cavity size is mildly dilated. Mild MR. Mild to moderate TR. There is abnormal thickening noted on the RV that may represent a vegetation-this was discussed with Dr. Hammer and Dr. Reilly. No patent foramen ovale. No further work up needed at this time    Hyperlipidemia     Kidney disorder     Migraine     Migraines     Neck pain     Stroke 2018       Past Surgical History:     Past Surgical History:   Procedure Laterality Date    ABDOMINOPLASTY      ATRIAL APPENDAGE EXCLUSION LEFT WITH TRANSESOPHAGEAL ECHOCARDIOGRAM N/A 10/22/2024    Procedure: Atrial Appendage Occlusion;  Surgeon:  Sourav Pompa MD;  Location: [facility] CATH INVASIVE LOCATION;  Service: Cardiovascular;  Laterality: N/A;    BACK SURGERY      L2-5 fusion 2012    CARDIAC CATHETERIZATION N/A 10/29/2024    Procedure: Left Heart Cath, possible pci;  Surgeon: Josemanuel Sheriff MD;  Location: [facility] CATH INVASIVE LOCATION;  Service: Cardiovascular;  Laterality: N/A;    CARDIAC ELECTROPHYSIOLOGY PROCEDURE Right 10/22/2024    Procedure: Ablation atrial fibrillation;  Surgeon: Sourav Pompa MD;  Location: [facility] CATH INVASIVE LOCATION;  Service: Cardiovascular;  Laterality: Right;    CHOLECYSTECTOMY      COLONOSCOPY  11/24/2014    Diverticulosis; Repeat 5 years    COLONOSCOPY  11/09/2010    Diverticulosis; Repeat 4 years    COLONOSCOPY  09/18/2007    Dr. Monzon-Normal; Repeat 3 years    COLONOSCOPY  06/02/2005    Dr. Monzon-Diminuitive polypectomy above the cecum; Otherwise normal colonoscopy; Repeat 2 years    COLONOSCOPY N/A 11/01/2019    Diverticulosis in the left colon; Two 5-6mm polyps in the cecum-path shows one tubular adenomatous and on hyperplastic polyp; One 8mm tubular adenomatous polyp in the ascending colon; Two 4-5mm tubular adenomatous polyps in the transverse colon; Repeat 3 years    COLONOSCOPY N/A 6/12/2023    Procedure: COLONOSCOPY WITH ANESTHESIA;  Surgeon: Susan Lord MD;  Location[facility] ENDOSCOPY;  Service: Gastroenterology;  Laterality: N/A;  preop hx of polyps   postop; polyps   PCP Woody Valentin MD    CYSTOCELE REPAIR      Cystocele and rectocele repair    ENDOSCOPY  09/28/2012    LA grade C esophagitis; HH    ENDOSCOPY N/A 11/01/2019    Small HH; Non-erosive gastritis-biopsied; Normal examined duodenum    ENDOSCOPY N/A 10/13/2024    Procedure: ESOPHAGOGASTRODUODENOSCOPY WITH BIOPSY X 1 AREA;  Surgeon: Miguel Vega MD;  Location: [facility] ENDOSCOPY;  Service: Gastroenterology;  Laterality: N/A;  Hiatal hernia, esophagitis, gastritis    FIBULA FRACTURE SURGERY       HYSTERECTOMY      NECK SURGERY  2012    ACDF C4-7    ORIF TIBIA FRACTURE Right        Social History:   Social History     Socioeconomic History    Marital status:    Tobacco Use    Smoking status: Never    Smokeless tobacco: Never   Vaping Use    Vaping status: Never Used   Substance and Sexual Activity    Alcohol use: No    Drug use: Never    Sexual activity: Defer       Procedures Performed         Consults:   Consults       No orders found for last 30 day(s).            Condition on Discharge:     Stable    Discharge Disposition  Home or Self Care    Discharge Medications     Discharge Medications        New Medications        Instructions Start Date   benzonatate 100 MG capsule  Commonly known as: Tessalon Perles   100 mg, Oral, 3 Times Daily PRN      butalbital-acetaminophen-caffeine -40 MG per tablet  Commonly known as: FIORICET, ESGIC   1 tablet, Oral, Every 8 Hours PRN             Continue These Medications        Instructions Start Date   ALPRAZolam 1 MG tablet  Commonly known as: XANAX   1 mg, 3 Times Daily PRN      aspirin 81 MG tablet   81 mg, Oral, Daily      cholecalciferol 25 MCG (1000 UT) tablet  Commonly known as: VITAMIN D3   1,000 Units, Daily      escitalopram 20 MG tablet  Commonly known as: LEXAPRO   20 mg, Daily      ezetimibe 10 MG tablet  Commonly known as: ZETIA   10 mg, Oral, Daily      ferrous sulfate 325 (65 FE) MG tablet   325 mg, Oral, Daily With Breakfast      FOLIC ACID PO   1 tablet, Daily      hydroxychloroquine 200 MG tablet  Commonly known as: PLAQUENIL   1 tablet, 2 Times Daily      levothyroxine 100 MCG tablet  Commonly known as: SYNTHROID, LEVOTHROID   100 mcg, Every Early Morning      Lifitegrast 5 % ophthalmic solution  Commonly known as: XIIDRA   1 drop, 2 Times Daily      memantine 10 MG tablet  Commonly known as: NAMENDA   10 mg, 2 Times Daily      metoprolol tartrate 25 MG tablet  Commonly known as: LOPRESSOR   12.5 mg, 2 Times Daily      pantoprazole 40  MG EC tablet  Commonly known as: PROTONIX   40 mg, Daily      PROLIA SC   Inject  under the skin into the appropriate area as directed. Every 6 months      sodium bicarbonate 650 MG tablet   650 mg, 2 Times Daily      topiramate 200 MG tablet  Commonly known as: TOPAMAX   1 tablet, Daily               Discharge Diet:     Activity at Discharge:     Follow-up Appointments  Future Appointments   Date Time Provider Department Center   2/21/2025  2:15 PM Sourav Pompa MD [facility]     Additional Instructions for the Follow-ups that You Need to Schedule       Discharge Follow-up with PCP   As directed       Currently Documented PCP:    Woody Valentin MD    PCP Phone Number:    [phone]     Follow Up Details: 5 to 7 days                Test Results Pending at Discharge  Pending Results       None             Risk for Readmission (LACE) Score: 10 (1/2/2025  6:00 AM)      Greater than 30 minutes spent in discharge activities for this patient    Signature:Electronically signed by Jareth Han PA-C, 01/02/25, 1:47 PM EST.

## 2025-01-02 NOTE — PLAN OF CARE
Goal Outcome Evaluation:  Plan of Care Reviewed With: patient           Outcome Evaluation: Pt is a 74 y.o. year old female admitted to [facility] 1/1/2024 with weakness, fever, cough. She's been unable to get up from bed due to weakness. Pt tested positive for COVID.     Pmhx significant for renal failure, HTN, HLD, CVA.    At baseline, pt lives with her adult daughter. She is an active  and normally IND with ADLs and IADLs. She denies regular use of any AD for mobility, though does use a shower chair. This date, she appears to have greatly improved with strength since admission. She is able to perform bed mobility with SBA, as well as transfers and functional mobility with SBA without the use of AD. She has returned to IND with ADLs. She is slightly below baseline with functional endurance, though this is expected to improve as she recovers from COVID infection. OT will sign off.    Anticipated Discharge Disposition (OT): home

## 2025-01-02 NOTE — THERAPY EVALUATION
Patient Name: Natalee Noble  : 1950    MRN: [id number]                              Today's Date: 2025       Admit Date: 2025    Visit Dx:     ICD-10-CM ICD-9-CM   1. COVID  U07.1 079.89   2. Weakness  R53.1 780.79   3. Dehydration  E86.0 276.51   4. Intractable periodic headache syndrome  G43.C1 346.21     Patient Active Problem List   Diagnosis    Cerebrovascular accident (CVA)    Paroxysmal A-fib    Hyperlipidemia    Migraines    History of echocardiogram    History of cardiac cath    History of cardiac monitoring    CKD (chronic kidney disease) stage 3, GFR 30-59 ml/min    Allergy to iodine    Adenomatous colon polyp    Epigastric pain    Nausea    Bloating    Belching    Long term current use of anticoagulant therapy    Atrial fibrillation with RVR    Metatarsal fracture, pathologic, left, initial encounter    FH: colon cancer    FH: colon polyps    Hypotensive episode    Acute on chronic renal insufficiency    Elevated troponin    Chronic HFrEF (heart failure with reduced ejection fraction)    Anxiety disorder    Constipation    Hypothyroidism (acquired)    Rheumatoid arthritis involving multiple sites    Atrial fibrillation, persistent    Personal history of fall    Atrial fibrillation    Persistent atrial fibrillation    Presence of Watchman left atrial appendage closure device    Generalized weakness    Non-STEMI (non-ST elevated myocardial infarction)    COVID     Past Medical History:   Diagnosis Date    Anxiety and depression     Arthritis     Back pain     CRF (chronic renal failure), stage 3 (moderate)     Diverticulosis     Essential hypertension     Family history of colon cancer     Family history of colonic polyps     Fractures     R tibia/fibula; L ankle    GERD (gastroesophageal reflux disease)     History of adenomatous polyp of colon     History of cardiac cath     19 left main no significant disease. LAD with no significant disease. LCX no significant disease. RCA no  significant disease.    History of cardiac monitoring     2/01/19 - ZIO PATCH - Underlying rhythm is sinus at 48-94 bpm. PSVT x23 with fastest 167 bpm, longest 24.4 seconds.   10/05/2018- ZIO PATCH - NSR most of time but did have episode of A fib ranging from .    History of colon polyps     History of echocardiogram     9/2018: Left ventricular systolic function is normal. EF 50%. Left ventricular diastolic dysfunction consistent with ipaired relaxation. Left atrial cavity size is mildly dilated. Mild MR. Mild to moderate TR. There is abnormal thickening noted on the RV that may represent a vegetation-this was discussed with Dr. Hammer and Dr. Reilly. No patent foramen ovale. No further work up needed at this time    Hyperlipidemia     Kidney disorder     Migraine     Migraines     Neck pain     Stroke 2018     Past Surgical History:   Procedure Laterality Date    ABDOMINOPLASTY      ATRIAL APPENDAGE EXCLUSION LEFT WITH TRANSESOPHAGEAL ECHOCARDIOGRAM N/A 10/22/2024    Procedure: Atrial Appendage Occlusion;  Surgeon: Sourav Pompa MD;  Location:  [facility];  Service: Cardiovascular;  Laterality: N/A;    BACK SURGERY      L2-5 fusion 2012    CARDIAC CATHETERIZATION N/A 10/29/2024    Procedure: Left Heart Cath, possible pci;  Surgeon: Josemanuel Sheriff MD;  Location:  [facility];  Service: Cardiovascular;  Laterality: N/A;    CARDIAC ELECTROPHYSIOLOGY PROCEDURE Right 10/22/2024    Procedure: Ablation atrial fibrillation;  Surgeon: Sourav Pompa MD;  Location:  [facility];  Service: Cardiovascular;  Laterality: Right;    CHOLECYSTECTOMY      COLONOSCOPY  11/24/2014    Diverticulosis; Repeat 5 years    COLONOSCOPY  11/09/2010    Diverticulosis; Repeat 4 years    COLONOSCOPY  09/18/2007    Dr. Monzon-Normal; Repeat 3 years    COLONOSCOPY  06/02/2005    Dr. Monzon-Diminuitive polypectomy above the cecum; Otherwise normal colonoscopy; Repeat 2  years    COLONOSCOPY N/A 11/01/2019    Diverticulosis in the left colon; Two 5-6mm polyps in the cecum-path shows one tubular adenomatous and on hyperplastic polyp; One 8mm tubular adenomatous polyp in the ascending colon; Two 4-5mm tubular adenomatous polyps in the transverse colon; Repeat 3 years    COLONOSCOPY N/A 6/12/2023    Procedure: COLONOSCOPY WITH ANESTHESIA;  Surgeon: Susan Lord MD;  Location: [facility] ENDOSCOPY;  Service: Gastroenterology;  Laterality: N/A;  preop hx of polyps   postop; polyps   PCP Woody Valentin MD    CYSTOCELE REPAIR      Cystocele and rectocele repair    ENDOSCOPY  09/28/2012    LA grade C esophagitis; HH    ENDOSCOPY N/A 11/01/2019    Small HH; Non-erosive gastritis-biopsied; Normal examined duodenum    ENDOSCOPY N/A 10/13/2024    Procedure: ESOPHAGOGASTRODUODENOSCOPY WITH BIOPSY X 1 AREA;  Surgeon: Miguel Vega MD;  Location: [facility] ENDOSCOPY;  Service: Gastroenterology;  Laterality: N/A;  Hiatal hernia, esophagitis, gastritis    FIBULA FRACTURE SURGERY      HYSTERECTOMY      NECK SURGERY  2012    ACDF C4-7    OR[facility] TIBIA FRACTURE Right       General Information       Row Name 01/02/25 1505          OT Time and Intention    Document Type evaluation  -LS     Mode of Treatment occupational therapy  -LS       Row Name 01/02/25 1505          General Information    Patient Profile Reviewed yes  -LS     Prior Level of Function independent:;ADL's;all household mobility;driving;community mobility  -LS     Existing Precautions/Restrictions no known precautions/restrictions  -LS     Barriers to Rehab none identified  -LS       Row Name 01/02/25 1505          Living Environment    People in Home child(siobhan), adult  -LS       Row Name 01/02/25 1505          Home Main Entrance    Number of Stairs, Main Entrance two  -LS     Stair Railings, Main Entrance none  -LS       Row Name 01/02/25 1505          Stairs Within Home, Primary    Number of Stairs, Within Home, Primary  none  -       Row Name 01/02/25 1505          Cognition    Orientation Status (Cognition) oriented x 4  -       Row Name 01/02/25 1505          Safety Issues/Impairments Affecting Functional Mobility    Impairments Affecting Function (Mobility) endurance/activity tolerance  -               User Key  (r) = Recorded By, (t) = Taken By, (c) = Cosigned By      Initials Name Provider Type    LS Manuelito Priest OT Occupational Therapist                     Mobility/ADL's       Row Name 01/02/25 1505          Bed Mobility    Bed Mobility bed mobility (all) activities  -     All Activities, [place] (Bed Mobility) modified independence  -       Row Name 01/02/25 1505          Transfers    Transfers sit-stand transfer;bed-chair transfer  -       Row Name 01/02/25 1505          Bed-Chair Transfer    Bed-Chair [place] (Transfers) standby assist  -       Row Name 01/02/25 1505          Sit-Stand Transfer    Sit-Stand [place] (Transfers) standby assist  -       Row Name 01/02/25 1505          Functional Mobility    Functional Mobility- Ind. Level standby assist  -     Patient was able to Ambulate yes  -       Row Name 01/02/25 1505          Activities of Daily Living    BADL Assessment/Intervention other (see comments)  Retains ADL independece  -               User Key  (r) = Recorded By, (t) = Taken By, (c) = Cosigned By      Initials Name Provider Type    LS Manuelito Priest OT Occupational Therapist                   Obj/Interventions       Row Name 01/02/25 1508          Sensory Assessment (Somatosensory)    Sensory Assessment (Somatosensory) sensation intact  -       Row Name 01/02/25 1508          Range of Motion Comprehensive    General Range of Motion no range of motion deficits identified  -       Row Name 01/02/25 1508          Strength Comprehensive (MMT)    General Manual Muscle Testing (MMT) Assessment no strength deficits identified  -       Row Name 01/02/25 1508           Balance    Comment, Balance No functional balance deficits noted  -               User Key  (r) = Recorded By, (t) = Taken By, (c) = Cosigned By      Initials Name Provider Type    Manuelito Pressley OT Occupational Therapist                   Goals/Plan    No documentation.                  Clinical Impression       Row Name 01/02/25 1508          Pain Assessment    Pretreatment Pain Rating 7/10  -LS     Posttreatment Pain Rating 7/10  -LS     Pain Location head  -       Row Name 01/02/25 150          Plan of Care Review    Plan of Care Reviewed With patient  -     Outcome Evaluation Pt is a 74 y.o. year old female admitted to [facility] 1/1/2024 with weakness, fever, cough. She's been unable to get up from bed due to weakness. Pt tested positive for COVID.     Pmhx significant for renal failure, HTN, HLD, CVA.    At baseline, pt lives with her adult daughter. She is an active  and normally IND with ADLs and IADLs. She denies regular use of any AD for mobility, though does use a shower chair. This date, she appears to have greatly improved with strength since admission. She is able to perform bed mobility with SBA, as well as transfers and functional mobility with SBA without the use of AD. She has returned to IND with ADLs. She is slightly below baseline with functional endurance, though this is expected to improve as she recovers from COVID infection. OT will sign off.  -       Row Name 01/02/25 1505          Therapy Assessment/Plan (OT)    Criteria for Skilled Therapeutic Interventions Met (OT) no;no problems identified which require skilled intervention  -     Therapy Frequency (OT) evaluation only  -     Predicted Duration of Therapy Intervention (OT) eval only  -       Row Name 01/02/25 1507          Therapy Plan Review/Discharge Plan (OT)    Anticipated Discharge Disposition (OT) home  -       Row Name 01/02/25 1507          Vital Signs    O2 Delivery Pre Treatment room air  -     O2  Delivery Intra Treatment room air  -LS     O2 Delivery Post Treatment room air  -LS     Pre Patient Position Supine  -LS     Intra Patient Position Standing  -LS     Post Patient Position Sitting  -LS       Row Name 01/02/25 1509          Positioning and Restraints    Pre-Treatment Position in bed  -LS     Post Treatment Position chair  -LS     In Chair notified nsg;reclined;call light within reach;encouraged to call for assist;exit alarm on  -LS               User Key  (r) = Recorded By, (t) = Taken By, (c) = Cosigned By      Initials Name Provider Type    Manuelito Pressley OT Occupational Therapist                   Outcome Measures       Row Name 01/02/25 1514          How much help from another is currently needed...    Putting on and taking off regular lower body clothing? 4  -LS     Bathing (including washing, rinsing, and drying) 4  -LS     Toileting (which includes using toilet bed pan or urinal) 4  -LS     Putting on and taking off regular upper body clothing 4  -LS     Taking care of personal grooming (such as brushing teeth) 4  -LS     Eating meals 4  -LS     AM-PAC 6 Clicks Score (OT) 24  -LS       Row Name 01/02/25 1514          Functional Assessment    Outcome Measure Options AM-PAC 6 Clicks Daily Activity (OT)  -LS               User Key  (r) = Recorded By, (t) = Taken By, (c) = Cosigned By      Initials Name Provider Type    Manuelito Pressley OT Occupational Therapist                    Occupational Therapy Education       Title: PT OT SLP Therapies (Resolved)       Topic: Occupational Therapy (Resolved)       Point: ADL training (Resolved)       Description:   Instruct learner(s) on proper safety adaptation and remediation techniques during self care or transfers.   Instruct in proper use of assistive devices.                  Learning Progress Summary            Patient Acceptance, E,TB, VU by KOMAL at 1/2/2025 1514                      Point: Precautions (Resolved)       Description:   Instruct  learner(s) on prescribed precautions during self-care and functional transfers.                  Learning Progress Summary            Patient Acceptance, E,TB, VU by  at 1/2/2025 1514                      Point: Body mechanics (Resolved)       Description:   Instruct learner(s) on proper positioning and spine alignment during self-care, functional mobility activities and/or exercises.                  Learning Progress Summary            Patient Acceptance, E,TB, VU by  at 1/2/2025 1514                                      User Key       Initials Effective Dates Name Provider Type Discipline     09/22/22 -  Manuelito Priest OT Occupational Therapist OT                  OT Recommendation and Plan  Therapy Frequency (OT): evaluation only  Plan of Care Review  Plan of Care Reviewed With: patient  Outcome Evaluation: Pt is a 74 y.o. year old female admitted to [facility] 1/1/2024 with weakness, fever, cough. She's been unable to get up from bed due to weakness. Pt tested positive for COVID.     Pmhx significant for renal failure, HTN, HLD, CVA.    At baseline, pt lives with her adult daughter. She is an active  and normally IND with ADLs and IADLs. She denies regular use of any AD for mobility, though does use a shower chair. This date, she appears to have greatly improved with strength since admission. She is able to perform bed mobility with SBA, as well as transfers and functional mobility with SBA without the use of AD. She has returned to IND with ADLs. She is slightly below baseline with functional endurance, though this is expected to improve as she recovers from COVID infection. OT will sign off.     Time Calculation:         Time Calculation- OT       Row Name 01/02/25 1515             Time Calculation- OT    OT Start Time 0855  -      OT Stop Time 0921  -      OT Time Calculation (min) 26 min  -      OT Received On 01/02/25  -         Untimed Charges    OT Eval/Re-eval Minutes 26  -         Total  Minutes    Untimed Charges Total Minutes 26  -LS       Total Minutes 26  -LS                User Key  (r) = Recorded By, (t) = Taken By, (c) = Cosigned By      Initials Name Provider Type    Manuelito Pressley OT Occupational Therapist                  Therapy Charges for Today       Code Description Service Date Service Provider Modifiers Qty    [id number]  OT EVAL LOW COMPLEXITY 4 1/2/2025 Manuelito Priest OT GO 1                 Manuelito Priest OT  1/2/2025

## 2025-01-02 NOTE — OUTREACH NOTE
Prep Survey      Flowsheet Row Responses   [facility] facility [facility] discharged from? [facility]   Is LACE score < 7 ? No   Eligibility Readm Mgmt   Discharge diagnosis COVID-19   Does the patient have one of the following disease processes/diagnoses(primary or secondary)? Other   Does the patient have Home health ordered? No   Is there a DME ordered? No   Prep survey completed? Yes            Leslie JOLLY - Registered Nurse

## 2025-01-02 NOTE — PLAN OF CARE
Problem: Adult Inpatient Plan of Care  Goal: Absence of Hospital-Acquired Illness or Injury  Intervention: Identify and Manage Fall Risk  Recent Flowsheet Documentation  Taken 1/2/2025 0200 by Jen Barriga, RN  Safety Promotion/Fall Prevention:   assistive device/personal items within reach   clutter free environment maintained   fall prevention program maintained   nonskid shoes/slippers when out of bed   room organization consistent   safety round/check completed  Taken 1/2/2025 0000 by Jen Barriga, RN  Safety Promotion/Fall Prevention:   assistive device/personal items within reach   clutter free environment maintained   fall prevention program maintained   nonskid shoes/slippers when out of bed   room organization consistent   safety round/check completed  Taken 1/1/2025 2200 by Jen Barriga, RN  Safety Promotion/Fall Prevention:   assistive device/personal items within reach   clutter free environment maintained   fall prevention program maintained   nonskid shoes/slippers when out of bed   room organization consistent   safety round/check completed  Taken 1/1/2025 2000 by Jen Barriga RN  Safety Promotion/Fall Prevention:   assistive device/personal items within reach   clutter free environment maintained   fall prevention program maintained   nonskid shoes/slippers when out of bed   room organization consistent   safety round/check completed   Goal Outcome Evaluation:  Plan of Care Reviewed With: patient, child        Progress: improving  Outcome Evaluation: Pt cont to complaint of headaches and back pain , see Mar. ivf is infusing,  will cont to monitor

## 2025-01-06 LAB
BACTERIA SPEC AEROBE CULT: NORMAL
BACTERIA SPEC AEROBE CULT: NORMAL

## 2025-01-07 ENCOUNTER — READMISSION MANAGEMENT (OUTPATIENT)
Dept: CALL CENTER | Facility: HOSPITAL | Age: 75
End: 2025-01-07
Payer: MEDICARE

## 2025-01-07 NOTE — OUTREACH NOTE
Medical Week 1 Survey      Flowsheet Row Responses   [facility] patient discharged from? [facility]   Does the patient have one of the following disease processes/diagnoses(primary or secondary)? Other   Week 1 attempt successful? Yes   Call start time 0914   Call end time 0941   Discharge diagnosis COVID-19   Person spoke with today (if not patient) and relationship patient   Meds reviewed with patient/caregiver? Yes   Is the patient having any side effects they believe may be caused by any medication additions or changes? No   Does the patient have all medications ordered at discharge? Yes   Is the patient taking all medications as directed (includes completed medication regime)? Yes   Does the patient have a primary care provider?  Yes   Does the patient have an appointment with their PCP within 7 days of discharge? No   Comments regarding PCP Patient is from [facility] and has a PCP there. She is presently staying in [place]. Will give PCP hotline number to patient and encouraged her to establish care temporarily until she goes back home.   What is preventing the patient from scheduling follow up appointments within 7 days of discharge? Unsure of when or with whom   Has the patient kept scheduled appointments due by today? No   Psychosocial issues? No   Did the patient receive a copy of their discharge instructions? Yes   Nursing interventions Reviewed instructions with patient   What is the patient's perception of their health status since discharge? Improving   Is the patient/caregiver able to teach back signs and symptoms related to disease process for when to call PCP? Yes   Is the patient/caregiver able to teach back signs and symptoms related to disease process for when to call 911? Yes   Is the patient/caregiver able to teach back the hierarchy of who to call/visit for symptoms/problems? PCP, Specialist, Home health nurse, Urgent Care, ED, 911 Yes   If the patient is a current smoker, are they able to  teach back resources for cessation? Not a smoker   Week 1 call completed? Yes   Graduated Yes   Graduated/Revoked comments Patient is still weak. She is resting at time of call.   Call end time 0941            Michael RIVERA - Registered Nurse   The patient is a 53y Female complaining of abdominal pain.

## 2025-01-09 ENCOUNTER — TELEPHONE (OUTPATIENT)
Dept: CARDIOLOGY | Facility: CLINIC | Age: 75
End: 2025-01-09
Payer: MEDICARE

## 2025-01-09 DIAGNOSIS — I50.22 CHRONIC HFREF (HEART FAILURE WITH REDUCED EJECTION FRACTION): ICD-10-CM

## 2025-01-09 DIAGNOSIS — I21.4 NON-STEMI (NON-ST ELEVATED MYOCARDIAL INFARCTION): Primary | Chronic | ICD-10-CM

## 2025-01-09 DIAGNOSIS — I63.9 CEREBROVASCULAR ACCIDENT (CVA), UNSPECIFIED MECHANISM: ICD-10-CM

## 2025-01-09 DIAGNOSIS — I48.91 ATRIAL FIBRILLATION WITH RVR: ICD-10-CM

## 2025-01-09 NOTE — TELEPHONE ENCOUNTER
----- Message from Sourav Pompa sent at 12/27/2024  4:22 PM EST -----  Sure    Dr PEREIRA  ----- Message -----  From: Agnieszka Ruff MA  Sent: 12/27/2024   2:12 PM EST  To: Sourav MCGOWAN MD    Is it ok to place referral to Cardiac rehab?

## 2025-01-13 ENCOUNTER — TELEPHONE (OUTPATIENT)
Dept: CARDIAC REHAB | Facility: HOSPITAL | Age: 75
End: 2025-01-13
Payer: MEDICARE

## 2025-01-13 NOTE — TELEPHONE ENCOUNTER
Request call back to cardiac rehab at [phone] to review insurance and schedule initial session.

## 2025-01-17 ENCOUNTER — TELEPHONE (OUTPATIENT)
Dept: CARDIAC REHAB | Facility: HOSPITAL | Age: 75
End: 2025-01-17
Payer: MEDICARE

## 2025-01-17 NOTE — TELEPHONE ENCOUNTER
Called to go over Cardiac Rehab benefits and schedule.  Voicemail is not setup .  Unable to leave message. 2nd attempt.

## 2025-01-30 ENCOUNTER — TELEPHONE (OUTPATIENT)
Dept: NEUROLOGY | Age: 75
End: 2025-01-30

## 2025-01-30 NOTE — TELEPHONE ENCOUNTER
Pt called asking if she could schedule appt for botox injection or if she needs an office visit first with Dr. Owens. Please contact pt to discuss, thank you.

## 2025-02-03 NOTE — TELEPHONE ENCOUNTER
I have called and spoke with patient to let her know that she will have to be seen in the office. Before she is scheduled an appointment for Botox.

## 2025-03-28 ENCOUNTER — HOSPITAL ENCOUNTER (OUTPATIENT)
Facility: HOSPITAL | Age: 75
Discharge: HOME | End: 2025-03-28
Attending: EMERGENCY MEDICINE
Payer: MEDICARE

## 2025-03-28 ENCOUNTER — OFFICE VISIT (OUTPATIENT)
Dept: CARDIOLOGY | Facility: CLINIC | Age: 75
End: 2025-03-28
Payer: MEDICARE

## 2025-03-28 VITALS
BODY MASS INDEX: 29.41 KG/M2 | RESPIRATION RATE: 16 BRPM | TEMPERATURE: 97.7 F | HEART RATE: 60 BPM | SYSTOLIC BLOOD PRESSURE: 110 MMHG | HEIGHT: 65 IN | WEIGHT: 176.5 LBS | DIASTOLIC BLOOD PRESSURE: 67 MMHG | OXYGEN SATURATION: 97 %

## 2025-03-28 VITALS
SYSTOLIC BLOOD PRESSURE: 110 MMHG | WEIGHT: 177 LBS | BODY MASS INDEX: 29.49 KG/M2 | OXYGEN SATURATION: 99 % | HEIGHT: 65 IN | DIASTOLIC BLOOD PRESSURE: 68 MMHG | HEART RATE: 57 BPM

## 2025-03-28 DIAGNOSIS — L03.211 CELLULITIS, FACE: Primary | ICD-10-CM

## 2025-03-28 DIAGNOSIS — I63.9 CEREBROVASCULAR ACCIDENT (CVA), UNSPECIFIED MECHANISM: ICD-10-CM

## 2025-03-28 DIAGNOSIS — D50.8 OTHER IRON DEFICIENCY ANEMIA: ICD-10-CM

## 2025-03-28 DIAGNOSIS — I10 PRIMARY HYPERTENSION: ICD-10-CM

## 2025-03-28 DIAGNOSIS — I48.0 PAROXYSMAL ATRIAL FIBRILLATION: Primary | ICD-10-CM

## 2025-03-28 DIAGNOSIS — Z95.818 PRESENCE OF WATCHMAN LEFT ATRIAL APPENDAGE CLOSURE DEVICE: ICD-10-CM

## 2025-03-28 PROCEDURE — 99213 OFFICE O/P EST LOW 20 MIN: CPT | Performed by: NURSE PRACTITIONER

## 2025-03-28 PROCEDURE — G0463 HOSPITAL OUTPT CLINIC VISIT: HCPCS | Performed by: NURSE PRACTITIONER

## 2025-03-28 RX ORDER — METOPROLOL SUCCINATE 25 MG/1
25 TABLET, EXTENDED RELEASE ORAL DAILY
Qty: 90 TABLET | Refills: 1 | Status: SHIPPED | OUTPATIENT
Start: 2025-03-28

## 2025-03-28 RX ORDER — CEPHALEXIN 500 MG/1
500 CAPSULE ORAL 4 TIMES DAILY
Qty: 40 CAPSULE | Refills: 0 | Status: SHIPPED | OUTPATIENT
Start: 2025-03-28 | End: 2025-04-07

## 2025-03-28 RX ORDER — MUPIROCIN 20 MG/G
1 OINTMENT TOPICAL 3 TIMES DAILY
Qty: 15 G | Refills: 0 | Status: SHIPPED | OUTPATIENT
Start: 2025-03-28 | End: 2025-04-04

## 2025-03-28 NOTE — DISCHARGE INSTRUCTIONS
Take the antibiotic as directed and until gone.  Use the ointment to the face 2 times a day or 3 times a day.    Follow-up with emergency department or primary care for worsening symptoms or inability to open the mouth or pain in the eye or development of fever.

## 2025-03-28 NOTE — PROGRESS NOTES
CC--- atrial fibrillation post ablation and watchman implantation    Sub  74-year-old patient had concomitant AF ablation and watchman implantation in 2024  Subsequent MELISSA revealed complete seal of the appendage and anticoagulation has been stopped  Patient has prior history of stroke and history of recurrent anemia with multiple falls in the past  Patient complains of some redness on left side of face which she has noticed this morning possibly from a bite and is going to urgent care for evaluation , denies any fever or chills  Had 1 episode of palpitation less than 20 minutes in several months    My previous history attached below for reference    74-year-old patient was evaluated in the hospital and underwent concomitant AF ablation with watchman implantation October 2024.  Post ablation patient had recurrent admission with UTI and has chronic anemia and underwent iron infusions and underwent cardiac catheterization without significant coronary artery stenosis and troponin elevation was secondary to recent ablation.  Catheterization revealed EF of 55% with mild MR.  Patient has known history of stroke in the past  He had a history of anemia and recurrent falls and henceforth watchman was done simultaneously during AF ablation.  Also has history of memory problems and also has hypothyroidism    Patient completed a rehab and feels much better and she is unable to tolerate metoprolol twice a day  Awaiting to see endocrinologist to adjust her thyroid dose because of normal TSH and T4      Past Medical History:   Diagnosis Date    Anxiety and depression     Arthritis     Back pain     CRF (chronic renal failure), stage 3 (moderate)     Diverticulosis     Essential hypertension     Family history of colon cancer     Family history of colonic polyps     Fractures     R tibia/fibula; L ankle    GERD (gastroesophageal reflux disease)     History of adenomatous polyp of colon     History of cardiac cath     1/28/19 left  main no significant disease. LAD with no significant disease. LCX no significant disease. RCA no significant disease.    History of cardiac monitoring     2/01/19 - ZIO PATCH - Underlying rhythm is sinus at 48-94 bpm. PSVT x23 with fastest 167 bpm, longest 24.4 seconds.   10/05/2018- ZIO PATCH - NSR most of time but did have episode of A fib ranging from .    History of colon polyps     History of echocardiogram     9/2018: Left ventricular systolic function is normal. EF 50%. Left ventricular diastolic dysfunction consistent with ipaired relaxation. Left atrial cavity size is mildly dilated. Mild MR. Mild to moderate TR. There is abnormal thickening noted on the RV that may represent a vegetation-this was discussed with Dr. Hammer and Dr. Reilly. No patent foramen ovale. No further work up needed at this time    Hyperlipidemia     Kidney disorder     Migraine     Migraines     Neck pain     Stroke 2018     Past Surgical History:   Procedure Laterality Date    ABDOMINOPLASTY      ATRIAL APPENDAGE EXCLUSION LEFT WITH TRANSESOPHAGEAL ECHOCARDIOGRAM N/A 10/22/2024    Procedure: Atrial Appendage Occlusion;  Surgeon: Sourav Pompa MD;  Location: [facility] CATH INVASIVE LOCATION;  Service: Cardiovascular;  Laterality: N/A;    BACK SURGERY      L2-5 fusion 2012    CARDIAC CATHETERIZATION N/A 10/29/2024    Procedure: Left Heart Cath, possible pci;  Surgeon: Josemanuel Sheriff MD;  Location:  [facility] CATH INVASIVE LOCATION;  Service: Cardiovascular;  Laterality: N/A;    CARDIAC ELECTROPHYSIOLOGY PROCEDURE Right 10/22/2024    Procedure: Ablation atrial fibrillation;  Surgeon: Sourav Pompa MD;  Location: [facility] CATH INVASIVE LOCATION;  Service: Cardiovascular;  Laterality: Right;    CHOLECYSTECTOMY      COLONOSCOPY  11/24/2014    Diverticulosis; Repeat 5 years    COLONOSCOPY  11/09/2010    Diverticulosis; Repeat 4 years    COLONOSCOPY  09/18/2007    Dr. Monzon-Normal; Repeat 3 years    COLONOSCOPY   06/02/2005    Dr. Monzon-Diminuitive polypectomy above the cecum; Otherwise normal colonoscopy; Repeat 2 years    COLONOSCOPY N/A 11/01/2019    Diverticulosis in the left colon; Two 5-6mm polyps in the cecum-path shows one tubular adenomatous and on hyperplastic polyp; One 8mm tubular adenomatous polyp in the ascending colon; Two 4-5mm tubular adenomatous polyps in the transverse colon; Repeat 3 years    COLONOSCOPY N/A 6/12/2023    Procedure: COLONOSCOPY WITH ANESTHESIA;  Surgeon: Susan Lord MD;  Location: [facility] ENDOSCOPY;  Service: Gastroenterology;  Laterality: N/A;  preop hx of polyps   postop; polyps   PCP Woody Valentin MD    CYSTOCELE REPAIR      Cystocele and rectocele repair    ENDOSCOPY  09/28/2012    LA grade C esophagitis; HH    ENDOSCOPY N/A 11/01/2019    Small HH; Non-erosive gastritis-biopsied; Normal examined duodenum    ENDOSCOPY N/A 10/13/2024    Procedure: ESOPHAGOGASTRODUODENOSCOPY WITH BIOPSY X 1 AREA;  Surgeon: Miguel Vega MD;  Location: [facility] ENDOSCOPY;  Service: Gastroenterology;  Laterality: N/A;  Hiatal hernia, esophagitis, gastritis    FIBULA FRACTURE SURGERY      HYSTERECTOMY      NECK SURGERY  2012    ACDF C4-7    ORIF TIBIA FRACTURE Right            Physical Exam    General:      well developed, well nourished, in no acute distress.    Head:      normocephalic and atraumatic.    Eyes:      PERRL/EOM intact, conjunctivae and sclerae clear without nystagmus.    Neck:      no  thyromegaly, trachea central with normal respiratory effort  Lungs:      clear bilaterally to auscultation.    Heart:       regular rate and rhythm, S1, S2 without murmurs, rubs, or gallops     Psych:      alert and cooperative; normal mood and affect; normal attention span and concentration.            Assessment and plan    Last hemoglobin improved to 11 g.  Platelets are normal.  TSH is normal potassium 4.0 LFTs normalized  Prior concomitant AF ablation Watchman implantation without  complications currently on aspirin  Hypothyroidism on levothyroxine  History of memory problems on Namenda  Hypertension controlled with metoprolol  Mild MR without any significant coronary artery stenosis and prior cardiac catheterization  Hyperlipidemia on Zetia  Prior history of orthostatic symptoms which have improved after rehab  Medications reviewed and several orders placed  Metoprolol refilled with a long-acting medication and prescription sent      ECG 12 Lead    Date/Time: 3/28/2025 10:49 AM  Performed by: Sourav Pompa MD    Authorized by: Sourav Pompa MD  Comparison: compared with previous ECG   Similar to previous ECG  Rhythm: sinus rhythm  Rate: normal  Conduction: conduction normal  QRS axis: normal      Electronically signed by Sourav Pompa MD, 03/28/25, 10:49 AM EDT.

## 2025-03-28 NOTE — FSED PROVIDER NOTE
Subjective   History of Present Illness  74-year-old female presents with redness and mild swelling to the left side of her face that started this morning when she woke up.  She has no pain or tenderness on exam there is minimal warmth.    History provided by:  Patient   used: No        Review of Systems   Constitutional:  Negative for fever.   HENT:  Negative for dental problem, mouth sores, rhinorrhea, sinus pressure, sinus pain and sore throat.    Respiratory:  Negative for cough.    Cardiovascular:  Negative for chest pain.   Gastrointestinal:  Negative for nausea and vomiting.   Skin:  Positive for color change (Redness to the left cheek that started this morning.). Negative for wound.   All other systems reviewed and are negative.      Past Medical History:   Diagnosis Date    Anxiety and depression     Arrhythmia     Arthritis     Atrial fibrillation     Back pain     CRF (chronic renal failure), stage 3 (moderate)     Diverticulosis     Essential hypertension     Family history of colon cancer     Family history of colonic polyps     Fractures     R tibia/fibula; L ankle    GERD (gastroesophageal reflux disease)     History of adenomatous polyp of colon     History of cardiac cath     1/28/19 left main no significant disease. LAD with no significant disease. LCX no significant disease. RCA no significant disease.    History of cardiac monitoring     2/01/19 - ZIO PATCH - Underlying rhythm is sinus at 48-94 bpm. PSVT x23 with fastest 167 bpm, longest 24.4 seconds.   10/05/2018- ZIO PATCH - NSR most of time but did have episode of A fib ranging from .    History of colon polyps     History of echocardiogram     9/2018: Left ventricular systolic function is normal. EF 50%. Left ventricular diastolic dysfunction consistent with ipaired relaxation. Left atrial cavity size is mildly dilated. Mild MR. Mild to moderate TR. There is abnormal thickening noted on the RV that may represent a  vegetation-this was discussed with Dr. Hammer and Dr. Reilly. No patent foramen ovale. No further work up needed at this time    Hyperlipidemia     Kidney disorder     Migraine     Migraines     Neck pain     Stroke 2018       Allergies   Allergen Reactions    Benzoin Anaphylaxis and Swelling     States when used on her neck it shut off her airway  Huge abscesses with surgery      Iodine Other (See Comments)     PT UNABLE TO TELL RN ABOUT REACTION AT THIS TIME, BUT FAMILY STATES PT HAD A REACTION TO BEING CLEANSED WITH IODINE IN SURGERY  IOBAN - used on neck, swelled to the point of shutting off airway    Vancomycin Other (See Comments)     Kidney failure  Vancomycin induced acute kidney injury      Amiodarone Other (See Comments)     Tremor- hand and face     Levaquin [Levofloxacin] Unknown (See Comments)     Unknown    Sulfa Antibiotics        Past Surgical History:   Procedure Laterality Date    ABDOMINOPLASTY      ATRIAL APPENDAGE EXCLUSION LEFT WITH TRANSESOPHAGEAL ECHOCARDIOGRAM N/A 10/22/2024    Procedure: Atrial Appendage Occlusion;  Surgeon: Sourav Pompa MD;  Location: [facility] CATH INVASIVE LOCATION;  Service: Cardiovascular;  Laterality: N/A;    BACK SURGERY      L2-5 fusion 2012    CARDIAC CATHETERIZATION N/A 10/29/2024    Procedure: Left Heart Cath, possible pci;  Surgeon: Josemanuel Sheriff MD;  Location: [facility] CATH INVASIVE LOCATION;  Service: Cardiovascular;  Laterality: N/A;    CARDIAC ELECTROPHYSIOLOGY PROCEDURE Right 10/22/2024    Procedure: Ablation atrial fibrillation;  Surgeon: Sourav Pompa MD;  Location: [facility] CATH INVASIVE LOCATION;  Service: Cardiovascular;  Laterality: Right;    CHOLECYSTECTOMY      COLONOSCOPY  11/24/2014    Diverticulosis; Repeat 5 years    COLONOSCOPY  11/09/2010    Diverticulosis; Repeat 4 years    COLONOSCOPY  09/18/2007    Dr. Monzon-Normal; Repeat 3 years    COLONOSCOPY  06/02/2005    Dr. Monzon-Diminuitive polypectomy above the cecum; Otherwise  normal colonoscopy; Repeat 2 years    COLONOSCOPY N/A 11/01/2019    Diverticulosis in the left colon; Two 5-6mm polyps in the cecum-path shows one tubular adenomatous and on hyperplastic polyp; One 8mm tubular adenomatous polyp in the ascending colon; Two 4-5mm tubular adenomatous polyps in the transverse colon; Repeat 3 years    COLONOSCOPY N/A 6/12/2023    Procedure: COLONOSCOPY WITH ANESTHESIA;  Surgeon: Susan Lord MD;  Location:  [facility] ENDOSCOPY;  Service: Gastroenterology;  Laterality: N/A;  preop hx of polyps   postop; polyps   PCP Woody Valentin MD    CYSTOCELE REPAIR      Cystocele and rectocele repair    ENDOSCOPY  09/28/2012    LA grade C esophagitis; HH    ENDOSCOPY N/A 11/01/2019    Small HH; Non-erosive gastritis-biopsied; Normal examined duodenum    ENDOSCOPY N/A 10/13/2024    Procedure: ESOPHAGOGASTRODUODENOSCOPY WITH BIOPSY X 1 AREA;  Surgeon: Miguel Vega MD;  Location: [facility] ENDOSCOPY;  Service: Gastroenterology;  Laterality: N/A;  Hiatal hernia, esophagitis, gastritis    FIBULA FRACTURE SURGERY      HYSTERECTOMY      NECK SURGERY  2012    ACDF C4-7    ORIF TIBIA FRACTURE Right        Family History   Problem Relation Age of Onset    Colon cancer Father 65    Colon polyps Daughter 38    Heart attack Mother     Hyperlipidemia Mother     Esophageal cancer Neg Hx     Liver cancer Neg Hx     Liver disease Neg Hx     Rectal cancer Neg Hx     Stomach cancer Neg Hx        Social History     Socioeconomic History    Marital status:    Tobacco Use    Smoking status: Never    Smokeless tobacco: Never   Vaping Use    Vaping status: Never Used   Substance and Sexual Activity    Alcohol use: No    Drug use: Never    Sexual activity: Defer           Objective   Physical Exam  Vitals and nursing note reviewed.   Constitutional:       General: She is not in acute distress.     Appearance: Normal appearance. She is not ill-appearing, toxic-appearing or diaphoretic.   HENT:       Right Ear: Tympanic membrane, ear canal and external ear normal.      Left Ear: Tympanic membrane, ear canal and external ear normal.      Nose: Nose normal. No nasal tenderness or congestion.      Mouth/Throat:      Mouth: Mucous membranes are moist. No injury, lacerations, oral lesions or angioedema.      Dentition: No dental tenderness, gingival swelling, dental caries or dental abscesses.      Pharynx: Oropharynx is clear. No oropharyngeal exudate or posterior oropharyngeal erythema.   Pulmonary:      Effort: Pulmonary effort is normal.   Skin:     General: Skin is warm and dry.      Findings: Erythema (Left cheek onset this morning.) present.   Neurological:      Mental Status: She is alert and oriented to person, place, and time.   Psychiatric:         Mood and Affect: Mood normal.         Behavior: Behavior normal.         Procedures           ED Course            [id number] Score: 5                                Medical Decision Making  74-year-old female presents with sudden onset of redness and mild swelling to the left side of her face.  She does not recall any injury and does not recall insect bite.  There is no itching, there is minimal redness and erythema.  Differential diagnoses include insect bite, dental infection, acute sinusitis, facial cellulitis.  I will treat the patient with Keflex.  She also wanted an ointment, mupirocin was ordered.  Reasons for return were discussed with the patient and her daughter.  Both verbalized understanding.    Risk  Prescription drug management.        Final diagnoses:   Cellulitis, face       ED Disposition  ED Disposition       ED Disposition   Discharge    Condition   Stable    Comment   --               Woody Valentin MD  [address] [place] [id number]  [phone]    Schedule an appointment as soon as possible for a visit            Medication List        New Prescriptions      cephalexin 500 MG capsule  Commonly known as: KEFLEX  Take 1  capsule by mouth 4 (Four) Times a Day for 10 days.     mupirocin 2 % ointment  Commonly known as: BACTROBAN  Apply 1 Application topically to the appropriate area as directed 3 (Three) Times a Day for 7 days.               Where to Get Your Medications        These medications were sent to [facility] [id number] [place] - [address] - [phone]  - [phone]   [address] [facility] [place] [address]      Phone: [phone]   cephalexin 500 MG capsule  mupirocin 2 % ointment

## 2025-07-02 ENCOUNTER — TELEPHONE (OUTPATIENT)
Dept: CARDIOLOGY | Facility: CLINIC | Age: 75
End: 2025-07-02
Payer: MEDICARE

## 2025-07-02 NOTE — TELEPHONE ENCOUNTER
"Caller: Natalee Noble \"Margaret\"    Relationship to patient: Self    Best call back number:     Patient is needing: PT STATES HER FEET AND LEGS HAVE BEEN SWELLING AND HAS A BURNING SENSATION IN HER FEET. STATES HER FEET ARE NOT RED BUT FEEL HOT TO THE TOUCH WHEN THIS IS HAPPENING. SWELLING HAS SUBSIDED THIS MORNING. PT STATES THIS IS NOT AN URGENT SITUATION. PLEASE CALL TO ADVISE.           "

## 2025-07-11 ENCOUNTER — TELEPHONE (OUTPATIENT)
Dept: CARDIOLOGY | Facility: CLINIC | Age: 75
End: 2025-07-11

## 2025-07-14 ENCOUNTER — TELEPHONE (OUTPATIENT)
Dept: CARDIOLOGY | Facility: CLINIC | Age: 75
End: 2025-07-14
Payer: MEDICARE

## 2025-07-14 NOTE — TELEPHONE ENCOUNTER
Caller: JESSICA PAREDES    Relationship to patient: Provider    Best call back number: [phone]    Patient is needing: JESSICA FROM [facility] CALLED TO REQUEST RECORDS BE SENT OVER FOR CARDIAC REHAB FOR PT. ORDERS HAVE BEEN SENT, BUT NEED DISCHARGE SUMMARY, MED LIST, AND OFFICE NOTE FAXED W/ REFERRAL TO [phone] TO THE ATTENTION OF JESSICA.

## 2025-08-01 ENCOUNTER — TELEPHONE (OUTPATIENT)
Dept: NEUROLOGY | Age: 75
End: 2025-08-01

## (undated) DEVICE — YANKAUER,BULB TIP WITH VENT: Brand: ARGYLE

## (undated) DEVICE — PK ENDO GI 50

## (undated) DEVICE — PINNACLE INTRODUCER SHEATH: Brand: PINNACLE

## (undated) DEVICE — Device: Brand: DEFENDO AIR/WATER/SUCTION AND BIOPSY VALVE

## (undated) DEVICE — CATH DIAG IMPULSE FL4 6F 100CM

## (undated) DEVICE — Device: Brand: REFERENCE PATCH CARTO 3

## (undated) DEVICE — SINGLE-USE BIOPSY FORCEPS: Brand: RADIAL JAW 4

## (undated) DEVICE — Device

## (undated) DEVICE — PAD E/S GRND SGL/FOIL 9FT/CORD DISP

## (undated) DEVICE — TBG IV DRIP CHAMBER MACRO SGL 72IN

## (undated) DEVICE — OCTA,PERSEID,2-2-2-2-2,D-CURVE: Brand: OCTARAY MAPPING CATHETER

## (undated) DEVICE — Device: Brand: WEBSTER CS

## (undated) DEVICE — CABL PACE BIPOL THRSHLD SHROUD PIN 8FT

## (undated) DEVICE — BITEBLOCK ENDO W/STRAP 60F A/ LF DISP

## (undated) DEVICE — THE SINGLE USE ETRAP – POLYP TRAP IS USED FOR SUCTION RETRIEVAL OF ENDOSCOPICALLY REMOVED POLYPS.: Brand: ETRAP

## (undated) DEVICE — ELECTRD DEFIB M/FUNC PROPADZ RADIOL 2PK

## (undated) DEVICE — INTERFACE CABLE: Brand: CARTO 3 SYSTEM ECO INTERFACE CABLE

## (undated) DEVICE — PULSED FIELD ABLATION CATHETER: Brand: FARAWAVE™

## (undated) DEVICE — ACCESS SHEATH WITH DILATOR: Brand: WATCHMAN FXD CURVE™ ACCESS SYSTEM

## (undated) DEVICE — MASK,OXYGEN,MED CONC,ADLT,7' TUB, UC: Brand: PENDING

## (undated) DEVICE — LN INJ CONTRST FLXCIL HP F/M LL 1200PSI72

## (undated) DEVICE — CUFF,BP,DISP,1 TUBE,ADULT,HP: Brand: MEDLINE

## (undated) DEVICE — SENSR O2 OXIMAX FNGR A/ 18IN NONSTR

## (undated) DEVICE — INTRO PERFORMER CHECKFLO/LG RAD/BND NO/GW 18F .038IN 30CM

## (undated) DEVICE — PK TRY HEART CATH 50

## (undated) DEVICE — THE CHANNEL CLEANING BRUSH IS A NYLON FLEXI BRUSH ATTACHED TO A FLEXIBLE PLASTIC SHEATH DESIGNED TO SAFELY REMOVE DEBRIS FROM FLEXIBLE ENDOSCOPES.

## (undated) DEVICE — ST ACC MICROPUNCTURE STFF/CANN PLAT/TP 4F 21G 40CM

## (undated) DEVICE — DGW .035 MC J3MM 150CM T H AMP: Brand: EMERALD

## (undated) DEVICE — SOUNDSTAR ECO 8F G CATHETER: Brand: SOUNDSTAR

## (undated) DEVICE — BLANKT WARM UNDER/BDY FUL/ACC A/ 90X206CM

## (undated) DEVICE — CATH DIAG IMPULSE PIG 5F 100CM

## (undated) DEVICE — ST DIL 8TO20F

## (undated) DEVICE — NDL PERC 1PRT THNWALL W/BASEPLT 18G 7CM

## (undated) DEVICE — CATHETER CONNECTION CABLE: Brand: FARASTAR™

## (undated) DEVICE — SNAR POLYP CAPTIVATOR RND STFF 2.4 240CM 10MM 1P/U

## (undated) DEVICE — PREF.GUIDING SHEATH W/MULT.CRV: Brand: PREFACE

## (undated) DEVICE — FRCP BX RADJAW4 NDL 2.8 240 STD OG

## (undated) DEVICE — ENDOGATOR AUXILIARY WATER JET CONNECTOR: Brand: ENDOGATOR

## (undated) DEVICE — CATH DIAG IMPULSE PIG .056 6F 110CM

## (undated) DEVICE — SNAR POLYP SENSATION MICRO OVL 13 240X40

## (undated) DEVICE — CONMED SCOPE SAVER BITE BLOCK, 20X27 MM: Brand: SCOPE SAVER

## (undated) DEVICE — TBG SMPL FLTR LINE NASL 02/C02 A/ BX/100

## (undated) DEVICE — Device: Brand: TORAYGUIDE GUIDEWIRE

## (undated) DEVICE — STEERABLE SHEATH CLEAR: Brand: FARADRIVE™

## (undated) DEVICE — CATH DIAG IMPULSE FR4 6F 100CM

## (undated) DEVICE — Device: Brand: RFP-100A CONNECTOR CABLE

## (undated) DEVICE — Device: Brand: NRG TRANSSEPTAL NEEDLE